# Patient Record
Sex: MALE | Race: WHITE | NOT HISPANIC OR LATINO | URBAN - METROPOLITAN AREA
[De-identification: names, ages, dates, MRNs, and addresses within clinical notes are randomized per-mention and may not be internally consistent; named-entity substitution may affect disease eponyms.]

---

## 2017-03-31 PROBLEM — D49.2 NEOPLASM OF UNSPECIFIED BEHAVIOR OF BONE, SOFT TISSUE, AND SKIN: Status: RESOLVED | Noted: 2017-03-17 | Resolved: 2017-03-31

## 2017-09-27 ENCOUNTER — APPOINTMENT (RX ONLY)
Dept: URBAN - METROPOLITAN AREA CLINIC 36 | Facility: CLINIC | Age: 82
Setting detail: DERMATOLOGY
End: 2017-09-27

## 2017-09-27 DIAGNOSIS — Z48.817 ENCOUNTER FOR SURGICAL AFTERCARE FOLLOWING SURGERY ON THE SKIN AND SUBCUTANEOUS TISSUE: ICD-10-CM

## 2017-09-27 PROBLEM — E78.5 HYPERLIPIDEMIA, UNSPECIFIED: Status: ACTIVE | Noted: 2017-09-27

## 2017-09-27 PROBLEM — Z85.828 PERSONAL HISTORY OF OTHER MALIGNANT NEOPLASM OF SKIN: Status: ACTIVE | Noted: 2017-09-27

## 2017-09-27 PROCEDURE — ? POST-OP WOUND CHECK

## 2017-09-27 PROCEDURE — 99024 POSTOP FOLLOW-UP VISIT: CPT

## 2017-09-27 ASSESSMENT — LOCATION DETAILED DESCRIPTION DERM: LOCATION DETAILED: RIGHT MEDIAL CANTHUS

## 2017-09-27 ASSESSMENT — LOCATION SIMPLE DESCRIPTION DERM: LOCATION SIMPLE: RIGHT EYELID

## 2017-09-27 ASSESSMENT — LOCATION ZONE DERM: LOCATION ZONE: EYELID

## 2017-09-27 NOTE — PROCEDURE: POST-OP WOUND CHECK
Add 79784 Cpt? (Important Note: In 2017 The Use Of 19762 Is Being Tracked By Cms To Determine Future Global Period Reimbursement For Global Periods): yes
Detail Level: Detailed
Body Location Override (Optional - Billing Will Still Be Based On Selected Body Map Location If Applicable): right medial canthus

## 2018-01-25 ENCOUNTER — APPOINTMENT (RX ONLY)
Dept: URBAN - METROPOLITAN AREA CLINIC 4 | Facility: CLINIC | Age: 83
Setting detail: DERMATOLOGY
End: 2018-01-25

## 2018-01-25 DIAGNOSIS — L57.0 ACTINIC KERATOSIS: ICD-10-CM

## 2018-01-25 DIAGNOSIS — L81.4 OTHER MELANIN HYPERPIGMENTATION: ICD-10-CM

## 2018-01-25 DIAGNOSIS — D18.0 HEMANGIOMA: ICD-10-CM

## 2018-01-25 DIAGNOSIS — L82.1 OTHER SEBORRHEIC KERATOSIS: ICD-10-CM

## 2018-01-25 DIAGNOSIS — D22 MELANOCYTIC NEVI: ICD-10-CM

## 2018-01-25 PROBLEM — D18.01 HEMANGIOMA OF SKIN AND SUBCUTANEOUS TISSUE: Status: ACTIVE | Noted: 2018-01-25

## 2018-01-25 PROBLEM — D22.5 MELANOCYTIC NEVI OF TRUNK: Status: ACTIVE | Noted: 2018-01-25

## 2018-01-25 PROBLEM — D22.61 MELANOCYTIC NEVI OF RIGHT UPPER LIMB, INCLUDING SHOULDER: Status: ACTIVE | Noted: 2018-01-25

## 2018-01-25 PROBLEM — D22.62 MELANOCYTIC NEVI OF LEFT UPPER LIMB, INCLUDING SHOULDER: Status: ACTIVE | Noted: 2018-01-25

## 2018-01-25 PROCEDURE — 17004 DESTROY PREMAL LESIONS 15/>: CPT

## 2018-01-25 PROCEDURE — 99213 OFFICE O/P EST LOW 20 MIN: CPT | Mod: 25

## 2018-01-25 PROCEDURE — ? LIQUID NITROGEN

## 2018-01-25 PROCEDURE — ? COUNSELING

## 2018-01-25 ASSESSMENT — LOCATION ZONE DERM
LOCATION ZONE: TRUNK
LOCATION ZONE: HAND
LOCATION ZONE: NECK
LOCATION ZONE: EAR
LOCATION ZONE: ARM
LOCATION ZONE: SCALP
LOCATION ZONE: FACE

## 2018-01-25 ASSESSMENT — LOCATION SIMPLE DESCRIPTION DERM
LOCATION SIMPLE: NECK
LOCATION SIMPLE: RIGHT HAND
LOCATION SIMPLE: LEFT UPPER BACK
LOCATION SIMPLE: SCALP
LOCATION SIMPLE: RIGHT WRIST
LOCATION SIMPLE: LEFT TEMPLE
LOCATION SIMPLE: ABDOMEN
LOCATION SIMPLE: CHEST
LOCATION SIMPLE: LEFT CHEEK
LOCATION SIMPLE: LEFT WRIST
LOCATION SIMPLE: RIGHT FOREARM
LOCATION SIMPLE: RIGHT CHEEK
LOCATION SIMPLE: LEFT UPPER ARM
LOCATION SIMPLE: RIGHT FOREHEAD
LOCATION SIMPLE: RIGHT UPPER ARM
LOCATION SIMPLE: RIGHT SCALP
LOCATION SIMPLE: LEFT FOREARM
LOCATION SIMPLE: RIGHT LOWER BACK
LOCATION SIMPLE: LEFT SCALP
LOCATION SIMPLE: RIGHT EAR
LOCATION SIMPLE: LOWER BACK
LOCATION SIMPLE: LEFT HAND
LOCATION SIMPLE: LEFT ELBOW
LOCATION SIMPLE: LEFT OCCIPITAL SCALP
LOCATION SIMPLE: GLABELLA

## 2018-01-25 ASSESSMENT — LOCATION DETAILED DESCRIPTION DERM
LOCATION DETAILED: LEFT MEDIAL TEMPLE
LOCATION DETAILED: GLABELLA
LOCATION DETAILED: LEFT RADIAL DORSAL HAND
LOCATION DETAILED: LEFT DISTAL DORSAL FOREARM
LOCATION DETAILED: LEFT VENTRAL DISTAL FOREARM
LOCATION DETAILED: LEFT ANTECUBITAL SKIN
LOCATION DETAILED: RIGHT ANTERIOR DISTAL UPPER ARM
LOCATION DETAILED: RIGHT INFERIOR FRONTAL SCALP
LOCATION DETAILED: LEFT ANTERIOR DISTAL UPPER ARM
LOCATION DETAILED: LEFT VENTRAL PROXIMAL FOREARM
LOCATION DETAILED: LEFT CENTRAL FRONTAL SCALP
LOCATION DETAILED: RIGHT INFERIOR CENTRAL MALAR CHEEK
LOCATION DETAILED: LEFT SUPERIOR LATERAL NECK
LOCATION DETAILED: LEFT MEDIAL INFERIOR CHEST
LOCATION DETAILED: RIGHT DORSAL WRIST
LOCATION DETAILED: RIGHT LATERAL FOREHEAD
LOCATION DETAILED: RIGHT SUPERIOR MEDIAL MIDBACK
LOCATION DETAILED: EPIGASTRIC SKIN
LOCATION DETAILED: RIGHT CENTRAL LATERAL NECK
LOCATION DETAILED: RIGHT FOREHEAD
LOCATION DETAILED: LEFT SUPERIOR PARIETAL SCALP
LOCATION DETAILED: SUPERIOR LUMBAR SPINE
LOCATION DETAILED: LEFT INFERIOR UPPER BACK
LOCATION DETAILED: RIGHT SUPERIOR HELIX
LOCATION DETAILED: RIGHT CENTRAL MALAR CHEEK
LOCATION DETAILED: RIGHT SUPERIOR PARIETAL SCALP
LOCATION DETAILED: LEFT DORSAL WRIST
LOCATION DETAILED: RIGHT SUPERIOR LATERAL NECK
LOCATION DETAILED: LEFT SUPERIOR LATERAL MALAR CHEEK
LOCATION DETAILED: LEFT SUPERIOR OCCIPITAL SCALP
LOCATION DETAILED: RIGHT VENTRAL PROXIMAL FOREARM
LOCATION DETAILED: RIGHT DISTAL DORSAL FOREARM
LOCATION DETAILED: LEFT PROXIMAL DORSAL FOREARM
LOCATION DETAILED: RIGHT PROXIMAL DORSAL FOREARM
LOCATION DETAILED: RIGHT MEDIAL FRONTAL SCALP
LOCATION DETAILED: RIGHT RADIAL DORSAL HAND

## 2018-07-26 ENCOUNTER — APPOINTMENT (RX ONLY)
Dept: URBAN - METROPOLITAN AREA CLINIC 4 | Facility: CLINIC | Age: 83
Setting detail: DERMATOLOGY
End: 2018-07-26

## 2018-07-26 DIAGNOSIS — L57.0 ACTINIC KERATOSIS: ICD-10-CM

## 2018-07-26 DIAGNOSIS — D22 MELANOCYTIC NEVI: ICD-10-CM

## 2018-07-26 DIAGNOSIS — D18.0 HEMANGIOMA: ICD-10-CM

## 2018-07-26 DIAGNOSIS — L81.4 OTHER MELANIN HYPERPIGMENTATION: ICD-10-CM

## 2018-07-26 DIAGNOSIS — L82.1 OTHER SEBORRHEIC KERATOSIS: ICD-10-CM

## 2018-07-26 PROBLEM — D22.62 MELANOCYTIC NEVI OF LEFT UPPER LIMB, INCLUDING SHOULDER: Status: ACTIVE | Noted: 2018-07-26

## 2018-07-26 PROBLEM — D22.61 MELANOCYTIC NEVI OF RIGHT UPPER LIMB, INCLUDING SHOULDER: Status: ACTIVE | Noted: 2018-07-26

## 2018-07-26 PROBLEM — D22.5 MELANOCYTIC NEVI OF TRUNK: Status: ACTIVE | Noted: 2018-07-26

## 2018-07-26 PROBLEM — D48.5 NEOPLASM OF UNCERTAIN BEHAVIOR OF SKIN: Status: ACTIVE | Noted: 2018-07-26

## 2018-07-26 PROBLEM — D18.01 HEMANGIOMA OF SKIN AND SUBCUTANEOUS TISSUE: Status: ACTIVE | Noted: 2018-07-26

## 2018-07-26 PROCEDURE — 11100: CPT | Mod: 59

## 2018-07-26 PROCEDURE — ? COUNSELING

## 2018-07-26 PROCEDURE — 17004 DESTROY PREMAL LESIONS 15/>: CPT

## 2018-07-26 PROCEDURE — 99213 OFFICE O/P EST LOW 20 MIN: CPT | Mod: 25

## 2018-07-26 PROCEDURE — ? BIOPSY BY SHAVE METHOD

## 2018-07-26 PROCEDURE — ? LIQUID NITROGEN

## 2018-07-26 PROCEDURE — 11101: CPT

## 2018-07-26 ASSESSMENT — LOCATION ZONE DERM
LOCATION ZONE: FACE
LOCATION ZONE: EAR
LOCATION ZONE: ARM
LOCATION ZONE: FINGER
LOCATION ZONE: TRUNK
LOCATION ZONE: SCALP
LOCATION ZONE: HAND
LOCATION ZONE: NECK

## 2018-07-26 ASSESSMENT — LOCATION DETAILED DESCRIPTION DERM
LOCATION DETAILED: RIGHT FOREHEAD
LOCATION DETAILED: LEFT CENTRAL FRONTAL SCALP
LOCATION DETAILED: LEFT SUPERIOR OCCIPITAL SCALP
LOCATION DETAILED: RIGHT SUPERIOR PARIETAL SCALP
LOCATION DETAILED: RIGHT ANTERIOR DISTAL UPPER ARM
LOCATION DETAILED: RIGHT SUPERIOR LATERAL NECK
LOCATION DETAILED: RIGHT PROXIMAL DORSAL SMALL FINGER
LOCATION DETAILED: SUPERIOR LUMBAR SPINE
LOCATION DETAILED: LEFT PROXIMAL DORSAL FOREARM
LOCATION DETAILED: LEFT POSTERIOR NECK
LOCATION DETAILED: LEFT INFERIOR UPPER BACK
LOCATION DETAILED: LEFT VENTRAL DISTAL FOREARM
LOCATION DETAILED: LEFT DISTAL POSTERIOR UPPER ARM
LOCATION DETAILED: RIGHT VENTRAL PROXIMAL FOREARM
LOCATION DETAILED: LEFT VENTRAL PROXIMAL FOREARM
LOCATION DETAILED: RIGHT CENTRAL MALAR CHEEK
LOCATION DETAILED: GLABELLA
LOCATION DETAILED: LEFT ULNAR DORSAL HAND
LOCATION DETAILED: EPIGASTRIC SKIN
LOCATION DETAILED: MID-OCCIPITAL SCALP
LOCATION DETAILED: LEFT PROXIMAL RADIAL DORSAL FOREARM
LOCATION DETAILED: LEFT SUPERIOR HELIX
LOCATION DETAILED: LEFT CENTRAL LATERAL NECK
LOCATION DETAILED: RIGHT LATERAL FOREHEAD
LOCATION DETAILED: LEFT ANTERIOR DISTAL UPPER ARM
LOCATION DETAILED: RIGHT SUPERIOR MEDIAL MIDBACK
LOCATION DETAILED: LEFT SUPERIOR FOREHEAD
LOCATION DETAILED: RIGHT PROXIMAL DORSAL FOREARM
LOCATION DETAILED: LEFT RADIAL DORSAL HAND
LOCATION DETAILED: RIGHT CENTRAL FRONTAL SCALP
LOCATION DETAILED: MID POSTERIOR NECK
LOCATION DETAILED: LEFT LATERAL FOREHEAD
LOCATION DETAILED: LEFT ANTECUBITAL SKIN
LOCATION DETAILED: LEFT LATERAL FRONTAL SCALP
LOCATION DETAILED: LEFT CENTRAL PARIETAL SCALP
LOCATION DETAILED: RIGHT RADIAL DORSAL HAND
LOCATION DETAILED: LEFT DISTAL RADIAL DORSAL FOREARM
LOCATION DETAILED: RIGHT SUPERIOR LATERAL FOREHEAD
LOCATION DETAILED: RIGHT DISTAL DORSAL FOREARM
LOCATION DETAILED: RIGHT SUPERIOR HELIX
LOCATION DETAILED: LEFT MEDIAL INFERIOR CHEST
LOCATION DETAILED: LEFT DISTAL DORSAL FOREARM
LOCATION DETAILED: RIGHT INFERIOR POSTAURICULAR SKIN
LOCATION DETAILED: LEFT OCCIPITAL SCALP
LOCATION DETAILED: LEFT PROXIMAL POSTERIOR UPPER ARM
LOCATION DETAILED: RIGHT INFERIOR LATERAL MALAR CHEEK
LOCATION DETAILED: LEFT MEDIAL FRONTAL SCALP
LOCATION DETAILED: RIGHT SUPERIOR OCCIPITAL SCALP

## 2018-07-26 ASSESSMENT — LOCATION SIMPLE DESCRIPTION DERM
LOCATION SIMPLE: LEFT ELBOW
LOCATION SIMPLE: RIGHT SMALL FINGER
LOCATION SIMPLE: ABDOMEN
LOCATION SIMPLE: POSTERIOR NECK
LOCATION SIMPLE: CHEST
LOCATION SIMPLE: LEFT FOREHEAD
LOCATION SIMPLE: LEFT UPPER BACK
LOCATION SIMPLE: RIGHT FOREHEAD
LOCATION SIMPLE: SCALP
LOCATION SIMPLE: LEFT UPPER ARM
LOCATION SIMPLE: RIGHT UPPER ARM
LOCATION SIMPLE: RIGHT CHEEK
LOCATION SIMPLE: RIGHT LOWER BACK
LOCATION SIMPLE: POSTERIOR SCALP
LOCATION SIMPLE: GLABELLA
LOCATION SIMPLE: LEFT SCALP
LOCATION SIMPLE: NECK
LOCATION SIMPLE: RIGHT SCALP
LOCATION SIMPLE: RIGHT OCCIPITAL SCALP
LOCATION SIMPLE: LOWER BACK
LOCATION SIMPLE: RIGHT EAR
LOCATION SIMPLE: RIGHT HAND
LOCATION SIMPLE: LEFT OCCIPITAL SCALP
LOCATION SIMPLE: LEFT HAND
LOCATION SIMPLE: RIGHT FOREARM
LOCATION SIMPLE: LEFT FOREARM
LOCATION SIMPLE: LEFT EAR

## 2018-07-26 NOTE — PROCEDURE: BIOPSY BY SHAVE METHOD
Anesthesia Type: 1% lidocaine with epinephrine
Lab: [id number]
Detail Level: Detailed
Dressing: bandage
Electrodesiccation Text: The wound bed was treated with electrodesiccation after the biopsy was performed.
Silver Nitrate Text: The wound bed was treated with silver nitrate after the biopsy was performed.
Post-Care Instructions: I reviewed with the patient in detail post-care instructions. Patient is to keep the biopsy site dry overnight, and then apply bacitracin twice daily until healed. Patient may apply hydrogen peroxide soaks to remove any crusting.
Destruction After The Procedure: No
Was A Bandage Applied: Yes
Curettage Text: The wound bed was treated with curettage after the biopsy was performed.
Lab Facility: 
Type Of Destruction Used: Electrodesiccation
X Size Of Lesion In Cm: 0
Notification Instructions: Patient will be notified of biopsy results. However, patient instructed to call the office if not contacted within 2 weeks.
Billing Type: Third-Party Bill
Anesthesia Volume In Cc: 0.5
Wound Care: Vaseline
Size Of Lesion In Cm: 0.8
Biopsy Type: H and E
Depth Of Biopsy: dermis
Consent: Written consent was obtained and risks were reviewed including but not limited to scarring, infection, bleeding, scabbing, incomplete removal, nerve damage and allergy to anesthesia.
Hemostasis: Electrocautery
Biopsy Method: Personna blade
Cryotherapy Text: The wound bed was treated with cryotherapy after the biopsy was performed.
Electrodesiccation And Curettage Text: The wound bed was treated with electrodesiccation and curettage after the biopsy was performed.
Size Of Lesion In Cm: 0.6

## 2018-08-20 ENCOUNTER — APPOINTMENT (RX ONLY)
Dept: URBAN - METROPOLITAN AREA CLINIC 4 | Facility: CLINIC | Age: 83
Setting detail: DERMATOLOGY
End: 2018-08-20

## 2018-08-20 PROBLEM — C44.319 BASAL CELL CARCINOMA OF SKIN OF OTHER PARTS OF FACE: Status: ACTIVE | Noted: 2018-08-20

## 2018-08-20 PROCEDURE — 13132 CMPLX RPR F/C/C/M/N/AX/G/H/F: CPT

## 2018-08-20 PROCEDURE — ? EXCISION

## 2018-08-20 PROCEDURE — 11642 EXC F/E/E/N/L MAL+MRG 1.1-2: CPT

## 2018-08-20 NOTE — PROCEDURE: EXCISION
Hatchet Flap Text: The defect edges were debeveled with a #15 scalpel blade.  Given the location of the defect, shape of the defect and the proximity to free margins a hatchet flap was deemed most appropriate.  Using a sterile surgical marker, an appropriate hatchet flap was drawn incorporating the defect and placing the expected incisions within the relaxed skin tension lines where possible.    The area thus outlined was incised deep to adipose tissue with a #15 scalpel blade.  The skin margins were undermined to an appropriate distance in all directions utilizing iris scissors.
Cartilage Graft Text: The defect edges were debeveled with a #15 scalpel blade.  Given the location of the defect, shape of the defect, the fact the defect involved a full thickness cartilage defect a cartilage graft was deemed most appropriate.  An appropriate donor site was identified, cleansed, and anesthetized. The cartilage graft was then harvested and transferred to the recipient site, oriented appropriately and then sutured into place.  The secondary defect was then repaired using a primary closure.
No Repair - Repaired With Adjacent Surgical Defect Text (Leave Blank If You Do Not Want): After the excision the defect was repaired concurrently with another surgical defect which was in close approximation.
Composite Graft Text: The defect edges were debeveled with a #15 scalpel blade.  Given the location of the defect, shape of the defect, the proximity to free margins and the fact the defect was full thickness a composite graft was deemed most appropriate.  The defect was outline and then transferred to the donor site.  A full thickness graft was then excised from the donor site. The graft was then placed in the primary defect, oriented appropriately and then sutured into place.  The secondary defect was then repaired using a primary closure.
Repair Anesthesia Method: local infiltration
Island Pedicle Flap With Canthal Suspension Text: The defect edges were debeveled with a #15 scalpel blade.  Given the location of the defect, shape of the defect and the proximity to free margins an island pedicle advancement flap was deemed most appropriate.  Using a sterile surgical marker, an appropriate advancement flap was drawn incorporating the defect, outlining the appropriate donor tissue and placing the expected incisions within the relaxed skin tension lines where possible. The area thus outlined was incised deep to adipose tissue with a #15 scalpel blade.  The skin margins were undermined to an appropriate distance in all directions around the primary defect and laterally outward around the island pedicle utilizing iris scissors.  There was minimal undermining beneath the pedicle flap. A suspension suture was placed in the canthal tendon to prevent tension and prevent ectropion.
H Plasty Text: Given the location of the defect, shape of the defect and the proximity to free margins a H-plasty was deemed most appropriate for repair.  Using a sterile surgical marker, the appropriate advancement arms of the H-plasty were drawn incorporating the defect and placing the expected incisions within the relaxed skin tension lines where possible. The area thus outlined was incised deep to adipose tissue with a #15 scalpel blade. The skin margins were undermined to an appropriate distance in all directions utilizing iris scissors.  The opposing advancement arms were then advanced into place in opposite direction and anchored with interrupted buried subcutaneous sutures.
Bill [id number] For Specimen Handling/Conveyance To Laboratory?: no
Bilobed Transposition Flap Text: The defect edges were debeveled with a #15 scalpel blade.  Given the location of the defect and the proximity to free margins a bilobed transposition flap was deemed most appropriate.  Using a sterile surgical marker, an appropriate bilobe flap drawn around the defect.    The area thus outlined was incised deep to adipose tissue with a #15 scalpel blade.  The skin margins were undermined to an appropriate distance in all directions utilizing iris scissors.
Complex Repair And Rotation Flap Text: The defect edges were debeveled with a #15 scalpel blade.  The primary defect was closed partially with a complex linear closure.  Given the location of the remaining defect, shape of the defect and the proximity to free margins a rotation flap was deemed most appropriate for complete closure of the defect.  Using a sterile surgical marker, an appropriate advancement flap was drawn incorporating the defect and placing the expected incisions within the relaxed skin tension lines where possible.    The area thus outlined was incised deep to adipose tissue with a #15 scalpel blade.  The skin margins were undermined to an appropriate distance in all directions utilizing iris scissors.
Show Repair Surgeon Variable: Yes
Z Plasty Text: The lesion was extirpated to the level of the fat with a #15 scalpel blade.  Given the location of the defect, shape of the defect and the proximity to free margins a Z-plasty was deemed most appropriate for repair.  Using a sterile surgical marker, the appropriate transposition arms of the Z-plasty were drawn incorporating the defect and placing the expected incisions within the relaxed skin tension lines where possible.    The area thus outlined was incised deep to adipose tissue with a #15 scalpel blade.  The skin margins were undermined to an appropriate distance in all directions utilizing iris scissors.  The opposing transposition arms were then transposed into place in opposite direction and anchored with interrupted buried subcutaneous sutures.
Detail Level: Detailed
Repair Type: Complex
Post-Care Instructions: I reviewed with the patient in detail post-care instructions:\\n1. Apply bacitracin over the steri-strips.  \\n2. Cut non-stick pad (Telfa) to cover the steri-strips\\n3. Apply tape (hypafix) over the non-stick pad\\n4. Change once per day for 5 days\\n5. Shower with bandage on, change bandage after shower\\n\\nPatient is not to engage in any heavy lifting, exercise, hot tub, or swimming for the next 14 days. Should the patient develop any fevers, chills, bleeding, severe pain patient will contact the office immediately.
Scalpel Size: 15 blade
Curvilinear Excision Additional Text (Leave Blank If You Do Not Want): The margin was drawn around the clinically apparent lesion.  A curvilinear shape was then drawn on the skin incorporating the lesion and margins.  Incisions were then made along these lines to the appropriate tissue plane and the lesion was extirpated.
Advancement-Rotation Flap Text: The defect edges were debeveled with a #15 scalpel blade.  Given the location of the defect, shape of the defect and the proximity to free margins an advancement-rotation flap was deemed most appropriate.  Using a sterile surgical marker, an appropriate flap was drawn incorporating the defect and placing the expected incisions within the relaxed skin tension lines where possible. The area thus outlined was incised deep to adipose tissue with a #15 scalpel blade.  The skin margins were undermined to an appropriate distance in all directions utilizing iris scissors.
Fusiform Excision Additional Text (Leave Blank If You Do Not Want): The margin was drawn around the clinically apparent lesion.  A fusiform shape was then drawn on the skin incorporating the lesion and margins.  Incisions were then made along these lines to the appropriate tissue plane and the lesion was extirpated.
Consent was obtained from the patient. The risks and benefits to therapy were discussed in detail. Specifically, the risks of infection, scarring, bleeding, prolonged wound healing, incomplete removal, allergy to anesthesia, nerve injury and recurrence were addressed. Prior to the procedure, the treatment site was clearly identified and confirmed by the patient. All components of Universal Protocol/PAUSE Rule completed.
Surgeon (Optional): Cullen
Positioning (Leave Blank If You Do Not Want): The patient was placed in a comfortable position exposing the surgical site.
O-L Flap Text: The defect edges were debeveled with a #15 scalpel blade.  Given the location of the defect, shape of the defect and the proximity to free margins an O-L flap was deemed most appropriate.  Using a sterile surgical marker, an appropriate advancement flap was drawn incorporating the defect and placing the expected incisions within the relaxed skin tension lines where possible.    The area thus outlined was incised deep to adipose tissue with a #15 scalpel blade.  The skin margins were undermined to an appropriate distance in all directions utilizing iris scissors.
Bilateral Helical Rim Advancement Flap Text: The defect edges were debeveled with a #15 blade scalpel.  Given the location of the defect and the proximity to free margins (helical rim) a bilateral helical rim advancement flap was deemed most appropriate.  Using a sterile surgical marker, the appropriate advancement flaps were drawn incorporating the defect and placing the expected incisions between the helical rim and antihelix where possible.  The area thus outlined was incised through and through with a #15 scalpel blade.  With a skin hook and iris scissors, the flaps were gently and sharply undermined and freed up.
Paramedian Forehead Flap Text: A decision was made to reconstruct the defect utilizing an interpolation axial flap and a staged reconstruction.  A telfa template was made of the defect.  This telfa template was then used to outline the paramedian forehead pedicle flap.  The donor area for the pedicle flap was then injected with anesthesia.  The flap was excised through the skin and subcutaneous tissue down to the layer of the underlying musculature.  The pedicle flap was carefully excised within this deep plane to maintain its blood supply.  The edges of the donor site were undermined.   The donor site was closed in a primary fashion.  The pedicle was then rotated into position and sutured.  Once the tube was sutured into place, adequate blood supply was confirmed with blanching and refill.  The pedicle was then wrapped with xeroform gauze and dressed appropriately with a telfa and gauze bandage to ensure continued blood supply and protect the attached pedicle.
Size Of Lesion In Cm: 1.3
Skin Substitute Text: The defect edges were debeveled with a #15 scalpel blade.  Given the location of the defect, shape of the defect and the proximity to free margins a skin substitute graft was deemed most appropriate.  The graft material was trimmed to fit the size of the defect. The graft was then placed in the primary defect and oriented appropriately.
Slit Excision Additional Text (Leave Blank If You Do Not Want): A linear line was drawn on the skin overlying the lesion. An incision was made slowly until the lesion was visualized.  Once visualized, the lesion was removed with blunt dissection.
Complex Repair And Modified Advancement Flap Text: The defect edges were debeveled with a #15 scalpel blade.  The primary defect was closed partially with a complex linear closure.  Given the location of the remaining defect, shape of the defect and the proximity to free margins a modified advancement flap was deemed most appropriate for complete closure of the defect.  Using a sterile surgical marker, an appropriate advancement flap was drawn incorporating the defect and placing the expected incisions within the relaxed skin tension lines where possible.    The area thus outlined was incised deep to adipose tissue with a #15 scalpel blade.  The skin margins were undermined to an appropriate distance in all directions utilizing iris scissors.
Tissue Cultured Epidermal Autograft Text: The defect edges were debeveled with a #15 scalpel blade.  Given the location of the defect, shape of the defect and the proximity to free margins a tissue cultured epidermal autograft was deemed most appropriate.  The graft was then trimmed to fit the size of the defect.  The graft was then placed in the primary defect and oriented appropriately.
Lazy S Intermediate Repair Preamble Text (Leave Blank If You Do Not Want): Undermining was performed with blunt dissection.
Xenograft Text: The defect edges were debeveled with a #15 scalpel blade.  Given the location of the defect, shape of the defect and the proximity to free margins a xenograft was deemed most appropriate.  The graft was then trimmed to fit the size of the defect.  The graft was then placed in the primary defect and oriented appropriately.
Secondary Defect Width (In Cm): 0
Anesthesia Volume In Cc: 12
Complex Repair And Tissue Cultured Epidermal Autograft Text: The defect edges were debeveled with a #15 scalpel blade.  The primary defect was closed partially with a complex linear closure.  Given the location of the defect, shape of the defect and the proximity to free margins an tissue cultured epidermal autograft was deemed most appropriate to repair the remaining defect.  The graft was trimmed to fit the size of the remaining defect.  The graft was then placed in the primary defect, oriented appropriately, and sutured into place.
W Plasty Text: The lesion was extirpated to the level of the fat with a #15 scalpel blade.  Given the location of the defect, shape of the defect and the proximity to free margins a W-plasty was deemed most appropriate for repair.  Using a sterile surgical marker, the appropriate transposition arms of the W-plasty were drawn incorporating the defect and placing the expected incisions within the relaxed skin tension lines where possible.    The area thus outlined was incised deep to adipose tissue with a #15 scalpel blade.  The skin margins were undermined to an appropriate distance in all directions utilizing iris scissors.  The opposing transposition arms were then transposed into place in opposite direction and anchored with interrupted buried subcutaneous sutures.
Complex Repair And Dermal Autograft Text: The defect edges were debeveled with a #15 scalpel blade.  The primary defect was closed partially with a complex linear closure.  Given the location of the defect, shape of the defect and the proximity to free margins an dermal autograft was deemed most appropriate to repair the remaining defect.  The graft was trimmed to fit the size of the remaining defect.  The graft was then placed in the primary defect, oriented appropriately, and sutured into place.
Lip Wedge Excision Repair Text: Given the location of the defect and the proximity to free margins a full thickness wedge repair was deemed most appropriate.  Using a sterile surgical marker, the appropriate repair was drawn incorporating the defect and placing the expected incisions perpendicular to the vermilion border.  The vermilion border was also meticulously outlined to ensure appropriate reapproximation during the repair.  The area thus outlined was incised through and through with a #15 scalpel blade.  The muscularis and dermis were reaproximated with deep sutures following hemostasis. Care was taken to realign the vermilion border before proceeding with the superficial closure.  Once the vermilion was realigned the superfical and mucosal closure was finished.
Keystone Flap Text: The defect edges were debeveled with a #15 scalpel blade.  Given the location of the defect, shape of the defect a keystone flap was deemed most appropriate.  Using a sterile surgical marker, an appropriate keystone flap was drawn incorporating the defect, outlining the appropriate donor tissue and placing the expected incisions within the relaxed skin tension lines where possible. The area thus outlined was incised deep to adipose tissue with a #15 scalpel blade.  The skin margins were undermined to an appropriate distance in all directions around the primary defect and laterally outward around the flap utilizing iris scissors.
Helical Rim Advancement Flap Text: The defect edges were debeveled with a #15 blade scalpel.  Given the location of the defect and the proximity to free margins (helical rim) a double helical rim advancement flap was deemed most appropriate.  Using a sterile surgical marker, the appropriate advancement flaps were drawn incorporating the defect and placing the expected incisions between the helical rim and antihelix where possible.  The area thus outlined was incised through and through with a #15 scalpel blade.  With a skin hook and iris scissors, the flaps were gently and sharply undermined and freed up.
Dermal Closure: buried vertical mattress
Complex Repair And M Plasty Text: The defect edges were debeveled with a #15 scalpel blade.  The primary defect was closed partially with a complex linear closure.  Given the location of the remaining defect, shape of the defect and the proximity to free margins an M plasty was deemed most appropriate for complete closure of the defect.  Using a sterile surgical marker, an appropriate advancement flap was drawn incorporating the defect and placing the expected incisions within the relaxed skin tension lines where possible.    The area thus outlined was incised deep to adipose tissue with a #15 scalpel blade.  The skin margins were undermined to an appropriate distance in all directions utilizing iris scissors.
Excisional Biopsy Additional Text (Leave Blank If You Do Not Want): The margin was drawn around the clinically apparent lesion. An elliptical shape was then drawn on the skin incorporating the lesion and margins.  Incisions were then made along these lines to the appropriate tissue plane and the lesion was extirpated.
Complex Repair And A-T Advancement Flap Text: The defect edges were debeveled with a #15 scalpel blade.  The primary defect was closed partially with a complex linear closure.  Given the location of the remaining defect, shape of the defect and the proximity to free margins an A-T advancement flap was deemed most appropriate for complete closure of the defect.  Using a sterile surgical marker, an appropriate advancement flap was drawn incorporating the defect and placing the expected incisions within the relaxed skin tension lines where possible.    The area thus outlined was incised deep to adipose tissue with a #15 scalpel blade.  The skin margins were undermined to an appropriate distance in all directions utilizing iris scissors.
Complex Repair And Double Advancement Flap Text: The defect edges were debeveled with a #15 scalpel blade.  The primary defect was closed partially with a complex linear closure.  Given the location of the remaining defect, shape of the defect and the proximity to free margins a double advancement flap was deemed most appropriate for complete closure of the defect.  Using a sterile surgical marker, an appropriate advancement flap was drawn incorporating the defect and placing the expected incisions within the relaxed skin tension lines where possible.    The area thus outlined was incised deep to adipose tissue with a #15 scalpel blade.  The skin margins were undermined to an appropriate distance in all directions utilizing iris scissors.
Ear Star Wedge Flap Text: The defect edges were debeveled with a #15 blade scalpel.  Given the location of the defect and the proximity to free margins (helical rim) an ear star wedge flap was deemed most appropriate.  Using a sterile surgical marker, the appropriate flap was drawn incorporating the defect and placing the expected incisions between the helical rim and antihelix where possible.  The area thus outlined was incised through and through with a #15 scalpel blade.
Complex Repair And Xenograft Text: The defect edges were debeveled with a #15 scalpel blade.  The primary defect was closed partially with a complex linear closure.  Given the location of the defect, shape of the defect and the proximity to free margins a xenograft was deemed most appropriate to repair the remaining defect.  The graft was trimmed to fit the size of the remaining defect.  The graft was then placed in the primary defect, oriented appropriately, and sutured into place.
Elliptical Excision Additional Text (Leave Blank If You Do Not Want): The margin was drawn around the clinically apparent lesion.  An elliptical shape was then drawn on the skin incorporating the lesion and margins.  Incisions were then made along these lines to the appropriate tissue plane and the lesion was extirpated.
Repair Performed By Another Provider Text (Leave Blank If You Do Not Want): After the tissue was excised the defect was repaired by another provider.
Complex Repair And Rhombic Flap Text: The defect edges were debeveled with a #15 scalpel blade.  The primary defect was closed partially with a complex linear closure.  Given the location of the remaining defect, shape of the defect and the proximity to free margins a rhombic flap was deemed most appropriate for complete closure of the defect.  Using a sterile surgical marker, an appropriate advancement flap was drawn incorporating the defect and placing the expected incisions within the relaxed skin tension lines where possible.    The area thus outlined was incised deep to adipose tissue with a #15 scalpel blade.  The skin margins were undermined to an appropriate distance in all directions utilizing iris scissors.
Wound Care: Bacitracin
Body Location Override (Optional - Billing Will Still Be Based On Selected Body Map Location If Applicable): left central temple
Deep Sutures: 4-0 Vicryl
Epidermal Autograft Text: The defect edges were debeveled with a #15 scalpel blade.  Given the location of the defect, shape of the defect and the proximity to free margins an epidermal autograft was deemed most appropriate.  Using a sterile surgical marker, the primary defect shape was transferred to the donor site. The epidermal graft was then harvested.  The skin graft was then placed in the primary defect and oriented appropriately.
Purse String (Intermediate) Text: Given the location of the defect and the characteristics of the surrounding skin a purse string intermediate closure was deemed most appropriate.  Undermining was performed circumfirentially around the surgical defect.  A purse string suture was then placed and tightened.
Advancement Flap (Single) Text: The defect edges were debeveled with a #15 scalpel blade.  Given the location of the defect and the proximity to free margins a single advancement flap was deemed most appropriate.  Using a sterile surgical marker, an appropriate advancement flap was drawn incorporating the defect and placing the expected incisions within the relaxed skin tension lines where possible.    The area thus outlined was incised deep to adipose tissue with a #15 scalpel blade.  The skin margins were undermined to an appropriate distance in all directions utilizing iris scissors.
V-Y Flap Text: The defect edges were debeveled with a #15 scalpel blade.  Given the location of the defect, shape of the defect and the proximity to free margins a V-Y flap was deemed most appropriate.  Using a sterile surgical marker, an appropriate advancement flap was drawn incorporating the defect and placing the expected incisions within the relaxed skin tension lines where possible.    The area thus outlined was incised deep to adipose tissue with a #15 scalpel blade.  The skin margins were undermined to an appropriate distance in all directions utilizing iris scissors.
Complex Repair And O-T Advancement Flap Text: The defect edges were debeveled with a #15 scalpel blade.  The primary defect was closed partially with a complex linear closure.  Given the location of the remaining defect, shape of the defect and the proximity to free margins an O-T advancement flap was deemed most appropriate for complete closure of the defect.  Using a sterile surgical marker, an appropriate advancement flap was drawn incorporating the defect and placing the expected incisions within the relaxed skin tension lines where possible.    The area thus outlined was incised deep to adipose tissue with a #15 scalpel blade.  The skin margins were undermined to an appropriate distance in all directions utilizing iris scissors.
Dermal Autograft Text: The defect edges were debeveled with a #15 scalpel blade.  Given the location of the defect, shape of the defect and the proximity to free margins a dermal autograft was deemed most appropriate.  Using a sterile surgical marker, the primary defect shape was transferred to the donor site. The area thus outlined was incised deep to adipose tissue with a #15 scalpel blade.  The harvested graft was then trimmed of adipose and epidermal tissue until only dermis was left.  The skin graft was then placed in the primary defect and oriented appropriately.
Complex Repair And Split-Thickness Skin Graft Text: The defect edges were debeveled with a #15 scalpel blade.  The primary defect was closed partially with a complex linear closure.  Given the location of the defect, shape of the defect and the proximity to free margins a split thickness skin graft was deemed most appropriate to repair the remaining defect.  The graft was trimmed to fit the size of the remaining defect.  The graft was then placed in the primary defect, oriented appropriately, and sutured into place.
Melolabial Interpolation Flap Text: A decision was made to reconstruct the defect utilizing an interpolation axial flap and a staged reconstruction.  A telfa template was made of the defect.  This telfa template was then used to outline the melolabial interpolation flap.  The donor area for the pedicle flap was then injected with anesthesia.  The flap was excised through the skin and subcutaneous tissue down to the layer of the underlying musculature.  The pedicle flap was carefully excised within this deep plane to maintain its blood supply.  The edges of the donor site were undermined.   The donor site was closed in a primary fashion.  The pedicle was then rotated into position and sutured.  Once the tube was sutured into place, adequate blood supply was confirmed with blanching and refill.  The pedicle was then wrapped with xeroform gauze and dressed appropriately with a telfa and gauze bandage to ensure continued blood supply and protect the attached pedicle.
Hemostasis: Electrocautery
Undermining Location (Optional): in the superficial subcutaneous fat
Muscle Hinge Flap Text: The defect edges were debeveled with a #15 scalpel blade.  Given the size, depth and location of the defect and the proximity to free margins a muscle hinge flap was deemed most appropriate.  Using a sterile surgical marker, an appropriate hinge flap was drawn incorporating the defect. The area thus outlined was incised with a #15 scalpel blade.  The skin margins were undermined to an appropriate distance in all directions utilizing iris scissors.
Island Pedicle Flap Text: The defect edges were debeveled with a #15 scalpel blade.  Given the location of the defect, shape of the defect and the proximity to free margins an island pedicle advancement flap was deemed most appropriate.  Using a sterile surgical marker, an appropriate advancement flap was drawn incorporating the defect, outlining the appropriate donor tissue and placing the expected incisions within the relaxed skin tension lines where possible.    The area thus outlined was incised deep to adipose tissue with a #15 scalpel blade.  The skin margins were undermined to an appropriate distance in all directions around the primary defect and laterally outward around the island pedicle utilizing iris scissors.  There was minimal undermining beneath the pedicle flap.
Size Of Margin In Cm: 0.2
Lab: [id number]
Posterior Auricular Interpolation Flap Text: A decision was made to reconstruct the defect utilizing an interpolation axial flap and a staged reconstruction.  A telfa template was made of the defect.  This telfa template was then used to outline the posterior auricular interpolation flap.  The donor area for the pedicle flap was then injected with anesthesia.  The flap was excised through the skin and subcutaneous tissue down to the layer of the underlying musculature.  The pedicle flap was carefully excised within this deep plane to maintain its blood supply.  The edges of the donor site were undermined.   The donor site was closed in a primary fashion.  The pedicle was then rotated into position and sutured.  Once the tube was sutured into place, adequate blood supply was confirmed with blanching and refill.  The pedicle was then wrapped with xeroform gauze and dressed appropriately with a telfa and gauze bandage to ensure continued blood supply and protect the attached pedicle.
O-T Plasty Text: The defect edges were debeveled with a #15 scalpel blade.  Given the location of the defect, shape of the defect and the proximity to free margins an O-T plasty was deemed most appropriate.  Using a sterile surgical marker, an appropriate O-T plasty was drawn incorporating the defect and placing the expected incisions within the relaxed skin tension lines where possible.    The area thus outlined was incised deep to adipose tissue with a #15 scalpel blade.  The skin margins were undermined to an appropriate distance in all directions utilizing iris scissors.
Epidermal Sutures: 5-0 Vicryl Rapide
Complex Repair And Single Advancement Flap Text: The defect edges were debeveled with a #15 scalpel blade.  The primary defect was closed partially with a complex linear closure.  Given the location of the remaining defect, shape of the defect and the proximity to free margins a single advancement flap was deemed most appropriate for complete closure of the defect.  Using a sterile surgical marker, an appropriate advancement flap was drawn incorporating the defect and placing the expected incisions within the relaxed skin tension lines where possible.    The area thus outlined was incised deep to adipose tissue with a #15 scalpel blade.  The skin margins were undermined to an appropriate distance in all directions utilizing iris scissors.
Complex Repair Preamble Text (Leave Blank If You Do Not Want): Extensive wide undermining was performed.
Date Of Previous Biopsy (Optional): 7/26/18
Complex Repair And Epidermal Autograft Text: The defect edges were debeveled with a #15 scalpel blade.  The primary defect was closed partially with a complex linear closure.  Given the location of the defect, shape of the defect and the proximity to free margins an epidermal autograft was deemed most appropriate to repair the remaining defect.  The graft was trimmed to fit the size of the remaining defect.  The graft was then placed in the primary defect, oriented appropriately, and sutured into place.
Complex Repair And V-Y Plasty Text: The defect edges were debeveled with a #15 scalpel blade.  The primary defect was closed partially with a complex linear closure.  Given the location of the remaining defect, shape of the defect and the proximity to free margins a V-Y plasty was deemed most appropriate for complete closure of the defect.  Using a sterile surgical marker, an appropriate advancement flap was drawn incorporating the defect and placing the expected incisions within the relaxed skin tension lines where possible.    The area thus outlined was incised deep to adipose tissue with a #15 scalpel blade.  The skin margins were undermined to an appropriate distance in all directions utilizing iris scissors.
Additional Anesthesia Volume In Cc: 6
S Plasty Text: Given the location and shape of the defect, and the orientation of relaxed skin tension lines, an S-plasty was deemed most appropriate for repair.  Using a sterile surgical marker, the appropriate outline of the S-plasty was drawn, incorporating the defect and placing the expected incisions within the relaxed skin tension lines where possible.  The area thus outlined was incised deep to adipose tissue with a #15 scalpel blade.  The skin margins were undermined to an appropriate distance in all directions utilizing iris scissors. The skin flaps were advanced over the defect.  The opposing margins were then approximated with interrupted buried subcutaneous sutures.
Interpolation Flap Text: A decision was made to reconstruct the defect utilizing an interpolation axial flap and a staged reconstruction.  A telfa template was made of the defect.  This telfa template was then used to outline the interpolation flap.  The donor area for the pedicle flap was then injected with anesthesia.  The flap was excised through the skin and subcutaneous tissue down to the layer of the underlying musculature.  The interpolation flap was carefully excised within this deep plane to maintain its blood supply.  The edges of the donor site were undermined.   The donor site was closed in a primary fashion.  The pedicle was then rotated into position and sutured.  Once the tube was sutured into place, adequate blood supply was confirmed with blanching and refill.  The pedicle was then wrapped with xeroform gauze and dressed appropriately with a telfa and gauze bandage to ensure continued blood supply and protect the attached pedicle.
Ftsg Text: The defect edges were debeveled with a #15 scalpel blade.  Given the location of the defect, shape of the defect and the proximity to free margins a full thickness skin graft was deemed most appropriate.  Using a sterile surgical marker, the primary defect shape was transferred to the donor site. The area thus outlined was incised deep to adipose tissue with a #15 scalpel blade.  The harvested graft was then trimmed of adipose tissue until only dermis and epidermis was left.  The skin margins of the secondary defect were undermined to an appropriate distance in all directions utilizing iris scissors.  The secondary defect was closed with interrupted buried subcutaneous sutures.  The skin edges were then re-apposed with running  sutures.  The skin graft was then placed in the primary defect and oriented appropriately.
Rhombic Flap Text: The defect edges were debeveled with a #15 scalpel blade.  Given the location of the defect and the proximity to free margins a rhombic flap was deemed most appropriate.  Using a sterile surgical marker, an appropriate rhombic flap was drawn incorporating the defect.    The area thus outlined was incised deep to adipose tissue with a #15 scalpel blade.  The skin margins were undermined to an appropriate distance in all directions utilizing iris scissors.
Anesthesia Type: 1% lidocaine with epinephrine
Modified Advancement Flap Text: The defect edges were debeveled with a #15 scalpel blade.  Given the location of the defect, shape of the defect and the proximity to free margins a modified advancement flap was deemed most appropriate.  Using a sterile surgical marker, an appropriate advancement flap was drawn incorporating the defect and placing the expected incisions within the relaxed skin tension lines where possible.    The area thus outlined was incised deep to adipose tissue with a #15 scalpel blade.  The skin margins were undermined to an appropriate distance in all directions utilizing iris scissors.
Excision Depth: adipose tissue
Crescentic Advancement Flap Text: The defect edges were debeveled with a #15 scalpel blade.  Given the location of the defect and the proximity to free margins a crescentic advancement flap was deemed most appropriate.  Using a sterile surgical marker, the appropriate advancement flap was drawn incorporating the defect and placing the expected incisions within the relaxed skin tension lines where possible.    The area thus outlined was incised deep to adipose tissue with a #15 scalpel blade.  The skin margins were undermined to an appropriate distance in all directions utilizing iris scissors.
Estimated Blood Loss (Cc): minimal
Alar Island Pedicle Flap Text: The defect edges were debeveled with a #15 scalpel blade.  Given the location of the defect, shape of the defect and the proximity to the alar rim an island pedicle advancement flap was deemed most appropriate.  Using a sterile surgical marker, an appropriate advancement flap was drawn incorporating the defect, outlining the appropriate donor tissue and placing the expected incisions within the nasal ala running parallel to the alar rim. The area thus outlined was incised with a #15 scalpel blade.  The skin margins were undermined minimally to an appropriate distance in all directions around the primary defect and laterally outward around the island pedicle utilizing iris scissors.  There was minimal undermining beneath the pedicle flap.
Dorsal Nasal Flap Text: The defect edges were debeveled with a #15 scalpel blade.  Given the location of the defect and the proximity to free margins a dorsal nasal flap was deemed most appropriate.  Using a sterile surgical marker, an appropriate dorsal nasal flap was drawn around the defect.    The area thus outlined was incised deep to adipose tissue with a #15 scalpel blade.  The skin margins were undermined to an appropriate distance in all directions utilizing iris scissors.
Rotation Flap Text: The defect edges were debeveled with a #15 scalpel blade.  Given the location of the defect, shape of the defect and the proximity to free margins a rotation flap was deemed most appropriate.  Using a sterile surgical marker, an appropriate rotation flap was drawn incorporating the defect and placing the expected incisions within the relaxed skin tension lines where possible.    The area thus outlined was incised deep to adipose tissue with a #15 scalpel blade.  The skin margins were undermined to an appropriate distance in all directions utilizing iris scissors.
Complex Repair And Z Plasty Text: The defect edges were debeveled with a #15 scalpel blade.  The primary defect was closed partially with a complex linear closure.  Given the location of the remaining defect, shape of the defect and the proximity to free margins a Z plasty was deemed most appropriate for complete closure of the defect.  Using a sterile surgical marker, an appropriate advancement flap was drawn incorporating the defect and placing the expected incisions within the relaxed skin tension lines where possible.    The area thus outlined was incised deep to adipose tissue with a #15 scalpel blade.  The skin margins were undermined to an appropriate distance in all directions utilizing iris scissors.
Double Island Pedicle Flap Text: The defect edges were debeveled with a #15 scalpel blade.  Given the location of the defect, shape of the defect and the proximity to free margins a double island pedicle advancement flap was deemed most appropriate.  Using a sterile surgical marker, an appropriate advancement flap was drawn incorporating the defect, outlining the appropriate donor tissue and placing the expected incisions within the relaxed skin tension lines where possible.    The area thus outlined was incised deep to adipose tissue with a #15 scalpel blade.  The skin margins were undermined to an appropriate distance in all directions around the primary defect and laterally outward around the island pedicle utilizing iris scissors.  There was minimal undermining beneath the pedicle flap.
Complex Repair And Ftsg Text: The defect edges were debeveled with a #15 scalpel blade.  The primary defect was closed partially with a complex linear closure.  Given the location of the defect, shape of the defect and the proximity to free margins a full thickness skin graft was deemed most appropriate to repair the remaining defect.  The graft was trimmed to fit the size of the remaining defect.  The graft was then placed in the primary defect, oriented appropriately, and sutured into place.
Intermediate / Complex Repair - Final Wound Length In Cm: 4.5
Melolabial Transposition Flap Text: The defect edges were debeveled with a #15 scalpel blade.  Given the location of the defect and the proximity to free margins a melolabial flap was deemed most appropriate.  Using a sterile surgical marker, an appropriate melolabial transposition flap was drawn incorporating the defect.    The area thus outlined was incised deep to adipose tissue with a #15 scalpel blade.  The skin margins were undermined to an appropriate distance in all directions utilizing iris scissors.
Bilobed Flap Text: The defect edges were debeveled with a #15 scalpel blade.  Given the location of the defect and the proximity to free margins a bilobe flap was deemed most appropriate.  Using a sterile surgical marker, an appropriate bilobe flap drawn around the defect.    The area thus outlined was incised deep to adipose tissue with a #15 scalpel blade.  The skin margins were undermined to an appropriate distance in all directions utilizing iris scissors.
Epidermal Closure Graft Donor Site (Optional): simple interrupted
A-T Advancement Flap Text: The defect edges were debeveled with a #15 scalpel blade.  Given the location of the defect, shape of the defect and the proximity to free margins an A-T advancement flap was deemed most appropriate.  Using a sterile surgical marker, an appropriate advancement flap was drawn incorporating the defect and placing the expected incisions within the relaxed skin tension lines where possible.    The area thus outlined was incised deep to adipose tissue with a #15 scalpel blade.  The skin margins were undermined to an appropriate distance in all directions utilizing iris scissors.
Complex Repair And Skin Substitute Graft Text: The defect edges were debeveled with a #15 scalpel blade.  The primary defect was closed partially with a complex linear closure.  Given the location of the remaining defect, shape of the defect and the proximity to free margins a skin substitute graft was deemed most appropriate to repair the remaining defect.  The graft was trimmed to fit the size of the remaining defect.  The graft was then placed in the primary defect, oriented appropriately, and sutured into place.
Partial Purse String (Simple) Text: Given the location of the defect and the characteristics of the surrounding skin a simple purse string closure was deemed most appropriate.  Undermining was performed circumferentially around the surgical defect.  A purse string suture was then placed and tightened. Wound tension of the circular defect prevented complete closure of the wound.
Trilobed Flap Text: The defect edges were debeveled with a #15 scalpel blade.  Given the location of the defect and the proximity to free margins a trilobed flap was deemed most appropriate.  Using a sterile surgical marker, an appropriate trilobed flap drawn around the defect.    The area thus outlined was incised deep to adipose tissue with a #15 scalpel blade.  The skin margins were undermined to an appropriate distance in all directions utilizing iris scissors.
Complex Repair And W Plasty Text: The defect edges were debeveled with a #15 scalpel blade.  The primary defect was closed partially with a complex linear closure.  Given the location of the remaining defect, shape of the defect and the proximity to free margins a W plasty was deemed most appropriate for complete closure of the defect.  Using a sterile surgical marker, an appropriate advancement flap was drawn incorporating the defect and placing the expected incisions within the relaxed skin tension lines where possible.    The area thus outlined was incised deep to adipose tissue with a #15 scalpel blade.  The skin margins were undermined to an appropriate distance in all directions utilizing iris scissors.
O-Z Plasty Text: The defect edges were debeveled with a #15 scalpel blade.  Given the location of the defect, shape of the defect and the proximity to free margins an O-Z plasty (double transposition flap) was deemed most appropriate.  Using a sterile surgical marker, the appropriate transposition flaps were drawn incorporating the defect and placing the expected incisions within the relaxed skin tension lines where possible.    The area thus outlined was incised deep to adipose tissue with a #15 scalpel blade.  The skin margins were undermined to an appropriate distance in all directions utilizing iris scissors.  Hemostasis was achieved with electrocautery.  The flaps were then transposed into place, one clockwise and the other counterclockwise, and anchored with interrupted buried subcutaneous sutures.
Complex Repair And Transposition Flap Text: The defect edges were debeveled with a #15 scalpel blade.  The primary defect was closed partially with a complex linear closure.  Given the location of the remaining defect, shape of the defect and the proximity to free margins a transposition flap was deemed most appropriate for complete closure of the defect.  Using a sterile surgical marker, an appropriate advancement flap was drawn incorporating the defect and placing the expected incisions within the relaxed skin tension lines where possible.    The area thus outlined was incised deep to adipose tissue with a #15 scalpel blade.  The skin margins were undermined to an appropriate distance in all directions utilizing iris scissors.
Graft Donor Site Bandage (Optional-Leave Blank If You Don't Want In Note): Steri-strips and a pressure bandage were applied to the donor site.
Burow's Advancement Flap Text: The defect edges were debeveled with a #15 scalpel blade.  Given the location of the defect and the proximity to free margins a Burow's advancement flap was deemed most appropriate.  Using a sterile surgical marker, the appropriate advancement flap was drawn incorporating the defect and placing the expected incisions within the relaxed skin tension lines where possible.    The area thus outlined was incised deep to adipose tissue with a #15 scalpel blade.  The skin margins were undermined to an appropriate distance in all directions utilizing iris scissors.
Referring Physician (Optional): Jayne CORONEL
Cheek-To-Nose Interpolation Flap Text: A decision was made to reconstruct the defect utilizing an interpolation axial flap and a staged reconstruction.  A telfa template was made of the defect.  This telfa template was then used to outline the Cheek-To-Nose Interpolation flap.  The donor area for the pedicle flap was then injected with anesthesia.  The flap was excised through the skin and subcutaneous tissue down to the layer of the underlying musculature.  The interpolation flap was carefully excised within this deep plane to maintain its blood supply.  The edges of the donor site were undermined.   The donor site was closed in a primary fashion.  The pedicle was then rotated into position and sutured.  Once the tube was sutured into place, adequate blood supply was confirmed with blanching and refill.  The pedicle was then wrapped with xeroform gauze and dressed appropriately with a telfa and gauze bandage to ensure continued blood supply and protect the attached pedicle.
Saucerization Excision Additional Text (Leave Blank If You Do Not Want): The margin was drawn around the clinically apparent lesion.  Incisions were then made along these lines, in a tangential fashion, to the appropriate tissue plane and the lesion was extirpated.
Mucosal Advancement Flap Text: Given the location of the defect, shape of the defect and the proximity to free margins a mucosal advancement flap was deemed most appropriate. Incisions were made with a 15 blade scalpel in the appropriate fashion along the cutaneous vermilion border and the mucosal lip. The remaining actinically damaged mucosal tissue was excised.  The mucosal advancement flap was then elevated to the gingival sulcus with care taken to preserve the neurovascular structures and advanced into the primary defect. Care was taken to ensure that precise realignment of the vermilion border was achieved.
Mercedes Flap Text: The defect edges were debeveled with a #15 scalpel blade.  Given the location of the defect, shape of the defect and the proximity to free margins a Mercedes flap was deemed most appropriate.  Using a sterile surgical marker, an appropriate advancement flap was drawn incorporating the defect and placing the expected incisions within the relaxed skin tension lines where possible. The area thus outlined was incised deep to adipose tissue with a #15 scalpel blade.  The skin margins were undermined to an appropriate distance in all directions utilizing iris scissors.
Pre-Excision Curettage Text (Leave Blank If You Do Not Want): Prior to drawing the surgical margin the visible lesion was removed with electrodesiccation and curettage to clearly define the lesion size.
Complex Repair And Dorsal Nasal Flap Text: The defect edges were debeveled with a #15 scalpel blade.  The primary defect was closed partially with a complex linear closure.  Given the location of the remaining defect, shape of the defect and the proximity to free margins a dorsal nasal flap was deemed most appropriate for complete closure of the defect.  Using a sterile surgical marker, an appropriate flap was drawn incorporating the defect and placing the expected incisions within the relaxed skin tension lines where possible.    The area thus outlined was incised deep to adipose tissue with a #15 scalpel blade.  The skin margins were undermined to an appropriate distance in all directions utilizing iris scissors.
Complex Repair And O-L Flap Text: The defect edges were debeveled with a #15 scalpel blade.  The primary defect was closed partially with a complex linear closure.  Given the location of the remaining defect, shape of the defect and the proximity to free margins an O-L flap was deemed most appropriate for complete closure of the defect.  Using a sterile surgical marker, an appropriate flap was drawn incorporating the defect and placing the expected incisions within the relaxed skin tension lines where possible.    The area thus outlined was incised deep to adipose tissue with a #15 scalpel blade.  The skin margins were undermined to an appropriate distance in all directions utilizing iris scissors.
Home Suture Removal Text: Patient was provided a home suture removal kit and will remove their sutures at home.  If they have any questions or difficulties they will call the office.
Previous Accession (Optional): [id number]
Complex Repair And Melolabial Flap Text: The defect edges were debeveled with a #15 scalpel blade.  The primary defect was closed partially with a complex linear closure.  Given the location of the remaining defect, shape of the defect and the proximity to free margins a melolabial flap was deemed most appropriate for complete closure of the defect.  Using a sterile surgical marker, an appropriate advancement flap was drawn incorporating the defect and placing the expected incisions within the relaxed skin tension lines where possible.    The area thus outlined was incised deep to adipose tissue with a #15 scalpel blade.  The skin margins were undermined to an appropriate distance in all directions utilizing iris scissors.
Excision Method: Elliptical
Dressing: dry sterile dressing
Spiral Flap Text: The defect edges were debeveled with a #15 scalpel blade.  Given the location of the defect, shape of the defect and the proximity to free margins a spiral flap was deemed most appropriate.  Using a sterile surgical marker, an appropriate rotation flap was drawn incorporating the defect and placing the expected incisions within the relaxed skin tension lines where possible. The area thus outlined was incised deep to adipose tissue with a #15 scalpel blade.  The skin margins were undermined to an appropriate distance in all directions utilizing iris scissors.
Cheek Interpolation Flap Text: A decision was made to reconstruct the defect utilizing an interpolation axial flap and a staged reconstruction.  A telfa template was made of the defect.  This telfa template was then used to outline the Cheek Interpolation flap.  The donor area for the pedicle flap was then injected with anesthesia.  The flap was excised through the skin and subcutaneous tissue down to the layer of the underlying musculature.  The interpolation flap was carefully excised within this deep plane to maintain its blood supply.  The edges of the donor site were undermined.   The donor site was closed in a primary fashion.  The pedicle was then rotated into position and sutured.  Once the tube was sutured into place, adequate blood supply was confirmed with blanching and refill.  The pedicle was then wrapped with xeroform gauze and dressed appropriately with a telfa and gauze bandage to ensure continued blood supply and protect the attached pedicle.
Partial Purse String (Intermediate) Text: Given the location of the defect and the characteristics of the surrounding skin an intermediate purse string closure was deemed most appropriate.  Undermining was performed circumferentially around the surgical defect.  A purse string suture was then placed and tightened. Wound tension of the circular defect prevented complete closure of the wound.
O-T Advancement Flap Text: The defect edges were debeveled with a #15 scalpel blade.  Given the location of the defect, shape of the defect and the proximity to free margins an O-T advancement flap was deemed most appropriate.  Using a sterile surgical marker, an appropriate advancement flap was drawn incorporating the defect and placing the expected incisions within the relaxed skin tension lines where possible.    The area thus outlined was incised deep to adipose tissue with a #15 scalpel blade.  The skin margins were undermined to an appropriate distance in all directions utilizing iris scissors.
Billing Type: Third-Party Bill
Star Wedge Flap Text: The defect edges were debeveled with a #15 scalpel blade.  Given the location of the defect, shape of the defect and the proximity to free margins a star wedge flap was deemed most appropriate.  Using a sterile surgical marker, an appropriate rotation flap was drawn incorporating the defect and placing the expected incisions within the relaxed skin tension lines where possible. The area thus outlined was incised deep to adipose tissue with a #15 scalpel blade.  The skin margins were undermined to an appropriate distance in all directions utilizing iris scissors.
Bi-Rhombic Flap Text: The defect edges were debeveled with a #15 scalpel blade.  Given the location of the defect and the proximity to free margins a bi-rhombic flap was deemed most appropriate.  Using a sterile surgical marker, an appropriate rhombic flap was drawn incorporating the defect. The area thus outlined was incised deep to adipose tissue with a #15 scalpel blade.  The skin margins were undermined to an appropriate distance in all directions utilizing iris scissors.
Path Notes (To The Dermatopathologist): Please check margins.
Split-Thickness Skin Graft Text: The defect edges were debeveled with a #15 scalpel blade.  Given the location of the defect, shape of the defect and the proximity to free margins a split thickness skin graft was deemed most appropriate.  Using a sterile surgical marker, the primary defect shape was transferred to the donor site. The split thickness graft was then harvested.  The skin graft was then placed in the primary defect and oriented appropriately.
Mastoid Interpolation Flap Text: A decision was made to reconstruct the defect utilizing an interpolation axial flap and a staged reconstruction.  A telfa template was made of the defect.  This telfa template was then used to outline the mastoid interpolation flap.  The donor area for the pedicle flap was then injected with anesthesia.  The flap was excised through the skin and subcutaneous tissue down to the layer of the underlying musculature.  The pedicle flap was carefully excised within this deep plane to maintain its blood supply.  The edges of the donor site were undermined.   The donor site was closed in a primary fashion.  The pedicle was then rotated into position and sutured.  Once the tube was sutured into place, adequate blood supply was confirmed with blanching and refill.  The pedicle was then wrapped with xeroform gauze and dressed appropriately with a telfa and gauze bandage to ensure continued blood supply and protect the attached pedicle.
Transposition Flap Text: The defect edges were debeveled with a #15 scalpel blade.  Given the location of the defect and the proximity to free margins a transposition flap was deemed most appropriate.  Using a sterile surgical marker, an appropriate transposition flap was drawn incorporating the defect.    The area thus outlined was incised deep to adipose tissue with a #15 scalpel blade.  The skin margins were undermined to an appropriate distance in all directions utilizing iris scissors.
Complex Repair And Bilobe Flap Text: The defect edges were debeveled with a #15 scalpel blade.  The primary defect was closed partially with a complex linear closure.  Given the location of the remaining defect, shape of the defect and the proximity to free margins a bilobe flap was deemed most appropriate for complete closure of the defect.  Using a sterile surgical marker, an appropriate advancement flap was drawn incorporating the defect and placing the expected incisions within the relaxed skin tension lines where possible.    The area thus outlined was incised deep to adipose tissue with a #15 scalpel blade.  The skin margins were undermined to an appropriate distance in all directions utilizing iris scissors.
Complex Repair And Double M Plasty Text: The defect edges were debeveled with a #15 scalpel blade.  The primary defect was closed partially with a complex linear closure.  Given the location of the remaining defect, shape of the defect and the proximity to free margins a double M plasty was deemed most appropriate for complete closure of the defect.  Using a sterile surgical marker, an appropriate advancement flap was drawn incorporating the defect and placing the expected incisions within the relaxed skin tension lines where possible.    The area thus outlined was incised deep to adipose tissue with a #15 scalpel blade.  The skin margins were undermined to an appropriate distance in all directions utilizing iris scissors.
Perilesional Excision Additional Text (Leave Blank If You Do Not Want): The margin was drawn around the clinically apparent lesion. Incisions were then made along these lines to the appropriate tissue plane and the lesion was extirpated.
Island Pedicle Flap-Requiring Vessel Identification Text: The defect edges were debeveled with a #15 scalpel blade.  Given the location of the defect, shape of the defect and the proximity to free margins an island pedicle advancement flap was deemed most appropriate.  Using a sterile surgical marker, an appropriate advancement flap was drawn, based on the axial vessel mentioned above, incorporating the defect, outlining the appropriate donor tissue and placing the expected incisions within the relaxed skin tension lines where possible.    The area thus outlined was incised deep to adipose tissue with a #15 scalpel blade.  The skin margins were undermined to an appropriate distance in all directions around the primary defect and laterally outward around the island pedicle utilizing iris scissors.  There was minimal undermining beneath the pedicle flap.
Lab Facility: 
V-Y Plasty Text: The defect edges were debeveled with a #15 scalpel blade.  Given the location of the defect, shape of the defect and the proximity to free margins an V-Y advancement flap was deemed most appropriate.  Using a sterile surgical marker, an appropriate advancement flap was drawn incorporating the defect and placing the expected incisions within the relaxed skin tension lines where possible.    The area thus outlined was incised deep to adipose tissue with a #15 scalpel blade.  The skin margins were undermined to an appropriate distance in all directions utilizing iris scissors.
Banner Transposition Flap Text: The defect edges were debeveled with a #15 scalpel blade.  Given the location of the defect and the proximity to free margins a Banner transposition flap was deemed most appropriate.  Using a sterile surgical marker, an appropriate flap drawn around the defect. The area thus outlined was incised deep to adipose tissue with a #15 scalpel blade.  The skin margins were undermined to an appropriate distance in all directions utilizing iris scissors.
Epidermal Closure: running subcuticular
Purse String (Simple) Text: Given the location of the defect and the characteristics of the surrounding skin a purse string simple closure was deemed most appropriate.  Undermining was performed circumferentially around the surgical defect.  A purse string suture was then placed and tightened.
Advancement Flap (Double) Text: The defect edges were debeveled with a #15 scalpel blade.  Given the location of the defect and the proximity to free margins a double advancement flap was deemed most appropriate.  Using a sterile surgical marker, the appropriate advancement flaps were drawn incorporating the defect and placing the expected incisions within the relaxed skin tension lines where possible.    The area thus outlined was incised deep to adipose tissue with a #15 scalpel blade.  The skin margins were undermined to an appropriate distance in all directions utilizing iris scissors.

## 2018-09-20 ENCOUNTER — APPOINTMENT (RX ONLY)
Dept: URBAN - METROPOLITAN AREA CLINIC 4 | Facility: CLINIC | Age: 83
Setting detail: DERMATOLOGY
End: 2018-09-20

## 2018-09-20 DIAGNOSIS — Z86.007 PERSONAL HISTORY OF IN-SITU NEOPLASM OF SKIN: ICD-10-CM

## 2018-09-20 DIAGNOSIS — L81.4 OTHER MELANIN HYPERPIGMENTATION: ICD-10-CM

## 2018-09-20 DIAGNOSIS — L57.0 ACTINIC KERATOSIS: ICD-10-CM

## 2018-09-20 PROBLEM — Z85.828 PERSONAL HISTORY OF OTHER MALIGNANT NEOPLASM OF SKIN: Status: ACTIVE | Noted: 2018-09-20

## 2018-09-20 PROCEDURE — 99213 OFFICE O/P EST LOW 20 MIN: CPT | Mod: 25

## 2018-09-20 PROCEDURE — 17000 DESTRUCT PREMALG LESION: CPT

## 2018-09-20 PROCEDURE — ? COUNSELING

## 2018-09-20 PROCEDURE — ? OBSERVATION

## 2018-09-20 PROCEDURE — ? LIQUID NITROGEN

## 2018-09-20 PROCEDURE — 17003 DESTRUCT PREMALG LES 2-14: CPT

## 2018-09-20 ASSESSMENT — LOCATION DETAILED DESCRIPTION DERM
LOCATION DETAILED: INFERIOR MID FOREHEAD
LOCATION DETAILED: RIGHT INFERIOR LATERAL FOREHEAD
LOCATION DETAILED: RIGHT CENTRAL ZYGOMA
LOCATION DETAILED: LEFT SUPERIOR MEDIAL MALAR CHEEK
LOCATION DETAILED: RIGHT CENTRAL MALAR CHEEK
LOCATION DETAILED: LEFT INFERIOR LATERAL MALAR CHEEK
LOCATION DETAILED: LEFT MEDIAL FRONTAL SCALP
LOCATION DETAILED: LEFT MEDIAL FOREHEAD
LOCATION DETAILED: RIGHT INFERIOR CENTRAL MALAR CHEEK
LOCATION DETAILED: LEFT CENTRAL MALAR CHEEK

## 2018-09-20 ASSESSMENT — LOCATION SIMPLE DESCRIPTION DERM
LOCATION SIMPLE: RIGHT ZYGOMA
LOCATION SIMPLE: INFERIOR FOREHEAD
LOCATION SIMPLE: LEFT SCALP
LOCATION SIMPLE: LEFT CHEEK
LOCATION SIMPLE: RIGHT CHEEK
LOCATION SIMPLE: RIGHT FOREHEAD
LOCATION SIMPLE: LEFT FOREHEAD

## 2018-09-20 ASSESSMENT — LOCATION ZONE DERM
LOCATION ZONE: SCALP
LOCATION ZONE: FACE

## 2019-01-17 ENCOUNTER — APPOINTMENT (RX ONLY)
Dept: URBAN - METROPOLITAN AREA CLINIC 4 | Facility: CLINIC | Age: 84
Setting detail: DERMATOLOGY
End: 2019-01-17

## 2019-01-17 DIAGNOSIS — L82.1 OTHER SEBORRHEIC KERATOSIS: ICD-10-CM

## 2019-01-17 DIAGNOSIS — L57.0 ACTINIC KERATOSIS: ICD-10-CM

## 2019-01-17 DIAGNOSIS — L81.4 OTHER MELANIN HYPERPIGMENTATION: ICD-10-CM

## 2019-01-17 DIAGNOSIS — D18.0 HEMANGIOMA: ICD-10-CM

## 2019-01-17 DIAGNOSIS — D22 MELANOCYTIC NEVI: ICD-10-CM

## 2019-01-17 PROBLEM — D18.01 HEMANGIOMA OF SKIN AND SUBCUTANEOUS TISSUE: Status: ACTIVE | Noted: 2019-01-17

## 2019-01-17 PROBLEM — D22.62 MELANOCYTIC NEVI OF LEFT UPPER LIMB, INCLUDING SHOULDER: Status: ACTIVE | Noted: 2019-01-17

## 2019-01-17 PROBLEM — D22.61 MELANOCYTIC NEVI OF RIGHT UPPER LIMB, INCLUDING SHOULDER: Status: ACTIVE | Noted: 2019-01-17

## 2019-01-17 PROBLEM — D22.5 MELANOCYTIC NEVI OF TRUNK: Status: ACTIVE | Noted: 2019-01-17

## 2019-01-17 PROCEDURE — 17003 DESTRUCT PREMALG LES 2-14: CPT

## 2019-01-17 PROCEDURE — 17000 DESTRUCT PREMALG LESION: CPT

## 2019-01-17 PROCEDURE — ? LIQUID NITROGEN

## 2019-01-17 PROCEDURE — 99213 OFFICE O/P EST LOW 20 MIN: CPT | Mod: 25

## 2019-01-17 PROCEDURE — ? COUNSELING

## 2019-01-17 ASSESSMENT — LOCATION SIMPLE DESCRIPTION DERM
LOCATION SIMPLE: RIGHT HAND
LOCATION SIMPLE: LEFT OCCIPITAL SCALP
LOCATION SIMPLE: RIGHT FOREARM
LOCATION SIMPLE: GLABELLA
LOCATION SIMPLE: LEFT UPPER BACK
LOCATION SIMPLE: LEFT HAND
LOCATION SIMPLE: LEFT FOREARM
LOCATION SIMPLE: LEFT ELBOW
LOCATION SIMPLE: RIGHT CHEEK
LOCATION SIMPLE: CHEST
LOCATION SIMPLE: LOWER BACK
LOCATION SIMPLE: RIGHT SCALP
LOCATION SIMPLE: ABDOMEN
LOCATION SIMPLE: RIGHT UPPER ARM
LOCATION SIMPLE: RIGHT LOWER BACK
LOCATION SIMPLE: LEFT UPPER ARM
LOCATION SIMPLE: SCALP

## 2019-01-17 ASSESSMENT — LOCATION DETAILED DESCRIPTION DERM
LOCATION DETAILED: RIGHT SUPERIOR PARIETAL SCALP
LOCATION DETAILED: RIGHT ANTERIOR DISTAL UPPER ARM
LOCATION DETAILED: EPIGASTRIC SKIN
LOCATION DETAILED: RIGHT VENTRAL PROXIMAL FOREARM
LOCATION DETAILED: LEFT INFERIOR UPPER BACK
LOCATION DETAILED: LEFT MEDIAL INFERIOR CHEST
LOCATION DETAILED: LEFT RADIAL DORSAL HAND
LOCATION DETAILED: LEFT ANTECUBITAL SKIN
LOCATION DETAILED: LEFT VENTRAL PROXIMAL FOREARM
LOCATION DETAILED: LEFT ANTERIOR DISTAL UPPER ARM
LOCATION DETAILED: RIGHT CENTRAL MALAR CHEEK
LOCATION DETAILED: LEFT VENTRAL DISTAL FOREARM
LOCATION DETAILED: RIGHT SUPERIOR MEDIAL MIDBACK
LOCATION DETAILED: SUPERIOR LUMBAR SPINE
LOCATION DETAILED: LEFT SUPERIOR OCCIPITAL SCALP
LOCATION DETAILED: RIGHT RADIAL DORSAL HAND
LOCATION DETAILED: GLABELLA
LOCATION DETAILED: RIGHT CENTRAL FRONTAL SCALP

## 2019-01-17 ASSESSMENT — LOCATION ZONE DERM
LOCATION ZONE: SCALP
LOCATION ZONE: HAND
LOCATION ZONE: FACE
LOCATION ZONE: ARM
LOCATION ZONE: TRUNK

## 2019-01-17 NOTE — PROCEDURE: LIQUID NITROGEN
Post-Care Instructions: I reviewed with the patient in detail post-care instructions. Patient is to wear sunprotection, and avoid picking at any of the treated lesions. Pt may apply Vaseline to crusted or scabbing areas.
Duration Of Freeze Thaw-Cycle (Seconds): 0
Detail Level: Detailed
Render Post-Care Instructions In Note?: no
Consent: The patient's consent was obtained including but not limited to risks of crusting, scabbing, blistering, scarring, darker or lighter pigmentary change, recurrence, incomplete removal and infection.
Medical Necessity Information: It is in your best interest to select a reason for this procedure from the list below. All of these items fulfill various CMS LCD requirements except the new and changing color options.
Medical Necessity Clause: This procedure was medically necessary because the lesions that were treated were:  If lesion does not resolve, bx is needed.

## 2019-07-18 ENCOUNTER — APPOINTMENT (RX ONLY)
Dept: URBAN - METROPOLITAN AREA CLINIC 4 | Facility: CLINIC | Age: 84
Setting detail: DERMATOLOGY
End: 2019-07-18

## 2019-07-18 DIAGNOSIS — D22 MELANOCYTIC NEVI: ICD-10-CM

## 2019-07-18 DIAGNOSIS — D18.0 HEMANGIOMA: ICD-10-CM

## 2019-07-18 DIAGNOSIS — L82.1 OTHER SEBORRHEIC KERATOSIS: ICD-10-CM

## 2019-07-18 DIAGNOSIS — L57.0 ACTINIC KERATOSIS: ICD-10-CM

## 2019-07-18 DIAGNOSIS — L81.4 OTHER MELANIN HYPERPIGMENTATION: ICD-10-CM

## 2019-07-18 PROBLEM — D18.01 HEMANGIOMA OF SKIN AND SUBCUTANEOUS TISSUE: Status: ACTIVE | Noted: 2019-07-18

## 2019-07-18 PROBLEM — D48.5 NEOPLASM OF UNCERTAIN BEHAVIOR OF SKIN: Status: ACTIVE | Noted: 2019-07-18

## 2019-07-18 PROBLEM — D22.62 MELANOCYTIC NEVI OF LEFT UPPER LIMB, INCLUDING SHOULDER: Status: ACTIVE | Noted: 2019-07-18

## 2019-07-18 PROBLEM — D22.5 MELANOCYTIC NEVI OF TRUNK: Status: ACTIVE | Noted: 2019-07-18

## 2019-07-18 PROBLEM — D22.61 MELANOCYTIC NEVI OF RIGHT UPPER LIMB, INCLUDING SHOULDER: Status: ACTIVE | Noted: 2019-07-18

## 2019-07-18 PROCEDURE — 11102 TANGNTL BX SKIN SINGLE LES: CPT | Mod: 59

## 2019-07-18 PROCEDURE — 99213 OFFICE O/P EST LOW 20 MIN: CPT | Mod: 25

## 2019-07-18 PROCEDURE — 17004 DESTROY PREMAL LESIONS 15/>: CPT

## 2019-07-18 PROCEDURE — ? BIOPSY BY SHAVE METHOD

## 2019-07-18 PROCEDURE — ? COUNSELING

## 2019-07-18 PROCEDURE — ? LIQUID NITROGEN

## 2019-07-18 ASSESSMENT — LOCATION DETAILED DESCRIPTION DERM
LOCATION DETAILED: RIGHT FOREHEAD
LOCATION DETAILED: RIGHT DORSAL WRIST
LOCATION DETAILED: LEFT PROXIMAL RADIAL DORSAL FOREARM
LOCATION DETAILED: RIGHT SUPERIOR CENTRAL BUCCAL CHEEK
LOCATION DETAILED: LEFT DISTAL LATERAL POSTERIOR UPPER ARM
LOCATION DETAILED: LEFT LATERAL ELBOW
LOCATION DETAILED: LEFT DISTAL RADIAL DORSAL FOREARM
LOCATION DETAILED: RIGHT PROXIMAL RADIAL DORSAL FOREARM
LOCATION DETAILED: RIGHT ANTERIOR DISTAL UPPER ARM
LOCATION DETAILED: GLABELLA
LOCATION DETAILED: LEFT MEDIAL FOREHEAD
LOCATION DETAILED: RIGHT SUPERIOR HELIX
LOCATION DETAILED: LEFT SUPERIOR FRONTAL SCALP
LOCATION DETAILED: LEFT VENTRAL PROXIMAL FOREARM
LOCATION DETAILED: LEFT DISTAL DORSAL FOREARM
LOCATION DETAILED: LEFT MEDIAL INFERIOR CHEST
LOCATION DETAILED: LEFT INFERIOR LATERAL FOREHEAD
LOCATION DETAILED: LEFT POSTERIOR EAR
LOCATION DETAILED: LEFT SUPERIOR HELIX
LOCATION DETAILED: RIGHT PROXIMAL DORSAL FOREARM
LOCATION DETAILED: RIGHT SUPERIOR FOREHEAD
LOCATION DETAILED: RIGHT CENTRAL FRONTAL SCALP
LOCATION DETAILED: LEFT ANTERIOR DISTAL UPPER ARM
LOCATION DETAILED: LEFT SUPERIOR MEDIAL MALAR CHEEK
LOCATION DETAILED: LEFT VENTRAL DISTAL FOREARM
LOCATION DETAILED: EPIGASTRIC SKIN
LOCATION DETAILED: LEFT SUPERIOR MEDIAL FOREHEAD
LOCATION DETAILED: RIGHT CENTRAL MALAR CHEEK
LOCATION DETAILED: LEFT LATERAL TEMPLE
LOCATION DETAILED: RIGHT SUPERIOR MEDIAL MIDBACK
LOCATION DETAILED: LEFT SUPERIOR CENTRAL MALAR CHEEK
LOCATION DETAILED: SUPERIOR LUMBAR SPINE
LOCATION DETAILED: LEFT RADIAL DORSAL HAND
LOCATION DETAILED: NASAL DORSUM
LOCATION DETAILED: RIGHT RADIAL DORSAL HAND
LOCATION DETAILED: RIGHT VENTRAL PROXIMAL FOREARM
LOCATION DETAILED: LEFT INFERIOR UPPER BACK
LOCATION DETAILED: RIGHT SUPERIOR POSTERIOR PARIETAL SCALP
LOCATION DETAILED: LEFT ANTIHELIX
LOCATION DETAILED: LEFT FOREHEAD
LOCATION DETAILED: RIGHT INFERIOR MEDIAL MALAR CHEEK
LOCATION DETAILED: LEFT LATERAL ZYGOMA
LOCATION DETAILED: LEFT DISTAL POSTERIOR UPPER ARM
LOCATION DETAILED: LEFT ANTECUBITAL SKIN

## 2019-07-18 ASSESSMENT — LOCATION SIMPLE DESCRIPTION DERM
LOCATION SIMPLE: LEFT FOREHEAD
LOCATION SIMPLE: CHEST
LOCATION SIMPLE: RIGHT HAND
LOCATION SIMPLE: RIGHT EAR
LOCATION SIMPLE: RIGHT WRIST
LOCATION SIMPLE: ABDOMEN
LOCATION SIMPLE: RIGHT UPPER ARM
LOCATION SIMPLE: RIGHT CHEEK
LOCATION SIMPLE: SCALP
LOCATION SIMPLE: LEFT HAND
LOCATION SIMPLE: LEFT UPPER ARM
LOCATION SIMPLE: NOSE
LOCATION SIMPLE: LEFT FOREARM
LOCATION SIMPLE: RIGHT FOREARM
LOCATION SIMPLE: LEFT EAR
LOCATION SIMPLE: LEFT ZYGOMA
LOCATION SIMPLE: LOWER BACK
LOCATION SIMPLE: POSTERIOR SCALP
LOCATION SIMPLE: LEFT TEMPLE
LOCATION SIMPLE: RIGHT FOREHEAD
LOCATION SIMPLE: LEFT CHEEK
LOCATION SIMPLE: LEFT UPPER BACK
LOCATION SIMPLE: GLABELLA
LOCATION SIMPLE: RIGHT SCALP
LOCATION SIMPLE: RIGHT LOWER BACK
LOCATION SIMPLE: LEFT ELBOW

## 2019-07-18 ASSESSMENT — LOCATION ZONE DERM
LOCATION ZONE: SCALP
LOCATION ZONE: ARM
LOCATION ZONE: TRUNK
LOCATION ZONE: FACE
LOCATION ZONE: EAR
LOCATION ZONE: NOSE
LOCATION ZONE: HAND

## 2019-07-18 NOTE — PROCEDURE: BIOPSY BY SHAVE METHOD
Electrodesiccation Text: The wound bed was treated with electrodesiccation after the biopsy was performed.
Notification Instructions: Patient will be notified of biopsy results. However, patient instructed to call the office if not contacted within 2 weeks.
Biopsy Type: H and E
Lab Facility: 
Wound Care: Vaseline
X Size Of Lesion In Cm: 0
Post-Care Instructions: I reviewed with the patient in detail post-care instructions. Patient is to keep the biopsy site dry overnight, and then apply bacitracin twice daily until healed. Patient may apply hydrogen peroxide soaks to remove any crusting.
Electrodesiccation And Curettage Text: The wound bed was treated with electrodesiccation and curettage after the biopsy was performed.
Dressing: bandage
Anesthesia Volume In Cc: 0.5
Type Of Destruction Used: Electrodesiccation
Consent: Written consent was obtained and risks were reviewed including but not limited to scarring, infection, bleeding, scabbing, incomplete removal, nerve damage and allergy to anesthesia.
Bill For Surgical Tray: no
Curettage Text: The wound bed was treated with curettage after the biopsy was performed.
Hemostasis: Electrocautery
Silver Nitrate Text: The wound bed was treated with silver nitrate after the biopsy was performed.
Biopsy Method: Personna blade
Detail Level: Detailed
Was A Bandage Applied: Yes
Cryotherapy Text: The wound bed was treated with cryotherapy after the biopsy was performed.
Depth Of Biopsy: dermis
Size Of Lesion In Cm: 0.3
Anesthesia Type: 1% lidocaine with epinephrine
Lab: [id number]
Billing Type: Third-Party Bill

## 2019-08-15 ENCOUNTER — APPOINTMENT (RX ONLY)
Dept: URBAN - METROPOLITAN AREA CLINIC 4 | Facility: CLINIC | Age: 84
Setting detail: DERMATOLOGY
End: 2019-08-15

## 2019-08-15 DIAGNOSIS — L57.0 ACTINIC KERATOSIS: ICD-10-CM

## 2019-08-15 DIAGNOSIS — L82.1 OTHER SEBORRHEIC KERATOSIS: ICD-10-CM

## 2019-08-15 DIAGNOSIS — D18.0 HEMANGIOMA: ICD-10-CM

## 2019-08-15 DIAGNOSIS — L81.4 OTHER MELANIN HYPERPIGMENTATION: ICD-10-CM

## 2019-08-15 DIAGNOSIS — D22 MELANOCYTIC NEVI: ICD-10-CM

## 2019-08-15 DIAGNOSIS — Z87.2 PERSONAL HISTORY OF DISEASES OF THE SKIN AND SUBCUTANEOUS TISSUE: ICD-10-CM

## 2019-08-15 PROBLEM — D22.62 MELANOCYTIC NEVI OF LEFT UPPER LIMB, INCLUDING SHOULDER: Status: ACTIVE | Noted: 2019-08-15

## 2019-08-15 PROBLEM — D18.01 HEMANGIOMA OF SKIN AND SUBCUTANEOUS TISSUE: Status: ACTIVE | Noted: 2019-08-15

## 2019-08-15 PROBLEM — D22.61 MELANOCYTIC NEVI OF RIGHT UPPER LIMB, INCLUDING SHOULDER: Status: ACTIVE | Noted: 2019-08-15

## 2019-08-15 PROBLEM — D22.5 MELANOCYTIC NEVI OF TRUNK: Status: ACTIVE | Noted: 2019-08-15

## 2019-08-15 PROCEDURE — ? COUNSELING

## 2019-08-15 PROCEDURE — ? OBSERVATION

## 2019-08-15 PROCEDURE — 99213 OFFICE O/P EST LOW 20 MIN: CPT | Mod: 25

## 2019-08-15 PROCEDURE — 17004 DESTROY PREMAL LESIONS 15/>: CPT

## 2019-08-15 PROCEDURE — ? LIQUID NITROGEN

## 2019-08-15 ASSESSMENT — LOCATION ZONE DERM
LOCATION ZONE: ARM
LOCATION ZONE: FACE
LOCATION ZONE: HAND
LOCATION ZONE: EAR
LOCATION ZONE: SCALP
LOCATION ZONE: TRUNK
LOCATION ZONE: NOSE

## 2019-08-15 ASSESSMENT — LOCATION SIMPLE DESCRIPTION DERM
LOCATION SIMPLE: RIGHT LOWER BACK
LOCATION SIMPLE: LEFT FOREARM
LOCATION SIMPLE: NOSE
LOCATION SIMPLE: ABDOMEN
LOCATION SIMPLE: RIGHT EAR
LOCATION SIMPLE: LEFT WRIST
LOCATION SIMPLE: LEFT HAND
LOCATION SIMPLE: CHEST
LOCATION SIMPLE: LEFT SCALP
LOCATION SIMPLE: POSTERIOR SCALP
LOCATION SIMPLE: GLABELLA
LOCATION SIMPLE: LOWER BACK
LOCATION SIMPLE: LEFT UPPER BACK
LOCATION SIMPLE: RIGHT FOREARM
LOCATION SIMPLE: LEFT EAR
LOCATION SIMPLE: RIGHT ELBOW
LOCATION SIMPLE: LEFT UPPER ARM
LOCATION SIMPLE: LEFT ELBOW
LOCATION SIMPLE: RIGHT HAND
LOCATION SIMPLE: RIGHT UPPER ARM
LOCATION SIMPLE: LEFT OCCIPITAL SCALP
LOCATION SIMPLE: RIGHT TEMPLE
LOCATION SIMPLE: LEFT FOREHEAD
LOCATION SIMPLE: RIGHT FOREHEAD
LOCATION SIMPLE: RIGHT CHEEK
LOCATION SIMPLE: RIGHT OCCIPITAL SCALP
LOCATION SIMPLE: LEFT CHEEK

## 2019-08-15 ASSESSMENT — LOCATION DETAILED DESCRIPTION DERM
LOCATION DETAILED: RIGHT PROXIMAL RADIAL DORSAL FOREARM
LOCATION DETAILED: RIGHT PROXIMAL DORSAL FOREARM
LOCATION DETAILED: LEFT INFERIOR UPPER BACK
LOCATION DETAILED: LEFT SUPERIOR CENTRAL MALAR CHEEK
LOCATION DETAILED: LEFT ANTERIOR DISTAL UPPER ARM
LOCATION DETAILED: NASAL DORSUM
LOCATION DETAILED: RIGHT ELBOW
LOCATION DETAILED: RIGHT INFERIOR CENTRAL MALAR CHEEK
LOCATION DETAILED: RIGHT CENTRAL MALAR CHEEK
LOCATION DETAILED: GLABELLA
LOCATION DETAILED: RIGHT SUPERIOR OCCIPITAL SCALP
LOCATION DETAILED: RIGHT SUPERIOR HELIX
LOCATION DETAILED: LEFT PROXIMAL DORSAL FOREARM
LOCATION DETAILED: RIGHT INFERIOR FOREHEAD
LOCATION DETAILED: MID-OCCIPITAL SCALP
LOCATION DETAILED: LEFT VENTRAL PROXIMAL FOREARM
LOCATION DETAILED: LEFT DORSAL WRIST
LOCATION DETAILED: RIGHT DISTAL RADIAL DORSAL FOREARM
LOCATION DETAILED: RIGHT ANTERIOR DISTAL UPPER ARM
LOCATION DETAILED: LEFT RADIAL DORSAL HAND
LOCATION DETAILED: RIGHT SUPERIOR MEDIAL MIDBACK
LOCATION DETAILED: LEFT SUPERIOR MEDIAL FOREHEAD
LOCATION DETAILED: LEFT SUPERIOR OCCIPITAL SCALP
LOCATION DETAILED: LEFT ANTECUBITAL SKIN
LOCATION DETAILED: EPIGASTRIC SKIN
LOCATION DETAILED: LEFT DISTAL DORSAL FOREARM
LOCATION DETAILED: LEFT MEDIAL INFERIOR CHEST
LOCATION DETAILED: LEFT ELBOW
LOCATION DETAILED: RIGHT DISTAL DORSAL FOREARM
LOCATION DETAILED: SUPERIOR LUMBAR SPINE
LOCATION DETAILED: LEFT SUPERIOR HELIX
LOCATION DETAILED: RIGHT INFERIOR MEDIAL FOREHEAD
LOCATION DETAILED: RIGHT CENTRAL BUCCAL CHEEK
LOCATION DETAILED: RIGHT CENTRAL TEMPLE
LOCATION DETAILED: LEFT VENTRAL DISTAL FOREARM
LOCATION DETAILED: RIGHT VENTRAL PROXIMAL FOREARM
LOCATION DETAILED: RIGHT RADIAL DORSAL HAND
LOCATION DETAILED: LEFT MEDIAL FRONTAL SCALP

## 2021-03-30 ENCOUNTER — HOSPITAL ENCOUNTER (OUTPATIENT)
Dept: RADIOLOGY | Facility: MEDICAL CENTER | Age: 86
End: 2021-03-30
Payer: MEDICARE

## 2021-03-30 ENCOUNTER — HOSPITAL ENCOUNTER (INPATIENT)
Facility: MEDICAL CENTER | Age: 86
LOS: 6 days | DRG: 038 | End: 2021-04-06
Attending: HOSPITALIST | Admitting: HOSPITALIST
Payer: MEDICARE

## 2021-03-30 DIAGNOSIS — I63.9 ACUTE CVA (CEREBROVASCULAR ACCIDENT) (HCC): ICD-10-CM

## 2021-03-30 LAB
BASOPHILS # BLD AUTO: 0.7 % (ref 0–1.8)
BASOPHILS # BLD: 0.04 K/UL (ref 0–0.12)
EOSINOPHIL # BLD AUTO: 0.24 K/UL (ref 0–0.51)
EOSINOPHIL NFR BLD: 4.2 % (ref 0–6.9)
ERYTHROCYTE [DISTWIDTH] IN BLOOD BY AUTOMATED COUNT: 44.5 FL (ref 35.9–50)
HCT VFR BLD AUTO: 47.4 % (ref 42–52)
HGB BLD-MCNC: 15.8 G/DL (ref 14–18)
IMM GRANULOCYTES # BLD AUTO: 0.03 K/UL (ref 0–0.11)
IMM GRANULOCYTES NFR BLD AUTO: 0.5 % (ref 0–0.9)
LYMPHOCYTES # BLD AUTO: 0.66 K/UL (ref 1–4.8)
LYMPHOCYTES NFR BLD: 11.4 % (ref 22–41)
MCH RBC QN AUTO: 31.2 PG (ref 27–33)
MCHC RBC AUTO-ENTMCNC: 33.3 G/DL (ref 33.7–35.3)
MCV RBC AUTO: 93.5 FL (ref 81.4–97.8)
MONOCYTES # BLD AUTO: 0.51 K/UL (ref 0–0.85)
MONOCYTES NFR BLD AUTO: 8.8 % (ref 0–13.4)
NEUTROPHILS # BLD AUTO: 4.3 K/UL (ref 1.82–7.42)
NEUTROPHILS NFR BLD: 74.4 % (ref 44–72)
NRBC # BLD AUTO: 0 K/UL
NRBC BLD-RTO: 0 /100 WBC
PLATELET # BLD AUTO: 193 K/UL (ref 164–446)
PMV BLD AUTO: 10.1 FL (ref 9–12.9)
RBC # BLD AUTO: 5.07 M/UL (ref 4.7–6.1)
WBC # BLD AUTO: 5.8 K/UL (ref 4.8–10.8)

## 2021-03-30 PROCEDURE — G0378 HOSPITAL OBSERVATION PER HR: HCPCS

## 2021-03-30 PROCEDURE — 36415 COLL VENOUS BLD VENIPUNCTURE: CPT

## 2021-03-30 PROCEDURE — 80053 COMPREHEN METABOLIC PANEL: CPT

## 2021-03-30 PROCEDURE — 83036 HEMOGLOBIN GLYCOSYLATED A1C: CPT

## 2021-03-30 PROCEDURE — 80061 LIPID PANEL: CPT

## 2021-03-30 PROCEDURE — 86780 TREPONEMA PALLIDUM: CPT

## 2021-03-30 PROCEDURE — 85025 COMPLETE CBC W/AUTO DIFF WBC: CPT

## 2021-03-30 PROCEDURE — 99220 PR INITIAL OBSERVATION CARE,LEVL III: CPT | Performed by: STUDENT IN AN ORGANIZED HEALTH CARE EDUCATION/TRAINING PROGRAM

## 2021-03-30 PROCEDURE — A9270 NON-COVERED ITEM OR SERVICE: HCPCS | Performed by: STUDENT IN AN ORGANIZED HEALTH CARE EDUCATION/TRAINING PROGRAM

## 2021-03-30 PROCEDURE — 82607 VITAMIN B-12: CPT

## 2021-03-30 PROCEDURE — 83735 ASSAY OF MAGNESIUM: CPT

## 2021-03-30 PROCEDURE — 700102 HCHG RX REV CODE 250 W/ 637 OVERRIDE(OP): Performed by: STUDENT IN AN ORGANIZED HEALTH CARE EDUCATION/TRAINING PROGRAM

## 2021-03-30 PROCEDURE — 82746 ASSAY OF FOLIC ACID SERUM: CPT

## 2021-03-30 RX ORDER — ASPIRIN 300 MG/1
300 SUPPOSITORY RECTAL DAILY
Status: DISCONTINUED | OUTPATIENT
Start: 2021-03-30 | End: 2021-03-31

## 2021-03-30 RX ORDER — ASPIRIN 325 MG
325 TABLET ORAL DAILY
Status: DISCONTINUED | OUTPATIENT
Start: 2021-03-30 | End: 2021-03-31

## 2021-03-30 RX ORDER — ACETAMINOPHEN 325 MG/1
650 TABLET ORAL EVERY 6 HOURS PRN
Status: DISCONTINUED | OUTPATIENT
Start: 2021-03-30 | End: 2021-04-06 | Stop reason: HOSPADM

## 2021-03-30 RX ORDER — BISACODYL 10 MG
10 SUPPOSITORY, RECTAL RECTAL
Status: DISCONTINUED | OUTPATIENT
Start: 2021-03-30 | End: 2021-04-06 | Stop reason: HOSPADM

## 2021-03-30 RX ORDER — ASPIRIN 81 MG/1
324 TABLET, CHEWABLE ORAL DAILY
Status: DISCONTINUED | OUTPATIENT
Start: 2021-03-31 | End: 2021-03-30

## 2021-03-30 RX ORDER — AMOXICILLIN 250 MG
2 CAPSULE ORAL 2 TIMES DAILY
Status: DISCONTINUED | OUTPATIENT
Start: 2021-03-30 | End: 2021-04-06 | Stop reason: HOSPADM

## 2021-03-30 RX ORDER — ASPIRIN 325 MG
325 TABLET ORAL DAILY
Status: DISCONTINUED | OUTPATIENT
Start: 2021-03-31 | End: 2021-03-30

## 2021-03-30 RX ORDER — ASPIRIN 81 MG/1
324 TABLET, CHEWABLE ORAL DAILY
Status: DISCONTINUED | OUTPATIENT
Start: 2021-03-30 | End: 2021-03-31

## 2021-03-30 RX ORDER — ASPIRIN 300 MG/1
300 SUPPOSITORY RECTAL DAILY
Status: DISCONTINUED | OUTPATIENT
Start: 2021-03-31 | End: 2021-03-30

## 2021-03-30 RX ORDER — POLYETHYLENE GLYCOL 3350 17 G/17G
1 POWDER, FOR SOLUTION ORAL
Status: DISCONTINUED | OUTPATIENT
Start: 2021-03-30 | End: 2021-04-06 | Stop reason: HOSPADM

## 2021-03-30 RX ADMIN — ASPIRIN 324 MG: 81 TABLET, CHEWABLE ORAL at 23:38

## 2021-03-30 ASSESSMENT — LIFESTYLE VARIABLES
EVER HAD A DRINK FIRST THING IN THE MORNING TO STEADY YOUR NERVES TO GET RID OF A HANGOVER: NO
CONSUMPTION TOTAL: NEGATIVE
AVERAGE NUMBER OF DAYS PER WEEK YOU HAVE A DRINK CONTAINING ALCOHOL: 0
TOTAL SCORE: 0
ON A TYPICAL DAY WHEN YOU DRINK ALCOHOL HOW MANY DRINKS DO YOU HAVE: 0
TOTAL SCORE: 0
EVER FELT BAD OR GUILTY ABOUT YOUR DRINKING: NO
DOES PATIENT WANT TO STOP DRINKING: NO
HAVE PEOPLE ANNOYED YOU BY CRITICIZING YOUR DRINKING: NO
HOW MANY TIMES IN THE PAST YEAR HAVE YOU HAD 5 OR MORE DRINKS IN A DAY: 0
ALCOHOL_USE: NO
HAVE YOU EVER FELT YOU SHOULD CUT DOWN ON YOUR DRINKING: NO
TOTAL SCORE: 0

## 2021-03-30 ASSESSMENT — PATIENT HEALTH QUESTIONNAIRE - PHQ9
SUM OF ALL RESPONSES TO PHQ9 QUESTIONS 1 AND 2: 0
1. LITTLE INTEREST OR PLEASURE IN DOING THINGS: NOT AT ALL
2. FEELING DOWN, DEPRESSED, IRRITABLE, OR HOPELESS: NOT AT ALL

## 2021-03-30 ASSESSMENT — COGNITIVE AND FUNCTIONAL STATUS - GENERAL
DAILY ACTIVITIY SCORE: 21
TOILETING: A LITTLE
MOBILITY SCORE: 18
CLIMB 3 TO 5 STEPS WITH RAILING: A LITTLE
DRESSING REGULAR LOWER BODY CLOTHING: A LITTLE
MOVING FROM LYING ON BACK TO SITTING ON SIDE OF FLAT BED: A LITTLE
WALKING IN HOSPITAL ROOM: A LITTLE
STANDING UP FROM CHAIR USING ARMS: A LITTLE
MOVING TO AND FROM BED TO CHAIR: A LITTLE
TURNING FROM BACK TO SIDE WHILE IN FLAT BAD: A LITTLE
HELP NEEDED FOR BATHING: A LITTLE
SUGGESTED CMS G CODE MODIFIER DAILY ACTIVITY: CJ
SUGGESTED CMS G CODE MODIFIER MOBILITY: CK

## 2021-03-30 ASSESSMENT — PAIN DESCRIPTION - PAIN TYPE: TYPE: ACUTE PAIN

## 2021-03-30 NOTE — PROGRESS NOTES
[facility]I[employer] OFFICER ADMISSION ACCEPTANCE NOTE:     - I spoke and discussed the case with the ER physician, Dr. Stout at [facility]  - This is a 86-year-old male who presents to the emergency room for transient bilateral blindness, generalized weakness, difficulty word finding.  His symptoms have now resolved.  This his second episodes of this.  Once CTA found bilateral 60% ICA stenosis.  He had a optometrist evaluation 2 months prior but not ophthalmology.  Outlying facility does not have MRI.  Patient will need a vascular surgery evaluation and MRI upon arrival.  - Please call admitting physician for full admission orders, and cross-coverage issues on patient's arrival to the unit.

## 2021-03-31 ENCOUNTER — APPOINTMENT (OUTPATIENT)
Dept: RADIOLOGY | Facility: MEDICAL CENTER | Age: 86
DRG: 038 | End: 2021-03-31
Attending: STUDENT IN AN ORGANIZED HEALTH CARE EDUCATION/TRAINING PROGRAM
Payer: MEDICARE

## 2021-03-31 ENCOUNTER — APPOINTMENT (OUTPATIENT)
Dept: RADIOLOGY | Facility: MEDICAL CENTER | Age: 86
DRG: 038 | End: 2021-03-31
Attending: NURSE PRACTITIONER
Payer: MEDICARE

## 2021-03-31 PROBLEM — E87.0 HYPERNATREMIA: Status: ACTIVE | Noted: 2021-03-31

## 2021-03-31 PROBLEM — H53.9 CHANGES IN VISION: Status: ACTIVE | Noted: 2021-03-31

## 2021-03-31 PROBLEM — I65.23 CAROTID STENOSIS, BILATERAL: Status: ACTIVE | Noted: 2021-03-31

## 2021-03-31 PROBLEM — R55 SYNCOPE: Status: ACTIVE | Noted: 2021-03-31

## 2021-03-31 PROBLEM — E87.0 HYPERNATREMIA: Status: RESOLVED | Noted: 2021-03-31 | Resolved: 2021-03-31

## 2021-03-31 PROBLEM — I63.9 ACUTE CVA (CEREBROVASCULAR ACCIDENT) (HCC): Status: ACTIVE | Noted: 2021-03-31

## 2021-03-31 PROBLEM — R55 SYNCOPE: Status: RESOLVED | Noted: 2021-03-31 | Resolved: 2021-03-31

## 2021-03-31 PROBLEM — R40.4 ALTERED LEVEL OF CONSCIOUSNESS: Status: ACTIVE | Noted: 2021-03-31

## 2021-03-31 PROBLEM — Z78.9 DO NOT INTUBATE, CARDIOPULMONARY RESUSCITATION (CPR)-ONLY CODE STATUS: Status: ACTIVE | Noted: 2021-03-31

## 2021-03-31 LAB
ALBUMIN SERPL BCP-MCNC: 4.1 G/DL (ref 3.2–4.9)
ALBUMIN/GLOB SERPL: 1.6 G/DL
ALP SERPL-CCNC: 89 U/L (ref 30–99)
ALT SERPL-CCNC: 12 U/L (ref 2–50)
ANION GAP SERPL CALC-SCNC: 12 MMOL/L (ref 7–16)
AST SERPL-CCNC: 11 U/L (ref 12–45)
BILIRUB SERPL-MCNC: 0.7 MG/DL (ref 0.1–1.5)
BUN SERPL-MCNC: 17 MG/DL (ref 8–22)
CALCIUM SERPL-MCNC: 8.9 MG/DL (ref 8.5–10.5)
CHLORIDE SERPL-SCNC: 106 MMOL/L (ref 96–112)
CHOLEST SERPL-MCNC: 202 MG/DL (ref 100–199)
CO2 SERPL-SCNC: 24 MMOL/L (ref 20–33)
CREAT SERPL-MCNC: 1.05 MG/DL (ref 0.5–1.4)
EKG IMPRESSION: NORMAL
EST. AVERAGE GLUCOSE BLD GHB EST-MCNC: 103 MG/DL
FLUAV RNA SPEC QL NAA+PROBE: NEGATIVE
FLUBV RNA SPEC QL NAA+PROBE: NEGATIVE
FOLATE SERPL-MCNC: 12.3 NG/ML
GLOBULIN SER CALC-MCNC: 2.6 G/DL (ref 1.9–3.5)
GLUCOSE SERPL-MCNC: 93 MG/DL (ref 65–99)
HBA1C MFR BLD: 5.2 % (ref 4–5.6)
HDLC SERPL-MCNC: 41 MG/DL
LDLC SERPL CALC-MCNC: 139 MG/DL
MAGNESIUM SERPL-MCNC: 2.5 MG/DL (ref 1.5–2.5)
POTASSIUM SERPL-SCNC: 3.7 MMOL/L (ref 3.6–5.5)
PROT SERPL-MCNC: 6.7 G/DL (ref 6–8.2)
SARS-COV-2 RNA RESP QL NAA+PROBE: NOTDETECTED
SODIUM SERPL-SCNC: 142 MMOL/L (ref 135–145)
SPECIMEN SOURCE: NORMAL
TREPONEMA PALLIDUM IGG+IGM AB [PRESENCE] IN SERUM OR PLASMA BY IMMUNOASSAY: NORMAL
TRIGL SERPL-MCNC: 111 MG/DL (ref 0–149)
VIT B12 SERPL-MCNC: 581 PG/ML (ref 211–911)

## 2021-03-31 PROCEDURE — A9270 NON-COVERED ITEM OR SERVICE: HCPCS | Performed by: STUDENT IN AN ORGANIZED HEALTH CARE EDUCATION/TRAINING PROGRAM

## 2021-03-31 PROCEDURE — 770020 HCHG ROOM/CARE - TELE (206)

## 2021-03-31 PROCEDURE — 4A00X4Z MEASUREMENT OF CENTRAL NERVOUS ELECTRICAL ACTIVITY, EXTERNAL APPROACH: ICD-10-PCS | Performed by: STUDENT IN AN ORGANIZED HEALTH CARE EDUCATION/TRAINING PROGRAM

## 2021-03-31 PROCEDURE — 36415 COLL VENOUS BLD VENIPUNCTURE: CPT

## 2021-03-31 PROCEDURE — 70551 MRI BRAIN STEM W/O DYE: CPT

## 2021-03-31 PROCEDURE — 95819 EEG AWAKE AND ASLEEP: CPT | Performed by: STUDENT IN AN ORGANIZED HEALTH CARE EDUCATION/TRAINING PROGRAM

## 2021-03-31 PROCEDURE — 99222 1ST HOSP IP/OBS MODERATE 55: CPT | Mod: 25 | Performed by: PSYCHIATRY & NEUROLOGY

## 2021-03-31 PROCEDURE — 97161 PT EVAL LOW COMPLEX 20 MIN: CPT

## 2021-03-31 PROCEDURE — 93010 ELECTROCARDIOGRAM REPORT: CPT | Performed by: INTERNAL MEDICINE

## 2021-03-31 PROCEDURE — 97165 OT EVAL LOW COMPLEX 30 MIN: CPT

## 2021-03-31 PROCEDURE — 96372 THER/PROPH/DIAG INJ SC/IM: CPT

## 2021-03-31 PROCEDURE — A9270 NON-COVERED ITEM OR SERVICE: HCPCS | Performed by: HOSPITALIST

## 2021-03-31 PROCEDURE — 93005 ELECTROCARDIOGRAM TRACING: CPT | Performed by: STUDENT IN AN ORGANIZED HEALTH CARE EDUCATION/TRAINING PROGRAM

## 2021-03-31 PROCEDURE — 83519 RIA NONANTIBODY: CPT

## 2021-03-31 PROCEDURE — 700102 HCHG RX REV CODE 250 W/ 637 OVERRIDE(OP): Performed by: STUDENT IN AN ORGANIZED HEALTH CARE EDUCATION/TRAINING PROGRAM

## 2021-03-31 PROCEDURE — 700111 HCHG RX REV CODE 636 W/ 250 OVERRIDE (IP): Performed by: STUDENT IN AN ORGANIZED HEALTH CARE EDUCATION/TRAINING PROGRAM

## 2021-03-31 PROCEDURE — 99233 SBSQ HOSP IP/OBS HIGH 50: CPT | Mod: GC | Performed by: HOSPITALIST

## 2021-03-31 PROCEDURE — 96374 THER/PROPH/DIAG INJ IV PUSH: CPT

## 2021-03-31 PROCEDURE — 0240U HCHG SARS-COV-2 COVID-19 NFCT DS RESP RNA 3 TRGT MIC: CPT

## 2021-03-31 PROCEDURE — C9803 HOPD COVID-19 SPEC COLLECT: HCPCS | Performed by: STUDENT IN AN ORGANIZED HEALTH CARE EDUCATION/TRAINING PROGRAM

## 2021-03-31 PROCEDURE — 95819 EEG AWAKE AND ASLEEP: CPT | Mod: 26 | Performed by: STUDENT IN AN ORGANIZED HEALTH CARE EDUCATION/TRAINING PROGRAM

## 2021-03-31 PROCEDURE — 83516 IMMUNOASSAY NONANTIBODY: CPT

## 2021-03-31 PROCEDURE — 700102 HCHG RX REV CODE 250 W/ 637 OVERRIDE(OP): Performed by: HOSPITALIST

## 2021-03-31 PROCEDURE — 700105 HCHG RX REV CODE 258: Performed by: STUDENT IN AN ORGANIZED HEALTH CARE EDUCATION/TRAINING PROGRAM

## 2021-03-31 RX ORDER — ASPIRIN 81 MG/1
81 TABLET, CHEWABLE ORAL DAILY
Status: DISCONTINUED | OUTPATIENT
Start: 2021-03-31 | End: 2021-04-06 | Stop reason: HOSPADM

## 2021-03-31 RX ORDER — SODIUM CHLORIDE, SODIUM LACTATE, POTASSIUM CHLORIDE, CALCIUM CHLORIDE 600; 310; 30; 20 MG/100ML; MG/100ML; MG/100ML; MG/100ML
INJECTION, SOLUTION INTRAVENOUS CONTINUOUS
Status: DISCONTINUED | OUTPATIENT
Start: 2021-03-31 | End: 2021-04-01

## 2021-03-31 RX ORDER — POTASSIUM CHLORIDE 20 MEQ/1
40 TABLET, EXTENDED RELEASE ORAL ONCE
Status: COMPLETED | OUTPATIENT
Start: 2021-03-31 | End: 2021-03-31

## 2021-03-31 RX ORDER — ATORVASTATIN CALCIUM 40 MG/1
40 TABLET, FILM COATED ORAL EVERY EVENING
Status: DISCONTINUED | OUTPATIENT
Start: 2021-03-31 | End: 2021-03-31

## 2021-03-31 RX ORDER — CLOPIDOGREL BISULFATE 75 MG/1
75 TABLET ORAL DAILY
Status: DISCONTINUED | OUTPATIENT
Start: 2021-03-31 | End: 2021-04-04

## 2021-03-31 RX ORDER — ATORVASTATIN CALCIUM 80 MG/1
80 TABLET, FILM COATED ORAL EVERY EVENING
Status: DISCONTINUED | OUTPATIENT
Start: 2021-03-31 | End: 2021-04-06 | Stop reason: HOSPADM

## 2021-03-31 RX ORDER — LORAZEPAM 2 MG/ML
1 INJECTION INTRAMUSCULAR ONCE
Status: COMPLETED | OUTPATIENT
Start: 2021-03-31 | End: 2021-03-31

## 2021-03-31 RX ORDER — CLOPIDOGREL BISULFATE 75 MG/1
75 TABLET ORAL DAILY
Status: DISCONTINUED | OUTPATIENT
Start: 2021-03-31 | End: 2021-03-31

## 2021-03-31 RX ORDER — LORAZEPAM 1 MG/1
1 TABLET ORAL
Status: DISCONTINUED | OUTPATIENT
Start: 2021-03-31 | End: 2021-03-31

## 2021-03-31 RX ADMIN — ATORVASTATIN CALCIUM 40 MG: 40 TABLET, FILM COATED ORAL at 01:31

## 2021-03-31 RX ADMIN — POTASSIUM CHLORIDE 40 MEQ: 1500 TABLET, EXTENDED RELEASE ORAL at 01:31

## 2021-03-31 RX ADMIN — ENOXAPARIN SODIUM 40 MG: 40 INJECTION SUBCUTANEOUS at 17:52

## 2021-03-31 RX ADMIN — CLOPIDOGREL BISULFATE 75 MG: 75 TABLET ORAL at 20:55

## 2021-03-31 RX ADMIN — ASPIRIN 81 MG: 81 TABLET, CHEWABLE ORAL at 09:08

## 2021-03-31 RX ADMIN — SODIUM CHLORIDE, POTASSIUM CHLORIDE, SODIUM LACTATE AND CALCIUM CHLORIDE: 600; 310; 30; 20 INJECTION, SOLUTION INTRAVENOUS at 11:08

## 2021-03-31 RX ADMIN — LORAZEPAM 1 MG: 2 INJECTION INTRAMUSCULAR; INTRAVENOUS at 16:15

## 2021-03-31 RX ADMIN — SODIUM CHLORIDE, POTASSIUM CHLORIDE, SODIUM LACTATE AND CALCIUM CHLORIDE: 600; 310; 30; 20 INJECTION, SOLUTION INTRAVENOUS at 21:48

## 2021-03-31 RX ADMIN — DOCUSATE SODIUM 50 MG AND SENNOSIDES 8.6 MG 2 TABLET: 8.6; 5 TABLET, FILM COATED ORAL at 17:53

## 2021-03-31 RX ADMIN — ATORVASTATIN CALCIUM 80 MG: 80 TABLET, FILM COATED ORAL at 17:53

## 2021-03-31 ASSESSMENT — ENCOUNTER SYMPTOMS
DIARRHEA: 0
FOCAL WEAKNESS: 0
DOUBLE VISION: 0
HEADACHES: 0
SORE THROAT: 0
FEVER: 0
MYALGIAS: 0
NAUSEA: 0
NERVOUS/ANXIOUS: 0
CHILLS: 0
SHORTNESS OF BREATH: 0
PALPITATIONS: 0
BLURRED VISION: 1
FALLS: 0
CONSTIPATION: 0
VOMITING: 0
ABDOMINAL PAIN: 0
COUGH: 0
DIZZINESS: 1
MEMORY LOSS: 1

## 2021-03-31 ASSESSMENT — COGNITIVE AND FUNCTIONAL STATUS - GENERAL
DAILY ACTIVITIY SCORE: 21
TOILETING: A LITTLE
DRESSING REGULAR LOWER BODY CLOTHING: A LITTLE
MOBILITY SCORE: 24
HELP NEEDED FOR BATHING: A LITTLE
SUGGESTED CMS G CODE MODIFIER MOBILITY: CH
SUGGESTED CMS G CODE MODIFIER DAILY ACTIVITY: CJ

## 2021-03-31 ASSESSMENT — GAIT ASSESSMENTS
DISTANCE (FEET): 200
GAIT LEVEL OF ASSIST: SUPERVISED

## 2021-03-31 ASSESSMENT — PAIN DESCRIPTION - PAIN TYPE: TYPE: ACUTE PAIN

## 2021-03-31 ASSESSMENT — FIBROSIS 4 INDEX: FIB4 SCORE: 1.41

## 2021-03-31 ASSESSMENT — ACTIVITIES OF DAILY LIVING (ADL): TOILETING: INDEPENDENT

## 2021-03-31 NOTE — CARE PLAN
"  Problem: Safety  Goal: Will remain free from injury  3/30/2021 2319 by Oscar Renee R.N.  Outcome: PROGRESSING AS EXPECTED  3/30/2021 2318 by Oscar Renee R.N.  Outcome: PROGRESSING AS EXPECTED   Pt safety precautions in place; bed in lowest lock position, 2 side rails up, non slip socks on, call light within reach- educated on when and how to use call light. Encouraged patient to call for toileting needs. Patient is agreeable, \"I had a fall last week in the shower.\" Bed alarm on.     Problem: Communication  Goal: The ability to communicate needs accurately and effectively will improve  Outcome: PROGRESSING AS EXPECTED   Encouraged patient to use hearing aids and call staff if vision issues reoccur. Oriented patient to call light.   "

## 2021-03-31 NOTE — ASSESSMENT & PLAN NOTE
Preceded by dizziness.  Etiology unclear.  Possible dehydration.    Check orthostatic blood pressures.  Echocardiogram.

## 2021-03-31 NOTE — ASSESSMENT & PLAN NOTE
As per outside hospital 146 sodium level.  Appears to be improved on repeat lab draw here.    Continue to monitor.

## 2021-03-31 NOTE — THERAPY
Physical Therapy   Initial Evaluation     Patient Name: Cooper Harvey  Age:  86 y.o., Sex:  male  Medical Record #: [id number]  Today's Date: 3/31/2021          Assessment  Patient is 86 y.o. male admitted w/ c/o transient vision changes.  Hx of BPH.  Per chart review, he woke up on the floor, not certain as to what happened.  He lives w/ his wife in a single story house.  He reports that he and his wife take walks almost every day.  Today, he is rec'd alert, in bed, no c/o visual changes or issues.  He is very willing to work w/ PT.  H is able to move to/from the eob w/o assist.  He is able to stand w/o assist.  He is able to ambulate in the hallway w/o loss of balance and w/o need of physical/mehcanical assist.  He is able to perform stairs w/ spv.  No acute PT needs.  Appears to be at his PLOF.  Plan    Recommend Physical Therapy for Evaluation only   DC Equipment Recommendations: None  Discharge Recommendations: Anticipate that the patient will have no further physical therapy needs after discharge from the hospital         Objective       03/31/21 0811   Prior Living Situation   Housing / Facility 1 Story House   Steps Into Home 5   Steps In Home 0   Rail Right Rail (Steps into Home)   Equipment Owned None   Lives with - Patient's Self Care Capacity Spouse   Prior Level of Functional Mobility   Bed Mobility Independent   Transfer Status Independent   Ambulation Independent   Assistive Devices Used None   Stairs Independent   Cognition    Level of Consciousness Alert   Balance Assessment   Sitting Balance (Static) Fair +   Sitting Balance (Dynamic) Fair +   Standing Balance (Static) Fair   Standing Balance (Dynamic) Fair   Weight Shift Sitting Good   Weight Shift Standing Good   Gait Analysis   Gait Level Of Assist Supervised   Assistive Device None   Distance (Feet) 200   # of Stairs Climbed 5   Level of Assist with Stairs Supervised   Bed Mobility    Supine to Sit Supervised   Sit to Supine Supervised    Scooting Supervised   Functional Mobility   Sit to Stand Supervised   Bed, Chair, Wheelchair Transfer Supervised   Anticipated Discharge Equipment and Recommendations   DC Equipment Recommendations None   Discharge Recommendations Anticipate that the patient will have no further physical therapy needs after discharge from the hospital

## 2021-03-31 NOTE — CONSULTS
Neurology Initial Consult H&P  Neurohospitalist Service, [facility] Neurosciences    Referring Physician: GREGORIO Rich M.D.    Reason for Referral: Transient vision changes and syncope    HPI: Cooper Harvey is a 86 y.o. male with past history of benign prostatic hypertrophy and hyperlipidemia who presented to [facility] 3/30/21 [facility] a [facility] with complaint of transient bilateral vision loss over the past several weeks. He reports that several times while he has been watching television in the evening, he feels his left eye wandering medially and experiences double vision. He reports these symptoms are improved in the mornings. He also reports a syncopal episode 2 days ago in which he awoke on the floor. He did not seek medical attention after this event, and felt well upon waking the next morning. He is symptom free at this time, denies headache, vision loss, speech changes, focal weakness. He is hard of hearing with bilateral hearing aids in place. CTA head/neck from [facility] showed 60% stenosis of the bilateral carotid arteries, thus request was made for transfer to [facility] for further work-up with MRI of the brain as well as vascular surgery consultation.    Review of systems: In addition to what is detailed in the HPI above, all other systems reviewed and are negative.    Past Medical History:    has a past medical history of Hydrocele, unspecified, Hypertrophy of prostate without urinary obstruction and other lower urinary tract symptoms (LUTS), and Mixed hyperlipidemia.    FHx:  family history includes Cancer in his mother.    SHx:   reports that he quit smoking about 31 years ago. He has a 20.00 pack-year smoking history. He has never used smokeless tobacco. He reports current alcohol use. He reports that he does not use drugs.    Allergies:  Allergies   Allergen Reactions   • Vicodin [Hydrocodone-Acetaminophen] Rash     rash       Medications:    Current  Facility-Administered Medications:   •  aspirin (ASA) chewable tab 81 mg, 81 mg, Oral, DAILY, Ann Marie Blanton M.D., 81 mg at 03/31/21 0908  •  LORazepam (ATIVAN) injection 1 mg, 1 mg, Intravenous, Once, Ann Marie Blanton M.D.  •  lactated ringers infusion, , Intravenous, Continuous, Ann Marie Blanton M.D., Last Rate: 125 mL/hr at 03/31/21 1108, New Bag at 03/31/21 1108  •  atorvastatin (LIPITOR) tablet 80 mg, 80 mg, Oral, Q EVENING, Ann Marie Blanton M.D.  •  Pharmacy consult request - Allow for permissive hypertension: SBP up to 220 mmHg/DBP up to 120 mmHg x 48 hours, , Other, PHARMACY TO DOSE, Ghulam Bryan M.D.  •  acetaminophen (Tylenol) tablet 650 mg, 650 mg, Oral, Q6HRS PRN, Ghulam Bryan M.D.  •  senna-docusate (PERICOLACE or SENOKOT S) 8.6-50 MG per tablet 2 tablet, 2 tablet, Oral, BID **AND** polyethylene glycol/lytes (MIRALAX) PACKET 1 Packet, 1 Packet, Oral, QDAY PRN **AND** magnesium hydroxide (MILK OF MAGNESIA) suspension 30 mL, 30 mL, Oral, QDAY PRN **AND** bisacodyl (DULCOLAX) suppository 10 mg, 10 mg, Rectal, QDAY PRN, Ghulam Bryan M.D.  •  enoxaparin (LOVENOX) inj 40 mg, 40 mg, Subcutaneous, DAILY AT 1800, Ghulam Bryan M.D.    Physical Examination:     Vitals:    03/31/21 0814 03/31/21 0815 03/31/21 0816 03/31/21 1207   BP: 159/79 153/81 118/63 156/90   Pulse: 60   64   Resp: (!) 26   (!) 28   Temp: 36.6 °C (97.9 °F)   36.9 °C (98.4 °F)   TempSrc: Temporal   Temporal   SpO2: 96%   95%   Weight:       Height:           General: Patient is awake and in no acute distress. Hard of hearing with hearing aides in place bilaterally.  Eyes: examination of optic disks not indicated at this time given acuity of consult  CV: SR 60's    NEUROLOGICAL EXAM:     Mental status: Awake, alert and fully oriented, follows commands  Speech and language: speech is not dysarthric. The patient is able to name and repeat.  Cranial nerve exam: Pupils are equal, round and reactive to light bilaterally. Visual fields  are full. Extraocular muscles are intact. Sensation in the face is intact to light touch. Face is symmetric. Hearing to finger rub equal. Palate elevates symmetrically. Shoulder shrug is full. Tongue is midline.  Motor exam: Strength is 4+/5 in all extremities both distally and proximally. Tone is normal. No abnormal movements were seen on exam.  Sensory exam: No sensory deficits identified   Deep tendon reflexes:  2+ and symmetric. Toes down-going bilaterally.  Coordination: no ataxia with finger to nose.  Gait: deferred given patient preference    Objective Data:    Labs:  No results found for: PROTHROMBTM, INR   Lab Results   Component Value Date/Time    WBC 5.8 03/30/2021 10:26 PM    RBC 5.07 03/30/2021 10:26 PM    HEMOGLOBIN 15.8 03/30/2021 10:26 PM    HEMATOCRIT 47.4 03/30/2021 10:26 PM    MCV 93.5 03/30/2021 10:26 PM    MCH 31.2 03/30/2021 10:26 PM    MCHC 33.3 (L) 03/30/2021 10:26 PM    MPV 10.1 03/30/2021 10:26 PM    NEUTSPOLYS 74.40 (H) 03/30/2021 10:26 PM    LYMPHOCYTES 11.40 (L) 03/30/2021 10:26 PM    MONOCYTES 8.80 03/30/2021 10:26 PM    EOSINOPHILS 4.20 03/30/2021 10:26 PM    BASOPHILS 0.70 03/30/2021 10:26 PM      Lab Results   Component Value Date/Time    SODIUM 142 03/30/2021 10:26 PM    POTASSIUM 3.7 03/30/2021 10:26 PM    CHLORIDE 106 03/30/2021 10:26 PM    CO2 24 03/30/2021 10:26 PM    GLUCOSE 93 03/30/2021 10:26 PM    BUN 17 03/30/2021 10:26 PM    CREATININE 1.05 03/30/2021 10:26 PM    CREATININE 1.2 10/03/2008 11:30 AM      Lab Results   Component Value Date/Time    CHOLSTRLTOT 202 (H) 03/30/2021 10:26 PM     (H) 03/30/2021 10:26 PM    HDL 41 03/30/2021 10:26 PM    TRIGLYCERIDE 111 03/30/2021 10:26 PM       Lab Results   Component Value Date/Time    ALKPHOSPHAT 89 03/30/2021 10:26 PM    ASTSGOT 11 (L) 03/30/2021 10:26 PM    ALTSGPT 12 03/30/2021 10:26 PM    TBILIRUBIN 0.7 03/30/2021 10:26 PM        Imaging/Testing:    I interpreted and/or reviewed the patient's  neuroimaging    MR-BRAIN-W/O    (Results Pending)   EC-ECHOCARDIOGRAM COMPLETE W/O CONT    (Results Pending)   MR-BRAIN-W/O    (Results Pending)       Assessment and Plan:    Cooper Harvey is a 86 y.o. male with past history of benign prostatic hypertrophy and hyperlipidemia who presented to [facility]30/[facility] with complaint of transient bilateral vision loss over the past several weeks. CTA head/neck from [facility] showed 60% stenosis of the bilateral carotid arteries, thus request was made for transfer to [facility] for further work-up with MRI of the brain as well as vascular surgery consultation. MRI brain w/o CST is pending. At this time he has no focal neurological deficits. With consideration of his report of recurrent diplopia in the evenings, AChR and MuSK antiboy titers have been ordered to rule out myasthenia gravis. He remains admitted to the floor in stable condition.    Plan:  -AChR and MuSK antibody titers  -MRI brain without      The evaluation of the patient, and recommended management, was discussed with attending neurologist, Dr. Benedict Bailon.    Corinne Canavero, APRN  Acute Care Neurohospitalist Service

## 2021-03-31 NOTE — PROGRESS NOTES
Daily Progress Note:     Date of Service: 3/31/2021  Primary Team: [facility] Blue Team   Attending: GREGORIO Rich M.D.   Senior Resident: Dr. Miller  Intern: Dr. Blanton  Contact:  [phone]    Patient ID:  86 YOM with PMHx of BPH, HLD who presented as a transfer from outside hospital for BL transient vision loss, weakness, concerning for TIA vs stroke. However, pt has had similar episodes over past year (~12) with associated stiffness, unresponsiveness, and occasional drooling, concerning for seizures. Vascular, Neuro consulted.     Chief Complaint:  BL transient vision loss    Interval Update:   NAEON. Clarified on episode with patient - has had ~12 episodes over the past year/year and a half. Per family members, no post-ictal confusion, can involve shaking and stiffness, lack of responsiveness, and occasional drooling. Per wife, he had his most recent episode almost 2 weeks ago while driving. Neurology consulted and EEG ordered for possible absence seizures. Vascular surgery consulted for BL carotid artery stenosis (60%) seen on CTA from outside hospital. Orthostatics were also positive for patient, started on IVF.     Consultants/Specialty:  Neuro, Vascular Surgery    Review of Systems:    Review of Systems   Constitutional: Negative for chills and fever.   HENT: Positive for ear pain. Negative for ear discharge and sore throat.    Eyes: Positive for blurred vision. Negative for double vision.   Respiratory: Negative for cough and shortness of breath.    Cardiovascular: Negative for chest pain and palpitations.   Gastrointestinal: Negative for abdominal pain, constipation, diarrhea, nausea and vomiting.   Genitourinary: Negative for dysuria, frequency and urgency.   Musculoskeletal: Negative for falls, joint pain and myalgias.   Skin: Negative for itching and rash.   Neurological: Positive for dizziness. Negative for focal weakness and headaches.   Psychiatric/Behavioral: Positive for memory loss. The patient  is not nervous/anxious.        Objective Data:   Physical Exam:   Vitals:   Temp:  [36.4 °C (97.6 °F)-36.9 °C (98.4 °F)] 36.9 °C (98.4 °F)  Pulse:  [54-91] 64  Resp:  [17-28] 28  BP: (116-168)/(63-90) 156/90  SpO2:  [95 %-97 %] 95 %    Physical Exam  Vitals and nursing note reviewed.   Constitutional:       General: He is not in acute distress.     Appearance: Normal appearance. He is well-developed.   HENT:      Head: Normocephalic and atraumatic.      Right Ear: External ear normal.      Left Ear: External ear normal.      Nose: Nose normal.      Mouth/Throat:      Mouth: Mucous membranes are dry.      Pharynx: Oropharynx is clear. No oropharyngeal exudate or posterior oropharyngeal erythema.   Eyes:      General: No scleral icterus.     Extraocular Movements: Extraocular movements intact.      Conjunctiva/sclera: Conjunctivae normal.   Neck:      Thyroid: No thyromegaly.      Vascular: No JVD.      Trachea: No tracheal deviation.   Cardiovascular:      Rate and Rhythm: Normal rate and regular rhythm.      Heart sounds: Normal heart sounds. No murmur. No friction rub. No gallop.    Pulmonary:      Effort: Pulmonary effort is normal. No respiratory distress.      Breath sounds: Normal breath sounds. No stridor. No wheezing or rales.   Abdominal:      General: Bowel sounds are normal. There is no distension.      Palpations: Abdomen is soft. There is no mass.      Tenderness: There is no abdominal tenderness. There is no guarding or rebound.   Musculoskeletal:      Cervical back: Neck supple.      Right lower leg: No edema.      Left lower leg: No edema.   Skin:     General: Skin is warm and dry.      Coloration: Skin is not pale.      Findings: No erythema or rash.   Neurological:      Mental Status: He is alert and oriented to person, place, and time.      Sensory: No sensory deficit.      Motor: No weakness or abnormal muscle tone.      Comments: Speech is slightly broken.  Decreasing hearing BL (chronic)    Psychiatric:         Behavior: Behavior normal.         Thought Content: Thought content normal.         Judgment: Judgment normal.         Labs:   Recent Results (from the past 24 hour(s))   CBC with Differential    Collection Time: 03/30/21 10:26 PM   Result Value Ref Range    WBC 5.8 4.8 - 10.8 K/uL    RBC 5.07 4.70 - 6.10 M/uL    Hemoglobin 15.8 14.0 - 18.0 g/dL    Hematocrit 47.4 42.0 - 52.0 %    MCV 93.5 81.4 - 97.8 fL    MCH 31.2 27.0 - 33.0 pg    MCHC 33.3 (L) 33.7 - 35.3 g/dL    RDW 44.5 35.9 - 50.0 fL    Platelet Count 193 164 - 446 K/uL    MPV 10.1 9.0 - 12.9 fL    Neutrophils-Polys 74.40 (H) 44.00 - 72.00 %    Lymphocytes 11.40 (L) 22.00 - 41.00 %    Monocytes 8.80 0.00 - 13.40 %    Eosinophils 4.20 0.00 - 6.90 %    Basophils 0.70 0.00 - 1.80 %    Immature Granulocytes 0.50 0.00 - 0.90 %    Nucleated RBC 0.00 /100 WBC    Neutrophils (Absolute) 4.30 1.82 - 7.42 K/uL    Lymphs (Absolute) 0.66 (L) 1.00 - 4.80 K/uL    Monos (Absolute) 0.51 0.00 - 0.85 K/uL    Eos (Absolute) 0.24 0.00 - 0.51 K/uL    Baso (Absolute) 0.04 0.00 - 0.12 K/uL    Immature Granulocytes (abs) 0.03 0.00 - 0.11 K/uL    NRBC (Absolute) 0.00 K/uL   Comp Metabolic Panel (CMP)    Collection Time: 03/30/21 10:26 PM   Result Value Ref Range    Sodium 142 135 - 145 mmol/L    Potassium 3.7 3.6 - 5.5 mmol/L    Chloride 106 96 - 112 mmol/L    Co2 24 20 - 33 mmol/L    Anion Gap 12.0 7.0 - 16.0    Glucose 93 65 - 99 mg/dL    Bun 17 8 - 22 mg/dL    Creatinine 1.05 0.50 - 1.40 mg/dL    Calcium 8.9 8.5 - 10.5 mg/dL    AST(SGOT) 11 (L) 12 - 45 U/L    ALT(SGPT) 12 2 - 50 U/L    Alkaline Phosphatase 89 30 - 99 U/L    Total Bilirubin 0.7 0.1 - 1.5 mg/dL    Albumin 4.1 3.2 - 4.9 g/dL    Total Protein 6.7 6.0 - 8.2 g/dL    Globulin 2.6 1.9 - 3.5 g/dL    A-G Ratio 1.6 g/dL   Magnesium    Collection Time: 03/30/21 10:26 PM   Result Value Ref Range    Magnesium 2.5 1.5 - 2.5 mg/dL   Lipid Profile    Collection Time: 03/30/21 10:26 PM   Result Value Ref Range     Cholesterol,Tot 202 (H) 100 - 199 mg/dL    Triglycerides 111 0 - 149 mg/dL    HDL 41 >=40 mg/dL     (H) <100 mg/dL   HEMOGLOBIN A1C    Collection Time: 21 10:26 PM   Result Value Ref Range    Glycohemoglobin 5.2 4.0 - 5.6 %    Est Avg Glucose 103 mg/dL   ESTIMATED GFR    Collection Time: 21 10:26 PM   Result Value Ref Range    GFR If African American >60 >60 mL/min/1.73 m 2    GFR If Non African American >60 >60 mL/min/1.73 m 2   CoV-2 and Flu A/B by PCR (24 hour In-House): Collect NP swab in VTM    Collection Time: 21  1:40 AM    Specimen: Nasopharyngeal; Respirate   Result Value Ref Range    SARS-CoV-2 Source NP Swab    EKG    Collection Time: 21  7:34 AM   Result Value Ref Range    Report       [facility]    Test Date:  2021  Pt Name:    MAURO REYES       Department: 171  MRN:        [id number]                      [facility]  Gender:     Male                         Technician: MANJINDER  :        1934                   Requested By:RONNIE KAPLAN  Order #:    [id number]                    Reading MD: Yue Thorpe MD    Measurements  Intervals                                Axis  Rate:       55                           P:          76  IN:         164                          QRS:        12  QRSD:       76                           T:          29  QT:         416  QTc:        398    Interpretive Statements  SINUS BRADYCARDIA  ARTIFACT IN LEAD(S) II,III,aVR,aVL,aVF,V1,V2,V3,V4,V5,V6  No previous ECG available for comparison  Electronically Signed On 3- 7:40:42 PDT by Yue Thorpe MD         Imaging:   Independant Imaging Review: Completed  MR-BRAIN-W/O    (Results Pending)   EC-ECHOCARDIOGRAM COMPLETE W/O CONT    (Results Pending)   MR-BRAIN-W/O    (Results Pending)       Problem Representation:   * Altered level of consciousness- (present on admission)  Assessment & Plan  - Presenting with BL transient vision loss with weakness; however, has  had multiple episodes over past year with shaking, unresponsiveness, stiffness, and drooling on last episode, concerning for TIA/amaurosis fugax vs stroke vs seizure  - CT head showed diffuse atrophy, EKG and CXR normal  - MRI, EEG, Echo pending  - Neuro and Vascular consulted  - Monitor on Tele  - Increased Atorvastatin to 80 mg, continue ASA - will hold off on further anticoagulation until discussed with Vascular  - Daughter would like to be updated (Angela, [phone])    Carotid stenosis, bilateral- (present on admission)  Assessment & Plan  - As per CTA at outside hospital 60% bilaterally - cnclear at this time whether this is related to his vision changes.  - Vascular surgery consulted - hold off on further anticoagulation, continue ASA and Atorvastatin    Mixed hyperlipidemia- (present on admission)  Assessment & Plan  - Increased Atorvastatin to 80 mg    Do not intubate, cardiopulmonary resuscitation (CPR)-only code status- (present on admission)  Assessment & Plan  Discussed CODE STATUS with patient.  He is okay with CPR.  He wishes not to be intubated.    BPH (benign prostatic hyperplasia)- (present on admission)  Assessment & Plan  - Not on any meds, denying any current symptoms

## 2021-03-31 NOTE — PROGRESS NOTES
Paged admitting upon patient arrival, consent to treat signed with patient. Paged admitting hospitalist for orders and patient arrival.

## 2021-03-31 NOTE — THERAPY
"Occupational Therapy   Initial Evaluation     Patient Name: Cooper Harvey  Age:  86 y.o., Sex:  male  Medical Record #: [id number]  Today's Date: 3/31/2021          Assessment  Patient is 86 y.o. male admitted for transient vision changes, possible TIA/CVA CTH and CTA (-), MRI still pending. Pt lives in a H with spouse, normally independent with all ADLs and per patient has been ambulating everyday with spouse prior to admission, pt describes vision changes as left eye drifting towards right intermittently but unable to reproduce during exam with visual tracking, pt supervision for all mobility and ADLs, no functional needs identified, will complete order at this time. Patient will not be actively followed for occupational therapy services at this time, however may be seen if requested by physician for 1 more visit within 30 days to address any discharge or equipment needs.     Plan    Recommend Occupational Therapy for Evaluation only.    DC Equipment Recommendations: (P) None  Discharge Recommendations: (P) Anticipate that the patient will have no further occupational therapy needs after discharge from the hospital     Subjective    \"Everything seems fine now\"     Objective       03/31/21 0820   Prior Living Situation   Prior Services Home-Independent   Housing / Facility [address]   Steps Into Home 5   Steps In Home 0   Rail Right Rail  (Steps in Home)   Bathroom Set up Walk In Shower   Equipment Owned None   Lives with - Patient's Self Care Capacity Spouse   Prior Level of ADL Function   Self Feeding Independent   Grooming / Hygiene Independent   Bathing Independent   Dressing Independent   Toileting Independent   Prior Level of IADL Function   Medication Management Independent   Laundry Independent   Kitchen Mobility Independent   Finances Independent   Home Management Independent   Shopping Independent   Prior Level Of Mobility Independent Without Device in Community   Driving / Transportation " Driving Independent   Occupation (Pre-Hospital Vocational) Retired Due To Age   History of Falls   History of Falls No   Pain 0 - 10 Group   Therapist Pain Assessment Post Activity Pain Same as Prior to Activity;Nurse Notified;0   Cognition    Cognition / Consciousness WDL   Level of Consciousness Alert   Active ROM Upper Body   Active ROM Upper Body  WDL   Dominant Hand Right   Strength Upper Body   Upper Body Strength  WDL   Sensation Upper Body   Upper Extremity Sensation  WDL   Upper Body Muscle Tone   Upper Body Muscle Tone  WDL   Neurological Concerns   Neurological Concerns No   Coordination Upper Body   Coordination WDL   Balance Assessment   Sitting Balance (Static) Fair +   Sitting Balance (Dynamic) Fair +   Standing Balance (Static) Fair   Standing Balance (Dynamic) Fair   Weight Shift Sitting Good   Weight Shift Standing Good   Comments no AD   Bed Mobility    Supine to Sit Supervised   Sit to Supine Supervised   Scooting Supervised   ADL Assessment   Eating Independent   Upper Body Dressing Supervision   Lower Body Dressing Supervision   Toileting   (NT-refused need)   How much help from another person does the patient currently need...   Putting on and taking off regular lower body clothing? 3   Bathing (including washing, rinsing, and drying)? 3   Toileting, which includes using a toilet, bedpan, or urinal? 3   Putting on and taking off regular upper body clothing? 4   Taking care of personal grooming such as brushing teeth? 4   Eating meals? 4   6 Clicks Daily Activity Score 21   Functional Mobility   Sit to Stand Supervised   Bed, Chair, Wheelchair Transfer Supervised   Toilet Transfers Refused   Transfer Method Stand Step   Mobility bed mobility, hallway mobility, up to chair   Comments no AD   Visual Perception   Visual Perception  WDL   Comments unable to reproduce symptoms which were reason for admission   Activity Tolerance   Sitting in Chair left seated in chair   Sitting Edge of Bed 10    Standing 5   Education Group   Education Provided Role of Occupational Therapist   Role of Occupational Therapist Patient Response Patient;Acceptance;Explanation   Problem List   Problem List None   Interdisciplinary Plan of Care Collaboration   IDT Collaboration with  Nursing   Patient Position at End of Therapy Seated;Chair Alarm On;Call Light within Reach;Tray Table within Reach;Phone within Reach   Collaboration Comments RN updated

## 2021-03-31 NOTE — H&P
"Hospital Medicine History & Physical Note    Date of Service  3/30/2021    Primary Care Physician  Wali Clay M.D.    Consultants      Code Status  Full Code    Chief Complaint  Transient vision changes and syncope    History of Presenting Illness  86 y.o. male who presented 3/30/2021 with transient vision changes.  This is a pleasant gentleman with a history of BPH with LUTS and hyperlipidemia not on any medications, who presents with complaints of vision changes as well as syncope.  Initially presented to [facility] in [place], [place] with complaints of transient bilateral blindness.  He is a poor historian and cannot recall the details with the vision changes.  He denies any diplopia.  He is also hard of hearing even with his hearing aids in place.  He reports his \"left eye crossing over to the right.\"  Per outside medical records, he had bilateral vision changes while driving 12 days ago, which improved.  He also had complaints of his head feeling like \"a baseball\" and feeling \"foggy.\"  Patient reports that he has been having vision changes that come and go but is not able to explain the details of the nature of change.  He reports that he passed out on Sunday was feeling dizzy especially getting up.  He reports waking up on the floor of the kitchen on Sunday morning.  Outside medical records indicate that he was also experiencing generalized weakness as well as having some word finding difficulties that were new.  He is currently living with his wife, who was concerned about the mental status changes.    CTA was performed at outside hospital, which showed 60% stenosis of the bilateral carotid arteries.  Request was made for transfer to [facility] for further work-up with MRI of the brain as well as vascular surgery consultation.  NIH stroke score was 2 with loss points for orientation.    Outside medical records were reviewed.  Laboratory studies were significant " with hyponatremia sodium of 146, creatinine elevated at 1.43, GFR of 50.  Total cholesterol 214, triglycerides 92, , HDL 46.  EKG showed sinus bradycardia.    Patient denies any history of stroke.  Distant history of alcohol and smoking.  Father  in a car accident while he was an infant.  Mother  of alcohol and smoking per patient.      Review of Systems  Review of Systems   Constitutional: Negative for chills and fever.   HENT: Positive for ear pain. Negative for ear discharge and sore throat.    Eyes: Positive for blurred vision. Negative for double vision.   Respiratory: Negative for cough and shortness of breath.    Cardiovascular: Negative for chest pain and palpitations.   Gastrointestinal: Negative for abdominal pain, constipation, diarrhea, nausea and vomiting.   Genitourinary: Negative for dysuria, frequency and urgency.   Musculoskeletal: Negative for falls, joint pain and myalgias.   Skin: Negative for itching and rash.   Neurological: Positive for dizziness. Negative for focal weakness and headaches.   Psychiatric/Behavioral: Positive for memory loss. The patient is not nervous/anxious.        Past Medical History   has a past medical history of Hydrocele, unspecified, Hypertrophy of prostate without urinary obstruction and other lower urinary tract symptoms (LUTS), and Mixed hyperlipidemia.    Surgical History   has a past surgical history that includes hemorrhoidectomy (); plastic surgery (2009); and eye surgery ( ; ).     Family History  family history includes Cancer in his mother.     Social History   reports that he quit smoking about 31 years ago. He has a 20.00 pack-year smoking history. He has never used smokeless tobacco. He reports current alcohol use. He reports that he does not use drugs.    Allergies  Allergies   Allergen Reactions   • Vicodin [Hydrocodone-Acetaminophen] Rash       Medications  None       Physical Exam  Temp:  [36.4 °C (97.6 °F)] 36.4 °C  (97.6 °F)  Pulse:  [54] 54  Resp:  [17] 17  BP: (168)/(83) 168/83  SpO2:  [97 %] 97 %    Physical Exam  Vitals and nursing note reviewed.   Constitutional:       General: He is not in acute distress.     Appearance: Normal appearance. He is well-developed. He is not ill-appearing or diaphoretic.   HENT:      Head: Normocephalic and atraumatic.      Right Ear: External ear normal.      Left Ear: External ear normal.      Nose: Nose normal.      Mouth/Throat:      Pharynx: Oropharynx is clear. No oropharyngeal exudate or posterior oropharyngeal erythema.   Eyes:      General: No scleral icterus.        Right eye: No discharge.         Left eye: No discharge.      Conjunctiva/sclera: Conjunctivae normal.      Pupils: Pupils are equal, round, and reactive to light.   Neck:      Thyroid: No thyromegaly.      Vascular: No carotid bruit or JVD.      Trachea: No tracheal deviation.   Cardiovascular:      Rate and Rhythm: Regular rhythm. Bradycardia present.      Heart sounds: Normal heart sounds. No murmur. No friction rub. No gallop.    Pulmonary:      Effort: Pulmonary effort is normal. No respiratory distress.      Breath sounds: Normal breath sounds. No stridor. No wheezing or rales.   Abdominal:      General: Bowel sounds are normal. There is no distension.      Palpations: Abdomen is soft. There is no mass.      Tenderness: There is no abdominal tenderness. There is no guarding or rebound.   Musculoskeletal:         General: No tenderness or deformity.      Cervical back: Neck supple. No tenderness.      Right lower leg: No edema.      Left lower leg: No edema.   Lymphadenopathy:      Cervical: No cervical adenopathy.   Skin:     General: Skin is warm and dry.      Coloration: Skin is not pale.      Findings: No erythema or rash.   Neurological:      Mental Status: He is alert and oriented to person, place, and time.      Sensory: No sensory deficit.      Motor: No weakness or abnormal muscle tone.      Comments:  Cranial nerves II through XII are intact.  There is no drift.  Speech is slightly broken.  Loss of hearing bilaterally.  No dysmetria.  Motor strength is full in both the upper and lower extremities.  Sensation is intact to light touch in both the upper lower extremities.  No carotid bruits on auscultation.   Psychiatric:         Behavior: Behavior normal.         Thought Content: Thought content normal.         Judgment: Judgment normal.         Laboratory:          No results for input(s): ALTSGPT, ASTSGOT, ALKPHOSPHAT, TBILIRUBIN, DBILIRUBIN, GAMMAGT, AMYLASE, LIPASE, ALB, PREALBUMIN, GLUCOSE in the last 72 hours.      No results for input(s): NTPROBNP in the last 72 hours.      No results for input(s): TROPONINT in the last 72 hours.    Imaging:  No orders to display       Head CT per my review shows diffuse global atrophy with no hypodensities, hemorrhages, or masses.  Hydrocephalus ex vacuo present secondary to global atrophy prominent in frontal lobes.      Chest x-ray performed at the outside hospital per my review shows hazy nonspecific interstitial infiltrates in the left lower lobe.  No focal consolidation.  Sharp costophrenic angles bilaterally.    EKG from outside hospital dated March 30, 2021 at 10:47 AM was personally reviewed.  The EKG showed sinus bradycardia with heart rate of 57, QTc of 400, no significant ST elevation or depression.    Assessment/Plan:  I anticipate this patient is appropriate for observation status at this time.    * Changes in vision- (present on admission)  Assessment & Plan  Specifics of vision changes or loss unclear.  Patient reports possibly being evaluated by an optometrist with normal exam.  Concern for TIA/amaurosis fugax versus stroke.    Admit to telemetry.  MRI of the brain without contrast.  Echocardiogram.  Consider neurology consultation.  Start aspirin and high intensity statin.    Carotid stenosis, bilateral- (present on admission)  Assessment & Plan  As per CTA  at outside hospital 60% bilaterally.  Unclear at this time whether this is related to his vision changes.    Consider neurology consultation.  Continue aspirin 325 mg daily and high intensity statin.  Consider neurology and vascular surgery consultations.    Syncope- (present on admission)  Assessment & Plan  Preceded by dizziness.  Etiology unclear.  Possible dehydration.    Check orthostatic blood pressures.  Echocardiogram.    Hypernatremia- (present on admission)  Assessment & Plan  As per outside hospital 146 sodium level.  Appears to be improved on repeat lab draw here.    Continue to monitor.    Mixed hyperlipidemia- (present on admission)  Assessment & Plan  As per lab studies.    Start high intensity statin in setting of possible TIA.    Do not intubate, cardiopulmonary resuscitation (CPR)-only code status- (present on admission)  Assessment & Plan  Discussed CODE STATUS with patient.  He is okay with CPR.  He wishes not to be intubated.    BPH (benign prostatic hyperplasia)- (present on admission)  Assessment & Plan  As per history currently not complaining of symptoms.      Continue to monitor.

## 2021-03-31 NOTE — PROGRESS NOTES
Received bedside report from transport, pt care assumed, VSS, pt assessment complete. Pt AAOx4, c/o 0/10 pain at this time. No signs of acute distress noted at this time. POC discussed with pt and verbalizes no questions. Pt denies any additional needs at this time. Bed in lowest position, bed alarm on, pt educated on fall risk and verbalized understanding, call light within reach, hourly rounding initiated.

## 2021-03-31 NOTE — ASSESSMENT & PLAN NOTE
- Presenting with BL transient vision loss with weakness; however, has had multiple episodes over past year with shaking, unresponsiveness, stiffness, and drooling on last episode, concerning for TIA/amaurosis fugax vs stroke vs seizure  - CT head showed diffuse atrophy, EKG and CXR normal  - MRI showed punctate acute infarcts in R cerebellum, R occipital, and R frontal cortex - Plavix added last night  - EEG was normal, Echo with bubble study normal (EF = 70%)  - Neuro considering myasthenia gravis - testing for MuSK Ab and AChR Ab  - Recommended Zio patch on d/c  - Vascular surgery performed CEA on 4/2 - removed hemorrhagic ulcerated plaue  - Continue ASA and Atorvastatin (discontinued Plavix, per Vascular)  - PT, OT, SLP recommended post-acute placement, PM&R consult pending.  - Daughter would like to be updated (Angela, [phone])

## 2021-03-31 NOTE — ED NOTES
Med Rec completed per patient at bedside  Allergies reviewed  No ORAL antibiotics in last 14 days

## 2021-03-31 NOTE — PROGRESS NOTES
· 2 RN skin check complete.   · Elbows are dry, but skin exam is otherwise unremarkable. Skin is clean, dry, and intact elsewhere.

## 2021-03-31 NOTE — ASSESSMENT & PLAN NOTE
- As per CTA at outside hospital 60% bilaterally - ucnlear at this time whether this is related to his vision changes  - CEA performed on R-side on 4/2 - removed hemorrhagic ulcerated plaque

## 2021-03-31 NOTE — PROCEDURES
ROUTINE VIDEO ELECTROENCEPHALOGRAM REPORT      Referring provider:   Dr. Blanton    DOS: 03/31/21 (0 hours and 24 minutes of total recording time).     INDICATION:  Cooper Harvey 86 y.o. male presenting with altered mental status    CURRENT ANTIEPILEPTIC AND/OR SEDATING REGIMEN: No AEDs    TECHNIQUE: Routine VEEG was set up by a Neurodiagnostic technologist who performed education to the patient and staff. A minimum of 23 electrodes and 23 channel recording was setup and performed by Neurodiagnostic technologist, in accordance with the international 10-20 system. The study was reviewed in bipolar and referential montages. The recording examined the patient in the  awake, drowsy, and sleep state(s).     DESCRIPTION OF THE RECORD:  During wakefulness, the background was moderate to low amplitude, continuous and showed a 9-10 Hz posterior dominant rhythm.  There was reactivity to eye closure/opening.  An anterior-posterior gradient was noted with faster beta frequencies seen anteriorly.  During drowsiness, theta/delta frequencies were seen.    Sleep was captured and was characterized by diffuse background delta/theta activity with a loss of myogenic artifact.  N2 sleep transients in the form of sleep spindles and vertex waves were seen in the leads over the central regions.     ACTIVATION PROCEDURES:   Intermittent Photic stimulation was performed in a stepwise fashion from 1 to 30 Hz, and did not elicit any abnormal responses.      ICTAL AND INTERICTAL FINDINGS:   No focal or generalized epileptiform activity noted.     No regional slowing was seen during this routine study.      No clinical events or seizures were reported or recorded during the study.     EKG: sampling of the EKG recording showed occasional PACs    EVENTS:  None    INTERPRETATION:   Normal video EEG recording in the awake, drowsy, and sleep state(s):  - No persistent focal asymmetries seen.  - No epileptiform discharges seen   - No seizures.  Clinical correlation is recommended.      Note: A normal EEG does not rule out epilepsy.  If the clinical suspicion remains high for seizures, a prolonged recording to capture clinical or subclinical events may be helpful.        Garrett Reinoso MD  Epilepsy and General Neurology  Department of Neurology  Instructor of Clinical Neurology [facility].   Office: [phone]  Fax: [phone]

## 2021-04-01 ENCOUNTER — APPOINTMENT (OUTPATIENT)
Dept: CARDIOLOGY | Facility: MEDICAL CENTER | Age: 86
DRG: 038 | End: 2021-04-01
Attending: STUDENT IN AN ORGANIZED HEALTH CARE EDUCATION/TRAINING PROGRAM
Payer: MEDICARE

## 2021-04-01 ENCOUNTER — APPOINTMENT (OUTPATIENT)
Dept: RADIOLOGY | Facility: MEDICAL CENTER | Age: 86
DRG: 038 | End: 2021-04-01
Attending: PSYCHIATRY & NEUROLOGY
Payer: MEDICARE

## 2021-04-01 PROBLEM — R41.82 ALTERED MENTAL STATUS, UNSPECIFIED: Status: ACTIVE | Noted: 2021-04-01

## 2021-04-01 LAB
ALBUMIN SERPL BCP-MCNC: 4.2 G/DL (ref 3.2–4.9)
ALBUMIN/GLOB SERPL: 1.5 G/DL
ALP SERPL-CCNC: 92 U/L (ref 30–99)
ALT SERPL-CCNC: 9 U/L (ref 2–50)
ANION GAP SERPL CALC-SCNC: 13 MMOL/L (ref 7–16)
AST SERPL-CCNC: 16 U/L (ref 12–45)
BASOPHILS # BLD AUTO: 0.4 % (ref 0–1.8)
BASOPHILS # BLD: 0.03 K/UL (ref 0–0.12)
BILIRUB SERPL-MCNC: 0.6 MG/DL (ref 0.1–1.5)
BUN SERPL-MCNC: 19 MG/DL (ref 8–22)
CALCIUM SERPL-MCNC: 8.9 MG/DL (ref 8.5–10.5)
CHLORIDE SERPL-SCNC: 102 MMOL/L (ref 96–112)
CO2 SERPL-SCNC: 21 MMOL/L (ref 20–33)
CREAT SERPL-MCNC: 1.03 MG/DL (ref 0.5–1.4)
EOSINOPHIL # BLD AUTO: 0.09 K/UL (ref 0–0.51)
EOSINOPHIL NFR BLD: 1.2 % (ref 0–6.9)
ERYTHROCYTE [DISTWIDTH] IN BLOOD BY AUTOMATED COUNT: 42.4 FL (ref 35.9–50)
GLOBULIN SER CALC-MCNC: 2.8 G/DL (ref 1.9–3.5)
GLUCOSE SERPL-MCNC: 128 MG/DL (ref 65–99)
HCT VFR BLD AUTO: 47.3 % (ref 42–52)
HGB BLD-MCNC: 16.1 G/DL (ref 14–18)
IMM GRANULOCYTES # BLD AUTO: 0.04 K/UL (ref 0–0.11)
IMM GRANULOCYTES NFR BLD AUTO: 0.5 % (ref 0–0.9)
LV EJECT FRACT  99904: 70
LV EJECT FRACT MOD 2C 99903: 74.08
LV EJECT FRACT MOD 4C 99902: 68.66
LV EJECT FRACT MOD BP 99901: 71.81
LYMPHOCYTES # BLD AUTO: 0.81 K/UL (ref 1–4.8)
LYMPHOCYTES NFR BLD: 10.5 % (ref 22–41)
MCH RBC QN AUTO: 31.1 PG (ref 27–33)
MCHC RBC AUTO-ENTMCNC: 34 G/DL (ref 33.7–35.3)
MCV RBC AUTO: 91.3 FL (ref 81.4–97.8)
MONOCYTES # BLD AUTO: 0.56 K/UL (ref 0–0.85)
MONOCYTES NFR BLD AUTO: 7.3 % (ref 0–13.4)
NEUTROPHILS # BLD AUTO: 6.16 K/UL (ref 1.82–7.42)
NEUTROPHILS NFR BLD: 80.1 % (ref 44–72)
NRBC # BLD AUTO: 0 K/UL
NRBC BLD-RTO: 0 /100 WBC
PLATELET # BLD AUTO: 204 K/UL (ref 164–446)
PMV BLD AUTO: 10.4 FL (ref 9–12.9)
POTASSIUM SERPL-SCNC: 3.8 MMOL/L (ref 3.6–5.5)
PROT SERPL-MCNC: 7 G/DL (ref 6–8.2)
RBC # BLD AUTO: 5.18 M/UL (ref 4.7–6.1)
SODIUM SERPL-SCNC: 136 MMOL/L (ref 135–145)
TSH SERPL DL<=0.005 MIU/L-ACNC: 2.01 UIU/ML (ref 0.38–5.33)
WBC # BLD AUTO: 7.7 K/UL (ref 4.8–10.8)

## 2021-04-01 PROCEDURE — A9270 NON-COVERED ITEM OR SERVICE: HCPCS | Performed by: STUDENT IN AN ORGANIZED HEALTH CARE EDUCATION/TRAINING PROGRAM

## 2021-04-01 PROCEDURE — 99233 SBSQ HOSP IP/OBS HIGH 50: CPT | Mod: GC | Performed by: HOSPITALIST

## 2021-04-01 PROCEDURE — 36415 COLL VENOUS BLD VENIPUNCTURE: CPT

## 2021-04-01 PROCEDURE — 700102 HCHG RX REV CODE 250 W/ 637 OVERRIDE(OP): Performed by: STUDENT IN AN ORGANIZED HEALTH CARE EDUCATION/TRAINING PROGRAM

## 2021-04-01 PROCEDURE — 770020 HCHG ROOM/CARE - TELE (206)

## 2021-04-01 PROCEDURE — A9270 NON-COVERED ITEM OR SERVICE: HCPCS | Performed by: HOSPITALIST

## 2021-04-01 PROCEDURE — 93306 TTE W/DOPPLER COMPLETE: CPT | Mod: 26 | Performed by: INTERNAL MEDICINE

## 2021-04-01 PROCEDURE — 70496 CT ANGIOGRAPHY HEAD: CPT | Mod: MG

## 2021-04-01 PROCEDURE — 84443 ASSAY THYROID STIM HORMONE: CPT

## 2021-04-01 PROCEDURE — 700117 HCHG RX CONTRAST REV CODE 255: Performed by: PSYCHIATRY & NEUROLOGY

## 2021-04-01 PROCEDURE — 80053 COMPREHEN METABOLIC PANEL: CPT

## 2021-04-01 PROCEDURE — 92610 EVALUATE SWALLOWING FUNCTION: CPT

## 2021-04-01 PROCEDURE — 700111 HCHG RX REV CODE 636 W/ 250 OVERRIDE (IP): Performed by: STUDENT IN AN ORGANIZED HEALTH CARE EDUCATION/TRAINING PROGRAM

## 2021-04-01 PROCEDURE — 700102 HCHG RX REV CODE 250 W/ 637 OVERRIDE(OP): Performed by: HOSPITALIST

## 2021-04-01 PROCEDURE — 83519 RIA NONANTIBODY: CPT

## 2021-04-01 PROCEDURE — 700105 HCHG RX REV CODE 258: Performed by: STUDENT IN AN ORGANIZED HEALTH CARE EDUCATION/TRAINING PROGRAM

## 2021-04-01 PROCEDURE — 93306 TTE W/DOPPLER COMPLETE: CPT

## 2021-04-01 PROCEDURE — 85025 COMPLETE CBC W/AUTO DIFF WBC: CPT

## 2021-04-01 PROCEDURE — 99233 SBSQ HOSP IP/OBS HIGH 50: CPT | Performed by: NURSE PRACTITIONER

## 2021-04-01 PROCEDURE — 70498 CT ANGIOGRAPHY NECK: CPT | Mod: MG

## 2021-04-01 RX ORDER — LABETALOL HYDROCHLORIDE 5 MG/ML
10 INJECTION, SOLUTION INTRAVENOUS EVERY 4 HOURS PRN
Status: DISCONTINUED | OUTPATIENT
Start: 2021-04-01 | End: 2021-04-06 | Stop reason: HOSPADM

## 2021-04-01 RX ORDER — HALOPERIDOL 5 MG/ML
2 INJECTION INTRAMUSCULAR EVERY 6 HOURS PRN
Status: DISCONTINUED | OUTPATIENT
Start: 2021-04-01 | End: 2021-04-06 | Stop reason: HOSPADM

## 2021-04-01 RX ORDER — DIVALPROEX SODIUM 250 MG/1
250 TABLET, DELAYED RELEASE ORAL EVERY 8 HOURS
Status: DISCONTINUED | OUTPATIENT
Start: 2021-04-01 | End: 2021-04-06 | Stop reason: HOSPADM

## 2021-04-01 RX ORDER — QUETIAPINE FUMARATE 25 MG/1
25 TABLET, FILM COATED ORAL
Status: COMPLETED | OUTPATIENT
Start: 2021-04-01 | End: 2021-04-01

## 2021-04-01 RX ORDER — SODIUM CHLORIDE, SODIUM LACTATE, POTASSIUM CHLORIDE, CALCIUM CHLORIDE 600; 310; 30; 20 MG/100ML; MG/100ML; MG/100ML; MG/100ML
INJECTION, SOLUTION INTRAVENOUS CONTINUOUS
Status: DISCONTINUED | OUTPATIENT
Start: 2021-04-01 | End: 2021-04-02

## 2021-04-01 RX ADMIN — DOCUSATE SODIUM 50 MG AND SENNOSIDES 8.6 MG 2 TABLET: 8.6; 5 TABLET, FILM COATED ORAL at 18:10

## 2021-04-01 RX ADMIN — QUETIAPINE FUMARATE 25 MG: 25 TABLET ORAL at 03:06

## 2021-04-01 RX ADMIN — SODIUM CHLORIDE, POTASSIUM CHLORIDE, SODIUM LACTATE AND CALCIUM CHLORIDE: 600; 310; 30; 20 INJECTION, SOLUTION INTRAVENOUS at 05:48

## 2021-04-01 RX ADMIN — ASPIRIN 81 MG: 81 TABLET, CHEWABLE ORAL at 05:47

## 2021-04-01 RX ADMIN — IOHEXOL 80 ML: 350 INJECTION, SOLUTION INTRAVENOUS at 17:07

## 2021-04-01 RX ADMIN — SODIUM CHLORIDE, POTASSIUM CHLORIDE, SODIUM LACTATE AND CALCIUM CHLORIDE: 600; 310; 30; 20 INJECTION, SOLUTION INTRAVENOUS at 15:15

## 2021-04-01 RX ADMIN — DIVALPROEX SODIUM 250 MG: 250 TABLET, DELAYED RELEASE ORAL at 13:48

## 2021-04-01 RX ADMIN — CLOPIDOGREL BISULFATE 75 MG: 75 TABLET ORAL at 18:10

## 2021-04-01 RX ADMIN — ATORVASTATIN CALCIUM 80 MG: 80 TABLET, FILM COATED ORAL at 18:10

## 2021-04-01 RX ADMIN — ENOXAPARIN SODIUM 40 MG: 40 INJECTION SUBCUTANEOUS at 18:10

## 2021-04-01 RX ADMIN — DIVALPROEX SODIUM 250 MG: 250 TABLET, DELAYED RELEASE ORAL at 21:23

## 2021-04-01 ASSESSMENT — ENCOUNTER SYMPTOMS
CONSTIPATION: 0
BLURRED VISION: 1
FOCAL WEAKNESS: 0
MEMORY LOSS: 1
FALLS: 0
NAUSEA: 0
COUGH: 0
MYALGIAS: 0
CHILLS: 0
DIARRHEA: 0
NERVOUS/ANXIOUS: 0
DIZZINESS: 1
SHORTNESS OF BREATH: 0
PALPITATIONS: 0
HEADACHES: 0
FEVER: 0
DOUBLE VISION: 0
ABDOMINAL PAIN: 0
SORE THROAT: 0
VOMITING: 0

## 2021-04-01 ASSESSMENT — PAIN DESCRIPTION - PAIN TYPE
TYPE: ACUTE PAIN
TYPE: ACUTE PAIN

## 2021-04-01 NOTE — PROGRESS NOTES
Daily Progress Note:     Date of Service: 4/1/2021  Primary Team: [employer] IM Blue Team   Attending: GREGORIO Rich M.D.   Senior Resident: Dr. Miller  Intern: Dr. Blanton  Contact:  [phone]    Patient ID:  [id number] YOM with PMHx of BPH, HLD who presented as a transfer from outside hospital for BL transient vision loss, weakness, concerning for TIA vs stroke. However, pt has had similar episodes over past year (~12) with associated stiffness, unresponsiveness, and occasional drooling, concerning for seizures. Vascular, Neuro consulted.      Chief Complaint:  BL transient vision loss     Interval Update:   NAEON. Pt was agitated and combative with staff last night - Seroquel was given, which did not seem to work. He was thus placed on soft restraints. This morning, he appears disoriented and confused to why he is on restraints but remains argumentative.     MRI of brain w/o contrast showed acute punctate infarcts in R cerebellum, R frontal cortex, and R occipital cortex. Plavix was added and Vascular Surgery notified about results. Neuro saw patient yesterday, concerning for possible myasthenia gravis (diplopia, weakness.) Echo pending.       Consultants/Specialty:  Neuro, Vascular Surgery    Review of Systems:    Review of Systems   Constitutional: Negative for chills and fever.   HENT: Positive for ear pain. Negative for ear discharge and sore throat.    Eyes: Positive for blurred vision. Negative for double vision.   Respiratory: Negative for cough and shortness of breath.    Cardiovascular: Negative for chest pain and palpitations.   Gastrointestinal: Negative for abdominal pain, constipation, diarrhea, nausea and vomiting.   Genitourinary: Negative for dysuria, frequency and urgency.   Musculoskeletal: Negative for falls, joint pain and myalgias.   Skin: Negative for itching and rash.   Neurological: Positive for dizziness. Negative for focal weakness and headaches.   Psychiatric/Behavioral: Positive for memory  loss. The patient is not nervous/anxious.        Objective Data:   Physical Exam:   Vitals:   Temp:  [36.3 °C (97.4 °F)-37.3 °C (99.2 °F)] 37.2 °C (98.9 °F)  Pulse:  [63-94] 88  Resp:  [17-20] 17  BP: (154-179)/() 170/96  SpO2:  [93 %-94 %] 94 %    Physical Exam  Vitals and nursing note reviewed.   Constitutional:       General: He is not in acute distress.     Appearance: Normal appearance. He is well-developed.   HENT:      Head: Normocephalic and atraumatic.      Right Ear: External ear normal.      Left Ear: External ear normal.      Nose: Nose normal.      Mouth/Throat:      Mouth: Mucous membranes are dry.      Pharynx: Oropharynx is clear. No oropharyngeal exudate or posterior oropharyngeal erythema.   Eyes:      General: No scleral icterus.     Extraocular Movements: Extraocular movements intact.      Conjunctiva/sclera: Conjunctivae normal.   Neck:      Thyroid: No thyromegaly.      Vascular: No JVD.      Trachea: No tracheal deviation.   Cardiovascular:      Rate and Rhythm: Normal rate and regular rhythm.      Heart sounds: Normal heart sounds. No murmur. No friction rub. No gallop.    Pulmonary:      Effort: Pulmonary effort is normal. No respiratory distress.      Breath sounds: Normal breath sounds. No stridor. No wheezing or rales.   Abdominal:      General: Bowel sounds are normal. There is no distension.      Palpations: Abdomen is soft.      Tenderness: There is no abdominal tenderness.   Musculoskeletal:      Cervical back: Neck supple.      Right lower leg: No edema.      Left lower leg: No edema.   Skin:     General: Skin is warm and dry.      Coloration: Skin is not pale.      Findings: No erythema or rash.   Neurological:      Mental Status: He is alert. He is disoriented.      Sensory: No sensory deficit.      Motor: No weakness or abnormal muscle tone.      Comments: Speech is slightly broken.  Decreasing hearing BL (chronic)   Psychiatric:      Comments: Agitated, combative last night          Labs:   Recent Results (from the past 24 hour(s))   TSH WITH REFLEX TO FT4    Collection Time: 04/01/21  5:21 AM   Result Value Ref Range    TSH 2.010 0.380 - 5.330 uIU/mL   CBC WITH DIFFERENTIAL    Collection Time: 04/01/21  5:21 AM   Result Value Ref Range    WBC 7.7 4.8 - 10.8 K/uL    RBC 5.18 4.70 - 6.10 M/uL    Hemoglobin 16.1 14.0 - 18.0 g/dL    Hematocrit 47.3 42.0 - 52.0 %    MCV 91.3 81.4 - 97.8 fL    MCH 31.1 27.0 - 33.0 pg    MCHC 34.0 33.7 - 35.3 g/dL    RDW 42.4 35.9 - 50.0 fL    Platelet Count 204 164 - 446 K/uL    MPV 10.4 9.0 - 12.9 fL    Neutrophils-Polys 80.10 (H) 44.00 - 72.00 %    Lymphocytes 10.50 (L) 22.00 - 41.00 %    Monocytes 7.30 0.00 - 13.40 %    Eosinophils 1.20 0.00 - 6.90 %    Basophils 0.40 0.00 - 1.80 %    Immature Granulocytes 0.50 0.00 - 0.90 %    Nucleated RBC 0.00 /100 WBC    Neutrophils (Absolute) 6.16 1.82 - 7.42 K/uL    Lymphs (Absolute) 0.81 (L) 1.00 - 4.80 K/uL    Monos (Absolute) 0.56 0.00 - 0.85 K/uL    Eos (Absolute) 0.09 0.00 - 0.51 K/uL    Baso (Absolute) 0.03 0.00 - 0.12 K/uL    Immature Granulocytes (abs) 0.04 0.00 - 0.11 K/uL    NRBC (Absolute) 0.00 K/uL   Comp Metabolic Panel    Collection Time: 04/01/21  5:21 AM   Result Value Ref Range    Sodium 136 135 - 145 mmol/L    Potassium 3.8 3.6 - 5.5 mmol/L    Chloride 102 96 - 112 mmol/L    Co2 21 20 - 33 mmol/L    Anion Gap 13.0 7.0 - 16.0    Glucose 128 (H) 65 - 99 mg/dL    Bun 19 8 - 22 mg/dL    Creatinine 1.03 0.50 - 1.40 mg/dL    Calcium 8.9 8.5 - 10.5 mg/dL    AST(SGOT) 16 12 - 45 U/L    ALT(SGPT) 9 2 - 50 U/L    Alkaline Phosphatase 92 30 - 99 U/L    Total Bilirubin 0.6 0.1 - 1.5 mg/dL    Albumin 4.2 3.2 - 4.9 g/dL    Total Protein 7.0 6.0 - 8.2 g/dL    Globulin 2.8 1.9 - 3.5 g/dL    A-G Ratio 1.5 g/dL   ESTIMATED GFR    Collection Time: 04/01/21  5:21 AM   Result Value Ref Range    GFR If African American >60 >60 mL/min/1.73 m 2    GFR If Non African American >60 >60 mL/min/1.73 m 2   EC-ECHOCARDIOGRAM  COMPLETE W/O CONT    Collection Time: 04/01/21 12:59 PM   Result Value Ref Range    Eject.Frac. MOD BP 71.81     Eject.Frac. MOD 4C 68.66     Eject.Frac. MOD 2C 74.08     Left Ventrical Ejection Fraction 70        Imaging:   Independant Imaging Review: Completed  EC-ECHOCARDIOGRAM COMPLETE W/O CONT   Final Result      MR-BRAIN-W/O   Final Result      1.  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   2.  Linear focus of gradient echo signal hypointensity in the left occipital lobe consistent with chronic hypertensive microhemorrhage, remote trauma or infection.   3.  Moderate diffuse cerebral substance loss.   4.  Mild microangiopathic ischemic change versus demyelination or gliosis.      CT-CTA HEAD WITH & W/O-POST PROCESS    (Results Pending)   CT-CTA NECK WITH & W/O-POST PROCESSING    (Results Pending)       Problem Representation:   * Acute CVA (cerebrovascular accident) (HCC)- (present on admission)  Assessment & Plan  - Presenting with BL transient vision loss with weakness; however, has had multiple episodes over past year with shaking, unresponsiveness, stiffness, and drooling on last episode, concerning for TIA/amaurosis fugax vs stroke vs seizure  - CT head showed diffuse atrophy, EKG and CXR normal  - MRI showed punctate acute infarcts in R cerebellum, R occipital, and R frontal cortex - Plavix added last night    - EEG was normal, Echo with bubble study normal (EF = 70%)  - Neuro considering myasthenia gravis - testing for MuSK Ab and AChR Ab, also ordered CTA head and neck  - Vascular surgery think patient is candidate for carotid clean-out or endarectomy -  - Monitor on Tele  - Continue ASA, Plavix, and Atorvastatin (discussed with Vascular, continue DAPT)  - Daughter would like to be updated (Angela, [phone])    Carotid stenosis, bilateral- (present on admission)  Assessment & Plan  - As per CTA at outside hospital 60% bilaterally -  joselear at this time whether this is related to his vision changes.  - Vascular surgery consulted - see above plan    Altered mental status, unspecified  Assessment & Plan  - Pt became extremely disoriented and combative with staff on 3/31 evening/night    - Was given Seroquel which did not seem to change behavior    - Then placed on soft restraints  - Remains fairly disoriented but soft restraints removed on 4/1 around 3 PM - started on Depakote TID  - Tele sitter ordered to monitor patient, has Haldol PRN onboard    Mixed hyperlipidemia- (present on admission)  Assessment & Plan  - Continue Atorvastatin at higher dose    Do not intubate, cardiopulmonary resuscitation (CPR)-only code status- (present on admission)  Assessment & Plan  Discussed CODE STATUS with patient.  He is okay with CPR.  He wishes not to be intubated.    BPH (benign prostatic hyperplasia)- (present on admission)  Assessment & Plan  - Not on any meds, denying any current symptoms

## 2021-04-01 NOTE — DISCHARGE PLANNING
"LSW met w/ pt at bedside for assessment.  Pt was a little confused and kept asking \"what happened?\".    LSW called and left voicemail msg for pt's spouse.  "

## 2021-04-01 NOTE — PROGRESS NOTES
Vascular    Seen and examined  Patient does meet indications for right carotid endarterectomy (60% stenosis and Right-brain infarcts on MRI)    However, he appears to be a relatively poor candidate for endarterectomy or stenting, he is somewhat confused and I'm not sure what his baseline function is at home.      I put a call out to the wife to discuss the situation with her and left a voicemail and I am awaiting a call back.      Willie Beavers MD  [facility] (General and Vascular Surgery)  Cell: [phone] (text/call)  Office: [phone]  __________________________________________________________________  Patient:Cooper Harvey   MRN:[id number]   CSN:[id number]    4/1/2021    11:06 AM
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
## 2021-04-01 NOTE — PROGRESS NOTES
Neurology Progress Note  Neurohospitalist Service, [facility]iences    Referring Physician: GREGORIO Rich M.D.    Reason for Referral: Transient vision changes and syncope    HPI: Cooper Harvey is a 86 y.o. male with past history of benign prostatic hypertrophy and hyperlipidemia who presented to [facility] 3/30/21 as a transfer from OSH with complaint of transient bilateral vision loss over the past several weeks. He reports that several times while he has been watching television in the evening, he feels his left eye wandering medially and experiences double vision. He reports these symptoms are improved in the mornings. He also reports a syncopal episode 2 days ago in which he awoke on the floor. He did not seek medical attention after this event, and felt well upon waking the next morning. He is symptom free at this time, denies headache, vision loss, speech changes, focal weakness. He is hard of hearing with bilateral hearing aids in place. OSH reported 60% stenosis of the bilateral carotid arteries, thus request was made for transfer to [facility] for further work-up, vascular surgery consultation, and to obtain MRI Brain for vision changes.    Interval Note 4/1/21: Patient became agitated and confused overnight, requiring restraints for safety. He is oriented to self only at this time. He is denying headache, visual change, focal motor weakness, and is not having difficulty speaking.     Review of systems: In addition to what is detailed in the HPI above, all other systems reviewed and are negative.    Past Medical History:    has a past medical history of Hydrocele, unspecified, Hypertrophy of prostate without urinary obstruction and other lower urinary tract symptoms (LUTS), and Mixed hyperlipidemia.    FHx:  family history includes Cancer in his mother.    SHx:   reports that he quit smoking about 31 years ago. He has a 20.00 pack-year smoking history. He has  never used smokeless tobacco. He reports current alcohol use. He reports that he does not use drugs.    Allergies:  Allergies   Allergen Reactions   • Vicodin [Hydrocodone-Acetaminophen] Rash     rash       Medications:    Current Facility-Administered Medications:   •  haloperidol lactate (HALDOL) injection 2 mg, 2 mg, Intravenous, Q6HRS PRN, Coretta Basurto M.D.  •  aspirin (ASA) chewable tab 81 mg, 81 mg, Oral, DAILY, Ann Marie Blanton M.D., 81 mg at 04/01/21 0547  •  lactated ringers infusion, , Intravenous, Continuous, Ann Marie Blanton M.D., Last Rate: 125 mL/hr at 04/01/21 0548, New Bag at 04/01/21 0548  •  atorvastatin (LIPITOR) tablet 80 mg, 80 mg, Oral, Q EVENING, Ann Marie Blanton M.D., 80 mg at 03/31/21 1753  •  clopidogrel (PLAVIX) tablet 75 mg, 75 mg, Oral, DAILY, GREGORIO Rich M.D., 75 mg at 03/31/21 2055  •  Pharmacy consult request - Allow for permissive hypertension: SBP up to 220 mmHg/DBP up to 120 mmHg x 48 hours, , Other, PHARMACY TO DOSE, Ghulam Bryan M.D.  •  acetaminophen (Tylenol) tablet 650 mg, 650 mg, Oral, Q6HRS PRN, Ghulam Bryan M.D.  •  senna-docusate (PERICOLACE or SENOKOT S) 8.6-50 MG per tablet 2 tablet, 2 tablet, Oral, BID, 2 tablet at 03/31/21 1753 **AND** polyethylene glycol/lytes (MIRALAX) PACKET 1 Packet, 1 Packet, Oral, QDAY PRN **AND** magnesium hydroxide (MILK OF MAGNESIA) suspension 30 mL, 30 mL, Oral, QDAY PRN **AND** bisacodyl (DULCOLAX) suppository 10 mg, 10 mg, Rectal, QDAY PRN, Ghulam Bryan M.D.  •  enoxaparin (LOVENOX) inj 40 mg, 40 mg, Subcutaneous, DAILY AT 1800, Ghulam Bryan M.D., 40 mg at 03/31/21 1752    Physical Examination:     Vitals:    03/31/21 2046 03/31/21 2331 04/01/21 0421 04/01/21 0817   BP:  (!) 161/97 (!) 168/117 (!) 179/97   Pulse:  81  94   Resp:  18 18 17   Temp:  36.4 °C (97.6 °F) 37.3 °C (99.1 °F) 37.3 °C (99.2 °F)   TempSrc:  Temporal Temporal Temporal   SpO2:  93% 94% 93%   Weight: 75.1 kg (165 lb 9.1 oz)      Height:            General: Patient is awake and in no acute distress. He is in soft restraints x 3 due to agitation, aggression towards staff, and attempts to get out of bed. He is hard of hearing with hearing aides in place bilaterally.  Eyes: examination of optic disks not indicated at this time given acuity of consult  CV: SR 80's    NEUROLOGICAL EXAM:     Mental status: Awake, alert, oriented to self only, attends, follows commands  Speech and language: speech is not dysarthric. The patient is able to name and repeat.  Cranial nerve exam: Pupils are equal, round and reactive to light bilaterally. Blinks to threat bilaterally. Tracks provider in room. Sensation in the face is intact to light touch. Face is symmetric. Hearing to finger rub equal. Palate elevates symmetrically. Shoulder shrug is full. Tongue is midline.  Motor exam: Strength is 4+/5 in all extremities both distally and proximally. Tone is normal. No abnormal movements were seen on exam.  Sensory exam: No sensory deficits identified   Deep tendon reflexes:  2+ and symmetric. Toes down-going bilaterally.  Coordination: no ataxia with finger to nose.  Gait: deferred given patient preference    NIH Stroke Scale    1a. Level of Consciousness (Alert, drowsy, etc): 0= Alert    1b. LOC Questions (Month, age): 2= Incorrect    1c. LOC Commands (Open/close eyes make fist/let go): 0= Obeys both correctly    2.   Best Gaze (Eyes open - patient follows examiner's finger on face): 0= Normal    3.   Visual Fields (introduce visual stimulus/threat to patient's field quadrants): 0= No visual loss    4.   Facial Paresis (Show teeth, raise eyebrows and squeeze eyes shut): 0= Normal     5a. Motor Arm - Left (Elevate arm to 90 degrees if patient is sitting, 45 degrees if  supine): 0= No drift    5b. Motor Arm - Right (Elevate arm to 90 degrees if patient is sitting, 45 degrees if supine): 0= No drift    6a. Motor Leg - Left (Elevate leg 30 degrees with patient supine): 0= No  drift    6b. Motor Leg - Right  (Elevate leg 30 degrees with patient supine): 0= No drift    7.   Limb Ataxia (Finger-nose, heel down shin): 0= No ataxia    8.   Sensory (Pin prick to face, arm, trunk and leg - compare side to side): 0= Normal    9.  Best Language (Name item, describe a picture and read sentences): 0= No aphasia    10. Dysarthria (Evaluate speech clarity by patient repeating listed words): 0= Normal articulation    11. Extinction and Inattention (Use information from prior testing to identify neglect or  double simultaneous stimuli testing): 0= No neglect    Total NIH Score: 2    Modified Marcos Scale (MRS): 0 = No symptoms        Objective Data:    Labs:  No results found for: PROTHROMBTM, INR   Lab Results   Component Value Date/Time    WBC 7.7 04/01/2021 05:21 AM    RBC 5.18 04/01/2021 05:21 AM    HEMOGLOBIN 16.1 04/01/2021 05:21 AM    HEMATOCRIT 47.3 04/01/2021 05:21 AM    MCV 91.3 04/01/2021 05:21 AM    MCH 31.1 04/01/2021 05:21 AM    MCHC 34.0 04/01/2021 05:21 AM    MPV 10.4 04/01/2021 05:21 AM    NEUTSPOLYS 80.10 (H) 04/01/2021 05:21 AM    LYMPHOCYTES 10.50 (L) 04/01/2021 05:21 AM    MONOCYTES 7.30 04/01/2021 05:21 AM    EOSINOPHILS 1.20 04/01/2021 05:21 AM    BASOPHILS 0.40 04/01/2021 05:21 AM      Lab Results   Component Value Date/Time    SODIUM 136 04/01/2021 05:21 AM    POTASSIUM 3.8 04/01/2021 05:21 AM    CHLORIDE 102 04/01/2021 05:21 AM    CO2 21 04/01/2021 05:21 AM    GLUCOSE 128 (H) 04/01/2021 05:21 AM    BUN 19 04/01/2021 05:21 AM    CREATININE 1.03 04/01/2021 05:21 AM    CREATININE 1.2 10/03/2008 11:30 AM      Lab Results   Component Value Date/Time    CHOLSTRLTOT 202 (H) 03/30/2021 10:26 PM     (H) 03/30/2021 10:26 PM    HDL 41 03/30/2021 10:26 PM    TRIGLYCERIDE 111 03/30/2021 10:26 PM       Lab Results   Component Value Date/Time    ALKPHOSPHAT 92 04/01/2021 05:21 AM    ASTSGOT 16 04/01/2021 05:21 AM    ALTSGPT 9 04/01/2021 05:21 AM    TBILIRUBIN 0.6 04/01/2021 05:21 AM         Imaging/Testing:    I interpreted and/or reviewed the patient's neuroimaging    MR-BRAIN-W/O   Final Result      1.  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   2.  Linear focus of gradient echo signal hypointensity in the left occipital lobe consistent with chronic hypertensive microhemorrhage, remote trauma or infection.   3.  Moderate diffuse cerebral substance loss.   4.  Mild microangiopathic ischemic change versus demyelination or gliosis.      EC-ECHOCARDIOGRAM COMPLETE W/O CONT    (Results Pending)       Assessment and Plan:    Cooper Harvey is a 86 y.o. male with past history of benign prostatic hypertrophy and hyperlipidemia who presented to [facility] 3/30/21 as a transfer from OS with complaint of transient bilateral vision loss over the past several weeks. OSH reported 60% stenosis of the bilateral carotid arteries, thus request was made for transfer to [facility] for further work-up with MRI of the brain as well as vascular surgery consultation. The patient is without neurological deficits. He has no complaints of visual changes since arrival. MRI brain 3/31 revealed acute punctate infarcts in RIGHT cerebellum, RIGHT occipital cortex and RIGHT frontal cortex. He was not considered a candidate for tPA due to unknown timing of infarcts and lack of symptoms. LVO is unlikely considering he is asymptomatic. CTA head and neck has been ordered to further evaluate stenosis and to assess for other vascular abnormality. Infarcts in multiple vascular territories cannot be explained by a single vessel. The appearance is suspicious for cardioembolic etiology, however atherosclerotic/atheroembolic sources have not been ruled out. Echocardiogram is pending. He is currently on DAPT and high intensity statin. Home meds are unverified and family has been difficult to reach per nursing staff. Hemoglobin A1C is 5.2, at goal.  LDL is elevated at 139, goal is <100. At this time he has no visual deficits, but wiith consideration of his report of recurrent diplopia in the evenings, AChR and MuSK antiboy titers have been ordered to rule out myasthenia gravis. He remains admitted to the floor. Unfortunately he has developed alteration in mental status with confusion and aggression requiring the use of restraints.    Plan:    1. STROKE  - Neurology checks and vital signs per protocol  - CTA head and neck - pending  - Telemetry  - Echo with bubble study  - Outpatient Zio patch  - BP goal is normotension  - High intensity statin  - Continue DAPT for now, further evaluation of vessels to determine ongoing need for DAPT vs monotherapy with aspirin.   - obtain normoglycemia and avoid hypo- or hyper -natremia; aim for normothermia  - evaluate and treat with PT/OT/ST    2. Visual complaints  -AChR and MuSK antibody titers    3. AMS   - attempt to minimize risk of delirium such as avoiding day time napping and promote night time sleep, monitor for constipation, remove lines/tubing that is not needed, avoid early lab draws and vital checks, limit polypharmacy as able, and keep close to the window.   -Recommend sitter vs soft restraints if possible.  - obtain normoglycemia and avoid hypo- or hyper -natremia; aim for normothermia    The evaluation of the patient, and recommended management, was discussed with attending neurologist, Dr. Benedict Bailon.    Corinne Canavero, APRN  Acute Care Neurohospitalist Service

## 2021-04-01 NOTE — CARE PLAN
Problem: Safety  Goal: Will remain free from injury  Outcome: PROGRESSING AS EXPECTED  Note: Pt mobility assessed at beginning of shift. Pt is x2 assist. Fall precautions in place. Non-slip socks on. Bed in lowest locked position. Bed alarm on. Call light within reach. Pt educated to call for assistance and verbalizes understanding.       Problem: Safety - Medical Restraint  Goal: Remains free of injury from restraints (Restraint for Interference with Medical Device)  Description: INTERVENTIONS:  1. Determine that other, less restrictive measures have been tried or would not be effective before applying the restraint  2. Evaluate the patient's condition at the time of restraint application  3. Inform patient/family regarding the reason for restraint  4. Q2H: Monitor safety, psychosocial status, comfort, nutrition and hydration  Outcome: PROGRESSING AS EXPECTED  Flowsheets (Taken 4/1/2021 3690)  Addressed this shift: Remains free of injury from restraints (restraint for interference with medical device):   Determine that other, less restrictive measures have been tried or would not be effective before applying the restraint   Evaluate the patient's condition at the time of restraint application   Inform patient/family regarding the reason for restraint   Every 2 hours: Monitor safety, psychosocial status, comfort, nutrition and hydration

## 2021-04-01 NOTE — PROGRESS NOTES
Assumed care of pt. Pt resting in bed with no signs of labored breathing. On RA. Tele monitor in place, cardiac rhythm being monitored. Call light within reach, bed in lowest position, upper bed rails up. Pt was updated on plan of care for the day. Will continue to monitor.

## 2021-04-01 NOTE — ASSESSMENT & PLAN NOTE
- Was disoriented on 3/31, required soft restraints but removed on 4/1  - Currently on Depakote TID  - 4/4: Had another episode on 4/3 night of agitation and confusion, may be related to sundowning  - Has Haldol PRN + Tele sitter

## 2021-04-01 NOTE — CARE PLAN
Problem: Communication  Goal: The ability to communicate needs accurately and effectively will improve  Outcome: PROGRESSING AS EXPECTED  Note: Encourage pt. to voice concerns or questions in regards to his plan of care.      Problem: Safety  Goal: Will remain free from injury  Outcome: PROGRESSING AS EXPECTED  Note: Patient's room close to nursing station, bed in the lowest position, call light within reach, hourly rounding on the pt.

## 2021-04-01 NOTE — PROGRESS NOTES
Patient got very aggressive with the CNA, trying to get out of bed, multiple staff members had to come and assist patient to stay in bed, patient continued to be aggressive towards the staff, bilateral soft wrist restraints and left ankle restraint was initiated. Dr. Coretta Basurto was notified and got restraint orders in place. Patient's wife was called but no answer, left voicemail to call back to be updated on the status of her . Will continue to monitor the patient.

## 2021-04-02 ENCOUNTER — ANESTHESIA EVENT (OUTPATIENT)
Dept: SURGERY | Facility: MEDICAL CENTER | Age: 86
DRG: 038 | End: 2021-04-02
Payer: MEDICARE

## 2021-04-02 ENCOUNTER — ANESTHESIA (OUTPATIENT)
Dept: SURGERY | Facility: MEDICAL CENTER | Age: 86
DRG: 038 | End: 2021-04-02
Payer: MEDICARE

## 2021-04-02 LAB
ABO + RH BLD: NORMAL
ABO GROUP BLD: NORMAL
BLD GP AB SCN SERPL QL: NORMAL
RH BLD: NORMAL

## 2021-04-02 PROCEDURE — 500368 HCHG DRAIN, 7MM FLAT-FLUTED: Performed by: SURGERY

## 2021-04-02 PROCEDURE — 700111 HCHG RX REV CODE 636 W/ 250 OVERRIDE (IP): Performed by: ANESTHESIOLOGY

## 2021-04-02 PROCEDURE — 03CK0ZZ EXTIRPATION OF MATTER FROM RIGHT INTERNAL CAROTID ARTERY, OPEN APPROACH: ICD-10-PCS | Performed by: SURGERY

## 2021-04-02 PROCEDURE — 99233 SBSQ HOSP IP/OBS HIGH 50: CPT | Mod: GC | Performed by: HOSPITALIST

## 2021-04-02 PROCEDURE — 95955 EEG DURING SURGERY: CPT | Performed by: SURGERY

## 2021-04-02 PROCEDURE — 160002 HCHG RECOVERY MINUTES (STAT): Performed by: SURGERY

## 2021-04-02 PROCEDURE — 770020 HCHG ROOM/CARE - TELE (206)

## 2021-04-02 PROCEDURE — A9270 NON-COVERED ITEM OR SERVICE: HCPCS | Performed by: HOSPITALIST

## 2021-04-02 PROCEDURE — 700102 HCHG RX REV CODE 250 W/ 637 OVERRIDE(OP): Performed by: STUDENT IN AN ORGANIZED HEALTH CARE EDUCATION/TRAINING PROGRAM

## 2021-04-02 PROCEDURE — 700101 HCHG RX REV CODE 250: Performed by: SURGERY

## 2021-04-02 PROCEDURE — 86900 BLOOD TYPING SEROLOGIC ABO: CPT

## 2021-04-02 PROCEDURE — 03HY32Z INSERTION OF MONITORING DEVICE INTO UPPER ARTERY, PERCUTANEOUS APPROACH: ICD-10-PCS | Performed by: ANESTHESIOLOGY

## 2021-04-02 PROCEDURE — 110454 HCHG SHELL REV 250: Performed by: SURGERY

## 2021-04-02 PROCEDURE — A9270 NON-COVERED ITEM OR SERVICE: HCPCS | Performed by: STUDENT IN AN ORGANIZED HEALTH CARE EDUCATION/TRAINING PROGRAM

## 2021-04-02 PROCEDURE — 700105 HCHG RX REV CODE 258: Performed by: ANESTHESIOLOGY

## 2021-04-02 PROCEDURE — 700111 HCHG RX REV CODE 636 W/ 250 OVERRIDE (IP): Performed by: SURGERY

## 2021-04-02 PROCEDURE — 86850 RBC ANTIBODY SCREEN: CPT

## 2021-04-02 PROCEDURE — 160028 HCHG SURGERY MINUTES - 1ST 30 MINS LEVEL 3: Performed by: SURGERY

## 2021-04-02 PROCEDURE — 700105 HCHG RX REV CODE 258: Performed by: STUDENT IN AN ORGANIZED HEALTH CARE EDUCATION/TRAINING PROGRAM

## 2021-04-02 PROCEDURE — 36415 COLL VENOUS BLD VENIPUNCTURE: CPT

## 2021-04-02 PROCEDURE — 500257: Performed by: SURGERY

## 2021-04-02 PROCEDURE — 700102 HCHG RX REV CODE 250 W/ 637 OVERRIDE(OP): Performed by: HOSPITALIST

## 2021-04-02 PROCEDURE — 160048 HCHG OR STATISTICAL LEVEL 1-5: Performed by: SURGERY

## 2021-04-02 PROCEDURE — 160035 HCHG PACU - 1ST 60 MINS PHASE I: Performed by: SURGERY

## 2021-04-02 PROCEDURE — C1781 MESH (IMPLANTABLE): HCPCS | Performed by: SURGERY

## 2021-04-02 PROCEDURE — 86901 BLOOD TYPING SEROLOGIC RH(D): CPT

## 2021-04-02 PROCEDURE — 160009 HCHG ANES TIME/MIN: Performed by: SURGERY

## 2021-04-02 PROCEDURE — 35301 RECHANNELING OF ARTERY: CPT | Mod: RT | Performed by: SURGERY

## 2021-04-02 PROCEDURE — 501837 HCHG SUTURE CV: Performed by: SURGERY

## 2021-04-02 PROCEDURE — 95955 EEG DURING SURGERY: CPT | Mod: 26,59 | Performed by: SURGERY

## 2021-04-02 PROCEDURE — 160039 HCHG SURGERY MINUTES - EA ADDL 1 MIN LEVEL 3: Performed by: SURGERY

## 2021-04-02 PROCEDURE — 160036 HCHG PACU - EA ADDL 30 MINS PHASE I: Performed by: SURGERY

## 2021-04-02 PROCEDURE — 700101 HCHG RX REV CODE 250: Performed by: ANESTHESIOLOGY

## 2021-04-02 PROCEDURE — 95938 SOMATOSENSORY TESTING: CPT | Performed by: SURGERY

## 2021-04-02 PROCEDURE — 03UK0JZ SUPPLEMENT RIGHT INTERNAL CAROTID ARTERY WITH SYNTHETIC SUBSTITUTE, OPEN APPROACH: ICD-10-PCS | Performed by: SURGERY

## 2021-04-02 PROCEDURE — 95940 IONM IN OPERATNG ROOM 15 MIN: CPT | Performed by: SURGERY

## 2021-04-02 PROCEDURE — 99232 SBSQ HOSP IP/OBS MODERATE 35: CPT | Performed by: NURSE PRACTITIONER

## 2021-04-02 PROCEDURE — 501838 HCHG SUTURE GENERAL: Performed by: SURGERY

## 2021-04-02 DEVICE — PATCH .8X8CM XENOSURE BIOLOGIC VASCULAR---ORDER IN MULTIPLES OF 5---: Type: IMPLANTABLE DEVICE | Status: FUNCTIONAL

## 2021-04-02 RX ORDER — SODIUM CHLORIDE, SODIUM LACTATE, POTASSIUM CHLORIDE, CALCIUM CHLORIDE 600; 310; 30; 20 MG/100ML; MG/100ML; MG/100ML; MG/100ML
INJECTION, SOLUTION INTRAVENOUS CONTINUOUS
Status: DISCONTINUED | OUTPATIENT
Start: 2021-04-02 | End: 2021-04-02 | Stop reason: HOSPADM

## 2021-04-02 RX ORDER — HEPARIN SODIUM,PORCINE 1000/ML
VIAL (ML) INJECTION
Status: DISCONTINUED | OUTPATIENT
Start: 2021-04-02 | End: 2021-04-02 | Stop reason: HOSPADM

## 2021-04-02 RX ORDER — OXYCODONE HYDROCHLORIDE AND ACETAMINOPHEN 5; 325 MG/1; MG/1
1 TABLET ORAL
Status: DISCONTINUED | OUTPATIENT
Start: 2021-04-02 | End: 2021-04-02 | Stop reason: HOSPADM

## 2021-04-02 RX ORDER — SODIUM CHLORIDE, SODIUM LACTATE, POTASSIUM CHLORIDE, CALCIUM CHLORIDE 600; 310; 30; 20 MG/100ML; MG/100ML; MG/100ML; MG/100ML
INJECTION, SOLUTION INTRAVENOUS
Status: DISCONTINUED | OUTPATIENT
Start: 2021-04-02 | End: 2021-04-02 | Stop reason: SURG

## 2021-04-02 RX ORDER — BUPIVACAINE HYDROCHLORIDE AND EPINEPHRINE 5; 5 MG/ML; UG/ML
INJECTION, SOLUTION EPIDURAL; INTRACAUDAL; PERINEURAL
Status: DISCONTINUED | OUTPATIENT
Start: 2021-04-02 | End: 2021-04-02 | Stop reason: HOSPADM

## 2021-04-02 RX ORDER — CEFAZOLIN SODIUM 1 G/3ML
INJECTION, POWDER, FOR SOLUTION INTRAMUSCULAR; INTRAVENOUS PRN
Status: DISCONTINUED | OUTPATIENT
Start: 2021-04-02 | End: 2021-04-02 | Stop reason: SURG

## 2021-04-02 RX ORDER — LISINOPRIL 10 MG/1
10 TABLET ORAL
Status: DISCONTINUED | OUTPATIENT
Start: 2021-04-02 | End: 2021-04-02

## 2021-04-02 RX ORDER — OXYCODONE HYDROCHLORIDE AND ACETAMINOPHEN 5; 325 MG/1; MG/1
2 TABLET ORAL
Status: DISCONTINUED | OUTPATIENT
Start: 2021-04-02 | End: 2021-04-02 | Stop reason: HOSPADM

## 2021-04-02 RX ORDER — LIDOCAINE HYDROCHLORIDE 20 MG/ML
INJECTION, SOLUTION EPIDURAL; INFILTRATION; INTRACAUDAL; PERINEURAL PRN
Status: DISCONTINUED | OUTPATIENT
Start: 2021-04-02 | End: 2021-04-02 | Stop reason: SURG

## 2021-04-02 RX ORDER — DIPHENHYDRAMINE HYDROCHLORIDE 50 MG/ML
12.5 INJECTION INTRAMUSCULAR; INTRAVENOUS
Status: DISCONTINUED | OUTPATIENT
Start: 2021-04-02 | End: 2021-04-02 | Stop reason: HOSPADM

## 2021-04-02 RX ORDER — LISINOPRIL 10 MG/1
10 TABLET ORAL
Status: DISCONTINUED | OUTPATIENT
Start: 2021-04-02 | End: 2021-04-05

## 2021-04-02 RX ORDER — PROTAMINE SULFATE 10 MG/ML
INJECTION, SOLUTION INTRAVENOUS PRN
Status: DISCONTINUED | OUTPATIENT
Start: 2021-04-02 | End: 2021-04-02 | Stop reason: SURG

## 2021-04-02 RX ORDER — PHENYLEPHRINE HCL IN 0.9% NACL 0.5 MG/5ML
SYRINGE (ML) INTRAVENOUS PRN
Status: DISCONTINUED | OUTPATIENT
Start: 2021-04-02 | End: 2021-04-02 | Stop reason: SURG

## 2021-04-02 RX ORDER — ONDANSETRON 2 MG/ML
INJECTION INTRAMUSCULAR; INTRAVENOUS PRN
Status: DISCONTINUED | OUTPATIENT
Start: 2021-04-02 | End: 2021-04-02 | Stop reason: SURG

## 2021-04-02 RX ORDER — HEPARIN SODIUM 1000 [USP'U]/ML
INJECTION, SOLUTION INTRAVENOUS; SUBCUTANEOUS PRN
Status: DISCONTINUED | OUTPATIENT
Start: 2021-04-02 | End: 2021-04-02 | Stop reason: SURG

## 2021-04-02 RX ORDER — LABETALOL HYDROCHLORIDE 5 MG/ML
INJECTION, SOLUTION INTRAVENOUS PRN
Status: DISCONTINUED | OUTPATIENT
Start: 2021-04-02 | End: 2021-04-02 | Stop reason: SURG

## 2021-04-02 RX ORDER — ROCURONIUM BROMIDE 10 MG/ML
INJECTION, SOLUTION INTRAVENOUS PRN
Status: DISCONTINUED | OUTPATIENT
Start: 2021-04-02 | End: 2021-04-02 | Stop reason: SURG

## 2021-04-02 RX ADMIN — LISINOPRIL 10 MG: 10 TABLET ORAL at 17:26

## 2021-04-02 RX ADMIN — PROPOFOL 20 MG: 10 INJECTION, EMULSION INTRAVENOUS at 12:41

## 2021-04-02 RX ADMIN — DIVALPROEX SODIUM 250 MG: 250 TABLET, DELAYED RELEASE ORAL at 21:00

## 2021-04-02 RX ADMIN — Medication 100 MCG: at 12:09

## 2021-04-02 RX ADMIN — EPHEDRINE SULFATE 10 MG: 50 INJECTION, SOLUTION INTRAVENOUS at 11:51

## 2021-04-02 RX ADMIN — LABETALOL HYDROCHLORIDE 5 MG: 5 INJECTION, SOLUTION INTRAVENOUS at 12:28

## 2021-04-02 RX ADMIN — PROPOFOL 100 MG: 10 INJECTION, EMULSION INTRAVENOUS at 11:11

## 2021-04-02 RX ADMIN — ATORVASTATIN CALCIUM 80 MG: 80 TABLET, FILM COATED ORAL at 17:26

## 2021-04-02 RX ADMIN — ROCURONIUM BROMIDE 10 MG: 10 INJECTION, SOLUTION INTRAVENOUS at 11:42

## 2021-04-02 RX ADMIN — ROCURONIUM BROMIDE 50 MG: 10 INJECTION, SOLUTION INTRAVENOUS at 11:11

## 2021-04-02 RX ADMIN — ONDANSETRON 4 MG: 2 INJECTION INTRAMUSCULAR; INTRAVENOUS at 11:11

## 2021-04-02 RX ADMIN — DOCUSATE SODIUM 50 MG AND SENNOSIDES 8.6 MG 2 TABLET: 8.6; 5 TABLET, FILM COATED ORAL at 05:50

## 2021-04-02 RX ADMIN — EPHEDRINE SULFATE 10 MG: 50 INJECTION, SOLUTION INTRAVENOUS at 12:08

## 2021-04-02 RX ADMIN — FENTANYL CITRATE 50 MCG: 50 INJECTION, SOLUTION INTRAMUSCULAR; INTRAVENOUS at 11:34

## 2021-04-02 RX ADMIN — ROCURONIUM BROMIDE 10 MG: 10 INJECTION, SOLUTION INTRAVENOUS at 12:07

## 2021-04-02 RX ADMIN — PROTAMINE SULFATE 50 MG: 10 INJECTION, SOLUTION INTRAVENOUS at 12:30

## 2021-04-02 RX ADMIN — FENTANYL CITRATE 50 MCG: 50 INJECTION, SOLUTION INTRAMUSCULAR; INTRAVENOUS at 11:53

## 2021-04-02 RX ADMIN — SODIUM CHLORIDE, POTASSIUM CHLORIDE, SODIUM LACTATE AND CALCIUM CHLORIDE: 600; 310; 30; 20 INJECTION, SOLUTION INTRAVENOUS at 00:35

## 2021-04-02 RX ADMIN — DIVALPROEX SODIUM 250 MG: 250 TABLET, DELAYED RELEASE ORAL at 05:50

## 2021-04-02 RX ADMIN — Medication 100 MCG: at 11:26

## 2021-04-02 RX ADMIN — LABETALOL HYDROCHLORIDE 5 MG: 5 INJECTION, SOLUTION INTRAVENOUS at 12:33

## 2021-04-02 RX ADMIN — LABETALOL HYDROCHLORIDE 10 MG: 5 INJECTION, SOLUTION INTRAVENOUS at 12:07

## 2021-04-02 RX ADMIN — LIDOCAINE HYDROCHLORIDE 100 MG: 20 INJECTION, SOLUTION EPIDURAL; INFILTRATION; INTRACAUDAL at 11:11

## 2021-04-02 RX ADMIN — HEPARIN SODIUM 7000 UNITS: 1000 INJECTION, SOLUTION INTRAVENOUS; SUBCUTANEOUS at 11:51

## 2021-04-02 RX ADMIN — Medication 100 MCG: at 11:22

## 2021-04-02 RX ADMIN — Medication 100 MCG: at 11:44

## 2021-04-02 RX ADMIN — CEFAZOLIN 2 G: 330 INJECTION, POWDER, FOR SOLUTION INTRAMUSCULAR; INTRAVENOUS at 11:11

## 2021-04-02 RX ADMIN — SUGAMMADEX 200 MG: 100 INJECTION, SOLUTION INTRAVENOUS at 12:41

## 2021-04-02 RX ADMIN — SODIUM CHLORIDE, POTASSIUM CHLORIDE, SODIUM LACTATE AND CALCIUM CHLORIDE: 600; 310; 30; 20 INJECTION, SOLUTION INTRAVENOUS at 11:04

## 2021-04-02 ASSESSMENT — PAIN DESCRIPTION - PAIN TYPE
TYPE: ACUTE PAIN;SURGICAL PAIN

## 2021-04-02 ASSESSMENT — ENCOUNTER SYMPTOMS
VOMITING: 0
BLURRED VISION: 1
MEMORY LOSS: 1
NERVOUS/ANXIOUS: 0
MYALGIAS: 0
COUGH: 0
DOUBLE VISION: 0
FOCAL WEAKNESS: 0
ABDOMINAL PAIN: 0
HEADACHES: 0
CONSTIPATION: 0
FALLS: 0
PALPITATIONS: 0
SHORTNESS OF BREATH: 0
DIARRHEA: 0
SORE THROAT: 0
DIZZINESS: 1
NAUSEA: 0
CHILLS: 0
FEVER: 0

## 2021-04-02 ASSESSMENT — COGNITIVE AND FUNCTIONAL STATUS - GENERAL
MOVING TO AND FROM BED TO CHAIR: A LITTLE
WALKING IN HOSPITAL ROOM: A LITTLE
DRESSING REGULAR LOWER BODY CLOTHING: A LITTLE
SUGGESTED CMS G CODE MODIFIER DAILY ACTIVITY: CK
HELP NEEDED FOR BATHING: A LITTLE
STANDING UP FROM CHAIR USING ARMS: A LITTLE
TOILETING: A LITTLE
EATING MEALS: A LITTLE
SUGGESTED CMS G CODE MODIFIER MOBILITY: CK
TURNING FROM BACK TO SIDE WHILE IN FLAT BAD: A LITTLE
DRESSING REGULAR UPPER BODY CLOTHING: A LITTLE
MOVING FROM LYING ON BACK TO SITTING ON SIDE OF FLAT BED: A LITTLE
MOBILITY SCORE: 18
CLIMB 3 TO 5 STEPS WITH RAILING: A LITTLE
PERSONAL GROOMING: A LITTLE
DAILY ACTIVITIY SCORE: 18

## 2021-04-02 ASSESSMENT — PAIN SCALES - GENERAL: PAIN_LEVEL: 0

## 2021-04-02 NOTE — THERAPY
Missed Therapy     Patient Name: Cooper Harvey  Age:  86 y.o., Sex:  male  Medical Record #: [id number]  Today's Date: 4/2/2021 04/02/21 1137   Interdisciplinary Plan of Care Collaboration   IDT Collaboration with  Nursing   Collaboration Comments Attempted to see patient for OT follow up, Patient currently off the floor for a right carotid endarterectomy. Will follow up 4/3 as appropriate.

## 2021-04-02 NOTE — OP REPORT
Vascular Surgery Operative Note  --------------------------------------------    Date of Service: 4/2/2021    Patient Name:  Cooper Harvey    Patient MRN:  [id number]    --------------------------------------------------------------------------------------------------    Preoperative Diagnosis:  1) Symptomatic right carotid stenosis    Postoperative Diagnosis:  1) Symptomatic right carotid stenosis    Procedure:  1) right carotid endarterectomy with neuromonitoring    -------------------------------------------------------------------------------------------------    Surgeon:   Willie Beavers MD    Assistant:   Angelo GRADY    Anesthesia:   GETA    EBL:    35cc    Blood Products:  none    Heparin:   Systemically heparinized, 7000 units    Specimen:   None sent    Complications:  None     Disposition:   PACU in stable condition     Findings:   Hemorrhagic ulcerated plaque    Justification for use of Surgical First Assist:  During this operation my assistant participated with patient preparation for surgery, incision, surgical exposure including retraction, dissection, and ligation to isolate the target structures and preserve nearby structures, and closure of the field of dissection.  The presence of the expert assist increased both the efficiency and safety of the operation, decreasing anesthesia time and decreasing the risk of intraoperative surgical complications.    -----------------------------------------------------------------------------------------------------    History:  Cooper Harvey is a 86 y.o. male with moderate right carotid stenosis and evidence of embolic strokes on MRI.  I had a very detailed, thorough discussion with the patient and his family regarding the severity of his carotid disease and that he is at high risk of stroke.  I explained the primary purpose of this operation is to prevent stroke from the plaque in the carotid artery.  I explained the details of the operation  including neuro monitoring and possible use of a shunt.  I discussed the alternatives of optimal medical management or stenting, although carotid endarterectomy is preferable in the setting of such severe plaque and in someone who is acceptable risk for surgery.  I discussed the potential risks, including but not limited to bleeding, infection, injury to vessels or nerves, and risks of anesthesia. I explained the risk that the operation itself can cause a stroke, although the risk of stroke from the operation is less than the risk of stroke if the plaque is not treated, and thus carotid endarterectomy is recommended.  All of their questions were answered. Patient understands and agrees to proceed.    Procedure Summary:  Following informed consent, patient was brought to the OR where general anesthesia was administered. Patient was positioned, neuromointoring was put in place and the right neck was prepped and draped in the usual sterile fashion.  Timeout was called to identify the correct patient, team and equipment.  Everyone was in agreement.  Procedure began with injection of local anesthesia along the right SCM and then a longitudinal incision was made and carried down through each layer using cautery to assure hemostasis.  The common facial vein was identified and ligated.  The common, external and internal carotid were identified and dissected circumferentially proximally and distally and encircled with loops.    Patient was systemically heparinized and then the artery was clamped, with the ICA being applied first.  The carotid was opened with a longitudinal arteriotomy and a hemorrhagic ulcerated plaque was seen.  There were no changes on EEG monitoring. Endarterectomy of the plaque was performed and the distal ICA endpoint was tacked with prolene sutures.  Once complete, a bovine pericardial patch was sutured with a running 6-0 prolene suture.  The artery was flushed and copiously irrigated, the suture line  was completed.  The clamps were removed, with the ICA being removed last, and flow was restored.  There was a continuous flow signal in the ICA at this point.  The heparin was reversed with protamine. Topical hemostatic was applied.  A 7 flat MARJ drain was placed and secured with a nylon suture.       At this point we had good hemostasis.  The platysma was reapproximated with interrupted vicryl sutures.  The skin was reapproximated with a running subcuticular suture.  A sterile dressing was placed. Patient was extubated and returned to PACU in stable condition.  All counts were correct at the conclusion of the case.         Willie Beavers MD  General and Vascular Surgery  [facility]  Cell: [phone]

## 2021-04-02 NOTE — CARE PLAN
Problem: Communication  Goal: The ability to communicate needs accurately and effectively will improve  Outcome: PROGRESSING AS EXPECTED   Patient educated to utilize call light. Frequent reorientation required due to confusion. Hourly rounding in place.    Problem: Safety  Goal: Will remain free from injury  Outcome: PROGRESSING AS EXPECTED   Patient's risk for injury and falls assessed. Appropriate safety precautions in place. Patient educated to utilize call light for needs. Patient verbalizes understanding. Telesitter at bedside for safety.

## 2021-04-02 NOTE — CONSULTS
Vascular Surgery Consult Note  -------------------------------------------------------------------------------------------------  Date: 4/1/2021    Consulting Physician: Willie Beavers M.D. [facility]    Referring Provider:   GREGORIO Rich MD  -------------------------------------------------------------------------------------------------    Reason for consultation:  Symptomatic right carotid stenosis    HPI:  This is a 86 y.o. male who is presenting with vision disturbance and MRI showed evidence of embolic strokes in the right hemisphere.  Subsequent work-up showed a moderate right carotid stenosis which was concerning for the source of the strokes.  When I came to evaluate the patient he was alert but somewhat disoriented.  He has no focal neurologic deficits at this time.    Past Medical History:   Diagnosis Date   • Hydrocele, unspecified    • Hypertrophy of prostate without urinary obstruction and other lower urinary tract symptoms (LUTS)    • Mixed hyperlipidemia        Past Surgical History:   Procedure Laterality Date   • PLASTIC SURGERY  February 2009    large skin cancer removed from the top of head with skin graft--donor site left anterior thigh   • HEMORRHOIDECTOMY  2004   • EYE SURGERY  6/05 ; 7/05    Bilateral Cataracts       Current Facility-Administered Medications   Medication Dose Route Frequency Provider Last Rate Last Admin   • haloperidol lactate (HALDOL) injection 2 mg  2 mg Intravenous Q6HRS PRN Coretta Basurto M.D.       • divalproex (DEPAKOTE) delayed-release tablet 250 mg  250 mg Oral Q8HRS Ann Marie Blanton M.D.   250 mg at 04/02/21 0550   • labetalol (NORMODYNE/TRANDATE) injection 10 mg  10 mg Intravenous Q4HRS PRN Coretta Basurto M.D.       • aspirin (ASA) chewable tab 81 mg  81 mg Oral DAILY Ann Marie Blanton M.D.   Stopped at 04/02/21 0600   • atorvastatin (LIPITOR) tablet 80 mg  80 mg Oral Q EVENING Ann Marie Blanton M.D.   80 mg at 04/01/21 1810   •  clopidogrel (PLAVIX) tablet 75 mg  75 mg Oral DAILY GREGORIO Rich M.D.   75 mg at 21   • acetaminophen (Tylenol) tablet 650 mg  650 mg Oral Q6HRS PRN Ghulam Bryan M.D.       • senna-docusate (PERICOLACE or SENOKOT S) 8.6-50 MG per tablet 2 tablet  2 tablet Oral BID Ghulam Bryan M.D.   2 tablet at 21 0550    And   • polyethylene glycol/lytes (MIRALAX) PACKET 1 Packet  1 Packet Oral QDAY PRN Ghulam Bryan M.D.        And   • magnesium hydroxide (MILK OF MAGNESIA) suspension 30 mL  30 mL Oral QDAY PRN Ghulam Bryan M.D.        And   • bisacodyl (DULCOLAX) suppository 10 mg  10 mg Rectal QDAY PRN Ghulam Bryan M.D.       • enoxaparin (LOVENOX) inj 40 mg  40 mg Subcutaneous DAILY AT 1800 Ghulam Bryan M.D.   40 mg at 21       Social History     Socioeconomic History   • Marital status:      Spouse name: Not on file   • Number of children: Not on file   • Years of education: Not on file   • Highest education level: Not on file   Occupational History   • Not on file   Tobacco Use   • Smoking status: Former Smoker     Packs/day: 1.00     Years: 20.00     Pack years: 20.00     Quit date: 1990     Years since quittin.2   • Smokeless tobacco: Never Used   • Tobacco comment: 25 to 30 years a go   Substance and Sexual Activity   • Alcohol use: Yes     Comment: slim and none   • Drug use: No   • Sexual activity: Yes     Partners: Female     Birth control/protection: Post-Menopausal   Other Topics Concern   • Not on file   Social History Narrative   • Not on file     Social Determinants of Health     Financial Resource Strain:    • Difficulty of Paying Living Expenses:    Food Insecurity:    • Worried About Running Out of Food in the Last Year:    • Ran Out of Food in the Last Year:    Transportation Needs:    • Lack of Transportation (Medical):    • Lack of Transportation (Non-Medical):    Physical Activity:    • Days of Exercise per Week:    • Minutes of Exercise per Session:   "  Stress:    • Feeling of Stress :    Social Connections:    • Frequency of Communication with Friends and Family:    • Frequency of Social Gatherings with Friends and Family:    • Attends Christian Services:    • Active Member of Clubs or Organizations:    • Attends Club or Organization Meetings:    • Marital Status:    Intimate Partner Violence:    • Fear of Current or Ex-Partner:    • Emotionally Abused:    • Physically Abused:    • Sexually Abused:        Family History   Problem Relation Age of Onset   • Cancer Mother        Allergies:  Vicodin [hydrocodone-acetaminophen]    Review of Systems:  Noncontributory except as per HPI    Physical Exam:  /83   Pulse 62   Temp 36.5 °C (97.7 °F) (Temporal)   Resp 16   Ht 1.753 m (5' 9\")   Wt 75.1 kg (165 lb 9.1 oz)   SpO2 95%     Constitutional: Alert, no acute distress  HEENT:  Normocephalic and atraumatic, EOMI  Neck:   Supple, no JVD,   Cardiovascular: Regular rate and rhythm,   Pulmonary:  Good air entry bilaterally,    Abdominal:  Soft, non-tender, non-distended     Aortic impulse not widened  Musculoskeletal: No edema, no tenderness  Neurological:  CN II-XII grossly intact, no focal deficits  Skin:   Skin is warm and dry. No rash noted.      Labs:  Recent Labs     03/30/21 2226 04/01/21  0521   WBC 5.8 7.7   RBC 5.07 5.18   HEMOGLOBIN 15.8 16.1   HEMATOCRIT 47.4 47.3   MCV 93.5 91.3   MCH 31.2 31.1   MCHC 33.3* 34.0   RDW 44.5 42.4   PLATELETCT 193 204   MPV 10.1 10.4     Recent Labs     03/30/21 2226 04/01/21  0521   SODIUM 142 136   POTASSIUM 3.7 3.8   CHLORIDE 106 102   CO2 24 21   GLUCOSE 93 128*   BUN 17 19   CREATININE 1.05 1.03   CALCIUM 8.9 8.9         Recent Labs     03/30/21 2226 04/01/21  0521   ASTSGOT 11* 16   ALTSGPT 12 9   TBILIRUBIN 0.7 0.6   ALKPHOSPHAT 89 92   GLOBULIN 2.6 2.8       Radiology:  MRI shows embolic strokes in the right hemisphere    CTA shows moderate bilateral carotid stenosis    Assessment/Plan:  -Symptomatic " moderate right carotid stenosis  -Embolic strokes to the right hemisphere  -Hyperlipidemia    Patient has embolic strokes in the right hemisphere and a moderate ipsilateral carotid stenosis.  The carotid stenosis is concerning for the source of the strokes and carotid intervention is recommended.  I had a detailed discussion with the patient and his family regarding the options for intervention which include either endarterectomy or stenting.  Patient appears to be a suitable candidate for carotid endarterectomy.    I had a very detailed, thorough discussion with the patient and his family regarding the severity of his carotid disease and that he is at high risk of additional strokes.  I explained the primary purpose of this operation is to prevent stroke from the plaque in the carotid artery.  I explained the details of the operation including neuro monitoring and possible use of a shunt.  I discussed the alternatives of optimal medical management or stenting, although carotid endarterectomy is preferable in the setting of such severe plaque and in someone who is acceptable risk for surgery.  I discussed the potential risks, including but not limited to bleeding, infection, injury to vessels or nerves, and risks of anesthesia. I explained the risk that the operation itself can cause a stroke, although the risk of stroke from the operation is less than the risk of stroke if the plaque is not treated, and thus carotid endarterectomy is recommended.  All of their questions were answered. They understand and agree to proceed.        Willie Beavers MD  [facility] (General and Vascular Surgery)  Cell: [phone] (text or call is fine, if you don't reach me please try my office)  Office: [phone]    ___________________________________________________________________  Patient:Cooper Harvey   MRN:[id number]   CSN:[id number]      Addendum 6/8/2021 8:30 PM  Referring provider for this consultation  was T. Lukas Beavers MD

## 2021-04-02 NOTE — ANESTHESIA PREPROCEDURE EVALUATION
Relevant Problems   NEURO   (+) Acute CVA (cerebrovascular accident) (HCC)      CARDIAC   (+) Carotid stenosis, bilateral      Other   (+) Arthritis       Physical Exam    Airway   Mallampati: II  TM distance: >3 FB  Neck ROM: full       Cardiovascular - normal exam  Rhythm: regular  Rate: normal  (-) murmur     Dental - normal exam           Pulmonary - normal exam  Breath sounds clear to auscultation     Abdominal    Neurological - normal exam                 Anesthesia Plan    ASA 4   ASA physical status 4 criteria: CVA or TIA - recent (< 3 months)    Plan - general       Airway plan will be ETT          Induction: intravenous      Pertinent diagnostic labs and testing reviewed    Informed Consent:    Anesthetic plan and risks discussed with patient.

## 2021-04-02 NOTE — ANESTHESIA PROCEDURE NOTES
Arterial Line  Performed by: Marcus Borrero M.D.  Authorized by: Marcus Borrero M.D.     Localization: surface landmarks    Patient Location:  OR  Indication: continuous blood pressure monitoring        Catheter Size:  20 G  Seldinger Technique?: Yes    Laterality:  Right  Site:  Radial artery  Line Secured:  Antimicrobial disc, tape and transparent dressing  Events: patient tolerated procedure well with no complications

## 2021-04-02 NOTE — PROGRESS NOTES
Daily Progress Note:     Date of Service: 4/2/2021  Primary Team: [facility] Blue Team   Attending: GREGORIO Rich M.D.   Senior Resident: Dr. Miller  Intern: Dr. Blanton  Contact:  [phone]    Patient ID:  86 YOM with PMHx of BPH, HLD who presented as a transfer from outside hospital for BL transient vision loss, weakness, concerning for TIA vs stroke. However, pt has had similar episodes over past year (~12) with associated stiffness, unresponsiveness, and occasional drooling, concerning for seizures. Vascular, Neuro consulted.      Chief Complaint:  BL transient vision loss     Interval Update:   NAEON.  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  CEA today with vascular surgery.  Patient tolerated procedure well.    Consultants/Specialty:  Neuro, Vascular Surgery    Review of Systems:    Review of Systems   Constitutional: Negative for chills and fever.   HENT: Positive for ear pain. Negative for ear discharge and sore throat.    Eyes: Positive for blurred vision. Negative for double vision.   Respiratory: Negative for cough and shortness of breath.    Cardiovascular: Negative for chest pain and palpitations.   Gastrointestinal: Negative for abdominal pain, constipation, diarrhea, nausea and vomiting.   Genitourinary: Negative for dysuria, frequency and urgency.   Musculoskeletal: Negative for falls, joint pain and myalgias.   Skin: Negative for itching and rash.   Neurological: Positive for dizziness. Negative for focal weakness and headaches.   Psychiatric/Behavioral: Positive for memory loss. The patient is not nervous/anxious.      Objective Data:   Physical Exam:   Vitals:   Temp:  [36.3 °C (97.3 °F)-37.7 °C (99.8 °F)] 36.3 °C (97.3 °F)  Pulse:  [50-78] 61  Resp:  [14-20] 16  BP: (114-169)/(43-98) 126/49  SpO2:  [94 %-100 %] 97 %    Physical Exam  Vitals and nursing note reviewed.    Constitutional:       General: He is not in acute distress.     Appearance: Normal appearance. He is well-developed.   HENT:      Head: Normocephalic and atraumatic.      Right Ear: External ear normal.      Left Ear: External ear normal.      Nose: Nose normal.      Mouth/Throat:      Mouth: Mucous membranes are dry.      Pharynx: Oropharynx is clear. No oropharyngeal exudate or posterior oropharyngeal erythema.   Eyes:      General: No scleral icterus.     Extraocular Movements: Extraocular movements intact.      Conjunctiva/sclera: Conjunctivae normal.   Neck:      Thyroid: No thyromegaly.      Vascular: No JVD.      Trachea: No tracheal deviation.   Cardiovascular:      Rate and Rhythm: Normal rate and regular rhythm.      Heart sounds: Normal heart sounds. No murmur. No friction rub. No gallop.    Pulmonary:      Effort: Pulmonary effort is normal. No respiratory distress.      Breath sounds: Normal breath sounds. No stridor. No wheezing or rales.   Abdominal:      General: Bowel sounds are normal. There is no distension.      Palpations: Abdomen is soft.      Tenderness: There is no abdominal tenderness.   Musculoskeletal:      Cervical back: Neck supple.      Right lower leg: No edema.      Left lower leg: No edema.   Skin:     General: Skin is warm and dry.      Coloration: Skin is not pale.      Findings: No erythema or rash.   Neurological:      Mental Status: He is alert. He is disoriented.      Sensory: No sensory deficit.      Motor: No weakness or abnormal muscle tone.      Comments: Speech is slightly broken.  Decreasing hearing BL (chronic)       Labs:   Recent Results (from the past 24 hour(s))   COD - Adult (Type and Screen)    Collection Time: 04/02/21 10:17 AM   Result Value Ref Range    ABO Grouping Only O     Rh Grouping Only POS     Antibody Screen-Cod NEG      Imaging:   Independant Imaging Review: Completed  CT-CTA HEAD WITH & W/O-POST PROCESS   Final Result      1.  No large vessel  occlusion is identified.      2.  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      3.  Atherosclerotic disease. Resulting stenosis is greater than 50% in the small distal right vertebral artery.      4.  No acute intracranial findings.      CT-CTA NECK WITH & W/O-POST PROCESSING   Final Result      1.  Atherosclerotic disease. Resulting stenosis in the carotid arteries is less than 50%.      2.  Resulting stenosis in the small right vertebral artery is greater than 50% distally. Stenosis in the proximal right subclavian artery is consistent with approximately 50% stenosis      EC-ECHOCARDIOGRAM COMPLETE W/O CONT   Final Result      MR-BRAIN-W/O   Final Result      1.  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   2.  Linear focus of gradient echo signal hypointensity in the left occipital lobe consistent with chronic hypertensive microhemorrhage, remote trauma or infection.   3.  Moderate diffuse cerebral substance loss.   4.  Mild microangiopathic ischemic change versus demyelination or gliosis.        Problem Representation:   * Acute CVA (cerebrovascular accident) (HCC)- (present on admission)  Assessment & Plan  - Presenting with BL transient vision loss with weakness; however, has had multiple episodes over past year with shaking, unresponsiveness, stiffness, and drooling on last episode, concerning for TIA/amaurosis fugax vs stroke vs seizure  - CT head showed diffuse atrophy, EKG and CXR normal  - MRI showed punctate acute infarcts in R cerebellum, R occipital, and R frontal cortex - Plavix added last night    - EEG was normal, Echo with bubble study normal (EF = 70%)  - Neuro considering myasthenia gravis - testing for MuSK Ab and AChR Ab, also ordered CTA head and neck  - Vascular surgery think patient is candidate for carotid clean-out or endarectomy -  - Monitor on Tele  - Continue ASA, Plavix, and Atorvastatin  (discussed with Vascular, continue DAPT)  - Daughter would like to be updated (Angela, [phone])    Carotid stenosis, bilateral- (present on admission)  Assessment & Plan  - As per CTA at outside hospital 60% bilaterally - cnclear at this time whether this is related to his vision changes.  - Vascular surgery consulted - see above plan    Altered mental status, unspecified  Assessment & Plan  - Pt became extremely disoriented and combative with staff on 3/31 evening/night    - Was given Seroquel which did not seem to change behavior    - Then placed on soft restraints  - Remains fairly disoriented but soft restraints removed on 4/1 around 3 PM - started on Depakote TID  - Tele sitter ordered to monitor patient, has Haldol PRN onboard    Mixed hyperlipidemia- (present on admission)  Assessment & Plan  - Continue Atorvastatin at higher dose    Do not intubate, cardiopulmonary resuscitation (CPR)-only code status- (present on admission)  Assessment & Plan  Discussed CODE STATUS with patient.  He is okay with CPR.  He wishes not to be intubated.    BPH (benign prostatic hyperplasia)- (present on admission)  Assessment & Plan  - Not on any meds, denying any current symptoms

## 2021-04-02 NOTE — ANESTHESIA TIME REPORT
Anesthesia Start and Stop Event Times     Date Time Event    4/2/2021 1052 Ready for Procedure     1104 Anesthesia Start     1257 Anesthesia Stop        Responsible Staff  04/02/21    Name Role Begin End    Marcus Borrero M.D. Anesth 1104 1257        Preop Diagnosis (Free Text):  Pre-op Diagnosis     ICA stenosis        Preop Diagnosis (Codes):    Post op Diagnosis  Carotid stenosis      Premium Reason  Non-Premium    Comments:

## 2021-04-02 NOTE — ANESTHESIA PROCEDURE NOTES
Airway    Date/Time: 4/2/2021 11:13 AM  Performed by: Marcus Borrero M.D.  Authorized by: Marcus Borrero M.D.     Location:  OR  Urgency:  Elective  Indications for Airway Management:  Anesthesia      Spontaneous Ventilation: absent    Sedation Level:  Deep  Preoxygenated: Yes    Patient Position:  Sniffing  Mask Difficulty Assessment:  0 - not attempted  Final Airway Type:  Endotracheal airway  Final Endotracheal Airway:  ETT  Cuffed: Yes    Technique Used for Successful ETT Placement:  Direct laryngoscopy    Insertion Site:  Oral  Blade Type:  Paula  Laryngoscope Blade/Videolaryngoscope Blade Size:  4  ETT Size (mm):  8.0  Measured from:  Gums  Placement Verified by: auscultation and capnometry    Cormack-Lehane Classification:  Grade I - full view of glottis  Number of Attempts at Approach:  1

## 2021-04-02 NOTE — OR NURSING
Three hours post op complete at 1600 in TPACU. Pt on 2 L NC.  No c/o nausea, tolerating PO fluids.  No difficulty swallowing, passed bedside swallow evaluation.  Right side of neck surgical site CDI.  MARJ compressed, patent and draining scant dark red fluid.   VSS, afebrile, PEGUERO, A/O x4.    Hearing aide found in pt bed, now on pt chart.   Upper and lower dentures in place.   Oxygen tank 75% full.  Transferred with surgical mask in place.   Pt transported on oxygen and monitor by this RN.     Bedside handoff to Brian PATE.

## 2021-04-02 NOTE — PROGRESS NOTES
Received bedside report from RN, pt care assumed, VSS, pt assessment complete. Pt AAOx1, no c/o pain at this time. No signs of acute distress noted at this time. Pt denies any additional needs at this time. Bed in lowest position, bed alarm on, telesitter at bedside, call light within reach, hourly rounding initiated.

## 2021-04-02 NOTE — ANESTHESIA POSTPROCEDURE EVALUATION
Patient: Cooper Harvey    Procedure Summary     Date: 04/02/21 Room / Location: [place] [address] / SURGERY [facility]    Anesthesia Start: 1104 Anesthesia Stop: 1257    Procedure: ENDARTERECTOMY, CAROTID (Right Neck) Diagnosis: (ICA stenosis)    Surgeons: Willie Beavers M.D. Responsible Provider: Marcus Borrero M.D.    Anesthesia Type: general ASA Status: 4          Final Anesthesia Type: general  Last vitals  BP   Blood Pressure : 143/58, Arterial BP: (!) 127/37    Temp   36.3 °C (97.3 °F)    Pulse   (!) 51   Resp   15    SpO2   99 %      Anesthesia Post Evaluation    Patient location during evaluation: PACU  Patient participation: complete - patient participated  Level of consciousness: awake and alert  Pain score: 0    Airway patency: patent  Anesthetic complications: no  Cardiovascular status: hemodynamically stable  Respiratory status: acceptable  Hydration status: euvolemic    PONV: none          No complications documented.     Nurse Pain Score: 0 (NPRS)

## 2021-04-02 NOTE — THERAPY
Missed Therapy     Patient Name: Cooper Harvey  Age:  86 y.o., Sex:  male  Medical Record #: [id number]  Today's Date: 4/2/2021 04/02/21 1131   Treatment Variance   Reason For Missed Therapy Medical - Patient  in Procedure   Interdisciplinary Plan of Care Collaboration   Collaboration Comments Attempted to see the patient for a cognitive evaluation.  Patient currently off the floor for a right carotid endarterectomy.  SLP will reattempt as the patient is appropriate.

## 2021-04-02 NOTE — PROGRESS NOTES
OR today for right carotid endarterectomy    Willie Beavers MD  [facility] (General and Vascular Surgery)  Cell: [phone] (text/call)  Office: [phone]  __________________________________________________________________  Patient:Cooper Harvey   MRN:[id number]   CSN:[id number]    4/2/2021    10:29 AM

## 2021-04-02 NOTE — THERAPY
Missed Therapy     Patient Name: Cooper Harvey  Age:  86 y.o., Sex:  male  Medical Record #: [id number]  Today's Date: 4/2/2021    Discussed missed therapy with RN    Pt having endarterectomy today, will attempt again on 4/3

## 2021-04-02 NOTE — PROGRESS NOTES
Neurology Progress Note  Neurohospitalist Service, [facility] Neurosciences    Referring Physician: GREGORIO Rich M.D.    Reason for Referral: Transient vision changes and syncope    HPI: Cooper Harvey is a 86 y.o. male with past history of benign prostatic hypertrophy and hyperlipidemia who presented to [facility] 3/30/21 as a transfer from OSH with complaint of transient bilateral vision loss over the past several weeks. He reports that several times while he has been watching television in the evening, he feels his left eye wandering medially and experiences double vision. He reports these symptoms are improved in the mornings. He also reports a syncopal episode 2 days ago in which he awoke on the floor. He did not seek medical attention after this event, and felt well upon waking the next morning. He is symptom free at this time, denies headache, vision loss, speech changes, focal weakness. He is hard of hearing with bilateral hearing aids in place. OSH reported 60% stenosis of the bilateral carotid arteries, thus request was made for transfer to [facility] for further work-up, vascular surgery consultation, and to obtain MRI Brain for vision changes.    Interval Note 4/1/21: Patient became agitated and confused overnight, requiring restraints for safety. He is oriented to self only at this time. He is denying headache, visual change, focal motor weakness, and is not having difficulty speaking.     Interval Note 4/2/21: Today the patient's mental status has returned to baseline. He has no new complaints. He has remained in SR 65-76. Family at bedside updated on results and plan of care. He is scheduled for CEA of Right ICA with Dr. Beavers today.    Review of systems: In addition to what is detailed in the HPI above, all other systems reviewed and are negative.    Past Medical History:    has a past medical history of Hydrocele, unspecified, Hypertrophy of prostate without  urinary obstruction and other lower urinary tract symptoms (LUTS), and Mixed hyperlipidemia.    FHx:  family history includes Cancer in his mother.    SHx:   reports that he quit smoking about 31 years ago. He has a 20.00 pack-year smoking history. He has never used smokeless tobacco. He reports current alcohol use. He reports that he does not use drugs.    Allergies:  Allergies   Allergen Reactions   • Vicodin [Hydrocodone-Acetaminophen] Rash     rash       Medications:    Current Facility-Administered Medications:   •  haloperidol lactate (HALDOL) injection 2 mg, 2 mg, Intravenous, Q6HRS PRN, Coretta Basurto M.D.  •  divalproex (DEPAKOTE) delayed-release tablet 250 mg, 250 mg, Oral, Q8HRS, Ann Marie Blanton M.D., 250 mg at 04/02/21 0550  •  labetalol (NORMODYNE/TRANDATE) injection 10 mg, 10 mg, Intravenous, Q4HRS PRN, Coretta Basurto M.D.  •  aspirin (ASA) chewable tab 81 mg, 81 mg, Oral, DAILY, Ann Marie Blanton M.D., Stopped at 04/02/21 0600  •  atorvastatin (LIPITOR) tablet 80 mg, 80 mg, Oral, Q EVENING, Ann Marie Blanton M.D., 80 mg at 04/01/21 1810  •  clopidogrel (PLAVIX) tablet 75 mg, 75 mg, Oral, DAILY, GREGORIO Rich M.D., 75 mg at 04/01/21 1810  •  acetaminophen (Tylenol) tablet 650 mg, 650 mg, Oral, Q6HRS PRN, Ghulam Bryan M.D.  •  senna-docusate (PERICOLACE or SENOKOT S) 8.6-50 MG per tablet 2 tablet, 2 tablet, Oral, BID, 2 tablet at 04/02/21 0550 **AND** polyethylene glycol/lytes (MIRALAX) PACKET 1 Packet, 1 Packet, Oral, QDAY PRN **AND** magnesium hydroxide (MILK OF MAGNESIA) suspension 30 mL, 30 mL, Oral, QDAY PRN **AND** bisacodyl (DULCOLAX) suppository 10 mg, 10 mg, Rectal, QDAY PRN, Ghulam Bryan M.D.  •  enoxaparin (LOVENOX) inj 40 mg, 40 mg, Subcutaneous, DAILY AT 1800, Ghulam Bryan M.D., 40 mg at 04/01/21 1810    Physical Examination:     Vitals:    04/01/21 1506 04/01/21 2000 04/02/21 0000 04/02/21 0400   BP: (!) 170/96 (!) 169/86 156/98 143/80   Pulse: 88 76 78 75   Resp: 17 18 18 16    Temp: 37.2 °C (98.9 °F) 37.7 °C (99.8 °F) 36.4 °C (97.6 °F) 36.5 °C (97.7 °F)   TempSrc: Temporal Temporal Temporal Temporal   SpO2: 94% 94% 96% 95%   Weight:       Height:           General: Patient is awake and in no acute distress. AxOx4. Calm and cooperative. He is hard of hearing with hearing aides in place bilaterally.  Eyes: examination of optic disks not indicated at this time given acuity of consult  CV: SR 80's    NEUROLOGICAL EXAM:     Mental status: Awake, alert, oriented x4, follows commands  Speech and language: speech is not dysarthric. The patient is able to name and repeat.  Cranial nerve exam: Pupils are equal, round and reactive to light bilaterally. Visual fields are full. EOMi. Sensation in the face is intact to light touch. Face is symmetric. Hearing to finger rub equal. Palate elevates symmetrically. Shoulder shrug is full. Tongue is midline.  Motor exam: Strength is 4+/5 in all extremities both distally and proximally. Tone is normal. No abnormal movements were seen on exam.  Sensory exam: No sensory deficits identified   Deep tendon reflexes:  2+ and symmetric. Toes down-going bilaterally.  Coordination: no ataxia with finger to nose.  Gait: deferred given patient preference    Objective Data:    Labs:  No results found for: PROTHROMBTM, INR   Lab Results   Component Value Date/Time    WBC 7.7 04/01/2021 05:21 AM    RBC 5.18 04/01/2021 05:21 AM    HEMOGLOBIN 16.1 04/01/2021 05:21 AM    HEMATOCRIT 47.3 04/01/2021 05:21 AM    MCV 91.3 04/01/2021 05:21 AM    MCH 31.1 04/01/2021 05:21 AM    MCHC 34.0 04/01/2021 05:21 AM    MPV 10.4 04/01/2021 05:21 AM    NEUTSPOLYS 80.10 (H) 04/01/2021 05:21 AM    LYMPHOCYTES 10.50 (L) 04/01/2021 05:21 AM    MONOCYTES 7.30 04/01/2021 05:21 AM    EOSINOPHILS 1.20 04/01/2021 05:21 AM    BASOPHILS 0.40 04/01/2021 05:21 AM      Lab Results   Component Value Date/Time    SODIUM 136 04/01/2021 05:21 AM    POTASSIUM 3.8 04/01/2021 05:21 AM    CHLORIDE 102 04/01/2021  05:21 AM    CO2 21 04/01/2021 05:21 AM    GLUCOSE 128 (H) 04/01/2021 05:21 AM    BUN 19 04/01/2021 05:21 AM    CREATININE 1.03 04/01/2021 05:21 AM    CREATININE 1.2 10/03/2008 11:30 AM      Lab Results   Component Value Date/Time    CHOLSTRLTOT 202 (H) 03/30/2021 10:26 PM     (H) 03/30/2021 10:26 PM    HDL 41 03/30/2021 10:26 PM    TRIGLYCERIDE 111 03/30/2021 10:26 PM       Lab Results   Component Value Date/Time    ALKPHOSPHAT 92 04/01/2021 05:21 AM    ASTSGOT 16 04/01/2021 05:21 AM    ALTSGPT 9 04/01/2021 05:21 AM    TBILIRUBIN 0.6 04/01/2021 05:21 AM        Imaging/Testing:    I interpreted and/or reviewed the patient's neuroimaging    CT-CTA HEAD WITH & W/O-POST PROCESS   Final Result      1.  No large vessel occlusion is identified.      2.  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      3.  Atherosclerotic disease. Resulting stenosis is greater than 50% in the small distal right vertebral artery.      4.  No acute intracranial findings.      CT-CTA NECK WITH & W/O-POST PROCESSING   Final Result      1.  Atherosclerotic disease. Resulting stenosis in the carotid arteries is less than 50%.      2.  Resulting stenosis in the small right vertebral artery is greater than 50% distally. Stenosis in the proximal right subclavian artery is consistent with approximately 50% stenosis      EC-ECHOCARDIOGRAM COMPLETE W/O CONT   Final Result      MR-BRAIN-W/O   Final Result      1.  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   2.  Linear focus of gradient echo signal hypointensity in the left occipital lobe consistent with chronic hypertensive microhemorrhage, remote trauma or infection.   3.  Moderate diffuse cerebral substance loss.   4.  Mild microangiopathic ischemic change versus demyelination or gliosis.          Assessment and Plan:    Cooper Harvey is a 86 y.o. male with past history of benign prostatic  hypertrophy and hyperlipidemia who presented to [facility] 3/30/21 as a transfer from OS with complaint of transient bilateral vision loss over the past several weeks. OSH reported 60% stenosis of the bilateral carotid arteries, thus request was made for transfer to [facility] for further work-up with MRI of the brain as well as vascular surgery consultation. The patient is without neurological deficits. He has no complaints of visual changes since arrival. MRI brain 3/31 revealed acute punctate infarcts in RIGHT cerebellum, RIGHT occipital cortex and RIGHT frontal cortex. He was not considered a candidate for tPA due to unknown timing of infarcts and lack of symptoms. CTA head and neck reveals: Atherosclerotic disease, resulting stenosis in the carotid arteries is less than 50%. Infarcts in multiple vascular territories cannot be explained by a single vessel. The appearance is suspicious for cardioembolic etiology, however diffuse atherosclerosis is also present. Echocardiogram shows EF 70%, no PFO, no wall motion abnormality, normal left atrial size, left atrial volume index is 20. He has remained in a sinus rhythm throughout his admission. We recommend ongoing cardiac monitoring for paroxysmal a fib despite reassuring findings due to cerebral infarcts occurring in multiple territories. He is currently on DAPT and high intensity statin. Hemoglobin A1C is 5.2, at goal. LDL is elevated at 139, goal is <100. At this time he has no visual deficits, but wiith consideration of his report of recurrent diplopia in the evenings, AChR and MuSK antiboy titers have been ordered to rule out underlying myasthenia gravis. He is planned to undergo CEA today.    Plan:    1. STROKE  - Neurology checks and vital signs per protocol  - Telemetry  - Outpatient Zio patch  - BP goal is normotension  - High intensity statin  - Continue DAPT  - obtain normoglycemia and avoid hypo- or hyper -natremia; aim for normothermia  -  evaluate and treat with PT/OT/ST    2. Visual complaints  -AChR and MuSK antibody titers    3. AMS - improved today   - attempt to minimize risk of delirium such as avoiding day time napping and promote night time sleep, monitor for constipation, remove lines/tubing that is not needed, avoid early lab draws and vital checks, limit polypharmacy as able, and keep close to the window.   -Recommend sitter vs soft restraints if possible.  - obtain normoglycemia and avoid hypo- or hyper -natremia; aim for normothermia    The evaluation of the patient, and recommended management, was discussed with attending neurologist, Dr. Benedict Bailon.    Corinne Canavero, APRN  Acute Care Neurohospitalist Service

## 2021-04-03 PROBLEM — N17.9 AKI (ACUTE KIDNEY INJURY) (HCC): Status: ACTIVE | Noted: 2021-04-03

## 2021-04-03 LAB
ALBUMIN SERPL BCP-MCNC: 3.5 G/DL (ref 3.2–4.9)
ALBUMIN/GLOB SERPL: 1.4 G/DL
ALP SERPL-CCNC: 76 U/L (ref 30–99)
ALT SERPL-CCNC: 9 U/L (ref 2–50)
ANION GAP SERPL CALC-SCNC: 6 MMOL/L (ref 7–16)
AST SERPL-CCNC: 22 U/L (ref 12–45)
BASOPHILS # BLD AUTO: 0.3 % (ref 0–1.8)
BASOPHILS # BLD: 0.03 K/UL (ref 0–0.12)
BILIRUB SERPL-MCNC: 0.5 MG/DL (ref 0.1–1.5)
BUN SERPL-MCNC: 28 MG/DL (ref 8–22)
CALCIUM SERPL-MCNC: 8.4 MG/DL (ref 8.5–10.5)
CHLORIDE SERPL-SCNC: 103 MMOL/L (ref 96–112)
CO2 SERPL-SCNC: 24 MMOL/L (ref 20–33)
CREAT SERPL-MCNC: 1.38 MG/DL (ref 0.5–1.4)
EOSINOPHIL # BLD AUTO: 0.13 K/UL (ref 0–0.51)
EOSINOPHIL NFR BLD: 1.3 % (ref 0–6.9)
ERYTHROCYTE [DISTWIDTH] IN BLOOD BY AUTOMATED COUNT: 46.2 FL (ref 35.9–50)
GLOBULIN SER CALC-MCNC: 2.5 G/DL (ref 1.9–3.5)
GLUCOSE SERPL-MCNC: 118 MG/DL (ref 65–99)
HCT VFR BLD AUTO: 43.6 % (ref 42–52)
HGB BLD-MCNC: 14.2 G/DL (ref 14–18)
IMM GRANULOCYTES # BLD AUTO: 0.04 K/UL (ref 0–0.11)
IMM GRANULOCYTES NFR BLD AUTO: 0.4 % (ref 0–0.9)
LYMPHOCYTES # BLD AUTO: 0.72 K/UL (ref 1–4.8)
LYMPHOCYTES NFR BLD: 7.4 % (ref 22–41)
MCH RBC QN AUTO: 31.1 PG (ref 27–33)
MCHC RBC AUTO-ENTMCNC: 32.6 G/DL (ref 33.7–35.3)
MCV RBC AUTO: 95.6 FL (ref 81.4–97.8)
MONOCYTES # BLD AUTO: 1.1 K/UL (ref 0–0.85)
MONOCYTES NFR BLD AUTO: 11.3 % (ref 0–13.4)
NEUTROPHILS # BLD AUTO: 7.74 K/UL (ref 1.82–7.42)
NEUTROPHILS NFR BLD: 79.3 % (ref 44–72)
NRBC # BLD AUTO: 0 K/UL
NRBC BLD-RTO: 0 /100 WBC
PLATELET # BLD AUTO: 204 K/UL (ref 164–446)
PMV BLD AUTO: 10.8 FL (ref 9–12.9)
POTASSIUM SERPL-SCNC: 4.2 MMOL/L (ref 3.6–5.5)
PROT SERPL-MCNC: 6 G/DL (ref 6–8.2)
RBC # BLD AUTO: 4.56 M/UL (ref 4.7–6.1)
SODIUM SERPL-SCNC: 133 MMOL/L (ref 135–145)
WBC # BLD AUTO: 9.8 K/UL (ref 4.8–10.8)

## 2021-04-03 PROCEDURE — 80053 COMPREHEN METABOLIC PANEL: CPT

## 2021-04-03 PROCEDURE — 770020 HCHG ROOM/CARE - TELE (206)

## 2021-04-03 PROCEDURE — 700102 HCHG RX REV CODE 250 W/ 637 OVERRIDE(OP): Performed by: STUDENT IN AN ORGANIZED HEALTH CARE EDUCATION/TRAINING PROGRAM

## 2021-04-03 PROCEDURE — 85025 COMPLETE CBC W/AUTO DIFF WBC: CPT

## 2021-04-03 PROCEDURE — 700105 HCHG RX REV CODE 258: Performed by: STUDENT IN AN ORGANIZED HEALTH CARE EDUCATION/TRAINING PROGRAM

## 2021-04-03 PROCEDURE — 92523 SPEECH SOUND LANG COMPREHEN: CPT

## 2021-04-03 PROCEDURE — 99233 SBSQ HOSP IP/OBS HIGH 50: CPT | Mod: GC | Performed by: HOSPITALIST

## 2021-04-03 PROCEDURE — A9270 NON-COVERED ITEM OR SERVICE: HCPCS | Performed by: STUDENT IN AN ORGANIZED HEALTH CARE EDUCATION/TRAINING PROGRAM

## 2021-04-03 PROCEDURE — 97161 PT EVAL LOW COMPLEX 20 MIN: CPT

## 2021-04-03 PROCEDURE — 99231 SBSQ HOSP IP/OBS SF/LOW 25: CPT | Performed by: NURSE PRACTITIONER

## 2021-04-03 PROCEDURE — 36415 COLL VENOUS BLD VENIPUNCTURE: CPT

## 2021-04-03 PROCEDURE — A9270 NON-COVERED ITEM OR SERVICE: HCPCS | Performed by: HOSPITALIST

## 2021-04-03 PROCEDURE — 97165 OT EVAL LOW COMPLEX 30 MIN: CPT

## 2021-04-03 PROCEDURE — 700102 HCHG RX REV CODE 250 W/ 637 OVERRIDE(OP): Performed by: HOSPITALIST

## 2021-04-03 RX ORDER — SODIUM CHLORIDE 9 MG/ML
INJECTION, SOLUTION INTRAVENOUS CONTINUOUS
Status: DISCONTINUED | OUTPATIENT
Start: 2021-04-03 | End: 2021-04-04

## 2021-04-03 RX ADMIN — DOCUSATE SODIUM 50 MG AND SENNOSIDES 8.6 MG 2 TABLET: 8.6; 5 TABLET, FILM COATED ORAL at 05:41

## 2021-04-03 RX ADMIN — DIVALPROEX SODIUM 250 MG: 250 TABLET, DELAYED RELEASE ORAL at 14:41

## 2021-04-03 RX ADMIN — LISINOPRIL 10 MG: 10 TABLET ORAL at 05:41

## 2021-04-03 RX ADMIN — ASPIRIN 81 MG: 81 TABLET, CHEWABLE ORAL at 05:42

## 2021-04-03 RX ADMIN — ATORVASTATIN CALCIUM 80 MG: 80 TABLET, FILM COATED ORAL at 17:25

## 2021-04-03 RX ADMIN — DIVALPROEX SODIUM 250 MG: 250 TABLET, DELAYED RELEASE ORAL at 07:55

## 2021-04-03 RX ADMIN — SODIUM CHLORIDE: 9 INJECTION, SOLUTION INTRAVENOUS at 07:55

## 2021-04-03 RX ADMIN — DIVALPROEX SODIUM 250 MG: 250 TABLET, DELAYED RELEASE ORAL at 23:20

## 2021-04-03 RX ADMIN — CLOPIDOGREL BISULFATE 75 MG: 75 TABLET ORAL at 17:25

## 2021-04-03 ASSESSMENT — ENCOUNTER SYMPTOMS
SHORTNESS OF BREATH: 0
COUGH: 0
CHILLS: 0
DIZZINESS: 1
VOMITING: 0
NAUSEA: 0
CONSTIPATION: 0
FOCAL WEAKNESS: 0
NERVOUS/ANXIOUS: 0
MYALGIAS: 0
SORE THROAT: 0
BLURRED VISION: 1
DOUBLE VISION: 0
FEVER: 0
HEADACHES: 0
FALLS: 0
ABDOMINAL PAIN: 0
DIARRHEA: 0
PALPITATIONS: 0
MEMORY LOSS: 1

## 2021-04-03 ASSESSMENT — COGNITIVE AND FUNCTIONAL STATUS - GENERAL
TURNING FROM BACK TO SIDE WHILE IN FLAT BAD: A LITTLE
CLIMB 3 TO 5 STEPS WITH RAILING: A LOT
SUGGESTED CMS G CODE MODIFIER MOBILITY: CK
DRESSING REGULAR UPPER BODY CLOTHING: A LITTLE
WALKING IN HOSPITAL ROOM: A LOT
MOVING TO AND FROM BED TO CHAIR: A LITTLE
MOVING FROM LYING ON BACK TO SITTING ON SIDE OF FLAT BED: A LITTLE
SUGGESTED CMS G CODE MODIFIER DAILY ACTIVITY: CK
TOILETING: A LOT
HELP NEEDED FOR BATHING: A LOT
STANDING UP FROM CHAIR USING ARMS: A LITTLE
PERSONAL GROOMING: A LITTLE
MOBILITY SCORE: 16
EATING MEALS: A LITTLE
DRESSING REGULAR LOWER BODY CLOTHING: A LOT
DAILY ACTIVITIY SCORE: 15

## 2021-04-03 ASSESSMENT — GAIT ASSESSMENTS
DISTANCE (FEET): 10
GAIT LEVEL OF ASSIST: MINIMAL ASSIST
ASSISTIVE DEVICE: FRONT WHEEL WALKER
DEVIATION: SHUFFLED GAIT;DECREASED HEEL STRIKE;DECREASED TOE OFF;BRADYKINETIC

## 2021-04-03 ASSESSMENT — ACTIVITIES OF DAILY LIVING (ADL): TOILETING: INDEPENDENT

## 2021-04-03 NOTE — THERAPY
"Speech Language Pathology   Cognitive Evaluation      Patient Name: Cooper Harvey  AGE:  86 y.o., SEX:  male  Medical Record #: [id number]  Today's Date: 4/3/2021     Precautions  Precautions: Fall Risk  Comments: karen MCMAHAN,    Assessment  The patient is an 85 y/o male who was admitted with bilateral transient vision loss and weakness.  PMHx: BPH, HLD.  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 Linear focus of gradient echo signal hypointensity in the left occipital lobe consistent with chronic hypertensive microhemorrhage, remote trauma or infection.  Moderate diffuse cerebral substance loss.\" Patient seen by SLP for a clinical swallow evaluation on 4/1/21 with recommendations including soft and bite size textures with thin liquids and direct supervision with PO intake.     Patient seen this date for a cognitive evaluation. Family present at the beginning of the session, but stepped out for the evaluation. Patient's wife stated the patient is very hard of hearing and his son assisted the patient in putting in hearing aids for the evaluation. Patient alert and oriented x3 (not oriented to the year). SLP administered portions of the Cognistat and informal medication management/clock drawing tasks. Patient demonstrated functional cognitive ability in attention, immediate memory/repetition, object naming, reasoning, judgment and copying written language. He demonstrated mild difficulty in orientation, verbal repetition of sentence-length information, delayed memory, calculations, medication management and clock drawing. Of note, patient demonstrates significant hearing loss with/without his hearing aids. Frequent repetition, clarification or written cues required for patient comprehension of instruction or cues. As a result, some errors in repetition or memory may be related to this. With slow speech, loud volume, short " phrases or written cues, his accuracy in task completion related to memory, mathematics and repetition significantly increases. He also showed slight difficulty in divided attention, demonstrating confusion about the topic of a task. This may also be related to hearing loss, as he showed improvement after significant clarification between tasks.   Notable findings include mild difficulty following written directions related to medication management. Recommend SLP initiate skilled intervention targeting executive functioning skills related to home safety with an emphasis on medication management.       Plan  1) Continue current diet of regular textures/thin liquids with direct supervision  2) SLP to initiate skilled cognitive intervention targeting executive functioning skills and medication management for home environment. Further cognitive goals to be added as appropriate.     Recommend Speech Therapy 3 times per week until therapy goals are met for the following treatments:  Cognitive-Linguistic Training and Patient / Family / Caregiver Education.    Discharge Recommendations: Recommend post-acute placement for additional speech therapy services prior to discharge home    Subjective  Patient seen this date for cognitive evaluation. Family present for the start of the session, but left for the evaluation.     Objective     04/03/21 1018   Charge Group   SLP Speech Language Evaluation Speech Sound Language Comprehension   Verbal Expression   Verbal Expression / Aphasia Eval (WDL) X   Vocal Quality Clear   Verbal Output Automatic Within Functional Limits (6-7)   Verbal Output: Phrases Within Functional Limits (6-7)   Verbal Output Conversation Within Functional Limits (6-7)   Verbal Output Functional Within Functional Limits (6-7)   Repetition: Single Words Within Functional Limits (6-7)   Repetition: Phrases Within Functional Limits (6-7)   Repetition: Sentences Minimal (4)  (Repetition required d/t hearing loss)    Naming Within Functional Limits (6-7)   Dysarthria Within Functional Limits (6-7)   Word Finding Deficits Within Functional Limits (6-7)   Auditory Comprehension   Auditory Comprehension (WDL) X   Yes / No Questions: Personal Information Within Functional Limits (6-7)   Yes / No Questions: General Information Within Functional Limits (6-7)   Yes / No Questions: Abstract Within Functional Limits (6-7)   Identifies Objects Within Functional Limits (6-7)   Follows One Unit Commands Within Functional Limits (6-7)   Follows Two Unit Commands Minimal (4)  (difficulty d/t hearing loss)   Follows Three Unit Commands Not Tested   Understands Paragraph   (Not tested d/t severe hearing loss)   Understands Simple, Structured Conversation  Within Functional Limits (6-7)   Understands Complex Conversation Minimal (4)  (Repetition/clarification required d/t hearing los)   Reading Comprehension   Reading Comprehension (WDL) X   Reading Words Within Functional Limits (6-7)   Reading Phrases Within Functional Limits (6-7)   Reading Sentences Within Functional Limits (6-7)   Following Written Direction Minimal (4)   Functional Reading Materials Within Functional Limits (6-7)   Barriers to Reading Vision / Visual Processing   Written Expression   Written Expression (WDL) X   Functional Writing to Dictation: Sentence Moderate (3)  (step-by-step intervention d/t hearing loss)   Overall Legibility Minimal (4)   Dominant Hand Right   Cognitive-Linguistic   Cognitive-Linguistic (WDL) X   Level of Consciousness Alert   Orientation Level Not Oriented to Time   Sustained Attention Within Functional Limits (6-7)   Alternating Attention Within Functional Limits (6-7)   Divided Attention Minimal (4)  (Increased confusion d/t hearing loss)   Short Term Memory Minimal (4)   Immediate Memory Within Functional Limits (6-7)   Long Term Memory / Reminiscing Within Functional Limits (6-7)   Simple Reasoning / Problem Solving Within Functional Limits  (6-7)   Complex Reasoning  / Problem Solving Minimal (4)   [place] Reasoning Within Functional Limits (6-7)   Abstract Reasoning Within Functional Limits (6-7)   Safety Awareness Supervision (5)   Insight into Deficits Supervision (5)   Executive Functioning / Organization Minimal (4)   Written Sequencing Supervision (5)   Written Arithmetic Minimal (4)   Auditory Math Minimal (4)   Medication Management  Minimal (4)   Clock Drawing Poor Planning;Disorganization;Numeric Errors;Impaired Hand Placement   Short Term Goals   Short Term Goal # 1 The patient will consume SB6/TN0 diet with no overt s/sx of aspiration, given min cues to swallowing strategies.    Goal Outcome # 1 Progressing as expected   Short Term Goal # 2 Pt will complete executive functioning tasks related to medication management and home safety with 90% accuracy and min cues   Education Group   Education Provided Traumatic Brain Injury / Cognitive-Linguistic;Role of Speech Therapy   TBI / Cog-Ling Patient Response Patient;Family;Acceptance;Explanation;Verbal Demonstration   Role of SLP Patient Response Patient;Family;Acceptance;Explanation;Verbal Demonstration   Problem List   Problem List Dysarthia;Cognitive-Linguistic Deficits;Reading Comprehension Deficit;Hearing Deficit   Anticipated Discharge Needs   Discharge Recommendations Recommend post-acute placement for additional speech therapy services prior to discharge home   Therapy Recommendations Upon DC Dysphagia Training;Comprehension Training;Cognitive-Linguistic Training;Community Re-Integration;Patient / Family / Caregiver Education   Interdisciplinary Plan of Care Collaboration   IDT Collaboration with  Nursing   Patient Position at End of Therapy In Bed;Call Light within Reach;Tray Table within Reach;Phone within Reach   Collaboration Comments RN updated with results and recs

## 2021-04-03 NOTE — PROGRESS NOTES
"Vascular    Events  4/3/2021: ANDREA, pain controlled, neuro stable    Vitals  /62   Pulse 67   Temp 36.6 °C (97.9 °F) (Temporal)   Resp 17   Ht 1.753 m (5' 9\")   Wt 75.1 kg (165 lb 9.1 oz)   SpO2 94%   BMI 24.45 kg/m²     Exam  Neuro exam stable  Minimal swelling  Drain removed    Labs  unremarkable    A/P)  POD 1 from carotid endarterectomy  Doing well  Up as tolerated  Home anytime from surgical standpoint  FU 2 weeks in clinic for progress check    Antithrombotic regimen: ASA 81mg daily indefinitely, no Plavix    Willie Beavers MD  [facility] (General and Vascular Surgery)  Cell: [phone] (text or call is fine, if you don't reach me please try my office)  Office: [phone]  __________________________________________________________________  Patient:Cooper Harvey   MRN:[id number]   CSN:[id number]    "

## 2021-04-03 NOTE — PROGRESS NOTES
Neurology Progress Note  Neurohospitalist Service, [facility]    Referring Physician: GREGORIO Rich M.D.    Reason for Referral: Transient vision changes and syncope    HPI: Cooper Harvey is a 86 y.o. male with past history of benign prostatic hypertrophy and hyperlipidemia who presented to [facility] 3/30/21 as a transfer from OSH with complaint of transient bilateral vision loss over the past several weeks. He reports that several times while he has been watching television in the evening, he feels his left eye wandering medially and experiences double vision. He reports these symptoms are improved in the mornings. He also reports a syncopal episode 2 days ago in which he awoke on the floor. He did not seek medical attention after this event, and felt well upon waking the next morning. He is symptom free at this time, denies headache, vision loss, speech changes, focal weakness. He is hard of hearing with bilateral hearing aids in place. OSH reported 60% stenosis of the bilateral carotid arteries, thus request was made for transfer to [facility] for further work-up, vascular surgery consultation, and to obtain MRI Brain for vision changes.    Interval Note 4/1/21: Patient became agitated and confused overnight, requiring restraints for safety. He is oriented to self only at this time. He is denying headache, visual change, focal motor weakness, and is not having difficulty speaking.     Interval Note 4/2/21: Today the patient's mental status has returned to baseline. He has no new complaints. He has remained in SR 65-76. Family at bedside updated on results and plan of care. He is scheduled for CEA of Right ICA with Dr. Beavers today.    Interval Note 4/3/21: Patient is alert and oriented, calm and cooperative, without complaint. He is 1d s/p CEA of the R Common Carotid. He is denying headache, visual change, focal motor weakness, and is not having difficulty  speaking.     Review of systems: In addition to what is detailed in the HPI above, all other systems reviewed and are negative.    Past Medical History:    has a past medical history of "[employer] (hard of hearing) (04/02/2021), Hydrocele, unspecified, Hypertrophy of prostate without urinary obstruction and other lower urinary tract symptoms (LUTS), and Mixed hyperlipidemia.    FHx:  family history includes Cancer in his mother.    SHx:   reports that he quit smoking about 31 years ago. He has a 20.00 pack-year smoking history. He has never used smokeless tobacco. He reports current alcohol use. He reports that he does not use drugs.    Allergies:  Allergies   Allergen Reactions   • Vicodin [Hydrocodone-Acetaminophen] Rash     rash       Medications:    Current Facility-Administered Medications:   •  NS infusion, , Intravenous, Continuous, Ann Marie Blanton M.D., Last Rate: 100 mL/hr at 04/03/21 0755, New Bag at 04/03/21 0755  •  lisinopril (PRINIVIL) tablet 10 mg, 10 mg, Oral, Q DAY, Preethi Miller M.D., 10 mg at 04/03/21 0541  •  haloperidol lactate (HALDOL) injection 2 mg, 2 mg, Intravenous, Q6HRS PRN, Coretta Basurto M.D.  •  divalproex (DEPAKOTE) delayed-release tablet 250 mg, 250 mg, Oral, Q8HRS, Ann Marie Blanton M.D., 250 mg at 04/03/21 0755  •  labetalol (NORMODYNE/TRANDATE) injection 10 mg, 10 mg, Intravenous, Q4HRS PRN, Coretta Basurto M.D.  •  aspirin (ASA) chewable tab 81 mg, 81 mg, Oral, DAILY, Ann Marie Blanton M.D., 81 mg at 04/03/21 0542  •  atorvastatin (LIPITOR) tablet 80 mg, 80 mg, Oral, Q EVENING, Ann Marie Blanton M.D., 80 mg at 04/02/21 1726  •  clopidogrel (PLAVIX) tablet 75 mg, 75 mg, Oral, DAILY, GREGORIO Rich M.D., 75 mg at 04/01/21 1810  •  acetaminophen (Tylenol) tablet 650 mg, 650 mg, Oral, Q6HRS PRN, Ghulam Bryan M.D.  •  senna-docusate (PERICOLACE or SENOKOT S) 8.6-50 MG per tablet 2 tablet, 2 tablet, Oral, BID, 2 tablet at 04/03/21 0541 **AND** polyethylene glycol/lytes  (MIRALAX) PACKET 1 Packet, 1 Packet, Oral, QDAY PRN **AND** magnesium hydroxide (MILK OF MAGNESIA) suspension 30 mL, 30 mL, Oral, QDAY PRN **AND** bisacodyl (DULCOLAX) suppository 10 mg, 10 mg, Rectal, QDAY PRN, Ghulam Bryan M.D.  •  enoxaparin (LOVENOX) inj 40 mg, 40 mg, Subcutaneous, DAILY AT 1800, Ghulam Bryan M.D., Stopped at 04/02/21 1800    Physical Examination:     Vitals:    04/03/21 0341 04/03/21 0715 04/03/21 0800 04/03/21 1130   BP: 114/53 130/65  125/60   Pulse: (!) 58 68  65   Resp: 16 17  17   Temp: 37.1 °C (98.8 °F) 36.8 °C (98.2 °F)  36.9 °C (98.5 °F)   TempSrc: Temporal Temporal  Temporal   SpO2: 93% 91% 93% 93%   Weight:       Height:           General: Awake, alert and in no acute distress. Calm and cooperative. He is hard of hearing with hearing aides in place bilaterally.  Eyes: examination of optic disks not indicated at this time given acuity of consult  CV: SR 60's    NEUROLOGICAL EXAM:     Mental status: Awake, alert, oriented x4, follows commands  Speech and language: speech is not dysarthric. The patient is able to name and repeat.  Cranial nerve exam: Pupils are equal, round and reactive to light bilaterally. Visual fields are full. EOMi. Sensation in the face is intact to light touch. Face is symmetric. Hearing to finger rub equal. Palate elevates symmetrically. Shoulder shrug is full. Tongue is midline.  Motor exam: Strength is 4+/5 in all extremities both distally and proximally. Tone is normal. No abnormal movements were seen on exam.  Sensory exam: No sensory deficits identified   Deep tendon reflexes:  2+ and symmetric. Toes down-going bilaterally.  Coordination: no ataxia  Gait: deferred given patient preference    Objective Data:    Labs:  No results found for: PROTHROMBTM, INR   Lab Results   Component Value Date/Time    WBC 9.8 04/03/2021 04:08 AM    RBC 4.56 (L) 04/03/2021 04:08 AM    HEMOGLOBIN 14.2 04/03/2021 04:08 AM    HEMATOCRIT 43.6 04/03/2021 04:08 AM    MCV 95.6  04/03/2021 04:08 AM    MCH 31.1 04/03/2021 04:08 AM    MCHC 32.6 (L) 04/03/2021 04:08 AM    MPV 10.8 04/03/2021 04:08 AM    NEUTSPOLYS 79.30 (H) 04/03/2021 04:08 AM    LYMPHOCYTES 7.40 (L) 04/03/2021 04:08 AM    MONOCYTES 11.30 04/03/2021 04:08 AM    EOSINOPHILS 1.30 04/03/2021 04:08 AM    BASOPHILS 0.30 04/03/2021 04:08 AM      Lab Results   Component Value Date/Time    SODIUM 133 (L) 04/03/2021 04:08 AM    POTASSIUM 4.2 04/03/2021 04:08 AM    CHLORIDE 103 04/03/2021 04:08 AM    CO2 24 04/03/2021 04:08 AM    GLUCOSE 118 (H) 04/03/2021 04:08 AM    BUN 28 (H) 04/03/2021 04:08 AM    CREATININE 1.38 04/03/2021 04:08 AM    CREATININE 1.2 10/03/2008 11:30 AM      Lab Results   Component Value Date/Time    CHOLSTRLTOT 202 (H) 03/30/2021 10:26 PM     (H) 03/30/2021 10:26 PM    HDL 41 03/30/2021 10:26 PM    TRIGLYCERIDE 111 03/30/2021 10:26 PM       Lab Results   Component Value Date/Time    ALKPHOSPHAT 76 04/03/2021 04:08 AM    ASTSGOT 22 04/03/2021 04:08 AM    ALTSGPT 9 04/03/2021 04:08 AM    TBILIRUBIN 0.5 04/03/2021 04:08 AM        Imaging/Testing:    I interpreted and/or reviewed the patient's neuroimaging    CT-CTA HEAD WITH & W/O-POST PROCESS   Final Result      1.  No large vessel occlusion is identified.      2.  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      3.  Atherosclerotic disease. Resulting stenosis is greater than 50% in the small distal right vertebral artery.      4.  No acute intracranial findings.      CT-CTA NECK WITH & W/O-POST PROCESSING   Final Result      1.  Atherosclerotic disease. Resulting stenosis in the carotid arteries is less than 50%.      2.  Resulting stenosis in the small right vertebral artery is greater than 50% distally. Stenosis in the proximal right subclavian artery is consistent with approximately 50% stenosis      EC-ECHOCARDIOGRAM COMPLETE W/O CONT   Final Result      MR-BRAIN-W/O   Final Result      1.  Punctate acute infarcts in the right  cerebellar hemisphere, right occipital cortex and right frontal cortex.   2.  Linear focus of gradient echo signal hypointensity in the left occipital lobe consistent with chronic hypertensive microhemorrhage, remote trauma or infection.   3.  Moderate diffuse cerebral substance loss.   4.  Mild microangiopathic ischemic change versus demyelination or gliosis.          Assessment and Plan:    Cooper Harvey is a 86 y.o. male with past history of benign prostatic hypertrophy and hyperlipidemia who presented to [facility] 3/30/21 as a transfer from OSH with complaint of transient bilateral vision loss over the past several weeks. OSH reported 60% stenosis of the bilateral carotid arteries, thus request was made for transfer to [facility] for further work-up with MRI of the brain as well as vascular surgery consultation. The patient is without neurological deficits. He has no complaints of visual changes since arrival. MRI brain 3/31 revealed acute punctate infarcts in RIGHT cerebellum, RIGHT occipital cortex and RIGHT frontal cortex. He was not considered a candidate for tPA due to unknown timing of infarcts and lack of symptoms. CTA head and neck reveals: Atherosclerotic disease, resulting stenosis in the carotid arteries is less than 50%. Infarcts in multiple vascular territories cannot be explained by a single vessel. The appearance is suspicious for cardioembolic etiology, however diffuse atherosclerosis is also present. Echocardiogram shows EF 70%, no PFO, no wall motion abnormality, normal left atrial size, left atrial volume index is 20. He has remained in a sinus rhythm throughout his admission. We recommend ongoing cardiac monitoring for paroxysmal a fib despite reassuring findings due to cerebral infarcts occurring in multiple territories. He is currently on DAPT and high intensity statin. Hemoglobin A1C is 5.2, at goal. LDL is elevated at 139, goal is <100. At this time he has no  visual deficits, but wiith consideration of his report of recurrent diplopia in the evenings, AChR and MuSK antiboy titers have been ordered to rule out underlying myasthenia gravis. He underwent successful CEA of R common carotid with Dr. Beavers yesterday. Plan to have left CEA in the near future. No further recommendations or further studies from an acute neurological standpoint at this time. Please re-consult if you have further questions or there is a change in status.    Plan:    1. STROKE  - Neurology checks and vital signs per protocol  - Telemetry  - Outpatient Zio patch  - BP goal is normotension  - High intensity statin  - Continue DAPT  - obtain normoglycemia and avoid hypo- or hyper -natremia; aim for normothermia  - evaluate and treat with PT/OT/ST    2. Visual complaints  -AChR and MuSK antibody titers - pending, may take several more days.      The evaluation of the patient, and recommended management, was discussed with attending neurologist, Dr. Benedict Bailon.    Corinne Canavero, APRN  Acute Care Neurohospitalist Service

## 2021-04-03 NOTE — PROGRESS NOTES
Daily Progress Note:     Date of Service: 4/3/2021  Primary Team: [facility] IM Blue Team   Attending: GREGORIO Rich M.D.   Senior Resident: Dr. Miller  Intern: Dr. Blanton  Contact:  [phone]    Patient ID:  [id number] YOM with PMHx of BPH, HLD who presented as a transfer from outside hospital for BL transient vision loss, weakness, concerning for TIA vs stroke. However, pt has had similar episodes over past year (~12) with associated stiffness, unresponsiveness, and occasional drooling, concerning for seizures. Vascular, Neuro consulted.      Chief Complaint:  BL transient vision loss     Interval Update:   NAEON.  POD#1 after CEA yesterday - showed hemorrhagic ulcerated plaque (likely source of acute CVAs). He tolerated procedure well and appears much more oriented this morning. Has 50 mL of blood from J-tube. Evaluated by SLP and PT this morning (OT pending) - both recommended post-acute placement. PM&R consulted - will see on Monday.      Consultants/Specialty:  Neuro, Vascular Surgery    Review of Systems:    Review of Systems   Constitutional: Negative for chills and fever.   HENT: Positive for ear pain. Negative for ear discharge and sore throat.    Eyes: Positive for blurred vision. Negative for double vision.   Respiratory: Negative for cough and shortness of breath.    Cardiovascular: Negative for chest pain and palpitations.   Gastrointestinal: Negative for abdominal pain, constipation, diarrhea, nausea and vomiting.   Genitourinary: Negative for dysuria, frequency and urgency.   Musculoskeletal: Negative for falls, joint pain and myalgias.   Skin: Negative for itching and rash.   Neurological: Positive for dizziness. Negative for focal weakness and headaches.   Psychiatric/Behavioral: Positive for memory loss. The patient is not nervous/anxious.        Objective Data:   Physical Exam:   Vitals:   Temp:  [36.3 °C (97.3 °F)-37.7 °C (99.8 °F)] 36.9 °C (98.5 °F)  Pulse:  [50-68] 65  Resp:  [14-20] 17  BP:  (100-157)/(43-78) 125/60  SpO2:  [91 %-100 %] 93 %    Physical Exam  Vitals and nursing note reviewed.   Constitutional:       General: He is not in acute distress.     Appearance: Normal appearance. He is well-developed.   HENT:      Head: Normocephalic and atraumatic.      Right Ear: External ear normal.      Left Ear: External ear normal.      Nose: Nose normal.      Mouth/Throat:      Mouth: Mucous membranes are dry.      Pharynx: Oropharynx is clear. No oropharyngeal exudate or posterior oropharyngeal erythema.   Eyes:      General: No scleral icterus.     Extraocular Movements: Extraocular movements intact.      Conjunctiva/sclera: Conjunctivae normal.   Neck:      Thyroid: No thyromegaly.      Vascular: No JVD.      Trachea: No tracheal deviation.   Cardiovascular:      Rate and Rhythm: Normal rate and regular rhythm.      Heart sounds: Normal heart sounds. No murmur. No friction rub. No gallop.    Pulmonary:      Effort: Pulmonary effort is normal. No respiratory distress.      Breath sounds: Normal breath sounds. No stridor. No wheezing or rales.   Abdominal:      General: Bowel sounds are normal. There is no distension.      Palpations: Abdomen is soft.      Tenderness: There is no abdominal tenderness.   Musculoskeletal:      Cervical back: Neck supple.      Right lower leg: No edema.      Left lower leg: No edema.   Skin:     General: Skin is warm and dry.      Coloration: Skin is not pale.      Findings: No erythema or rash.   Neurological:      General: No focal deficit present.      Mental Status: He is alert and oriented to person, place, and time.      Sensory: No sensory deficit.      Motor: Weakness (3/5 strength BLE) present. No abnormal muscle tone.      Comments: Speech is slightly broken.  Decreasing hearing BL (chronic)   Psychiatric:         Mood and Affect: Mood normal.         Behavior: Behavior normal.         Labs:   Recent Results (from the past 24 hour(s))   CBC WITH DIFFERENTIAL     Collection Time: 04/03/21  4:08 AM   Result Value Ref Range    WBC 9.8 4.8 - 10.8 K/uL    RBC 4.56 (L) 4.70 - 6.10 M/uL    Hemoglobin 14.2 14.0 - 18.0 g/dL    Hematocrit 43.6 42.0 - 52.0 %    MCV 95.6 81.4 - 97.8 fL    MCH 31.1 27.0 - 33.0 pg    MCHC 32.6 (L) 33.7 - 35.3 g/dL    RDW 46.2 35.9 - 50.0 fL    Platelet Count 204 164 - 446 K/uL    MPV 10.8 9.0 - 12.9 fL    Neutrophils-Polys 79.30 (H) 44.00 - 72.00 %    Lymphocytes 7.40 (L) 22.00 - 41.00 %    Monocytes 11.30 0.00 - 13.40 %    Eosinophils 1.30 0.00 - 6.90 %    Basophils 0.30 0.00 - 1.80 %    Immature Granulocytes 0.40 0.00 - 0.90 %    Nucleated RBC 0.00 /100 WBC    Neutrophils (Absolute) 7.74 (H) 1.82 - 7.42 K/uL    Lymphs (Absolute) 0.72 (L) 1.00 - 4.80 K/uL    Monos (Absolute) 1.10 (H) 0.00 - 0.85 K/uL    Eos (Absolute) 0.13 0.00 - 0.51 K/uL    Baso (Absolute) 0.03 0.00 - 0.12 K/uL    Immature Granulocytes (abs) 0.04 0.00 - 0.11 K/uL    NRBC (Absolute) 0.00 K/uL   Comp Metabolic Panel    Collection Time: 04/03/21  4:08 AM   Result Value Ref Range    Sodium 133 (L) 135 - 145 mmol/L    Potassium 4.2 3.6 - 5.5 mmol/L    Chloride 103 96 - 112 mmol/L    Co2 24 20 - 33 mmol/L    Anion Gap 6.0 (L) 7.0 - 16.0    Glucose 118 (H) 65 - 99 mg/dL    Bun 28 (H) 8 - 22 mg/dL    Creatinine 1.38 0.50 - 1.40 mg/dL    Calcium 8.4 (L) 8.5 - 10.5 mg/dL    AST(SGOT) 22 12 - 45 U/L    ALT(SGPT) 9 2 - 50 U/L    Alkaline Phosphatase 76 30 - 99 U/L    Total Bilirubin 0.5 0.1 - 1.5 mg/dL    Albumin 3.5 3.2 - 4.9 g/dL    Total Protein 6.0 6.0 - 8.2 g/dL    Globulin 2.5 1.9 - 3.5 g/dL    A-G Ratio 1.4 g/dL   ESTIMATED GFR    Collection Time: 04/03/21  4:08 AM   Result Value Ref Range    GFR If  59 (A) >60 mL/min/1.73 m 2    GFR If Non African American 49 (A) >60 mL/min/1.73 m 2       Imaging:   Independant Imaging Review: Completed  CT-CTA HEAD WITH & W/O-POST PROCESS   Final Result      1.  No large vessel occlusion is identified.      2.  3 mm aneurysm at the  bifurcation of the right middle cerebral artery.      3.  Atherosclerotic disease. Resulting stenosis is greater than 50% in the small distal right vertebral artery.      4.  No acute intracranial findings.      CT-CTA NECK WITH & W/O-POST PROCESSING   Final Result      1.  Atherosclerotic disease. Resulting stenosis in the carotid arteries is less than 50%.      2.  Resulting stenosis in the small right vertebral artery is greater than 50% distally. Stenosis in the proximal right subclavian artery is consistent with approximately 50% stenosis      EC-ECHOCARDIOGRAM COMPLETE W/O CONT   Final Result      MR-BRAIN-W/O   Final Result      1.  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   2.  Linear focus of gradient echo signal hypointensity in the left occipital lobe consistent with chronic hypertensive microhemorrhage, remote trauma or infection.   3.  Moderate diffuse cerebral substance loss.   4.  Mild microangiopathic ischemic change versus demyelination or gliosis.          Problem Representation:   * Acute CVA (cerebrovascular accident) (HCC)- (present on admission)  Assessment & Plan  - Presenting with BL transient vision loss with weakness; however, has had multiple episodes over past year with shaking, unresponsiveness, stiffness, and drooling on last episode, concerning for TIA/amaurosis fugax vs stroke vs seizure  - CT head showed diffuse atrophy, EKG and CXR normal  - MRI showed punctate acute infarcts in R cerebellum, R occipital, and R frontal cortex - Plavix added last night    - EEG was normal, Echo with bubble study normal (EF = 70%)  - Neuro considering myasthenia gravis - testing for MuSK Ab and AChR Ab    - Recommended Zio patch on d/c  - Vascular surgery performed CEA on 4/2 - removed hemorrhagic ulcerated plaue  - Continue ASA, Plavix, and Atorvastatin (discussed with Vascular, continue DAPT)  - PT and SLP recommended post-acute  placement, OT and PM&R consult pending  - Daughter would like to be updated (Angela, [phone])    Carotid stenosis, bilateral- (present on admission)  Assessment & Plan  - As per CTA at outside hospital 60% bilaterally - ucnlear at this time whether this is related to his vision changes  - CEA performed on R-side on 4/2 - removed hemorrhagic ulcerated plaque    Altered mental status, unspecified  Assessment & Plan  - Pt became extremely disoriented and combative with staff on 3/31 evening/night    - Was given Seroquel which did not seem to change behavior    - Then placed on soft restraints  - Remains fairly disoriented but soft restraints removed on 4/1 around 3 PM - started on Depakote TID  - Tele sitter + Haldol PRN onboard    Mixed hyperlipidemia- (present on admission)  Assessment & Plan  - Continue Atorvastatin    ANA LAURA (acute kidney injury) (HCC)  Assessment & Plan  - Cr 1.38 on 4/3 from 1.03  - BUN/Cr > 20 - started on IVF    Do not intubate, cardiopulmonary resuscitation (CPR)-only code status- (present on admission)  Assessment & Plan  Discussed CODE STATUS with patient.  He is okay with CPR.  He wishes not to be intubated.    BPH (benign prostatic hyperplasia)- (present on admission)  Assessment & Plan  - Not on any meds, denying any current symptoms

## 2021-04-03 NOTE — THERAPY
Physical Therapy   Initial Evaluation     Patient Name: Cooper Harvey  Age:  86 y.o., Sex:  male  Medical Record #: [id number]  Today's Date: 4/3/2021     Precautions: Fall Risk    Assessment  Patient is 86 y.o. male who was admitted on 3/30/21 due to vision changes. Found to have moderated right carotid stenosis and  CTA showed a 3 mm aneurysm at the 5 bifurcation of the right middle cerebral artery with stenosis and greater than 50% small distal right vertebral artery. Pt underwent right carotid endarterectomy on 4/2/21.  Pt presenting with a decline in mobility from pre-op PT assessment (see below for status and goals).   Recommend continued inpatient PT services while in house. Pt may require post acute PT services depending on progress.    Plan    Recommend Physical Therapy 4 times per week until therapy goals are met for the following treatments:  Bed Mobility, Equipment, Gait Training, Neuro Re-Education / Balance, Self Care/Home Evaluation, Stair Training, Therapeutic Activities and Therapeutic Exercises    DC Equipment Recommendations: Unable to determine at this time  Discharge Recommendations: Recommend post-acute placement for additional physical therapy services prior to discharge home          Objective       04/03/21 0945   Prior Living Situation   Prior Services None   Housing / Facility [address] [facility]   Steps Into Home 5   Steps In Home 0   Rail Right Rail  (Steps in Home)   Equipment Owned None   Lives with - Patient's Self Care Capacity Spouse   Comments resides with spouse in [facility]   Prior Level of Functional Mobility   Bed Mobility Independent   Transfer Status Independent   Ambulation Independent   Stairs Independent   Cognition    Speech/ Communication Delayed Responses, [place]   Level of Consciousness Alert,    Active ROM Lower Body    Active ROM Lower Body  WDL   Strength Lower Body   Lower Body Strength  WDL   Comments no isolated weakness,   Sensation Lower Body   Lower Extremity  Sensation   WDL   Balance Assessment   Sitting Balance (Static) Fair   Sitting Balance (Dynamic) Fair -   Standing Balance (Static) Fair -   Standing Balance (Dynamic) Poor +   Weight Shift Sitting Fair   Weight Shift Standing Fair   Comments heavy reliance on FWW for balance in staning   Gait Analysis   Gait Level Of Assist Minimal Assist   Assistive Device Front Wheel Walker   Distance (Feet) 10   # of Times Distance was Traveled 1   Deviation Shuffled Gait;Decreased Heel Strike;Decreased Toe Off;Bradykinetic  (heavy reliance on FWW for balance. )   # of Stairs Climbed 0   Weight Bearing Status no restrictions.    Bed Mobility    Supine to Sit Minimal Assist   Sit to Supine Minimal Assist   Scooting Minimal Assist   Rolling Minimum Assist to Lt.   Comments cues for sequencing    Functional Mobility   Sit to Stand Minimal Assist   Transfer Method Stand Step   Mobility walk in room then BTB   Comments need cues for walker guidance, cues for posture and balance    Patient / Family Goals    Patient / Family Goal #1 return home    Short Term Goals    Short Term Goal # 1 Pt will perform bed mobility with HOB flat at SPV level by the 6th tx   Short Term Goal # 2 Pt will transfer with LRAD from bed to chair with SPV by the 6th tx   Short Term Goal # 3 Pt will ambulate with LRAD for 150 ft at SPV level by the 6th tx    Short Term Goal # 4 Pt will acsend and descend steps x 5 to enter/exit his home with SPV by the 6th tx.   Education Group   Role of Physical Therapist Patient Response Patient;Acceptance;Explanation;Verbal Demonstration   Use of Assistive Device Patient Response Patient;Acceptance;Explanation;Reinforcement Needed;Demonstration   Problem List    Problems Impaired Bed Mobility;Impaired Transfers;Impaired Ambulation;Functional Strength Deficit;Impaired Balance;Impaired Coordination;Impaired Vision;Decreased Activity Tolerance;Motor Planning / Sequencing

## 2021-04-03 NOTE — THERAPY
Occupational Therapy   Initial Evaluation     Patient Name: Cooper Harvey  Age:  86 y.o., Sex:  male  Medical Record #: [id number]  Today's Date: 4/3/2021     Precautions  Precautions: Fall Risk  Comments: karen MCMAHAN,    Assessment  Patient is 86 y.o. male with a diagnosis of acute CVA now s/p CEA of the R common carotid. Pt demonstrating decreased cognition, balance, generalized strength and endurance post CEA. Pt required cues for sequencing, max a for LB ADLs and min a for UB ADL, increased c/o fatigue post light ADL and eob mobility. Pt will benefit from acute skilled OT services and post acute placement recommended at this time.     Plan    Recommend Occupational Therapy 3 times per week until therapy goals are met for the following treatments:  Adaptive Equipment, Cognitive Skill Development, Manual Therapy Techniques, Neuro Re-Education / Balance, Self Care/Activities of Daily Living, Therapeutic Activities and Therapeutic Exercises.    DC Equipment Recommendations: Unable to determine at this time  Discharge Recommendations: Recommend post-acute placement for additional occupational therapy services prior to discharge home        Objective       04/03/21 0908   Prior Living Situation   Prior Services Home-Independent   Housing / Facility 1 Story House   Steps Into Home 5   Steps In Home 0   Rail Right Rail (Steps into Home)   Bathroom Set up Walk In Shower   Equipment Owned None   Lives with - Patient's Self Care Capacity Spouse   Comments Reports living w/ Spouse in [place], [place].    Prior Level of ADL Function   Self Feeding Independent   Grooming / Hygiene Independent   Bathing Independent   Dressing Independent   Toileting Independent   Prior Level of IADL Function   Medication Management Independent   Laundry Independent   Kitchen Mobility Independent   Finances Independent   Home Management Requires Assist   Shopping Independent   Prior Level Of Mobility Independent Without Device in Community    Driving / Transportation Driving Independent   Occupation (Pre-Hospital Vocational) Retired Due To Age   Cognition    Cognition / Consciousness X   Speech/ Communication Delayed Responses   Orientation Level Not Oriented to Time   Level of Consciousness Alert   Safety Awareness Impaired   Sequencing Impaired   Comments Following 1-step directions, delayed responses and slightly delayed sequencing and safety awareness noted   Coordination Upper Body   Coordination X   Comments slightly delayed d/t delayed responses   Balance Assessment   Sitting Balance (Static) Fair   Sitting Balance (Dynamic) Fair -   Standing Balance (Static) Fair -   Standing Balance (Dynamic) Poor +   Weight Shift Sitting Fair   Weight Shift Standing Fair   Comments w/FWW   Bed Mobility    Supine to Sit Minimal Assist   Sit to Supine Minimal Assist   Scooting Minimal Assist   Rolling Minimum Assist to Lt.   Comments cues for sequencing and increased time   ADL Assessment   Eating Supervision   Grooming Minimal Assist;Seated   Bathing   (discussed home s/u, AE )   Upper Body Dressing Minimal Assist   Lower Body Dressing Maximal Assist   Toileting Maximal Assist  (jones in place)   Functional Mobility   Sit to Stand Minimal Assist   Toilet Transfers Unable to Participate   Mobility bed mobility, short walk near bed with FWW   Comments w/FWW   Short Term Goals   Short Term Goal # 1 Pt will perform LB dressing with min a   Short Term Goal # 2 Pt will perform toileting task w/ min a   Short Term Goal # 3 Pt will perform functional t/f's with min a   Anticipated Discharge Equipment and Recommendations   DC Equipment Recommendations Unable to determine at this time

## 2021-04-04 LAB
ACHR BIND AB SER-SCNC: 0 NMOL/L (ref 0–0.4)
ACHR BLOCK AB/ACHR TOTAL SFR SER: 9 % (ref 0–26)
ALBUMIN SERPL BCP-MCNC: 3.6 G/DL (ref 3.2–4.9)
ALBUMIN/GLOB SERPL: 1.3 G/DL
ALP SERPL-CCNC: 73 U/L (ref 30–99)
ALT SERPL-CCNC: 9 U/L (ref 2–50)
ANION GAP SERPL CALC-SCNC: 9 MMOL/L (ref 7–16)
AST SERPL-CCNC: 20 U/L (ref 12–45)
BASOPHILS # BLD AUTO: 0.3 % (ref 0–1.8)
BASOPHILS # BLD: 0.03 K/UL (ref 0–0.12)
BILIRUB SERPL-MCNC: 0.7 MG/DL (ref 0.1–1.5)
BUN SERPL-MCNC: 21 MG/DL (ref 8–22)
CALCIUM SERPL-MCNC: 8.2 MG/DL (ref 8.5–10.5)
CHLORIDE SERPL-SCNC: 109 MMOL/L (ref 96–112)
CO2 SERPL-SCNC: 23 MMOL/L (ref 20–33)
CREAT SERPL-MCNC: 1.11 MG/DL (ref 0.5–1.4)
EOSINOPHIL # BLD AUTO: 0.12 K/UL (ref 0–0.51)
EOSINOPHIL NFR BLD: 1.2 % (ref 0–6.9)
ERYTHROCYTE [DISTWIDTH] IN BLOOD BY AUTOMATED COUNT: 44.6 FL (ref 35.9–50)
GLOBULIN SER CALC-MCNC: 2.7 G/DL (ref 1.9–3.5)
GLUCOSE SERPL-MCNC: 107 MG/DL (ref 65–99)
HCT VFR BLD AUTO: 41.5 % (ref 42–52)
HGB BLD-MCNC: 13.7 G/DL (ref 14–18)
IMM GRANULOCYTES # BLD AUTO: 0.07 K/UL (ref 0–0.11)
IMM GRANULOCYTES NFR BLD AUTO: 0.7 % (ref 0–0.9)
LYMPHOCYTES # BLD AUTO: 0.55 K/UL (ref 1–4.8)
LYMPHOCYTES NFR BLD: 5.6 % (ref 22–41)
MCH RBC QN AUTO: 30.9 PG (ref 27–33)
MCHC RBC AUTO-ENTMCNC: 33 G/DL (ref 33.7–35.3)
MCV RBC AUTO: 93.7 FL (ref 81.4–97.8)
MONOCYTES # BLD AUTO: 0.86 K/UL (ref 0–0.85)
MONOCYTES NFR BLD AUTO: 8.8 % (ref 0–13.4)
NEUTROPHILS # BLD AUTO: 8.19 K/UL (ref 1.82–7.42)
NEUTROPHILS NFR BLD: 83.4 % (ref 44–72)
NRBC # BLD AUTO: 0 K/UL
NRBC BLD-RTO: 0 /100 WBC
PLATELET # BLD AUTO: 189 K/UL (ref 164–446)
PMV BLD AUTO: 10.6 FL (ref 9–12.9)
POTASSIUM SERPL-SCNC: 3.9 MMOL/L (ref 3.6–5.5)
PROT SERPL-MCNC: 6.3 G/DL (ref 6–8.2)
RBC # BLD AUTO: 4.43 M/UL (ref 4.7–6.1)
SODIUM SERPL-SCNC: 141 MMOL/L (ref 135–145)
WBC # BLD AUTO: 9.8 K/UL (ref 4.8–10.8)

## 2021-04-04 PROCEDURE — 80053 COMPREHEN METABOLIC PANEL: CPT

## 2021-04-04 PROCEDURE — 700102 HCHG RX REV CODE 250 W/ 637 OVERRIDE(OP): Performed by: STUDENT IN AN ORGANIZED HEALTH CARE EDUCATION/TRAINING PROGRAM

## 2021-04-04 PROCEDURE — 85025 COMPLETE CBC W/AUTO DIFF WBC: CPT

## 2021-04-04 PROCEDURE — 36415 COLL VENOUS BLD VENIPUNCTURE: CPT

## 2021-04-04 PROCEDURE — A9270 NON-COVERED ITEM OR SERVICE: HCPCS | Performed by: STUDENT IN AN ORGANIZED HEALTH CARE EDUCATION/TRAINING PROGRAM

## 2021-04-04 PROCEDURE — 700111 HCHG RX REV CODE 636 W/ 250 OVERRIDE (IP): Performed by: STUDENT IN AN ORGANIZED HEALTH CARE EDUCATION/TRAINING PROGRAM

## 2021-04-04 PROCEDURE — 700105 HCHG RX REV CODE 258: Performed by: STUDENT IN AN ORGANIZED HEALTH CARE EDUCATION/TRAINING PROGRAM

## 2021-04-04 PROCEDURE — 99232 SBSQ HOSP IP/OBS MODERATE 35: CPT | Mod: GC | Performed by: HOSPITALIST

## 2021-04-04 PROCEDURE — 770020 HCHG ROOM/CARE - TELE (206)

## 2021-04-04 RX ORDER — AMLODIPINE BESYLATE 5 MG/1
5 TABLET ORAL
Status: DISCONTINUED | OUTPATIENT
Start: 2021-04-04 | End: 2021-04-06

## 2021-04-04 RX ADMIN — DIVALPROEX SODIUM 250 MG: 250 TABLET, DELAYED RELEASE ORAL at 05:19

## 2021-04-04 RX ADMIN — DIVALPROEX SODIUM 250 MG: 250 TABLET, DELAYED RELEASE ORAL at 22:50

## 2021-04-04 RX ADMIN — SODIUM CHLORIDE: 9 INJECTION, SOLUTION INTRAVENOUS at 03:42

## 2021-04-04 RX ADMIN — DIVALPROEX SODIUM 250 MG: 250 TABLET, DELAYED RELEASE ORAL at 13:03

## 2021-04-04 RX ADMIN — LISINOPRIL 10 MG: 10 TABLET ORAL at 05:19

## 2021-04-04 RX ADMIN — ENOXAPARIN SODIUM 40 MG: 40 INJECTION SUBCUTANEOUS at 18:17

## 2021-04-04 RX ADMIN — AMLODIPINE BESYLATE 5 MG: 5 TABLET ORAL at 08:15

## 2021-04-04 RX ADMIN — ATORVASTATIN CALCIUM 80 MG: 80 TABLET, FILM COATED ORAL at 18:17

## 2021-04-04 RX ADMIN — ASPIRIN 81 MG: 81 TABLET, CHEWABLE ORAL at 05:19

## 2021-04-04 ASSESSMENT — ENCOUNTER SYMPTOMS
BLURRED VISION: 1
DIZZINESS: 1
DIARRHEA: 0
SHORTNESS OF BREATH: 0
PALPITATIONS: 0
CHILLS: 0
DOUBLE VISION: 0
COUGH: 0
NAUSEA: 0
ABDOMINAL PAIN: 0
SORE THROAT: 0
VOMITING: 0
NERVOUS/ANXIOUS: 0
CONSTIPATION: 0
HEADACHES: 0
MYALGIAS: 0
FALLS: 0
FEVER: 0
MEMORY LOSS: 1
FOCAL WEAKNESS: 0

## 2021-04-04 NOTE — PROGRESS NOTES
Handoff report received at 1915 from day shift RN. Assumed pt care. Pt not in distress. Pt AOx 4, on RA. Tele box on, rhythm verified. Safety precautions in place. Call light and personal belongings within reach. Educated to call for assistance if needed.

## 2021-04-04 NOTE — CARE PLAN
Problem: Safety  Goal: Will remain free from injury  Outcome: PROGRESSING AS EXPECTED  Fall precautions in place. Educated to use call light for assistance.      Problem: Knowledge Deficit  Goal: Knowledge of disease process/condition, treatment plan, diagnostic tests, and medications will improve  Outcome: PROGRESSING AS EXPECTED  Updated on POC, educated on all medications received

## 2021-04-04 NOTE — PROGRESS NOTES
Right carotid endarterectomy site noted to have swelling 1+. Charge RN notified and in to reassess site together. Pt denies pain. AOx2-3, otherwise q 4 neuro unremarkable.    Rapid RN consulted. Dressing reinforced

## 2021-04-04 NOTE — PROGRESS NOTES
"Daily Progress Note:     Date of Service: 4/4/2021  Primary Team: [facility] Blue Team   Attending: GREGORIO Rich M.D.   Senior Resident: Dr. Miller  Intern: Dr. Blanton  Contact:  [phone]    Patient ID:  86 YOM with PMHx of BPH, HLD who presented as a transfer from outside hospital for BL transient vision loss, weakness, concerning for TIA vs stroke. However, pt has had similar episodes over past year (~12) with associated stiffness, unresponsiveness, and occasional drooling, concerning for seizures. Vascular, Neuro consulted.      Chief Complaint:  BL transient vision loss     Interval Update:   NAEON.  POD#2  after CEA on 4/2 - showed hemorrhagic ulcerated plaque (likely source of acute CVAs). Slight swelling around neck yesterday but no pain associated with it. Continues to be A&O x 2-3 (appears baseline.) However, pt reported to family he had another episode where he \"blanked out.\" Re-assessed around 12 PM, pt appeared around baseline - will defer CT head w/o contrast for now, unless pt's mental status begins to decline. Placement pending.      Consultants/Specialty:  Neuro, Vascular Surgery    Review of Systems:    Review of Systems   Constitutional: Negative for chills and fever.   HENT: Positive for ear pain. Negative for ear discharge and sore throat.    Eyes: Positive for blurred vision. Negative for double vision.   Respiratory: Negative for cough and shortness of breath.    Cardiovascular: Negative for chest pain and palpitations.   Gastrointestinal: Negative for abdominal pain, constipation, diarrhea, nausea and vomiting.   Genitourinary: Negative for dysuria, frequency and urgency.   Musculoskeletal: Negative for falls, joint pain and myalgias.   Skin: Negative for itching and rash.   Neurological: Positive for dizziness. Negative for focal weakness and headaches.   Psychiatric/Behavioral: Positive for memory loss. The patient is not nervous/anxious.        Objective Data:   Physical Exam: "   Vitals:   Temp:  [36.6 °C (97.9 °F)-37.6 °C (99.6 °F)] 37.6 °C (99.6 °F)  Pulse:  [65-82] 82  Resp:  [17-20] 18  BP: (125-178)/(60-84) 156/84  SpO2:  [93 %-95 %] 93 %    Physical Exam  Vitals and nursing note reviewed.   Constitutional:       General: He is not in acute distress.     Appearance: Normal appearance. He is well-developed.   HENT:      Head: Normocephalic and atraumatic.      Right Ear: External ear normal.      Left Ear: External ear normal.      Nose: Nose normal.      Mouth/Throat:      Mouth: Mucous membranes are dry.      Pharynx: Oropharynx is clear. No oropharyngeal exudate or posterior oropharyngeal erythema.   Eyes:      General: No scleral icterus.     Extraocular Movements: Extraocular movements intact.      Conjunctiva/sclera: Conjunctivae normal.   Neck:      Thyroid: No thyromegaly.      Vascular: No JVD.      Trachea: No tracheal deviation.   Cardiovascular:      Rate and Rhythm: Normal rate and regular rhythm.      Heart sounds: Normal heart sounds. No murmur. No friction rub. No gallop.    Pulmonary:      Effort: Pulmonary effort is normal. No respiratory distress.      Breath sounds: Normal breath sounds. No stridor. No wheezing or rales.   Abdominal:      General: Bowel sounds are normal. There is no distension.      Palpations: Abdomen is soft.      Tenderness: There is no abdominal tenderness.   Musculoskeletal:      Cervical back: Neck supple.      Right lower leg: No edema.      Left lower leg: No edema.   Skin:     General: Skin is warm and dry.      Coloration: Skin is not pale.      Findings: No erythema or rash.   Neurological:      General: No focal deficit present.      Mental Status: He is alert. Mental status is at baseline.      Sensory: No sensory deficit.      Motor: Weakness (3/5 strength BLE) present. No abnormal muscle tone.      Comments: A&O x2-3. Speech is slightly broken.  Decreasing hearing BL (chronic)   Psychiatric:         Mood and Affect: Mood normal.          Behavior: Behavior normal.         Labs:   Recent Results (from the past 24 hour(s))   CBC WITH DIFFERENTIAL    Collection Time: 04/04/21  4:06 AM   Result Value Ref Range    WBC 9.8 4.8 - 10.8 K/uL    RBC 4.43 (L) 4.70 - 6.10 M/uL    Hemoglobin 13.7 (L) 14.0 - 18.0 g/dL    Hematocrit 41.5 (L) 42.0 - 52.0 %    MCV 93.7 81.4 - 97.8 fL    MCH 30.9 27.0 - 33.0 pg    MCHC 33.0 (L) 33.7 - 35.3 g/dL    RDW 44.6 35.9 - 50.0 fL    Platelet Count 189 164 - 446 K/uL    MPV 10.6 9.0 - 12.9 fL    Neutrophils-Polys 83.40 (H) 44.00 - 72.00 %    Lymphocytes 5.60 (L) 22.00 - 41.00 %    Monocytes 8.80 0.00 - 13.40 %    Eosinophils 1.20 0.00 - 6.90 %    Basophils 0.30 0.00 - 1.80 %    Immature Granulocytes 0.70 0.00 - 0.90 %    Nucleated RBC 0.00 /100 WBC    Neutrophils (Absolute) 8.19 (H) 1.82 - 7.42 K/uL    Lymphs (Absolute) 0.55 (L) 1.00 - 4.80 K/uL    Monos (Absolute) 0.86 (H) 0.00 - 0.85 K/uL    Eos (Absolute) 0.12 0.00 - 0.51 K/uL    Baso (Absolute) 0.03 0.00 - 0.12 K/uL    Immature Granulocytes (abs) 0.07 0.00 - 0.11 K/uL    NRBC (Absolute) 0.00 K/uL   Comp Metabolic Panel    Collection Time: 04/04/21  4:06 AM   Result Value Ref Range    Sodium 141 135 - 145 mmol/L    Potassium 3.9 3.6 - 5.5 mmol/L    Chloride 109 96 - 112 mmol/L    Co2 23 20 - 33 mmol/L    Anion Gap 9.0 7.0 - 16.0    Glucose 107 (H) 65 - 99 mg/dL    Bun 21 8 - 22 mg/dL    Creatinine 1.11 0.50 - 1.40 mg/dL    Calcium 8.2 (L) 8.5 - 10.5 mg/dL    AST(SGOT) 20 12 - 45 U/L    ALT(SGPT) 9 2 - 50 U/L    Alkaline Phosphatase 73 30 - 99 U/L    Total Bilirubin 0.7 0.1 - 1.5 mg/dL    Albumin 3.6 3.2 - 4.9 g/dL    Total Protein 6.3 6.0 - 8.2 g/dL    Globulin 2.7 1.9 - 3.5 g/dL    A-G Ratio 1.3 g/dL   ESTIMATED GFR    Collection Time: 04/04/21  4:06 AM   Result Value Ref Range    GFR If African American >60 >60 mL/min/1.73 m 2    GFR If Non African American >60 >60 mL/min/1.73 m 2       Imaging:   Independant Imaging Review: Completed  CT-CTA HEAD WITH & W/O-POST  PROCESS   Final Result      1.  No large vessel occlusion is identified.      2.  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      3.  Atherosclerotic disease. Resulting stenosis is greater than 50% in the small distal right vertebral artery.      4.  No acute intracranial findings.      CT-CTA NECK WITH & W/O-POST PROCESSING   Final Result      1.  Atherosclerotic disease. Resulting stenosis in the carotid arteries is less than 50%.      2.  Resulting stenosis in the small right vertebral artery is greater than 50% distally. Stenosis in the proximal right subclavian artery is consistent with approximately 50% stenosis      EC-ECHOCARDIOGRAM COMPLETE W/O CONT   Final Result      MR-BRAIN-W/O   Final Result      1.  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   2.  Linear focus of gradient echo signal hypointensity in the left occipital lobe consistent with chronic hypertensive microhemorrhage, remote trauma or infection.   3.  Moderate diffuse cerebral substance loss.   4.  Mild microangiopathic ischemic change versus demyelination or gliosis.          Problem Representation:   * Acute CVA (cerebrovascular accident) (HCC)- (present on admission)  Assessment & Plan  - Presenting with BL transient vision loss with weakness; however, has had multiple episodes over past year with shaking, unresponsiveness, stiffness, and drooling on last episode, concerning for TIA/amaurosis fugax vs stroke vs seizure  - CT head showed diffuse atrophy, EKG and CXR normal  - MRI showed punctate acute infarcts in R cerebellum, R occipital, and R frontal cortex - Plavix added last night    - EEG was normal, Echo with bubble study normal (EF = 70%)  - Neuro considering myasthenia gravis - testing for MuSK Ab and AChR Ab    - Recommended Zio patch on d/c  - Vascular surgery performed CEA on 4/2 - removed hemorrhagic ulcerated plaue  - Continue ASA and  Atorvastatin (discontinued Plavix, per Vascular)  - PT, OT, SLP recommended post-acute placement, PM&R consult pending Monday  - Daughter would like to be updated (Angela, [phone])    Carotid stenosis, bilateral- (present on admission)  Assessment & Plan  - As per CTA at outside hospital 60% bilaterally - ucnlear at this time whether this is related to his vision changes  - CEA performed on R-side on 4/2 - removed hemorrhagic ulcerated plaque    Altered mental status, unspecified  Assessment & Plan  - Was disoriented on 3/31, required soft restraints but removed on 4/1  - Currently on Depakote TID  - 4/4: Had another episode on 4/3 night of agitation and confusion, may be related to sundowning  - Has Haldol PRN + Tele sitter    Mixed hyperlipidemia- (present on admission)  Assessment & Plan  - Continue Atorvastatin    ANA LAURA (acute kidney injury) (HCC)  Assessment & Plan  - Cr 1.38 on 4/3 from 1.03  - Cr 1.11 on 4/4  - ANA LAURA resolved, IVF d/c    Do not intubate, cardiopulmonary resuscitation (CPR)-only code status- (present on admission)  Assessment & Plan  Discussed CODE STATUS with patient.  He is okay with CPR.  He wishes not to be intubated.    BPH (benign prostatic hyperplasia)- (present on admission)  Assessment & Plan  - Not on any meds, denying any current symptoms

## 2021-04-05 LAB
ALBUMIN SERPL BCP-MCNC: 3.6 G/DL (ref 3.2–4.9)
ALBUMIN/GLOB SERPL: 1.3 G/DL
ALP SERPL-CCNC: 71 U/L (ref 30–99)
ALT SERPL-CCNC: 15 U/L (ref 2–50)
ANION GAP SERPL CALC-SCNC: 10 MMOL/L (ref 7–16)
AST SERPL-CCNC: 31 U/L (ref 12–45)
BASOPHILS # BLD AUTO: 0.3 % (ref 0–1.8)
BASOPHILS # BLD: 0.03 K/UL (ref 0–0.12)
BILIRUB SERPL-MCNC: 0.5 MG/DL (ref 0.1–1.5)
BUN SERPL-MCNC: 21 MG/DL (ref 8–22)
CALCIUM SERPL-MCNC: 8.2 MG/DL (ref 8.5–10.5)
CHLORIDE SERPL-SCNC: 106 MMOL/L (ref 96–112)
CO2 SERPL-SCNC: 22 MMOL/L (ref 20–33)
CREAT SERPL-MCNC: 0.78 MG/DL (ref 0.5–1.4)
EOSINOPHIL # BLD AUTO: 0.24 K/UL (ref 0–0.51)
EOSINOPHIL NFR BLD: 2.6 % (ref 0–6.9)
ERYTHROCYTE [DISTWIDTH] IN BLOOD BY AUTOMATED COUNT: 43.8 FL (ref 35.9–50)
GLOBULIN SER CALC-MCNC: 2.7 G/DL (ref 1.9–3.5)
GLUCOSE SERPL-MCNC: 91 MG/DL (ref 65–99)
HCT VFR BLD AUTO: 40.5 % (ref 42–52)
HGB BLD-MCNC: 13.6 G/DL (ref 14–18)
IMM GRANULOCYTES # BLD AUTO: 0.06 K/UL (ref 0–0.11)
IMM GRANULOCYTES NFR BLD AUTO: 0.6 % (ref 0–0.9)
LYMPHOCYTES # BLD AUTO: 0.52 K/UL (ref 1–4.8)
LYMPHOCYTES NFR BLD: 5.6 % (ref 22–41)
MAGNESIUM SERPL-MCNC: 2.1 MG/DL (ref 1.5–2.5)
MCH RBC QN AUTO: 31.2 PG (ref 27–33)
MCHC RBC AUTO-ENTMCNC: 33.6 G/DL (ref 33.7–35.3)
MCV RBC AUTO: 92.9 FL (ref 81.4–97.8)
MONOCYTES # BLD AUTO: 0.87 K/UL (ref 0–0.85)
MONOCYTES NFR BLD AUTO: 9.3 % (ref 0–13.4)
NEUTROPHILS # BLD AUTO: 7.62 K/UL (ref 1.82–7.42)
NEUTROPHILS NFR BLD: 81.6 % (ref 44–72)
NRBC # BLD AUTO: 0 K/UL
NRBC BLD-RTO: 0 /100 WBC
PLATELET # BLD AUTO: 189 K/UL (ref 164–446)
PMV BLD AUTO: 10.3 FL (ref 9–12.9)
POTASSIUM SERPL-SCNC: 3.7 MMOL/L (ref 3.6–5.5)
PROT SERPL-MCNC: 6.3 G/DL (ref 6–8.2)
RBC # BLD AUTO: 4.36 M/UL (ref 4.7–6.1)
SODIUM SERPL-SCNC: 138 MMOL/L (ref 135–145)
WBC # BLD AUTO: 9.3 K/UL (ref 4.8–10.8)

## 2021-04-05 PROCEDURE — 700102 HCHG RX REV CODE 250 W/ 637 OVERRIDE(OP): Performed by: STUDENT IN AN ORGANIZED HEALTH CARE EDUCATION/TRAINING PROGRAM

## 2021-04-05 PROCEDURE — 99232 SBSQ HOSP IP/OBS MODERATE 35: CPT | Mod: GC | Performed by: HOSPITALIST

## 2021-04-05 PROCEDURE — A9270 NON-COVERED ITEM OR SERVICE: HCPCS | Performed by: STUDENT IN AN ORGANIZED HEALTH CARE EDUCATION/TRAINING PROGRAM

## 2021-04-05 PROCEDURE — 700111 HCHG RX REV CODE 636 W/ 250 OVERRIDE (IP): Performed by: STUDENT IN AN ORGANIZED HEALTH CARE EDUCATION/TRAINING PROGRAM

## 2021-04-05 PROCEDURE — 99222 1ST HOSP IP/OBS MODERATE 55: CPT | Performed by: PHYSICAL MEDICINE & REHABILITATION

## 2021-04-05 PROCEDURE — 83735 ASSAY OF MAGNESIUM: CPT

## 2021-04-05 PROCEDURE — 36415 COLL VENOUS BLD VENIPUNCTURE: CPT

## 2021-04-05 PROCEDURE — 85025 COMPLETE CBC W/AUTO DIFF WBC: CPT

## 2021-04-05 PROCEDURE — 770020 HCHG ROOM/CARE - TELE (206)

## 2021-04-05 PROCEDURE — 80053 COMPREHEN METABOLIC PANEL: CPT

## 2021-04-05 PROCEDURE — 51798 US URINE CAPACITY MEASURE: CPT

## 2021-04-05 RX ORDER — LISINOPRIL 20 MG/1
20 TABLET ORAL
Status: DISCONTINUED | OUTPATIENT
Start: 2021-04-06 | End: 2021-04-06 | Stop reason: HOSPADM

## 2021-04-05 RX ORDER — LISINOPRIL 10 MG/1
10 TABLET ORAL ONCE
Status: COMPLETED | OUTPATIENT
Start: 2021-04-05 | End: 2021-04-05

## 2021-04-05 RX ORDER — POTASSIUM CHLORIDE 20 MEQ/1
20 TABLET, EXTENDED RELEASE ORAL ONCE
Status: COMPLETED | OUTPATIENT
Start: 2021-04-05 | End: 2021-04-05

## 2021-04-05 RX ADMIN — ATORVASTATIN CALCIUM 80 MG: 80 TABLET, FILM COATED ORAL at 16:52

## 2021-04-05 RX ADMIN — DIVALPROEX SODIUM 250 MG: 250 TABLET, DELAYED RELEASE ORAL at 22:00

## 2021-04-05 RX ADMIN — POTASSIUM CHLORIDE 20 MEQ: 1500 TABLET, EXTENDED RELEASE ORAL at 08:32

## 2021-04-05 RX ADMIN — AMLODIPINE BESYLATE 5 MG: 5 TABLET ORAL at 05:29

## 2021-04-05 RX ADMIN — ENOXAPARIN SODIUM 40 MG: 40 INJECTION SUBCUTANEOUS at 16:52

## 2021-04-05 RX ADMIN — DIVALPROEX SODIUM 250 MG: 250 TABLET, DELAYED RELEASE ORAL at 14:57

## 2021-04-05 RX ADMIN — ASPIRIN 81 MG: 81 TABLET, CHEWABLE ORAL at 05:29

## 2021-04-05 RX ADMIN — LISINOPRIL 10 MG: 10 TABLET ORAL at 05:29

## 2021-04-05 RX ADMIN — LISINOPRIL 10 MG: 10 TABLET ORAL at 08:32

## 2021-04-05 RX ADMIN — DIVALPROEX SODIUM 250 MG: 250 TABLET, DELAYED RELEASE ORAL at 05:30

## 2021-04-05 ASSESSMENT — ENCOUNTER SYMPTOMS
DIZZINESS: 1
MYALGIAS: 0
MEMORY LOSS: 1
CONSTIPATION: 0
SHORTNESS OF BREATH: 0
CHILLS: 0
FALLS: 0
BLURRED VISION: 1
ABDOMINAL PAIN: 0
PALPITATIONS: 0
DIARRHEA: 0
VOMITING: 0
DOUBLE VISION: 0
NAUSEA: 0
NERVOUS/ANXIOUS: 0
COUGH: 0
FOCAL WEAKNESS: 0
SORE THROAT: 0
FEVER: 0
HEADACHES: 0

## 2021-04-05 NOTE — PROGRESS NOTES
Daily Progress Note:     Date of Service: 4/5/2021  Primary Team: [facility] Blue Team   Attending: GREGORIO Rich M.D.   Senior Resident: Dr. Miller  Intern: Dr. Reddy  Contact:  [phone]       Chief Complaint:  BL transient vision loss     Interval Update:   She denies any acute complaints overnight.  Denies any chest pain, palpitation, nausea, vomiting, diarrhea, or constipation at this time.  Patient says he is able to eat his diet without any difficulty. No other complaints at this time.    Consultants/Specialty:  Neuro, Vascular Surgery    Review of Systems:    Review of Systems   Constitutional: Negative for chills and fever.   HENT: Positive for ear pain. Negative for ear discharge and sore throat.    Eyes: Positive for blurred vision. Negative for double vision.   Respiratory: Negative for cough and shortness of breath.    Cardiovascular: Negative for chest pain and palpitations.   Gastrointestinal: Negative for abdominal pain, constipation, diarrhea, nausea and vomiting.   Genitourinary: Negative for dysuria, frequency and urgency.   Musculoskeletal: Negative for falls, joint pain and myalgias.   Skin: Negative for itching and rash.   Neurological: Positive for dizziness. Negative for focal weakness and headaches.   Psychiatric/Behavioral: Positive for memory loss. The patient is not nervous/anxious.        Objective Data:   Physical Exam:   Vitals:   Temp:  [36.6 °C (97.9 °F)-36.9 °C (98.5 °F)] 36.9 °C (98.5 °F)  Pulse:  [70-87] 70  Resp:  [18-20] 18  BP: (114-168)/(60-85) 158/70  SpO2:  [93 %-96 %] 96 %    Physical Exam  Vitals and nursing note reviewed.   Constitutional:       General: He is not in acute distress.     Appearance: Normal appearance. He is well-developed.   HENT:      Head: Normocephalic and atraumatic.      Right Ear: External ear normal.      Left Ear: External ear normal.      Nose: Nose normal.      Mouth/Throat:      Mouth: Mucous membranes are moist.      Pharynx: Oropharynx is  clear. No oropharyngeal exudate or posterior oropharyngeal erythema.   Eyes:      General: No scleral icterus.     Extraocular Movements: Extraocular movements intact.      Conjunctiva/sclera: Conjunctivae normal.   Neck:      Thyroid: No thyromegaly.      Vascular: No JVD.      Trachea: No tracheal deviation.   Cardiovascular:      Rate and Rhythm: Normal rate and regular rhythm.      Heart sounds: Normal heart sounds. No murmur. No friction rub. No gallop.    Pulmonary:      Effort: Pulmonary effort is normal. No respiratory distress.      Breath sounds: Normal breath sounds. No stridor. No wheezing or rales.   Abdominal:      General: Bowel sounds are normal. There is no distension.      Palpations: Abdomen is soft.      Tenderness: There is no abdominal tenderness.   Musculoskeletal:      Right lower leg: No edema.      Left lower leg: No edema.   Skin:     General: Skin is warm and dry.      Coloration: Skin is not pale.      Findings: No erythema or rash.   Neurological:      General: No focal deficit present.      Mental Status: He is alert. Mental status is at baseline.      Sensory: No sensory deficit.      Motor: Weakness (3/5 strength BLE) present. No abnormal muscle tone.      Comments: A&O x2-3. Speech is slightly broken.  Decreasing hearing BL (chronic)   Psychiatric:         Mood and Affect: Mood normal.         Behavior: Behavior normal.         Labs:   Recent Results (from the past 24 hour(s))   CBC WITH DIFFERENTIAL    Collection Time: 04/05/21  3:12 AM   Result Value Ref Range    WBC 9.3 4.8 - 10.8 K/uL    RBC 4.36 (L) 4.70 - 6.10 M/uL    Hemoglobin 13.6 (L) 14.0 - 18.0 g/dL    Hematocrit 40.5 (L) 42.0 - 52.0 %    MCV 92.9 81.4 - 97.8 fL    MCH 31.2 27.0 - 33.0 pg    MCHC 33.6 (L) 33.7 - 35.3 g/dL    RDW 43.8 35.9 - 50.0 fL    Platelet Count 189 164 - 446 K/uL    MPV 10.3 9.0 - 12.9 fL    Neutrophils-Polys 81.60 (H) 44.00 - 72.00 %    Lymphocytes 5.60 (L) 22.00 - 41.00 %    Monocytes 9.30 0.00 -  13.40 %    Eosinophils 2.60 0.00 - 6.90 %    Basophils 0.30 0.00 - 1.80 %    Immature Granulocytes 0.60 0.00 - 0.90 %    Nucleated RBC 0.00 /100 WBC    Neutrophils (Absolute) 7.62 (H) 1.82 - 7.42 K/uL    Lymphs (Absolute) 0.52 (L) 1.00 - 4.80 K/uL    Monos (Absolute) 0.87 (H) 0.00 - 0.85 K/uL    Eos (Absolute) 0.24 0.00 - 0.51 K/uL    Baso (Absolute) 0.03 0.00 - 0.12 K/uL    Immature Granulocytes (abs) 0.06 0.00 - 0.11 K/uL    NRBC (Absolute) 0.00 K/uL   Comp Metabolic Panel    Collection Time: 04/05/21  3:12 AM   Result Value Ref Range    Sodium 138 135 - 145 mmol/L    Potassium 3.7 3.6 - 5.5 mmol/L    Chloride 106 96 - 112 mmol/L    Co2 22 20 - 33 mmol/L    Anion Gap 10.0 7.0 - 16.0    Glucose 91 65 - 99 mg/dL    Bun 21 8 - 22 mg/dL    Creatinine 0.78 0.50 - 1.40 mg/dL    Calcium 8.2 (L) 8.5 - 10.5 mg/dL    AST(SGOT) 31 12 - 45 U/L    ALT(SGPT) 15 2 - 50 U/L    Alkaline Phosphatase 71 30 - 99 U/L    Total Bilirubin 0.5 0.1 - 1.5 mg/dL    Albumin 3.6 3.2 - 4.9 g/dL    Total Protein 6.3 6.0 - 8.2 g/dL    Globulin 2.7 1.9 - 3.5 g/dL    A-G Ratio 1.3 g/dL   ESTIMATED GFR    Collection Time: 04/05/21  3:12 AM   Result Value Ref Range    GFR If African American >60 >60 mL/min/1.73 m 2    GFR If Non African American >60 >60 mL/min/1.73 m 2   MAGNESIUM    Collection Time: 04/05/21  3:12 AM   Result Value Ref Range    Magnesium 2.1 1.5 - 2.5 mg/dL       Imaging:   Independant Imaging Review: Completed  CT-CTA HEAD WITH & W/O-POST PROCESS   Final Result      1.  No large vessel occlusion is identified.      2.  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      3.  Atherosclerotic disease. Resulting stenosis is greater than 50% in the small distal right vertebral artery.      4.  No acute intracranial findings.      CT-CTA NECK WITH & W/O-POST PROCESSING   Final Result      1.  Atherosclerotic disease. Resulting stenosis in the carotid arteries is less than 50%.      2.  Resulting stenosis in the  small right vertebral artery is greater than 50% distally. Stenosis in the proximal right subclavian artery is consistent with approximately 50% stenosis      EC-ECHOCARDIOGRAM COMPLETE W/O CONT   Final Result      MR-BRAIN-W/O   Final Result      1.  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   2.  Linear focus of gradient echo signal hypointensity in the left occipital lobe consistent with chronic hypertensive microhemorrhage, remote trauma or infection.   3.  Moderate diffuse cerebral substance loss.   4.  Mild microangiopathic ischemic change versus demyelination or gliosis.          Problem Representation:   * Acute CVA (cerebrovascular accident) (HCC)- (present on admission)  Assessment & Plan  - Presenting with BL transient vision loss with weakness; however, has had multiple episodes over past year with shaking, unresponsiveness, stiffness, and drooling on last episode, concerning for TIA/amaurosis fugax vs stroke vs seizure  - CT head showed diffuse atrophy, EKG and CXR normal  - MRI showed punctate acute infarcts in R cerebellum, R occipital, and R frontal cortex - Plavix added last night  - EEG was normal, Echo with bubble study normal (EF = 70%)  - Neuro considering myasthenia gravis - testing for MuSK Ab and AChR Ab  - Recommended Zio patch on d/c  - Vascular surgery performed CEA on 4/2 - removed hemorrhagic ulcerated plaue  - Continue ASA and Atorvastatin (discontinued Plavix, per Vascular)  - PT, OT, SLP recommended post-acute placement, PM&R consult pending.  - Daughter would like to be updated (Angela, [phone])    Carotid stenosis, bilateral- (present on admission)  Assessment & Plan  - As per CTA at outside hospital 60% bilaterally - ucnlear at this time whether this is related to his vision changes  - CEA performed on R-side on 4/2 - removed hemorrhagic ulcerated plaque    Altered mental status, unspecified  Assessment &  Plan  - Was disoriented on 3/31, required soft restraints but removed on 4/1  - Currently on Depakote TID  - 4/4: Had another episode on 4/3 night of agitation and confusion, may be related to sundowning  - Has Haldol PRN + Tele sitter    Mixed hyperlipidemia- (present on admission)  Assessment & Plan  - Continue Atorvastatin    ANA LAURA (acute kidney injury) (HCC)  Assessment & Plan  - Cr 1.38 on 4/3 from 1.03  - Cr 1.11 on 4/4  - ANA LAURA resolved, IVF d/c    Do not intubate, cardiopulmonary resuscitation (CPR)-only code status- (present on admission)  Assessment & Plan  Discussed CODE STATUS with patient.  He is okay with CPR.  He wishes not to be intubated.    BPH (benign prostatic hyperplasia)- (present on admission)  Assessment & Plan  - Not on any meds, denying any current symptoms

## 2021-04-05 NOTE — CARE PLAN
Problem: Safety  Goal: Will remain free from falls  Outcome: PROGRESSING AS EXPECTED  Bed alarm on. Telesitter in place. Bed locked and in lowest position. Treaded socks on. Educated to call for assistance, call light within reach     Problem: Skin Integrity  Goal: Risk for impaired skin integrity will decrease  Outcome: PROGRESSING AS EXPECTED  Waffle placed. Pillows for repositioning

## 2021-04-05 NOTE — CONSULTS
Physical Medicine and Rehabilitation Consultation         Initial Consult      Initial Consultation Date: 4/5/2021  Consulting provider: Dr. Blanton  Reason for consultation: assess for acute inpatient rehab appropriateness  LOS: 5 Day(s)      Chief complaint: stroke      HPI:   The patient is a 86 y.o. male with a past medical history of BPH, HL;  who presented on 3/30/2021  9:43 PM as transfer from [facility] with bilateral vision loss and syncopal episode 2 days prior to admission.  CTA head/neck at outside facility showed 60% stenosis of bilateral carotid arteries, transferred to [facility] for additional work-up and vascular surgery consultation.  Did have an episode of agitation on 4/1, but returned to baseline by 4/2.  S/p right CEA with Dr. Beavers on 4/2.     The patient currently reports that he knew that he had a stroke, but difficult to communicate due to the hard of hearing or due to cognitive impairment.  He states that his wife was here 5 minutes ago but just left.  Denies any focal weakness.  Does have intermittent right hand/forearm numbness and swelling.  No significant pain.  No fever, chills, nausea, vomiting.      ROS:  Pertinent positives are listed in HPI, all other systems reviewed and are negative        Social Hx:  Pre-morbidly, this patient lived in:   1 story home  With 5 steps to enter  Lives with spouse      Current level of function:   SLP 4/3: Mild cognitive deficits  PT 4/3: Min assist, 10 feet; min assist bed mobility and transfers  OT 4/3: Min assist bed mobility; max assist ADLs, min assist transfers        PMH:  Past Medical History:   Diagnosis Date   • [place] (hard of hearing) 04/02/2021   • Hydrocele, unspecified    • Hypertrophy of prostate without urinary obstruction and other lower urinary tract symptoms (LUTS)    • Mixed hyperlipidemia          PSH:  Past Surgical History:   Procedure Laterality Date   • PB THROMBOENDARTECTMY NECK,NECK INCIS Right 4/2/2021    Procedure:  "ENDARTERECTOMY, CAROTID;  Surgeon: Willie Beavers M.D.;  Location: SURGERY [facility];  Service: Vascular   • PLASTIC SURGERY  February 2009    large skin cancer removed from the top of head with skin graft--donor site left anterior thigh   • HEMORRHOIDECTOMY  2004   • EYE SURGERY  6/05 ; 7/05    Bilateral Cataracts         FHX: Reviewed.  Family History   Problem Relation Age of Onset   • Cancer Mother                  Medications:  Current Facility-Administered Medications   Medication Dose   • [START ON 4/6/2021] lisinopril (PRINIVIL) tablet 20 mg  20 mg   • amLODIPine (NORVASC) tablet 5 mg  5 mg   • haloperidol lactate (HALDOL) injection 2 mg  2 mg   • divalproex (DEPAKOTE) delayed-release tablet 250 mg  250 mg   • labetalol (NORMODYNE/TRANDATE) injection 10 mg  10 mg   • aspirin (ASA) chewable tab 81 mg  81 mg   • atorvastatin (LIPITOR) tablet 80 mg  80 mg   • acetaminophen (Tylenol) tablet 650 mg  650 mg   • senna-docusate (PERICOLACE or SENOKOT S) 8.6-50 MG per tablet 2 tablet  2 tablet    And   • polyethylene glycol/lytes (MIRALAX) PACKET 1 Packet  1 Packet    And   • magnesium hydroxide (MILK OF MAGNESIA) suspension 30 mL  30 mL    And   • bisacodyl (DULCOLAX) suppository 10 mg  10 mg   • enoxaparin (LOVENOX) inj 40 mg  40 mg       Allergies:  Allergies   Allergen Reactions   • Vicodin [Hydrocodone-Acetaminophen] Rash     rash         Vitals: /70   Pulse 70   Temp 36.9 °C (98.5 °F) (Temporal)   Resp 18   Ht 1.753 m (5' 9\")   Wt 75.1 kg (165 lb 9.1 oz)   SpO2 96%         Physical Exam:  Gen: very pleasant  Head: no visible head lesions or abrasion   Eyes/ Nose/ Mouth: moist mucous membranes  Cardio: Well perfused extremities  Pulm: on room air, with normal respiratory effort  Abd: Soft NTND  Skin: warm/dry skin  Ext: No atrophy of muscles  Psych: Difficulty answering questions due to severe hearing impairment; blunted affect    Neuro:   -Speech: fluent, no aphasia or " dysarthria    Motor:  -4/5 with all 4 limbs    Sensory:   -Numbness to right hand/forearm             Labs: Reviewed and significant for 4/5, Hgb 13.6, Na 138, K3.7, Cr 0.78  Recent Labs     04/03/21 0408 04/04/21 0406 04/05/21  0312   RBC 4.56* 4.43* 4.36*   HEMOGLOBIN 14.2 13.7* 13.6*   HEMATOCRIT 43.6 41.5* 40.5*   PLATELETCT 204 189 189     Recent Labs     04/03/21 0408 04/04/21 0406 04/05/21  0312   SODIUM 133* 141 138   POTASSIUM 4.2 3.9 3.7   CHLORIDE 103 109 106   CO2 24 23 22   GLUCOSE 118* 107* 91   BUN 28* 21 21   CREATININE 1.38 1.11 0.78   CALCIUM 8.4* 8.2* 8.2*     Recent Results (from the past 24 hour(s))   CBC WITH DIFFERENTIAL    Collection Time: 04/05/21  3:12 AM   Result Value Ref Range    WBC 9.3 4.8 - 10.8 K/uL    RBC 4.36 (L) 4.70 - 6.10 M/uL    Hemoglobin 13.6 (L) 14.0 - 18.0 g/dL    Hematocrit 40.5 (L) 42.0 - 52.0 %    MCV 92.9 81.4 - 97.8 fL    MCH 31.2 27.0 - 33.0 pg    MCHC 33.6 (L) 33.7 - 35.3 g/dL    RDW 43.8 35.9 - 50.0 fL    Platelet Count 189 164 - 446 K/uL    MPV 10.3 9.0 - 12.9 fL    Neutrophils-Polys 81.60 (H) 44.00 - 72.00 %    Lymphocytes 5.60 (L) 22.00 - 41.00 %    Monocytes 9.30 0.00 - 13.40 %    Eosinophils 2.60 0.00 - 6.90 %    Basophils 0.30 0.00 - 1.80 %    Immature Granulocytes 0.60 0.00 - 0.90 %    Nucleated RBC 0.00 /100 WBC    Neutrophils (Absolute) 7.62 (H) 1.82 - 7.42 K/uL    Lymphs (Absolute) 0.52 (L) 1.00 - 4.80 K/uL    Monos (Absolute) 0.87 (H) 0.00 - 0.85 K/uL    Eos (Absolute) 0.24 0.00 - 0.51 K/uL    Baso (Absolute) 0.03 0.00 - 0.12 K/uL    Immature Granulocytes (abs) 0.06 0.00 - 0.11 K/uL    NRBC (Absolute) 0.00 K/uL   Comp Metabolic Panel    Collection Time: 04/05/21  3:12 AM   Result Value Ref Range    Sodium 138 135 - 145 mmol/L    Potassium 3.7 3.6 - 5.5 mmol/L    Chloride 106 96 - 112 mmol/L    Co2 22 20 - 33 mmol/L    Anion Gap 10.0 7.0 - 16.0    Glucose 91 65 - 99 mg/dL    Bun 21 8 - 22 mg/dL    Creatinine 0.78 0.50 - 1.40 mg/dL    Calcium 8.2 (L)  8.5 - 10.5 mg/dL    AST(SGOT) 31 12 - 45 U/L    ALT(SGPT) 15 2 - 50 U/L    Alkaline Phosphatase 71 30 - 99 U/L    Total Bilirubin 0.5 0.1 - 1.5 mg/dL    Albumin 3.6 3.2 - 4.9 g/dL    Total Protein 6.3 6.0 - 8.2 g/dL    Globulin 2.7 1.9 - 3.5 g/dL    A-G Ratio 1.3 g/dL   ESTIMATED GFR    Collection Time: 04/05/21  3:12 AM   Result Value Ref Range    GFR If African American >60 >60 mL/min/1.73 m 2    GFR If Non African American >60 >60 mL/min/1.73 m 2   MAGNESIUM    Collection Time: 04/05/21  3:12 AM   Result Value Ref Range    Magnesium 2.1 1.5 - 2.5 mg/dL           Imaging:  MR-BRAIN-W/O  Result Date: 3/31/2021  3/31/2021 4:21 PM HISTORY/REASON FOR EXAM:  Altered mental status. Bilateral vision loss. TECHNIQUE/EXAM DESCRIPTION AND NUMBER OF VIEWS:  MRI of the brain without contrast. The study was performed on a [employer] Signa 1.5 Lotus MRI scanner. Spoiled-GRASS sagittal, thin-section T2 fast spin-echo axial, T1 coronal, and FLAIR coronal images were obtained of the whole brain. FINDINGS:  The calvariae are normal. There are no extra-axial fluid collections. There is a pattern of moderate cerebral atrophy manifest as prominence of sulcal markings over the convexities and vertex along with moderate ventriculomegaly. There are several punctate foci of diffusion restriction noted in the right cerebellar hemisphere, right occipital cortex and right frontal cortex consistent with acute infarcts. There is a linear focus of gradient echo signal hypointensity in the left occipital lobe consistent with chronic hemosiderin deposition which may related to prior hypertensive microhemorrhage, trauma or infection. There is a pattern of mild supratentorial white matter disease with scattered foci of bright T2 and FLAIR signal in the subcortical and deep white matter of both hemispheres consistent with small vessel ischemic change versus demyelination or gliosis. There is no mass effect or midline shift. There are no hemorrhagic  lesions. The brainstem and posterior fossa structures are unremarkable. Vascular flow voids in the carotid and vertebrobasilar arteries, [place], and dural venous sinuses are intact. The patient is status post cataract repair. The visualized paranasal sinuses and mastoid air cells appear clear.     1.  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 2.  Linear focus of gradient echo signal hypointensity in the left occipital lobe consistent with chronic hypertensive microhemorrhage, remote trauma or infection. 3.  Moderate diffuse cerebral substance loss. 4.  Mild microangiopathic ischemic change versus demyelination or gliosis.              ASSESSMENT:  Patient is a 86 y.o. male admitted with right CVA causing vision impairments, not a candidate for TPA due to timing.  MRI brain 3/31 showed acute punctate infarcts in right cerebellum, right occipital cortex, and right frontal cortex.  CTA head/neck showed no significant stenosis.  Concerning for cardioembolic etiology given multiple location.  Echo EF 70%, no PFO.  Right common carotid CEA on 4/2 with Dr. Beavers, plan for left CEA in the future.     # Rehabilitation: Impaired ADLs and mobility  -Vitals: hypertension, RA  -Insurance: Medicare/AARP  -Discharge support: Spouse  -Rehab Impairment Code: 0001.4 - Stroke: No Paresis  -Reason for admission: Acute CVA (cerebrovascular accident) (HCC)  -When medically cleared and discharge support verified, recommend inpatient rehab      #Neuro: right CVA causing vision impairments, not a candidate for TPA.  MRI brain 3/31 showed acute punctate infarcts in right cerebellum, right occipital cortex, and right frontal cortex.  CTA head/neck showed no significant stenosis.  Concerning for cardioembolic etiology given multiple location.  Echo EF 70%, no PFO.  Right common carotid CEA on 4/2 with Dr. Beavers  -DAPT   -plan for left CEA in the future.    -Outpatient Zio patch  -AChR and MuSK antibody titers from 3/31 were negative  -Depakote 250 mg every 8 hours for temporary agitation, can likely be weaned    #CV: HL, HTN  -amlodipine, ASA, atorvastatin, lisinopril    #Renal: ANA LAURA, improved    #Bladder: BPH   -Voiding    #Bowel: 3x on 4/4    #DVT PPX:  -Lovenox        Discussed with pt, summarized hospitalization and care, options for next step of care  Labs reviewed as above.  Discussed with rehab team about recommendations       Thank you for allowing us to participate in the care of this patient.             Royce Rowland MD  Physical Medicine and Rehabilitation   4/5/2021

## 2021-04-05 NOTE — CARE PLAN
Problem: Safety  Goal: Will remain free from injury  Outcome: PROGRESSING AS EXPECTED  Goal: Will remain free from falls  Outcome: PROGRESSING AS EXPECTED     Problem: Infection  Goal: Will remain free from infection  Outcome: PROGRESSING AS EXPECTED     Problem: Venous Thromboembolism (VTW)/Deep Vein Thrombosis (DVT) Prevention:  Goal: Patient will participate in Venous Thrombosis (VTE)/Deep Vein Thrombosis (DVT)Prevention Measures  Outcome: PROGRESSING AS EXPECTED     Problem: Mobility  Goal: Risk for activity intolerance will decrease  Outcome: PROGRESSING AS EXPECTED

## 2021-04-05 NOTE — DISCHARGE PLANNING
[facility] Transitional Care Coordination    Referral from:  Dr. Blanton  Insurance Provider on Facesheet: Medicare/AARP  Potential Rehab Diagnosis:  Stroke    Chart review indicates patient has on going medical management and therapy needs to possibly meet inpatient rehab facility criteria with the goal of returning to community.    D/C support: Spouse     Physiatry to consult.  MRI brain - Punctate acute infarcts in the right cerebellar hemisphere, right occipital cortex and right frontal cortex.  POD #3   R carotid endarterectomy.    Last Covid test:  3/31/0221 COVID negative     Thank you for the referral.

## 2021-04-05 NOTE — PROGRESS NOTES
Handoff report received from day shift RN. Assumed pt care. Pt not in distress. Pt AOx 2-3, on RA. Tele box on, rhythm verified. Safety precautions in place. Call light and personal belongings within reach. Educated to call for assistance if needed.

## 2021-04-05 NOTE — PROGRESS NOTES
Received bedside report from RN, pt care assumed, VSS, pt assessment complete. Pt AAOx3, disoriented to time, with no c/o of pain at this time. No signs of acute distress noted at this time. Plan of care discussed with pt and verbalizes no questions. Pt denies any additional needs at this time. Bed locked/in lowest position, bed alarm on, pt educated on fall risk and verbalized understanding, call light within reach, hourly rounding initiated.

## 2021-04-06 ENCOUNTER — HOSPITAL ENCOUNTER (INPATIENT)
Facility: REHABILITATION | Age: 86
LOS: 18 days | DRG: 057 | End: 2021-04-24
Attending: PHYSICAL MEDICINE & REHABILITATION | Admitting: PHYSICAL MEDICINE & REHABILITATION
Payer: MEDICARE

## 2021-04-06 VITALS
BODY MASS INDEX: 24.52 KG/M2 | HEART RATE: 77 BPM | WEIGHT: 165.57 LBS | DIASTOLIC BLOOD PRESSURE: 77 MMHG | OXYGEN SATURATION: 95 % | SYSTOLIC BLOOD PRESSURE: 163 MMHG | TEMPERATURE: 99.5 F | RESPIRATION RATE: 18 BRPM | HEIGHT: 69 IN

## 2021-04-06 PROBLEM — D64.9 ANEMIA: Status: ACTIVE | Noted: 2021-04-06

## 2021-04-06 PROBLEM — H91.90 HARD OF HEARING: Status: ACTIVE | Noted: 2021-04-06

## 2021-04-06 PROBLEM — E87.6 HYPOKALEMIA: Status: ACTIVE | Noted: 2021-04-06

## 2021-04-06 PROBLEM — Z98.890 S/P CAROTID ENDARTERECTOMY: Status: ACTIVE | Noted: 2021-04-06

## 2021-04-06 PROBLEM — R45.1 AGITATION: Status: ACTIVE | Noted: 2021-04-06

## 2021-04-06 PROBLEM — R00.1 SINUS BRADYCARDIA: Status: ACTIVE | Noted: 2021-04-06

## 2021-04-06 LAB
ALBUMIN SERPL BCP-MCNC: 3.6 G/DL (ref 3.2–4.9)
ALBUMIN/GLOB SERPL: 1.2 G/DL
ALP SERPL-CCNC: 76 U/L (ref 30–99)
ALT SERPL-CCNC: 17 U/L (ref 2–50)
ANION GAP SERPL CALC-SCNC: 7 MMOL/L (ref 7–16)
AST SERPL-CCNC: 23 U/L (ref 12–45)
BASOPHILS # BLD AUTO: 0.6 % (ref 0–1.8)
BASOPHILS # BLD: 0.04 K/UL (ref 0–0.12)
BILIRUB SERPL-MCNC: 0.5 MG/DL (ref 0.1–1.5)
BUN SERPL-MCNC: 21 MG/DL (ref 8–22)
CALCIUM SERPL-MCNC: 8.4 MG/DL (ref 8.5–10.5)
CHLORIDE SERPL-SCNC: 105 MMOL/L (ref 96–112)
CO2 SERPL-SCNC: 27 MMOL/L (ref 20–33)
CREAT SERPL-MCNC: 0.88 MG/DL (ref 0.5–1.4)
EOSINOPHIL # BLD AUTO: 0.24 K/UL (ref 0–0.51)
EOSINOPHIL NFR BLD: 3.4 % (ref 0–6.9)
ERYTHROCYTE [DISTWIDTH] IN BLOOD BY AUTOMATED COUNT: 43.3 FL (ref 35.9–50)
GLOBULIN SER CALC-MCNC: 2.9 G/DL (ref 1.9–3.5)
GLUCOSE SERPL-MCNC: 98 MG/DL (ref 65–99)
HCT VFR BLD AUTO: 41.4 % (ref 42–52)
HGB BLD-MCNC: 13.9 G/DL (ref 14–18)
IMM GRANULOCYTES # BLD AUTO: 0.04 K/UL (ref 0–0.11)
IMM GRANULOCYTES NFR BLD AUTO: 0.6 % (ref 0–0.9)
LYMPHOCYTES # BLD AUTO: 0.57 K/UL (ref 1–4.8)
LYMPHOCYTES NFR BLD: 8 % (ref 22–41)
MCH RBC QN AUTO: 31.2 PG (ref 27–33)
MCHC RBC AUTO-ENTMCNC: 33.6 G/DL (ref 33.7–35.3)
MCV RBC AUTO: 92.8 FL (ref 81.4–97.8)
MONOCYTES # BLD AUTO: 0.72 K/UL (ref 0–0.85)
MONOCYTES NFR BLD AUTO: 10.1 % (ref 0–13.4)
MUSK AB SER-SCNC: 0 NMOL/L (ref 0–0.03)
MUSK AB SER-SCNC: 0 NMOL/L (ref 0–0.03)
NEUTROPHILS # BLD AUTO: 5.52 K/UL (ref 1.82–7.42)
NEUTROPHILS NFR BLD: 77.3 % (ref 44–72)
NRBC # BLD AUTO: 0 K/UL
NRBC BLD-RTO: 0 /100 WBC
PLATELET # BLD AUTO: 206 K/UL (ref 164–446)
PMV BLD AUTO: 10.6 FL (ref 9–12.9)
POTASSIUM SERPL-SCNC: 3.4 MMOL/L (ref 3.6–5.5)
PROT SERPL-MCNC: 6.5 G/DL (ref 6–8.2)
RBC # BLD AUTO: 4.46 M/UL (ref 4.7–6.1)
SODIUM SERPL-SCNC: 139 MMOL/L (ref 135–145)
WBC # BLD AUTO: 7.1 K/UL (ref 4.8–10.8)

## 2021-04-06 PROCEDURE — A9270 NON-COVERED ITEM OR SERVICE: HCPCS | Performed by: STUDENT IN AN ORGANIZED HEALTH CARE EDUCATION/TRAINING PROGRAM

## 2021-04-06 PROCEDURE — 770010 HCHG ROOM/CARE - REHAB SEMI PRIVAT*

## 2021-04-06 PROCEDURE — 99239 HOSP IP/OBS DSCHRG MGMT >30: CPT | Mod: GC | Performed by: HOSPITALIST

## 2021-04-06 PROCEDURE — 99223 1ST HOSP IP/OBS HIGH 75: CPT | Mod: AI | Performed by: PHYSICAL MEDICINE & REHABILITATION

## 2021-04-06 PROCEDURE — 700111 HCHG RX REV CODE 636 W/ 250 OVERRIDE (IP): Performed by: PHYSICAL MEDICINE & REHABILITATION

## 2021-04-06 PROCEDURE — 700102 HCHG RX REV CODE 250 W/ 637 OVERRIDE(OP): Performed by: PHYSICAL MEDICINE & REHABILITATION

## 2021-04-06 PROCEDURE — 80053 COMPREHEN METABOLIC PANEL: CPT

## 2021-04-06 PROCEDURE — 36415 COLL VENOUS BLD VENIPUNCTURE: CPT

## 2021-04-06 PROCEDURE — U0003 INFECTIOUS AGENT DETECTION BY NUCLEIC ACID (DNA OR RNA); SEVERE ACUTE RESPIRATORY SYNDROME CORONAVIRUS 2 (SARS-COV-2) (CORONAVIRUS DISEASE [COVID-19]), AMPLIFIED PROBE TECHNIQUE, MAKING USE OF HIGH THROUGHPUT TECHNOLOGIES AS DESCRIBED BY CMS-2020-01-R: HCPCS

## 2021-04-06 PROCEDURE — 85025 COMPLETE CBC W/AUTO DIFF WBC: CPT

## 2021-04-06 PROCEDURE — U0005 INFEC AGEN DETEC AMPLI PROBE: HCPCS

## 2021-04-06 PROCEDURE — 94760 N-INVAS EAR/PLS OXIMETRY 1: CPT

## 2021-04-06 PROCEDURE — 700102 HCHG RX REV CODE 250 W/ 637 OVERRIDE(OP): Performed by: STUDENT IN AN ORGANIZED HEALTH CARE EDUCATION/TRAINING PROGRAM

## 2021-04-06 PROCEDURE — A9270 NON-COVERED ITEM OR SERVICE: HCPCS | Performed by: PHYSICAL MEDICINE & REHABILITATION

## 2021-04-06 RX ORDER — ATORVASTATIN CALCIUM 80 MG/1
80 TABLET, FILM COATED ORAL EVERY EVENING
Qty: 30 TABLET | Status: ON HOLD
Start: 2021-04-06 | End: 2021-04-23

## 2021-04-06 RX ORDER — QUETIAPINE FUMARATE 25 MG/1
25 TABLET, FILM COATED ORAL 3 TIMES DAILY PRN
Status: DISCONTINUED | OUTPATIENT
Start: 2021-04-06 | End: 2021-04-20

## 2021-04-06 RX ORDER — ENEMA 19; 7 G/133ML; G/133ML
1 ENEMA RECTAL
Status: DISCONTINUED | OUTPATIENT
Start: 2021-04-06 | End: 2021-04-24 | Stop reason: HOSPADM

## 2021-04-06 RX ORDER — LISINOPRIL 20 MG/1
20 TABLET ORAL DAILY
Qty: 30 TABLET | Status: ON HOLD
Start: 2021-04-07 | End: 2021-04-15 | Stop reason: SDUPTHER

## 2021-04-06 RX ORDER — BISACODYL 10 MG
10 SUPPOSITORY, RECTAL RECTAL
Status: DISCONTINUED | OUTPATIENT
Start: 2021-04-06 | End: 2021-04-16

## 2021-04-06 RX ORDER — ONDANSETRON 2 MG/ML
4 INJECTION INTRAMUSCULAR; INTRAVENOUS 4 TIMES DAILY PRN
Status: DISCONTINUED | OUTPATIENT
Start: 2021-04-06 | End: 2021-04-24 | Stop reason: HOSPADM

## 2021-04-06 RX ORDER — MIDAZOLAM HYDROCHLORIDE 5 MG/ML
5 INJECTION INTRAMUSCULAR; INTRAVENOUS PRN
Status: DISCONTINUED | OUTPATIENT
Start: 2021-04-06 | End: 2021-04-24 | Stop reason: HOSPADM

## 2021-04-06 RX ORDER — HALOPERIDOL 5 MG/ML
2 INJECTION INTRAMUSCULAR EVERY 6 HOURS PRN
Refills: 0 | Status: ON HOLD
Start: 2021-04-06 | End: 2021-04-15

## 2021-04-06 RX ORDER — POLYETHYLENE GLYCOL 3350 17 G/17G
1 POWDER, FOR SOLUTION ORAL
Status: CANCELLED | OUTPATIENT
Start: 2021-04-06

## 2021-04-06 RX ORDER — ATORVASTATIN CALCIUM 40 MG/1
80 TABLET, FILM COATED ORAL EVERY EVENING
Status: DISCONTINUED | OUTPATIENT
Start: 2021-04-06 | End: 2021-04-15

## 2021-04-06 RX ORDER — ASPIRIN 81 MG/1
81 TABLET, CHEWABLE ORAL DAILY
Status: DISCONTINUED | OUTPATIENT
Start: 2021-04-07 | End: 2021-04-24 | Stop reason: HOSPADM

## 2021-04-06 RX ORDER — AMOXICILLIN 250 MG
2 CAPSULE ORAL 2 TIMES DAILY
Status: DISCONTINUED | OUTPATIENT
Start: 2021-04-06 | End: 2021-04-16

## 2021-04-06 RX ORDER — ATORVASTATIN CALCIUM 80 MG/1
80 TABLET, FILM COATED ORAL EVERY EVENING
Status: CANCELLED | OUTPATIENT
Start: 2021-04-06

## 2021-04-06 RX ORDER — AMLODIPINE BESYLATE 10 MG/1
10 TABLET ORAL
Status: CANCELLED | OUTPATIENT
Start: 2021-04-07

## 2021-04-06 RX ORDER — DIVALPROEX SODIUM 250 MG/1
250 TABLET, DELAYED RELEASE ORAL EVERY 8 HOURS
Status: DISCONTINUED | OUTPATIENT
Start: 2021-04-06 | End: 2021-04-08

## 2021-04-06 RX ORDER — AMLODIPINE BESYLATE 10 MG/1
10 TABLET ORAL DAILY
Qty: 30 TABLET | Status: ON HOLD
Start: 2021-04-07 | End: 2021-04-15

## 2021-04-06 RX ORDER — LABETALOL HYDROCHLORIDE 5 MG/ML
10 INJECTION, SOLUTION INTRAVENOUS EVERY 4 HOURS PRN
Refills: 0 | Status: ON HOLD
Start: 2021-04-06 | End: 2021-04-15

## 2021-04-06 RX ORDER — BISACODYL 10 MG
10 SUPPOSITORY, RECTAL RECTAL
Refills: 0 | Status: ON HOLD
Start: 2021-04-06 | End: 2021-04-23

## 2021-04-06 RX ORDER — ECHINACEA PURPUREA EXTRACT 125 MG
2 TABLET ORAL PRN
Status: DISCONTINUED | OUTPATIENT
Start: 2021-04-06 | End: 2021-04-24 | Stop reason: HOSPADM

## 2021-04-06 RX ORDER — LISINOPRIL 20 MG/1
20 TABLET ORAL
Status: DISCONTINUED | OUTPATIENT
Start: 2021-04-07 | End: 2021-04-07

## 2021-04-06 RX ORDER — POLYETHYLENE GLYCOL 3350 17 G/17G
1 POWDER, FOR SOLUTION ORAL
Status: DISCONTINUED | OUTPATIENT
Start: 2021-04-06 | End: 2021-04-16

## 2021-04-06 RX ORDER — ALUMINA, MAGNESIA, AND SIMETHICONE 2400; 2400; 240 MG/30ML; MG/30ML; MG/30ML
20 SUSPENSION ORAL
Status: DISCONTINUED | OUTPATIENT
Start: 2021-04-06 | End: 2021-04-24 | Stop reason: HOSPADM

## 2021-04-06 RX ORDER — AMLODIPINE BESYLATE 5 MG/1
10 TABLET ORAL
Status: DISCONTINUED | OUTPATIENT
Start: 2021-04-07 | End: 2021-04-15

## 2021-04-06 RX ORDER — DIVALPROEX SODIUM 250 MG/1
250 TABLET, DELAYED RELEASE ORAL EVERY 8 HOURS
Qty: 90 TABLET | Status: ON HOLD
Start: 2021-04-06 | End: 2021-04-15

## 2021-04-06 RX ORDER — BISACODYL 10 MG
10 SUPPOSITORY, RECTAL RECTAL
Status: CANCELLED | OUTPATIENT
Start: 2021-04-06

## 2021-04-06 RX ORDER — DIVALPROEX SODIUM 250 MG/1
250 TABLET, DELAYED RELEASE ORAL EVERY 8 HOURS
Status: CANCELLED | OUTPATIENT
Start: 2021-04-06

## 2021-04-06 RX ORDER — ASPIRIN 81 MG/1
81 TABLET, CHEWABLE ORAL DAILY
Status: CANCELLED | OUTPATIENT
Start: 2021-04-07

## 2021-04-06 RX ORDER — ONDANSETRON 4 MG/1
4 TABLET, ORALLY DISINTEGRATING ORAL 4 TIMES DAILY PRN
Status: DISCONTINUED | OUTPATIENT
Start: 2021-04-06 | End: 2021-04-24 | Stop reason: HOSPADM

## 2021-04-06 RX ORDER — TRAZODONE HYDROCHLORIDE 50 MG/1
50 TABLET ORAL
Status: DISCONTINUED | OUTPATIENT
Start: 2021-04-06 | End: 2021-04-24 | Stop reason: HOSPADM

## 2021-04-06 RX ORDER — LACTULOSE 20 G/30ML
30 SOLUTION ORAL
Status: DISCONTINUED | OUTPATIENT
Start: 2021-04-06 | End: 2021-04-24 | Stop reason: HOSPADM

## 2021-04-06 RX ORDER — AMOXICILLIN 250 MG
2 CAPSULE ORAL 2 TIMES DAILY
Status: CANCELLED | OUTPATIENT
Start: 2021-04-06

## 2021-04-06 RX ORDER — ACETAMINOPHEN 325 MG/1
650 TABLET ORAL EVERY 4 HOURS PRN
Status: DISCONTINUED | OUTPATIENT
Start: 2021-04-06 | End: 2021-04-24 | Stop reason: HOSPADM

## 2021-04-06 RX ORDER — POLYVINYL ALCOHOL 14 MG/ML
1 SOLUTION/ DROPS OPHTHALMIC PRN
Status: DISCONTINUED | OUTPATIENT
Start: 2021-04-06 | End: 2021-04-24 | Stop reason: HOSPADM

## 2021-04-06 RX ORDER — LISINOPRIL 20 MG/1
20 TABLET ORAL
Status: CANCELLED | OUTPATIENT
Start: 2021-04-07

## 2021-04-06 RX ORDER — LANOLIN ALCOHOL/MO/W.PET/CERES
3 CREAM (GRAM) TOPICAL NIGHTLY PRN
Status: DISCONTINUED | OUTPATIENT
Start: 2021-04-06 | End: 2021-04-07

## 2021-04-06 RX ORDER — AMLODIPINE BESYLATE 10 MG/1
10 TABLET ORAL
Status: DISCONTINUED | OUTPATIENT
Start: 2021-04-07 | End: 2021-04-06 | Stop reason: HOSPADM

## 2021-04-06 RX ORDER — AMOXICILLIN 250 MG
2 CAPSULE ORAL 2 TIMES DAILY
Qty: 30 TABLET | Refills: 0 | Status: ON HOLD
Start: 2021-04-06 | End: 2021-04-23

## 2021-04-06 RX ORDER — ASPIRIN 81 MG/1
81 TABLET, CHEWABLE ORAL DAILY
Qty: 100 TABLET | Status: ON HOLD
Start: 2021-04-07 | End: 2021-04-23 | Stop reason: SDUPTHER

## 2021-04-06 RX ORDER — ACETAMINOPHEN 325 MG/1
650 TABLET ORAL EVERY 6 HOURS PRN
Qty: 30 TABLET | Refills: 0 | Status: ON HOLD
Start: 2021-04-06 | End: 2021-04-23

## 2021-04-06 RX ORDER — HYDRALAZINE HYDROCHLORIDE 10 MG/1
10 TABLET, FILM COATED ORAL EVERY 8 HOURS PRN
Status: DISCONTINUED | OUTPATIENT
Start: 2021-04-06 | End: 2021-04-24 | Stop reason: HOSPADM

## 2021-04-06 RX ORDER — HYDROXYZINE HYDROCHLORIDE 25 MG/1
50 TABLET, FILM COATED ORAL EVERY 6 HOURS PRN
Status: DISCONTINUED | OUTPATIENT
Start: 2021-04-06 | End: 2021-04-24 | Stop reason: HOSPADM

## 2021-04-06 RX ORDER — POLYETHYLENE GLYCOL 3350 17 G/17G
17 POWDER, FOR SOLUTION ORAL
Refills: 3 | Status: ON HOLD
Start: 2021-04-06 | End: 2021-04-23

## 2021-04-06 RX ADMIN — DIVALPROEX SODIUM 250 MG: 250 TABLET, DELAYED RELEASE ORAL at 21:45

## 2021-04-06 RX ADMIN — AMLODIPINE BESYLATE 5 MG: 5 TABLET ORAL at 05:22

## 2021-04-06 RX ADMIN — LISINOPRIL 20 MG: 20 TABLET ORAL at 05:22

## 2021-04-06 RX ADMIN — DIVALPROEX SODIUM 250 MG: 250 TABLET, DELAYED RELEASE ORAL at 05:23

## 2021-04-06 RX ADMIN — ASPIRIN 81 MG: 81 TABLET, CHEWABLE ORAL at 05:22

## 2021-04-06 RX ADMIN — SENNOSIDES AND DOCUSATE SODIUM 2 TABLET: 8.6; 5 TABLET ORAL at 21:45

## 2021-04-06 RX ADMIN — ENOXAPARIN SODIUM 40 MG: 40 INJECTION SUBCUTANEOUS at 17:54

## 2021-04-06 RX ADMIN — MELATONIN TAB 3 MG 3 MG: 3 TAB at 21:45

## 2021-04-06 RX ADMIN — DOCUSATE SODIUM 50 MG AND SENNOSIDES 8.6 MG 2 TABLET: 8.6; 5 TABLET, FILM COATED ORAL at 05:22

## 2021-04-06 RX ADMIN — ATORVASTATIN CALCIUM 80 MG: 40 TABLET, FILM COATED ORAL at 21:45

## 2021-04-06 ASSESSMENT — LIFESTYLE VARIABLES
ON A TYPICAL DAY WHEN YOU DRINK ALCOHOL HOW MANY DRINKS DO YOU HAVE: 0
EVER FELT BAD OR GUILTY ABOUT YOUR DRINKING: NO
ALCOHOL_USE: NO
TOTAL SCORE: 0
HAVE PEOPLE ANNOYED YOU BY CRITICIZING YOUR DRINKING: NO
TOTAL SCORE: 0
HOW MANY TIMES IN THE PAST YEAR HAVE YOU HAD 5 OR MORE DRINKS IN A DAY: 0
EVER HAD A DRINK FIRST THING IN THE MORNING TO STEADY YOUR NERVES TO GET RID OF A HANGOVER: NO
TOTAL SCORE: 0
EVER_SMOKED: YES
AVERAGE NUMBER OF DAYS PER WEEK YOU HAVE A DRINK CONTAINING ALCOHOL: 0
CONSUMPTION TOTAL: NEGATIVE
HAVE YOU EVER FELT YOU SHOULD CUT DOWN ON YOUR DRINKING: NO

## 2021-04-06 ASSESSMENT — PAIN DESCRIPTION - PAIN TYPE
TYPE: ACUTE PAIN
TYPE: ACUTE PAIN

## 2021-04-06 ASSESSMENT — FIBROSIS 4 INDEX: FIB4 SCORE: 2.33

## 2021-04-06 ASSESSMENT — PATIENT HEALTH QUESTIONNAIRE - PHQ9
SUM OF ALL RESPONSES TO PHQ9 QUESTIONS 1 AND 2: 0
1. LITTLE INTEREST OR PLEASURE IN DOING THINGS: NOT AT ALL

## 2021-04-06 NOTE — DISCHARGE PLANNING
[facility] Transitional Care Coordination    Dr. Sugey Barkley will accept Ion to inpatient rehab.  Transport scheduled 3:30-4p today via [facility].  Nursing to call report to [phone].  Voalte update to SAMANTHA Bowens.  Telephone call to son Ion Barrios with update on pending transfer.  Provided [facility] nurses station contact information.      [facility] will follow to assist as needed with transition to [facility].

## 2021-04-06 NOTE — PREADMISSION SCREENING NOTE
Pre-Admission Screening Form    Patient Information:   Name: Cooper Harvey     MRN: [id number]       : 1934      Age: 86 y.o.   Gender: male      Race: White [7]       Marital Status:  [2]  Family Contact: Cynthia Harvey  DaughterConstantin        Relationship: Spouse [17]  Daughter [2]  Home Phone: [phone]             Cell Phone:   [phone]  Advanced Directives: None  Code Status:  PARTIAL  Current Attending Provider: GREGORIO Rich M.D.  Referring Physician: Dr. Blanton     Physiatrist Consult: Dr. Royce Rowland       Referral Date: 2021  Primary Payor Source:  MEDICARE  Secondary Payor Source:  [facility]    Medical Information:   Date of Admission to Acute Care Setting:3/30/2021  Room Number: T728/01  Rehabilitation Diagnosis: 0001.4 - Stroke: No Paresis  Immunization History   Administered Date(s) Administered   • INFLUENZA TIV (IM) 2012   • Influenza Vaccine Quad Inj (Pf) 2015   • Pneumococcal polysaccharide vaccine (PPSV-23) 2012     Allergies   Allergen Reactions   • Vicodin [Hydrocodone-Acetaminophen] Rash     rash     Past Medical History:   Diagnosis Date   • [place] (hard of hearing) 2021   • Hydrocele, unspecified    • Hypertrophy of prostate without urinary obstruction and other lower urinary tract symptoms (LUTS)    • Mixed hyperlipidemia      Past Surgical History:   Procedure Laterality Date   • PB THROMBOENDARTECTMY NECK,NECK INCIS Right 2021    Procedure: ENDARTERECTOMY, CAROTID;  Surgeon: Willie Beavers M.D.;  Location: SURGERY [facility];  Service: Vascular   • PLASTIC SURGERY  2009    large skin cancer removed from the top of head with skin graft--donor site left anterior thigh   • HEMORRHOIDECTOMY     • EYE SURGERY   ;     Bilateral Cataracts       History Leading to Admission, Conditions that Caused the Need for Rehab (CMS):        Ghulam Bryan M.D.   Physician   [facility] Medicine   H&P       Addendum   Date  "of Service:  3/30/2021 11:53 PM               [facility] Medicine History & Physical Note     Date of Service  3/30/2021     Primary Care Physician  Wali Clay M.D.     Consultants        Code Status  Full Code     Chief Complaint  Transient vision changes and syncope     History of Presenting Illness  86 y.o. male who presented 3/30/2021 with transient vision changes.  This is a pleasant gentleman with a history of BPH with LUTS and hyperlipidemia not on any medications, who presents with complaints of vision changes as well as syncope.  Initially presented to [facility] in [place], [place] with complaints of transient bilateral blindness.  He is a poor historian and cannot recall the details with the vision changes.  He denies any diplopia.  He is also hard of hearing even with his hearing aids in place.  He reports his \"left eye crossing over to the right.\"  Per outside medical records, he had bilateral vision changes while driving 12 days ago, which improved.  He also had complaints of his head feeling like \"a baseball\" and feeling \"foggy.\"  Patient reports that he has been having vision changes that come and go but is not able to explain the details of the nature of change.  He reports that he passed out on Sunday was feeling dizzy especially getting up.  He reports waking up on the floor of the kitchen on Sunday morning.  Outside medical records indicate that he was also experiencing generalized weakness as well as having some word finding difficulties that were new.  He is currently living with his wife, who was concerned about the mental status changes.     CTA was performed at outside hospital, which showed 60% stenosis of the bilateral carotid arteries.  Request was made for transfer to [facility] for further work-up with MRI of the brain as well as vascular surgery consultation.  NIH stroke score was 2 with loss points for " orientation.     Outside medical records were reviewed.  Laboratory studies were significant with hyponatremia sodium of 146, creatinine elevated at 1.43, GFR of 50.  Total cholesterol 214, triglycerides 92, , HDL 46.  EKG showed sinus bradycardia.     Patient denies any history of stroke.  Distant history of alcohol and smoking.  Father  in a car accident while he was an infant.  Mother  of alcohol and smoking per patient.        Review of Systems  Review of Systems   Constitutional: Negative for chills and fever.   HENT: Positive for ear pain. Negative for ear discharge and sore throat.    Eyes: Positive for blurred vision. Negative for double vision.   Respiratory: Negative for cough and shortness of breath.    Cardiovascular: Negative for chest pain and palpitations.   Gastrointestinal: Negative for abdominal pain, constipation, diarrhea, nausea and vomiting.   Genitourinary: Negative for dysuria, frequency and urgency.   Musculoskeletal: Negative for falls, joint pain and myalgias.   Skin: Negative for itching and rash.   Neurological: Positive for dizziness. Negative for focal weakness and headaches.   Psychiatric/Behavioral: Positive for memory loss. The patient is not nervous/anxious.          Past Medical History   has a past medical history of Hydrocele, unspecified, Hypertrophy of prostate without urinary obstruction and other lower urinary tract symptoms (LUTS), and Mixed hyperlipidemia.     Surgical History   has a past surgical history that includes hemorrhoidectomy (); plastic surgery (2009); and eye surgery ( ; ).      Family History  family history includes Cancer in his mother.      Social History   reports that he quit smoking about 31 years ago. He has a 20.00 pack-year smoking history. He has never used smokeless tobacco. He reports current alcohol use. He reports that he does not use drugs.     Allergies       Allergies   Allergen Reactions   • Vicodin  [Hydrocodone-Acetaminophen] Rash                     Assessment/Plan:  I anticipate this patient is appropriate for observation status at this time.     * Changes in vision- (present on admission)  Assessment & Plan  Specifics of vision changes or loss unclear.  Patient reports possibly being evaluated by an optometrist with normal exam.  Concern for TIA/amaurosis fugax versus stroke.     Admit to telemetry.  MRI of the brain without contrast.  Echocardiogram.  Consider neurology consultation.  Start aspirin and high intensity statin.     Carotid stenosis, bilateral- (present on admission)  Assessment & Plan  As per CTA at outside hospital 60% bilaterally.  Unclear at this time whether this is related to his vision changes.     Consider neurology consultation.  Continue aspirin 325 mg daily and high intensity statin.  Consider neurology and vascular surgery consultations.     Syncope- (present on admission)  Assessment & Plan  Preceded by dizziness.  Etiology unclear.  Possible dehydration.     Check orthostatic blood pressures.  Echocardiogram.     Hypernatremia- (present on admission)  Assessment & Plan  As per outside hospital 146 sodium level.  Appears to be improved on repeat lab draw here.     Continue to monitor.     Mixed hyperlipidemia- (present on admission)  Assessment & Plan  As per lab studies.     Start high intensity statin in setting of possible TIA.     Do not intubate, cardiopulmonary resuscitation (CPR)-only code status- (present on admission)  Assessment & Plan  Discussed CODE STATUS with patient.  He is okay with CPR.  He wishes not to be intubated.     BPH (benign prostatic hyperplasia)- (present on admission)  Assessment & Plan  As per history currently not complaining of symptoms.       Continue to monitor.                 Corinne E Canavero, A.P.R.N.   Nurse Practitioner   Neurology   Consults       Attested   Date of Service:  3/31/2021 12:15 PM               Attestation signed by Benedict  JIMMIE Bailon at 3/31/2021 2:45 PM   The patient was seen and examined. I agree with plan above; see any corrections below:     I saw and evaluated the patient and discussed the management with the primary medical team, nursing, and resident staff. I reviewed Ngoc Hicks note and agree with their findings and plan as documented in the note except as documented below. The chart was reviewed and summarized.  As listed in reviewed note any/all available labs, imaging, vitals were reviewed and neuroimaging visualized. Available nursing, consultant, and resident notes were reviewed.     86-year-old male transferred from outside facility.  CT head and neck shows bilateral carotid orifice carotid disease of 60%.  He is a very poor historian.  He does have a hearing issues which could be a problem with the different histories.  The story he told us: he has been having vision problems the past several weeks now.  Seems to be worse at night.  Especially after watching TV for a while.  He does says he had double vision at times.  Given this type of story sounds more like a myasthenia gravis picture.  However I do agree getting the MRI brain to make sure nothing acute.  We will send off the anticholinergic antibodies and MuSK antibodies.     Benedict Bailon MD  Board Certified Neurology-ABPN  t) [phone]        Referring Provider-  GREGORIO Rich M.D.         [facility]      Neurology Initial Consult H&P  Neurohospitalist Service, [facility]     Referring Physician: GREGORIO Rich M.D.     Reason for Referral: Transient vision changes and syncope     HPI: Cooper Harvey is a 86 y.o. male with past history of benign prostatic hypertrophy and hyperlipidemia who presented to [facility] 3/30/21 as a transfer from [facility] with complaint of transient bilateral vision loss over the past several weeks. He reports that several times while he has been watching television in the  evening, he feels his left eye wandering medially and experiences double vision. He reports these symptoms are improved in the mornings. He also reports a syncopal episode 2 days ago in which he awoke on the floor. He did not seek medical attention after this event, and felt well upon waking the next morning. He is symptom free at this time, denies headache, vision loss, speech changes, focal weakness. He is hard of hearing with bilateral hearing aids in place. CTA head/neck from [facility] showed 60% stenosis of the bilateral carotid arteries, thus request was made for transfer to [facility] for further work-up with MRI of the brain as well as vascular surgery consultation.     Review of systems: In addition to what is detailed in the HPI above, all other systems reviewed and are negative.     Past Medical History:    has a past medical history of Hydrocele, unspecified, Hypertrophy of prostate without urinary obstruction and other lower urinary tract symptoms (LUTS), and Mixed hyperlipidemia.     FHx:  family history includes Cancer in his mother.     SHx:   reports that he quit smoking about 31 years ago. He has a 20.00 pack-year smoking history. He has never used smokeless tobacco. He reports current alcohol use. He reports that he does not use drugs.         Assessment and Plan:     Cooper Harvey is a 86 y.o. male with past history of benign prostatic hypertrophy and hyperlipidemia who presented to [facility] 3/30/21 as a transfer from [facility] with complaint of transient bilateral vision loss over the past several weeks. CTA head/neck from [facility] showed 60% stenosis of the bilateral carotid arteries, thus request was made for transfer to [facility] for further work-up with MRI of the brain as well as vascular surgery consultation. MRI brain w/o CST is pending. At this time he has no focal neurological deficits. With consideration of his report of recurrent diplopia in the evenings,  AChR and MuSK antiboy titers have been ordered to rule out myasthenia gravis. He remains admitted to the floor in stable condition.     Plan:  -AChR and MuSK antibody titers  -MRI brain without        The evaluation of the patient, and recommended management, was discussed with attending neurologist, Dr. Benedict Bailon.     Corinne Canavero, APRN Kevin R Chysna, M.D.   Physician   Neurology   Procedures       Signed   Date of Service:  3/31/2021  2:21 PM            Procedure Orders                  ROUTINE VIDEO ELECTROENCEPHALOGRAM REPORT        Referring provider:   Dr. Blanton     DOS: 03/31/21 (0 hours and 24 minutes of total recording time).      INDICATION:  Cooper Harvey 86 y.o. male presenting with altered mental status     CURRENT ANTIEPILEPTIC AND/OR SEDATING REGIMEN: No AEDs     TECHNIQUE: Routine VEEG was set up by a Neurodiagnostic technologist who performed education to the patient and staff. A minimum of 23 electrodes and 23 channel recording was setup and performed by Neurodiagnostic technologist, in accordance with the international 10-20 system. The study was reviewed in bipolar and referential montages. The recording examined the patient in the  awake, drowsy, and sleep state(s).      DESCRIPTION OF THE RECORD:  During wakefulness, the background was moderate to low amplitude, continuous and showed a 9-10 Hz posterior dominant rhythm.  There was reactivity to eye closure/opening.  An anterior-posterior gradient was noted with faster beta frequencies seen anteriorly.  During drowsiness, theta/delta frequencies were seen.     Sleep was captured and was characterized by diffuse background delta/theta activity with a loss of myogenic artifact.  N2 sleep transients in the form of sleep spindles and vertex waves were seen in the leads over the central regions.      ACTIVATION PROCEDURES:   Intermittent Photic stimulation was performed in a stepwise fashion from 1 to 30 Hz, and did not  elicit any abnormal responses.        ICTAL AND INTERICTAL FINDINGS:   No focal or generalized epileptiform activity noted.      No regional slowing was seen during this routine study.       No clinical events or seizures were reported or recorded during the study.      EKG: sampling of the EKG recording showed occasional PACs     EVENTS:  None     INTERPRETATION:   Normal video EEG recording in the awake, drowsy, and sleep state(s):  - No persistent focal asymmetries seen.  - No epileptiform discharges seen   - No seizures. Clinical correlation is recommended.        Note: A normal EEG does not rule out epilepsy.  If the clinical suspicion remains high for seizures, a prolonged recording to capture clinical or subclinical events may be helpful.           MD Willie King M.D.   Physician   Surgery Vascular   OP Report       Signed   Date of Service:  4/2/2021  1:51 PM                    Vascular Surgery Operative Note  --------------------------------------------     Date of Service:          4/2/2021     Patient Name:             Cooper Harvey     Patient MRN:               [id number]     --------------------------------------------------------------------------------------------------     Preoperative Diagnosis:  1) Symptomatic right carotid stenosis     Postoperative Diagnosis:  1) Symptomatic right carotid stenosis     Procedure:  1) right carotid endarterectomy with neuromonitoring     -------------------------------------------------------------------------------------------------     Surgeon:                                 Willie Beavers MD     Assistant:                                Angelo GRADY     Anesthesia:                             GETA     EBL:                                        35cc     Blood Products:                      none     Heparin:                                  Systemically heparinized, 7000 units     Specimen:                                None sent     Complications:                        None      Disposition:                             PACU in stable condition      Findings:                                 Hemorrhagic ulcerated plaque     Justification for use of Surgical First Assist:  During this operation my assistant participated with patient preparation for surgery, incision, surgical exposure including retraction, dissection, and ligation to isolate the target structures and preserve nearby structures, and closure of the field of dissection.  The presence of the expert assist increased both the efficiency and safety of the operation, decreasing anesthesia time and decreasing the risk of intraoperative surgical complications.     -----------------------------------------------------------------------------------------------------     History:  Cooper Harvey is a 86 y.o. male with moderate right carotid stenosis and evidence of embolic strokes on MRI.  I had a very detailed, thorough discussion with the patient and his family regarding the severity of his carotid disease and that he is at high risk of stroke.  I explained the primary purpose of this operation is to prevent stroke from the plaque in the carotid artery.  I explained the details of the operation including neuro monitoring and possible use of a shunt.  I discussed the alternatives of optimal medical management or stenting, although carotid endarterectomy is preferable in the setting of such severe plaque and in someone who is acceptable risk for surgery.  I discussed the potential risks, including but not limited to bleeding, infection, injury to vessels or nerves, and risks of anesthesia. I explained the risk that the operation itself can cause a stroke, although the risk of stroke from the operation is less than the risk of stroke if the plaque is not treated, and thus carotid endarterectomy is recommended.  All of their questions were answered. Patient understands and  agrees to proceed.     Procedure Summary:  Following informed consent, patient was brought to the OR where general anesthesia was administered. Patient was positioned, neuromointoring was put in place and the right neck was prepped and draped in the usual sterile fashion.  Timeout was called to identify the correct patient, team and equipment.  Everyone was in agreement.  Procedure began with injection of local anesthesia along the right SCM and then a longitudinal incision was made and carried down through each layer using cautery to assure hemostasis.  The common facial vein was identified and ligated.  The common, external and internal carotid were identified and dissected circumferentially proximally and distally and encircled with loops.    Patient was systemically heparinized and then the artery was clamped, with the ICA being applied first.  The carotid was opened with a longitudinal arteriotomy and a hemorrhagic ulcerated plaque was seen.  There were no changes on EEG monitoring. Endarterectomy of the plaque was performed and the distal ICA endpoint was tacked with prolene sutures.  Once complete, a bovine pericardial patch was sutured with a running 6-0 prolene suture.  The artery was flushed and copiously irrigated, the suture line was completed.  The clamps were removed, with the ICA being removed last, and flow was restored.  There was a continuous flow signal in the ICA at this point.  The heparin was reversed with protamine. Topical hemostatic was applied.  A 7 flat MARJ drain was placed and secured with a nylon suture.        At this point we had good hemostasis.  The platysma was reapproximated with interrupted vicryl sutures.  The skin was reapproximated with a running subcuticular suture.  A sterile dressing was placed. Patient was extubated and returned to PACU in stable condition.  All counts were correct at the conclusion of the case.           Willie Rowland M.D.    Physician   Physical Medicine & Rehab   Consults       Signed   Date of Service:  4/5/2021  4:41 PM            Consult Orders                                                                         Physical Medicine and Rehabilitation Consultation                                                                                      Initial Consult        Initial Consultation Date: 4/5/2021  Consulting provider: Dr. Blanton  Reason for consultation: assess for acute inpatient rehab appropriateness  LOS: 5 Day(s)        Chief complaint: stroke        HPI:   The patient is a 86 y.o. male with a past medical history of BPH, HL;  who presented on 3/30/2021  9:43 PM as transfer from [facility] with bilateral vision loss and syncopal episode 2 days prior to admission.  CTA head/neck at outside facility showed 60% stenosis of bilateral carotid arteries, transferred to [facility] for additional work-up and vascular surgery consultation.  Did have an episode of agitation on 4/1, but returned to baseline by 4/2.  S/p right CEA with Dr. Beavers on 4/2.     The patient currently reports that he knew that he had a stroke, but difficult to communicate due to the hard of hearing or due to cognitive impairment.  He states that his wife was here 5 minutes ago but just left.  Denies any focal weakness.  Does have intermittent right hand/forearm numbness and swelling.  No significant pain.  No fever, chills, nausea, vomiting.        ROS:  Pertinent positives are listed in HPI, all other systems reviewed and are negative           Social Hx:  Pre-morbidly, this patient lived in:   1 story home  With 5 steps to enter  Lives with spouse        Current level of function:   SLP 4/3: Mild cognitive deficits  PT 4/3: Min assist, 10 feet; min assist bed mobility and transfers  OT 4/3: Min assist bed mobility; max assist ADLs, min assist transfers               ASSESSMENT:  Patient is a 86 y.o. male admitted with right CVA causing vision  impairments, not a candidate for TPA due to timing.  MRI brain 3/31 showed acute punctate infarcts in right cerebellum, right occipital cortex, and right frontal cortex.  CTA head/neck showed no significant stenosis.  Concerning for cardioembolic etiology given multiple location.  Echo EF 70%, no PFO.  Right common carotid CEA on 4/2 with Dr. Beavers, plan for left CEA in the future.      # Rehabilitation: Impaired ADLs and mobility  -Vitals: hypertension, RA  -Insurance: Medicare/AARP  -Discharge support: Spouse  -Rehab Impairment Code: 0001.4 - Stroke: No Paresis  -Reason for admission: Acute CVA (cerebrovascular accident) (HCC)  -When medically cleared and discharge support verified, recommend inpatient rehab        #Neuro: right CVA causing vision impairments, not a candidate for TPA.  MRI brain 3/31 showed acute punctate infarcts in right cerebellum, right occipital cortex, and right frontal cortex.  CTA head/neck showed no significant stenosis.  Concerning for cardioembolic etiology given multiple location.  Echo EF 70%, no PFO.  Right common carotid CEA on 4/2 with Dr. Beavers  -DAPT   -plan for left CEA in the future.   -Outpatient Zio patch  -AChR and MuSK antibody titers from 3/31 were negative  -Depakote 250 mg every 8 hours for temporary agitation, can likely be weaned     #CV: HL, HTN  -amlodipine, ASA, atorvastatin, lisinopril     #Renal: ANA LAURA, improved     #Bladder: BPH   -Voiding     #Bowel: 3x on 4/4     #DVT PPX:  -Lovenox           Discussed with pt, summarized hospitalization and care, options for next step of care  Labs reviewed as above.  Discussed with rehab team about recommendations         Thank you for allowing us to participate in the care of this patient.                  Royce Rowland MD    Co-morbidities: See above  Potential Risk - Complications: Cognitive Impairment, Deep Vein Thrombosis, Dysphagia, Incontinence, Malnutrition, Pain, Paralysis, Perceptual Impairment, Pneumonia,  "Pressure Ulcer, Seizures, Urinary Tract Infection and Infection  Level of Risk: High    Ongoing Medical Management Needed (Medical/Nursing Needs):   Patient Active Problem List    Diagnosis Date Noted   • Acute CVA (cerebrovascular accident) ([facility]) 03/31/2021   • Carotid stenosis, bilateral 03/31/2021   • Altered mental status, unspecified 04/01/2021   • Mixed hyperlipidemia 09/04/2009   • ANA LAURA (acute kidney injury) ([facility]) 04/03/2021   • Do not intubate, cardiopulmonary resuscitation (CPR)-only code status 03/31/2021   • BPH (benign prostatic hyperplasia) 06/29/2010   • Rectus diastasis 08/04/2011   • Hydrocele, left 08/04/2011   • Arthritis 06/29/2010   • Skin cancer 09/04/2009   • HYDROCELE 09/04/2009     Devorah Lund RTatyanaN.   Registered Nurse      Progress Notes       Signed   Date of Service:  4/6/2021  7:56 AM                    Received bedside report from RN, pt care assumed, VSS, pt assessment complete. Pt AAOx3 disoriented to year with no complaint of pain at this time. No signs of acute distress noted at this time. Plan of care discussed with pt and verbalizes no questions. Pt denies any additional needs at this time. Bed locked/in lowest position, bed alarm on, pt educated on fall risk and verbalized understanding, call light within reach, hourly rounding initiated.         Current Vital Signs:   Temperature: 37.5 °C (99.5 °F) Pulse: 77 Respiration: 18 Blood Pressure : (!) 163/77  Weight: 75.1 kg (165 lb 9.1 oz) Height: 175.3 cm (5' 9\")  Pulse Oximetry: 95 % O2 (LPM): 0      Completed Laboratory Reports:  Recent Labs     04/04/21  0406 04/05/21  0312 04/06/21  0600   WBC 9.8 9.3 7.1   HEMOGLOBIN 13.7* 13.6* 13.9*   HEMATOCRIT 41.5* 40.5* 41.4*   PLATELETCT 189 189 206   SODIUM 141 138 139   POTASSIUM 3.9 3.7 3.4*   BUN 21 21 21   CREATININE 1.11 0.78 0.88   ALBUMIN 3.6 3.6 3.6   GLUCOSE 107* 91 98     Additional Labs: Not Applicable    Prior Living Situation:   Housing / Facility: 1 Story House  Steps " Into Home: 5  Steps In Home: 0  Lives with - Patient's Self Care Capacity: Spouse  Equipment Owned: None    Prior Level of Function / Living Situation:   Physical Therapy: Prior Services: None  Housing / Facility: [address] [facility]  Steps Into Home: 5  Steps In Home: 0  Rail: Right Rail  (Steps in Home)  Bathroom Set up: Walk In Shower  Equipment Owned: None  Lives with - Patient's Self Care Capacity: Spouse  Bed Mobility: Independent  Transfer Status: Independent  Ambulation: Independent  Assistive Devices Used: None  Stairs: Independent  Current Level of Function:   Gait Level Of Assist: Minimal Assist  Assistive Device: Front Wheel Walker  Distance (Feet): 10  Deviation: Shuffled Gait, Decreased Heel Strike, Decreased Toe Off, Bradykinetic(heavy reliance on FWW for balance. )  # of Stairs Climbed: 0  Level of Assist with Stairs: Supervised  Weight Bearing Status: no restrictions.   Supine to Sit: Minimal Assist  Sit to Supine: Minimal Assist  Scooting: Minimal Assist  Rolling: Minimum Assist to Lt.  Comments: cues for sequencing   Sit to Stand: Minimal Assist  Bed, Chair, Wheelchair Transfer: Unable to Participate  Toilet Transfers: Unable to Participate  Transfer Method: Stand Step  Sitting in Chair: NT  Sitting Edge of Bed: 8 min   Standing: 3 min   Occupational Therapy:   Self Feeding: Independent  Grooming / Hygiene: Independent  Bathing: Independent  Dressing: Independent  Toileting: Independent  Medication Management: Independent  Laundry: Independent  Kitchen Mobility: Independent  Finances: Independent  Home Management: Requires Assist  Shopping: Independent  Prior Level Of Mobility: Independent Without Device in Community  Driving / Transportation: Driving Independent  Prior Services: None  Housing / Facility: [address] [facility]  Occupation (Pre-Hospital Vocational): Retired Due To Age  Current Level of Function:   Eating: Supervision  Bathing: (discussed home s/u, AE )  Upper Body Dressing: Minimal Assist  Lower  Body Dressing: Maximal Assist  Toileting: Maximal Assist(jones in place)  Speech Language Pathology:   Problem List: Dysarthia, Cognitive-Linguistic Deficits, Reading Comprehension Deficit, Hearing Deficit  Diet / Liquid Recommendation: Soft & Bite-Sized (6) - (Dysphagia III), Thin (0)  Rehabilitation Prognosis/Potential: Good  Estimated Length of Stay: 14 days    Nursing:   Orientation : Disoriented to Time  Jones in Place    Scope/Intensity of Services Recommended:  Physical Therapy: 1 hr / day  5 days / week. Therapeutic Interventions Required: Maximize Endurance, Mobility, Strength and Safety  Occupational Therapy: 1 hr / day 5 days / week. Therapeutic Interventions Required: Maximize Self Care, ADLs, IADLs and Energy Conservation  Speech & Language Pathology: 1 hr / day 5 days / week. Therapeutic Interventions Required: Maximize Cognition, Swallowing and Safety  Rehabilitation Nursin/7. Therapeutic Interventions Required: Monitor Pain, Skin, Vital Signs, Intake and Output, Labs, Safety, Aspiration Risk, Family Training and R Carotid Surgical Incision Care; DVT Prophylaxis; Bowel & blader regimen; Infection Control; ADlL's.  Rehabilitation Physician: 3 - 5 days / week. Therapeutic Interventions Required: Medical Management  Respiratory Care: Consult. Therapeutic Interventions Required: Pulmonary Toileting, Aspiration Risk and Respiratory care per protocol  Dietician: Consult. Therapeutic Interventions Required: Nutritional evaluation with recommendations to promote optimal heatlh/healing.     He requires 24-hour rehabilitation nursing to manage bowel and bladder function, skin care, surgical incision, nutrition and fluid intake, pulmonary hygiene, pain control, safety, medication management and patient/family goals. In addition, rehabilitation nursing will reiterate and reinforce therapy skills and equipment use, including ADLs, as well as provide education to the patient and family. Cooper Harvey  is willing to participate in and is able to tolerate the proposed plan of care.    Rehabilitation Goals and Plan (Expected frequency & duration of treatment in the IRF):   Return to the Community, Modified Independent Level of Care and Outpatient Support  Anticipated Date of Rehabilitation Admission: 04/06/2021  Patient/Family oriented IRF level of care/facility/plan: Yes  Patient/Family willing to participate in IRF care/facility/plan: Yes  Patient able to tolerate IRF level of care proposed: Yes  Patient has potential to benefit IRF level of care proposed: Yes  Comments: Not Applicable    Special Needs or Precautions - Medical Necessity:  Safety Concerns/Precautions:  Fall Risk / High Risk for Falls, Balance, Cognition, Hard of Hearing, Visually Impaired and Bed / Chair Alarm  Complex Wound Care: R Neck Surgical Incision Care  Pain Management     Diet:   DIET ORDERS (From admission to next 24h)     Start     Ordered    04/02/21 1355  Diet Order Diet: Level 6 - Soft and Bite Sized; Liquid level: Level 0 - Thin; Tray Modifications (optional): SLP - 1:1 Supervision by Nursing, SLP - Deliver to Nursing Station  ALL MEALS     Question Answer Comment   Diet: Level 6 - Soft and Bite Sized    Liquid level Level 0 - Thin    Tray Modifications (optional) SLP - 1:1 Supervision by Nursing    Tray Modifications (optional) SLP - Deliver to Nursing Station        04/02/21 1354                Anticipated Discharge Destination / Patient/Family Goal:  Destination: Home with Assistance Support System: Spouse and Family   Anticipated home health services: OT, PT, SLP, Nursing and Aide  Previously used HH service/ provider: Not Applicable  Anticipated DME Needs: To be determined  Outpatient Services: To be determined  Alternative resources to address additional identified needs:   Outpatient follow up Vascular Surgery - appointment to be arranged  Outpatient follow up Physiatry Clinic - appointment to be arranged  Pre-Screen  Completed: 4/6/2021 12:29 PM Jeaneth Krause R.N.

## 2021-04-06 NOTE — DISCHARGE PLANNING
[facility] Transitional Care Coordination    Follow up with client/spouse Cynthia at bedside to discuss IRF referral.  Explained specifics of inpatient rehab, answered questions/concerns. Pt agreeable to rehab admission.  Spouse then requested TCC speak with family members in waiting room.      Son Ion Barrios, daughters Angela and Constantin present.  Reviewed specifics of inpatient rehab level care, answered questions.  All in agreement with rehab admission.  Family again raised question of additional visitor allowed to assist Cynthia who they report is also hard of hearing.  Advised only one visitor for duration of stay after confirmation of negative COVID status upon arrival [facility].  Family voiced understanding.  They inquired about more direct channel for Physician updates, as they feel Cynthia may not clearly comprehend treatment team recommendations.   Reassured them CM team [facility] is good resource for updates on patient progress, plan of care and should be able to facilitate Physician updates to family representative.  We reviewed insurance coverage - Medicare/AARP - insurance coverage to include no anticipated out-of-pocket cost for IRF.  Provided TCC contact information for any additional rehab concerns.

## 2021-04-06 NOTE — H&P
"REHABILITATION HISTORY AND PHYSICAL/POST ADMISSION EVALUATION    4/6/2021  4:45 PM  Cooper Harvey  RH29/02  Admission: 4/6/2021  [facility] Code/Reason for admission: 0001.4 - Stroke: No Paresis   Etiologic diagnosis/problem: Acute CVA (cerebrovascular accident) (HCC)  Chief Complaint: double vision    HPI:  The patient is a 86 y.o. male with a past medical history of BPH, hearing loss, hyperlipidemia; now admitted for acute inpatient rehabilitation with severe functional debility after an acute stroke.      On admission the patient and medical record report, he was transferred from [facility] with complaints of weeks of vision changes and a possible syncopal event. Negative Head CT. CTA with bilateral carotid stenosis, for which he was then transferred here to [facility]. MRI with punctate infarctions in the right cerebellum, right occipital and right frontal lobes. NIHSS 2. Patient is now s/p right carotid endarterectomy with Dr. Beavers on 4/2. HgbA1c 5.2, , ECHO EF 70 %. EKG with sinus bradycardia. He had an EEG that was negative for seizures. He was started on Depakote for agitation.     Patient current reports double vision. He is very hard of hearing, has aides, and can only hear if you yell right next to his ear. He denies any focal weakness. He is ambidextrous. 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 I remarked that he must be very strong, and he replied \"No just stupid,\" with a laugh. He reports one of his daughters is the person to talk to if there is any medical issues. Had discussion with him regarding his code status, which was CPR OK, but intubation not OK over at acute. Explained cannot do the first without the second and he reports he just doesn't want to be on a breathing machine for a long time or to be a burden to his children. Nursing report from acute indicates he's had some bladder incontinence.     Patient was " evaluated by Rehab Medicine physician and Physical Therapy, Occupational Therapy and Speech Therapy and determined to be appropriate for acute inpatient rehab and was transferred to [facility] on No admission date for patient encounter..    With this acute therapeutic intervention, this patient hopes to improve his functional status, and return to independent living with the supportive care of spouse.    REVIEW OF SYSTEMS:     A complete review of systems was performed and was negative in detail with the exception of items mentioned elsewhere in this document.    PMH:  Past Medical History:   Diagnosis Date   • [place] (hard of hearing) 04/02/2021   • Hydrocele, unspecified    • Hypertrophy of prostate without urinary obstruction and other lower urinary tract symptoms (LUTS)    • Mixed hyperlipidemia        PSH:  Past Surgical History:   Procedure Laterality Date   • PB THROMBOENDARTECTMY NECK,NECK INCIS Right 4/2/2021    Procedure: ENDARTERECTOMY, CAROTID;  Surgeon: Willie Beavers M.D.;  Location: SURGERY [facility];  Service: Vascular   • PLASTIC SURGERY  February 2009    large skin cancer removed from the top of head with skin graft--donor site left anterior thigh   • HEMORRHOIDECTOMY  2004   • EYE SURGERY  6/05 ; 7/05    Bilateral Cataracts       Family History   Problem Relation Age of Onset   • Cancer Mother         MEDICATIONS:  Current Facility-Administered Medications   Medication Dose   • hydrOXYzine HCl (ATARAX) tablet 50 mg  50 mg   • melatonin tablet 3 mg  3 mg   • Respiratory Therapy Consult     • Pharmacy Consult Request ...Pain Management Review 1 Each  1 Each   • hydrALAZINE (APRESOLINE) tablet 10 mg  10 mg   • acetaminophen (Tylenol) tablet 650 mg  650 mg   • lactulose 20 GM/30ML solution 30 mL  30 mL   • docusate sodium (ENEMEEZ) enema 283 mg  283 mg   • fleet enema 133 mL  1 Each   • artificial tears ophthalmic solution 1 Drop  1 Drop   • benzocaine-menthol (CEPACOL) lozenge  1 Lozenge  1 Lozenge   • mag hydrox-al hydrox-simeth (MAALOX PLUS ES or MYLANTA DS) suspension 20 mL  20 mL   • ondansetron (ZOFRAN ODT) dispertab 4 mg  4 mg    Or   • ondansetron (ZOFRAN) syringe/vial injection 4 mg  4 mg   • traZODone (DESYREL) tablet 50 mg  50 mg   • sodium chloride (OCEAN) 0.65 % nasal spray 2 Spray  2 Spray   • midazolam (VERSED) 5 mg/mL (1 mL vial)  5 mg   • senna-docusate (PERICOLACE or SENOKOT S) 8.6-50 MG per tablet 2 tablet  2 tablet    And   • polyethylene glycol/lytes (MIRALAX) PACKET 1 Packet  1 Packet    And   • magnesium hydroxide (MILK OF MAGNESIA) suspension 30 mL  30 mL    And   • bisacodyl (DULCOLAX) suppository 10 mg  10 mg   • enoxaparin (LOVENOX) inj 40 mg  40 mg   • [START ON 4/7/2021] amLODIPine (NORVASC) tablet 10 mg  10 mg   • [START ON 4/7/2021] aspirin (ASA) chewable tab 81 mg  81 mg   • atorvastatin (LIPITOR) tablet 80 mg  80 mg   • divalproex (DEPAKOTE) delayed-release tablet 250 mg  250 mg   • [START ON 4/7/2021] lisinopril (PRINIVIL) tablet 20 mg  20 mg       ALLERGIES:  Vicodin [hydrocodone-acetaminophen]    PSYCHOSOCIAL HISTORY:  Pre-mobidly, the patient lived in a single level home with 5 steps to enter, in [place], [place] with spouse. He has been  to his wife for over 50 years. They have 5 children. He's had multiple jobs, in [place] and in [facility]. Wife apparently is also hard of hearing.     LEVEL OF FUNCTION PRIOR TO DISABILTY:  Independent    LEVEL OF FUNCTION PRIOR TO ADMISSION to [facility]:  PT:  Gait Level Of Assist: Minimal Assist  Assistive Device: Front Wheel Walker  Distance (Feet): 10  Deviation: Shuffled Gait, Decreased Heel Strike, Decreased Toe Off, Bradykinetic(heavy reliance on FWW for balance. )  # of Stairs Climbed: 0  Level of Assist with Stairs: Supervised  Weight Bearing Status: no restrictions.   Supine to Sit: Minimal Assist  Sit to Supine: Minimal Assist  Scooting: Minimal Assist  Rolling: Minimum Assist to  "Lt.  Comments: cues for sequencing   Sit to Stand: Minimal Assist  Bed, Chair, Wheelchair Transfer: Unable to Participate  Toilet Transfers: Unable to Participate  Transfer Method: Stand Step  Sitting in Chair: NT  Sitting Edge of Bed: 8 min   Standing: 3 min     OT:  Eating: Supervision  Bathing: (discussed home s/u, AE )  Upper Body Dressing: Minimal Assist  Lower Body Dressing: Maximal Assist  Toileting: Maximal Assist(jones in place)    SLP:  Problem List: Dysarthia, Cognitive-Linguistic Deficits, Reading Comprehension Deficit, Hearing Deficit  Diet / Liquid Recommendation: Soft & Bite-Sized (6) - (Dysphagia III), Thin (0)    CURRENT LEVEL OF FUNCTION:   Same as level of function prior to admission to [facility]    PHYSICAL EXAM:     VITAL SIGNS:   height is 1.727 m (5' 8\") and weight is 76 kg (167 lb 8.8 oz). His temporal temperature is 37.1 °C (98.8 °F). His blood pressure is 143/83 and his pulse is 76. His respiration is 18 and oxygen saturation is 96%.     GENERAL: No apparent distress  HEENT: Normocephalic/atraumatic, EOMI and PERRL, horizontal nystagmus to the right  CARDIAC: Regular rate and rhythm, normal S1, S2, no murmurs, no peripheral edema   LUNGS: Clear to auscultation, normal respiratory effort, on room air   ABDOMINAL: bowel sounds present, soft, nontender and nondistended    EXTREMITIES: no spasticity or no edema, thin skin with ecchymosis in bilateral forearms  MSK: No joint swelling    NEURO:    Mental status: alert  Speech: fluent, no aphasia or dysarthria    CRANIAL NERVES:  2,3: visual acuity grossly intact, PERRL  3,4,6: EOMI bilaterally, right horizontal nystagmus, + diplopia  5: intact in all branches  7: no facial asymmetry  8: very hard of hearing, with aides in place  9,10: symmetric palate elevation  11: SCM/Trapezius strength 5/5 bilaterally  12: tongue protrudes midline    Motor:  Shoulder flexors:  Right -  5/5, Left -  5/5  Elbow flexors:  Right -  5/5, Left - "  5/5  Elbow extensors:  Right -  5/5, Left -  5/5  Symmetrical   Hip flexors:  Right -  5/5, Left -  5/5  Knee ext:  Right -  5/5, Left -  5/5  Dorsiflexors:  Right -  5/5, Left -  5/5  EHL:  Right -  5/5, Left -  5/5  Plantar flexors:  Right -  5/5, Left -  5/5       RADIOLOGY:              Results for orders placed during the hospital encounter of 03/30/21   MR-BRAIN-W/O    Impression 1.  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  2.  Linear focus of gradient echo signal hypointensity in the left occipital lobe consistent with chronic hypertensive microhemorrhage, remote trauma or infection.  3.  Moderate diffuse cerebral substance loss.  4.  Mild microangiopathic ischemic change versus demyelination or gliosis.                                                                                               Results for orders placed during the hospital encounter of 03/30/21   CT-CTA NECK WITH & W/O-POST PROCESSING    Impression 1.  Atherosclerotic disease. Resulting stenosis in the carotid arteries is less than 50%.    2.  Resulting stenosis in the small right vertebral artery is greater than 50% distally. Stenosis in the proximal right subclavian artery is consistent with approximately 50% stenosis                                                                    LABS:  Recent Labs     04/04/21  0406 04/05/21  0312 04/06/21  0600   SODIUM 141 138 139   POTASSIUM 3.9 3.7 3.4*   CHLORIDE 109 106 105   CO2 23 22 27   GLUCOSE 107* 91 98   BUN 21 21 21   CREATININE 1.11 0.78 0.88   CALCIUM 8.2* 8.2* 8.4*     Recent Labs     04/04/21  0406 04/05/21  0312 04/06/21  0600   WBC 9.8 9.3 7.1   RBC 4.43* 4.36* 4.46*   HEMOGLOBIN 13.7* 13.6* 13.9*   HEMATOCRIT 41.5* 40.5* 41.4*   MCV 93.7 92.9 92.8   MCH 30.9 31.2 31.2   MCHC 33.0* 33.6* 33.6*   RDW 44.6 43.8 43.3   PLATELETCT 189 189 206   MPV 10.6 10.3 10.6         PRIMARY REHAB DIAGNOSIS:    This patient  is a 86 y.o. male admitted for acute inpatient rehabilitation with Acute CVA (cerebrovascular accident) (HCC).    IMPAIRMENTS:   Cognitive  ADLs/IADLs  Mobility    SECONDARY DIAGNOSIS/MEDICAL CO-MORBIDITIES AFFECTING FUNCTION:    Agitation  Hypertension  Carotid stenosis  S/p right carotid endarterectomy  Anemia  Hyperlipidemia  Hypokalemia  BPH  Sinus bradycardia    RELEVANT CHANGES SINCE PREADMISSION EVALUATION:    Status unchanged    The patient's rehabilitation potential is Excellent  The patient's medical prognosis is excellent    PLAN:   Discussion and Recommendations, discussed with the patient and/or family:   1. The patient requires an acute inpatient rehabilitation program with a coordinated program of care at an intensity and frequency not available at a lower level of care. This recommendation is substantiated by the patient's medical physicians who recommend that the patient's intervention and assessment of medical issues needs to be done at an acute level of care for patient's safety and maximum outcome.     2. A coordinated program of care will be supplied by an interdisciplinary team of physical therapy, occupational therapy, rehab physician, rehab nursing, and, if needed, speech therapy and rehab psychology. Rehab team presents a patient-specific rehabilitation and education program concentrating on prevention of future problems related to accessibility, mobility, skin, bowel, bladder, sexuality, and psychosocial and medical/surgical problems.     3. Need for Rehabilitation Physician: The rehab physician will be evaluating the patient on a multi-weekly basis to help coordinate the program of care. The rehab physician communicates between medical physicians, therapists, and nurses to maximize the patient's potential outcome. Specific areas in which the rehab physician will be providing daily assessment include the following:   A. Assessing the patient's heart rate and blood pressure response (vitals  monitoring) to activity and making adjustments in medications or conservative measures as needed.   B. The rehab physician will be assessing the frequency at which the program can be increased to allow the patient to reach optimal functional outcome.   C. The rehab physician will also provide assessments in daily skin care, especially in light of patient's impairments in mobility.   D. The rehab physician will provide special expertise in understanding how to work with functional impairment and recommend appropriate interventions, compensatory techniques, and education that will facilitate the patient's outcome.     4. Rehab R.N.   The rehab RN will be working with patient to carry over in room mobility and activities of daily living when the patient is not in 3 hours of skilled therapy. Rehab nursing will be working in conjunction with rehab physician to address all the medical issues above and continue to assess laboratory work and discuss abnormalities with the treating physicians, assess vitals, and response to activity, and discuss and report abnormalities with the rehab physician. Rehab RN will also continue daily skin care, supervise bladder/bowel program, instruct in medication administration, and ensure patient safety.     5. Therapies to treat at intensity and frequency of (may change after completion of evaluation by all therapeutic disciplines):       PT:  Physical therapy to address mobility, transfer, gait training and evaluation for adaptive equipment needs 1hour/day at least 5 days/week for the duration of the ELOS (see below)       OT:  Occupational therapy to address ADLs, self-care, home management training, functional mobility/transfers and assistive device evaluation, and community re-integration 1hour/day at least 5 days/week for the duration of the ELOS (see below).        ST/Dysphagia:  Speech therapy to address speech, language, and cognitive deficits as well as swallowing difficulties with  retraining/dysphagia management and community re-integration with comprehension, expression, cognitive training 1hour/day at least 5 days/week for the duration of the ELOS (see below).     6. Medical management / Rehabilitation Issues/Adverse Potential affecting function as part of rehabilitation plan.    Agitation  Depakote, titrate prior to discharge  PRN seroquel    Hypertension  Amlodipine  Lisinopril  PRN hydralazine  May need to consult hospitalist    Carotid stenosis  S/p right carotid endarterectomy  Statin  Aspirin  Outpatient follow up with Dr. Beavers    Anemia  Check am labs    Hyperlipidemia  Statin    Hypokalemia  Check am labs    BPH  With incontinence  Check PVRs    Sinus bradycardia  Monitor    I performed a complete drug regimen review and did not identify any potential clinically significant medication issues.    The patient's CODE STATUS was confirmed as FULL CODE on admission, with the patient and/or family at bedside.    REHABILITATION ISSUES/ADVERSE POTENTIAL:  1.  CVA (Cerebrovascular Accident): Continue aspirin for secondary prophylaxis as well as lipid and blood pressure management. Patient demonstrates functional deficits in strength, balance, coordination, and ADL's. Patient is admitted to [facility] for comprehensive rehabilitation therapy as described below.   Rehabilitation nursing monitors bowel and bladder control, educates on medication administration, co-morbidities and monitors patient safety.    2.  DVT prophylaxis:  Patient is on Lovenox for anticoagulation upon transfer. Encourage OOB. Monitor daily for signs and symptoms of DVT including but not limited to swelling and pain to prevent the development of DVT that may interfere with therapies.    3.  Pain: No issues with pain currently / Controlled with as needed oral analgesics.    4.  Nutrition/Dysphagia: Dietician monitors nutrient intake, recommend supplements prn and provide nutrition education to  pt/family to promote optimal nutrition for wound healing/recovery.     5.  Bladder/bowel:  Start bowel and bladder program, to prevent constipation, urinary retention (which may lead to UTI), and urinary incontinence (which will impact upon pt's functional independence).   - TV Q3h while awake with post void bladder scans, I&O cath for PVRs >400  - up to commode after meal     6.  Skin/dermal ulcer prophylaxis: Monitor for new skin conditions with q.2 h. turns as required to prevent the development of skin breakdown.     7.  Cognition/Behavior:  Psychologist Dr. Mcfadden provides adjustment counseling as needed to illness and psychosocial barriers that may be potential barriers to rehabilitation.     8. Respiratory therapy: RT performs O2 management prn, breathing retraining, pulmonary hygiene and bronchospasm management prn to optimize participation in therapies.    Pt was seen today for 71 min, and entire time spent in face-to-face contact was >50% in counseling and coordination of care as detailed in A/P above.        GOALS/EXPECTED LEVEL OF FUNCTION BASED ON CURRENT MEDICAL AND FUNCTIONAL STATUS (may change based on patient's medical status and rate of impairment recovery):  Transfers:   Supervision  Mobility/Gait:   Supervision  ADL's:   Supervision  Cognition:  Least Verbal cues    DISPOSITION: Discharge to pre-morbid independent living setting with the supportive care of patient's spouse.      ELOS: 10-14 days    Sugey Barkley M.D.  Physical Medicine and Rehabilitation

## 2021-04-06 NOTE — DISCHARGE SUMMARY
Discharge Summary    Date of Admission: 3/30/2021  Date of Discharge: 4/6/2021  Discharging Attending: GREGORIO Rich M.D.   Discharging Senior Resident: Dr. Miller  Discharging Intern: Dr. Reddy    CHIEF COMPLAINT ON ADMISSION  Transient vision changes    Reason for Admission  Mental status change    Admission Date  3/30/2021    CODE STATUS  Partial Code    HPI & HOSPITAL COURSE  This is a 86 y.o. male who presented on 3/30/2021 with transient vision changes.  He has a previous history of BPH with LUTS, hyperlipidemia and untreated hypertension.  He presented from [facility] in [place], [place]. CTA was performed at outside hospital, which showed 60% stenosis of the bilateral carotid arteries.  Request was made for transfer to [facility] for further work-up with MRI of the brain as well as vascular surgery consultation.  NIH stroke score was 2 with loss points for orientation.     Outside medical records were reviewed.  Laboratory studies were significant with hyponatremia sodium of 146, creatinine elevated at 1.43, GFR of 50.  Total cholesterol 214, triglycerides 92, , HDL 46.  EKG showed sinus bradycardia.  He was started on aspirin and statin and admitted to telemetry for further work-up and observation.     Given his initial presentation, lack of focal neurologic deficits and unclear history EEG was done which was negative.  MRI of the brain was done which did show multiple punctate acute infarcts in the right cerebellar hemisphere, right occipital cortex and right frontal cortex, an area of chronic hypertensive microhemorrhages, moderate diffuse cerebral submucosal loss and mild microangiopathic ischemic changes.  Echo with bubble was negative.  Given his MRI findings and the carotid stenosis seen on CTA, vascular surgery was consulted and after discussion with both the patient and his family it was decided to pursue a right carotid endarterectomy on 4/2/2021.   Patient tolerated the procedure well.  Neurology was also consulted who recommended secondary prevention with blood pressure control, statin and antiplatelet therapy.  He worked with physical therapy occupational therapy and speech therapy and remained stable for the duration of his hospital stay. Physiatry saw the patient and recommended inpatient rehab and patient was discharged to rehab.     Therefore, he is discharged in good and stable condition to an inpatient rehabilitation hospital.    The patient met 2-midnight criteria for an inpatient stay at the time of discharge.    PHYSICAL EXAM ON DISCHARGE  Temp:  [36.7 °C (98 °F)-37.5 °C (99.5 °F)] 37.5 °C (99.5 °F)  Pulse:  [70-94] 77  Resp:  [15-18] 18  BP: (150-180)/(68-77) 163/77  SpO2:  [95 %-98 %] 95 %    Vitals and nursing note reviewed.   Constitutional:       General: He is not in acute distress.     Appearance: Normal appearance. He is well-developed.   HENT:      Head: Normocephalic and atraumatic.      Right Ear: External ear normal.      Left Ear: External ear normal.      Nose: Nose normal.      Mouth/Throat:      Mouth: Mucous membranes are moist.      Pharynx: Oropharynx is clear. No oropharyngeal exudate or posterior oropharyngeal erythema.   Eyes:      General: No scleral icterus.     Extraocular Movements: Extraocular movements intact.      Conjunctiva/sclera: Conjunctivae normal.   Neck:      Thyroid: No thyromegaly.      Vascular: No JVD.      Trachea: No tracheal deviation.   Cardiovascular:      Rate and Rhythm: Normal rate and regular rhythm.      Heart sounds: Normal heart sounds. No murmur. No friction rub. No gallop.    Pulmonary:      Effort: Pulmonary effort is normal. No respiratory distress.      Breath sounds: Normal breath sounds. No stridor. No wheezing or rales.   Abdominal:      General: Bowel sounds are normal. There is no distension.      Palpations: Abdomen is soft.      Tenderness: There is no abdominal tenderness.    Musculoskeletal:      Right lower leg: No edema.      Left lower leg: No edema.   Skin:     General: Skin is warm and dry.      Coloration: Skin is not pale.      Findings: No erythema or rash.   Neurological:      General: No focal deficit present.      Mental Status: He is alert. Mental status is at baseline.      Sensory: No sensory deficit.      Motor: Weakness (3/5 strength BLE) present. No abnormal muscle tone.      Comments: A&O x2-3. Speech is slightly broken.  Decreasing hearing BL (chronic)   Psychiatric:         Mood and Affect: Mood normal.         Behavior: Behavior normal.     Discharge Date  4/6/2021, to rehab facility    FOLLOW UP ITEMS POST DISCHARGE  -Follow-up with stroke clinic  -May need left CEA, follow-up with vascular clinic    DISCHARGE DIAGNOSES  Principal Problem:    Acute CVA (cerebrovascular accident) ([facility]) POA: Yes  Active Problems:    Carotid stenosis, bilateral POA: Yes    Mixed hyperlipidemia POA: Yes    Altered mental status, unspecified POA: Unknown    BPH (benign prostatic hyperplasia) POA: Yes    Do not intubate, cardiopulmonary resuscitation (CPR)-only code status POA: Yes    ANA LAURA (acute kidney injury) ([facility]) POA: Unknown  Resolved Problems:    Syncope POA: Yes    Hypernatremia POA: Yes    FOLLOW UP  No follow-up provider specified.    MEDICATIONS ON DISCHARGE     Medication List      START taking these medications      Instructions   acetaminophen 325 MG Tabs  Commonly known as: Tylenol   Take 2 Tablets by mouth every 6 hours as needed (Mild Pain; (Pain scale 1-3); Temp greater than 100.5 F).  Dose: 650 mg     amLODIPine 10 MG Tabs  Start taking on: April 7, 2021  Commonly known as: NORVASC   Take 1 tablet by mouth every day.  Dose: 10 mg     aspirin 81 MG Chew chewable tablet  Start taking on: April 7, 2021  Commonly known as: ASA   Chew 1 tablet every day.  Dose: 81 mg     atorvastatin 80 MG tablet  Commonly known as: LIPITOR   Take 1 tablet by mouth every evening.  Dose: 80  mg     bisacodyl 10 MG Supp  Commonly known as: DULCOLAX   Insert 1 Suppository into the rectum 1 time a day as needed (if magnesium hydroxide ineffective after 24 hours).  Dose: 10 mg     divalproex 250 MG Tbec  Commonly known as: DEPAKOTE   Take 1 tablet by mouth every 8 hours.  Dose: 250 mg     enoxaparin 40 MG/0.4ML Soln inj  Commonly known as: LOVENOX   Inject 40 mg under the skin every day at 6 PM.  Dose: 40 mg     haloperidol lactate 5 MG/ML Soln  Commonly known as: HALDOL   Infuse 0.4 mL into a venous catheter every 6 hours as needed.  Dose: 2 mg     labetalol 5 MG/ML Soln  Commonly known as: NORMODYNE/TRANDATE   Infuse 2 mL into a venous catheter every four hours as needed (see admin instructions for parameters).  Dose: 10 mg     lisinopril 20 MG Tabs  Start taking on: April 7, 2021  Commonly known as: PRINIVIL   Take 1 tablet by mouth every day.  Dose: 20 mg     magnesium hydroxide 400 MG/5ML Susp  Commonly known as: MILK OF MAGNESIA   Take 30 mL by mouth 1 time a day as needed (if polyethylene glycol ineffective after 24 hours).  Dose: 30 mL     polyethylene glycol/lytes 17 g Pack  Commonly known as: MIRALAX   Take 1 Packet by mouth 1 time a day as needed (if sennosides and docusate ineffective after 24 hours).  Dose: 17 g     senna-docusate 8.6-50 MG Tabs  Commonly known as: PERICOLACE or SENOKOT S   Take 2 Tablets by mouth 2 times a day.  Dose: 2 tablet          Allergies  Allergies   Allergen Reactions   • Vicodin [Hydrocodone-Acetaminophen] Rash     rash     DIET  Orders Placed This Encounter   Procedures   • Diet Order Diet: Level 6 - Soft and Bite Sized; Liquid level: Level 0 - Thin; Tray Modifications (optional): SLP - 1:1 Supervision by Nursing, SLP - Deliver to Nursing Station     Standing Status:   Standing     Number of Occurrences:   1     Order Specific Question:   Diet:     Answer:   Level 6 - Soft and Bite Sized [23]     Order Specific Question:   Liquid level     Answer:   Level 0 - Thin      Order Specific Question:   Tray Modifications (optional)     Answer:   SLP - 1:1 Supervision by Nursing     Order Specific Question:   Tray Modifications (optional)     Answer:   SLP - Deliver to Nursing Station     ACTIVITY  As tolerated and directed by rehab.  Weight bearing as tolerated    CONSULTATIONS  Dr. Bailon with Neurology Service consulted.  Treatment options were discussed and plan of care agreed upon.   Dr. Beavers with vascular surgery consulted.  Treatment options were discussed and plan of care agreed upon.  Dr. Rowland with physical medicine and rehab consulted.  Treatment options were discussed and plan of care agreed upon.    PROCEDURES  3/31/2021: Routine video electroencephalogram  4/2/2021: Right carotid endarterectomy with neuro monitoring    Time spent on discharge: 120 minutes

## 2021-04-06 NOTE — DISCHARGE PLANNING
Anticipated Discharge Disposition: [facility] via [facility] at 3:30 pm today.    Action: Spoke with patient's spouse at the bedside about [facility]. She verbalized understanding and agreement and signed [employer] transfer form.    Barriers to Discharge: None    Plan: Will continue to follow for any discharge needs/barriers

## 2021-04-06 NOTE — PROGRESS NOTES
Received bedside report from RN, pt care assumed, VSS, pt assessment complete. Pt AAOx3 disoriented to year with no complaint of pain at this time. No signs of acute distress noted at this time. Plan of care discussed with pt and verbalizes no questions. Pt denies any additional needs at this time. Bed locked/in lowest position, bed alarm on, pt educated on fall risk and verbalized understanding, call light within reach, hourly rounding initiated.

## 2021-04-06 NOTE — DISCHARGE INSTRUCTIONS
Discharge Instructions    Discharged to other by medical transportation with escort. Discharged via wheelchair, hospital escort: Yes.  Special equipment needed: Not Applicable    Be sure to schedule a follow-up appointment with your primary care doctor or any specialists as instructed.     Discharge Plan:   Influenza Vaccine Indication: Not indicated: Previously immunized this influenza season and > 8 years of age    I understand that a diet low in cholesterol, fat, and sodium is recommended for good health. Unless I have been given specific instructions below for another diet, I accept this instruction as my diet prescription.   Other diet: soft/bite sized, thin liquids    Special Instructions: None    · Is patient discharged on Warfarin / Coumadin?   No     Depression / Suicide Risk    As you are discharged from this [facility] facility, it is important to learn how to keep safe from harming yourself.    Recognize the warning signs:  · Abrupt changes in personality, positive or negative- including increase in energy   · Giving away possessions  · Change in eating patterns- significant weight changes-  positive or negative  · Change in sleeping patterns- unable to sleep or sleeping all the time   · Unwillingness or inability to communicate  · Depression  · Unusual sadness, discouragement and loneliness  · Talk of wanting to die  · Neglect of personal appearance   · Rebelliousness- reckless behavior  · Withdrawal from people/activities they love  · Confusion- inability to concentrate     If you or a loved one observes any of these behaviors or has concerns about self-harm, here's what you can do:  · Talk about it- your feelings and reasons for harming yourself  · Remove any means that you might use to hurt yourself (examples: pills, rope, extension cords, firearm)  · Get professional help from the community (Mental Health, Substance Abuse, psychological counseling)  · Do not be alone:Call your Safe Contact-  someone whom you trust who will be there for you.  · Call your local CRISIS HOTLINE 156-2695 or 474-843-7102  · Call your local Children's Mobile Crisis Response Team [place] (212) 102-1976 or www.Fastacash  · Call the toll free National Suicide Prevention Hotlines   · National Suicide Prevention Lifeline 750-947-LYHS (1796)  · National Hope Line Network 800-SUICIDE (469-3072)    Stroke/CVA/TIA/Hemorrhagic Ischemia Discharge Instructions  You have had a stroke. Your risk factors have been identified as follows:  Carotid Stenosis   It is important that you reduce your risk factors to avoid another stroke in the future. Here are some general guidelines to follow:  · Eat healthy - avoid food high in fat.  · Get regular exercise.  · Maintain a healthy weight.  · Avoid smoking.  · Avoid alcohol and illegal drug use.  · Take your medications as directed.  For more information regarding risk factors, refer to pages 17-19 in your Stroke Patient Education Guide. Stroke Education Guide was given to patient.    Warning signs of a stroke include (which can also be found on page 3 of your Stroke Patient Education Guide):  · Sudden numbness of weakness of the face, arm or leg (especially on one side of the body).  · Sudden confusion, trouble speaking or understanding.  · Sudden trouble seeing in one or both eyes.  · Sudden trouble walking, dizziness, loss of balance or coordination.  · Sudden severe headache with no known cause.  It is very important to get treatment quickly when a stroke occurs. If you experience any of the above warning signs, call 911 immediately.     Some patients who have had a stroke will be going home on a blood thinner medication called Warfarin (Coumadin).  This medication requires very close monitoring and follow up.  This follow up can be provided by either your Primary Care Physician or by [facility]'s Outpatient Anticoagulation Service.  The Outpatient Anticoagulation Service is located at  the [facility] at [facility] ([address]).  If you do not know when your follow up appointment is scheduled, call [phone] to verify your appointment time.      · Is patient discharged on Warfarin / Coumadin?   No     Depression / Suicide Risk    As you are discharged from this [facility], it is important to learn how to keep safe from harming yourself.    Recognize the warning signs:  · Abrupt changes in personality, positive or negative- including increase in energy   · Giving away possessions  · Change in eating patterns- significant weight changes-  positive or negative  · Change in sleeping patterns- unable to sleep or sleeping all the time   · Unwillingness or inability to communicate  · Depression  · Unusual sadness, discouragement and loneliness  · Talk of wanting to die  · Neglect of personal appearance   · Rebelliousness- reckless behavior  · Withdrawal from people/activities they love  · Confusion- inability to concentrate     If you or a loved one observes any of these behaviors or has concerns about self-harm, here's what you can do:  · Talk about it- your feelings and reasons for harming yourself  · Remove any means that you might use to hurt yourself (examples: pills, rope, extension cords, firearm)  · Get professional help from the community (Mental Health, Substance Abuse, psychological counseling)  · Do not be alone:Call your Safe Contact- someone whom you trust who will be there for you.  · Call your local CRISIS HOTLINE 721-5406 or 519-436-8732  · Call your local Children's Mobile Crisis Response Team [place] (913) 940-8349 or www.[employer] Mobile  · Call the toll free National Suicide Prevention Hotlines   · National Suicide Prevention Lifeline 792-210-QGLY (8987)  · National Hope Line Network 800-SUICIDE (757-2597)          Ischemic Stroke    An ischemic stroke is the sudden death of brain tissue. Blood carries oxygen  to all areas of the body. This type of stroke happens when your blood does not flow to your brain like normal. Your brain cannot get the oxygen it needs. This is an emergency. It must be treated right away.  Symptoms of a stroke usually happen all of a sudden. You may notice them when you wake up. They can include:  · Weakness or loss of feeling in your face, arm, or leg. This often happens on one side of the body.  · Trouble walking.  · Trouble moving your arms or legs.  · Loss of balance or coordination.  · Feeling confused.  · Trouble talking or understanding what people are saying.  · Slurred speech.  · Trouble seeing.  · Seeing two of one object (double vision).  · Feeling dizzy.  · Feeling sick to your stomach (nauseous) and throwing up (vomiting).  · A very bad headache for no reason.  Get help as soon as any of these problems start. This is important. Some treatments work better if they are given right away. These include:  · Aspirin.  · Medicines to control blood pressure.  · A shot (injection) of medicine to break up the blood clot.  · Treatments given in the blood vessel (artery) to take out the clot or break it up.  Other treatments may include:  · Oxygen.  · Fluids given through an IV tube.  · Medicines to thin out your blood.  · Procedures to help your blood flow better.  What increases the risk?  Certain things may make you more likely to have a stroke. Some of these are things that you can change, such as:  · Being very overweight (obesity).  · Smoking.  · Taking birth control pills.  · Not being active.  · Drinking too much alcohol.  · Using drugs.  Other risk factors include:  · High blood pressure.  · High cholesterol.  · Diabetes.  · Heart disease.  · Being , , , or .  · Being over age 60.  · Family history of stroke.  · Having had blood clots, stroke, or warning stroke (transient ischemic attack, TIA) in the past.  · Sickle cell  disease.  · Being a woman with a history of high blood pressure in pregnancy (preeclampsia).  · Migraine headache.  · Sleep apnea.  · Having an irregular heartbeat (atrial fibrillation).  · Long-term (chronic) diseases that cause soreness and swelling (inflammation).  · Disorders that affect how your blood clots.  Follow these instructions at home:  Medicines  · Take over-the-counter and prescription medicines only as told by your doctor.  · If you were told to take aspirin or another medicine to thin your blood, take it exactly as told by your doctor.  ? Taking too much of the medicine can cause bleeding.  ? If you do not take enough, it may not work as well.  · Know the side effects of your medicines. If you are taking a blood thinner, make sure you:  ? Hold pressure over any cuts for longer than usual.  ? Tell your dentist and other doctors that you take this medicine.  ? Avoid activities that may cause damage or injury to your body.  Eating and drinking  · Follow instructions from your doctor about what you cannot eat or drink.  · Eat healthy foods.  · If you have trouble with swallowing, do these things to avoid choking:  ? Take small bites when eating.  ? Eat foods that are soft or pureed.  Safety  · Follow instructions from your health care team about physical activity.  · Use a walker or cane as told by your doctor.  · Keep your home safe so you do not fall. This may include:  ? Having experts look at your home to make sure it is safe.  ? Putting grab bars in the bedroom and bathroom.  ? Using raised toilets.  ? Putting a seat in the shower.  General instructions  · Do not use any tobacco products.  ? Examples of these are cigarettes, chewing tobacco, and e-cigarettes.  ? If you need help quitting, ask your doctor.  · Limit how much alcohol you drink. This means no more than 1 drink a day for nonpregnant women and 2 drinks a day for men. One drink equals 12 oz of beer, 5 oz of wine, or 1½ oz of hard  "liquor.  · If you need help to stop using drugs or alcohol, ask your doctor to refer you to a program or specialist.  · Stay active. Exercise as told by your doctor.  · Keep all follow-up visits as told by your doctor. This is important.  Get help right away if:    · You have any signs of a stroke. \"BE FAST\" is an easy way to remember the main warning signs:  ? B - Balance. Signs are dizziness, sudden trouble walking, or loss of balance.  ? E - Eyes. Signs are trouble seeing or a change in how you see.  ? F - Face. Signs are sudden weakness or loss of feeling of the face, or the face or eyelid drooping on one side.  ? A - Arms. Signs are weakness or loss of feeling in an arm. This happens suddenly and usually on one side of the body.  ? S - Speech. Signs are sudden trouble speaking, slurred speech, or trouble understanding what people say.  ? T - Time. Time to call emergency services. Write down what time symptoms started.  · You have other signs of a stroke, such as:  ? A sudden, very bad headache with no known cause.  ? Feeling sick to your stomach (nausea).  ? Throwing up (vomiting).  ? Jerky movements you cannot control (seizure).  These symptoms may be an emergency. Do not wait to see if the symptoms will go away. Get medical help right away. Call your local emergency services (911 in the [place] Do not drive yourself to the hospital.  Summary  · An ischemic stroke is the sudden death of brain tissue.  · Symptoms of a stroke usually happen all of a sudden. You may notice them when you wake up.  · Get help if you have any warning signs of a stroke. This is important. Some treatments work better if they are given right away.  This information is not intended to replace advice given to you by your health care provider. Make sure you discuss any questions you have with your health care provider.  Document Released: 12/06/2012 Document Revised: 05/29/2019 Document Reviewed: 03/15/2017  Elsevier Patient Education © " 2020 Elsevier Inc[employer]        Stroke Prevention  Some medical conditions and behaviors are associated with a higher chance of having a stroke. You can help prevent a stroke by making nutrition, lifestyle, and other changes, including managing any medical conditions you may have.  What nutrition changes can be made?    · Eat healthy foods. You can do this by:  ? Choosing foods high in fiber, such as fresh fruits and vegetables and whole grains.  ? Eating at least 5 or more servings of fruits and vegetables a day. Try to fill half of your plate at each meal with fruits and vegetables.  ? Choosing lean protein foods, such as lean cuts of meat, poultry without skin, fish, tofu, beans, and nuts.  ? Eating low-fat dairy products.  ? Avoiding foods that are high in salt (sodium). This can help lower blood pressure.  ? Avoiding foods that have saturated fat, trans fat, and cholesterol. This can help prevent high cholesterol.  ? Avoiding processed and premade foods.  · Follow your health care provider's specific guidelines for losing weight, controlling high blood pressure (hypertension), lowering high cholesterol, and managing diabetes. These may include:  ? Reducing your daily calorie intake.  ? Limiting your daily sodium intake to 1,500 milligrams (mg).  ? Using only healthy fats for cooking, such as olive oil, canola oil, or sunflower oil.  ? Counting your daily carbohydrate intake.  What lifestyle changes can be made?  · Maintain a healthy weight. Talk to your health care provider about your ideal weight.  · Get at least 30 minutes of moderate physical activity at least 5 days a week. Moderate activity includes brisk walking, biking, and swimming.  · Do not use any products that contain nicotine or tobacco, such as cigarettes and e-cigarettes. If you need help quitting, ask your health care provider. It may also be helpful to avoid exposure to secondhand smoke.  · Limit alcohol intake to no more than 1 drink a day for  nonpregnant women and 2 drinks a day for men. One drink equals 12 oz of beer, 5 oz of wine, or 1½ oz of hard liquor.  · Stop any illegal drug use.  · Avoid taking birth control pills. Talk to your health care provider about the risks of taking birth control pills if:  ? You are over 35 years old.  ? You smoke.  ? You get migraines.  ? You have ever had a blood clot.  What other changes can be made?  · Manage your cholesterol levels.  ? Eating a healthy diet is important for preventing high cholesterol. If cholesterol cannot be managed through diet alone, you may also need to take medicines.  ? Take any prescribed medicines to control your cholesterol as told by your health care provider.  · Manage your diabetes.  ? Eating a healthy diet and exercising regularly are important parts of managing your blood sugar. If your blood sugar cannot be managed through diet and exercise, you may need to take medicines.  ? Take any prescribed medicines to control your diabetes as told by your health care provider.  · Control your hypertension.  ? To reduce your risk of stroke, try to keep your blood pressure below 130/80.  ? Eating a healthy diet and exercising regularly are an important part of controlling your blood pressure. If your blood pressure cannot be managed through diet and exercise, you may need to take medicines.  ? Take any prescribed medicines to control hypertension as told by your health care provider.  ? Ask your health care provider if you should monitor your blood pressure at home.  ? Have your blood pressure checked every year, even if your blood pressure is normal. Blood pressure increases with age and some medical conditions.  · Get evaluated for sleep disorders (sleep apnea). Talk to your health care provider about getting a sleep evaluation if you snore a lot or have excessive sleepiness.  · Take over-the-counter and prescription medicines only as told by your health care provider. Aspirin or blood  thinners (antiplatelets or anticoagulants) may be recommended to reduce your risk of forming blood clots that can lead to stroke.  · Make sure that any other medical conditions you have, such as atrial fibrillation or atherosclerosis, are managed.  What are the warning signs of a stroke?  The warning signs of a stroke can be easily remembered as BEFAST.  · B is for balance. Signs include:  ? Dizziness.  ? Loss of balance or coordination.  ? Sudden trouble walking.  · E is for eyes. Signs include:  ? A sudden change in vision.  ? Trouble seeing.  · F is for face. Signs include:  ? Sudden weakness or numbness of the face.  ? The face or eyelid drooping to one side.  · A is for arms. Signs include:  ? Sudden weakness or numbness of the arm, usually on one side of the body.  · S is for speech. Signs include:  ? Trouble speaking (aphasia).  ? Trouble understanding.  · T is for time.  ? These symptoms may represent a serious problem that is an emergency. Do not wait to see if the symptoms will go away. Get medical help right away. Call your local emergency services (911 in the [place] Do not drive yourself to the hospital.  · Other signs of stroke may include:  ? A sudden, severe headache with no known cause.  ? Nausea or vomiting.  ? Seizure.  Where to find more information  For more information, visit:  · American Stroke Association: www.strokeassociation.org  · National Stroke Association: www.stroke.org  Summary  · You can prevent a stroke by eating healthy, exercising, not smoking, limiting alcohol intake, and managing any medical conditions you may have.  · Do not use any products that contain nicotine or tobacco, such as cigarettes and e-cigarettes. If you need help quitting, ask your health care provider. It may also be helpful to avoid exposure to secondhand smoke.  · Remember BEFAST for warning signs of stroke. Get help right away if you or a loved one has any of these signs.  This information is not intended to  replace advice given to you by your health care provider. Make sure you discuss any questions you have with your health care provider.  Document Released: 01/25/2006 Document Revised: 11/30/2018 Document Reviewed: 01/23/2018  Elsevier Patient Education © 2020 [employer]          Carotid Stenosis and Carotid Endarterectomy  The carotid arteries are the large arteries in the neck that supply blood to the brain. Carotid stenosis is the narrowing of the carotid arteries. This narrowing can put you at risk for a stroke because it decreases blood flow to the brain (ischemic stroke or transient ischemic attack [TIA]). Carotid stenosis is also called carotid artery disease.  CAUSES   Carotid stenosis is usually caused by a buildup of plaque in the arteries. Plaque is also called atherosclerosis. Plaque can build up in any of your arteries. This commonly occurs as we grow older.  SYMPTOMS   Symptoms of decreased blood to your brain include:   · Feeling faint.  · Numbness or weakness in the arms or legs.  · Difficulty with movements.  · Difficulty speaking.  · Vision problems.  If the arteries are left untreated, you are at risk of having a stroke or TIA.  TREATMENT   Nonsurgical Treatment  If appropriate, your caregiver will suggest medical options other than surgery. These include taking aspirin or other medicines that thin your blood (anticoagulants). Your caregiver can help you decide if these are acceptable treatment methods for you.  Surgical Treatment: Carotid Endarterectomy  A carotid endarterectomy is a surgical procedure to clear blockages in the carotid artery.  RISKS AND COMPLICATIONS  · Bleeding.  · Infection.  · Stroke or TIA.  BEFORE THE PROCEDURE   · Do not eat or drink for as long as directed by your caregiver before the procedure.  · Your caregiver will advise you if there are any medicines that need to be withheld before the surgery, and for how long.  PROCEDURE   You will be given a drug to make you  sleep (general anesthesia). After you receive the anesthetic, the surgeon will make a small cut (incision) in your neck to expose the artery. An incision is made in the artery and the plaque is removed. The artery is then repaired and the incision is closed in your neck with stitches (sutures).  AFTER THE PROCEDURE   You will stay in the hospital right after surgery. Recovery time varies, depending on your age, condition, general health, and other factors. You are usually able to return to a normal lifestyle within a few weeks.  HOME CARE INSTRUCTIONS   · It is normal to be sore for a couple weeks after surgery. See your caregiver if this seems to be getting worse rather than better.  · Take showers, not baths, for a few days after surgery or until instructed otherwise by your caregiver. Do not take a bath or swim until directed by your surgeon.  · Only take over-the-counter or prescription medicines for pain, discomfort, or fever as directed by your caregiver.  · An anticoagulant may be prescribed after surgery. This medicine should be taken exactly as directed.  · Change bandages (dressings) as directed by your caregiver.  · Resume your normal activities as directed by your caregiver.  · Avoid lifting until you are instructed otherwise.  · Make an appointment to see your caregiver for suture or staple removal when instructed.  · Stop smoking if you smoke. This is a grave risk factor.  · Stop taking the pill (oral contraceptives) unless your caregiver recommends otherwise.  · Maintain good blood pressure control.  · Exercise regularly or as instructed.  · Lower blood lipids (cholesterol and triglycerides).  · Eat a heart-healthy diet. Manage heart problems if they are contributing to your risk.  SEEK MEDICAL CARE IF:   · There is increased bleeding from the wound.  · You notice redness, swelling, or increasing pain in the wound.  · You notice swelling in your neck or have difficulty breathing or talking.  · You  notice a bad smell or pus coming from the wound or dressing.  · You have an oral temperature above 102° F (38.9° C).  SEEK IMMEDIATE MEDICAL CARE IF:   · Your initial symptoms are getting worse instead of better.  · You develop any abnormal bruising or bleeding.  · You develop a rash.  · You have difficulty breathing.  · You develop any reaction or side effects to medicine given.  · You develop chest pain, shortness of breath, or pain or swelling in your legs.  · You have a return of symptoms or problems that caused you to have this surgery.  · You develop a temporary loss of vision.  · You develop temporary numbness on one side.  · You develop a temporary inability to speak (aphasia).        Atorvastatin tablets  What is this medicine?  ATORVASTATIN (a TORE va sta tin) is known as a HMG-CoA reductase inhibitor or 'statin'. It lowers the level of cholesterol and triglycerides in the blood. This drug may also reduce the risk of heart attack, stroke, or other health problems in patients with risk factors for heart disease. Diet and lifestyle changes are often used with this drug.  This medicine may be used for other purposes; ask your health care provider or pharmacist if you have questions.  COMMON BRAND NAME(S): Lipitor  What should I tell my health care provider before I take this medicine?  They need to know if you have any of these conditions:  · diabetes  · if you often drink alcohol  · history of stroke  · kidney disease  · liver disease  · muscle aches or weakness  · thyroid disease  · an unusual or allergic reaction to atorvastatin, other medicines, foods, dyes, or preservatives  · pregnant or trying to get pregnant  · breast-feeding  How should I use this medicine?  Take this medicine by mouth with a glass of water. Follow the directions on the prescription label. You can take it with or without food. If it upsets your stomach, take it with food. Do not take with grapefruit juice. Take your medicine at  regular intervals. Do not take it more often than directed. Do not stop taking except on your doctor's advice.  Talk to your pediatrician regarding the use of this medicine in children. While this drug may be prescribed for children as young as 10 for selected conditions, precautions do apply.  Overdosage: If you think you have taken too much of this medicine contact a poison control center or emergency room at once.  NOTE: This medicine is only for you. Do not share this medicine with others.  What if I miss a dose?  If you miss a dose, take it as soon as you can. If your next dose is to be taken in less than 12 hours, then do not take the missed dose. Take the next dose at your regular time. Do not take double or extra doses.  What may interact with this medicine?  Do not take this medicine with any of the following medications:  · dasabuvir; ombitasvir; paritaprevir; ritonavir  · ombitasvir; paritaprevir; ritonavir  · posaconazole  · red yeast rice  This medicine may also interact with the following medications:  · alcohol  · birth control pills  · certain antibiotics like erythromycin and clarithromycin  · certain antivirals for HIV or hepatitis  · certain medicines for cholesterol like fenofibrate, gemfibrozil, and niacin  · certain medicines for fungal infections like ketoconazole and itraconazole  · colchicine  · cyclosporine  · digoxin  · grapefruit juice  · rifampin  This list may not describe all possible interactions. Give your health care provider a list of all the medicines, herbs, non-prescription drugs, or dietary supplements you use. Also tell them if you smoke, drink alcohol, or use illegal drugs. Some items may interact with your medicine.  What should I watch for while using this medicine?  Visit your doctor or health care professional for regular check-ups. You may need regular tests to make sure your liver is working properly.  Your health care professional may tell you to stop taking this  medicine if you develop muscle problems. If your muscle problems do not go away after stopping this medicine, contact your health care professional.  Do not become pregnant while taking this medicine. Women should inform their health care professional if they wish to become pregnant or think they might be pregnant. There is a potential for serious side effects to an unborn child. Talk to your health care professional or pharmacist for more information. Do not breast-feed an infant while taking this medicine.  This medicine may increase blood sugar. Ask your healthcare provider if changes in diet or medicines are needed if you have diabetes.  If you are going to need surgery or other procedure, tell your doctor that you are using this medicine.  This drug is only part of a total heart-health program. Your doctor or a dietician can suggest a low-cholesterol and low-fat diet to help. Avoid alcohol and smoking, and keep a proper exercise schedule.  This medicine may cause a decrease in Co-Enzyme Q-10. You should make sure that you get enough Co-Enzyme Q-10 while you are taking this medicine. Discuss the foods you eat and the vitamins you take with your health care professional.  What side effects may I notice from receiving this medicine?  Side effects that you should report to your doctor or health care professional as soon as possible:  · allergic reactions like skin rash, itching or hives, swelling of the face, lips, or tongue  · fever  · joint pain  · loss of memory  · redness, blistering, peeling or loosening of the skin, including inside the mouth  · signs and symptoms of high blood sugar such as being more thirsty or hungry or having to urinate more than normal. You may also feel very tired or have blurry vision.  · signs and symptoms of liver injury like dark yellow or brown urine; general ill feeling or flu-like symptoms; light-belly pain; unusually weak or tired; yellowing of the eyes or skin  · signs and  symptoms of muscle injury like dark urine; trouble passing urine or change in the amount of urine; unusually weak or tired; muscle pain or side or back pain  Side effects that usually do not require medical attention (report to your doctor or health care professional if they continue or are bothersome):  · diarrhea  · nausea  · stomach pain  · trouble sleeping  · upset stomach  This list may not describe all possible side effects. Call your doctor for medical advice about side effects. You may report side effects to FDA at 9-197-YDZ-5507.  Where should I keep my medicine?  Keep out of the reach of children.  Store between 20 and 25 degrees C (68 and 77 degrees F). Throw away any unused medicine after the expiration date.  NOTE: This sheet is a summary. It may not cover all possible information. If you have questions about this medicine, talk to your doctor, pharmacist, or health care provider.  © 2020 Elsevier/Gold Standard (2019-10-09 11:36:16)    · You develop temporary areas of weakness.  · You have problems or concerns that have not been answered.  MAKE SURE YOU:   · Understand these instructions.  · Will watch your condition.  · Will get help right away if you are not doing well or get worse.  Document Released: 12/15/2001 Document Revised: 03/11/2013 Document Reviewed: 06/25/2010  ExitCare® Patient Information ©2014 [employer].          Aspirin and Your Heart    Aspirin is a medicine that prevents the cells in the blood that are used for clotting, called platelets, from sticking together. Aspirin can be used to help reduce the risk of blood clots, heart attacks, and other heart-related problems.  Can I take aspirin?  Your health care provider will help you determine whether it is safe and beneficial for you to take aspirin daily. Taking aspirin daily may be helpful if you:  · Have had a heart attack or chest pain.  · Are at risk for a heart attack.  · Have undergone open-heart surgery, such as coronary  artery bypass surgery (CABG).  · Have had coronary angioplasty or a stent.  · Have had certain types of stroke or transient ischemic attack (TIA).  · Have peripheral artery disease (PAD).  · Have chronic heart rhythm problems such as atrial fibrillation and cannot take an anticoagulant.  · Have valve disease or have had surgery on a valve.  What are the risks?  Daily use of aspirin can cause side effects. Some of these include:  · Bleeding. Bleeding problems can be minor or serious. An example of a minor problem is a cut that does not stop bleeding. An example of a more serious problem is stomach bleeding or, rarely, bleeding into the brain. Your risk of bleeding is increased if you are also taking non-steroidal anti-inflammatory drugs (NSAIDs).  · Increased bruising.  · Upset stomach.  · An allergic reaction. People who have nasal polyps have an increased risk of developing an aspirin allergy.  General guidelines  · Take aspirin only as told by your health care provider. Make sure that you understand how much you should take and what form you should take. The two forms of aspirin are:  ? Non-enteric-coated.This type of aspirin does not have a coating and is absorbed quickly. This type of aspirin also comes in a chewable form.  ? Enteric-coated. This type of aspirin has a coating that releases the medicine very slowly. Enteric-coated aspirin might cause less stomach upset than non-enteric-coated aspirin. This type of aspirin should not be chewed or crushed.  · Limit alcohol intake to no more than 1 drink a day for nonpregnant women and 2 drinks a day for men. Drinking alcohol increases your risk of bleeding. One drink equals 12 oz of beer, 5 oz of wine, or 1½ oz of hard liquor.  Contact a health care provider if you:  · Have unusual bleeding or bruising.  · Have stomach pain or nausea.  · Have ringing in your ears.  · Have an allergic reaction that causes:  ? Hives.  ? Itchy skin.  ? Swelling of the lips, tongue, or  face.  Get help right away if you:  · Notice that your bowel movements are bloody, dark red, or black in color.  · Vomit or cough up blood.  · Have blood in your urine.  · Cough, have noisy breathing (wheeze), or feel short of breath.  · Have chest pain, especially if the pain spreads to the arms, back, neck, or jaw.  · Have a severe headache, or a headache with confusion, or dizziness.  These symptoms may represent a serious problem that is an emergency. Do not wait to see if the symptoms will go away. Get medical help right away. Call your local emergency services (911 in the [place] Do not drive yourself to the hospital.  Summary  · Aspirin can be used to help reduce the risk of blood clots, heart attacks, and other heart-related problems.  · Daily use of aspirin can increase your risk of side effects. Your health care provider will help you determine whether it is safe and beneficial for you to take aspirin daily.  · Take aspirin only as told by your health care provider. Make sure that you understand how much you can take and what form you can take.  This information is not intended to replace advice given to you by your health care provider. Make sure you discuss any questions you have with your health care provider.  Document Released: 11/30/2009 Document Revised: 10/18/2018 Document Reviewed: 10/18/2018  Elsevier Patient Education © 2020 Elsevier Inc.

## 2021-04-06 NOTE — PROGRESS NOTES
Discharge instructions given to patient and family at bedside, verbalizes understanding. Individualized instruction and stroke discharge information provided on stroke education, and worsening of symptoms needing follow-up care. Telemetry monitor removed, per [facility] RN okay to keep IV in. All belongings accounted for, all questions answered at this time. Patient discharged to [facility] with escort. Copy of escort's badge copied and placed in chart.

## 2021-04-07 PROBLEM — F05 SUNDOWNING: Status: ACTIVE | Noted: 2021-04-07

## 2021-04-07 LAB
ALBUMIN SERPL BCP-MCNC: 3.5 G/DL (ref 3.2–4.9)
ALBUMIN/GLOB SERPL: 1.2 G/DL
ALP SERPL-CCNC: 77 U/L (ref 30–99)
ALT SERPL-CCNC: 17 U/L (ref 2–50)
ANION GAP SERPL CALC-SCNC: 6 MMOL/L (ref 7–16)
AST SERPL-CCNC: 20 U/L (ref 12–45)
BASOPHILS # BLD AUTO: 0.6 % (ref 0–1.8)
BASOPHILS # BLD: 0.04 K/UL (ref 0–0.12)
BILIRUB SERPL-MCNC: 0.5 MG/DL (ref 0.1–1.5)
BUN SERPL-MCNC: 26 MG/DL (ref 8–22)
CALCIUM SERPL-MCNC: 8.7 MG/DL (ref 8.5–10.5)
CHLORIDE SERPL-SCNC: 102 MMOL/L (ref 96–112)
CO2 SERPL-SCNC: 27 MMOL/L (ref 20–33)
CREAT SERPL-MCNC: 0.97 MG/DL (ref 0.5–1.4)
EOSINOPHIL # BLD AUTO: 0.23 K/UL (ref 0–0.51)
EOSINOPHIL NFR BLD: 3.4 % (ref 0–6.9)
ERYTHROCYTE [DISTWIDTH] IN BLOOD BY AUTOMATED COUNT: 43.8 FL (ref 35.9–50)
GLOBULIN SER CALC-MCNC: 2.9 G/DL (ref 1.9–3.5)
GLUCOSE SERPL-MCNC: 88 MG/DL (ref 65–99)
HCT VFR BLD AUTO: 42 % (ref 42–52)
HGB BLD-MCNC: 14 G/DL (ref 14–18)
IMM GRANULOCYTES # BLD AUTO: 0.04 K/UL (ref 0–0.11)
IMM GRANULOCYTES NFR BLD AUTO: 0.6 % (ref 0–0.9)
LYMPHOCYTES # BLD AUTO: 0.72 K/UL (ref 1–4.8)
LYMPHOCYTES NFR BLD: 10.7 % (ref 22–41)
MAGNESIUM SERPL-MCNC: 2.1 MG/DL (ref 1.5–2.5)
MCH RBC QN AUTO: 31.1 PG (ref 27–33)
MCHC RBC AUTO-ENTMCNC: 33.3 G/DL (ref 33.7–35.3)
MCV RBC AUTO: 93.3 FL (ref 81.4–97.8)
MONOCYTES # BLD AUTO: 0.79 K/UL (ref 0–0.85)
MONOCYTES NFR BLD AUTO: 11.7 % (ref 0–13.4)
NEUTROPHILS # BLD AUTO: 4.92 K/UL (ref 1.82–7.42)
NEUTROPHILS NFR BLD: 73 % (ref 44–72)
NRBC # BLD AUTO: 0 K/UL
NRBC BLD-RTO: 0 /100 WBC
PLATELET # BLD AUTO: 209 K/UL (ref 164–446)
PMV BLD AUTO: 10.3 FL (ref 9–12.9)
POTASSIUM SERPL-SCNC: 3.2 MMOL/L (ref 3.6–5.5)
PROT SERPL-MCNC: 6.4 G/DL (ref 6–8.2)
RBC # BLD AUTO: 4.5 M/UL (ref 4.7–6.1)
SARS-COV-2 RNA RESP QL NAA+PROBE: NOTDETECTED
SODIUM SERPL-SCNC: 135 MMOL/L (ref 135–145)
SPECIMEN SOURCE: NORMAL
WBC # BLD AUTO: 6.7 K/UL (ref 4.8–10.8)

## 2021-04-07 PROCEDURE — 82306 VITAMIN D 25 HYDROXY: CPT

## 2021-04-07 PROCEDURE — 700111 HCHG RX REV CODE 636 W/ 250 OVERRIDE (IP): Performed by: PHYSICAL MEDICINE & REHABILITATION

## 2021-04-07 PROCEDURE — A9270 NON-COVERED ITEM OR SERVICE: HCPCS | Performed by: PHYSICAL MEDICINE & REHABILITATION

## 2021-04-07 PROCEDURE — 92523 SPEECH SOUND LANG COMPREHEN: CPT

## 2021-04-07 PROCEDURE — 97162 PT EVAL MOD COMPLEX 30 MIN: CPT

## 2021-04-07 PROCEDURE — 99233 SBSQ HOSP IP/OBS HIGH 50: CPT | Performed by: PHYSICAL MEDICINE & REHABILITATION

## 2021-04-07 PROCEDURE — 97167 OT EVAL HIGH COMPLEX 60 MIN: CPT

## 2021-04-07 PROCEDURE — 85025 COMPLETE CBC W/AUTO DIFF WBC: CPT

## 2021-04-07 PROCEDURE — 80053 COMPREHEN METABOLIC PANEL: CPT

## 2021-04-07 PROCEDURE — 36415 COLL VENOUS BLD VENIPUNCTURE: CPT

## 2021-04-07 PROCEDURE — 97535 SELF CARE MNGMENT TRAINING: CPT

## 2021-04-07 PROCEDURE — 770010 HCHG ROOM/CARE - REHAB SEMI PRIVAT*

## 2021-04-07 PROCEDURE — 700102 HCHG RX REV CODE 250 W/ 637 OVERRIDE(OP): Performed by: PHYSICAL MEDICINE & REHABILITATION

## 2021-04-07 PROCEDURE — 83735 ASSAY OF MAGNESIUM: CPT

## 2021-04-07 PROCEDURE — 97530 THERAPEUTIC ACTIVITIES: CPT

## 2021-04-07 RX ORDER — QUETIAPINE FUMARATE 25 MG/1
25 TABLET, FILM COATED ORAL NIGHTLY
Status: DISCONTINUED | OUTPATIENT
Start: 2021-04-07 | End: 2021-04-20

## 2021-04-07 RX ORDER — LANOLIN ALCOHOL/MO/W.PET/CERES
3 CREAM (GRAM) TOPICAL
Status: DISCONTINUED | OUTPATIENT
Start: 2021-04-07 | End: 2021-04-24 | Stop reason: HOSPADM

## 2021-04-07 RX ORDER — LISINOPRIL 20 MG/1
30 TABLET ORAL
Status: DISCONTINUED | OUTPATIENT
Start: 2021-04-08 | End: 2021-04-15

## 2021-04-07 RX ORDER — ZIPRASIDONE MESYLATE 20 MG/ML
20 INJECTION, POWDER, LYOPHILIZED, FOR SOLUTION INTRAMUSCULAR
Status: DISCONTINUED | OUTPATIENT
Start: 2021-04-07 | End: 2021-04-20

## 2021-04-07 RX ORDER — POTASSIUM CHLORIDE 20 MEQ/1
40 TABLET, EXTENDED RELEASE ORAL ONCE
Status: COMPLETED | OUTPATIENT
Start: 2021-04-07 | End: 2021-04-07

## 2021-04-07 RX ADMIN — SENNOSIDES AND DOCUSATE SODIUM 2 TABLET: 8.6; 5 TABLET ORAL at 09:57

## 2021-04-07 RX ADMIN — ASPIRIN 81 MG CHEWABLE TABLET 81 MG: 81 TABLET CHEWABLE at 09:56

## 2021-04-07 RX ADMIN — DIVALPROEX SODIUM 250 MG: 250 TABLET, DELAYED RELEASE ORAL at 21:33

## 2021-04-07 RX ADMIN — QUETIAPINE FUMARATE 25 MG: 25 TABLET ORAL at 21:33

## 2021-04-07 RX ADMIN — ENOXAPARIN SODIUM 40 MG: 40 INJECTION SUBCUTANEOUS at 17:37

## 2021-04-07 RX ADMIN — MELATONIN TAB 3 MG 3 MG: 3 TAB at 21:33

## 2021-04-07 RX ADMIN — ATORVASTATIN CALCIUM 80 MG: 40 TABLET, FILM COATED ORAL at 21:33

## 2021-04-07 RX ADMIN — SENNOSIDES AND DOCUSATE SODIUM 2 TABLET: 8.6; 5 TABLET ORAL at 21:32

## 2021-04-07 RX ADMIN — DIVALPROEX SODIUM 250 MG: 250 TABLET, DELAYED RELEASE ORAL at 05:58

## 2021-04-07 RX ADMIN — LISINOPRIL 20 MG: 20 TABLET ORAL at 05:59

## 2021-04-07 RX ADMIN — AMLODIPINE BESYLATE 10 MG: 5 TABLET ORAL at 05:58

## 2021-04-07 RX ADMIN — DIVALPROEX SODIUM 250 MG: 250 TABLET, DELAYED RELEASE ORAL at 15:08

## 2021-04-07 RX ADMIN — POTASSIUM CHLORIDE 40 MEQ: 1500 TABLET, EXTENDED RELEASE ORAL at 10:01

## 2021-04-07 ASSESSMENT — BRIEF INTERVIEW FOR MENTAL STATUS (BIMS)
WHAT MONTH IS IT: ACCURATE WITHIN 5 DAYS
WHAT MONTH IS IT: MISSED BY 6 DAYS TO 1 MONTH
ASKED TO RECALL SOCK: NO, COULD NOT RECALL
ASKED TO RECALL BLUE: NO, COULD NOT RECALL
BIMS SUMMARY SCORE: 7
ASKED TO RECALL BED: NO, COULD NOT RECALL
WHAT YEAR IS IT: MISSED BY 1 YEAR
ASKED TO RECALL BED: NO, COULD NOT RECALL
WHAT DAY OF THE WEEK IS IT: INCORRECT
INITIAL REPETITION OF BED BLUE SOCK - FIRST ATTEMPT: 3
BIMS SUMMARY SCORE: 9
ASKED TO RECALL SOCK: NO, COULD NOT RECALL
INITIAL REPETITION OF BED BLUE SOCK - FIRST ATTEMPT: 3
WHAT DAY OF THE WEEK IS IT: INCORRECT
WHAT YEAR IS IT: CORRECT
ASKED TO RECALL BLUE: YES, NO CUE REQUIRED

## 2021-04-07 ASSESSMENT — GAIT ASSESSMENTS
DISTANCE (FEET): 25
ASSISTIVE DEVICE: HAND HELD ASSIST
GAIT LEVEL OF ASSIST: TOTAL ASSIST X 2
DEVIATION: ATAXIC;BRADYKINETIC;DECREASED HEEL STRIKE;DECREASED TOE OFF

## 2021-04-07 ASSESSMENT — ACTIVITIES OF DAILY LIVING (ADL)
TOILETING_LEVEL_OF_ASSIST_DESCRIPTION: ASSIST TO PULL PANTS UP;ASSIST FOR STANDING BALANCE;GRAB BAR;INCREASED TIME;INITIAL PREPARATION FOR TASK;SET-UP OF EQUIPMENT;SUPERVISION FOR SAFETY;VERBAL CUEING
TOILET_TRANSFER_DESCRIPTION: GRAB BAR;INCREASED TIME;INITIAL PREPARATION FOR TASK;SET-UP OF EQUIPMENT;SUPERVISION FOR SAFETY;VERBAL CUEING
BED_CHAIR_WHEELCHAIR_TRANSFER_DESCRIPTION: INCREASED TIME;SET-UP OF EQUIPMENT;VERBAL CUEING;SUPERVISION FOR SAFETY
TUB_SHOWER_TRANSFER_DESCRIPTION: GRAB BAR;SHOWER BENCH;INCREASED TIME;INITIAL PREPARATION FOR TASK;SET-UP OF EQUIPMENT;SUPERVISION FOR SAFETY;VERBAL CUEING
TOILETING: INDEPENDENT

## 2021-04-07 NOTE — FLOWSHEET NOTE
04/06/21 1720   Events/Summary/Plan   Events/Summary/Plan RT assessment   Vital Signs   Pulse 76   Respiration 18   Pulse Oximetry 96 %   $ Pulse Oximetry (Spot Check) Yes   Respiratory Assessment   Level of Consciousness Alert   Chest Exam   Work Of Breathing / Effort Within Normal Limits   Breath Sounds   RLL Breath Sounds Diminished   LLL Breath Sounds Diminished   Oxygen   O2 Delivery Device None - Room Air   Smoking History   Have you ever smoked Yes   Have you smoked in the last 12 months No   Confirm Quit Date 04/06/71

## 2021-04-07 NOTE — CARE PLAN
Problem: Safety  Goal: Will remain free from injury  Outcome: PROGRESSING AS EXPECTED  Note: Call light within reach, patient encouraged to use for assistance for any needs and for assistance for safe transferring.    Patient has wander guard on.   Goal: Will remain free from falls  Outcome: PROGRESSING AS EXPECTED

## 2021-04-07 NOTE — CARE PLAN
Problem: Communication  Goal: The ability to communicate needs accurately and effectively will improve  Note: Pt is very difficult of hearing even with hearing aids on.      Problem: Safety  Goal: Will remain free from injury  Note: Pt was found walking in the hallway unsupervised a little after being admitted thinking he was in a bowling alley, pt was escorted back to room, reoriented to new environment, educated of safety precautions and fall prevention measures, bed alarms are on, call light is within easy reach, pt was advised not to transfer without assistance and to use call light when assistance is needed. Night shift RN made aware of pt's impulsivity and wandering episode.

## 2021-04-07 NOTE — FLOWSHEET NOTE
04/06/21 1719   Patient History   Pulmonary Diagnosis none that pt knows of   Procedures Relevant to Respiratory Status none   Home O2 No   Nocturnal CPAP No   Home Treatments/Frequency No

## 2021-04-07 NOTE — THERAPY
Physical Therapy   Initial Evaluation     Patient Name: Cooper Harvey  Age:  86 y.o., Sex:  male  Medical Record #: [id number]  Today's Date: 4/7/2021     Subjective    Patient is seated in w/c upon arrival. Patient states he is frustrated with his physical ability.     Objective       04/07/21 1031   Cosignature   Documentation Review Approved with modifications made by preceptor in flowsheet   Prior Living Situation   Prior Services Home-Independent   Housing / Facility 1 Story House   Steps Into Home 5   Steps In Home 0   Rail Unable To Determine At This Time  (Unclear answer from patient)   Equipment Owned Front-Wheel Walker  (Unclear 4WW vs FWW; old from when spouse broke her leg)   Lives with - Patient's Self Care Capacity Spouse   Comments Need to verify home-set up with spouse; pt provides inconsistent responses   Prior Level of Functional Mobility   Bed Mobility Independent   Transfer Status Independent   Ambulation Independent   Distance Ambulation (Feet) 150  (Unclear )   Assistive Devices Used None   Stairs Independent   Prior Functioning: Everyday Activities   Self Care Independent   Indoor Mobility (Ambulation) Independent   Stairs Independent   Functional Cognition Independent   Prior Device Use None of the given options   Pain 0 - 10 Group   Location Head   Therapist Pain Assessment Prior to Activity   Cognition    Speech/ Communication Hard of Hearing   Orientation Level Not Oriented to Year;Not Oriented to Day;Not Oriented to Reason   Level of Consciousness Confused   Ability To Follow Commands 1 Step   Safety Awareness Impulsive   Attention Impaired  (Frequent cuing to stay on task)   Passive ROM Lower Body   Passive ROM Lower Body WDL   Active ROM Lower Body    Active ROM Lower Body  WDL   Strength Lower Body   Lower Body Strength  WDL   Rt Hip Flexion Strength 5 (N)   Rt Knee Flexion Strength 5 (N)   Rt Ankle Dorsiflexion Strength 5 (N)   Lt Hip Flexion Strength 5 (N)   Lt Knee Extension  Strength 5 (N)   Lt Ankle Dorsiflexion Strength 5 (N)   Sensation Lower Body   Lower Extremity Sensation   WDL   Rt Lower Extremity Light Touch   (Intact)   Rt Lower Extremity Proprioception   (Intact)   Lt Lower Extremity Light Touch   (Intact)   Lt Lower Extremity Proprioception   (Intact)   Paresthesia   (Denies )   Balance Assessment   Sitting Balance (Static) Fair   Standing Balance (Static) Fair -   Standing Balance (Dynamic) Poor +   Bed Mobility    Supine to Sit Stand by Assist  (VC for hand placement)   Sit to Supine Stand by Assist   Sit to Stand Contact Guard Assist   Scooting Stand by Assist  (VC )   Rolling Supervised   Comments HOB elevated, use of bed rail   Coordination Lower Body    Apraxia Right Lower Extremity Present   Apraxia Left Lower Extremity Present   Heel To Shin Right Impaired   Heel To Shin Left Impaired   Roll Left and Right   Assistance Needed Supervision;Verbal cues   Physical Assistance Level No physical assistance   CARE Score 4   Roll Left and Right Discharge Goal   Discharge Goal 6   Sit to Lying   Assistance Needed Supervision;Verbal cues   Physical Assistance Level No physical assistance   CARE Score 4   Sit to Lying Discharge Goal   Discharge Goal 6   Lying to Sitting on Side of Bed   Assistance Needed Supervision;Verbal cues;Adaptive equipment   Physical Assistance Level No physical assistance   CARE Score 4   Lying to Sitting on Side of Bed Discharge Goal   Discharge Goal 6   Sit to Stand   Assistance Needed Supervision;Verbal cues   Physical Assistance Level No physical assistance   CARE Score 4   Sit to Stand Discharge Goal   Discharge Goal 6   Chair/Bed-to-Chair Transfer   Assistance Needed Supervision;Adaptive equipment;Verbal cues   Physical Assistance Level No physical assistance   CARE Score 4   Chair/Bed-to-Chair Transfer Discharge Goal   Discharge Goal 6   Car Transfer   Reason if not Attempted Environmental limitations  (Isolation precaution )   CARE Score 10   Car  Transfer Discharge Goal   Discharge Goal 4   Walk 10 Feet   Assistance Needed Physical assistance;Verbal cues   Physical Assistance Level Total assistance   CARE Score 1   Walk 10 Feet Discharge Goal   Discharge Goal 4   Walk 50 Feet with Two Turns   Reason if not Attempted Safety concerns   CARE Score 88   Walk 50 Feet with Two Turns Discharge Goal   Discharge Goal 4   Walk 150 Feet   Reason if not Attempted Safety concerns   CARE Score 88   Walk 150 Feet Discharge Goal   Discharge Goal 4   Walking 10 Feet on Uneven Surfaces   Reason if not Attempted Safety concerns   CARE Score 88   Walking 10 Feet on Uneven Surfaces Discharge Goal   Discharge Goal 4   1 Step (Curb)   Reason if not Attempted Safety concerns   CARE Score 88   1 Step (Curb) Discharge Goal   Discharge Goal 4   4 Steps   Reason if not Attempted Safety concerns   CARE Score 88   4 Steps Discharge Goal   Discharge Goal 4   12 Steps   Reason if not Attempted Safety concerns   CARE Score 88   12 Steps Discharge Goal   Discharge Goal 9   Picking Up Object   Reason if not Attempted Safety concerns   CARE Score 88   Picking Up Object Discharge Goal   Discharge Goal 4   Wheel 50 Feet with Two Turns   Assistance Needed Supervision;Adaptive equipment;Verbal cues   Physical Assistance Level No physical assistance   CARE Score 4   Type of Wheelchair/Scooter Manual   Wheel 50 Feet with Two Turns Discharge Goal   Discharge Goal 4   Wheel 150 Feet   Reason if not Attempted Safety concerns   CARE Score 88   Type of Wheelchair/Scooter Manual   Wheel 150 Feet Discharge Goal   Discharge Goal 4   Gait Functional Level of Assist    Gait Level Of Assist Total Assist X 2  (Nicholas with 2nd person for w/c follow)   Assistive Device Hand Held Assist   Distance (Feet) 25   # of Times Distance was Traveled 1   Deviation Ataxic;Bradykinetic;Decreased Heel Strike;Decreased Toe Off   Wheelchair Functional Level of Assist   Wheelchair Assist Supervised   Distance Wheelchair (Feet or  Distance) 50   Wheelchair Description Adaptive equipment;Supervision for safety;Verbal cueing   Stairs Functional Level of Assist   Level of Assist with Stairs Unable to Participate   Transfer Functional Level of Assist   Bed, Chair, Wheelchair Transfer Contact Guard Assist   Bed Chair Wheelchair Transfer Description Increased time;Set-up of equipment;Verbal cueing;Supervision for safety  (SPT from w/c <> bed)   Problem List    Problems Impaired Ambulation;Impaired Balance;Impaired Coordination;Safety Awareness Deficits / Cognition;Impaired Bed Mobility;Impaired Transfers;Impaired Vision;Decreased Activity Tolerance   Precautions   Precautions Fall Risk   Comments Impulsivity, Diploplia, [place], Wander-Guard   Current Discharge Plan   Current Discharge Plan Return to Prior Living Situation   Interdisciplinary Plan of Care Collaboration   IDT Collaboration with  Nursing   Patient Position at End of Therapy Seated;Chair Alarm On;Self Releasing Lap Belt Applied;Call Light within Reach;Tray Table within Reach   Collaboration Comments CLOF   Benefit   Therapy Benefit Patient Would Benefit from Inpatient Rehabilitation Physical Therapy to Maximize Functional [place] with ADLs, IADLs and Mobility.   Strengths & Barriers   Strengths Adequate strength;Independent prior level of function;Pleasant and cooperative;Willingly participates in therapeutic activities   Barriers Confused;Decreased endurance;Difficulty following instructions;Hearing impairment;Home accessibility;Impaired balance;Impaired carryover of learning;Impaired functional cognition;Impaired insight/denial of deficits;Impulsive;Limited mobility;Impaired activity tolerance   PT Total Time Spent   PT Individual Total Time Spent (Mins) 60   PT Charge Group   PT Therapeutic Activities 1   PT Evaluation PT Evaluation Mod     Pt demonstrated intermittent impulsivity during session.  Completed collaboration with therapy tech to place alarming seat belt later in the  afternoon.      Assessment  Patient is 86 y.o. male with a diagnosis of acute acute CVA (cerebrovascular accident). MRI with punctate infarctions in the right cerebellum, right occipital and right frontal lobes.Patient is now s/p right carotid endarterectomy. Additional factors influencing patient status / progress (ie: cognitive factors, co-morbidities, social support, etc): confusion, difficulty following instructions, [place], impulsive and independent prior level of function.      Plan  Recommend Physical Therapy  minutes per day 5-7 days per week for 10-14 days for the following treatments:  PT Group Therapy, PT Gait Training, PT Therapeutic Exercises, PT Neuro Re-Ed/Balance, PT Aquatic Therapy, PT Therapeutic Activity and PT Evaluation.    Passport items to be completed:  Get in/out of bed safely, in/out of a vehicle, safely use mobility device, walk or wheel around home/community, navigate up and down stairs, show how to get up/down from the ground, ensure home is accessible, demonstrate HEP, complete caregiver training    Goals:  Long term and short term goals have been discussed with patient and they are in agreement.    Physical Therapy Problems     Problem: Mobility     Dates: Start: 04/07/21       Goal: STG-Within one week, patient will propel wheelchair community     Dates: Start: 04/07/21       Description: 1) Individualized goal:  W/C 150ft with supervision   2) Interventions:  PT Gait Training, PT Therapeutic Exercises, PT Neuro Re-Ed/Balance, PT Aquatic Therapy, PT Therapeutic Activity, and PT Evaluation            Goal: STG-Within one week, patient will ambulate household distance     Dates: Start: 04/07/21       Description: 1) Individualized goal: AMB 30 ft with FWW and CGA  2) Interventions:  PT Gait Training, PT Therapeutic Exercises, PT Neuro Re-Ed/Balance, PT Aquatic Therapy, PT Therapeutic Activity, and PT Evaluation                  Problem: Mobility Transfers     Dates: Start: 04/07/21        Goal: STG-Within one week, patient will transfer bed to chair     Dates: Start: 04/07/21       Description: 1) Individualized goal:  SPT with FWW and SBA  2) Interventions: T Gait Training, PT Therapeutic Exercises, PT Neuro Re-Ed/Balance, PT Aquatic Therapy, PT Therapeutic Activity, and PT Evaluation                  Problem: PT-Long Term Goals     Dates: Start: 04/07/21       Goal: LTG-By discharge, patient will ambulate     Dates: Start: 04/07/21       Description: 1) Individualized goal: AMB 150ft with FWW and supervision  2) Interventions:  PT Gait Training, PT Therapeutic Exercises, PT Neuro Re-Ed/Balance, PT Aquatic Therapy, PT Therapeutic Activity, and PT Evaluation            Goal: LTG-By discharge, patient will transfer one surface to another     Dates: Start: 04/07/21       Description: 1) Individualized goal: SPT with FWW and supervision  2) Interventions:  PT Gait Training, PT Therapeutic Exercises, PT Neuro Re-Ed/Balance, PT Aquatic Therapy, PT Therapeutic Activity, and PT Evaluation            Goal: LTG-By discharge, patient will ambulate up/down 4-6 stairs     Dates: Start: 04/07/21       Description: 1) Individualized goal:  Negotiate 5 steps with 2 handrails and CGA  2) Interventions:  PT Gait Training, PT Therapeutic Exercises, PT Neuro Re-Ed/Balance, PT Aquatic Therapy, PT Therapeutic Activity, and PT Evaluation            Goal: LTG-By discharge, patient will transfer in/out of a car     Dates: Start: 04/07/21       Description: 1) Individualized goal: Transfer with FWW and SBA  2) Interventions:  PT Gait Training, PT Therapeutic Exercises, PT Neuro Re-Ed/Balance, PT Aquatic Therapy, PT Therapeutic Activity, and PT Evaluation

## 2021-04-07 NOTE — PROGRESS NOTES
Patient is disoriented to time and place. Patient left room twice to go down barth and went into other patient rooms. Patient stated that he was going bowling the first time and then to go see the cars outside the second time.     Patient becomes frustrated when asked to go back to his room after reorienting.     This writer spoke with Dr. Barkley. Patient will have wander guard put on and [place] bed if needed.

## 2021-04-07 NOTE — PROGRESS NOTES
"Rehab Progress Note     Date of Service: 4/7/2021  Chief Complaint: follow up stroke    Interval Events (Subjective)    Patient seen and examined in this room today. He was disoriented last night, thought he was in a bowling alley, went into other patient's rooms, and wanted to go outside to see the cars. A wander guard was put on patient.    Patient remembers this episode and reports to me he doesn't know why he was confused. Advised him it's from being in the hospital, and from his small strokes. He participated in therapy today.    Easier to communicate with patient with the dry erase board. He denies any pain. He has no complaints. He states everyone is very nice here.     ROS: No changes to bowel, bladder, pain, mood, or sleep.       Objective:  VITAL SIGNS: /88   Pulse 91   Temp 36.6 °C (97.8 °F) (Tympanic)   Resp 18   Ht 1.727 m (5' 8\") Comment: Simultaneous filing. User may not have seen previous data.  Wt 76 kg (167 lb 8.8 oz) Comment: Simultaneous filing. User may not have seen previous data.  SpO2 96%   BMI 25.48 kg/m²   Gen: alert, no apparent distress  Neuro: notable for non-focal, very hard of hearing    Recent Results (from the past 72 hour(s))   CBC WITH DIFFERENTIAL    Collection Time: 04/05/21  3:12 AM   Result Value Ref Range    WBC 9.3 4.8 - 10.8 K/uL    RBC 4.36 (L) 4.70 - 6.10 M/uL    Hemoglobin 13.6 (L) 14.0 - 18.0 g/dL    Hematocrit 40.5 (L) 42.0 - 52.0 %    MCV 92.9 81.4 - 97.8 fL    MCH 31.2 27.0 - 33.0 pg    MCHC 33.6 (L) 33.7 - 35.3 g/dL    RDW 43.8 35.9 - 50.0 fL    Platelet Count 189 164 - 446 K/uL    MPV 10.3 9.0 - 12.9 fL    Neutrophils-Polys 81.60 (H) 44.00 - 72.00 %    Lymphocytes 5.60 (L) 22.00 - 41.00 %    Monocytes 9.30 0.00 - 13.40 %    Eosinophils 2.60 0.00 - 6.90 %    Basophils 0.30 0.00 - 1.80 %    Immature Granulocytes 0.60 0.00 - 0.90 %    Nucleated RBC 0.00 /100 WBC    Neutrophils (Absolute) 7.62 (H) 1.82 - 7.42 K/uL    Lymphs (Absolute) 0.52 (L) 1.00 - 4.80 " K/uL    Monos (Absolute) 0.87 (H) 0.00 - 0.85 K/uL    Eos (Absolute) 0.24 0.00 - 0.51 K/uL    Baso (Absolute) 0.03 0.00 - 0.12 K/uL    Immature Granulocytes (abs) 0.06 0.00 - 0.11 K/uL    NRBC (Absolute) 0.00 K/uL   Comp Metabolic Panel    Collection Time: 04/05/21  3:12 AM   Result Value Ref Range    Sodium 138 135 - 145 mmol/L    Potassium 3.7 3.6 - 5.5 mmol/L    Chloride 106 96 - 112 mmol/L    Co2 22 20 - 33 mmol/L    Anion Gap 10.0 7.0 - 16.0    Glucose 91 65 - 99 mg/dL    Bun 21 8 - 22 mg/dL    Creatinine 0.78 0.50 - 1.40 mg/dL    Calcium 8.2 (L) 8.5 - 10.5 mg/dL    AST(SGOT) 31 12 - 45 U/L    ALT(SGPT) 15 2 - 50 U/L    Alkaline Phosphatase 71 30 - 99 U/L    Total Bilirubin 0.5 0.1 - 1.5 mg/dL    Albumin 3.6 3.2 - 4.9 g/dL    Total Protein 6.3 6.0 - 8.2 g/dL    Globulin 2.7 1.9 - 3.5 g/dL    A-G Ratio 1.3 g/dL   ESTIMATED GFR    Collection Time: 04/05/21  3:12 AM   Result Value Ref Range    GFR If African American >60 >60 mL/min/1.73 m 2    GFR If Non African American >60 >60 mL/min/1.73 m 2   MAGNESIUM    Collection Time: 04/05/21  3:12 AM   Result Value Ref Range    Magnesium 2.1 1.5 - 2.5 mg/dL   CBC WITH DIFFERENTIAL    Collection Time: 04/06/21  6:00 AM   Result Value Ref Range    WBC 7.1 4.8 - 10.8 K/uL    RBC 4.46 (L) 4.70 - 6.10 M/uL    Hemoglobin 13.9 (L) 14.0 - 18.0 g/dL    Hematocrit 41.4 (L) 42.0 - 52.0 %    MCV 92.8 81.4 - 97.8 fL    MCH 31.2 27.0 - 33.0 pg    MCHC 33.6 (L) 33.7 - 35.3 g/dL    RDW 43.3 35.9 - 50.0 fL    Platelet Count 206 164 - 446 K/uL    MPV 10.6 9.0 - 12.9 fL    Neutrophils-Polys 77.30 (H) 44.00 - 72.00 %    Lymphocytes 8.00 (L) 22.00 - 41.00 %    Monocytes 10.10 0.00 - 13.40 %    Eosinophils 3.40 0.00 - 6.90 %    Basophils 0.60 0.00 - 1.80 %    Immature Granulocytes 0.60 0.00 - 0.90 %    Nucleated RBC 0.00 /100 WBC    Neutrophils (Absolute) 5.52 1.82 - 7.42 K/uL    Lymphs (Absolute) 0.57 (L) 1.00 - 4.80 K/uL    Monos (Absolute) 0.72 0.00 - 0.85 K/uL    Eos (Absolute) 0.24 0.00  - 0.51 K/uL    Baso (Absolute) 0.04 0.00 - 0.12 K/uL    Immature Granulocytes (abs) 0.04 0.00 - 0.11 K/uL    NRBC (Absolute) 0.00 K/uL   Comp Metabolic Panel    Collection Time: 04/06/21  6:00 AM   Result Value Ref Range    Sodium 139 135 - 145 mmol/L    Potassium 3.4 (L) 3.6 - 5.5 mmol/L    Chloride 105 96 - 112 mmol/L    Co2 27 20 - 33 mmol/L    Anion Gap 7.0 7.0 - 16.0    Glucose 98 65 - 99 mg/dL    Bun 21 8 - 22 mg/dL    Creatinine 0.88 0.50 - 1.40 mg/dL    Calcium 8.4 (L) 8.5 - 10.5 mg/dL    AST(SGOT) 23 12 - 45 U/L    ALT(SGPT) 17 2 - 50 U/L    Alkaline Phosphatase 76 30 - 99 U/L    Total Bilirubin 0.5 0.1 - 1.5 mg/dL    Albumin 3.6 3.2 - 4.9 g/dL    Total Protein 6.5 6.0 - 8.2 g/dL    Globulin 2.9 1.9 - 3.5 g/dL    A-G Ratio 1.2 g/dL   ESTIMATED GFR    Collection Time: 04/06/21  6:00 AM   Result Value Ref Range    GFR If African American >60 >60 mL/min/1.73 m 2    GFR If Non African American >60 >60 mL/min/1.73 m 2   SARS-CoV-2, PCR (In-House)    Collection Time: 04/06/21  5:00 PM   Result Value Ref Range    SARS-CoV-2 Source NP Swab     SARS-CoV-2 by PCR NotDetected    CBC with Differential    Collection Time: 04/07/21  6:19 AM   Result Value Ref Range    WBC 6.7 4.8 - 10.8 K/uL    RBC 4.50 (L) 4.70 - 6.10 M/uL    Hemoglobin 14.0 14.0 - 18.0 g/dL    Hematocrit 42.0 42.0 - 52.0 %    MCV 93.3 81.4 - 97.8 fL    MCH 31.1 27.0 - 33.0 pg    MCHC 33.3 (L) 33.7 - 35.3 g/dL    RDW 43.8 35.9 - 50.0 fL    Platelet Count 209 164 - 446 K/uL    MPV 10.3 9.0 - 12.9 fL    Neutrophils-Polys 73.00 (H) 44.00 - 72.00 %    Lymphocytes 10.70 (L) 22.00 - 41.00 %    Monocytes 11.70 0.00 - 13.40 %    Eosinophils 3.40 0.00 - 6.90 %    Basophils 0.60 0.00 - 1.80 %    Immature Granulocytes 0.60 0.00 - 0.90 %    Nucleated RBC 0.00 /100 WBC    Neutrophils (Absolute) 4.92 1.82 - 7.42 K/uL    Lymphs (Absolute) 0.72 (L) 1.00 - 4.80 K/uL    Monos (Absolute) 0.79 0.00 - 0.85 K/uL    Eos (Absolute) 0.23 0.00 - 0.51 K/uL    Baso (Absolute) 0.04  0.00 - 0.12 K/uL    Immature Granulocytes (abs) 0.04 0.00 - 0.11 K/uL    NRBC (Absolute) 0.00 K/uL   Comp Metabolic Panel (CMP)    Collection Time: 04/07/21  6:19 AM   Result Value Ref Range    Sodium 135 135 - 145 mmol/L    Potassium 3.2 (L) 3.6 - 5.5 mmol/L    Chloride 102 96 - 112 mmol/L    Co2 27 20 - 33 mmol/L    Anion Gap 6.0 (L) 7.0 - 16.0    Glucose 88 65 - 99 mg/dL    Bun 26 (H) 8 - 22 mg/dL    Creatinine 0.97 0.50 - 1.40 mg/dL    Calcium 8.7 8.5 - 10.5 mg/dL    AST(SGOT) 20 12 - 45 U/L    ALT(SGPT) 17 2 - 50 U/L    Alkaline Phosphatase 77 30 - 99 U/L    Total Bilirubin 0.5 0.1 - 1.5 mg/dL    Albumin 3.5 3.2 - 4.9 g/dL    Total Protein 6.4 6.0 - 8.2 g/dL    Globulin 2.9 1.9 - 3.5 g/dL    A-G Ratio 1.2 g/dL   Magnesium    Collection Time: 04/07/21  6:19 AM   Result Value Ref Range    Magnesium 2.1 1.5 - 2.5 mg/dL   ESTIMATED GFR    Collection Time: 04/07/21  6:19 AM   Result Value Ref Range    GFR If African American >60 >60 mL/min/1.73 m 2    GFR If Non African American >60 >60 mL/min/1.73 m 2       Current Facility-Administered Medications   Medication Frequency   • QUEtiapine (Seroquel) tablet 25 mg Nightly   • hydrOXYzine HCl (ATARAX) tablet 50 mg Q6HRS PRN   • melatonin tablet 3 mg HS PRN   • Respiratory Therapy Consult Continuous RT   • Pharmacy Consult Request ...Pain Management Review 1 Each PHARMACY TO DOSE   • hydrALAZINE (APRESOLINE) tablet 10 mg Q8HRS PRN   • acetaminophen (Tylenol) tablet 650 mg Q4HRS PRN   • lactulose 20 GM/30ML solution 30 mL QDAY PRN   • docusate sodium (ENEMEEZ) enema 283 mg QDAY PRN   • fleet enema 133 mL QDAY PRN   • artificial tears ophthalmic solution 1 Drop PRN   • benzocaine-menthol (CEPACOL) lozenge 1 Lozenge Q2HRS PRN   • mag hydrox-al hydrox-simeth (MAALOX PLUS ES or MYLANTA DS) suspension 20 mL Q2HRS PRN   • ondansetron (ZOFRAN ODT) dispertab 4 mg 4X/DAY PRN    Or   • ondansetron (ZOFRAN) syringe/vial injection 4 mg 4X/DAY PRN   • traZODone (DESYREL) tablet 50 mg  QHS PRN   • sodium chloride (OCEAN) 0.65 % nasal spray 2 Spray PRN   • midazolam (VERSED) 5 mg/mL (1 mL vial) PRN   • senna-docusate (PERICOLACE or SENOKOT S) 8.6-50 MG per tablet 2 tablet BID    And   • polyethylene glycol/lytes (MIRALAX) PACKET 1 Packet QDAY PRN    And   • magnesium hydroxide (MILK OF MAGNESIA) suspension 30 mL QDAY PRN    And   • bisacodyl (DULCOLAX) suppository 10 mg QDAY PRN   • enoxaparin (LOVENOX) inj 40 mg DAILY AT 1800   • amLODIPine (NORVASC) tablet 10 mg Q DAY   • aspirin (ASA) chewable tab 81 mg DAILY   • atorvastatin (LIPITOR) tablet 80 mg Q EVENING   • divalproex (DEPAKOTE) delayed-release tablet 250 mg Q8HRS   • lisinopril (PRINIVIL) tablet 20 mg Q DAY   • QUEtiapine (Seroquel) tablet 25 mg TID PRN       Orders Placed This Encounter   Procedures   • Diet Order Diet: Level 6 - Soft and Bite Sized; Liquid level: Level 0 - Thin; Tray Modifications (optional): SLP - 1:1 Supervision by Nursing, SLP - Deliver to Nursing Station     Standing Status:   Standing     Number of Occurrences:   1     Order Specific Question:   Diet:     Answer:   Level 6 - Soft and Bite Sized [23]     Order Specific Question:   Liquid level     Answer:   Level 0 - Thin     Order Specific Question:   Tray Modifications (optional)     Answer:   SLP - 1:1 Supervision by Nursing     Order Specific Question:   Tray Modifications (optional)     Answer:   SLP - Deliver to Nursing Station       Assessment:  Active Hospital Problems    Diagnosis    • *Acute CVA (cerebrovascular accident) (HCC)    • Carotid stenosis, bilateral    • Altered mental status, unspecified    • Mixed hyperlipidemia    • BPH (benign prostatic hyperplasia)    • Sundowning    • Hard of hearing    • Sinus bradycardia    • Hypokalemia    • Anemia    • S/P carotid endarterectomy    • Agitation      This patient is a 86 y.o. male admitted for acute inpatient rehabilitation with Acute CVA (cerebrovascular accident) (HCC).    Medical Decision Making and  Plan:    Small punctate infarctions  Right frontal lobe, right cerebellum, right occipital lobe  Non-focal  Visual complaints  Cognitive impairment (suspect some is baseline)  Continue full rehab program  PT/OT/SLP, 1 hr each discipline, 5 days per week    Aspirin   Statin    Outpatient follow up with stroke bridge clinic, Dr Sales    Right carotid stenosis  S/p CEA 4/2  Statin  Aspirin  Outpatient follow up with Dr. Nimesh Kuhn  Sundowning  Depakote started at acute, titrate prior to discharge  Scheduled Seroquel at night  Scheduled melatonin at night  PRN seroquel  Qtc OK     Hypertension  Amlodipine  Lisinopril, increase dose  PRN hydralazine     Anemia, resolved     Hyperlipidemia  Statin     Hypokalemia  Supplement     Sinus bradycardia  Monitor    Bowel program  Continue bowel medications  Scheduled Sennakot  PRN Miralax, MOM, bisacodyl suppository  Last BM 4/6    Bladder program  BPH  With incontinence  Check PVRs - 106, 86  Bladder scan for no voids  ICP for over 400 cc  Scheduled toileting    DVT prophylaxis  Lovenox    Total time:  35 minutes.  I spent greater than 50% of the time for patient care, counseling, and coordination on this date, including patient face-to face time, unit/floor time with review of records/pertinent lab data and studies, as well as discussing diagnostic evaluation/work up, planned therapeutic interventions, and future disposition of care, as per the interval events/subjective and the assessment and plan as noted above.      Sugey Barkley M.D.   Physical Medicine and Rehabilitation

## 2021-04-07 NOTE — PROGRESS NOTES
Patient admitted to facility at 1645 via w/c; accompanied by hospital transport.  Patient assisted to room and positioned in bed for comfort and safety; call light within reach.  Patient assisted with stowing belongings and oriented to room and facility.  Admission assessment performed and documented in computer.  Admission paperwork completed; signed copies placed in chart.  Will continue to monitor.    2 RN skin check done with admitting RN and RN Caitlin. Face photo and skin photos documented in media. Appropriate LDAs opened. Pt with Varghese score of 18, RN wound protocol not ordered at this time. Pt has an incision to R side of neck, clean and dry, open to air.

## 2021-04-07 NOTE — THERAPY
"Speech Language Pathology   Initial Assessment     Patient Name: Cooper Harvey  AGE:  86 y.o., SEX:  male  Medical Record #: [id number]  Today's Date: 4/7/2021     Subjective    Per H&P: \"The patient is a 86 y.o. male with a past medical history of BPH, hearing loss, hyperlipidemia; now admitted for acute inpatient rehabilitation with severe functional debility after an acute stroke.       On admission the patient and medical record report, he was transferred from [facility] with complaints of weeks of vision changes and a possible syncopal event. Negative Head CT. CTA with bilateral carotid stenosis, for which he was then transferred here to [facility]. MRI with punctate infarctions in the right cerebellum, right occipital and right frontal lobes. NIHSS 2. Patient is now s/p right carotid endarterectomy with Dr. Beavers on 4/2. HgbA1c 5.2, , ECHO EF 70 %. EKG with sinus bradycardia. He had an EEG that was negative for seizures. He was started on Depakote for agitation.      Patient current reports double vision. He is very hard of hearing, has aides, and can only hear if you yell right next to his ear. He denies any focal weakness. He is ambidextrous. He reports some numbness and tingling in his right hand ever since he played rope tug of war a month ago. I remarked that he must be very strong, and he replied \"No just stupid,\" with a laugh. He reports one of his daughters is the person to talk to if there is any medical issues. Had discussion with him regarding his code status, which was CPR OK, but intubation not OK over at acute. Explained cannot do the first without the second and he reports he just doesn't want to be on a breathing machine for a long time or to be a burden to his children. Nursing report from acute indicates he's had some bladder incontinence.\"        Objective       04/07/21 0831   Precautions   Precautions Fall Risk   Prior Living Situation   Prior Services None " "  Housing / Facility 1 Story House   Lives with - Patient's Self Care Capacity Spouse   Prior Level Of Function   Communication Within Functional Limits   Swallow Within Functional Limits   Dentition Intact   Dentures None   Hearing Impaired Both Ears   Hearing Aid Right;Left   Vision Within Functional Limits for Evaluation   Patient's Primary Language English   Occupation (Pre-Hospital Vocational) Retired Due To Age   Receptive Language / Auditory Comprehension   Receptive Language / Auditory Comprehension X   Understands Simple, Structured Conversation  Minimal (4)   Expressive Language   Expressive Language (WDL) WDL   Reading Comprehension    Reading Comprehension (WDL) WDL   Written Language Expression   Dominant Hand Ambidextrous   Cognition   Cognitive-Linguistic (WDL) X   Attention to Task Within Functional Limits (6-7)   Orientation  Supervision (5)   Functional Memory Activities Moderate (3)   ABS (Agitated Behavior Scale)   Agitated Behavior Scale Performed No   Cognitive Pattern Assessment   Cognitive Pattern Assessment Used BIMS   Brief Interview for Mental Status (BIMS)   Repetition of Three Words (First Attempt) 3   Temporal Orientation: Year Missed by more than 5 years   Temporal Orientation: Month Accurate within 5 days   Temporal Orientation: Day Incorrect   Recall: \"Sock\" No, could not recall   Recall: \"Blue\" Yes, no cue required   Recall: \"Bed\" No, could not recall   BIMS Summary Score 7   Social / Pragmatic Communication   Social / Pragmatic Communication WDL   Tracheostomy   Tracheostomy No   Functional Level of Assist   Comprehension Moderate Assist   Comprehension Description Hearing aids/amplifiers   Expression Supervision   Expression Description Verbal cueing   Social Interaction Independent   Problem Solving Moderate Assist   Problem Solving Description Bed/chair alarm;Increased time;Therapy schedule;Verbal cueing   Memory Moderate Assist   Memory Description Bed/chair alarm;Increased " time;Seat belt;Therapy schedule;Verbal cueing   Outcome Measures   Outcome Measures Utilized SCCAN   SCCAN (Scales of Cognitive and Communicative Ability for Neurorehabilitation)   Oral Expression - Raw Score 14   Oral Expression - Scale Performance Score 74   Orientation - Raw Score 11   Orientation - Scale Performance Score 92   Speech Comprehension - Raw Score 10   Speech Comprehension - Scale Performance Score 77   Problem List   Problem List Cognitive-Linguistic Deficits;Hearing Deficit;Memory Deficit   Current Discharge Plan   Current Discharge Plan Return to Prior Living Situation   Benefit   Therapy Benefit Patient would benefit from Inpatient Rehab Speech-Language Pathology to address above identified deficits.   Interdisciplinary Plan of Care Collaboration   Patient Position at End of Therapy Seated;Chair Alarm On;Self Releasing Lap Belt Applied;Call Light within Reach;Tray Table within Reach;Phone within Reach   Strengths & Barriers   Strengths Alert and oriented;Pleasant and cooperative;Willingly participates in therapeutic activities   Barriers Hearing impairment;Impaired insight/denial of deficits;Impaired functional cognition;Impaired carryover of learning   SLP Total Time Spent   SLP Individual Total Time Spent (Mins) 60   Evaluation Charges   Charges Yes   SLP Speech Language Evaluation Speech Sound Language Comprehension       Assessment    Patient is 86 y.o. male with a diagnosis of severe functional debility after an acute CVA.  Additional factors influencing patient status/progress (ie: cognitive factors, co-morbidities, social support, etc): hearing impairment, impaired functional cognition, impaired insight to deficits     Cog-Ling Eval: Discussed pt's PLOF. Pt required the use of application via iPad (speech to large text application) for comprehension of questions, conversation, and directions for the duration of this evaluation due to being severely [place] even with his hearing aids in. Pt  "reported he lives at home with his 81 year old wife. Pt reported his wife was assisting with medications (giving him his pills at the prescribed times), managing finances, and driving. Pt reported he is ambidextrous, however, he prefers to write with is right hand. Pt has a significant hearing impairment bilaterally and reports he does not like his hearing aids and they need to be cleaned. Pt reporting during this evaluation \"I can understand people when they talk slow\"; however required written information for accurate comprehension.  Pt demonstrated difficulty recalling the reason he is in the hospital (unsure how many strokes he has had and when). Pt reported double vision in his left and right eye, causing him to stop what he is doing (watching TV) until they readjust, he does not wear glasses. SCCAN assessment initiated, however, not completed due to time constraints and time dedicated to dictate and for pt to comprehend written instructions via iPad. Pt completed 3/8 subtests. Pt demonstrated difficulty with delayed and immediate recall and speech comprehension tasks. Full scores will be available upon completion of SCCAN.     Plan  Recommend Speech Therapy 30-60 minutes per day 5-7 days per week for 2 weeks for the following treatments:  SLP Speech Language Treatment, SLP Self Care / ADL Training , SLP Cognitive Skill Development and SLP Group Treatment.    Passport items to be completed:  Express basic needs, understand food/liquid recommendations, consistently follow swallow precautions, manage finances, manage medications, arrive to therapy appointments on time, complete daily memory log entries, solve problems related to safety situations, review education related to hospitalization, complete caregiver training     Goals:  Long term and short term goals have been discussed with patient and they are in agreement.    Speech Therapy Problems       Problem: Problem Solving STGs       Dates: Start: 04/07/21    "      Goal: STG-Within one week, patient will       Dates: Start: 04/07/21       Description: 1) Individualized goal:  complete SCCAN assessment with goals to be added as appropriate.  2) Interventions:  SLP Speech Language Treatment and SLP Cognitive Skill Development                          Problem: Speech/Swallowing LTGs       Dates: Start: 04/07/21         Goal: LTG-By discharge, patient will solve basic problems       Dates: Start: 04/07/21       Description: 1) Individualized goal:  given spv for a safe d/c home  2) Interventions:  SLP Speech Language Treatment, SLP Self Care / ADL Training , SLP Cognitive Skill Development, and SLP Group Treatment

## 2021-04-07 NOTE — DISCHARGE PLANNING
"CASE MANAGEMENT INITIAL ASSESSMENT    Admit Date:  4/6/2021     I spoke with patients wife Cynthia to discuss role of case management / discharge planning / team conference.     Patient is a  86 y.o. male transferred from [facility] S/P Stroke.    PLOF: Home and independent with spouse.    PCP: [facility]: per wife, \"just sees whoever is available\".      ADM MD: Sugey Barkley    Diagnosis: Ischemic stroke ([facility]) [I63.9]    Co-morbidities:   Patient Active Problem List    Diagnosis Date Noted   • Acute CVA (cerebrovascular accident) ([facility]) 03/31/2021   • Carotid stenosis, bilateral 03/31/2021   • Altered mental status, unspecified 04/01/2021   • Mixed hyperlipidemia 09/04/2009   • ANA LAURA (acute kidney injury) ([facility]) 04/03/2021   • Do not intubate, cardiopulmonary resuscitation (CPR)-only code status 03/31/2021   • BPH (benign prostatic hyperplasia) 06/29/2010   • Hard of hearing 04/06/2021   • Sinus bradycardia 04/06/2021   • Hypokalemia 04/06/2021   • Anemia 04/06/2021   • S/P carotid endarterectomy 04/06/2021   • Agitation 04/06/2021   • Rectus diastasis 08/04/2011   • Hydrocele, left 08/04/2011   • Arthritis 06/29/2010   • Skin cancer 09/04/2009   • HYDROCELE 09/04/2009     Prior Living Situation:  Housing / Facility: 1 Story House  Lives with - Patient's Self Care Capacity: Spouse    Prior Level of Function:  Medication Management: Independent  Finances: Independent  Home Management: Independent  Shopping: Independent  Prior Level Of Mobility: Independent Without Device in Community, Independent Without Device in Home  Driving / Transportation: Driving Independent    Support Systems:  Primary : Cynthia Harvey-wife  Other support systems: Daughter- Constantin: [phone]  Advance Directives: No  Power of  (Name & Phone): Wife    Previous Services Utilized:   Equipment Owned: None  Prior Services: None, Home-Independent    Other Information:  Occupation (Pre-Hospital Vocational): Retired " Due To Age     Primary Payor Source: Medicare A, Medicare B  Secondary Payor Source: Supplemental Insurance(AARP)  Primary Care Practitioner : Pt. goes to [facility] and sees whoever is avail.    Other MDs: Dr. Beavers    Additional Case Management Questions:  Have you ever received case management services for yourself or a family member? No    Do you feel you have and an understanding of what services  provide? Yes    Do you have any additional questions regarding case management? No      CASE MANAGEMENT PLAN OF CARE   Individualized Goals:   Patient / Family Goal:  Per wife:   1. Have home health at DC   2. Have family training so I can be more confident when  comes home   3. Gain strength    Barriers:   1. Functional limitation    Plan:  1. Continue to follow patient through hospitalization and provide discharge planning in collaboration with patient, family, physicians and ancillary services.     2. Utilize community resources to ensure a safe discharge.

## 2021-04-07 NOTE — THERAPY
"Occupational Therapy   Initial Evaluation     Patient Name: Cooper Harvey  Age:  86 y.o., Sex:  male  Medical Record #: [id number]  Today's Date: 4/7/2021     Subjective    \"Good morning\"  Pt awake, supine in bed, agreeable to OT eval     Objective       04/07/21 0701   Prior Living Situation   Prior Services None   Housing / Facility 1 Story House   Steps Into Home 5   Elevator No   Lives with - Patient's Self Care Capacity Spouse   Comments Pt resides with wife in [place], [place].  PLOF Indep   Prior Level of ADL Function   Self Feeding Independent   Grooming / Hygiene Independent   Bathing Independent   Dressing Independent   Toileting Independent   Prior Level of IADL Function   Medication Management Independent   Laundry Independent   Kitchen Mobility Independent   Finances Independent   Home Management Independent   Shopping Independent   Prior Level Of Mobility Independent Without Device in Community;Independent Without Device in Home   Driving / Transportation Driving Independent   Occupation (Pre-Hospital Vocational) Retired Due To Age   Prior Functioning: Everyday Activities   Self Care Independent   Indoor Mobility (Ambulation) Independent   Stairs Independent   Functional Cognition Independent   Vitals   O2 Delivery Device None - Room Air   Pain   Intervention Declines   Pain 0 - 10 Group   Comfort Goal 0   Cognition    Level of Consciousness Alert   ABS (Agitated Behavior Scale)   Agitated Behavior Scale Performed No   Cognitive Pattern Assessment   Cognitive Pattern Assessment Used BIMS   Brief Interview for Mental Status (BIMS)   Repetition of Three Words (First Attempt) 3   Temporal Orientation: Year Correct   Temporal Orientation: Month Missed by 6 days to 1 month   Temporal Orientation: Day Incorrect   Recall: \"Sock\" No, could not recall   Recall: \"Blue\" No, could not recall   Recall: \"Bed\" No, could not recall   BIMS Summary Score 7   Vision Screen   Vision Not tested   Passive ROM Upper Body "   Passive ROM Upper Body WDL   Active ROM Upper Body   Active ROM Upper Body  WDL   Dominant Hand Ambidextrous   Strength Upper Body   Upper Body Strength  X   Comments Generalized weakness bilaterlly   Upper Body Muscle Tone   Upper Body Muscle Tone  WDL   Balance Assessment   Sitting Balance (Static) Fair   Sitting Balance (Dynamic) Fair -   Standing Balance (Static) Fair -   Standing Balance (Dynamic) Poor +   Bed Mobility    Supine to Sit Minimal Assist   Sit to Stand Minimal Assist   Eating   Assistance Needed Independent   Physical Assistance Level No physical assistance   CARE Score 6   Eating Discharge Goal   Discharge Goal 6   Oral Hygiene   Assistance Needed Supervision;Set-up / clean-up   Physical Assistance Level No physical assistance   CARE Score 4   Oral Hygiene Discharge Goal   Discharge Goal 6   Shower/Bathe Self   Assistance Needed Physical assistance;Verbal cues;Supervision;Set-up / clean-up;Adaptive equipment   Physical Assistance Level 26%-50%   CARE Score 3   Shower/Bathe Self Discharge Goal   Discharge Goal 6   Upper Body Dressing   Assistance Needed Set-up / clean-up;Supervision   Physical Assistance Level No physical assistance   CARE Score 4   Upper Body Dressing Discharge Goal   Discharge Goal 6   Lower Body Dressing   Assistance Needed Physical assistance;Verbal cues;Supervision;Set-up / clean-up   Physical Assistance Level 76% or more   CARE Score 2   Lower Body Dressing Discharge Goal   Discharge Goal 6   Putting On/Taking Off Footwear   Assistance Needed Physical assistance;Verbal cues;Supervision;Set-up / clean-up   Physical Assistance Level 76% or more   CARE Score 2   Putting On/Taking Off Footwear Discharge Goal   Discharge Goal 6   Toileting Hygiene   Assistance Needed Physical assistance;Verbal cues;Supervision;Set-up / clean-up;Adaptive equipment   Physical Assistance Level 26%-50%   CARE Score 3   Toileting Hygiene Discharge Goal   Discharge Goal 6   Toilet Transfer    Assistance Needed Set-up / clean-up;Supervision;Verbal cues;Physical assistance   Physical Assistance Level 25% or less   CARE Score 3   Toilet Transfer Discharge Goal   Discharge Goal 6   Hearing, Speech, and Vision   Expression of Ideas and Wants Some difficulty   Understanding Verbal and Non-Verbal Content Usually understands   Functional Level of Assist   Eating Independent   Grooming Supervision;Seated   Grooming Description Increased time;Initial preparation for task;Seated in wheelchair at sink;Set-up of equipment;Supervision for safety;Verbal cueing   Bathing Moderate Assist   Bathing Description Grab bar;Hand held shower;Tub bench;Assit wtih lower extremities;Assit with perineal;Increased time;Initial preparation for task;Set-up of equipment;Supervision for safety;Verbal cueing   Upper Body Dressing Supervision   Upper Body Dressing Description Increased time;Set-up of equipment;Initial preparation for task;Supervision for safety;Verbal cueing   Lower Body Dressing Maximal Assist   Lower Body Dressing Description Grab bar;Assist with closures;Increased time;Initial preparation for task;Set-up of equipment;Supervision for safety;Verbal cueing   Toileting Moderate Assist   Toileting Description Assist to pull pants up;Assist for standing balance;Grab bar;Increased time;Initial preparation for task;Set-up of equipment;Supervision for safety;Verbal cueing   Toilet Transfers Minimal Assist   Toilet Transfer Description Grab bar;Increased time;Initial preparation for task;Set-up of equipment;Supervision for safety;Verbal cueing   Tub / Shower Transfers Minimal Assist   Tub Shower Transfer Description Grab bar;Shower bench;Increased time;Initial preparation for task;Set-up of equipment;Supervision for safety;Verbal cueing   Problem List   Problem List Decreased Active Daily Living Skills;Decreased Homemaking Skills;Decreased Upper Extremity Strength Right;Decreased Upper Extremity Strength Left;Decreased  Functional Mobility;Safety Awareness Deficits / Cognition;Impaired Vision;Impaired Postural Control / Balance;Other (Comments)  ([place], impulsivity, Diploplia)   Precautions   Precautions Fall Risk;Other (See Comments)   Comments Impulsivity, Diploplia, [place], Wander-Guard   Current Discharge Plan   Current Discharge Plan Return to Prior Living Situation   Benefit    Therapy Benefit Patient Would Benefit from Inpatient Rehab Occupational Therapy to Maximize Telephone with ADLs, IADLs and Functional Mobility.   Interdisciplinary Plan of Care Collaboration   IDT Collaboration with  Nursing   Patient Position at End of Therapy Seated;Chair Alarm On;Self Releasing Lap Belt Applied;Call Light within Reach;Tray Table within Reach;Phone within Reach   Collaboration Comments CLOF   Strengths & Barriers   Strengths Independent prior level of function;Motivated for self care and independence;Pleasant and cooperative;Willingly participates in therapeutic activities   Barriers Bladder incontinence;Decreased endurance;Generalized weakness;Hearing impairment;Impaired activity tolerance;Impaired balance;Impulsive;Confused;Limited mobility;Visual impairment   OT Total Time Spent   OT Individual Total Time Spent (Mins) 60   OT Charge Group   OT Self Care / ADL 1   OT Evaluation OT Evaluation High       Assessment  This patient is a 86 y.o. male admitted for acute inpatient rehabilitation with Acute CVA.  Additional factors influencing patient status / progress (ie: cognitive factors, co-morbidities, social support, etc): Agitation, Hypertension, Carotid Stenosis, S/P Right Carotid Endarteretomy, Anemia, Hyperlipidemia, Hypokalemia, BPH, Sinus Bradycardia.  At time of OT eval patient presented below baseline for ADL's, functional mobility and transfers.  Barriers include impulsivity, impaired safety/environmental awareness, confusion, generalized weakness, impaired endurance and balance..      Plan  Recommend Occupational Therapy  30-60 minutes per day 5-7 days per week for 2 weeks for the following treatments:  OT Self Care/ADL, OT Cognitive Skill Dev, OT Neuro Re-Ed/Balance, OT Therapeutic Activity, OT Evaluation and OT Therapeutic Exercise.    Passport items to be completed:  Passport items to be completed:  Perform bathroom transfers, complete dressing, complete feeding, get ready for the day, prepare a simple meal, participate in household tasks, adapt home for safety needs, demonstrate home exercise program, complete caregiver training     Goals:  Long term and short term goals have been discussed with patient and they are in agreement.    Occupational Therapy Goals     Problem: Dressing     Dates: Start: 04/07/21       Goal: STG-Within one week, patient will dress LB     Dates: Start: 04/07/21       Description: 1) Individualized Goal:  Min assist to don/doff Lower body Clothing via AE/DME PRN  2) Interventions:  OT Self Care/ADL, OT Cognitive Skill Dev, OT Neuro Re-Ed/Balance, OT Therapeutic Activity, OT Evaluation, and OT Therapeutic Exercise                  Problem: Functional Transfers     Dates: Start: 04/07/21       Goal: STG-Within one week, patient will transfer to toilet     Dates: Start: 04/07/21       Description: 1) Individualized Goal:  Sup/SBA for commode transfer via DME PRN  2) Interventions:  OT Self Care/ADL, OT Cognitive Skill Dev, OT Neuro Re-Ed/Balance, OT Therapeutic Activity, OT Evaluation, and OT Therapeutic Exercise                    Problem: OT Long Term Goals     Dates: Start: 04/07/21       Goal: LTG-By discharge, patient will complete basic self care tasks     Dates: Start: 04/07/21       Description: 1) Individualized Goal:  Mod I for BADL's via AE/DME PRN  2) Interventions:  OT Self Care/ADL, OT Cognitive Skill Dev, OT Neuro Re-Ed/Balance, OT Therapeutic Activity, OT Evaluation, and OT Therapeutic Exercise              Goal: LTG-By discharge, patient will perform bathroom transfers     Dates: Start:  04/07/21       Description: 1) Individualized Goal: Mod I for bathroom transfers via DME PRN  2) Interventions:  OT Self Care/ADL, OT Cognitive Skill Dev, OT Neuro Re-Ed/Balance, OT Therapeutic Activity, OT Evaluation, and OT Therapeutic Exercise                    Problem: Toileting     Dates: Start: 04/07/21       Goal: STG-Within one week, patient will complete toileting tasks     Dates: Start: 04/07/21       Description: 1) Individualized Goal:  Min assist for BM via DME PRN  2) Interventions:  OT Self Care/ADL, OT Cognitive Skill Dev, OT Neuro Re-Ed/Balance, OT Therapeutic Activity, OT Evaluation, and OT Therapeutic Exercise

## 2021-04-08 ENCOUNTER — NON-PROVIDER VISIT (OUTPATIENT)
Dept: CARDIOLOGY | Facility: MEDICAL CENTER | Age: 86
End: 2021-04-08
Payer: MEDICARE

## 2021-04-08 DIAGNOSIS — R00.2 PALPITATIONS: ICD-10-CM

## 2021-04-08 DIAGNOSIS — I47.10 SVT (SUPRAVENTRICULAR TACHYCARDIA) (HCC): ICD-10-CM

## 2021-04-08 LAB — 25(OH)D3 SERPL-MCNC: 34 NG/ML (ref 30–80)

## 2021-04-08 PROCEDURE — 97112 NEUROMUSCULAR REEDUCATION: CPT

## 2021-04-08 PROCEDURE — 700102 HCHG RX REV CODE 250 W/ 637 OVERRIDE(OP): Performed by: PHYSICAL MEDICINE & REHABILITATION

## 2021-04-08 PROCEDURE — 97129 THER IVNTJ 1ST 15 MIN: CPT

## 2021-04-08 PROCEDURE — 97116 GAIT TRAINING THERAPY: CPT

## 2021-04-08 PROCEDURE — 97130 THER IVNTJ EA ADDL 15 MIN: CPT

## 2021-04-08 PROCEDURE — 99232 SBSQ HOSP IP/OBS MODERATE 35: CPT | Performed by: PHYSICAL MEDICINE & REHABILITATION

## 2021-04-08 PROCEDURE — 97110 THERAPEUTIC EXERCISES: CPT

## 2021-04-08 PROCEDURE — 700111 HCHG RX REV CODE 636 W/ 250 OVERRIDE (IP): Performed by: PHYSICAL MEDICINE & REHABILITATION

## 2021-04-08 PROCEDURE — 97530 THERAPEUTIC ACTIVITIES: CPT

## 2021-04-08 PROCEDURE — 770010 HCHG ROOM/CARE - REHAB SEMI PRIVAT*

## 2021-04-08 PROCEDURE — A9270 NON-COVERED ITEM OR SERVICE: HCPCS | Performed by: PHYSICAL MEDICINE & REHABILITATION

## 2021-04-08 PROCEDURE — 97535 SELF CARE MNGMENT TRAINING: CPT

## 2021-04-08 RX ORDER — DIVALPROEX SODIUM 250 MG/1
250 TABLET, DELAYED RELEASE ORAL EVERY 12 HOURS
Status: DISCONTINUED | OUTPATIENT
Start: 2021-04-08 | End: 2021-04-09

## 2021-04-08 RX ADMIN — LISINOPRIL 30 MG: 20 TABLET ORAL at 05:58

## 2021-04-08 RX ADMIN — QUETIAPINE FUMARATE 25 MG: 25 TABLET ORAL at 20:41

## 2021-04-08 RX ADMIN — ATORVASTATIN CALCIUM 80 MG: 40 TABLET, FILM COATED ORAL at 20:41

## 2021-04-08 RX ADMIN — ASPIRIN 81 MG CHEWABLE TABLET 81 MG: 81 TABLET CHEWABLE at 08:54

## 2021-04-08 RX ADMIN — AMLODIPINE BESYLATE 10 MG: 5 TABLET ORAL at 05:59

## 2021-04-08 RX ADMIN — MELATONIN TAB 3 MG 3 MG: 3 TAB at 20:41

## 2021-04-08 RX ADMIN — ENOXAPARIN SODIUM 40 MG: 40 INJECTION SUBCUTANEOUS at 17:49

## 2021-04-08 RX ADMIN — SENNOSIDES AND DOCUSATE SODIUM 2 TABLET: 8.6; 5 TABLET ORAL at 08:54

## 2021-04-08 RX ADMIN — DIVALPROEX SODIUM 250 MG: 250 TABLET, DELAYED RELEASE ORAL at 20:41

## 2021-04-08 RX ADMIN — DIVALPROEX SODIUM 250 MG: 250 TABLET, DELAYED RELEASE ORAL at 05:58

## 2021-04-08 RX ADMIN — SENNOSIDES AND DOCUSATE SODIUM 2 TABLET: 8.6; 5 TABLET ORAL at 20:40

## 2021-04-08 ASSESSMENT — ENCOUNTER SYMPTOMS
FOCAL WEAKNESS: 0
DIZZINESS: 1
MEMORY LOSS: 1
DOUBLE VISION: 1
BLURRED VISION: 1

## 2021-04-08 ASSESSMENT — GAIT ASSESSMENTS
DISTANCE (FEET): 60
GAIT LEVEL OF ASSIST: TOTAL ASSIST X 2
DEVIATION: ATAXIC;SHUFFLED GAIT;DECREASED HEEL STRIKE;DECREASED TOE OFF
ASSISTIVE DEVICE: FRONT WHEEL WALKER

## 2021-04-08 ASSESSMENT — ACTIVITIES OF DAILY LIVING (ADL)
BED_CHAIR_WHEELCHAIR_TRANSFER_DESCRIPTION: SET-UP OF EQUIPMENT;SUPERVISION FOR SAFETY;VERBAL CUEING
TOILETING_LEVEL_OF_ASSIST_DESCRIPTION: SET-UP OF EQUIPMENT;SUPERVISION FOR SAFETY;VERBAL CUEING

## 2021-04-08 NOTE — THERAPY
"Physical Therapy   Daily Treatment     Patient Name: Cooper Harvey  Age:  86 y.o., Sex:  male  Medical Record #: [id number]  Today's Date: 4/8/2021     Precautions  Precautions: (P) Fall Risk  Comments: (P) Impulsivity, Diploplia, [place], Wander-Guard    Subjective    Patient is seated in w/c upon arrival, agreeable to therapy. Spouse present for duration of session. Spouse confirms home set-up, 4 stairs to enter through garage with one rail (can use either right or left). Spouse states she has a 4WW from a previous fall he can use.      Objective       04/08/21 1031   Precautions   Precautions Fall Risk   Comments Impulsivity, Diploplia, [place], Wander-Guard   Vitals   Pulse 95   Patient BP Position Sitting   Blood Pressure  102/46   Pulse Oximetry 95 %   O2 Delivery Device None - Room Air   Cognition    Speech/ Communication Hard of Hearing   Level of Consciousness Alert   Ability To Follow Commands 1 Step  (Variable response to 2 step commands )   Gait Functional Level of Assist    Gait Level Of Assist Total Assist X 2  (CGA with 2nd person for w/c follow)   Assistive Device Front Wheel Walker   Distance (Feet) 60  (30 feet x2 with FWW; 10 feet x1 // bars)   # of Times Distance was Traveled 1   Deviation Ataxic;Shuffled Gait;Decreased Heel Strike;Decreased Toe Off   Sitting Lower Body Exercises   Ankle Pumps 1 set of 10;Bilateral   Hip Abduction 1 set of 10;Bilateral   Hip Adduction 1 set of 10;Bilateral   Long Arc Quad 1 set of 10;Bilateral  (ataxic)   Marching 1 set of 10   Hamstring Curl 1 set of 10;Bilateral  (ataxic)   Comments Seated exercise HEP handout provided with blue theraband    Bed Mobility    Sit to Stand Contact Guard Assist   Neuro-Muscular Treatments   Neuro-Muscular Treatments Anterior weight shift;Sequencing;Tactile Cuing;Verbal Cuing;Weight Shift Right;Weight Shift Left   Comments Toe taps on 4\" step in // bars x10 reps bilat with CGA; step-ups on 4\" box in // bars x5 reps bilat with CGA; " seated toe taps on cone x10 reps bilat with an emphasis on coordination; standing toe taps on cone in // bars with emphasis on coordination    Interdisciplinary Plan of Care Collaboration   IDT Collaboration with  Nursing;Certified Nursing Assistant;Physician   Patient Position at End of Therapy Seated;Chair Alarm On;Self Releasing Lap Belt Applied;Call Light within Reach;Tray Table within Reach;Family / Friend in Room   Collaboration Comments CLOF    Strengths & Barriers   Strengths Adequate strength;Independent prior level of function;Pleasant and cooperative;Willingly participates in therapeutic activities   Barriers Decreased endurance;Difficulty following instructions;Hearing impairment;Home accessibility;Impaired balance;Impaired carryover of learning;Impaired functional cognition;Impaired insight/denial of deficits;Impulsive;Limited mobility;Impaired activity tolerance   PT Total Time Spent   PT Individual Total Time Spent (Mins) 60   PT Charge Group   PT Gait Training 2   PT Therapeutic Exercise 1   PT Neuromuscular Re-Education / Balance 1     Patient encouraged to completed seated exercise program in room.  CLOF discussed with SLP.    Assessment    Patient appears less confused with increased attention today. Patient demonstrates an improvement in functional mobility. LE ataxia decreased with ambulation practice. Patient demonstrated good coordination with toe taps on a cone in seated and standing, however increased ataxia noted with LAQ and hamstring curls. Patient is limited by hearing impairment, requiring therapist to speak loudly closely ear. Use of a whiteboard would improve communication.    Strengths: (P) Adequate strength, Independent prior level of function, Pleasant and cooperative, Willingly participates in therapeutic activities  Barriers: (P) Decreased endurance, Difficulty following instructions, Hearing impairment, Home accessibility, Impaired balance, Impaired carryover of learning,  Impaired functional cognition, Impaired insight/denial of deficits, Impulsive, Limited mobility, Impaired activity tolerance    Plan    Gait with most appropriate assistive device (4WW vs FWW), step practice with one rail, coordination exercise, transfer training, balance assessment.    Passport items to be completed:  Get in/out of bed safely, in/out of a vehicle, safely use mobility device, walk or wheel around home/community, navigate up and down stairs, show how to get up/down from the ground, ensure home is accessible, demonstrate HEP, complete caregiver training    Physical Therapy Problems     Problem: Mobility     Dates: Start: 04/07/21       Goal: STG-Within one week, patient will propel wheelchair community     Dates: Start: 04/07/21       Description: 1) Individualized goal:  W/C 150ft with supervision   2) Interventions:  PT Gait Training, PT Therapeutic Exercises, PT Neuro Re-Ed/Balance, PT Aquatic Therapy, PT Therapeutic Activity, and PT Evaluation            Goal: STG-Within one week, patient will ambulate household distance     Dates: Start: 04/07/21       Description: 1) Individualized goal: AMB 30 ft with FWW and CGA  2) Interventions:  PT Gait Training, PT Therapeutic Exercises, PT Neuro Re-Ed/Balance, PT Aquatic Therapy, PT Therapeutic Activity, and PT Evaluation                  Problem: Mobility Transfers     Dates: Start: 04/07/21       Goal: STG-Within one week, patient will transfer bed to chair     Dates: Start: 04/07/21       Description: 1) Individualized goal:  SPT with FWW and SBA  2) Interventions: T Gait Training, PT Therapeutic Exercises, PT Neuro Re-Ed/Balance, PT Aquatic Therapy, PT Therapeutic Activity, and PT Evaluation                  Problem: PT-Long Term Goals     Dates: Start: 04/07/21       Goal: LTG-By discharge, patient will ambulate     Dates: Start: 04/07/21       Description: 1) Individualized goal: AMB 150ft with FWW and supervision  2) Interventions:  PT Gait  Training, PT Therapeutic Exercises, PT Neuro Re-Ed/Balance, PT Aquatic Therapy, PT Therapeutic Activity, and PT Evaluation            Goal: LTG-By discharge, patient will transfer one surface to another     Dates: Start: 04/07/21       Description: 1) Individualized goal: SPT with FWW and supervision  2) Interventions:  PT Gait Training, PT Therapeutic Exercises, PT Neuro Re-Ed/Balance, PT Aquatic Therapy, PT Therapeutic Activity, and PT Evaluation            Goal: LTG-By discharge, patient will ambulate up/down 4-6 stairs     Dates: Start: 04/07/21       Description: 1) Individualized goal:  Negotiate 5 steps with 2 handrails and CGA  2) Interventions:  PT Gait Training, PT Therapeutic Exercises, PT Neuro Re-Ed/Balance, PT Aquatic Therapy, PT Therapeutic Activity, and PT Evaluation            Goal: LTG-By discharge, patient will transfer in/out of a car     Dates: Start: 04/07/21       Description: 1) Individualized goal: Transfer with FWW and SBA  2) Interventions:  PT Gait Training, PT Therapeutic Exercises, PT Neuro Re-Ed/Balance, PT Aquatic Therapy, PT Therapeutic Activity, and PT Evaluation

## 2021-04-08 NOTE — THERAPY
Speech Language Pathology  Daily Treatment     Patient Name: Cooper Harvey  Age:  86 y.o., Sex:  male  Medical Record #: [id number]  Today's Date: 4/8/2021     Precautions  Precautions: Fall Risk  Comments: Impulsivity, Diploplia, [place], Wander-Guard    Subjective    Patient pleasant and cooperative.  Slow to process.     Objective       04/08/21 0931   Cognition   Attention to Task Within Functional Limits (6-7)   Simple Attention Minimal (4)   Moderate Attention Moderate (3)   Orientation  Supervision (5)   Prospective Memory Severe (2)   Functional Memory Activities Moderate (3)   Functional Problem Solving Moderate (3)   Written Sequencing Moderate (3)   Written Arithmetic Minimal (4)   Clock Drawing Poor Planning;Disorganization;Impaired Hand Placement   SCCAN (Scales of Cognitive and Communicative Ability for Neurorehabilitation)   Oral Expression - Raw Score 14   Oral Expression - Scale Performance Score 74   Orientation - Raw Score 11   Orientation - Scale Performance Score 92   Memory - Raw Score 8   Memory - Scale Performance Score 42   Speech Comprehension - Raw Score 10   Speech Comprehension - Scale Performance Score 77   Reading Comprehension - Raw Score 7   Reading Comprehension - Scale Performance Score 58   Writing - Raw Score 6   Writing - Scale Performance Score 86   Attention - Raw Score 7   Attention - Scale Performance Score 44   Problem Solving - Raw Score 16   Problem Solving - Scale Performance Score 70   SCCAN Total Raw Score 64   SCCAN Degree of Severity Moderate Impairment   SLP Total Time Spent   SLP Individual Total Time Spent (Mins) 60   Treatment Charges   SLP Cognitive Skill Development First 15 Minutes 1   SLP Cognitive Skill Development Additional 15 Minutes 3       Assessment    SCCAN completed Patient achieved a total raw score of 64 which is 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 Patient achieved the following percentage scores for subtests given:   Oral Expression 74, Orientation 92, Memory 42, Speech Comprehension 77, Reading Comprehension 58, Writing 86, Attention 44, Problem solving 70.  Patient required extra time and repetitions to process auditory and written information.     Strengths: Alert and oriented, Pleasant and cooperative, Willingly participates in therapeutic activities  Barriers: Hearing impairment, Impaired insight/denial of deficits, Impaired functional cognition, Impaired carryover of learning    Plan    Target written directives, attention, recall and safety planning and problem solving.    Passport items to be completed:  Express basic needs, understand food/liquid recommendations, consistently follow swallow precautions, manage finances, manage medications, arrive to therapy appointments on time, complete daily memory log entries, solve problems related to safety situations, review education related to hospitalization, complete caregiver training     Speech Therapy Problems     Problem: Memory STGs     Dates: Start: 04/08/21       Goal: STG-Within one week, patient will     Dates: Start: 04/08/21       Description: 1) Individualized goal:  recall daily events and safety sequencing with 70% acc with use of memory log and memory strategies.  2) Interventions:  SLP Self Care / ADL Training , SLP Cognitive Skill Development, and SLP Group Treatment                    Problem: Problem Solving STGs     Dates: Start: 04/07/21       Goal: STG-Within one week, patient will solve basic problems     Dates: Start: 04/08/21       Description: 1) Individualized goal:  related to safety and hospitalization with min A with 80% accuracy.   2) Interventions:  SLP Speech Language Treatment, SLP Self Care / ADL Training , SLP Cognitive Skill Development, and SLP Group Treatment              Goal: STG-Within one week, patient will     Dates: Start: 04/08/21       Description: 1) Individualized goal:  will perform selective attention tasks with 80% acc with  min cues to improve to 100%  2) Interventions:  SLP Self Care / ADL Training , SLP Cognitive Skill Development, and SLP Group Treatment                    Problem: Speech/Swallowing LTGs     Dates: Start: 04/07/21       Goal: LTG-By discharge, patient will solve basic problems     Dates: Start: 04/07/21       Description: 1) Individualized goal:  given spv for a safe d/c home  2) Interventions:  SLP Speech Language Treatment, SLP Self Care / ADL Training , SLP Cognitive Skill Development, and SLP Group Treatment

## 2021-04-08 NOTE — CARE PLAN
Problem: Safety  Goal: Will remain free from injury  Outcome: PROGRESSING AS EXPECTED  Note: Call light within reach, patient encouraged to use for assistance for any needs and for assistance for safe transferring.    Patient has wander guard on left wrist. Alarms on.   Goal: Will remain free from falls  Outcome: PROGRESSING AS EXPECTED

## 2021-04-08 NOTE — CARE PLAN
Problem: Problem Solving STGs  Goal: STG-Within one week, patient will  Description: 1) Individualized goal:  complete SCCAN assessment with goals to be added as appropriate.  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 See ST note for full details.

## 2021-04-08 NOTE — THERAPY
Occupational Therapy  Daily Treatment     Patient Name: Cooper Harvey  Age:  86 y.o., Sex:  male  Medical Record #: [id number]  Today's Date: 4/8/2021     Precautions  Precautions: (P) Fall Risk  Comments: (P) Impulsivity, Diploplia, [place], Wander-Guard         Subjective    Patient asleep in bed, but easily awoken.  Agreeable to work on shaving his facial hair.     Objective       04/08/21 1331   Precautions   Precautions Fall Risk   Comments Impulsivity, Diploplia, [place], Wander-Guard   Cognition    Speech/ Communication Hard of Hearing   Functional Level of Assist   Grooming Minimal Assist   Grooming Description Set-up of equipment;Seated in wheelchair at sink;Supervision for safety;Verbal cueing;Increased time  (min A to shave, cleaned dentures and wash/dry hands/face)   Toileting Stand by Assist   Toileting Description Set-up of equipment;Supervision for safety;Verbal cueing  (setup/sba to void in urinal standing at bedside)   Bed, Chair, Wheelchair Transfer Contact Guard Assist   Bed Chair Wheelchair Transfer Description Set-up of equipment;Supervision for safety;Verbal cueing   Bed Mobility    Supine to Sit Minimal Assist  (x 2 attempts)   Sit to Supine Stand by Assist  (x 2 attempts)   Scooting Minimal Assist   Interdisciplinary Plan of Care Collaboration   Patient Position at End of Therapy In Bed;Call Light within Reach;Tray Table within Reach;Phone within Reach   OT Total Time Spent   OT Individual Total Time Spent (Mins) 60   OT Charge Group   OT Self Care / ADL 3   OT Therapy Activity 1       Assessment    Patient required min A for thoroughness with shaving due to impaired coordination of R UE.  Required assistance with areas under the jaw line on both sides and the front of his neck.  Did well with cleaning his dentures. Extra time was required for shaving due to 1-2 weeks of growth.  Strengths: Independent prior level of function, Motivated for self care and independence, Pleasant and cooperative,  Willingly participates in therapeutic activities  Barriers: Bladder incontinence, Decreased endurance, Generalized weakness, Hearing impairment, Impaired activity tolerance, Impaired balance, Impulsive, Confused, Limited mobility, Visual impairment    Plan    ADLs, functional mobility, safety with transfers, strength/endurance/coordination      Occupational Therapy Goals     Problem: Dressing     Dates: Start: 04/07/21       Goal: STG-Within one week, patient will dress LB     Dates: Start: 04/07/21       Description: 1) Individualized Goal:  Min assist to don/doff Lower body Clothing via AE/DME PRN  2) Interventions:  OT Self Care/ADL, OT Cognitive Skill Dev, OT Neuro Re-Ed/Balance, OT Therapeutic Activity, OT Evaluation, and OT Therapeutic Exercise                  Problem: Functional Transfers     Dates: Start: 04/07/21       Goal: STG-Within one week, patient will transfer to toilet     Dates: Start: 04/07/21       Description: 1) Individualized Goal:  Sup/SBA for commode transfer via DME PRN  2) Interventions:  OT Self Care/ADL, OT Cognitive Skill Dev, OT Neuro Re-Ed/Balance, OT Therapeutic Activity, OT Evaluation, and OT Therapeutic Exercise                    Problem: OT Long Term Goals     Dates: Start: 04/07/21       Goal: LTG-By discharge, patient will complete basic self care tasks     Dates: Start: 04/07/21       Description: 1) Individualized Goal:  Mod I for BADL's via AE/DME PRN  2) Interventions:  OT Self Care/ADL, OT Cognitive Skill Dev, OT Neuro Re-Ed/Balance, OT Therapeutic Activity, OT Evaluation, and OT Therapeutic Exercise              Goal: LTG-By discharge, patient will perform bathroom transfers     Dates: Start: 04/07/21       Description: 1) Individualized Goal: Mod I for bathroom transfers via DME PRN  2) Interventions:  OT Self Care/ADL, OT Cognitive Skill Dev, OT Neuro Re-Ed/Balance, OT Therapeutic Activity, OT Evaluation, and OT Therapeutic Exercise                    Problem:  Toileting     Dates: Start: 04/07/21       Goal: STG-Within one week, patient will complete toileting tasks     Dates: Start: 04/07/21       Description: 1) Individualized Goal:  Min assist for BM via DME PRN  2) Interventions:  OT Self Care/ADL, OT Cognitive Skill Dev, OT Neuro Re-Ed/Balance, OT Therapeutic Activity, OT Evaluation, and OT Therapeutic Exercise

## 2021-04-08 NOTE — PROGRESS NOTES
"Rehab Progress Note     Date of Service: 4/8/2021  Chief Complaint: follow up stroke    Interval Events (Subjective)    Patient seen and examined today in the therapy gym.  He is walking with the physical therapist using a walker.  Patient had no agitation overnight.    Neurology will come by tomorrow for the patient to get a Zio patch cardiac monitor.    Review of Systems   HENT: Positive for hearing loss.    Eyes: Positive for blurred vision and double vision.   Neurological: Positive for dizziness. Negative for focal weakness.   Psychiatric/Behavioral: Positive for memory loss.            Objective:  VITAL SIGNS: /55   Pulse 93   Temp 36.6 °C (97.9 °F) (Tympanic)   Resp 18   Ht 1.727 m (5' 8\") Comment: Simultaneous filing. User may not have seen previous data.  Wt 76 kg (167 lb 8.8 oz) Comment: Simultaneous filing. User may not have seen previous data.  SpO2 96%   BMI 25.48 kg/m²   Physical Exam   Constitutional: He is well-developed, well-nourished, and in no distress.   Cardiovascular: Normal rate and regular rhythm.   Pulmonary/Chest: Effort normal and breath sounds normal.   Neurological: He is alert. He is disoriented. Gait abnormal.         Recent Results (from the past 72 hour(s))   CBC WITH DIFFERENTIAL    Collection Time: 04/06/21  6:00 AM   Result Value Ref Range    WBC 7.1 4.8 - 10.8 K/uL    RBC 4.46 (L) 4.70 - 6.10 M/uL    Hemoglobin 13.9 (L) 14.0 - 18.0 g/dL    Hematocrit 41.4 (L) 42.0 - 52.0 %    MCV 92.8 81.4 - 97.8 fL    MCH 31.2 27.0 - 33.0 pg    MCHC 33.6 (L) 33.7 - 35.3 g/dL    RDW 43.3 35.9 - 50.0 fL    Platelet Count 206 164 - 446 K/uL    MPV 10.6 9.0 - 12.9 fL    Neutrophils-Polys 77.30 (H) 44.00 - 72.00 %    Lymphocytes 8.00 (L) 22.00 - 41.00 %    Monocytes 10.10 0.00 - 13.40 %    Eosinophils 3.40 0.00 - 6.90 %    Basophils 0.60 0.00 - 1.80 %    Immature Granulocytes 0.60 0.00 - 0.90 %    Nucleated RBC 0.00 /100 WBC    Neutrophils (Absolute) 5.52 1.82 - 7.42 K/uL    Lymphs " (Absolute) 0.57 (L) 1.00 - 4.80 K/uL    Monos (Absolute) 0.72 0.00 - 0.85 K/uL    Eos (Absolute) 0.24 0.00 - 0.51 K/uL    Baso (Absolute) 0.04 0.00 - 0.12 K/uL    Immature Granulocytes (abs) 0.04 0.00 - 0.11 K/uL    NRBC (Absolute) 0.00 K/uL   Comp Metabolic Panel    Collection Time: 04/06/21  6:00 AM   Result Value Ref Range    Sodium 139 135 - 145 mmol/L    Potassium 3.4 (L) 3.6 - 5.5 mmol/L    Chloride 105 96 - 112 mmol/L    Co2 27 20 - 33 mmol/L    Anion Gap 7.0 7.0 - 16.0    Glucose 98 65 - 99 mg/dL    Bun 21 8 - 22 mg/dL    Creatinine 0.88 0.50 - 1.40 mg/dL    Calcium 8.4 (L) 8.5 - 10.5 mg/dL    AST(SGOT) 23 12 - 45 U/L    ALT(SGPT) 17 2 - 50 U/L    Alkaline Phosphatase 76 30 - 99 U/L    Total Bilirubin 0.5 0.1 - 1.5 mg/dL    Albumin 3.6 3.2 - 4.9 g/dL    Total Protein 6.5 6.0 - 8.2 g/dL    Globulin 2.9 1.9 - 3.5 g/dL    A-G Ratio 1.2 g/dL   ESTIMATED GFR    Collection Time: 04/06/21  6:00 AM   Result Value Ref Range    GFR If African American >60 >60 mL/min/1.73 m 2    GFR If Non African American >60 >60 mL/min/1.73 m 2   SARS-CoV-2, PCR (In-House)    Collection Time: 04/06/21  5:00 PM   Result Value Ref Range    SARS-CoV-2 Source NP Swab     SARS-CoV-2 by PCR NotDetected    CBC with Differential    Collection Time: 04/07/21  6:19 AM   Result Value Ref Range    WBC 6.7 4.8 - 10.8 K/uL    RBC 4.50 (L) 4.70 - 6.10 M/uL    Hemoglobin 14.0 14.0 - 18.0 g/dL    Hematocrit 42.0 42.0 - 52.0 %    MCV 93.3 81.4 - 97.8 fL    MCH 31.1 27.0 - 33.0 pg    MCHC 33.3 (L) 33.7 - 35.3 g/dL    RDW 43.8 35.9 - 50.0 fL    Platelet Count 209 164 - 446 K/uL    MPV 10.3 9.0 - 12.9 fL    Neutrophils-Polys 73.00 (H) 44.00 - 72.00 %    Lymphocytes 10.70 (L) 22.00 - 41.00 %    Monocytes 11.70 0.00 - 13.40 %    Eosinophils 3.40 0.00 - 6.90 %    Basophils 0.60 0.00 - 1.80 %    Immature Granulocytes 0.60 0.00 - 0.90 %    Nucleated RBC 0.00 /100 WBC    Neutrophils (Absolute) 4.92 1.82 - 7.42 K/uL    Lymphs (Absolute) 0.72 (L) 1.00 - 4.80  K/uL    Monos (Absolute) 0.79 0.00 - 0.85 K/uL    Eos (Absolute) 0.23 0.00 - 0.51 K/uL    Baso (Absolute) 0.04 0.00 - 0.12 K/uL    Immature Granulocytes (abs) 0.04 0.00 - 0.11 K/uL    NRBC (Absolute) 0.00 K/uL   Comp Metabolic Panel (CMP)    Collection Time: 04/07/21  6:19 AM   Result Value Ref Range    Sodium 135 135 - 145 mmol/L    Potassium 3.2 (L) 3.6 - 5.5 mmol/L    Chloride 102 96 - 112 mmol/L    Co2 27 20 - 33 mmol/L    Anion Gap 6.0 (L) 7.0 - 16.0    Glucose 88 65 - 99 mg/dL    Bun 26 (H) 8 - 22 mg/dL    Creatinine 0.97 0.50 - 1.40 mg/dL    Calcium 8.7 8.5 - 10.5 mg/dL    AST(SGOT) 20 12 - 45 U/L    ALT(SGPT) 17 2 - 50 U/L    Alkaline Phosphatase 77 30 - 99 U/L    Total Bilirubin 0.5 0.1 - 1.5 mg/dL    Albumin 3.5 3.2 - 4.9 g/dL    Total Protein 6.4 6.0 - 8.2 g/dL    Globulin 2.9 1.9 - 3.5 g/dL    A-G Ratio 1.2 g/dL   Magnesium    Collection Time: 04/07/21  6:19 AM   Result Value Ref Range    Magnesium 2.1 1.5 - 2.5 mg/dL   ESTIMATED GFR    Collection Time: 04/07/21  6:19 AM   Result Value Ref Range    GFR If African American >60 >60 mL/min/1.73 m 2    GFR If Non African American >60 >60 mL/min/1.73 m 2       Current Facility-Administered Medications   Medication Frequency   • divalproex (DEPAKOTE) delayed-release tablet 250 mg Q12HRS   • QUEtiapine (Seroquel) tablet 25 mg Nightly   • lisinopril (PRINIVIL) tablet 30 mg Q DAY   • melatonin tablet 3 mg QHS   • ziprasidone (Geodon) injection 20 mg Q2HRS PRN   • hydrOXYzine HCl (ATARAX) tablet 50 mg Q6HRS PRN   • Respiratory Therapy Consult Continuous RT   • Pharmacy Consult Request ...Pain Management Review 1 Each PHARMACY TO DOSE   • hydrALAZINE (APRESOLINE) tablet 10 mg Q8HRS PRN   • acetaminophen (Tylenol) tablet 650 mg Q4HRS PRN   • lactulose 20 GM/30ML solution 30 mL QDAY PRN   • docusate sodium (ENEMEEZ) enema 283 mg QDAY PRN   • fleet enema 133 mL QDAY PRN   • artificial tears ophthalmic solution 1 Drop PRN   • benzocaine-menthol (CEPACOL) lozenge 1  Lozenge Q2HRS PRN   • mag hydrox-al hydrox-simeth (MAALOX PLUS ES or MYLANTA DS) suspension 20 mL Q2HRS PRN   • ondansetron (ZOFRAN ODT) dispertab 4 mg 4X/DAY PRN    Or   • ondansetron (ZOFRAN) syringe/vial injection 4 mg 4X/DAY PRN   • traZODone (DESYREL) tablet 50 mg QHS PRN   • sodium chloride (OCEAN) 0.65 % nasal spray 2 Spray PRN   • midazolam (VERSED) 5 mg/mL (1 mL vial) PRN   • senna-docusate (PERICOLACE or SENOKOT S) 8.6-50 MG per tablet 2 tablet BID    And   • polyethylene glycol/lytes (MIRALAX) PACKET 1 Packet QDAY PRN    And   • magnesium hydroxide (MILK OF MAGNESIA) suspension 30 mL QDAY PRN    And   • bisacodyl (DULCOLAX) suppository 10 mg QDAY PRN   • enoxaparin (LOVENOX) inj 40 mg DAILY AT 1800   • amLODIPine (NORVASC) tablet 10 mg Q DAY   • aspirin (ASA) chewable tab 81 mg DAILY   • atorvastatin (LIPITOR) tablet 80 mg Q EVENING   • QUEtiapine (Seroquel) tablet 25 mg TID PRN       Orders Placed This Encounter   Procedures   • Diet Order Diet: Level 6 - Soft and Bite Sized; Liquid level: Level 0 - Thin; Tray Modifications (optional): SLP - 1:1 Supervision by Nursing, SLP - Deliver to Nursing Station     Standing Status:   Standing     Number of Occurrences:   1     Order Specific Question:   Diet:     Answer:   Level 6 - Soft and Bite Sized [23]     Order Specific Question:   Liquid level     Answer:   Level 0 - Thin     Order Specific Question:   Tray Modifications (optional)     Answer:   SLP - 1:1 Supervision by Nursing     Order Specific Question:   Tray Modifications (optional)     Answer:   SLP - Deliver to Nursing Station       Assessment:  Active Hospital Problems    Diagnosis    • *Acute CVA (cerebrovascular accident) (HCC)    • Carotid stenosis, bilateral    • Altered mental status, unspecified    • Mixed hyperlipidemia    • BPH (benign prostatic hyperplasia)    • Sundowning    • Hard of hearing    • Sinus bradycardia    • Hypokalemia    • Anemia    • S/P carotid endarterectomy    • Agitation       This patient is a 86 y.o. male admitted for acute inpatient rehabilitation with Acute CVA (cerebrovascular accident) (HCC).    Therapy notes reviewed.    Patient will have his first weekly conference on Monday to discuss a discharge date.    Medical Decision Making and Plan:    Small punctate infarctions  Right frontal lobe, right cerebellum, right occipital lobe  Non-focal  Visual complaints  Cognitive impairment (suspect some is baseline)  Continue full rehab program  PT/OT/SLP, 1 hr each discipline, 5 days per week    Aspirin   Statin    Outpatient follow up with stroke bridge clinic, Dr Henrry Pinto patch cardiac monitor to be placed tomorrow.    Right carotid stenosis  S/p CEA 4/2  Statin  Aspirin  Outpatient follow up with Dr. Beavers    Agitation, improved  Sundowning, improved  Depakote started at acute, start titration  Scheduled Seroquel at night  Scheduled melatonin at night  PRN seroquel, not requiring  Qtc OK     Hypertension, improved  Amlodipine  Lisinopril, increase dose  PRN hydralazine     Anemia, resolved     Hyperlipidemia  Statin     Hypokalemia  Supplement  Recheck in the morning     Sinus bradycardia, resolved    Bowel program  Continue bowel medications  Scheduled Sennakot  PRN Miralax, MOM, bisacodyl suppository  Last  4/7    Bladder program  BPH  With incontinence  Check PVRs - 106, 86, 20  Not retaining  Bladder scan for no voids  ICP for over 400 cc  Scheduled toileting    DVT prophylaxis  Lovenox    Total time:  28 minutes.  I spent greater than 50% of the time for patient care, counseling, and coordination on this date, including patient face-to face time, unit/floor time with review of records/pertinent lab data and studies, as well as discussing diagnostic evaluation/work up, planned therapeutic interventions, and future disposition of care, as per the interval events/subjective and the assessment and plan as noted above.    I have performed a physical exam, reviewed and  updated ROS, as well as the assessment and plan today 4/8/2021. In review of note from 4/7/2021 there are no new changes except as documented above.            Sugey Barkley M.D.   Physical Medicine and Rehabilitation

## 2021-04-08 NOTE — DISCHARGE PLANNING
CM met with patients two daughters in the lobby to explain CM and IDT.  Provided them with a business card.  CM will continue to monitor for DC needs.

## 2021-04-09 DIAGNOSIS — I65.23 CAROTID STENOSIS, BILATERAL: ICD-10-CM

## 2021-04-09 DIAGNOSIS — I63.9 ACUTE CVA (CEREBROVASCULAR ACCIDENT) (HCC): ICD-10-CM

## 2021-04-09 DIAGNOSIS — Z98.890 S/P CAROTID ENDARTERECTOMY: ICD-10-CM

## 2021-04-09 LAB
ANION GAP SERPL CALC-SCNC: 10 MMOL/L (ref 7–16)
BUN SERPL-MCNC: 32 MG/DL (ref 8–22)
CALCIUM SERPL-MCNC: 8.8 MG/DL (ref 8.5–10.5)
CHLORIDE SERPL-SCNC: 109 MMOL/L (ref 96–112)
CO2 SERPL-SCNC: 26 MMOL/L (ref 20–33)
CREAT SERPL-MCNC: 1.02 MG/DL (ref 0.5–1.4)
GLUCOSE SERPL-MCNC: 84 MG/DL (ref 65–99)
POTASSIUM SERPL-SCNC: 3.9 MMOL/L (ref 3.6–5.5)
SODIUM SERPL-SCNC: 145 MMOL/L (ref 135–145)

## 2021-04-09 PROCEDURE — A9270 NON-COVERED ITEM OR SERVICE: HCPCS

## 2021-04-09 PROCEDURE — 97110 THERAPEUTIC EXERCISES: CPT

## 2021-04-09 PROCEDURE — A9270 NON-COVERED ITEM OR SERVICE: HCPCS | Performed by: PHYSICAL MEDICINE & REHABILITATION

## 2021-04-09 PROCEDURE — 770010 HCHG ROOM/CARE - REHAB SEMI PRIVAT*

## 2021-04-09 PROCEDURE — 97130 THER IVNTJ EA ADDL 15 MIN: CPT | Performed by: SPEECH-LANGUAGE PATHOLOGIST

## 2021-04-09 PROCEDURE — 36415 COLL VENOUS BLD VENIPUNCTURE: CPT

## 2021-04-09 PROCEDURE — 99222 1ST HOSP IP/OBS MODERATE 55: CPT | Performed by: PSYCHIATRY & NEUROLOGY

## 2021-04-09 PROCEDURE — 700102 HCHG RX REV CODE 250 W/ 637 OVERRIDE(OP)

## 2021-04-09 PROCEDURE — 80048 BASIC METABOLIC PNL TOTAL CA: CPT

## 2021-04-09 PROCEDURE — 97112 NEUROMUSCULAR REEDUCATION: CPT

## 2021-04-09 PROCEDURE — 99232 SBSQ HOSP IP/OBS MODERATE 35: CPT | Performed by: PHYSICAL MEDICINE & REHABILITATION

## 2021-04-09 PROCEDURE — 97129 THER IVNTJ 1ST 15 MIN: CPT | Performed by: SPEECH-LANGUAGE PATHOLOGIST

## 2021-04-09 PROCEDURE — 700102 HCHG RX REV CODE 250 W/ 637 OVERRIDE(OP): Performed by: PHYSICAL MEDICINE & REHABILITATION

## 2021-04-09 PROCEDURE — 97535 SELF CARE MNGMENT TRAINING: CPT

## 2021-04-09 PROCEDURE — 700111 HCHG RX REV CODE 636 W/ 250 OVERRIDE (IP): Performed by: PHYSICAL MEDICINE & REHABILITATION

## 2021-04-09 RX ORDER — DIVALPROEX SODIUM 250 MG/1
250 TABLET, DELAYED RELEASE ORAL
Status: COMPLETED | OUTPATIENT
Start: 2021-04-09 | End: 2021-04-11

## 2021-04-09 RX ORDER — DIVALPROEX SODIUM 250 MG/1
TABLET, DELAYED RELEASE ORAL
Status: COMPLETED
Start: 2021-04-09 | End: 2021-04-09

## 2021-04-09 RX ADMIN — AMLODIPINE BESYLATE 10 MG: 5 TABLET ORAL at 05:47

## 2021-04-09 RX ADMIN — MELATONIN TAB 3 MG 3 MG: 3 TAB at 20:27

## 2021-04-09 RX ADMIN — SENNOSIDES AND DOCUSATE SODIUM 2 TABLET: 8.6; 5 TABLET ORAL at 09:20

## 2021-04-09 RX ADMIN — QUETIAPINE FUMARATE 25 MG: 25 TABLET ORAL at 20:27

## 2021-04-09 RX ADMIN — ASPIRIN 81 MG CHEWABLE TABLET 81 MG: 81 TABLET CHEWABLE at 09:20

## 2021-04-09 RX ADMIN — LISINOPRIL 30 MG: 20 TABLET ORAL at 05:46

## 2021-04-09 RX ADMIN — ENOXAPARIN SODIUM 40 MG: 40 INJECTION SUBCUTANEOUS at 17:37

## 2021-04-09 RX ADMIN — DIVALPROEX SODIUM 250 MG: 250 TABLET, DELAYED RELEASE ORAL at 09:20

## 2021-04-09 RX ADMIN — ATORVASTATIN CALCIUM 80 MG: 40 TABLET, FILM COATED ORAL at 20:27

## 2021-04-09 RX ADMIN — DIVALPROEX SODIUM 250 MG: 250 TABLET, DELAYED RELEASE ORAL at 20:26

## 2021-04-09 ASSESSMENT — BALANCE ASSESSMENTS
PICK UP OBJECT FROM THE FLOOR FROM A STANDING POSITION: 3
TRANSFERS: 4
STANDING TO SITTING: 4
TURN 360 DEGREES: 2
SITTING TO STANDING: 4
STANDING UNSUPPORTED WITH EYES CLOSED: 3
STANDING ON ONE LEG: 3
PLACE ALTERNATE FOOT ON STEP OR STOOL WHILE STANDING UNSUPPORTED: 0
SITTING UNSUPPORTED: 4
LONG VERSION TOTAL SCORE (MAX 56): 43
REACHING FORWARD WITH OUTSTRETCHED ARM WHILE STANDING: 2
STANDING UNSUPPORTED WITH FEET TOGETHER: 3
LONG VERSION TOTAL SCORE (MAX 56): 43
STANDING UNSUPPORTED ONE FOOT IN FRONT: 4
STANDING UNSUPPORTED: 3
LOOK OVER LEFT AND RIGHT SHOULDERS WHILE STANDING: 4

## 2021-04-09 ASSESSMENT — GAIT ASSESSMENTS
ASSISTIVE DEVICE: FRONT WHEEL WALKER
DISTANCE (FEET): 150
DEVIATION: ATAXIC;SHUFFLED GAIT;DECREASED HEEL STRIKE;DECREASED TOE OFF
GAIT LEVEL OF ASSIST: CONTACT GUARD ASSIST

## 2021-04-09 ASSESSMENT — ACTIVITIES OF DAILY LIVING (ADL): BED_CHAIR_WHEELCHAIR_TRANSFER_DESCRIPTION: ADAPTIVE EQUIPMENT;SET-UP OF EQUIPMENT;SUPERVISION FOR SAFETY

## 2021-04-09 NOTE — THERAPY
Speech Language Pathology  Daily Treatment     Patient Name: Cooper Harvey  Age:  86 y.o., Sex:  male  Medical Record #: [id number]  Today's Date: 4/9/2021     Precautions  Precautions: Fall Risk  Comments: Impulsivity, severe [place]    Subjective    Patient was in bed when SLP arrived but agreeable to join SLP in speech office for tx. He is very [place] and requires all verbal information also be presented in written form.      Objective       04/09/21 1001   Precautions   Precautions Fall Risk   Comments Impulsivity, severe [place]   Reading Comprehension    Reading Sentences Within Functional Limits (6-7)   Written Language Expression   Copying Within Functional Limits (6-7)   Legibility Within Functional Limits (6-7)   Cognition   Orientation  Supervision (5)   Functional Memory Activities Moderate (3)   Sleep/Wake Cycle   Sleep & Rest Awake;Out of bed   Interdisciplinary Plan of Care Collaboration   IDT Collaboration with  Therapy Tech;Certified Nursing Assistant   Patient Position at End of Therapy In Bed;Phone within Reach;Tray Table within Reach   Collaboration Comments re: wheelchair brakes not working   SLP Total Time Spent   SLP Individual Total Time Spent (Mins) 60   Treatment Charges   SLP Cognitive Skill Development First 15 Minutes 1   SLP Cognitive Skill Development Additional 15 Minutes 3       Assessment    SLP provided patient with memory book and orientation to staff page. SLP discussed the names/roles of each discipline. Patient copied the information into his memory book. SLP also provided verbal cues for patient to use the therapy schedule to determine the date and who he would be working with today. Patient was able to copy the times and names for scheduled therapy into his memory book with minimal verbal cues.     Strengths: Alert and oriented, Pleasant and cooperative, Willingly participates in therapeutic activities  Barriers: Hearing impairment, Impaired insight/denial of deficits, Impaired  functional cognition, Impaired carryover of learning    Plan    Target functional memory and safety    Passport items to be completed:  Express basic needs, understand food/liquid recommendations, consistently follow swallow precautions, manage finances, manage medications, arrive to therapy appointments on time, complete daily memory log entries, solve problems related to safety situations, review education related to hospitalization, complete caregiver training     Speech Therapy Problems     Problem: Memory STGs     Dates: Start: 04/08/21       Goal: STG-Within one week, patient will     Dates: Start: 04/08/21       Description: 1) Individualized goal:  recall daily events and safety sequencing with 70% acc with use of memory log and memory strategies.  2) Interventions:  SLP Self Care / ADL Training , SLP Cognitive Skill Development, and SLP Group Treatment                    Problem: Problem Solving STGs     Dates: Start: 04/07/21       Goal: STG-Within one week, patient will solve basic problems     Dates: Start: 04/08/21       Description: 1) Individualized goal:  related to safety and hospitalization with min A with 80% accuracy.   2) Interventions:  SLP Speech Language Treatment, SLP Self Care / ADL Training , SLP Cognitive Skill Development, and SLP Group Treatment              Goal: STG-Within one week, patient will     Dates: Start: 04/08/21       Description: 1) Individualized goal:  will perform selective attention tasks with 80% acc with min cues to improve to 100%  2) Interventions:  SLP Self Care / ADL Training , SLP Cognitive Skill Development, and SLP Group Treatment                    Problem: Speech/Swallowing LTGs     Dates: Start: 04/07/21       Goal: LTG-By discharge, patient will solve basic problems     Dates: Start: 04/07/21       Description: 1) Individualized goal:  given spv for a safe d/c home  2) Interventions:  SLP Speech Language Treatment, SLP Self Care / ADL Training , SLP  Cognitive Skill Development, and SLP Group Treatment

## 2021-04-09 NOTE — CONSULTS
"Neurology Initial Consult H&P  Neurohospitalist Service, [facility]    Referring Physician: Sugey Barkley M.D.    HPI: Cooper Harvey is a 86 y.o. man with a history of HLD and stroke admitted to the rehab hospital presenting for whom neurology has been consulted for extended cardiac event monitoring placement.  As documented by Dr. Sugey Barkley 4/6/21, \"admitted for acute inpatient rehabilitation with severe functional debility after an acute stroke... [s/p] transferred from [facility] with complaints of weeks of vision changes and a possible syncopal event... CTA with bilateral carotid stenosis, for which he was then transferred here to [facility]. MRI with punctate infarctions in the right cerebellum, right occipital and right frontal lobes. NIHSS 2. Patient is now s/p right carotid endarterectomy with Dr. Beavers on 4/2... EEG that was negative for seizures. He was started on Depakote for agitation. Patient current reports double vision. He is very hard of hearing, has aides, and can only hear if you yell right next to his ear. He denies any focal weakness. He is ambidextrous. He reports some numbness and tingling in his right hand ever since he played [employer] tug of war a month ago.\"  Seen at bedside 4/9/21 and patient denies headache.  Denies CP, SOB, lightheadedness, or anxiety.    Review of systems: In addition to what is detailed in the HPI above, all other systems reviewed and are negative.    Past Medical History:    has a past medical history of [place] (hard of hearing) (04/02/2021), Hydrocele, unspecified, Hypertrophy of prostate without urinary obstruction and other lower urinary tract symptoms (LUTS), and Mixed hyperlipidemia.    FHx:  family history includes Cancer in his mother.    SHx:   reports that he quit smoking about 31 years ago. He has a 20.00 pack-year smoking history. He has never used smokeless tobacco. He reports current alcohol use. He reports " that he does not use drugs.    Allergies:  Allergies   Allergen Reactions   • Vicodin [Hydrocodone-Acetaminophen] Rash     rash       Medications:    Current Facility-Administered Medications:   •  divalproex (DEPAKOTE) delayed-release tablet 250 mg, 250 mg, Oral, Q12HRS, Sugey Barkley M.D., 250 mg at 04/08/21 2041  •  QUEtiapine (Seroquel) tablet 25 mg, 25 mg, Oral, Nightly, Sugey Barkley M.D., 25 mg at 04/08/21 2041  •  lisinopril (PRINIVIL) tablet 30 mg, 30 mg, Oral, Q DAY, Sugey Barkley M.D., 30 mg at 04/09/21 0546  •  melatonin tablet 3 mg, 3 mg, Oral, QHS, Sugey Barkley M.D., 3 mg at 04/08/21 2041  •  ziprasidone (Geodon) injection 20 mg, 20 mg, Intramuscular, Q2HRS PRN, Sugey Barkley M.D.  •  hydrOXYzine HCl (ATARAX) tablet 50 mg, 50 mg, Oral, Q6HRS PRN, Sugey Barkley M.D.  •  Respiratory Therapy Consult, , Nebulization, Continuous RT, Sugey Barkley M.D.  •  Pharmacy Consult Request ...Pain Management Review 1 Each, 1 Each, Other, PHARMACY TO DOSE, Sugey Barkley M.D.  •  hydrALAZINE (APRESOLINE) tablet 10 mg, 10 mg, Oral, Q8HRS PRN, Sugey Barkley M.D.  •  acetaminophen (Tylenol) tablet 650 mg, 650 mg, Oral, Q4HRS PRN, Sugey Barkley M.D.  •  lactulose 20 GM/30ML solution 30 mL, 30 mL, Oral, QDAY PRN, Sugey Barkley M.D.  •  docusate sodium (ENEMEEZ) enema 283 mg, 283 mg, Rectal, QDAY PRN, Sugey Barkley M.D.  •  fleet enema 133 mL, 1 Each, Rectal, QDAY PRN, Sugey Barkley M.D.  •  artificial tears ophthalmic solution 1 Drop, 1 Drop, Both Eyes, PRN, Sugey Barkley M.D.  •  benzocaine-menthol (CEPACOL) lozenge 1 Lozenge, 1 Lozenge, Mouth/Throat, Q2HRS PRN, Sugey Barkley M.D.  •  mag hydrox-al hydrox-simeth (MAALOX PLUS ES or MYLANTA DS) suspension 20 mL, 20 mL, Oral, Q2HRS PRN, Sugey Barkley M.D.  •  ondansetron (ZOFRAN ODT) dispertab 4 mg, 4 mg, Oral, 4X/DAY PRN **OR** ondansetron (ZOFRAN) syringe/vial injection 4 mg, 4 mg,  Intramuscular, 4X/DAY PRN, Sugey Barkley M.D.  •  traZODone (DESYREL) tablet 50 mg, 50 mg, Oral, QHS PRN, Sugey Barkley M.D.  •  sodium chloride (OCEAN) 0.65 % nasal spray 2 Spray, 2 Spray, Nasal, PRN, Sugey Barkley M.D.  •  midazolam (VERSED) 5 mg/mL (1 mL vial), 5 mg, Nasal, PRN, Sugey Barkley M.D.  •  senna-docusate (PERICOLACE or SENOKOT S) 8.6-50 MG per tablet 2 tablet, 2 tablet, Oral, BID, 2 tablet at 04/08/21 2040 **AND** polyethylene glycol/lytes (MIRALAX) PACKET 1 Packet, 1 Packet, Oral, QDAY PRN **AND** magnesium hydroxide (MILK OF MAGNESIA) suspension 30 mL, 30 mL, Oral, QDAY PRN **AND** bisacodyl (DULCOLAX) suppository 10 mg, 10 mg, Rectal, QDAY PRN, Sugey Barkley M.D.  •  enoxaparin (LOVENOX) inj 40 mg, 40 mg, Subcutaneous, DAILY AT 1800, Sugey Barkley M.D., 40 mg at 04/08/21 1749  •  amLODIPine (NORVASC) tablet 10 mg, 10 mg, Oral, Q DAY, Sugey Barkley M.D., 10 mg at 04/09/21 0547  •  aspirin (ASA) chewable tab 81 mg, 81 mg, Oral, DAILY, Sugey Barkley M.D., 81 mg at 04/08/21 0854  •  atorvastatin (LIPITOR) tablet 80 mg, 80 mg, Oral, Q EVENING, Sugey Barkley M.D., 80 mg at 04/08/21 2041  •  QUEtiapine (Seroquel) tablet 25 mg, 25 mg, Oral, TID PRN, Sugey Barkley M.D.    Physical Examination:     Vitals:    04/08/21 1400 04/08/21 1900 04/09/21 0546 04/09/21 0700   BP: 114/55 106/62 143/69 131/60   Pulse: 93 84  76   Resp: 18 18  18   Temp: 36.6 °C (97.9 °F) 36.1 °C (97 °F)  36.3 °C (97.3 °F)   TempSrc: Tympanic Temporal  Oral   SpO2: 96% 95%  93%   Weight:       Height:           General: Patient is awake and in no acute distress  Eyes: examination of optic disks not indicated at this time given acuity of consult  CV: RRR    NEUROLOGICAL EXAM:     Mental status: Awake, alert and oriented to place and time, follows simple but not embedded commands, unable to spell CANDY backwards, + frontal lobe release signs: present grasp reflex, negative glabellar, snout,  and negative palmomental  Speech and language: speech is not dysarthric. The patient is able to name and repeat.  Cranial nerve exam: Pupils are equal, round and reactive to light bilaterally. Visual fields are full. Extraocular muscles are intact with impairment of upgaze. Sensation in the face is intact to light touch. Face is symmetric. Hearing to finger rub diminished b/l; severe presbyacusis. Palate elevates symmetrically. Shoulder shrug is full. Tongue is midline.  Motor exam: Strength is 5/5 in all extremities both distally and proximally. Tone is evident for gegenhalten. No abnormal movements were seen on exam.  Sensory exam: No sensory deficits identified   Deep tendon reflexes:  1+ and symmetric  Coordination: no ataxia   Gait: deferred given patient preference    Objective Data:    Labs:  No results found for: PROTHROMBTM, INR   Lab Results   Component Value Date/Time    WBC 6.7 04/07/2021 06:19 AM    RBC 4.50 (L) 04/07/2021 06:19 AM    HEMOGLOBIN 14.0 04/07/2021 06:19 AM    HEMATOCRIT 42.0 04/07/2021 06:19 AM    MCV 93.3 04/07/2021 06:19 AM    MCH 31.1 04/07/2021 06:19 AM    MCHC 33.3 (L) 04/07/2021 06:19 AM    MPV 10.3 04/07/2021 06:19 AM    NEUTSPOLYS 73.00 (H) 04/07/2021 06:19 AM    LYMPHOCYTES 10.70 (L) 04/07/2021 06:19 AM    MONOCYTES 11.70 04/07/2021 06:19 AM    EOSINOPHILS 3.40 04/07/2021 06:19 AM    BASOPHILS 0.60 04/07/2021 06:19 AM      Lab Results   Component Value Date/Time    SODIUM 135 04/07/2021 06:19 AM    POTASSIUM 3.2 (L) 04/07/2021 06:19 AM    CHLORIDE 102 04/07/2021 06:19 AM    CO2 27 04/07/2021 06:19 AM    GLUCOSE 88 04/07/2021 06:19 AM    BUN 26 (H) 04/07/2021 06:19 AM    CREATININE 0.97 04/07/2021 06:19 AM    CREATININE 1.2 10/03/2008 11:30 AM      Lab Results   Component Value Date/Time    CHOLSTRLTOT 202 (H) 03/30/2021 10:26 PM     (H) 03/30/2021 10:26 PM    HDL 41 03/30/2021 10:26 PM    TRIGLYCERIDE 111 03/30/2021 10:26 PM       Lab Results   Component Value Date/Time     ALKPHOSPHAT 77 04/07/2021 06:19 AM    ASTSGOT 20 04/07/2021 06:19 AM    ALTSGPT 17 04/07/2021 06:19 AM    TBILIRUBIN 0.5 04/07/2021 06:19 AM        Imaging/Testing:    I interpreted and/or reviewed the patient's neuroimaging    MRI brain 3/31/21:     1.  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  2.  Linear focus of gradient echo signal hypointensity in the left occipital lobe consistent with chronic hypertensive microhemorrhage, remote trauma or infection.  3.  Moderate diffuse cerebral substance loss.  4.  Mild microangiopathic ischemic change versus demyelination or gliosis.    Assessment and Plan:    Cooper Harvey is a 86 y.o. man with a history of HLD and stroke to both anterior and posterior circulation territories in the right hemisphere presenting for whom neurology has been consulted for placement of extended cardiac event monitor.  No evidence of fetal PCA on CTA head.  The differential diagnosis as it pertains to etiology includes large vessel disease ie. atherosclerotic disease and thromboembolism vs. cardioembolic phenomena.  Clinically stable and doing well with rehabilitation efforts.    Plan:    - provide secondary stroke prevention measures  - goal BP is normotension   - obtain normoglycemia and avoid hypo- or hyper -natremia; aim for normothermia  - secondary stroke prevention therapy with ASA and high intensity statin per SPARCL Trial  - follow up in neurology stroke bridge clinic; referral is in    On 4/9/21, I personally placed an outpatient extended cardiac event monitoring for afib (eg. Zio patch) at bedside.    The evaluation of the patient, and recommended management, was discussed with Dr. Barkley.    Mirza Estrada MD  Neurohospitalist, Acute Care Services   of Neurology

## 2021-04-09 NOTE — CARE PLAN
Problem: Safety  Goal: Will remain free from falls  Outcome: PROGRESSING AS EXPECTED  Note: Patient demonstrates good safety technique this shift.  Reminders to use the call light for transfer.  Able to verbalize needs.       Problem: Venous Thromboembolism (VTW)/Deep Vein Thrombosis (DVT) Prevention:  Goal: Patient will participate in Venous Thrombosis (VTE)/Deep Vein Thrombosis (DVT)Prevention Measures  Outcome: PROGRESSING AS EXPECTED  Note: Pt is up and walking, participates in therapies, and up to dinning room for all meals.      Problem: Pain Management  Goal: Pain level will decrease to patient's comfort goal  Outcome: PROGRESSING AS EXPECTED  Note: Pt able to participate in therapies and activities this shift. Patient able to verbalize pain level and verbalize an acceptable level of pain.

## 2021-04-09 NOTE — THERAPY
Physical Therapy   Daily Treatment     Patient Name: Cooper Harvey  Age:  86 y.o., Sex:  male  Medical Record #: [id number]  Today's Date: 4/9/2021     Precautions  Precautions: (P) Fall Risk  Comments: (P) Pt is currently wearing a Zio patch heart monitor (grey case needs to be within 6ft of pt at all times); Impulsive; [place]    Subjective    Patient laying in bed upon arrival, agreeable to therapy. Patient states he would like exercise more throughout the day.     Objective       04/09/21 1501   Precautions   Precautions Fall Risk   Comments Pt is currently wearing a Zio patch heart monitor (grey case needs to be within 6ft of pt at all times); Impulsive; [place]   Cognition    Speech/ Communication Hard of Hearing   Ability To Follow Commands 2 Step   Attention   (Intact)   Gait Functional Level of Assist    Gait Level Of Assist Contact Guard Assist   Assistive Device Front Wheel Walker   Distance (Feet) 150   # of Times Distance was Traveled 1   Deviation Ataxic;Shuffled Gait;Decreased Heel Strike;Decreased Toe Off   Transfer Functional Level of Assist   Bed, Chair, Wheelchair Transfer Contact Guard Assist  (SPT with no AD)   Bed Chair Wheelchair Transfer Description Adaptive equipment;Set-up of equipment;Supervision for safety   Bed Mobility    Supine to Sit Minimal Assist  (Verbal cuing for sequencing; HHA)   Sit to Supine Stand by Assist   Sit to Stand Contact Guard Assist   Neuro-Muscular Treatments   Neuro-Muscular Treatments Anterior weight shift;Postural Facilitation;Sequencing;Tactile Cuing;Verbal Cuing;Weight Shift Right;Weight Shift Left   Comments NBOS on Airex foam x30 seconds no UE support; toe taps on Airex foam x60 second no UE support; balloon volley with no UE support x60 seconds x2 sets    Greene Balance Scale   Sitting Unsupported (Score 0-4) 4   Change Of Positon: Sitting To Standing (Score 0-4) 4   Change Of Positon: Standing To Sitting (Score 0-4) 4   Transfers (Score 0-4) 4   Standing  Unsupported (Score 0-4) 3   Standing With Eyes Closed (Score 0-4) 3   Standing With Feet Together (Score 0-4) 3   Tandem Standing (Score 0-4) 4   Standing On One Leg (Score 0-4) 3   Turning Trunk (Feet Fixed) (Score 0-4) 4   Retrieving Objects From Floor (Score 0-4) 3   Turning 360 Degrees (Score 0-4) 2   Stool Stepping (Score 0-4) 0  (Need assistance to prevent LOB; toe caught on step)   Reaching Forward While Standing (Score 0-4) 2   Greene Balance Total Score (0-56) 43   Interdisciplinary Plan of Care Collaboration   IDT Collaboration with  Family / Caregiver   Patient Position at End of Therapy In Bed;Bed Alarm On;Call Light within Reach;Tray Table within Reach;Phone within Reach   Collaboration Comments Spouse present at beginning of session   Strengths & Barriers   Strengths Adequate strength;Independent prior level of function;Pleasant and cooperative;Willingly participates in therapeutic activities   Barriers Decreased endurance;Difficulty following instructions;Hearing impairment;Home accessibility;Impaired balance;Impaired carryover of learning;Impulsive;Limited mobility;Impaired activity tolerance   PT Total Time Spent   PT Individual Total Time Spent (Mins) 60   PT Charge Group   PT Neuromuscular Re-Education / Balance 4     Patient educated on Greene Balance assessment score and interpretation.    Assessment    Patient scored a 43/56 on the [place] Balance assessment, indicating an increase risk for a fall. Patient receptive to score interpretation and motivated to improve his balance. Patient would benefit from a FWW for ambulation at this time. Patient demonstrated increased attention and ability to follow multi-step commands. Patient continues to be limited by hard of hearing. Patient affect improved following balloon volley, suggesting he may benefit from recreational therapy.     Strengths: (P) Adequate strength, Independent prior level of function, Pleasant and cooperative, Willingly participates in  therapeutic activities  Barriers: (P) Decreased endurance, Difficulty following instructions, Hearing impairment, Home accessibility, Impaired balance, Impaired carryover of learning, Impulsive, Limited mobility, Impaired activity tolerance    Plan    Gait with most FWW, step practice with one rail, coordination exercise, transfer training, static and dynamic balance.      Passport items to be completed:  Get in/out of bed safely, in/out of a vehicle, safely use mobility device, walk or wheel around home/community, navigate up and down stairs, show how to get up/down from the ground, ensure home is accessible, demonstrate HEP, complete caregiver training    Physical Therapy Problems     Problem: Mobility     Dates: Start: 04/07/21       Goal: STG-Within one week, patient will propel wheelchair community     Dates: Start: 04/07/21       Description: 1) Individualized goal:  W/C 150ft with supervision   2) Interventions:  PT Gait Training, PT Therapeutic Exercises, PT Neuro Re-Ed/Balance, PT Aquatic Therapy, PT Therapeutic Activity, and PT Evaluation            Goal: STG-Within one week, patient will ambulate household distance     Dates: Start: 04/07/21       Description: 1) Individualized goal: AMB 30 ft with FWW and CGA  2) Interventions:  PT Gait Training, PT Therapeutic Exercises, PT Neuro Re-Ed/Balance, PT Aquatic Therapy, PT Therapeutic Activity, and PT Evaluation                  Problem: Mobility Transfers     Dates: Start: 04/07/21       Goal: STG-Within one week, patient will transfer bed to chair     Dates: Start: 04/07/21       Description: 1) Individualized goal:  SPT with FWW and SBA  2) Interventions: T Gait Training, PT Therapeutic Exercises, PT Neuro Re-Ed/Balance, PT Aquatic Therapy, PT Therapeutic Activity, and PT Evaluation                  Problem: PT-Long Term Goals     Dates: Start: 04/07/21       Goal: LTG-By discharge, patient will ambulate     Dates: Start: 04/07/21       Description: 1)  Individualized goal: AMB 150ft with FWW and supervision  2) Interventions:  PT Gait Training, PT Therapeutic Exercises, PT Neuro Re-Ed/Balance, PT Aquatic Therapy, PT Therapeutic Activity, and PT Evaluation            Goal: LTG-By discharge, patient will transfer one surface to another     Dates: Start: 04/07/21       Description: 1) Individualized goal: SPT with FWW and supervision  2) Interventions:  PT Gait Training, PT Therapeutic Exercises, PT Neuro Re-Ed/Balance, PT Aquatic Therapy, PT Therapeutic Activity, and PT Evaluation            Goal: LTG-By discharge, patient will ambulate up/down 4-6 stairs     Dates: Start: 04/07/21       Description: 1) Individualized goal:  Negotiate 5 steps with 2 handrails and CGA  2) Interventions:  PT Gait Training, PT Therapeutic Exercises, PT Neuro Re-Ed/Balance, PT Aquatic Therapy, PT Therapeutic Activity, and PT Evaluation            Goal: LTG-By discharge, patient will transfer in/out of a car     Dates: Start: 04/07/21       Description: 1) Individualized goal: Transfer with FWW and SBA  2) Interventions:  PT Gait Training, PT Therapeutic Exercises, PT Neuro Re-Ed/Balance, PT Aquatic Therapy, PT Therapeutic Activity, and PT Evaluation

## 2021-04-09 NOTE — THERAPY
Occupational Therapy  Daily Treatment     Patient Name: Cooper Harvey  Age:  86 y.o., Sex:  male  Medical Record #: [id number]  Today's Date: 4/9/2021     Precautions  Precautions: (P) Fall Risk  Comments: (P) Impulsivity, [place]         Subjective    Patient and spouse sitting in room together talking.  Patient was agreeable to OT.       Objective       04/09/21 1231   Precautions   Precautions Fall Risk   Comments Impulsivity, [place]   Pain 0 - 10 Group   Therapist Pain Assessment 0   Cognition    Speech/ Communication Hard of Hearing   Safety Awareness Impaired   Functional Level of Assist   Lower Body Dressing Contact Guard Assist   Lower Body Dressing Description Increased time;Set-up of equipment;Supervision for safety;Verbal cueing  (don/doff socks, don shoes w/ laces, buttoned/zipped shorts)   Sitting Upper Body Exercises   Sitting Upper Body Exercises Yes   Tricep Press 3 sets of 10;Bilateral;Weight (See Comments for lbs)  (30 lbs on rickshaw forward)   Sitting Lower Body Exercises   Sitting Lower Body Exercises Yes   Nustep Time (See Comments)  (nustep level 5 x 15 minutes w/ B UE/LE)   Interdisciplinary Plan of Care Collaboration   IDT Collaboration with  Family / Caregiver   Patient Position at End of Therapy Seated;Chair Alarm On;Self Releasing Lap Belt Applied;Call Light within Reach;Tray Table within Reach;Phone within Reach;Family / Friend in Room   Collaboration Comments spouse present during session   OT Total Time Spent   OT Individual Total Time Spent (Mins) 60   OT Charge Group   OT Self Care / ADL 2   OT Therapeutic Exercise  2     SPT w/c <> nustep with min A and no AD.    Assessment    Patient with good tolerance to using nustep. 30 lbs on rickshaw was appropriate for patient for first attempt as patient's body was shaky when attempting 40 lbs.    Strengths: Independent prior level of function, Motivated for self care and independence, Pleasant and cooperative, Willingly participates in  therapeutic activities  Barriers: Bladder incontinence, Decreased endurance, Generalized weakness, Hearing impairment, Impaired activity tolerance, Impaired balance, Impulsive, Confused, Limited mobility, Visual impairment    Plan    ADLs, functional mobility, safety with transfers, strength/endurance/coordination      Occupational Therapy Goals     Problem: Dressing     Dates: Start: 04/07/21       Goal: STG-Within one week, patient will dress LB     Dates: Start: 04/07/21       Description: 1) Individualized Goal:  Min assist to don/doff Lower body Clothing via AE/DME PRN  2) Interventions:  OT Self Care/ADL, OT Cognitive Skill Dev, OT Neuro Re-Ed/Balance, OT Therapeutic Activity, OT Evaluation, and OT Therapeutic Exercise                  Problem: Functional Transfers     Dates: Start: 04/07/21       Goal: STG-Within one week, patient will transfer to toilet     Dates: Start: 04/07/21       Description: 1) Individualized Goal:  Sup/SBA for commode transfer via DME PRN  2) Interventions:  OT Self Care/ADL, OT Cognitive Skill Dev, OT Neuro Re-Ed/Balance, OT Therapeutic Activity, OT Evaluation, and OT Therapeutic Exercise                    Problem: OT Long Term Goals     Dates: Start: 04/07/21       Goal: LTG-By discharge, patient will complete basic self care tasks     Dates: Start: 04/07/21       Description: 1) Individualized Goal:  Mod I for BADL's via AE/DME PRN  2) Interventions:  OT Self Care/ADL, OT Cognitive Skill Dev, OT Neuro Re-Ed/Balance, OT Therapeutic Activity, OT Evaluation, and OT Therapeutic Exercise              Goal: LTG-By discharge, patient will perform bathroom transfers     Dates: Start: 04/07/21       Description: 1) Individualized Goal: Mod I for bathroom transfers via DME PRN  2) Interventions:  OT Self Care/ADL, OT Cognitive Skill Dev, OT Neuro Re-Ed/Balance, OT Therapeutic Activity, OT Evaluation, and OT Therapeutic Exercise                    Problem: Toileting     Dates: Start:  04/07/21       Goal: STG-Within one week, patient will complete toileting tasks     Dates: Start: 04/07/21       Description: 1) Individualized Goal:  Min assist for BM via DME PRN  2) Interventions:  OT Self Care/ADL, OT Cognitive Skill Dev, OT Neuro Re-Ed/Balance, OT Therapeutic Activity, OT Evaluation, and OT Therapeutic Exercise

## 2021-04-09 NOTE — CARE PLAN
Problem: Communication  Goal: The ability to communicate needs accurately and effectively will improve  Intervention: [place] patient and significant other/support system to call light to alert staff of needs  Note: Educated Patient to uses call light appropriately this shift, and to wait for assistance when needed and to not attempt self transfer.  Able to verbalize needs.       Problem: Pain Management  Goal: Pain level will decrease to patient's comfort goal  Outcome: PROGRESSING AS EXPECTED  Note: Pt able to participate in therapies and activities this shift. Patient able to verbalize pain level and verbalize an acceptable level of pain.

## 2021-04-09 NOTE — PROGRESS NOTES
"Rehab Progress Note     Date of Service: 4/9/2021  Chief Complaint: follow up stroke    Interval Events (Subjective)    Patient seen and examined today in his room.  He reports feeling tired as he just finished his therapies.  His wife is present.  She is very hard of hearing as well as the patient.  Advised him both he will have a Ziehl patch today placed by neurology.  Patient reports that he slept well last night.  Per speech therapy is going to have a modified barium swallow study.  Patient denies any pain.  He has no new complaints.  Per discussion with nursing staff slept well last night without any agitation.    ROS: No changes to bowel, bladder, pain, mood, or sleep.              Objective:  VITAL SIGNS: /60   Pulse 91   Temp 36.5 °C (97.7 °F) (Tympanic)   Resp 18   Ht 1.727 m (5' 8\") Comment: Simultaneous filing. User may not have seen previous data.  Wt 76 kg (167 lb 8.8 oz) Comment: Simultaneous filing. User may not have seen previous data.  SpO2 94%   BMI 25.48 kg/m²   Gen: alert, no apparent distress  Neuro: notable for hard of hearing, short-term memory impairments    Recent Results (from the past 72 hour(s))   SARS-CoV-2, PCR (In-House)    Collection Time: 04/06/21  5:00 PM   Result Value Ref Range    SARS-CoV-2 Source NP Swab     SARS-CoV-2 by PCR NotDetected    CBC with Differential    Collection Time: 04/07/21  6:19 AM   Result Value Ref Range    WBC 6.7 4.8 - 10.8 K/uL    RBC 4.50 (L) 4.70 - 6.10 M/uL    Hemoglobin 14.0 14.0 - 18.0 g/dL    Hematocrit 42.0 42.0 - 52.0 %    MCV 93.3 81.4 - 97.8 fL    MCH 31.1 27.0 - 33.0 pg    MCHC 33.3 (L) 33.7 - 35.3 g/dL    RDW 43.8 35.9 - 50.0 fL    Platelet Count 209 164 - 446 K/uL    MPV 10.3 9.0 - 12.9 fL    Neutrophils-Polys 73.00 (H) 44.00 - 72.00 %    Lymphocytes 10.70 (L) 22.00 - 41.00 %    Monocytes 11.70 0.00 - 13.40 %    Eosinophils 3.40 0.00 - 6.90 %    Basophils 0.60 0.00 - 1.80 %    Immature Granulocytes 0.60 0.00 - 0.90 %    Nucleated " RBC 0.00 /100 WBC    Neutrophils (Absolute) 4.92 1.82 - 7.42 K/uL    Lymphs (Absolute) 0.72 (L) 1.00 - 4.80 K/uL    Monos (Absolute) 0.79 0.00 - 0.85 K/uL    Eos (Absolute) 0.23 0.00 - 0.51 K/uL    Baso (Absolute) 0.04 0.00 - 0.12 K/uL    Immature Granulocytes (abs) 0.04 0.00 - 0.11 K/uL    NRBC (Absolute) 0.00 K/uL   Comp Metabolic Panel (CMP)    Collection Time: 04/07/21  6:19 AM   Result Value Ref Range    Sodium 135 135 - 145 mmol/L    Potassium 3.2 (L) 3.6 - 5.5 mmol/L    Chloride 102 96 - 112 mmol/L    Co2 27 20 - 33 mmol/L    Anion Gap 6.0 (L) 7.0 - 16.0    Glucose 88 65 - 99 mg/dL    Bun 26 (H) 8 - 22 mg/dL    Creatinine 0.97 0.50 - 1.40 mg/dL    Calcium 8.7 8.5 - 10.5 mg/dL    AST(SGOT) 20 12 - 45 U/L    ALT(SGPT) 17 2 - 50 U/L    Alkaline Phosphatase 77 30 - 99 U/L    Total Bilirubin 0.5 0.1 - 1.5 mg/dL    Albumin 3.5 3.2 - 4.9 g/dL    Total Protein 6.4 6.0 - 8.2 g/dL    Globulin 2.9 1.9 - 3.5 g/dL    A-G Ratio 1.2 g/dL   Magnesium    Collection Time: 04/07/21  6:19 AM   Result Value Ref Range    Magnesium 2.1 1.5 - 2.5 mg/dL   Vitamin D, 25-hydroxy (blood)    Collection Time: 04/07/21  6:19 AM   Result Value Ref Range    25-Hydroxy   Vitamin D 25 34 30 - 80 ng/mL   ESTIMATED GFR    Collection Time: 04/07/21  6:19 AM   Result Value Ref Range    GFR If African American >60 >60 mL/min/1.73 m 2    GFR If Non African American >60 >60 mL/min/1.73 m 2   Basic Metabolic Panel    Collection Time: 04/09/21  7:55 AM   Result Value Ref Range    Sodium 145 135 - 145 mmol/L    Potassium 3.9 3.6 - 5.5 mmol/L    Chloride 109 96 - 112 mmol/L    Co2 26 20 - 33 mmol/L    Glucose 84 65 - 99 mg/dL    Bun 32 (H) 8 - 22 mg/dL    Creatinine 1.02 0.50 - 1.40 mg/dL    Calcium 8.8 8.5 - 10.5 mg/dL    Anion Gap 10.0 7.0 - 16.0   ESTIMATED GFR    Collection Time: 04/09/21  7:55 AM   Result Value Ref Range    GFR If African American >60 >60 mL/min/1.73 m 2    GFR If Non African American >60 >60 mL/min/1.73 m 2       Current  Facility-Administered Medications   Medication Frequency   • divalproex (DEPAKOTE) delayed-release tablet 250 mg Q12HRS   • QUEtiapine (Seroquel) tablet 25 mg Nightly   • lisinopril (PRINIVIL) tablet 30 mg Q DAY   • melatonin tablet 3 mg QHS   • ziprasidone (Geodon) injection 20 mg Q2HRS PRN   • hydrOXYzine HCl (ATARAX) tablet 50 mg Q6HRS PRN   • Respiratory Therapy Consult Continuous RT   • Pharmacy Consult Request ...Pain Management Review 1 Each PHARMACY TO DOSE   • hydrALAZINE (APRESOLINE) tablet 10 mg Q8HRS PRN   • acetaminophen (Tylenol) tablet 650 mg Q4HRS PRN   • lactulose 20 GM/30ML solution 30 mL QDAY PRN   • docusate sodium (ENEMEEZ) enema 283 mg QDAY PRN   • fleet enema 133 mL QDAY PRN   • artificial tears ophthalmic solution 1 Drop PRN   • benzocaine-menthol (CEPACOL) lozenge 1 Lozenge Q2HRS PRN   • mag hydrox-al hydrox-simeth (MAALOX PLUS ES or MYLANTA DS) suspension 20 mL Q2HRS PRN   • ondansetron (ZOFRAN ODT) dispertab 4 mg 4X/DAY PRN    Or   • ondansetron (ZOFRAN) syringe/vial injection 4 mg 4X/DAY PRN   • traZODone (DESYREL) tablet 50 mg QHS PRN   • sodium chloride (OCEAN) 0.65 % nasal spray 2 Spray PRN   • midazolam (VERSED) 5 mg/mL (1 mL vial) PRN   • senna-docusate (PERICOLACE or SENOKOT S) 8.6-50 MG per tablet 2 tablet BID    And   • polyethylene glycol/lytes (MIRALAX) PACKET 1 Packet QDAY PRN    And   • magnesium hydroxide (MILK OF MAGNESIA) suspension 30 mL QDAY PRN    And   • bisacodyl (DULCOLAX) suppository 10 mg QDAY PRN   • enoxaparin (LOVENOX) inj 40 mg DAILY AT 1800   • amLODIPine (NORVASC) tablet 10 mg Q DAY   • aspirin (ASA) chewable tab 81 mg DAILY   • atorvastatin (LIPITOR) tablet 80 mg Q EVENING   • QUEtiapine (Seroquel) tablet 25 mg TID PRN       Orders Placed This Encounter   Procedures   • Diet Order Diet: Level 6 - Soft and Bite Sized; Liquid level: Level 0 - Thin; Tray Modifications (optional): SLP - 1:1 Supervision by Nursing, SLP - Deliver to Nursing Station     Standing  Status:   Standing     Number of Occurrences:   1     Order Specific Question:   Diet:     Answer:   Level 6 - Soft and Bite Sized [23]     Order Specific Question:   Liquid level     Answer:   Level 0 - Thin     Order Specific Question:   Tray Modifications (optional)     Answer:   SLP - 1:1 Supervision by Nursing     Order Specific Question:   Tray Modifications (optional)     Answer:   SLP - Deliver to Nursing Station       Assessment:  Active Hospital Problems    Diagnosis    • *Acute CVA (cerebrovascular accident) (HCC)    • Carotid stenosis, bilateral    • Altered mental status, unspecified    • Mixed hyperlipidemia    • BPH (benign prostatic hyperplasia)    • Sundowning    • Hard of hearing    • Sinus bradycardia    • Hypokalemia    • Anemia    • S/P carotid endarterectomy    • Agitation      This patient is a 86 y.o. male admitted for acute inpatient rehabilitation with Acute CVA (cerebrovascular accident) ([facility]).    Patient will have his first weekly conference on Monday to discuss a discharge date.    Medical Decision Making and Plan:    Small punctate infarctions  Right frontal lobe, right cerebellum, right occipital lobe  Non-focal  Visual complaints  Cognitive impairment (suspect some is baseline)  Continue full rehab program  PT/OT/SLP, 1 hr each discipline, 5 days per week    Aspirin   Statin    Outpatient follow up with stroke bridge clinic, Dr Sales, referrals made    Zio patch cardiac monitor to be placed today by neurology    Right carotid stenosis  S/p CEA 4/2  Statin  Aspirin  Outpatient follow up with Dr. Beavers, referrals made    Agitation, resolved  Sundowning, improved  Depakote started at acute, continue titration  Scheduled Seroquel at night  Scheduled melatonin at night  PRN seroquel, not requiring  Qtc OK     Hypertension, improved  Amlodipine  Lisinopril, increase dose  PRN hydralazine     Anemia, resolved     Hyperlipidemia  Statin     Hypokalemia, resolved     Bowel program  Continue  bowel medications  Scheduled Sennakot  PRN Miralax, MOM, bisacodyl suppository  Last BM 4/8    Bladder program  BPH  With incontinence  Check PVRs - 106, 86, 20  Not retaining  Bladder scan for no voids  ICP for over 400 cc  Scheduled toileting    DVT prophylaxis  Lovenox    Total time:  26 minutes.  I spent greater than 50% of the time for patient care, counseling, and coordination on this date, including patient face-to face time, unit/floor time with review of records/pertinent lab data and studies, as well as discussing diagnostic evaluation/work up, planned therapeutic interventions, and future disposition of care, as per the interval events/subjective and the assessment and plan as noted above.    I have performed a physical exam, reviewed and updated ROS, as well as the assessment and plan today 4/9/2021. In review of note from 4/8/2021 there are no new changes except as documented above.                Sugey Barkley M.D.   Physical Medicine and Rehabilitation

## 2021-04-10 PROCEDURE — A9270 NON-COVERED ITEM OR SERVICE: HCPCS | Performed by: PHYSICAL MEDICINE & REHABILITATION

## 2021-04-10 PROCEDURE — 97110 THERAPEUTIC EXERCISES: CPT

## 2021-04-10 PROCEDURE — 700102 HCHG RX REV CODE 250 W/ 637 OVERRIDE(OP): Performed by: PHYSICAL MEDICINE & REHABILITATION

## 2021-04-10 PROCEDURE — 97530 THERAPEUTIC ACTIVITIES: CPT

## 2021-04-10 PROCEDURE — 97116 GAIT TRAINING THERAPY: CPT

## 2021-04-10 PROCEDURE — 92610 EVALUATE SWALLOWING FUNCTION: CPT

## 2021-04-10 PROCEDURE — 770010 HCHG ROOM/CARE - REHAB SEMI PRIVAT*

## 2021-04-10 PROCEDURE — 97112 NEUROMUSCULAR REEDUCATION: CPT

## 2021-04-10 PROCEDURE — 99231 SBSQ HOSP IP/OBS SF/LOW 25: CPT | Performed by: PHYSICAL MEDICINE & REHABILITATION

## 2021-04-10 PROCEDURE — 700111 HCHG RX REV CODE 636 W/ 250 OVERRIDE (IP): Performed by: PHYSICAL MEDICINE & REHABILITATION

## 2021-04-10 RX ADMIN — SENNOSIDES AND DOCUSATE SODIUM 2 TABLET: 8.6; 5 TABLET ORAL at 09:16

## 2021-04-10 RX ADMIN — SENNOSIDES AND DOCUSATE SODIUM 2 TABLET: 8.6; 5 TABLET ORAL at 20:35

## 2021-04-10 RX ADMIN — LISINOPRIL 30 MG: 20 TABLET ORAL at 04:53

## 2021-04-10 RX ADMIN — MELATONIN TAB 3 MG 3 MG: 3 TAB at 20:34

## 2021-04-10 RX ADMIN — ATORVASTATIN CALCIUM 80 MG: 40 TABLET, FILM COATED ORAL at 20:34

## 2021-04-10 RX ADMIN — DIVALPROEX SODIUM 250 MG: 250 TABLET, DELAYED RELEASE ORAL at 20:34

## 2021-04-10 RX ADMIN — ENOXAPARIN SODIUM 40 MG: 40 INJECTION SUBCUTANEOUS at 17:39

## 2021-04-10 RX ADMIN — ASPIRIN 81 MG CHEWABLE TABLET 81 MG: 81 TABLET CHEWABLE at 09:15

## 2021-04-10 RX ADMIN — AMLODIPINE BESYLATE 10 MG: 5 TABLET ORAL at 04:52

## 2021-04-10 RX ADMIN — QUETIAPINE FUMARATE 25 MG: 25 TABLET ORAL at 20:35

## 2021-04-10 ASSESSMENT — GAIT ASSESSMENTS
DEVIATION: SHUFFLED GAIT;DECREASED HEEL STRIKE;DECREASED TOE OFF
DISTANCE (FEET): 125
GAIT LEVEL OF ASSIST: CONTACT GUARD ASSIST
ASSISTIVE DEVICE: FRONT WHEEL WALKER

## 2021-04-10 ASSESSMENT — ACTIVITIES OF DAILY LIVING (ADL)
BED_CHAIR_WHEELCHAIR_TRANSFER_DESCRIPTION: INCREASED TIME;ADAPTIVE EQUIPMENT;SUPERVISION FOR SAFETY
TOILET_TRANSFER_DESCRIPTION: GRAB BAR;INCREASED TIME;INITIAL PREPARATION FOR TASK;SUPERVISION FOR SAFETY;VERBAL CUEING

## 2021-04-10 NOTE — THERAPY
"Occupational Therapy  Daily Treatment     Patient Name: Cooper Harvey  Age:  86 y.o., Sex:  male  Medical Record #: [id number]  Today's Date: 4/10/2021     Precautions  Precautions: (P) Fall Risk, Other (See Comments)  Comments: (P) Pt is currently wearing a Zio patch heart monitor (grey case needs to be within 6ft of pt at all times); Impulsive; [place], wander-guard         Subjective    \"I think I did better than yesterday\" - pt reported about standing in // bars     Objective       04/10/21 0931   Precautions   Precautions Fall Risk;Other (See Comments)   Comments Pt is currently wearing a Zio patch heart monitor (grey case needs to be within 6ft of pt at all times); Impulsive; [place], wander-guard   Sitting Upper Body Exercises   Tricep Press 3 sets of 10;Bilateral;Weight (See Comments for lbs)  (30#, rickshaw)   Sitting Lower Body Exercises   Nustep Resistance Level 3  (10 min, B UE/LE)   Balance   Comments Pt completed balance activity in // bars to address functional mobility and balance during ADLs. Pt participated in bean bag toss where he reached outside his NILO w/ BUE while the other UE held onto // bar for balance. Pt used BUE to throw bean bags. Pt ambulated with hands on // bars to retreieve bean bags from ~3ft off the ground. Pt also participated in balloon volley inside // bars. Pt used one hand on // bars for balance. Pt was able to reach outside of NILO with 0 LOB. Pt reports that he felt more steady that he did yeasterday and thinks his balance is improving. Pt required 4 breaks throughout balance exercises.    Interdisciplinary Plan of Care Collaboration   IDT Collaboration with  Nursing   Patient Position at End of Therapy In Bed;Bed Alarm On;Call Light within Reach;Tray Table within Reach;Phone within Reach   Collaboration Comments CLOF   OT Total Time Spent   OT Individual Total Time Spent (Mins) 60   OT Charge Group   OT Neuromuscular Re-education / Balance 3   OT Therapeutic Exercise  1   Pt " participated in balance activity in // bars. See flowsheet for details.     Assessment    Pt tolerated session well focused on balance, UE strength, and endurance. Pt reports his balance feels better than yesterday. Pt was motivated to do UE exercises and  // bars to address balance. Pt required cues to stay seated until therapist was ready for transfer. Pt required min-CGA for transfers and Sit<>stands during session.  Strengths: Independent prior level of function, Motivated for self care and independence, Pleasant and cooperative, Willingly participates in therapeutic activities  Barriers: Bladder incontinence, Decreased endurance, Generalized weakness, Hearing impairment, Impaired activity tolerance, Impaired balance, Impulsive, Confused, Limited mobility, Visual impairment    Plan    ADLs, functional mobility, safety with transfers, strength/endurance/coordination    Occupational Therapy Goals     Problem: Dressing     Dates: Start: 04/07/21       Goal: STG-Within one week, patient will dress LB     Dates: Start: 04/07/21       Description: 1) Individualized Goal:  Min assist to don/doff Lower body Clothing via AE/DME PRN  2) Interventions:  OT Self Care/ADL, OT Cognitive Skill Dev, OT Neuro Re-Ed/Balance, OT Therapeutic Activity, OT Evaluation, and OT Therapeutic Exercise                  Problem: Functional Transfers     Dates: Start: 04/07/21       Goal: STG-Within one week, patient will transfer to toilet     Dates: Start: 04/07/21       Description: 1) Individualized Goal:  Sup/SBA for commode transfer via DME PRN  2) Interventions:  OT Self Care/ADL, OT Cognitive Skill Dev, OT Neuro Re-Ed/Balance, OT Therapeutic Activity, OT Evaluation, and OT Therapeutic Exercise                    Problem: OT Long Term Goals     Dates: Start: 04/07/21       Goal: LTG-By discharge, patient will complete basic self care tasks     Dates: Start: 04/07/21       Description: 1) Individualized Goal:  Mod I for BADL's  via AE/DME PRN  2) Interventions:  OT Self Care/ADL, OT Cognitive Skill Dev, OT Neuro Re-Ed/Balance, OT Therapeutic Activity, OT Evaluation, and OT Therapeutic Exercise              Goal: LTG-By discharge, patient will perform bathroom transfers     Dates: Start: 04/07/21       Description: 1) Individualized Goal: Mod I for bathroom transfers via DME PRN  2) Interventions:  OT Self Care/ADL, OT Cognitive Skill Dev, OT Neuro Re-Ed/Balance, OT Therapeutic Activity, OT Evaluation, and OT Therapeutic Exercise                    Problem: Toileting     Dates: Start: 04/07/21       Goal: STG-Within one week, patient will complete toileting tasks     Dates: Start: 04/07/21       Description: 1) Individualized Goal:  Min assist for BM via DME PRN  2) Interventions:  OT Self Care/ADL, OT Cognitive Skill Dev, OT Neuro Re-Ed/Balance, OT Therapeutic Activity, OT Evaluation, and OT Therapeutic Exercise

## 2021-04-10 NOTE — THERAPY
Physical Therapy   Daily Treatment     Patient Name: Cooper Harvey  Age:  86 y.o., Sex:  male  Medical Record #: [id number]  Today's Date: 4/10/2021     Precautions  Precautions: (P) Fall Risk  Comments: (P) Pt is currently wearing a Zio patch heart monitor (grey case needs to be within 6ft of pt at all times); Impulsive; [place], wander guard    Subjective    Patient in bed asleep, easily awoken and agreeable to therapy.     Objective       04/10/21 1430   Precautions   Precautions Fall Risk   Comments Pt is currently wearing a Zio patch heart monitor (grey case needs to be within 6ft of pt at all times); Impulsive; [place], wander guard   Gait Functional Level of Assist    Gait Level Of Assist Contact Guard Assist   Assistive Device Front Wheel Walker   Distance (Feet) 125   # of Times Distance was Traveled 3   Deviation Shuffled Gait;Decreased Heel Strike;Decreased Toe Off   Transfer Functional Level of Assist   Bed, Chair, Wheelchair Transfer Contact Guard Assist   Bed Chair Wheelchair Transfer Description Increased time;Adaptive equipment;Supervision for safety  (stand step transfer with FWW vs. UE support)   Toilet Transfers Contact Guard Assist   Toilet Transfer Description Grab bar;Increased time;Initial preparation for task;Supervision for safety;Verbal cueing  (w/c approach, stand step transfer with GB)   Bed Mobility    Supine to Sit Minimal Assist   Sit to Supine Stand by Assist   Sit to Stand Contact Guard Assist  (to SBA)   Scooting Stand by Assist   Rolling Supervised   Interdisciplinary Plan of Care Collaboration   Patient Position at End of Therapy In Bed;Call Light within Reach;Tray Table within Reach;Phone within Reach   PT Total Time Spent   PT Individual Total Time Spent (Mins) 60   PT Charge Group   PT Gait Training 1   PT Neuromuscular Re-Education / Balance 2   PT Therapeutic Activities 1     Staggered stance sit<>stands from therapy mat with BUEs holding 3# dumbbell performing BUE overhead  press to encourage anterior weight shifting and reduce retropulsion, 2x10 each leg leading. SBA-CGA and no UE support.    Tall kneeling with no UE support and SBA-CGA tossing ball into air and catching 2x20.    Assessment    Patient tolerated session well, focus of session on interval gait training and balance activities. Patient with tendency for retropulsion during mobility with reduction in frequency after interventions. Able to assume quadruped/tall kneeling activities from standing position with demonstration and extra time.    Strengths: Adequate strength, Independent prior level of function, Pleasant and cooperative, Willingly participates in therapeutic activities  Barriers: Decreased endurance, Difficulty following instructions, Hearing impairment, Home accessibility, Impaired balance, Impaired carryover of learning, Impulsive, Limited mobility, Impaired activity tolerance    Plan    Gait with most FWW, step practice with one rail, coordination exercise, transfer training, static and dynamic balance.      Passport items to be completed:  Get in/out of bed safely, in/out of a vehicle, safely use mobility device, walk or wheel around home/community, navigate up and down stairs, show how to get up/down from the ground, ensure home is accessible, demonstrate HEP, complete caregiver training      Physical Therapy Problems     Problem: Mobility     Dates: Start: 04/07/21       Goal: STG-Within one week, patient will propel wheelchair community     Dates: Start: 04/07/21       Description: 1) Individualized goal:  W/C 150ft with supervision   2) Interventions:  PT Gait Training, PT Therapeutic Exercises, PT Neuro Re-Ed/Balance, PT Aquatic Therapy, PT Therapeutic Activity, and PT Evaluation            Goal: STG-Within one week, patient will ambulate household distance     Dates: Start: 04/07/21       Description: 1) Individualized goal: AMB 30 ft with FWW and CGA  2) Interventions:  PT Gait Training, PT Therapeutic  Exercises, PT Neuro Re-Ed/Balance, PT Aquatic Therapy, PT Therapeutic Activity, and PT Evaluation                  Problem: Mobility Transfers     Dates: Start: 04/07/21       Goal: STG-Within one week, patient will transfer bed to chair     Dates: Start: 04/07/21       Description: 1) Individualized goal:  SPT with FWW and SBA  2) Interventions: T Gait Training, PT Therapeutic Exercises, PT Neuro Re-Ed/Balance, PT Aquatic Therapy, PT Therapeutic Activity, and PT Evaluation                  Problem: PT-Long Term Goals     Dates: Start: 04/07/21       Goal: LTG-By discharge, patient will ambulate     Dates: Start: 04/07/21       Description: 1) Individualized goal: AMB 150ft with FWW and supervision  2) Interventions:  PT Gait Training, PT Therapeutic Exercises, PT Neuro Re-Ed/Balance, PT Aquatic Therapy, PT Therapeutic Activity, and PT Evaluation            Goal: LTG-By discharge, patient will transfer one surface to another     Dates: Start: 04/07/21       Description: 1) Individualized goal: SPT with FWW and supervision  2) Interventions:  PT Gait Training, PT Therapeutic Exercises, PT Neuro Re-Ed/Balance, PT Aquatic Therapy, PT Therapeutic Activity, and PT Evaluation            Goal: LTG-By discharge, patient will ambulate up/down 4-6 stairs     Dates: Start: 04/07/21       Description: 1) Individualized goal:  Negotiate 5 steps with 2 handrails and CGA  2) Interventions:  PT Gait Training, PT Therapeutic Exercises, PT Neuro Re-Ed/Balance, PT Aquatic Therapy, PT Therapeutic Activity, and PT Evaluation            Goal: LTG-By discharge, patient will transfer in/out of a car     Dates: Start: 04/07/21       Description: 1) Individualized goal: Transfer with FWW and SBA  2) Interventions:  PT Gait Training, PT Therapeutic Exercises, PT Neuro Re-Ed/Balance, PT Aquatic Therapy, PT Therapeutic Activity, and PT Evaluation                       No significant past surgical history

## 2021-04-10 NOTE — CARE PLAN
Problem: Communication  Goal: The ability to communicate needs accurately and effectively will improve  Outcome: PROGRESSING AS EXPECTED     Problem: Safety  Goal: Will remain free from injury  Outcome: PROGRESSING AS EXPECTED     Problem: Bowel/Gastric:  Goal: Normal bowel function is maintained or improved  Outcome: PROGRESSING AS EXPECTED  Intervention: Educate patient and significant other/support system about diet, fluid intake, medications and activity to promote bowel function  Note: Declined scheduled bowel meds at hs. BM noted earlier in day.

## 2021-04-10 NOTE — PROGRESS NOTES
Received patient during shift change, report rec'd from day shift RN. Resting in bed, VS stable on room air. Per report [employer] of [employer], contact guard assist for transfers. A&O x 2-3, [place], able to make needs known. Bed in low position, call light within reach.

## 2021-04-10 NOTE — THERAPY
"Speech Language Pathology   Initial Assessment     Patient Name: Cooper Harvey  AGE:  86 y.o., SEX:  male  Medical Record #: [id number]  Today's Date: 4/10/2021     Subjective  Per chart review:  \"The patient is a 86 y.o. male with a past medical history of BPH, hearing loss, hyperlipidemia; now admitted for acute inpatient rehabilitation with severe functional debility after an acute stroke.\"    Pt pleasant and cooperative during oral pharyngeal evaluation.      Objective    See flow sheet.   Assessment    Patient is 86 y.o. male with a diagnosis of acute CVA.  Additional factors influencing patient status/progress (ie: cognitive factors, co-morbidities, social support, etc): impaired functional cognition, [place], supportive family.       Oral mechanism evaluation revealed decreased ROM of tongue to R cheek, however, all other structures, ROM, and function WFL.   Pt tolerated 7- Regular Textures and 0-Thin liquids w/ no overt clinical s/sx of aspiration. Pt completed Sarita swallow protocol w/ no s/sx of aspiration. Pt benefited from occasional verbal cues to slow rate.   REC: Upgrade pt to 7- Regular Textures, continue 0-Thin liquids.    No dysphagia therapy warranted at this time.       Plan  Recommend Speech Therapy 30-60 minutes per day 5-7 days per week for 2 weeks for the following treatments:  SLP Self Care / ADL Training , SLP Cognitive Skill Development and SLP Group Treatment.    Passport items to be completed:  Express basic needs, understand food/liquid recommendations, consistently follow swallow precautions, manage finances, manage medications, arrive to therapy appointments on time, complete daily memory log entries, solve problems related to safety situations, review education related to hospitalization, complete caregiver training     Goals:  Long term and short term goals have been discussed with patient and spouse and they are in agreement.    Speech Therapy Problems     Problem: Memory STGs     " Dates: Start: 04/08/21       Goal: STG-Within one week, patient will     Dates: Start: 04/08/21       Description: 1) Individualized goal:  recall daily events and safety sequencing with 70% acc with use of memory log and memory strategies.  2) Interventions:  SLP Self Care / ADL Training , SLP Cognitive Skill Development, and SLP Group Treatment                    Problem: Problem Solving STGs     Dates: Start: 04/07/21       Goal: STG-Within one week, patient will solve basic problems     Dates: Start: 04/08/21       Description: 1) Individualized goal:  related to safety and hospitalization with min A with 80% accuracy.   2) Interventions:  SLP Speech Language Treatment, SLP Self Care / ADL Training , SLP Cognitive Skill Development, and SLP Group Treatment              Goal: STG-Within one week, patient will     Dates: Start: 04/08/21       Description: 1) Individualized goal:  will perform selective attention tasks with 80% acc with min cues to improve to 100%  2) Interventions:  SLP Self Care / ADL Training , SLP Cognitive Skill Development, and SLP Group Treatment                    Problem: Speech/Swallowing LTGs     Dates: Start: 04/07/21       Goal: LTG-By discharge, patient will solve basic problems     Dates: Start: 04/07/21       Description: 1) Individualized goal:  given spv for a safe d/c home  2) Interventions:  SLP Speech Language Treatment, SLP Self Care / ADL Training , SLP Cognitive Skill Development, and SLP Group Treatment

## 2021-04-10 NOTE — PROGRESS NOTES
"Rehab Progress Note     Date of Service: 4/10/2021  Chief Complaint: follow up stroke    Interval Events (Subjective)    Patient seen and examined today in his room.  His wife Cynthia is present.  He denies any pain.  He does report some fatigue however.  Discussed with the patient and wife that we will meet on Monday to pick an estimated discharge date.  Patient reports he is in no rush to get out of here as everybody is so nice.  Patient's cardiac monitor was placed by neurology yesterday.  All questions were answered.    ROS: No changes to bowel, bladder, pain, mood, or sleep.       Objective:  VITAL SIGNS: /62   Pulse 71   Temp 36.9 °C (98.4 °F) (Temporal)   Resp 16   Ht 1.727 m (5' 8\") Comment: Simultaneous filing. User may not have seen previous data.  Wt 76 kg (167 lb 8.8 oz) Comment: Simultaneous filing. User may not have seen previous data.  SpO2 94%   BMI 25.48 kg/m²   Gen: alert, no apparent distress  Neuro: notable for very hard of hearing, short-term memory impairments    Recent Results (from the past 72 hour(s))   Basic Metabolic Panel    Collection Time: 04/09/21  7:55 AM   Result Value Ref Range    Sodium 145 135 - 145 mmol/L    Potassium 3.9 3.6 - 5.5 mmol/L    Chloride 109 96 - 112 mmol/L    Co2 26 20 - 33 mmol/L    Glucose 84 65 - 99 mg/dL    Bun 32 (H) 8 - 22 mg/dL    Creatinine 1.02 0.50 - 1.40 mg/dL    Calcium 8.8 8.5 - 10.5 mg/dL    Anion Gap 10.0 7.0 - 16.0   ESTIMATED GFR    Collection Time: 04/09/21  7:55 AM   Result Value Ref Range    GFR If African American >60 >60 mL/min/1.73 m 2    GFR If Non African American >60 >60 mL/min/1.73 m 2       Current Facility-Administered Medications   Medication Frequency   • divalproex (DEPAKOTE) delayed-release tablet 250 mg QHS   • QUEtiapine (Seroquel) tablet 25 mg Nightly   • lisinopril (PRINIVIL) tablet 30 mg Q DAY   • melatonin tablet 3 mg QHS   • ziprasidone (Geodon) injection 20 mg Q2HRS PRN   • hydrOXYzine HCl (ATARAX) tablet 50 mg " Q6HRS PRN   • Respiratory Therapy Consult Continuous RT   • Pharmacy Consult Request ...Pain Management Review 1 Each PHARMACY TO DOSE   • hydrALAZINE (APRESOLINE) tablet 10 mg Q8HRS PRN   • acetaminophen (Tylenol) tablet 650 mg Q4HRS PRN   • lactulose 20 GM/30ML solution 30 mL QDAY PRN   • docusate sodium (ENEMEEZ) enema 283 mg QDAY PRN   • fleet enema 133 mL QDAY PRN   • artificial tears ophthalmic solution 1 Drop PRN   • benzocaine-menthol (CEPACOL) lozenge 1 Lozenge Q2HRS PRN   • mag hydrox-al hydrox-simeth (MAALOX PLUS ES or MYLANTA DS) suspension 20 mL Q2HRS PRN   • ondansetron (ZOFRAN ODT) dispertab 4 mg 4X/DAY PRN    Or   • ondansetron (ZOFRAN) syringe/vial injection 4 mg 4X/DAY PRN   • traZODone (DESYREL) tablet 50 mg QHS PRN   • sodium chloride (OCEAN) 0.65 % nasal spray 2 Spray PRN   • midazolam (VERSED) 5 mg/mL (1 mL vial) PRN   • senna-docusate (PERICOLACE or SENOKOT S) 8.6-50 MG per tablet 2 tablet BID    And   • polyethylene glycol/lytes (MIRALAX) PACKET 1 Packet QDAY PRN    And   • magnesium hydroxide (MILK OF MAGNESIA) suspension 30 mL QDAY PRN    And   • bisacodyl (DULCOLAX) suppository 10 mg QDAY PRN   • enoxaparin (LOVENOX) inj 40 mg DAILY AT 1800   • amLODIPine (NORVASC) tablet 10 mg Q DAY   • aspirin (ASA) chewable tab 81 mg DAILY   • atorvastatin (LIPITOR) tablet 80 mg Q EVENING   • QUEtiapine (Seroquel) tablet 25 mg TID PRN       Orders Placed This Encounter   Procedures   • Diet Order Diet: Level 7 - Easy to Chew; Liquid level: Level 0 - Thin     Standing Status:   Standing     Number of Occurrences:   1     Order Specific Question:   Diet:     Answer:   Level 7 - Easy to Chew [22]     Order Specific Question:   Liquid level     Answer:   Level 0 - Thin       Assessment:  Active Hospital Problems    Diagnosis    • *Acute CVA (cerebrovascular accident) (HCC)    • Carotid stenosis, bilateral    • Altered mental status, unspecified    • Mixed hyperlipidemia    • BPH (benign prostatic  hyperplasia)    • Sundowning    • Hard of hearing    • Sinus bradycardia    • Hypokalemia    • Anemia    • S/P carotid endarterectomy    • Agitation      This patient is a 86 y.o. male admitted for acute inpatient rehabilitation with Acute CVA (cerebrovascular accident) (HCC).    Patient will have his first weekly conference on Monday to discuss a discharge date.    Medical Decision Making and Plan:    Small punctate infarctions  Right frontal lobe, right cerebellum, right occipital lobe  Non-focal  Visual complaints  Cognitive impairment (suspect some is baseline)  Continue full rehab program  PT/OT/SLP, 1 hr each discipline, 5 days per week    Aspirin   Statin    Outpatient follow up with stroke bridge clinic, Dr Sales, referrals made    Zio patch cardiac monitor to be placed by neurology    Right carotid stenosis  S/p CEA 4/2  Statin  Aspirin  Outpatient follow up with Dr. Beavers, referrals made    Agitation, resolved  Sundowning, improved  Depakote started at acute, continue titration, stop date 4/11  Scheduled Seroquel at night  Scheduled melatonin at night  PRN seroquel, not requiring  Qtc OK     Hypertension, improved  Amlodipine  Lisinopril, increased dose  PRN hydralazine     Anemia, resolved     Hyperlipidemia  Statin     Hypokalemia, resolved     Bowel program  Continue bowel medications  Scheduled Sennakot  PRN Miralax, MOM, bisacodyl suppository  Last BM 4/9    Bladder program  BPH  With incontinence  Check PVRs - 106, 86, 20  Not retaining  Bladder scan for no voids  ICP for over 400 cc  Scheduled toileting    DVT prophylaxis  Lovenox    Total time:  16 minutes.  I spent greater than 50% of the time for patient care, counseling, and coordination on this date, including patient face-to face time, unit/floor time with review of records/pertinent lab data and studies, as well as discussing diagnostic evaluation/work up, planned therapeutic interventions, and future disposition of care, as per the  interval events/subjective and the assessment and plan as noted above.    I have performed a physical exam, reviewed and updated ROS, as well as the assessment and plan today 4/10/2021. In review of note from 4/9/2021 there are no new changes except as documented above.      Sugey Barkley M.D.   Physical Medicine and Rehabilitation

## 2021-04-11 PROCEDURE — 770010 HCHG ROOM/CARE - REHAB SEMI PRIVAT*

## 2021-04-11 PROCEDURE — 700102 HCHG RX REV CODE 250 W/ 637 OVERRIDE(OP): Performed by: PHYSICAL MEDICINE & REHABILITATION

## 2021-04-11 PROCEDURE — 700111 HCHG RX REV CODE 636 W/ 250 OVERRIDE (IP): Performed by: PHYSICAL MEDICINE & REHABILITATION

## 2021-04-11 PROCEDURE — A9270 NON-COVERED ITEM OR SERVICE: HCPCS | Performed by: PHYSICAL MEDICINE & REHABILITATION

## 2021-04-11 RX ADMIN — ASPIRIN 81 MG CHEWABLE TABLET 81 MG: 81 TABLET CHEWABLE at 08:31

## 2021-04-11 RX ADMIN — MELATONIN TAB 3 MG 3 MG: 3 TAB at 19:47

## 2021-04-11 RX ADMIN — QUETIAPINE FUMARATE 25 MG: 25 TABLET ORAL at 19:47

## 2021-04-11 RX ADMIN — ENOXAPARIN SODIUM 40 MG: 40 INJECTION SUBCUTANEOUS at 18:44

## 2021-04-11 RX ADMIN — SENNOSIDES AND DOCUSATE SODIUM 2 TABLET: 8.6; 5 TABLET ORAL at 19:47

## 2021-04-11 RX ADMIN — DIVALPROEX SODIUM 250 MG: 250 TABLET, DELAYED RELEASE ORAL at 19:47

## 2021-04-11 RX ADMIN — AMLODIPINE BESYLATE 10 MG: 5 TABLET ORAL at 05:03

## 2021-04-11 RX ADMIN — SENNOSIDES AND DOCUSATE SODIUM 2 TABLET: 8.6; 5 TABLET ORAL at 08:31

## 2021-04-11 RX ADMIN — LISINOPRIL 30 MG: 20 TABLET ORAL at 05:04

## 2021-04-11 RX ADMIN — ATORVASTATIN CALCIUM 80 MG: 40 TABLET, FILM COATED ORAL at 19:47

## 2021-04-11 ASSESSMENT — FIBROSIS 4 INDEX: FIB4 SCORE: 2

## 2021-04-11 NOTE — CARE PLAN
Problem: Communication  Goal: The ability to communicate needs accurately and effectively will improve  Outcome: PROGRESSING AS EXPECTED     Problem: Safety  Goal: Will remain free from injury  Outcome: PROGRESSING AS EXPECTED  Intervention: Educate patient and significant other/support system about adaptive mobility strategies and safe transfers  Note: Using call light for assist. Bed in low position, call light within reach, room near nurses station, bed alarm activated.     Problem: Bowel/Gastric:  Goal: Normal bowel function is maintained or improved  Outcome: PROGRESSING AS EXPECTED

## 2021-04-11 NOTE — CARE PLAN
Problem: Safety  Goal: Will remain free from injury  Note:   Educated Patient to uses call light appropriately this shift, and to wait for assistance when needed and to not attempt self transfer.  Able to verbalize needs.       Problem: Pain Management  Goal: Pain level will decrease to patient's comfort goal  Note: Pt. denies pain during this shift.

## 2021-04-11 NOTE — PROGRESS NOTES
Received patient during shift change, report rec'd from day shift RN. Resting in bed, VS stable on room air. Continent of B&B, contact guard assist for transfers. A&O x 2-3, able to make needs known. Bed in low position, call light within reach.

## 2021-04-11 NOTE — CARE PLAN
Problem: Safety  Goal: Will remain free from falls  Outcome: PROGRESSING AS EXPECTED  Note: Patient demonstrates good safety technique this shift.  Able to verbalize needs.

## 2021-04-12 PROCEDURE — 700111 HCHG RX REV CODE 636 W/ 250 OVERRIDE (IP): Performed by: PHYSICAL MEDICINE & REHABILITATION

## 2021-04-12 PROCEDURE — 97535 SELF CARE MNGMENT TRAINING: CPT

## 2021-04-12 PROCEDURE — 97530 THERAPEUTIC ACTIVITIES: CPT

## 2021-04-12 PROCEDURE — A9270 NON-COVERED ITEM OR SERVICE: HCPCS | Performed by: PHYSICAL MEDICINE & REHABILITATION

## 2021-04-12 PROCEDURE — 97130 THER IVNTJ EA ADDL 15 MIN: CPT

## 2021-04-12 PROCEDURE — 99233 SBSQ HOSP IP/OBS HIGH 50: CPT | Performed by: PHYSICAL MEDICINE & REHABILITATION

## 2021-04-12 PROCEDURE — 97129 THER IVNTJ 1ST 15 MIN: CPT

## 2021-04-12 PROCEDURE — 770010 HCHG ROOM/CARE - REHAB SEMI PRIVAT*

## 2021-04-12 PROCEDURE — 97116 GAIT TRAINING THERAPY: CPT

## 2021-04-12 PROCEDURE — 700102 HCHG RX REV CODE 250 W/ 637 OVERRIDE(OP): Performed by: PHYSICAL MEDICINE & REHABILITATION

## 2021-04-12 PROCEDURE — 97112 NEUROMUSCULAR REEDUCATION: CPT

## 2021-04-12 RX ADMIN — SENNOSIDES AND DOCUSATE SODIUM 2 TABLET: 8.6; 5 TABLET ORAL at 19:36

## 2021-04-12 RX ADMIN — ATORVASTATIN CALCIUM 80 MG: 40 TABLET, FILM COATED ORAL at 19:36

## 2021-04-12 RX ADMIN — ENOXAPARIN SODIUM 40 MG: 40 INJECTION SUBCUTANEOUS at 17:49

## 2021-04-12 RX ADMIN — SENNOSIDES AND DOCUSATE SODIUM 2 TABLET: 8.6; 5 TABLET ORAL at 08:09

## 2021-04-12 RX ADMIN — ASPIRIN 81 MG CHEWABLE TABLET 81 MG: 81 TABLET CHEWABLE at 08:09

## 2021-04-12 RX ADMIN — MELATONIN TAB 3 MG 3 MG: 3 TAB at 19:36

## 2021-04-12 RX ADMIN — LISINOPRIL 30 MG: 20 TABLET ORAL at 05:27

## 2021-04-12 RX ADMIN — AMLODIPINE BESYLATE 10 MG: 5 TABLET ORAL at 05:28

## 2021-04-12 RX ADMIN — QUETIAPINE FUMARATE 25 MG: 25 TABLET ORAL at 19:36

## 2021-04-12 ASSESSMENT — PATIENT HEALTH QUESTIONNAIRE - PHQ9
1. LITTLE INTEREST OR PLEASURE IN DOING THINGS: NOT AT ALL
SUM OF ALL RESPONSES TO PHQ9 QUESTIONS 1 AND 2: 0
2. FEELING DOWN, DEPRESSED, IRRITABLE, OR HOPELESS: NOT AT ALL

## 2021-04-12 ASSESSMENT — GAIT ASSESSMENTS
DISTANCE (FEET): 120
GAIT LEVEL OF ASSIST: CONTACT GUARD ASSIST
DEVIATION: SHUFFLED GAIT;DECREASED HEEL STRIKE;DECREASED TOE OFF;DECREASED BASE OF SUPPORT
ASSISTIVE DEVICE: FRONT WHEEL WALKER

## 2021-04-12 ASSESSMENT — ACTIVITIES OF DAILY LIVING (ADL)
TOILET_TRANSFER_DESCRIPTION: GRAB BAR;SET-UP OF EQUIPMENT;SUPERVISION FOR SAFETY;VERBAL CUEING
TOILETING_LEVEL_OF_ASSIST_DESCRIPTION: GRAB BAR;SET-UP OF EQUIPMENT;SUPERVISION FOR SAFETY
TUB_SHOWER_TRANSFER_DESCRIPTION: GRAB BAR;SHOWER BENCH;SET-UP OF EQUIPMENT;SUPERVISION FOR SAFETY;VERBAL CUEING
TOILET_TRANSFER_DESCRIPTION: GRAB BAR;SET-UP OF EQUIPMENT;SUPERVISION FOR SAFETY;VERBAL CUEING
BED_CHAIR_WHEELCHAIR_TRANSFER_DESCRIPTION: INCREASED TIME;ADAPTIVE EQUIPMENT;SET-UP OF EQUIPMENT;SUPERVISION FOR SAFETY;VERBAL CUEING
BED_CHAIR_WHEELCHAIR_TRANSFER_DESCRIPTION: SET-UP OF EQUIPMENT;SUPERVISION FOR SAFETY;VERBAL CUEING

## 2021-04-12 NOTE — PROGRESS NOTES
"Rehab Progress Note     Date of Service: 4/12/2021  Chief Complaint: follow up stroke    Interval Events (Subjective)    Patient seen and examined today in his room. His wife is present. He denies any pain. He has no new complaints.   Advised him we will have his first weekly conference today.     ROS: No changes to bowel, bladder, pain, mood, or sleep.         Objective:  VITAL SIGNS: /72   Pulse 65   Temp 36.3 °C (97.4 °F) (Oral)   Resp 18   Ht 1.727 m (5' 8\") Comment: Simultaneous filing. User may not have seen previous data.  Wt 76.1 kg (167 lb 11.2 oz)   SpO2 91%   BMI 25.50 kg/m²   Gen: alert, no apparent distress  Neuro: notable for hard of hearing, impaired balance    No results found for this or any previous visit (from the past 72 hour(s)).    Current Facility-Administered Medications   Medication Frequency   • QUEtiapine (Seroquel) tablet 25 mg Nightly   • lisinopril (PRINIVIL) tablet 30 mg Q DAY   • melatonin tablet 3 mg QHS   • ziprasidone (Geodon) injection 20 mg Q2HRS PRN   • hydrOXYzine HCl (ATARAX) tablet 50 mg Q6HRS PRN   • Respiratory Therapy Consult Continuous RT   • Pharmacy Consult Request ...Pain Management Review 1 Each PHARMACY TO DOSE   • hydrALAZINE (APRESOLINE) tablet 10 mg Q8HRS PRN   • acetaminophen (Tylenol) tablet 650 mg Q4HRS PRN   • lactulose 20 GM/30ML solution 30 mL QDAY PRN   • docusate sodium (ENEMEEZ) enema 283 mg QDAY PRN   • fleet enema 133 mL QDAY PRN   • artificial tears ophthalmic solution 1 Drop PRN   • benzocaine-menthol (CEPACOL) lozenge 1 Lozenge Q2HRS PRN   • mag hydrox-al hydrox-simeth (MAALOX PLUS ES or MYLANTA DS) suspension 20 mL Q2HRS PRN   • ondansetron (ZOFRAN ODT) dispertab 4 mg 4X/DAY PRN    Or   • ondansetron (ZOFRAN) syringe/vial injection 4 mg 4X/DAY PRN   • traZODone (DESYREL) tablet 50 mg QHS PRN   • sodium chloride (OCEAN) 0.65 % nasal spray 2 Spray PRN   • midazolam (VERSED) 5 mg/mL (1 mL vial) PRN   • senna-docusate (PERICOLACE or SENOKOT " S) 8.6-50 MG per tablet 2 tablet BID    And   • polyethylene glycol/lytes (MIRALAX) PACKET 1 Packet QDAY PRN    And   • magnesium hydroxide (MILK OF MAGNESIA) suspension 30 mL QDAY PRN    And   • bisacodyl (DULCOLAX) suppository 10 mg QDAY PRN   • enoxaparin (LOVENOX) inj 40 mg DAILY AT 1800   • amLODIPine (NORVASC) tablet 10 mg Q DAY   • aspirin (ASA) chewable tab 81 mg DAILY   • atorvastatin (LIPITOR) tablet 80 mg Q EVENING   • QUEtiapine (Seroquel) tablet 25 mg TID PRN       Orders Placed This Encounter   Procedures   • Diet Order Diet: Level 7 - Easy to Chew; Liquid level: Level 0 - Thin     Standing Status:   Standing     Number of Occurrences:   1     Order Specific Question:   Diet:     Answer:   Level 7 - Easy to Chew [22]     Order Specific Question:   Liquid level     Answer:   Level 0 - Thin       Assessment:  Active Hospital Problems    Diagnosis    • *Acute CVA (cerebrovascular accident) (HCC)    • Carotid stenosis, bilateral    • Altered mental status, unspecified    • Mixed hyperlipidemia    • BPH (benign prostatic hyperplasia)    • Sundowning    • Hard of hearing    • Sinus bradycardia    • Hypokalemia    • Anemia    • S/P carotid endarterectomy    • Agitation      This patient is a 86 y.o. male admitted for acute inpatient rehabilitation with Acute CVA (cerebrovascular accident) (HCC).    I led and attended the weekly conference today, and agree with the IDT conference documentation and plan of care as noted below.    Date of conference: 4/12/2021    Goals and barriers: See IDT note.    Biggest barriers: hard of hearing, impaired balance, impaired carry over of learning, stairs at home, confusion, decreased safety awareness, slow processing speed    CM/social support: wife supportive    Anticipated DC date: 4/21    Home health: PT/OT/SLP/RN    Equip: FWW    Follow up: PCP, Dr. Sales, stroke bridge clinic, cardiology (at 6 weeks)        Medical Decision Making and Plan:    Small punctate  infarctions  Right frontal lobe, right cerebellum, right occipital lobe  Non-focal  Visual complaints  Cognitive impairment (suspect some is baseline)  Continue full rehab program  PT/OT/SLP, 1 hr each discipline, 5 days per week    Aspirin   Statin    Outpatient follow up with stroke bridge clinic, Dr Sales, referrals made    Zio patch cardiac monitor placed by neurology 4/9, follow up with cardiology at 6 weeks    Right carotid stenosis  S/p CEA 4/2  Statin  Aspirin  Outpatient follow up with Dr. Beavers, referrals made    Agitation, resolved  Sundowning, improved  Depakote started at acute, continue titration, stop date 4/11  Scheduled Seroquel at night  Scheduled melatonin at night  PRN seroquel, not requiring  Qtc OK     Hypertension, improved  Amlodipine  Lisinopril, increased dose  PRN hydralazine     Anemia, resolved     Hyperlipidemia  Statin     Hypokalemia, resolved    Azotemia  Recheck in am  May need some IVF     Bowel program  Continue bowel medications  Scheduled Sennakot  PRN Miralax, MOM, bisacodyl suppository  Last BM 4/12    Bladder program  BPH  With incontinence  Check PVRs - 106, 86, 20  Not retaining  Bladder scan for no voids  ICP for over 400 cc  Scheduled toileting    DVT prophylaxis  Lovenox    Total time:  40 minutes.  I spent greater than 50% of the time for patient care, counseling, and coordination on this date, including patient face-to face time, unit/floor time with review of records/pertinent lab data and studies, as well as discussing diagnostic evaluation/work up, planned therapeutic interventions, and future disposition of care, as per the interval events/subjective and the assessment and plan as noted above.        Sugey Barkley M.D.   Physical Medicine and Rehabilitation

## 2021-04-12 NOTE — CARE PLAN
Problem: Mobility  Goal: STG-Within one week, patient will propel wheelchair community  Description: 1) Individualized goal:  W/C 150ft with supervision   2) Interventions:  PT Gait Training, PT Therapeutic Exercises, PT Neuro Re-Ed/Balance, PT Aquatic Therapy, PT Therapeutic Activity, and PT Evaluation    Outcome: MET  Goal: STG-Within one week, patient will ambulate household distance  Description: 1) Individualized goal: AMB 30 ft with FWW and CGA  2) Interventions:  PT Gait Training, PT Therapeutic Exercises, PT Neuro Re-Ed/Balance, PT Aquatic Therapy, PT Therapeutic Activity, and PT Evaluation    Outcome: MET     Problem: Mobility Transfers  Goal: STG-Within one week, patient will transfer bed to chair  Description: 1) Individualized goal:  SPT with FWW and SBA  2) Interventions: T Gait Training, PT Therapeutic Exercises, PT Neuro Re-Ed/Balance, PT Aquatic Therapy, PT Therapeutic Activity, and PT Evaluation    Outcome: MET

## 2021-04-12 NOTE — THERAPY
Physical Therapy   Daily Treatment     Patient Name: Cooper Harvey  Age:  86 y.o., Sex:  male  Medical Record #: [id number]  Today's Date: 4/12/2021     Precautions  Precautions: Fall Risk, Other (See Comments)  Comments: Pt is currently wearing a Zio patch heart monitor (grey case needs to be within 6ft of pt at all times); Impulsive; [place], wander guard    Subjective    Patient laying in bed upon arrival, agreeable to therapy. Patient reports he has not used the bathroom since being here.     Objective       04/12/21 0831   Cosignature   Documentation Review Approved with modifications made by preceptor in flowsheet   Precautions   Precautions Fall Risk;Other (See Comments)   Comments Pt is currently wearing a Zio patch heart monitor (grey case needs to be within 6ft of pt at all times); Impulsive; [place], wander guard   Cognition    Speech/ Communication Hard of Hearing   Gait Functional Level of Assist    Gait Level Of Assist Contact Guard Assist   Assistive Device Front Wheel Walker   Distance (Feet) 120  (120ft x1; 60ft x1)   # of Times Distance was Traveled 1   Deviation Shuffled Gait;Decreased Heel Strike;Decreased Toe Off;Decreased Base Of Support  (Decrease toe clearance on R with fatigue )   Wheelchair Functional Level of Assist   Wheelchair Assist Supervised   Distance Wheelchair (Feet or Distance) 150   Wheelchair Description Adaptive equipment;Supervision for safety   Stairs Functional Level of Assist   Level of Assist with Stairs Unable to Participate   Transfer Functional Level of Assist   Bed, Chair, Wheelchair Transfer Stand by Assist   Bed Chair Wheelchair Transfer Description Increased time;Adaptive equipment;Set-up of equipment;Supervision for safety;Verbal cueing  (SPT with FWW from w/c to bed)   Toilet Transfers Contact Guard Assist   Toilet Transfer Description Grab bar;Set-up of equipment;Supervision for safety;Verbal cueing  (VC to use hand rail and doff pants )   Supine Lower Body  "Exercise   Bridges 2 sets of 10   Standing Lower Body Exercises   Step Up Bilateral  (5x 4\" stair in // bars min A)   Bed Mobility    Supine to Sit Stand by Assist   Sit to Supine Stand by Assist   Sit to Stand Stand by Assist   Scooting Stand by Assist   Neuro-Muscular Treatments   Neuro-Muscular Treatments Anterior weight shift;Joint Approximation;Postural Facilitation;Sequencing;Tactile Cuing;Verbal Cuing;Weight Shift Right;Weight Shift Left   Comments Toe taps on 4\" box 10 reps bilat x1 set; toe taps on 6\" box 10 reps bilat x2 sets; VC for postural facilitation with gait; joint approximation with step ups prox to knee jt    Interdisciplinary Plan of Care Collaboration   IDT Collaboration with  Certified Nursing Assistant   Patient Position at End of Therapy Other (Comments)  (Hand-off to CNA as pt uses restroom post tx)   Strengths & Barriers   Strengths Adequate strength;Independent prior level of function;Pleasant and cooperative;Willingly participates in therapeutic activities   Barriers Decreased endurance;Difficulty following instructions;Hearing impairment;Home accessibility;Impaired balance;Impaired carryover of learning;Impulsive;Limited mobility;Impaired activity tolerance   PT Total Time Spent   PT Individual Total Time Spent (Mins) 60   PT Charge Group   PT Gait Training 1   PT Neuromuscular Re-Education / Balance 1   PT Therapeutic Activities 2       Assessment    Patient shows improvement in functional mobility, demonstrated by an increase in ambulation distance. Patient continues to be limited by activity tolerance, with an increase in R toe drag with fatigue. Poor carryover of sit to stand sequencing noted, requiring reinforcement. Step up to a 4\" step required Nicholas, suggesting the patient would not be able to negotiate stairs to enter his home at this time.    Strengths: Adequate strength, Independent prior level of function, Pleasant and cooperative, Willingly participates in therapeutic " "activities  Barriers: Decreased endurance, Difficulty following instructions, Hearing impairment, Home accessibility, Impaired balance, Impaired carryover of learning, Impulsive, Limited mobility, Impaired activity tolerance    Plan    STS sequencing, gait with FWW, step practice progressing to 6\" steps with one rail as appropriate, coordination exercise, transfer training, static and dynamic balance.      Passport items to be completed:  Get in/out of bed safely, in/out of a vehicle, safely use mobility device, walk or wheel around home/community, navigate up and down stairs, show how to get up/down from the ground, ensure home is accessible, demonstrate HEP, complete caregiver training          Physical Therapy Problems     Problem: Mobility     Dates: Start: 04/07/21       Goal: STG-Within one week, patient will propel wheelchair community     Dates: Start: 04/07/21       Description: 1) Individualized goal:  W/C 150ft with supervision   2) Interventions:  PT Gait Training, PT Therapeutic Exercises, PT Neuro Re-Ed/Balance, PT Aquatic Therapy, PT Therapeutic Activity, and PT Evaluation            Goal: STG-Within one week, patient will ambulate household distance     Dates: Start: 04/07/21       Description: 1) Individualized goal: AMB 30 ft with FWW and CGA  2) Interventions:  PT Gait Training, PT Therapeutic Exercises, PT Neuro Re-Ed/Balance, PT Aquatic Therapy, PT Therapeutic Activity, and PT Evaluation                  Problem: Mobility Transfers     Dates: Start: 04/07/21       Goal: STG-Within one week, patient will transfer bed to chair     Dates: Start: 04/07/21       Description: 1) Individualized goal:  SPT with FWW and SBA  2) Interventions: T Gait Training, PT Therapeutic Exercises, PT Neuro Re-Ed/Balance, PT Aquatic Therapy, PT Therapeutic Activity, and PT Evaluation                  Problem: PT-Long Term Goals     Dates: Start: 04/07/21       Goal: LTG-By discharge, patient will ambulate     Dates: " Start: 04/07/21       Description: 1) Individualized goal: AMB 150ft with FWW and supervision  2) Interventions:  PT Gait Training, PT Therapeutic Exercises, PT Neuro Re-Ed/Balance, PT Aquatic Therapy, PT Therapeutic Activity, and PT Evaluation            Goal: LTG-By discharge, patient will transfer one surface to another     Dates: Start: 04/07/21       Description: 1) Individualized goal: SPT with FWW and supervision  2) Interventions:  PT Gait Training, PT Therapeutic Exercises, PT Neuro Re-Ed/Balance, PT Aquatic Therapy, PT Therapeutic Activity, and PT Evaluation            Goal: LTG-By discharge, patient will ambulate up/down 4-6 stairs     Dates: Start: 04/07/21       Description: 1) Individualized goal:  Negotiate 5 steps with 2 handrails and CGA  2) Interventions:  PT Gait Training, PT Therapeutic Exercises, PT Neuro Re-Ed/Balance, PT Aquatic Therapy, PT Therapeutic Activity, and PT Evaluation            Goal: LTG-By discharge, patient will transfer in/out of a car     Dates: Start: 04/07/21       Description: 1) Individualized goal: Transfer with FWW and SBA  2) Interventions:  PT Gait Training, PT Therapeutic Exercises, PT Neuro Re-Ed/Balance, PT Aquatic Therapy, PT Therapeutic Activity, and PT Evaluation

## 2021-04-12 NOTE — THERAPY
Occupational Therapy  Daily Treatment     Patient Name: Cooper Harvey  Age:  86 y.o., Sex:  male  Medical Record #: [id number]  Today's Date: 4/12/2021     Precautions  Precautions: (P) Fall Risk, Other (See Comments)  Comments: (P) Pt is currently wearing a Zio patch heart monitor (grey case needs to be within 6ft of pt at all times); Impulsive; [place], wander guard         Subjective    Patient laying in bed asleep with his spouse in the bedside chair.  Easily awoken and agreeable to a shower and shave.     Objective       04/12/21 1231   Precautions   Precautions Fall Risk;Other (See Comments)   Comments Pt is currently wearing a Zio patch heart monitor (grey case needs to be within 6ft of pt at all times); Impulsive; [place], wander guard   Cognition    Speech/ Communication Hard of Hearing   Safety Awareness Impaired   Functional Level of Assist   Grooming Supervision;Seated   Bathing Supervision   Bathing Description Set-up of equipment;Supervision for safety;Verbal cueing;Set up for shower sleeve;Tub bench;Grab bar;Hand held shower  (setup/sba with cues for safety and problem solving)   Upper Body Dressing Supervision   Upper Body Dressing Description Set-up of equipment   Lower Body Dressing Stand by Assist   Lower Body Dressing Description Set-up of equipment;Supervision for safety;Verbal cueing  (setup/sba to don/doff socks, pants, underwear, doff shoes)   Toileting Stand by Assist   Toileting Description Grab bar;Set-up of equipment;Supervision for safety  (SBA to void while standing)   Bed, Chair, Wheelchair Transfer Minimal Assist   Bed Chair Wheelchair Transfer Description Set-up of equipment;Supervision for safety;Verbal cueing  (min A SPT bed <> w/c without AD)   Toilet Transfers Contact Guard Assist   Toilet Transfer Description Grab bar;Set-up of equipment;Supervision for safety;Verbal cueing   Tub / Shower Transfers Stand by Assist   Tub Shower Transfer Description Grab bar;Shower bench;Set-up of  equipment;Supervision for safety;Verbal cueing   Bed Mobility    Supine to Sit Stand by Assist   Sit to Supine Stand by Assist   Scooting Stand by Assist   Interdisciplinary Plan of Care Collaboration   IDT Collaboration with  Family / Caregiver   Patient Position at End of Therapy In Bed;Call Light within Reach;Tray Table within Reach;Phone within Reach;Family / Friend in Room;Bed Alarm On   Collaboration Comments spouse present   OT Total Time Spent   OT Individual Total Time Spent (Mins) 60   OT Charge Group   OT Self Care / ADL 4       Assessment    Patient completed adl routine with CGA/SBA with fair- safety awareness and impaired problem solving with use of shower head and temperature controls.  Strengths: Independent prior level of function, Motivated for self care and independence, Pleasant and cooperative, Willingly participates in therapeutic activities  Barriers: Bladder incontinence, Decreased endurance, Generalized weakness, Hearing impairment, Impaired activity tolerance, Impaired balance, Impulsive, Confused, Limited mobility, Visual impairment    Plan    ADLs, functional mobility, safety with transfers, strength/endurance/coordination      Occupational Therapy Goals     Problem: Dressing     Dates: Start: 04/12/21       Goal: STG-Within one week, patient will dress LB     Dates: Start: 04/12/21       Description: 1) Individualized Goal:  with supervision using AE/AD/techniques  2) Interventions:  OT Self Care/ADL, OT Cognitive Skill Dev, OT Neuro Re-Ed/Balance, OT Therapeutic Activity, OT Evaluation, and OT Therapeutic Exercise                  Problem: Functional Transfers     Dates: Start: 04/07/21       Goal: STG-Within one week, patient will transfer to toilet     Dates: Start: 04/07/21       Description: 1) Individualized Goal:  Sup/SBA for commode transfer via DME PRN  2) Interventions:  OT Self Care/ADL, OT Cognitive Skill Dev, OT Neuro Re-Ed/Balance, OT Therapeutic Activity, OT Evaluation,  and OT Therapeutic Exercise        Note:     Goal Note filed on 04/12/21 1123 by Chema Funk, OT/L    CGA                        Problem: OT Long Term Goals     Dates: Start: 04/07/21       Goal: LTG-By discharge, patient will complete basic self care tasks     Dates: Start: 04/07/21       Description: 1) Individualized Goal:  Mod I for BADL's via AE/DME PRN  2) Interventions:  OT Self Care/ADL, OT Cognitive Skill Dev, OT Neuro Re-Ed/Balance, OT Therapeutic Activity, OT Evaluation, and OT Therapeutic Exercise              Goal: LTG-By discharge, patient will perform bathroom transfers     Dates: Start: 04/07/21       Description: 1) Individualized Goal: Mod I for bathroom transfers via DME PRN  2) Interventions:  OT Self Care/ADL, OT Cognitive Skill Dev, OT Neuro Re-Ed/Balance, OT Therapeutic Activity, OT Evaluation, and OT Therapeutic Exercise                    Problem: Toileting     Dates: Start: 04/12/21       Goal: STG-Within one week, patient will complete toileting tasks     Dates: Start: 04/12/21       Description: 1) Individualized Goal:  Supervised with AE/AD/techniques  2) Interventions:  OT Self Care/ADL, OT Cognitive Skill Dev, OT Neuro Re-Ed/Balance, OT Therapeutic Activity, OT Evaluation, and OT Therapeutic Exercise

## 2021-04-12 NOTE — THERAPY
Speech Language Pathology  Daily Treatment     Patient Name: Cooper Harvey  Age:  86 y.o., Sex:  male  Medical Record #: [id number]  Today's Date: 4/12/2021     Precautions  Precautions: Fall Risk  Comments: Pt is currently wearing a Zio patch heart monitor (grey case needs to be within 6ft of pt at all times); Impulsive; [place], wander guard    Subjective    Patient pleasant and cooperative.  [place] even with hearing aids functioning.  Supplemented with written instruction to facilitate patient comprehension.      Objective       04/12/21 0931   Cognition   Moderate Attention Moderate (3)   Functional Memory Activities Moderate (3)   Functional Problem Solving Moderate (3)   Written Sequencing Moderate (3)   Functional Level of Assist   Comprehension Moderate Assist   Comprehension Description Hearing aids/amplifiers;Verbal cues;Other (comment)  (written supplements)   Expression Supervision   Expression Description Verbal cueing   Social Interaction Independent   Problem Solving Moderate Assist   Problem Solving Description Bed/chair alarm;Increased time;Seat belt;Supervision;Therapy schedule;Verbal cueing   Memory Moderate Assist   Memory Description Bed/chair alarm;Increased time;Seat belt;Supervision;Therapy schedule;Verbal cueing   SLP Total Time Spent   SLP Individual Total Time Spent (Mins) 60   Treatment Charges   SLP Cognitive Skill Development First 15 Minutes 1   SLP Cognitive Skill Development Additional 15 Minutes 3       Assessment    Patient followed one step written directives with min to mod cues needed to attention to action to take on target with 80% acc.   Patient ranked 4 step written sequencing with 50% acc with mod cues needed to improve.  Continued education on how to use memory log to record skills that he needs to practice to master. Patient struggled to understand what he was being instructed to do with hearing loss a barrier.  Wrote out suggestion of what patient could record and he  copied. However, he was not able to read his own witting.  He will need total assist with memory log.    Strengths: Able to follow instructions, Alert and oriented, Pleasant and cooperative, Supportive family  Barriers: Impaired functional cognition    Plan    Target recall of safety sequencing, attention, safety planning and problem solving.    Passport items to be completed:  Express basic needs, understand food/liquid recommendations, consistently follow swallow precautions, manage finances, manage medications, arrive to therapy appointments on time, complete daily memory log entries, solve problems related to safety situations, review education related to hospitalization, complete caregiver training     Speech Therapy Problems     Problem: Memory STGs     Dates: Start: 04/08/21       Goal: STG-Within one week, patient will     Dates: Start: 04/08/21       Description: 1) Individualized goal:  recall daily events and safety sequencing with 70% acc with use of memory log and memory strategies.  2) Interventions:  SLP Self Care / ADL Training , SLP Cognitive Skill Development, and SLP Group Treatment                    Problem: Problem Solving STGs     Dates: Start: 04/07/21       Goal: STG-Within one week, patient will solve basic problems     Dates: Start: 04/08/21       Description: 1) Individualized goal:  related to safety and hospitalization with min A with 80% accuracy.   2) Interventions:  SLP Speech Language Treatment, SLP Self Care / ADL Training , SLP Cognitive Skill Development, and SLP Group Treatment              Goal: STG-Within one week, patient will     Dates: Start: 04/08/21       Description: 1) Individualized goal:  will perform selective attention tasks with 80% acc with min cues to improve to 100%  2) Interventions:  SLP Self Care / ADL Training , SLP Cognitive Skill Development, and SLP Group Treatment                    Problem: Speech/Swallowing LTGs     Dates: Start: 04/07/21       Goal:  LTG-By discharge, patient will solve basic problems     Dates: Start: 04/07/21       Description: 1) Individualized goal:  given spv for a safe d/c home  2) Interventions:  SLP Speech Language Treatment, SLP Self Care / ADL Training , SLP Cognitive Skill Development, and SLP Group Treatment

## 2021-04-12 NOTE — CARE PLAN
Problem: Problem Solving STGs  Goal: STG-Within one week, patient will solve basic problems  Description: 1) Individualized goal:  related to safety and hospitalization with min A with 80% accuracy.   2) Interventions:  SLP Speech Language Treatment, SLP Self Care / ADL Training , SLP Cognitive Skill Development, and SLP Group Treatment      Outcome: NOT MET  Note: Mod verbal cues and prompting needed.  Goal: STG-Within one week, patient will  Description: 1) Individualized goal:  will perform selective attention tasks with 80% acc with min cues to improve to 100%  2) Interventions:  SLP Self Care / ADL Training , SLP Cognitive Skill Development, and SLP Group Treatment      Outcome: NOT MET  Note: Min to mod cues needed for 80% acc.     Problem: Memory STGs  Goal: STG-Within one week, patient will  Description: 1) Individualized goal:  recall daily events and safety sequencing with 70% acc with use of memory log and memory strategies.  2) Interventions:  SLP Self Care / ADL Training , SLP Cognitive Skill Development, and SLP Group Treatment      Outcome: NOT MET  Note: Mod A for 70% acc.  Difficulty reading own writing impacts ability to independently record on his memory log.

## 2021-04-12 NOTE — CARE PLAN
Problem: Functional Transfers  Goal: STG-Within one week, patient will transfer to toilet  Description: 1) Individualized Goal:  Sup/SBA for commode transfer via DME PRN  2) Interventions:  OT Self Care/ADL, OT Cognitive Skill Dev, OT Neuro Re-Ed/Balance, OT Therapeutic Activity, OT Evaluation, and OT Therapeutic Exercise      Outcome: NOT MET  Note: CGA     Problem: Dressing  Goal: STG-Within one week, patient will dress LB  Description: 1) Individualized Goal:  Min assist to don/doff Lower body Clothing via AE/DME PRN  2) Interventions:  OT Self Care/ADL, OT Cognitive Skill Dev, OT Neuro Re-Ed/Balance, OT Therapeutic Activity, OT Evaluation, and OT Therapeutic Exercise    Outcome: MET  Note: CGA     Problem: Toileting  Goal: STG-Within one week, patient will complete toileting tasks  Description: 1) Individualized Goal:  Min assist for BM via DME PRN  2) Interventions:  OT Self Care/ADL, OT Cognitive Skill Dev, OT Neuro Re-Ed/Balance, OT Therapeutic Activity, OT Evaluation, and OT Therapeutic Exercise      Outcome: MET  Note: SBA

## 2021-04-13 ENCOUNTER — APPOINTMENT (OUTPATIENT)
Dept: RADIOLOGY | Facility: MEDICAL CENTER | Age: 86
End: 2021-04-13
Attending: EMERGENCY MEDICINE
Payer: MEDICARE

## 2021-04-13 ENCOUNTER — HOSPITAL ENCOUNTER (OUTPATIENT)
Facility: MEDICAL CENTER | Age: 86
End: 2021-04-15
Attending: EMERGENCY MEDICINE | Admitting: STUDENT IN AN ORGANIZED HEALTH CARE EDUCATION/TRAINING PROGRAM
Payer: MEDICARE

## 2021-04-13 DIAGNOSIS — Z86.73 HISTORY OF STROKE: ICD-10-CM

## 2021-04-13 DIAGNOSIS — H53.2 DIPLOPIA: ICD-10-CM

## 2021-04-13 PROBLEM — I67.1 ANEURYSM OF MIDDLE CEREBRAL ARTERY: Status: ACTIVE | Noted: 2021-04-13

## 2021-04-13 LAB
ABO GROUP BLD: NORMAL
ALBUMIN SERPL BCP-MCNC: 3.4 G/DL (ref 3.2–4.9)
ALBUMIN/GLOB SERPL: 1.1 G/DL
ALP SERPL-CCNC: 86 U/L (ref 30–99)
ALT SERPL-CCNC: 25 U/L (ref 2–50)
ANION GAP SERPL CALC-SCNC: 8 MMOL/L (ref 7–16)
ANION GAP SERPL CALC-SCNC: 8 MMOL/L (ref 7–16)
APTT PPP: 29.9 SEC (ref 24.7–36)
AST SERPL-CCNC: 19 U/L (ref 12–45)
BASOPHILS # BLD AUTO: 0.8 % (ref 0–1.8)
BASOPHILS # BLD: 0.05 K/UL (ref 0–0.12)
BILIRUB SERPL-MCNC: 0.2 MG/DL (ref 0.1–1.5)
BLD GP AB SCN SERPL QL: NORMAL
BUN SERPL-MCNC: 24 MG/DL (ref 8–22)
BUN SERPL-MCNC: 25 MG/DL (ref 8–22)
CALCIUM SERPL-MCNC: 8.5 MG/DL (ref 8.5–10.5)
CALCIUM SERPL-MCNC: 8.7 MG/DL (ref 8.5–10.5)
CHLORIDE SERPL-SCNC: 103 MMOL/L (ref 96–112)
CHLORIDE SERPL-SCNC: 104 MMOL/L (ref 96–112)
CO2 SERPL-SCNC: 25 MMOL/L (ref 20–33)
CO2 SERPL-SCNC: 27 MMOL/L (ref 20–33)
CREAT SERPL-MCNC: 0.97 MG/DL (ref 0.5–1.4)
CREAT SERPL-MCNC: 1.16 MG/DL (ref 0.5–1.4)
EKG IMPRESSION: NORMAL
EOSINOPHIL # BLD AUTO: 0.23 K/UL (ref 0–0.51)
EOSINOPHIL NFR BLD: 3.9 % (ref 0–6.9)
ERYTHROCYTE [DISTWIDTH] IN BLOOD BY AUTOMATED COUNT: 44.6 FL (ref 35.9–50)
GLOBULIN SER CALC-MCNC: 3 G/DL (ref 1.9–3.5)
GLUCOSE SERPL-MCNC: 103 MG/DL (ref 65–99)
GLUCOSE SERPL-MCNC: 87 MG/DL (ref 65–99)
HCT VFR BLD AUTO: 42.1 % (ref 42–52)
HGB BLD-MCNC: 13.6 G/DL (ref 14–18)
IMM GRANULOCYTES # BLD AUTO: 0.04 K/UL (ref 0–0.11)
IMM GRANULOCYTES NFR BLD AUTO: 0.7 % (ref 0–0.9)
INR PPP: 1.09 (ref 0.87–1.13)
LYMPHOCYTES # BLD AUTO: 0.51 K/UL (ref 1–4.8)
LYMPHOCYTES NFR BLD: 8.6 % (ref 22–41)
MCH RBC QN AUTO: 31.1 PG (ref 27–33)
MCHC RBC AUTO-ENTMCNC: 32.3 G/DL (ref 33.7–35.3)
MCV RBC AUTO: 96.3 FL (ref 81.4–97.8)
MONOCYTES # BLD AUTO: 0.7 K/UL (ref 0–0.85)
MONOCYTES NFR BLD AUTO: 11.8 % (ref 0–13.4)
NEUTROPHILS # BLD AUTO: 4.42 K/UL (ref 1.82–7.42)
NEUTROPHILS NFR BLD: 74.2 % (ref 44–72)
NRBC # BLD AUTO: 0 K/UL
NRBC BLD-RTO: 0 /100 WBC
PLATELET # BLD AUTO: 282 K/UL (ref 164–446)
PMV BLD AUTO: 11.2 FL (ref 9–12.9)
POTASSIUM SERPL-SCNC: 4.6 MMOL/L (ref 3.6–5.5)
POTASSIUM SERPL-SCNC: 4.7 MMOL/L (ref 3.6–5.5)
PROT SERPL-MCNC: 6.4 G/DL (ref 6–8.2)
PROTHROMBIN TIME: 14.4 SEC (ref 12–14.6)
RBC # BLD AUTO: 4.37 M/UL (ref 4.7–6.1)
RH BLD: NORMAL
SODIUM SERPL-SCNC: 136 MMOL/L (ref 135–145)
SODIUM SERPL-SCNC: 139 MMOL/L (ref 135–145)
TROPONIN T SERPL-MCNC: 21 NG/L (ref 6–19)
WBC # BLD AUTO: 6 K/UL (ref 4.8–10.8)

## 2021-04-13 PROCEDURE — 770024 HCHG ROOM/CARE - REHAB LOA (180)

## 2021-04-13 PROCEDURE — 93005 ELECTROCARDIOGRAM TRACING: CPT | Performed by: EMERGENCY MEDICINE

## 2021-04-13 PROCEDURE — 0042T CT-CEREBRAL PERFUSION ANALYSIS: CPT

## 2021-04-13 PROCEDURE — U0005 INFEC AGEN DETEC AMPLI PROBE: HCPCS

## 2021-04-13 PROCEDURE — 86900 BLOOD TYPING SEROLOGIC ABO: CPT

## 2021-04-13 PROCEDURE — 99285 EMERGENCY DEPT VISIT HI MDM: CPT

## 2021-04-13 PROCEDURE — 99239 HOSP IP/OBS DSCHRG MGMT >30: CPT | Performed by: PHYSICAL MEDICINE & REHABILITATION

## 2021-04-13 PROCEDURE — 85025 COMPLETE CBC W/AUTO DIFF WBC: CPT

## 2021-04-13 PROCEDURE — 99220 PR INITIAL OBSERVATION CARE,LEVL III: CPT | Performed by: STUDENT IN AN ORGANIZED HEALTH CARE EDUCATION/TRAINING PROGRAM

## 2021-04-13 PROCEDURE — 70498 CT ANGIOGRAPHY NECK: CPT | Mod: MG

## 2021-04-13 PROCEDURE — G0378 HOSPITAL OBSERVATION PER HR: HCPCS

## 2021-04-13 PROCEDURE — 86850 RBC ANTIBODY SCREEN: CPT

## 2021-04-13 PROCEDURE — 70450 CT HEAD/BRAIN W/O DYE: CPT | Mod: MG

## 2021-04-13 PROCEDURE — 36415 COLL VENOUS BLD VENIPUNCTURE: CPT

## 2021-04-13 PROCEDURE — A9270 NON-COVERED ITEM OR SERVICE: HCPCS | Performed by: PSYCHIATRY & NEUROLOGY

## 2021-04-13 PROCEDURE — 80048 BASIC METABOLIC PNL TOTAL CA: CPT

## 2021-04-13 PROCEDURE — 700102 HCHG RX REV CODE 250 W/ 637 OVERRIDE(OP): Performed by: PSYCHIATRY & NEUROLOGY

## 2021-04-13 PROCEDURE — U0003 INFECTIOUS AGENT DETECTION BY NUCLEIC ACID (DNA OR RNA); SEVERE ACUTE RESPIRATORY SYNDROME CORONAVIRUS 2 (SARS-COV-2) (CORONAVIRUS DISEASE [COVID-19]), AMPLIFIED PROBE TECHNIQUE, MAKING USE OF HIGH THROUGHPUT TECHNOLOGIES AS DESCRIBED BY CMS-2020-01-R: HCPCS

## 2021-04-13 PROCEDURE — 85610 PROTHROMBIN TIME: CPT

## 2021-04-13 PROCEDURE — 97110 THERAPEUTIC EXERCISES: CPT

## 2021-04-13 PROCEDURE — 70496 CT ANGIOGRAPHY HEAD: CPT | Mod: MG

## 2021-04-13 PROCEDURE — 86901 BLOOD TYPING SEROLOGIC RH(D): CPT

## 2021-04-13 PROCEDURE — 97112 NEUROMUSCULAR REEDUCATION: CPT

## 2021-04-13 PROCEDURE — A9270 NON-COVERED ITEM OR SERVICE: HCPCS | Performed by: PHYSICAL MEDICINE & REHABILITATION

## 2021-04-13 PROCEDURE — 71045 X-RAY EXAM CHEST 1 VIEW: CPT

## 2021-04-13 PROCEDURE — 97530 THERAPEUTIC ACTIVITIES: CPT

## 2021-04-13 PROCEDURE — 84484 ASSAY OF TROPONIN QUANT: CPT

## 2021-04-13 PROCEDURE — 97535 SELF CARE MNGMENT TRAINING: CPT

## 2021-04-13 PROCEDURE — 80053 COMPREHEN METABOLIC PANEL: CPT

## 2021-04-13 PROCEDURE — 94760 N-INVAS EAR/PLS OXIMETRY 1: CPT

## 2021-04-13 PROCEDURE — 700117 HCHG RX CONTRAST REV CODE 255: Performed by: EMERGENCY MEDICINE

## 2021-04-13 PROCEDURE — 85730 THROMBOPLASTIN TIME PARTIAL: CPT

## 2021-04-13 PROCEDURE — 97116 GAIT TRAINING THERAPY: CPT

## 2021-04-13 PROCEDURE — 700102 HCHG RX REV CODE 250 W/ 637 OVERRIDE(OP): Performed by: PHYSICAL MEDICINE & REHABILITATION

## 2021-04-13 PROCEDURE — 99215 OFFICE O/P EST HI 40 MIN: CPT | Performed by: PSYCHIATRY & NEUROLOGY

## 2021-04-13 RX ORDER — POLYETHYLENE GLYCOL 3350 17 G/17G
1 POWDER, FOR SOLUTION ORAL
Status: DISCONTINUED | OUTPATIENT
Start: 2021-04-13 | End: 2021-04-15 | Stop reason: HOSPADM

## 2021-04-13 RX ORDER — ACETAMINOPHEN 325 MG/1
650 TABLET ORAL EVERY 6 HOURS PRN
Status: DISCONTINUED | OUTPATIENT
Start: 2021-04-13 | End: 2021-04-15 | Stop reason: HOSPADM

## 2021-04-13 RX ORDER — AMOXICILLIN 250 MG
2 CAPSULE ORAL 2 TIMES DAILY
Status: DISCONTINUED | OUTPATIENT
Start: 2021-04-13 | End: 2021-04-15 | Stop reason: HOSPADM

## 2021-04-13 RX ORDER — LISINOPRIL 30 MG/1
30 TABLET ORAL DAILY
Status: ON HOLD | COMMUNITY
End: 2021-04-15

## 2021-04-13 RX ORDER — ATORVASTATIN CALCIUM 40 MG/1
40 TABLET, FILM COATED ORAL EVERY EVENING
Status: DISCONTINUED | OUTPATIENT
Start: 2021-04-13 | End: 2021-04-15 | Stop reason: HOSPADM

## 2021-04-13 RX ORDER — ASPIRIN 81 MG/1
81 TABLET, CHEWABLE ORAL DAILY
Status: DISCONTINUED | OUTPATIENT
Start: 2021-04-13 | End: 2021-04-15 | Stop reason: HOSPADM

## 2021-04-13 RX ORDER — QUETIAPINE FUMARATE 25 MG/1
25 TABLET, FILM COATED ORAL
Status: DISCONTINUED | OUTPATIENT
Start: 2021-04-13 | End: 2021-04-15 | Stop reason: HOSPADM

## 2021-04-13 RX ORDER — LANOLIN ALCOHOL/MO/W.PET/CERES
3 CREAM (GRAM) TOPICAL
Status: ON HOLD | COMMUNITY
End: 2021-04-23 | Stop reason: SDUPTHER

## 2021-04-13 RX ORDER — QUETIAPINE FUMARATE 25 MG/1
25 TABLET, FILM COATED ORAL
Status: ON HOLD | COMMUNITY
End: 2021-04-23

## 2021-04-13 RX ORDER — BISACODYL 10 MG
10 SUPPOSITORY, RECTAL RECTAL
Status: DISCONTINUED | OUTPATIENT
Start: 2021-04-13 | End: 2021-04-15 | Stop reason: HOSPADM

## 2021-04-13 RX ADMIN — ASPIRIN 81 MG: 81 TABLET, CHEWABLE ORAL at 16:45

## 2021-04-13 RX ADMIN — LISINOPRIL 30 MG: 20 TABLET ORAL at 05:11

## 2021-04-13 RX ADMIN — IOHEXOL 100 ML: 350 INJECTION, SOLUTION INTRAVENOUS at 14:45

## 2021-04-13 RX ADMIN — ASPIRIN 81 MG CHEWABLE TABLET 81 MG: 81 TABLET CHEWABLE at 08:18

## 2021-04-13 RX ADMIN — IOHEXOL 40 ML: 350 INJECTION, SOLUTION INTRAVENOUS at 14:45

## 2021-04-13 RX ADMIN — AMLODIPINE BESYLATE 10 MG: 5 TABLET ORAL at 05:11

## 2021-04-13 RX ADMIN — SENNOSIDES AND DOCUSATE SODIUM 2 TABLET: 8.6; 5 TABLET ORAL at 08:18

## 2021-04-13 ASSESSMENT — LIFESTYLE VARIABLES
TOTAL SCORE: 0
HAVE PEOPLE ANNOYED YOU BY CRITICIZING YOUR DRINKING: NO
ON A TYPICAL DAY WHEN YOU DRINK ALCOHOL HOW MANY DRINKS DO YOU HAVE: 0
AVERAGE NUMBER OF DAYS PER WEEK YOU HAVE A DRINK CONTAINING ALCOHOL: 0
EVER FELT BAD OR GUILTY ABOUT YOUR DRINKING: NO
HOW MANY TIMES IN THE PAST YEAR HAVE YOU HAD 5 OR MORE DRINKS IN A DAY: 0
CONSUMPTION TOTAL: NEGATIVE
TOTAL SCORE: 0
HAVE YOU EVER FELT YOU SHOULD CUT DOWN ON YOUR DRINKING: NO
DO YOU DRINK ALCOHOL: NO
TOTAL SCORE: 0
DOES PATIENT WANT TO STOP DRINKING: NO
ALCOHOL_USE: NO
EVER HAD A DRINK FIRST THING IN THE MORNING TO STEADY YOUR NERVES TO GET RID OF A HANGOVER: NO

## 2021-04-13 ASSESSMENT — PATIENT HEALTH QUESTIONNAIRE - PHQ9
SUM OF ALL RESPONSES TO PHQ9 QUESTIONS 1 AND 2: 0
1. LITTLE INTEREST OR PLEASURE IN DOING THINGS: NOT AT ALL
SUM OF ALL RESPONSES TO PHQ9 QUESTIONS 1 AND 2: 0
1. LITTLE INTEREST OR PLEASURE IN DOING THINGS: NOT AT ALL
2. FEELING DOWN, DEPRESSED, IRRITABLE, OR HOPELESS: NOT AT ALL

## 2021-04-13 ASSESSMENT — GAIT ASSESSMENTS
ASSISTIVE DEVICE: FRONT WHEEL WALKER
DISTANCE (FEET): 125
GAIT LEVEL OF ASSIST: CONTACT GUARD ASSIST
DEVIATION: SHUFFLED GAIT;DECREASED HEEL STRIKE;DECREASED TOE OFF;BRADYKINETIC

## 2021-04-13 ASSESSMENT — ACTIVITIES OF DAILY LIVING (ADL)
TOILET_TRANSFER_DESCRIPTION: GRAB BAR;SET-UP OF EQUIPMENT;SUPERVISION FOR SAFETY;VERBAL CUEING
BED_CHAIR_WHEELCHAIR_TRANSFER_DESCRIPTION: INCREASED TIME;SET-UP OF EQUIPMENT;SUPERVISION FOR SAFETY;VERBAL CUEING
TOILETING_LEVEL_OF_ASSIST_DESCRIPTION: SET-UP OF EQUIPMENT;SUPERVISION FOR SAFETY;VERBAL CUEING;GRAB BAR

## 2021-04-13 ASSESSMENT — FIBROSIS 4 INDEX: FIB4 SCORE: 1.16

## 2021-04-13 ASSESSMENT — ENCOUNTER SYMPTOMS
HEADACHES: 1
DOUBLE VISION: 1

## 2021-04-13 NOTE — ED PROVIDER NOTES
ED Provider Note    CHIEF COMPLAINT  Stroke protocol    [facility]  Cooper Harvey is a 86 y.o. male who presents for evaluation of possible stroke.  The patient was recently admitted after being diagnosed with an occipital stroke.  During his hospitalization the patient did undergo a carotid endarterectomy.  The patient is currently on aspirin but no other anticoagulants.  Patient is currently at the rehab hospital.  I was contacted by the rehabilitation physician who indicates that around 1 PM today patient complained of some double vision.  They were concerned about the possibility of stroke and was transferred here for evaluation.  The patient was seen emergently upon arrival by the stroke team.  The patient denies: Headache, cardiorespiratory symptoms, gastrointestinal symptoms, unilateral motor weakness or paresthesias.  No other complaints.    REVIEW OF SYSTEMS  See HPI for further details.  The patient does have a Zio patch on evaluating for potential atrial fibrillation.  All other systems negative.    PAST MEDICAL HISTORY  Past Medical History:   Diagnosis Date   • [place] (hard of hearing) 04/02/2021   • Hydrocele, unspecified    • Hypertrophy of prostate without urinary obstruction and other lower urinary tract symptoms (LUTS)    • Mixed hyperlipidemia        FAMILY HISTORY  Family History   Problem Relation Age of Onset   • Cancer Mother        SOCIAL HISTORY  Resides locally;    SURGICAL HISTORY  Past Surgical History:   Procedure Laterality Date   • PB THROMBOENDARTECTMY NECK,NECK INCIS Right 4/2/2021    Procedure: ENDARTERECTOMY, CAROTID;  Surgeon: Willie Beavers M.D.;  Location: SURGERY [facility];  Service: Vascular   • PLASTIC SURGERY  February 2009    large skin cancer removed from the top of head with skin graft--donor site left anterior thigh   • HEMORRHOIDECTOMY  2004   • EYE SURGERY  6/05 ; 7/05    Bilateral Cataracts       CURRENT MEDICATIONS  See nurses notes    ALLERGIES  Allergies    Allergen Reactions   • Vicodin [Hydrocodone-Acetaminophen] Rash     rash       PHYSICAL EXAM  VITAL SIGNS: see nurses notes   Constitutional: 86-year-old male, hard of hearing, but oriented x2  HENT: ,Atraumatic, Bilateral external ears normal, tympanic membranes clear, Oropharynx moist, No oral exudates, Nose normal.   Eyes: PERRL, EOMI, Conjunctiva normal, No discharge.   Neck: Normal range of motion, No tenderness, Supple, No stridor.   Lymphatic: No lymphadenopathy noted.   Cardiovascular: Normal heart rate, Normal rhythm, No murmurs, No rubs, No gallops.   Thorax & Lungs: Normal Equal breath sounds, No respiratory distress, No wheezing, no stridor, no rales. No chest tenderness.   Abdomen: Soft, nontender, nondistended, no organomegaly, positive bowel sounds normal in quality. No guarding or rebound.  Skin: Mild decreased skin turgor, pink, warm, dry. No rashes, petechiae, purpura. Normal capillary refill.   Back: No tenderness, No CVA tenderness.   Extremities: Intact distal pulses, No edema, No tenderness, No cyanosis, No clubbing. Vascular: Pulses are 2+, symmetric in the upper and lower extremities.  Musculoskeletal: Diffuse arthritic changes. No tenderness to palpation or major deformities noted.   Neurologic: Awake, oriented x 2, Normal motor function, Normal sensory function, No gross focal deficits noted; mild diplopia on examination    EKG  I have interpreted: Rate 70, rhythm sinus, axis normal, ME QRS QT intervals normal, no acute STEMI, twelve-lead EKG, no acute change compared to previous tracing;    RADIOLOGY/PROCEDURES  DX-CHEST-PORTABLE (1 VIEW)   Final Result      No acute cardiac or pulmonary abnormalities are identified.      CT-CTA NECK WITH & W/O-POST PROCESSING   Final Result      Interval right sided endarterectomy without evidence of occlusion or stenosis.      Calcified and noncalcified plaque in the left carotid bulb and proximal left internal carotid artery with less than 50%  stenosis.      CT-CEREBRAL PERFUSION ANALYSIS   Final Result      1.  Cerebral blood flow less than 30% likely representing completed infarct = 0 mL.      2.  T Max more than 6 seconds likely representing combination of completed infarct and ischemia = 0 mL.      3.  Mismatched volume likely representing ischemic brain/penumbra = None      4.  Please note that the cerebral perfusion was performed on the limited brain tissue around the basal ganglia region. Infarct/ischemia outside the CT perfusion sections can be missed in this study.      CT-CTA HEAD WITH & W/O-POST PROCESS   Final Result      No evidence of large vessel occlusion.      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      CT-HEAD W/O   Final Result      No acute intracranial abnormality      MR-BRAIN-W/O    (Results Pending)         COURSE & MEDICAL DECISION MAKING  Pertinent Labs & Imaging studies reviewed. (See chart for details)  1.  Monitor  2.  Saline lock    Laboratory studies: CBC shows a white count of 6.0, 74% neutrophils, 8% lymphocytes, 11% monocytes, hemoglobin 13.6, crit 42.1; CMP shows a glucose of 103, BUN 25, otherwise within normal; troponin 21; coags within normal;    Discussion/consultation: At this time, the patient presents for evaluation of possible stroke.  The patient was seen emergently as a stroke protocol by neurology.  The patient does not meet criteria for intervention or thrombolytic therapy.  The patient will be admitted to the hospitalist for monitoring.  The patient remained stable while in the emerge department.  I spent 30 minutes by time attendance evaluating patient, reassessing the patient, reviewing laboratory and imaging studies, speaking with consultants.    FINAL IMPRESSION  1. Diplopia    2. History of stroke    3.      Critical care time, 30 minutes       PLAN  1.  The patient will be admitted for further monitoring, treatment, and care.    Electronically signed  by: Guy G Gansert, M.D., 4/13/2021 2:09 PM

## 2021-04-13 NOTE — THERAPY
Occupational Therapy  Daily Treatment     Patient Name: Cooper Harvey  Age:  86 y.o., Sex:  male  Medical Record #: [id number]  Today's Date: 4/13/2021     Precautions  Precautions: (P) Fall Risk, Other (See Comments)  Comments: (P) Pt is currently wearing a Zio patch heart monitor (grey case needs to be within 6ft of pt at all times); Impulsive; [place], wander guard         Subjective    Patient complaining of a visual change while being pushed in w/c down the hallway.  He kept his eyes closed and stated it felt like one of his eyes was moving medially towards the other eye.     Objective       04/13/21 1231   Precautions   Precautions Fall Risk;Other (See Comments)   Comments Pt is currently wearing a Zio patch heart monitor (grey case needs to be within 6ft of pt at all times); Impulsive; [place], wander guard   Functional Level of Assist   Grooming Supervision   Grooming Description Supervision for safety;Seated in wheelchair at sink  (to wash hands)   Lower Body Dressing Stand by Assist   Lower Body Dressing Description Set-up of equipment;Supervision for safety  (setup/sba to don shoes and tie laces seated EOB)   Toileting Stand by Assist   Toileting Description Set-up of equipment;Supervision for safety;Verbal cueing;Grab bar   Toilet Transfers Stand by Assist   Toilet Transfer Description Grab bar;Set-up of equipment;Supervision for safety;Verbal cueing   Sitting Lower Body Exercises   Nustep Time (See Comments)  (nustep level 6 x 9 minutes w/ B UE/LE)   Bed Mobility    Supine to Sit Stand by Assist   Sit to Supine Stand by Assist   Scooting Stand by Assist   Therapy Missed   Missed Therapy (Minutes) 15   Reason For Missed Therapy Medical - Patient on Hold from Therapy   OT Total Time Spent   OT Individual Total Time Spent (Mins) 45   OT Charge Group   OT Self Care / ADL 1   OT Therapy Activity 1   OT Therapeutic Exercise  1       Assessment    Patient was tolerating session well, participating and laughing  throughout most of session.  Then put his hands up to his face, covered his eyes and stated it felt like one of his eyes was moving medially toward the other.  Patient had difficulty explaining verbally what he was experiencing.  Nurse, charge nurse and Dr. Barkley informed and assessed patient.  Strengths: Independent prior level of function, Motivated for self care and independence, Pleasant and cooperative, Willingly participates in therapeutic activities  Barriers: Bladder incontinence, Decreased endurance, Generalized weakness, Hearing impairment, Impaired activity tolerance, Impaired balance, Impulsive, Confused, Limited mobility, Visual impairment    Plan    ADLs, functional mobility, safety with transfers, strength/endurance/coordination    Occupational Therapy Goals     Problem: Dressing     Dates: Start: 04/12/21       Goal: STG-Within one week, patient will dress LB     Dates: Start: 04/12/21       Description: 1) Individualized Goal:  with supervision using AE/AD/techniques  2) Interventions:  OT Self Care/ADL, OT Cognitive Skill Dev, OT Neuro Re-Ed/Balance, OT Therapeutic Activity, OT Evaluation, and OT Therapeutic Exercise                  Problem: Functional Transfers     Dates: Start: 04/07/21       Goal: STG-Within one week, patient will transfer to toilet     Dates: Start: 04/07/21       Description: 1) Individualized Goal:  Sup/SBA for commode transfer via DME PRN  2) Interventions:  OT Self Care/ADL, OT Cognitive Skill Dev, OT Neuro Re-Ed/Balance, OT Therapeutic Activity, OT Evaluation, and OT Therapeutic Exercise        Note:     Goal Note filed on 04/12/21 1123 by Chema Fukn, OT/L    CGA                        Problem: OT Long Term Goals     Dates: Start: 04/07/21       Goal: LTG-By discharge, patient will complete basic self care tasks     Dates: Start: 04/07/21       Description: 1) Individualized Goal:  Mod I for BADL's via AE/DME PRN  2) Interventions:  OT Self Care/ADL, OT Cognitive  Skill Dev, OT Neuro Re-Ed/Balance, OT Therapeutic Activity, OT Evaluation, and OT Therapeutic Exercise              Goal: LTG-By discharge, patient will perform bathroom transfers     Dates: Start: 04/07/21       Description: 1) Individualized Goal: Mod I for bathroom transfers via DME PRN  2) Interventions:  OT Self Care/ADL, OT Cognitive Skill Dev, OT Neuro Re-Ed/Balance, OT Therapeutic Activity, OT Evaluation, and OT Therapeutic Exercise                    Problem: Toileting     Dates: Start: 04/12/21       Goal: STG-Within one week, patient will complete toileting tasks     Dates: Start: 04/12/21       Description: 1) Individualized Goal:  Supervised with AE/AD/techniques  2) Interventions:  OT Self Care/ADL, OT Cognitive Skill Dev, OT Neuro Re-Ed/Balance, OT Therapeutic Activity, OT Evaluation, and OT Therapeutic Exercise

## 2021-04-13 NOTE — CARE PLAN
Problem: Safety  Goal: Will remain free from falls  Intervention: Implement fall precautions  Note: Use of call light reinforced to make needs known.Fall precautions and frequent rounding in place for safety.Call light within reach.Will continue to monitor and assess needs and safety.     Problem: Bowel/Gastric:  Goal: Normal bowel function is maintained or improved  Intervention: Educate patient and significant other/support system about signs and symptoms of constipation and interventions to implement  Note: Pt is continent of bowel per report.Scheduled senna given.LBM 4/12.Will continue to monitor.

## 2021-04-13 NOTE — CONSULTS
"Neurology Initial Consult H&P  Neurohospitalist Service, [facility]    Referring Physician: Dr. Guy Gansert    HPI: Cooper Harvey is a 86 y.o. man presenting for whom neurology has been consulted for visual changes.  He was sent over from the [facility] 4/13/21 when at 1300 his wife witnessed him hold his hands over his eyes as he complained that he was \"seeing double.\"  She adds that this is similar to prior episodes preceding confirmed strokes.  I personally saw him at the [facility] last week to place a Zio patch.  He has a history of HLD and stroke in Feb-March 2021 initially transferred at that time from [facility] with complaints of weeks of vision changes and a possible syncopal event... CTA with bilateral carotid stenosis, for which he was then transferred here to [facility] (prior admission). MRI at that time showed punctate infarctions in the right cerebellum, right occipital and right frontal lobes. Patient is s/p right carotid endarterectomy with Dr. Beavers on 4/2... EEG that was negative for seizures. He was started on Depakote for agitation. Patient has been complainig of intermittent double vision for days to weeks. He is very hard of hearing, has aides, and can only hear if you yell right next to his ear. Denies headache, no changes in strength, sensation, or speech.  Denies vertigo.    Review of systems: In addition to what is detailed in the HPI above, all other systems reviewed and are negative.    Past Medical History:    has a past medical history of [place] (hard of hearing) (04/02/2021), Hydrocele, unspecified, Hypertrophy of prostate without urinary obstruction and other lower urinary tract symptoms (LUTS), and Mixed hyperlipidemia.    FHx:  family history includes Cancer in his mother.    SHx:   reports that he quit smoking about 31 years ago. He has a 20.00 pack-year smoking history. He has never used smokeless tobacco. He reports current " alcohol use. He reports that he does not use drugs.    Allergies:  Allergies   Allergen Reactions   • Vicodin [Hydrocodone-Acetaminophen] Rash     rash       Medications:  No current facility-administered medications for this encounter.    Current Outpatient Medications:   •  acetaminophen (TYLENOL) 325 MG Tab, Take 2 Tablets by mouth every 6 hours as needed (Mild Pain; (Pain scale 1-3); Temp greater than 100.5 F)., Disp: 30 tablet, Rfl: 0  •  amLODIPine (NORVASC) 10 MG Tab, Take 1 tablet by mouth every day., Disp: 30 tablet, Rfl:   •  aspirin (ASA) 81 MG Chew Tab chewable tablet, Chew 1 tablet every day., Disp: 100 tablet, Rfl:   •  atorvastatin (LIPITOR) 80 MG tablet, Take 1 tablet by mouth every evening., Disp: 30 tablet, Rfl:   •  divalproex (DEPAKOTE) 250 MG Tablet Delayed Response, Take 1 tablet by mouth every 8 hours., Disp: 90 tablet, Rfl:   •  enoxaparin (LOVENOX) 40 MG/0.4ML Solution inj, Inject 40 mg under the skin every day at 6 PM., Disp:  , Rfl:   •  haloperidol lactate (HALDOL) 5 MG/ML Solution, Infuse 0.4 mL into a venous catheter every 6 hours as needed., Disp:  , Rfl: 0  •  labetalol (NORMODYNE/TRANDATE) 5 MG/ML Solution, Infuse 2 mL into a venous catheter every four hours as needed (see admin instructions for parameters)., Disp:  , Rfl: 0  •  lisinopril (PRINIVIL) 20 MG Tab, Take 1 tablet by mouth every day., Disp: 30 tablet, Rfl:   •  senna-docusate (PERICOLACE OR SENOKOT S) 8.6-50 MG Tab, Take 2 Tablets by mouth 2 times a day., Disp: 30 tablet, Rfl: 0  •  polyethylene glycol/lytes (MIRALAX) 17 g Pack, Take 1 Packet by mouth 1 time a day as needed (if sennosides and docusate ineffective after 24 hours)., Disp: , Rfl: 3  •  magnesium hydroxide (MILK OF MAGNESIA) 400 MG/5ML Suspension, Take 30 mL by mouth 1 time a day as needed (if polyethylene glycol ineffective after 24 hours)., Disp:  , Rfl:   •  bisacodyl (DULCOLAX) 10 MG Suppos, Insert 1 Suppository into the rectum 1 time a day as needed (if  magnesium hydroxide ineffective after 24 hours)., Disp:  , Rfl: 0    Facility-Administered Medications Ordered in Other Encounters:   •  [MAR Hold - Suspended Admission] QUEtiapine (Seroquel) tablet 25 mg, 25 mg, Oral, Nightly, Sugey Barkley M.D., 25 mg at 04/12/21 1936  •  [MAR Hold - Suspended Admission] lisinopril (PRINIVIL) tablet 30 mg, 30 mg, Oral, Q DAY, Suegy Barkley M.D., 30 mg at 04/13/21 0511  •  [MAR Hold - Suspended Admission] melatonin tablet 3 mg, 3 mg, Oral, QHS, Sugey Barkley M.D., 3 mg at 04/12/21 1936  •  [MAR Hold - Suspended Admission] ziprasidone (Geodon) injection 20 mg, 20 mg, Intramuscular, Q2HRS PRN, Sugey Barkley M.D.  •  [MAR Hold - Suspended Admission] hydrOXYzine HCl (ATARAX) tablet 50 mg, 50 mg, Oral, Q6HRS PRN, Sugey Barkley M.D.  •  [MAR Hold - Suspended Admission] Respiratory Therapy Consult, , Nebulization, Continuous RT, Sugey Barkley M.D.  •  [MAR Hold - Suspended Admission] Pharmacy Consult Request ...Pain Management Review 1 Each, 1 Each, Other, PHARMACY TO DOSE, Sugey Barkley M.D.  •  [MAR Hold - Suspended Admission] hydrALAZINE (APRESOLINE) tablet 10 mg, 10 mg, Oral, Q8HRS PRN, Sugey Barkley M.D.  •  [MAR Hold - Suspended Admission] acetaminophen (Tylenol) tablet 650 mg, 650 mg, Oral, Q4HRS PRN, Sugey Barkley M.D.  •  [MAR Hold - Suspended Admission] lactulose 20 GM/30ML solution 30 mL, 30 mL, Oral, QDAY PRN, Sugey Barkley M.D.  •  [MAR Hold - Suspended Admission] docusate sodium (ENEMEEZ) enema 283 mg, 283 mg, Rectal, QDAY PRN, Sugey Barkley M.D.  •  [MAR Hold - Suspended Admission] fleet enema 133 mL, 1 Each, Rectal, QDAY PRN, Sugey Barkley M.D.  •  [MAR Hold - Suspended Admission] artificial tears ophthalmic solution 1 Drop, 1 Drop, Both Eyes, PRN, Sugey Barkley M.D.  •  [MAR Hold - Suspended Admission] benzocaine-menthol (CEPACOL) lozenge 1 Lozenge, 1 Lozenge, Mouth/Throat, Q2HRS PRN, Sugey Barkley,  M.D.  •  [MAR Hold - Suspended Admission] mag hydrox-al hydrox-simeth (MAALOX PLUS ES or MYLANTA DS) suspension 20 mL, 20 mL, Oral, Q2HRS PRN, Sugey Barkley M.D.  •  [MAR Hold - Suspended Admission] ondansetron (ZOFRAN ODT) dispertab 4 mg, 4 mg, Oral, 4X/DAY PRN **OR** [MAR Hold - Suspended Admission] ondansetron (ZOFRAN) syringe/vial injection 4 mg, 4 mg, Intramuscular, 4X/DAY PRN, Sugey Barkley M.D.  •  [MAR Hold - Suspended Admission] traZODone (DESYREL) tablet 50 mg, 50 mg, Oral, QHS PRN, Sugey Barkley M.D.  •  [MAR Hold - Suspended Admission] sodium chloride (OCEAN) 0.65 % nasal spray 2 Spray, 2 Spray, Nasal, PRN, Sugey Barkley M.D.  •  [MAR Hold - Suspended Admission] midazolam (VERSED) 5 mg/mL (1 mL vial), 5 mg, Nasal, PRN, Sugey Barkley M.D.  •  [MAR Hold - Suspended Admission] senna-docusate (PERICOLACE or SENOKOT S) 8.6-50 MG per tablet 2 tablet, 2 tablet, Oral, BID, 2 tablet at 04/13/21 0818 **AND** [MAR Hold - Suspended Admission] polyethylene glycol/lytes (MIRALAX) PACKET 1 Packet, 1 Packet, Oral, QDAY PRN **AND** [MAR Hold - Suspended Admission] magnesium hydroxide (MILK OF MAGNESIA) suspension 30 mL, 30 mL, Oral, QDAY PRN **AND** [MAR Hold - Suspended Admission] bisacodyl (DULCOLAX) suppository 10 mg, 10 mg, Rectal, QDAY PRN, Sugey Barkley M.D.  •  [MAR Hold - Suspended Admission] enoxaparin (LOVENOX) inj 40 mg, 40 mg, Subcutaneous, DAILY AT 1800, Sugey Barkley M.D., 40 mg at 04/12/21 1749  •  [MAR Hold - Suspended Admission] amLODIPine (NORVASC) tablet 10 mg, 10 mg, Oral, Q DAY, Sugey Barkley M.D., 10 mg at 04/13/21 0511  •  [MAR Hold - Suspended Admission] aspirin (ASA) chewable tab 81 mg, 81 mg, Oral, DAILY, Sugey Barkley M.D., 81 mg at 04/13/21 0818  •  [MAR Hold - Suspended Admission] atorvastatin (LIPITOR) tablet 80 mg, 80 mg, Oral, Q EVENING, Sugey Barkley M.D., 80 mg at 04/12/21 1936  •  [MAR Hold - Suspended Admission] QUEtiapine (Seroquel)  tablet 25 mg, 25 mg, Oral, TID PRN, Sugey Barkley M.D.    Physical Examination:     There were no vitals filed for this visit.    General: Patient is awake and in no acute distress  Eyes: examination of optic disks not indicated at this time given acuity of consult  CV: RRR    NEUROLOGICAL EXAM:     Mental status: Awake, alert and oriented, follows simple commands  Speech and language: speech is mildly dysarthric. The patient is able to name and repeat.  Cranial nerve exam: Pupils are equal, round and reactive to light bilaterally. Visual fields are full on blink to threat with diminished peripheral fields. Extraocular muscles are intact without deviation or palsy. Sensation in the face is intact to light touch. Face is symmetric. Hearing to finger rub equal. Palate elevates symmetrically. Shoulder shrug is full. Tongue is midline.  Motor exam: Strength is 5/5 in all extremities both distally and proximally. No pronator drift or orbiting.  Mild paratonia. No abnormal movements were seen on exam.  Sensory exam: No sensory deficits identified  Deep tendon reflexes:  1+ and symmetric  Coordination: mildly ataxic on the right  Gait: deferred given patient actively being transported to CT    Objective Data:    Labs:  No results found for: PROTHROMBTM, INR   Lab Results   Component Value Date/Time    WBC 6.7 04/07/2021 06:19 AM    RBC 4.50 (L) 04/07/2021 06:19 AM    HEMOGLOBIN 14.0 04/07/2021 06:19 AM    HEMATOCRIT 42.0 04/07/2021 06:19 AM    MCV 93.3 04/07/2021 06:19 AM    MCH 31.1 04/07/2021 06:19 AM    MCHC 33.3 (L) 04/07/2021 06:19 AM    MPV 10.3 04/07/2021 06:19 AM    NEUTSPOLYS 73.00 (H) 04/07/2021 06:19 AM    LYMPHOCYTES 10.70 (L) 04/07/2021 06:19 AM    MONOCYTES 11.70 04/07/2021 06:19 AM    EOSINOPHILS 3.40 04/07/2021 06:19 AM    BASOPHILS 0.60 04/07/2021 06:19 AM      Lab Results   Component Value Date/Time    SODIUM 139 04/13/2021 06:09 AM    POTASSIUM 4.6 04/13/2021 06:09 AM    CHLORIDE 104 04/13/2021  "06:09 AM    CO2 27 04/13/2021 06:09 AM    GLUCOSE 87 04/13/2021 06:09 AM    BUN 24 (H) 04/13/2021 06:09 AM    CREATININE 0.97 04/13/2021 06:09 AM    CREATININE 1.2 10/03/2008 11:30 AM      Lab Results   Component Value Date/Time    CHOLSTRLTOT 202 (H) 03/30/2021 10:26 PM     (H) 03/30/2021 10:26 PM    HDL 41 03/30/2021 10:26 PM    TRIGLYCERIDE 111 03/30/2021 10:26 PM       Lab Results   Component Value Date/Time    ALKPHOSPHAT 77 04/07/2021 06:19 AM    ASTSGOT 20 04/07/2021 06:19 AM    ALTSGPT 17 04/07/2021 06:19 AM    TBILIRUBIN 0.5 04/07/2021 06:19 AM        Imaging/Testing:    I interpreted and/or reviewed the patient's neuroimaging    CT-HEAD W/O   Final Result      No acute intracranial abnormality      DX-CHEST-PORTABLE (1 VIEW)    (Results Pending)   CT-CEREBRAL PERFUSION ANALYSIS    (Results Pending)   CT-CTA HEAD WITH & W/O-POST PROCESS    (Results Pending)   CT-CTA NECK WITH & W/O-POST PROCESSING    (Results Pending)       Assessment and Plan:    Cooper Harvey is a 86 y.o. man presenting for whom neurology has been consulted for visual changes.  [facility] when at 1300 his wife witnessed him hold his hands over his eyes as he complained that he was \"seeing double.\"  He suffered right posterior circulation infarctions in Feb-March 2021 with an extended cardiac event monitor placed recently; managed on ASA in the interim.  The complaint of diplopia is subacute, recurrent, and intermittent.  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  Ddx thromboembolic stroke affecting frontal eye fields and PPRF.  Not a candidate for tPA given history of recent ischemic stroke within the past 3 months, with symptoms recurrent, and reproducible, occurring on the order of days.  To be admitted for further workup.    Plan:    - Admit to the hospital for further workup of etiology and to provide secondary stroke prevention " measures  - Neurology checks and vital signs per protocol  - Permissive HTN for 24-48 hours from onset of symptoms followed by goal BP <140 systolic. Long term BP goal (s/p 1-2 weeks from onset) is normotension  - obtain normoglycemia and avoid hypo- or hyper -natremia; aim for normothermia  - secondary stroke prevention therapy with ASA and high intensity statin per SPARCL Trial  - cardiology interrogation of the Zio patch gateway device  - serum studies for stroke risk factors: lipid panel & hemoglobin A1C  - To identify evidence of acute stroke territory, obtain MRI brain  - evaluate and treat with PT/OT/ST    The evaluation of the patient, and recommended management, was discussed with Dr. Guy Gansert.    Mirza Estrada MD  Neurohospitalist, Acute Care Services   of Neurology    CRITICAL CARE    Upon my evaluation, this patient had a high probability of imminent or life-threatening deterioration due to cerebrovascular accident which required my direct attention, intervention, and personal management.  I personally provided 45 minutes of total critical care time outside of time spent on separately billable/documented procedures. Time includes: review of laboratory data, review of radiology studies, discussion with consultants, discussion with family/patient, determining candidacy for thrombolytics and/or IR intervention, and monitoring for potential decompensation.  Interventions were performed as documented in detail in the chart.

## 2021-04-13 NOTE — THERAPY
"Physical Therapy   Daily Treatment     Patient Name: Cooper Harvey  Age:  86 y.o., Sex:  male  Medical Record #: [id number]  Today's Date: 4/13/2021     Precautions  Precautions: Fall Risk, Other (See Comments)  Comments: Pt is currently wearing a Zio patch heart monitor (grey case needs to be within 6ft of pt at all times); Impulsive; [place], wander guard    Subjective    Patient laying in bed upon arrival, agreeable to therapy.      Objective       04/13/21 0901   [facility]   Documentation Review Approved    Precautions   Precautions Fall Risk;Other (See Comments)   Comments Pt is currently wearing a Zio patch heart monitor (grey case needs to be within 6ft of pt at all times); Impulsive; [place], wander guard   Vitals   Pulse 80   Patient BP Position Sitting   Blood Pressure  120/50   Pulse Oximetry 96 %   Cognition    Speech/ Communication Hard of Hearing   Gait Functional Level of Assist    Gait Level Of Assist Contact Guard Assist   Assistive Device Front Wheel Walker  (Grocery cart )   Distance (Feet) 125  (125ft x1 FWW; 125ft x1 grocery cart w/ object finding)   # of Times Distance was Traveled 2   Deviation Shuffled Gait;Decreased Heel Strike;Decreased Toe Off;Bradykinetic  (R lateral whip)   Stairs Functional Level of Assist   Level of Assist with Stairs Contact Guard Assist   # of Stairs Climbed 6  (6 consecutive 4\" step with 2 rails; 4 4\" steps in // bars)   Stairs Description Extra time;Limited by fatigue;Safety concerns;Supervision for safety;Verbal cueing   Transfer Functional Level of Assist   Bed, Chair, Wheelchair Transfer Contact Guard Assist   Bed Chair Wheelchair Transfer Description Increased time;Set-up of equipment;Supervision for safety;Verbal cueing  (SPT with FWW from bed to w/c)   Sitting Lower Body Exercises   Sit to Stand 1 set of 10   Standing Lower Body Exercises   Step Up Bilateral;1 set of 10  (10 4\" steps in // bars)   Bed Mobility    Supine to Sit Stand by Assist  (VC for hand " "placement)   Sit to Stand Stand by Assist   Neuro-Muscular Treatments   Neuro-Muscular Treatments Anterior weight shift;Postural Facilitation;Sequencing;Tactile Cuing;Verbal Cuing;Weight Shift Right;Weight Shift Left   Comments Posture facilitation with amb; environmental scanning and safety cues with amb; Verbal and tactile cues for STS sequencing; standing toe taps with cone 10reps x2sets in // bars for coordination; stepping over dowels for increased toe clearance x60' in // bars   Interdisciplinary Plan of Care Collaboration   IDT Collaboration with  Physician;Certified Nursing Assistant   Patient Position at End of Therapy Seated;Chair Alarm On;Self Releasing Lap Belt Applied;Call Light within Reach;Tray Table within Reach;Phone within Reach   Collaboration Comments CLOF; pt request to use restroom at end of session   Strengths & Barriers   Strengths Adequate strength;Independent prior level of function;Pleasant and cooperative;Willingly participates in therapeutic activities   Barriers Decreased endurance;Difficulty following instructions;Hearing impairment;Home accessibility;Impaired balance;Impaired carryover of learning;Impulsive;Limited mobility;Impaired activity tolerance   PT Total Time Spent   PT Individual Total Time Spent (Mins) 60   PT Charge Group   PT Gait Training 1   PT Neuromuscular Re-Education / Balance 2   PT Therapeutic Activities 1       Assessment    Patient demonstrates improvement with stair negotiation, progressing to 6 consecutive 4\" steps. CGA continues to be required for gait as patient will occasionally trip with decreased toe clearance on the R. Patient demonstrates carryover of learning in today's session with decreased cues for safe STS sequencing.    Strengths: Adequate strength, Independent prior level of function, Pleasant and cooperative, Willingly participates in therapeutic activities  Barriers: Decreased endurance, Difficulty following instructions, Hearing impairment, " "Home accessibility, Impaired balance, Impaired carryover of learning, Impulsive, Limited mobility, Impaired activity tolerance    Plan    Review STS sequencing, gait with FWW, step practice progressing to 6\" steps with one rail as appropriate, coordination exercise, transfer training, static and dynamic balance.      Passport items to be completed:  Get in/out of bed safely, in/out of a vehicle, safely use mobility device, walk or wheel around home/community, navigate up and down stairs, show how to get up/down from the ground, ensure home is accessible, demonstrate HEP, complete caregiver training          Physical Therapy Problems     Problem: Mobility     Dates: Start: 04/12/21       Goal: STG-Within one week, patient will ambulate community distances     Dates: Start: 04/12/21       Description: 1) Individualized goal:   feet with FWW and SBA  2) Interventions:  PT Gait Training, PT Therapeutic Exercises, PT Neuro Re-Ed/Balance, PT Therapeutic Activity, and PT Evaluation            Goal: STG-Within one week, patient will     Dates: Start: 04/12/21       Description: 1) Individualized goal: negotiate 4-6, 6\" steps with 2 rails and CGA  2) Interventions:  PT Gait Training, PT Therapeutic Exercises, PT Neuro Re-Ed/Balance, PT Therapeutic Activity, and PT Evaluation                  Problem: Mobility Transfers     Dates: Start: 04/12/21       Goal: STG-Within one week, patient will transfer bed to chair     Dates: Start: 04/12/21       Description: 1) Individualized goal:  SPT with FWW and supervision   2) Interventions:  PT Gait Training, PT Therapeutic Exercises, PT Neuro Re-Ed/Balance, PT Therapeutic Activity, and PT Evaluation                  Problem: PT-Long Term Goals     Dates: Start: 04/07/21       Goal: LTG-By discharge, patient will ambulate     Dates: Start: 04/07/21       Description: 1) Individualized goal: AMB 150ft with FWW and supervision  2) Interventions:  PT Gait Training, PT Therapeutic " Exercises, PT Neuro Re-Ed/Balance, PT Aquatic Therapy, PT Therapeutic Activity, and PT Evaluation            Goal: LTG-By discharge, patient will transfer one surface to another     Dates: Start: 04/07/21       Description: 1) Individualized goal: SPT with FWW and supervision  2) Interventions:  PT Gait Training, PT Therapeutic Exercises, PT Neuro Re-Ed/Balance, PT Aquatic Therapy, PT Therapeutic Activity, and PT Evaluation            Goal: LTG-By discharge, patient will ambulate up/down 4-6 stairs     Dates: Start: 04/07/21       Description: 1) Individualized goal:  Negotiate 5 steps with 2 handrails and CGA  2) Interventions:  PT Gait Training, PT Therapeutic Exercises, PT Neuro Re-Ed/Balance, PT Aquatic Therapy, PT Therapeutic Activity, and PT Evaluation            Goal: LTG-By discharge, patient will transfer in/out of a car     Dates: Start: 04/07/21       Description: 1) Individualized goal: Transfer with FWW and SBA  2) Interventions:  PT Gait Training, PT Therapeutic Exercises, PT Neuro Re-Ed/Balance, PT Aquatic Therapy, PT Therapeutic Activity, and PT Evaluation

## 2021-04-13 NOTE — DISCHARGE SUMMARY
Admission Date: 4/6/2021    Discharge Date: 4/13/2021    Attending Provider: Sugey Barkley MD    Admission Diagnosis:   Active Hospital Problems    Diagnosis    • *Acute CVA (cerebrovascular accident) (HCC)    • Carotid stenosis, bilateral    • Altered mental status, unspecified    • Mixed hyperlipidemia    • BPH (benign prostatic hyperplasia)    • Sundowning    • Hard of hearing    • Sinus bradycardia    • Hypokalemia    • Anemia    • S/P carotid endarterectomy    • Agitation        Discharge Diagnosis:  Active Hospital Problems    Diagnosis    • *Acute CVA (cerebrovascular accident) (HCC)    • Carotid stenosis, bilateral    • Altered mental status, unspecified    • Mixed hyperlipidemia    • BPH (benign prostatic hyperplasia)    • Sundowning    • Hard of hearing    • Sinus bradycardia    • Hypokalemia    • Anemia    • S/P carotid endarterectomy    • Agitation        HPI per H&P:  The patient is a 86 y.o. male with a past medical history of BPH, hearing loss, hyperlipidemia; now admitted for acute inpatient rehabilitation with severe functional debility after an acute stroke.       On admission the patient and medical record report, he was transferred from [facility] with complaints of weeks of vision changes and a possible syncopal event. Negative Head CT. CTA with bilateral carotid stenosis, for which he was then transferred here to [facility]. MRI with punctate infarctions in the right cerebellum, right occipital and right frontal lobes. NIHSS 2. Patient is now s/p right carotid endarterectomy with Dr. Beavers on 4/2. HgbA1c 5.2, , ECHO EF 70 %. EKG with sinus bradycardia. He had an EEG that was negative for seizures. He was started on Depakote for agitation.      Patient current reports double vision. He is very hard of hearing, has aides, and can only hear if you yell right next to his ear. He denies any focal weakness. He is ambidextrous. He reports some numbness and tingling in his right  "hand ever since he played rope tug of war a month ago. I remarked that he must be very strong, and he replied \"No just stupid,\" with a laugh. He reports one of his daughters is the person to talk to if there is any medical issues. Had discussion with him regarding his code status, which was CPR OK, but intubation not OK over at [facility]. Explained cannot do the first without the second and he reports he just doesn't want to be on a breathing machine for a long time or to be a burden to his children. Nursing report from [facility] indicates he's had some bladder incontinence.      Patient was evaluated by Rehab Medicine physician and Physical Therapy, Occupational Therapy and Speech Therapy and determined to be appropriate for acute inpatient rehab and was transferred to [facility] on 4/6/2021.     Rehab Hospital Course by Problem List:    Small punctate infarctions  Right frontal lobe, right cerebellum, right occipital lobe  Non-focal  Visual complaints  Cognitive impairment (suspect some is baseline)  Continue full rehab program  PT/OT/SLP, 1 hr each discipline, 5 days per week     Aspirin   Statin     Outpatient follow up with stroke bridge clinic, Dr Sales, referrals made     Zio patch cardiac monitor placed by neurology 4/9, follow up with cardiology at 6 weeks    Patient with worsening complaints of double vision on 4/13, for which he was transferred to the acute hospital for further work-up and evaluation     Right carotid stenosis  S/p CEA 4/2  Statin  Aspirin  Outpatient follow up with Dr. Beavers, referrals made     Agitation, resolved  Sundowning, resolved  Depakote started at acute, s/p titration, stop date 4/11  Scheduled Seroquel at night  Scheduled melatonin at night  PRN seroquel, not requiring  Qtc OK     Hypertension, improved  Amlodipine  Lisinopril, increased dose  PRN hydralazine     Anemia, resolved     Hyperlipidemia  Statin     Hypokalemia, resolved     Azotemia, resolved     Bowel " program  Continue bowel medications  Scheduled Sennakot  PRN Miralax, MOM, bisacodyl suppository  Last BM 4/13     Bladder program  BPH  With incontinence  Check PVRs - 106, 86, 20  Not retaining  Bladder scan for no voids  ICP for over 400 cc  Scheduled toileting     DVT prophylaxis  Lovenox    Functional Status at Discharge  Eating:  Independent  Eating Description:     Grooming:  Supervision  Grooming Description:  Supervision for safety, Seated in wheelchair at sink(to wash hands)  Bathing:  Supervision  Bathing Description:  Set-up of equipment, Supervision for safety, Verbal cueing, Set up for shower sleeve, Tub bench, Grab bar, Hand held shower(setup/sba with cues for safety and problem solving)  Upper Body Dressing:  Supervision  Upper Body Dressing Description:  Set-up of equipment  Lower Body Dressing:  Stand by Assist  Lower Body Dressing Description:  Set-up of equipment, Supervision for safety(setup/sba to don shoes and tie laces seated EOB)     Walk:  Contact Guard Assist  Distance Walked:  125(125ft x1 FWW; 125ft x1 grocery cart w/ object finding)  Number of Times Distance Was Traveled:  2  Assistive Device:  Front Wheel Walker(Grocery cart )  Gait Deviation:  Shuffled Gait, Decreased Heel Strike, Decreased Toe Off, Bradykinetic(R lateral whip)  Wheelchair:  Supervised  Distance Propelled:  150   Wheelchair Description:  Adaptive equipment, Supervision for safety  Stairs Contact Guard Assist  Stairs Description Extra time, Limited by fatigue, Safety concerns, Supervision for safety, Verbal cueing     Comprehension:  Moderate Assist  Comprehension Description:  Hearing aids/amplifiers, Verbal cues, Other (comment)(written supplements)  Expression:  Supervision  Expression Description:  Verbal cueing  Social Interaction:  Independent  Social Interaction Description:     Problem Solving:  Moderate Assist  Problem Solving Description:  Bed/chair alarm, Increased time, Seat belt, Supervision, Therapy  schedule, Verbal cueing  Memory:  Moderate Assist  Memory Description:  Bed/chair alarm, Increased time, Seat belt, Supervision, Therapy schedule, Verbal cueing       Sugey PERSAUD M.D., personally performed a complete drug regimen review and no potential clinically significant medication issues were identified.     Discharge Medication:     Medication List      ASK your doctor about these medications      Instructions   acetaminophen 325 MG Tabs  Commonly known as: Tylenol   Take 2 Tablets by mouth every 6 hours as needed (Mild Pain; (Pain scale 1-3); Temp greater than 100.5 F).  Dose: 650 mg     amLODIPine 10 MG Tabs  Commonly known as: NORVASC   Take 1 tablet by mouth every day.  Dose: 10 mg     aspirin 81 MG Chew chewable tablet  Commonly known as: ASA   Chew 1 tablet every day.  Dose: 81 mg     atorvastatin 80 MG tablet  Commonly known as: LIPITOR   Take 1 tablet by mouth every evening.  Dose: 80 mg     bisacodyl 10 MG Supp  Commonly known as: DULCOLAX   Insert 1 Suppository into the rectum 1 time a day as needed (if magnesium hydroxide ineffective after 24 hours).  Dose: 10 mg     divalproex 250 MG Tbec  Commonly known as: DEPAKOTE   Take 1 tablet by mouth every 8 hours.  Dose: 250 mg     enoxaparin 40 MG/0.4ML Soln inj  Commonly known as: LOVENOX   Inject 40 mg under the skin every day at 6 PM.  Dose: 40 mg     haloperidol lactate 5 MG/ML Soln  Commonly known as: HALDOL   Infuse 0.4 mL into a venous catheter every 6 hours as needed.  Dose: 2 mg     labetalol 5 MG/ML Soln  Commonly known as: NORMODYNE/TRANDATE   Infuse 2 mL into a venous catheter every four hours as needed (see admin instructions for parameters).  Dose: 10 mg     lisinopril 20 MG Tabs  Commonly known as: PRINIVIL   Take 1 tablet by mouth every day.  Dose: 20 mg     magnesium hydroxide 400 MG/5ML Susp  Commonly known as: MILK OF MAGNESIA   Take 30 mL by mouth 1 time a day as needed (if polyethylene glycol ineffective after 24  hours).  Dose: 30 mL     polyethylene glycol/lytes 17 g Pack  Commonly known as: MIRALAX   Take 1 Packet by mouth 1 time a day as needed (if sennosides and docusate ineffective after 24 hours).  Dose: 17 g     senna-docusate 8.6-50 MG Tabs  Commonly known as: PERICOLACE or SENOKOT S   Take 2 Tablets by mouth 2 times a day.  Dose: 2 tablet            Disposition:  Patient to discharge to the acute hospital for further evaluation of his strokelike symptoms.    Condition on Discharge:  Guarded.    More than 35 minutes was spent on discharging this patient, including face-to-face time, prescription management, and the dictation of this note.    Sugey Barkley M.D.    Date of Service: 4/13/2021

## 2021-04-13 NOTE — THERAPY
Missed Therapy     Patient Name: Cooper Harvey  Age:  86 y.o., Sex:  male  Medical Record #: [id number]  Today's Date: 4/13/2021    Discussed missed therapy with physician, .       04/13/21 1401   Therapy Missed   Missed Therapy (Minutes) 60   Reason For Missed Therapy Medical - Patient on Hold from Therapy;Medical - Other (Please Comment)  (transfered to ER for change in condition/ visual function.)

## 2021-04-13 NOTE — CARE PLAN
Problem: Safety  Goal: Will remain free from injury  Note:   Educated Patient to uses call light appropriately this shift, and to wait for assistance when needed and to not attempt self transfer.  Able to verbalize needs.          Problem: Bowel/Gastric:  Goal: Normal bowel function is maintained or improved  Note: Pt. Was continent of bowels during this shift. LBM 4/12/21.

## 2021-04-13 NOTE — ED TRIAGE NOTES
Cooper Harvey  Chief Complaint   Patient presents with   • Vision Change     Pt bib ems from [facility] where Pt has been for last month after suffering a stroke. Pt was brought in to ER for vision changes that Pt describes as flashes of light. Stroke code called. Pt immediately to CT.          /60   Pulse 70   Resp 17   SpO2 93%

## 2021-04-13 NOTE — ED NOTES
Assisted patient with urinal. Placed chux under patient and placed patient into gown. Wife continues to be at bedside

## 2021-04-13 NOTE — PROGRESS NOTES
Patient c/o worsening blurry vision. Dr Barkley assessed patient. Order to send patient to ED for evaluation . Wife is by bedside and updated. MELITA signed. Zio patch monitor sent with him. Left at about 1348 via [email or web address] by REILLY.

## 2021-04-14 ENCOUNTER — APPOINTMENT (OUTPATIENT)
Dept: RADIOLOGY | Facility: MEDICAL CENTER | Age: 86
End: 2021-04-14
Attending: PSYCHIATRY & NEUROLOGY
Payer: MEDICARE

## 2021-04-14 DIAGNOSIS — H53.2 TRANSIENT DIPLOPIA: ICD-10-CM

## 2021-04-14 LAB
CHOLEST SERPL-MCNC: 109 MG/DL (ref 100–199)
EST. AVERAGE GLUCOSE BLD GHB EST-MCNC: 105 MG/DL
HBA1C MFR BLD: 5.3 % (ref 4–5.6)
HDLC SERPL-MCNC: 28 MG/DL
LDLC SERPL CALC-MCNC: 64 MG/DL
SARS-COV-2 RNA RESP QL NAA+PROBE: NOTDETECTED
SPECIMEN SOURCE: NORMAL
TRIGL SERPL-MCNC: 84 MG/DL (ref 0–149)
TROPONIN T SERPL-MCNC: 22 NG/L (ref 6–19)

## 2021-04-14 PROCEDURE — A9270 NON-COVERED ITEM OR SERVICE: HCPCS | Performed by: PSYCHIATRY & NEUROLOGY

## 2021-04-14 PROCEDURE — A9270 NON-COVERED ITEM OR SERVICE: HCPCS | Performed by: STUDENT IN AN ORGANIZED HEALTH CARE EDUCATION/TRAINING PROGRAM

## 2021-04-14 PROCEDURE — 700102 HCHG RX REV CODE 250 W/ 637 OVERRIDE(OP): Performed by: STUDENT IN AN ORGANIZED HEALTH CARE EDUCATION/TRAINING PROGRAM

## 2021-04-14 PROCEDURE — 97166 OT EVAL MOD COMPLEX 45 MIN: CPT

## 2021-04-14 PROCEDURE — 80061 LIPID PANEL: CPT

## 2021-04-14 PROCEDURE — 70551 MRI BRAIN STEM W/O DYE: CPT | Mod: MG

## 2021-04-14 PROCEDURE — 700102 HCHG RX REV CODE 250 W/ 637 OVERRIDE(OP): Performed by: PSYCHIATRY & NEUROLOGY

## 2021-04-14 PROCEDURE — 99214 OFFICE O/P EST MOD 30 MIN: CPT | Performed by: NURSE PRACTITIONER

## 2021-04-14 PROCEDURE — 99225 PR SUBSEQUENT OBSERVATION CARE,LEVEL II: CPT | Performed by: HOSPITALIST

## 2021-04-14 PROCEDURE — 84484 ASSAY OF TROPONIN QUANT: CPT

## 2021-04-14 PROCEDURE — 770024 HCHG ROOM/CARE - REHAB LOA (180)

## 2021-04-14 PROCEDURE — G0378 HOSPITAL OBSERVATION PER HR: HCPCS

## 2021-04-14 PROCEDURE — 83036 HEMOGLOBIN GLYCOSYLATED A1C: CPT

## 2021-04-14 PROCEDURE — 97161 PT EVAL LOW COMPLEX 20 MIN: CPT

## 2021-04-14 RX ADMIN — DOCUSATE SODIUM 50 MG AND SENNOSIDES 8.6 MG 2 TABLET: 8.6; 5 TABLET, FILM COATED ORAL at 18:52

## 2021-04-14 RX ADMIN — QUETIAPINE FUMARATE 25 MG: 25 TABLET ORAL at 21:10

## 2021-04-14 RX ADMIN — ATORVASTATIN CALCIUM 40 MG: 40 TABLET, FILM COATED ORAL at 18:52

## 2021-04-14 ASSESSMENT — ENCOUNTER SYMPTOMS
DIZZINESS: 0
FEVER: 0
NAUSEA: 0
PALPITATIONS: 0
BLURRED VISION: 0
TINGLING: 0
TREMORS: 0
WEAKNESS: 0
CONSTIPATION: 0
HEADACHES: 0
SHORTNESS OF BREATH: 0
VOMITING: 0
DOUBLE VISION: 0
SORE THROAT: 0
MYALGIAS: 0
ABDOMINAL PAIN: 0
FOCAL WEAKNESS: 0
COUGH: 0
DIARRHEA: 0
CHILLS: 0

## 2021-04-14 ASSESSMENT — COGNITIVE AND FUNCTIONAL STATUS - GENERAL
TOILETING: A LITTLE
SUGGESTED CMS G CODE MODIFIER MOBILITY: CK
DRESSING REGULAR UPPER BODY CLOTHING: A LITTLE
DRESSING REGULAR LOWER BODY CLOTHING: A LITTLE
TURNING FROM BACK TO SIDE WHILE IN FLAT BAD: A LITTLE
CLIMB 3 TO 5 STEPS WITH RAILING: A LITTLE
HELP NEEDED FOR BATHING: A LITTLE
SUGGESTED CMS G CODE MODIFIER DAILY ACTIVITY: CK
WALKING IN HOSPITAL ROOM: A LITTLE
DAILY ACTIVITIY SCORE: 18
MOVING FROM LYING ON BACK TO SITTING ON SIDE OF FLAT BED: UNABLE
MOVING TO AND FROM BED TO CHAIR: A LITTLE
EATING MEALS: A LITTLE
STANDING UP FROM CHAIR USING ARMS: A LITTLE
PERSONAL GROOMING: A LITTLE
MOBILITY SCORE: 16

## 2021-04-14 ASSESSMENT — PATIENT HEALTH QUESTIONNAIRE - PHQ9
1. LITTLE INTEREST OR PLEASURE IN DOING THINGS: NOT AT ALL
SUM OF ALL RESPONSES TO PHQ9 QUESTIONS 1 AND 2: 0

## 2021-04-14 ASSESSMENT — ACTIVITIES OF DAILY LIVING (ADL): TOILETING: INDEPENDENT

## 2021-04-14 ASSESSMENT — GAIT ASSESSMENTS
DEVIATION: DECREASED BASE OF SUPPORT;BRADYKINETIC;DECREASED HEEL STRIKE;DECREASED TOE OFF
GAIT LEVEL OF ASSIST: MINIMAL ASSIST
DISTANCE (FEET): 100
ASSISTIVE DEVICE: HAND HELD ASSIST

## 2021-04-14 NOTE — ASSESSMENT & PLAN NOTE
Cont Seroquel 25mg bedtime  Minimize risk of delirium such as avoiding day time napping and promote night time sleep, monitor for constipation, remove lines/tubing that is not needed, avoid early lab draws and vital checks, limit polypharmacy as able, and keep close to the window

## 2021-04-14 NOTE — H&P
"Hospital Medicine History & Physical Note    Date of Service  4/13/2021    Primary Care Physician  Wali Clay M.D.    Consultants  neurology    Code Status  Full Code    Chief Complaint  Chief Complaint   Patient presents with   • Vision Change     Pt bib ems from [facility] where Pt has been for last month after suffering a stroke. Pt was brought in to ER for vision changes that Pt describes as flashes of light. Stroke code called. Pt immediately to CT.        History of Presenting Illness  86 y.o. male with past medical history of recent CVA of right cerebellar hemisphere, right occipital cortex and right frontal cortex, s/p r cartoid endarterectomy 4/2/2021, HTN, BPH, who presented 4/13/2021 from [facility] for vision changes. Patient was noted at 1300 that he held his hands over his eyes and complained \"seeing double\" and headache while he was doing physical therapy at the rehab and she was transferred to here to rule out stroke.   Patient was recently admitted 3/30-4/6 for transient vision changes. MRI of the brain showed punctate infarctions in the right cerebellum, right occipital and right frontal lobes. Patient is s/p right carotid endarterectomy with Dr. Beavers on 4/2. EEG that was negative for seizures. He was managed on ASA in the interim and discharged to [facility],   Upon arrival, neurology Dr. Estrada evaluated the patient and CT head, CTA neck and head stable, no acute hemorrhage. Not a candidate for tPA gien history of recent ischemic stroke within the past 3 months.  At the time of my evaluation, patient denies headache, double vision, chest pain, shortness of breath, nausea, vomiting, abdominal pain.  Patient will be admitted as observational status and MRI brain is ordered.     Review of Systems  Review of Systems   Eyes: Positive for double vision.   Neurological: Positive for headaches.   All other systems reviewed and are negative.      Past Medical History   has a past " medical history of [place] (hard of hearing) (04/02/2021), Hydrocele, unspecified, Hypertrophy of prostate without urinary obstruction and other lower urinary tract symptoms (LUTS), and Mixed hyperlipidemia.    Surgical History   has a past surgical history that includes hemorrhoidectomy (2004); plastic surgery (February 2009); eye surgery (6/05 ; 7/05); and pr thromboendartectmy neck,neck incis (Right, 4/2/2021).     Family History  family history includes Cancer in his mother.     Social History   reports that he quit smoking about 31 years ago. He has a 20.00 pack-year smoking history. He has never used smokeless tobacco. He reports current alcohol use. He reports that he does not use drugs.    Allergies  Allergies   Allergen Reactions   • Vicodin [Hydrocodone-Acetaminophen] Rash     rash       Medications  Prior to Admission Medications   Prescriptions Last Dose Informant Patient Reported? Taking?   QUEtiapine (SEROQUEL) 25 MG Tab 4/12/2021 at 1936 MAR from Other Facility Yes Yes   Sig: Take 25 mg by mouth at bedtime.   acetaminophen (TYLENOL) 325 MG Tab NOT TAKING at NOT TAKING MAR from Other Facility No No   Sig: Take 2 Tablets by mouth every 6 hours as needed (Mild Pain; (Pain scale 1-3); Temp greater than 100.5 F).   Patient not taking: Reported on 4/13/2021   amLODIPine (NORVASC) 10 MG Tab 4/13/2021 at 0511 MAR from Other Facility No No   Sig: Take 1 tablet by mouth every day.   aspirin (ASA) 81 MG Chew Tab chewable tablet 4/13/2021 at 0818 MAR from Other Facility No No   Sig: Chew 1 tablet every day.   atorvastatin (LIPITOR) 80 MG tablet 4/12/2021 at 1936 MAR from Other Facility No No   Sig: Take 1 tablet by mouth every evening.   bisacodyl (DULCOLAX) 10 MG Suppos NOT TAKING at NOT TAKING MAR from Other Facility No No   Sig: Insert 1 Suppository into the rectum 1 time a day as needed (if magnesium hydroxide ineffective after 24 hours).   Patient not taking: Reported on 4/13/2021   divalproex (DEPAKOTE) 250 MG  Tablet Delayed Response NOT TAKING at NOT TAKING MAR from Other Facility No No   Sig: Take 1 tablet by mouth every 8 hours.   Patient not taking: Reported on 4/13/2021   enoxaparin (LOVENOX) 40 MG/0.4ML Solution inj 4/12/2021 at 1749 MAR from Other Facility No No   Sig: Inject 40 mg under the skin every day at 6 PM.   haloperidol lactate (HALDOL) 5 MG/ML Solution NOT TAKING at NOT TAKING MAR from Other Facility No No   Sig: Infuse 0.4 mL into a venous catheter every 6 hours as needed.   Patient not taking: Reported on 4/13/2021   labetalol (NORMODYNE/TRANDATE) 5 MG/ML Solution NOT TAKING at NOT TAKING MAR from Other Facility No No   Sig: Infuse 2 mL into a venous catheter every four hours as needed (see admin instructions for parameters).   Patient not taking: Reported on 4/13/2021   lisinopril (PRINIVIL) 20 MG Tab NOT TAKING at NOT TAKING MAR from Other Facility No No   Sig: Take 1 tablet by mouth every day.   Patient not taking: Reported on 4/13/2021   lisinopril (PRINIVIL) 30 MG tablet 4/13/2021 at 0511 MAR from Other Facility Yes Yes   Sig: Take 30 mg by mouth every day.   magnesium hydroxide (MILK OF MAGNESIA) 400 MG/5ML Suspension NOT TAKING at NOT TAKING MAR from Other Facility No No   Sig: Take 30 mL by mouth 1 time a day as needed (if polyethylene glycol ineffective after 24 hours).   Patient not taking: Reported on 4/13/2021   melatonin 3 MG Tab 4/12/2021 at 1936 MAR from Other Facility Yes Yes   Sig: Take 3 mg by mouth at bedtime.   polyethylene glycol/lytes (MIRALAX) 17 g Pack NOT TAKING at NOT TAKING MAR from Other Facility No No   Sig: Take 1 Packet by mouth 1 time a day as needed (if sennosides and docusate ineffective after 24 hours).   Patient not taking: Reported on 4/13/2021   senna-docusate (PERICOLACE OR SENOKOT S) 8.6-50 MG Tab 4/13/2021 at 0818 MAR from Other Facility No No   Sig: Take 2 Tablets by mouth 2 times a day.      Facility-Administered Medications: None       Physical Exam  Temp:   [36.7 °C (98.1 °F)] 36.7 °C (98.1 °F)  Pulse:  [58-70] 60  Resp:  [13-20] 13  BP: (127-148)/(60-67) 147/66  SpO2:  [93 %-96 %] 96 %    Physical Exam  Vitals and nursing note reviewed.   Constitutional:       General: He is not in acute distress.     Appearance: Normal appearance.   HENT:      Head: Normocephalic and atraumatic.      Mouth/Throat:      Mouth: Mucous membranes are dry.      Pharynx: Oropharynx is clear.   Eyes:      Pupils: Pupils are equal, round, and reactive to light.   Cardiovascular:      Rate and Rhythm: Normal rate and regular rhythm.      Comments: ziopatch noted on the L upper chest  Pulmonary:      Effort: Pulmonary effort is normal.      Breath sounds: Normal breath sounds.   Abdominal:      General: Abdomen is flat. Bowel sounds are normal.      Palpations: Abdomen is soft.   Musculoskeletal:         General: No swelling or tenderness. Normal range of motion.   Skin:     General: Skin is warm and dry.   Neurological:      General: No focal deficit present.      Mental Status: He is alert and oriented to person, place, and time.      Cranial Nerves: No cranial nerve deficit.      Sensory: No sensory deficit.      Motor: No weakness.      Comments: Hard hearing, follows commands   Psychiatric:         Mood and Affect: Mood normal.         Behavior: Behavior normal.         Laboratory:  Recent Labs     04/13/21  1443   WBC 6.0   RBC 4.37*   HEMOGLOBIN 13.6*   HEMATOCRIT 42.1   MCV 96.3   MCH 31.1   MCHC 32.3*   RDW 44.6   PLATELETCT 282   MPV 11.2     Recent Labs     04/13/21  0609 04/13/21  1443   SODIUM 139 136   POTASSIUM 4.6 4.7   CHLORIDE 104 103   CO2 27 25   GLUCOSE 87 103*   BUN 24* 25*   CREATININE 0.97 1.16   CALCIUM 8.7 8.5     Recent Labs     04/13/21  0609 04/13/21  1443   ALTSGPT  --  25   ASTSGOT  --  19   ALKPHOSPHAT  --  86   TBILIRUBIN  --  0.2   GLUCOSE 87 103*     Recent Labs     04/13/21  1443   APTT 29.9   INR 1.09     No results for input(s): NTPROBNP in the last 72  hours.      Recent Labs     04/13/21  1443   TROPONINT 21*       Imaging:  DX-CHEST-PORTABLE (1 VIEW)   Final Result      No acute cardiac or pulmonary abnormalities are identified.      CT-CTA NECK WITH & W/O-POST PROCESSING   Final Result      Interval right sided endarterectomy without evidence of occlusion or stenosis.      Calcified and noncalcified plaque in the left carotid bulb and proximal left internal carotid artery with less than 50% stenosis.      CT-CEREBRAL PERFUSION ANALYSIS   Final Result      1.  Cerebral blood flow less than 30% likely representing completed infarct = 0 mL.      2.  T Max more than 6 seconds likely representing combination of completed infarct and ischemia = 0 mL.      3.  Mismatched volume likely representing ischemic brain/penumbra = None      4.  Please note that the cerebral perfusion was performed on the limited brain tissue around the basal ganglia region. Infarct/ischemia outside the CT perfusion sections can be missed in this study.      CT-CTA HEAD WITH & W/O-POST PROCESS   Final Result      No evidence of large vessel occlusion.      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      CT-HEAD W/O   Final Result      No acute intracranial abnormality      MR-BRAIN-W/O    (Results Pending)         Assessment/Plan:  I anticipate this patient is appropriate for observation status at this time.    * Diplopia  Assessment & Plan  Recurrent diplopia with recent hx of ischemic CVA on 3/31. MRI brain on 3/31 showed punctate infarctions in the right cerebellum, right occipital and right frontal lobes. Patient is s/p right carotid endarterectomy with Dr. Beavers on 4/2.  CT head, CTA head/neck, CT perfusion: no acute abnormalities  Evaluated by neurology: not a candidate for tPA   Neurology checks and vital signs per protocol  Permissive HTN for 24-48 hours from onset of symptoms followed by goal BP <140 systolic. Long term BP goal (s/p 1-2  weeks from onset) is normotension  Obtain normoglycemia and avoid hypo- or hyper -natremia; aim for normothermia  Secondary stroke prevention therapy with ASA and high intensity statin  Cardiology interrogation of the Zio patch gateway device  Serum studies for stroke risk factors: lipid panel & hemoglobin A1C  MRI brain  PT/OT/SLP  Neurology following, rec appreciated    History of CVA (cerebrovascular accident)  Assessment & Plan  Recent hx of ischemic CVA 3/31, s/p right carotid endarterectomy with Dr. Beavers on 4/2. EEG that was negative for seizures. He was managed on ASA in the interim and discharged to [facility] on 4/6  Cont ASA and statin    Carotid stenosis, bilateral- (present on admission)  Assessment & Plan  s/p right carotid endarterectomy with Dr. Beavers on 4/2.    Aneurysm of middle cerebral artery  Assessment & Plan  3mm aneurysm seen at the bifurcation of the R middle cerebral artery on CTA head, stable    Sundowning- (present on admission)  Assessment & Plan  Cont Seroquel 25mg bedtime  Minimize risk of delirium such as avoiding day time napping and promote night time sleep, monitor for constipation, remove lines/tubing that is not needed, avoid early lab draws and vital checks, limit polypharmacy as able, and keep close to the window

## 2021-04-14 NOTE — ASSESSMENT & PLAN NOTE
Recurrent diplopia with recent hx of ischemic CVA on 3/31. MRI brain on 3/31 showed punctate infarctions in the right cerebellum, right occipital and right frontal lobes. Patient is s/p right carotid endarterectomy with Dr. Beavers on 4/2.  CT head, CTA head/neck, CT perfusion: no acute abnormalities  Evaluated by neurology: not a candidate for tPA   Neurology checks and vital signs per protocol  Permissive HTN for 24-48 hours from onset of symptoms followed by goal BP <140 systolic. Long term BP goal (s/p 1-2 weeks from onset) is normotension  Obtain normoglycemia and avoid hypo- or hyper -natremia; aim for normothermia  Secondary stroke prevention therapy with ASA and high intensity statin  Cardiology interrogation of the Zio patch gateway device  Serum studies for stroke risk factors: lipid panel & hemoglobin A1C  MRI brain  PT/OT/SLP  Neurology following, rec appreciated  4/14: Diplopia resolved.  Pending MRI.  Also pending interrogation of ZIO Patch.

## 2021-04-14 NOTE — THERAPY
Physical Therapy   Initial Evaluation     Patient Name: Cooper Harvey  Age:  86 y.o., Sex:  male  Medical Record #: [id number]  Today's Date: 4/14/2021     Precautions: Fall Risk    Assessment  Patient is an 86 y.o. male transferred from acute rehab with c/o double vision. Pt recently admitted with R cerebellar, occipital, and frontal lobe CVA. Pt seen for PT evaluation at this time. Pt with decr problem solving and intermittent confusion. Pt required min A for STS and ambulates with HHA and min A for safety. Pt demos scissoring gait and short step lengths. Pt would benefit from return to acute rehab for continued intensive therapy prior to return home to maximize safety and functional independence. Will follow.     Plan  Recommend Physical Therapy 4 times per week until therapy goals are met for the following treatments:  Bed Mobility, Gait Training, Neuro Re-Education / Balance, Self Care/Home Evaluation, Stair Training, Therapeutic Activities and Therapeutic Exercises  DC Equipment Recommendations: Unable to determine at this time  Discharge Recommendations: Recommend post-acute placement for additional physical therapy services prior to discharge home (return to acute rehab)       04/14/21 0834   Prior Living Situation   Prior Services None   Housing / Facility 1 Story House   Steps Into Home 5   Steps In Home 0   Equipment Owned None   Lives with -  Spouse   Comments prior to last admit 3/30/21, pt was independent. was transferred to [facility] from [facility] (rehab for CVA)   Prior Level of Functional Mobility   Bed Mobility Independent   Transfer Status Independent   Ambulation Independent   Distance Ambulation (Feet) community distances   Assistive Devices Used None   Stairs Independent   Comments prior to last admit, has been at rehab   Cognition    Speech/ Communication Hard of Hearing   Level of Consciousness Alert (intermittent confusion)   Sequencing Impaired   Comments decr problem solving, needs  repeated cues. knows it is April but thinks his stroke was a few months ago.    Strength Lower Body   Comments grossly 5/5   Vision   Vision Comments admitted with double vision, no overt c/o vision changes at this time   Balance Assessment   Sitting Balance (Static) Fair   Sitting Balance (Dynamic) Fair   Standing Balance (Static) Fair -   Standing Balance (Dynamic) Poor +   Weight Shift Sitting Fair   Weight Shift Standing Fair   Comments standing with HHA   Gait Analysis   Gait Level Of Assist Minimal Assist   Assistive Device Hand Held Assist   Distance (Feet) 100   Deviation Decreased Base Of Support;Bradykinetic;Decreased Heel Strike;Decreased Toe Off (scissoring gait)   Weight Bearing Status no restrictions   Comments no overt LOB   Bed Mobility    Supine to Sit Supervised   Sit to Supine Supervised   Scooting Supervised   Functional Mobility   Sit to Stand Minimal Assist   Short Term Goals    Short Term Goal # 1 pt will perform all functional xfrs with SPV in 6 visits for improved independence   Short Term Goal # 2 pt will ambulate 500ft without AD with SPV in 6 visits for return to PLOF   Short Term Goal # 3 pt will negotiate 5 steps with SPV in 6 visits to access home

## 2021-04-14 NOTE — ASSESSMENT & PLAN NOTE
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 Beavers on 4/2. EEG that was negative for seizures. He was managed on ASA in the interim and discharged to [facility] on 4/6  Cont ASA and statin

## 2021-04-14 NOTE — CARE PLAN
Pt aox3, disoriented to time. VSS. Denies CP. Denies SOB. States he still is experiencing double vision. Pt did exhibit some increased confusion during the night, pt has hx of sundowning. Hard of hearing. Pt strict NPO, pending SLP eval. PT/OT on board. No events during shift. Now needs or concerns.       Problem: Communication  Goal: The ability to communicate needs accurately and effectively will improve  Outcome: PROGRESSING AS EXPECTED     Problem: Safety  Goal: Will remain free from injury  Outcome: PROGRESSING AS EXPECTED  Goal: Will remain free from falls  Outcome: PROGRESSING AS EXPECTED     Problem: Pain Management  Goal: Pain level will decrease to patient's comfort goal  Outcome: PROGRESSING AS EXPECTED     Problem: Infection  Goal: Will remain free from infection  Outcome: PROGRESSING AS EXPECTED     Problem: Venous Thromboembolism (VTW)/Deep Vein Thrombosis (DVT) Prevention:  Goal: Patient will participate in Venous Thrombosis (VTE)/Deep Vein Thrombosis (DVT)Prevention Measures  Outcome: PROGRESSING AS EXPECTED     Problem: Psychosocial Needs:  Goal: Level of anxiety will decrease  Outcome: PROGRESSING AS EXPECTED     Problem: Fluid Volume:  Goal: Will maintain balanced intake and output  Outcome: PROGRESSING AS EXPECTED     Problem: Respiratory:  Goal: Respiratory status will improve  Outcome: PROGRESSING AS EXPECTED     Problem: Bowel/Gastric:  Goal: Normal bowel function is maintained or improved  Outcome: PROGRESSING AS EXPECTED  Goal: Will not experience complications related to bowel motility  Outcome: PROGRESSING AS EXPECTED     Problem: Urinary Elimination:  Goal: Ability to reestablish a normal urinary elimination pattern will improve  Outcome: PROGRESSING AS EXPECTED     Problem: Skin Integrity  Goal: Risk for impaired skin integrity will decrease  Outcome: PROGRESSING AS EXPECTED     Problem: Mobility  Goal: Risk for activity intolerance will decrease  Outcome: PROGRESSING AS EXPECTED      Problem: Medication  Goal: Compliance with prescribed medication will improve  Outcome: PROGRESSING AS EXPECTED     Problem: Knowledge Deficit  Goal: Knowledge of disease process/condition, treatment plan, diagnostic tests, and medications will improve  Outcome: PROGRESSING AS EXPECTED  Goal: Knowledge of the prescribed therapeutic regimen will improve  Outcome: PROGRESSING AS EXPECTED     Problem: Discharge Barriers/Planning  Goal: Patient's continuum of care needs will be met  Outcome: PROGRESSING AS EXPECTED

## 2021-04-14 NOTE — PROGRESS NOTES
Discussed with cardiology.  Okay to temporarily remove Zio patch prior to imaging and replace afterwards.

## 2021-04-14 NOTE — PROGRESS NOTES
[facility] Medicine Daily Progress Note    Date of Service  4/14/2021    Chief Complaint  86 y.o. male admitted 4/13/2021 with vision changes.    Hospital Course  This is an 86-year-old male with past medical history of recent CVA of right cerebellar hemisphere, right occipital cortex and right frontal cortex, status post right carotid endarterectomy on 4/2/2021, hypertension, BPH who was [facility] for complaint of sudden onset visual changes.  The patient reports seeing double while watching television.  According to the patient and spouse this has been intermittent since his stroke.  On admission patient was evaluated by neurology.  CTA head and neck were negative for acute changes.  His symptoms are completely resolved on presentation.  Pending MRI.    Interval Problem Update  4/14: Patient denies visual change at this time.  He is neurologically intact.  According to spouse at bedside patient does have intermittent episodes of confusion, although this is persistent since stroke.  Pending MRI.  Vital signs stable.    Consultants/Specialty  Neurology    Code Status  Full Code    Disposition  TBD.    Review of Systems  Review of Systems   Constitutional: Negative for chills, fever and malaise/fatigue.   HENT: Negative for congestion and sore throat.    Eyes: Negative for blurred vision and double vision.   Respiratory: Negative for cough and shortness of breath.    Cardiovascular: Negative for chest pain, palpitations and leg swelling.   Gastrointestinal: Negative for abdominal pain, constipation, diarrhea, nausea and vomiting.   Genitourinary: Negative for dysuria.   Musculoskeletal: Negative for joint pain and myalgias.   Skin: Negative for itching and rash.   Neurological: Negative for dizziness, tingling, tremors, focal weakness, weakness and headaches.        Physical Exam  Temp:  [36.6 °C (97.8 °F)-37.1 °C (98.7 °F)] 37.1 °C (98.7 °F)  Pulse:  [58-73] 69  Resp:  [13-20] 18  BP:  (127-164)/(60-72) 164/72  SpO2:  [92 %-98 %] 95 %    Physical Exam  Vitals and nursing note reviewed.   Constitutional:       General: He is not in acute distress.     Appearance: Normal appearance. He is not ill-appearing.   HENT:      Head: Normocephalic and atraumatic.      Nose: Nose normal. No congestion.      Mouth/Throat:      Mouth: Mucous membranes are moist.      Pharynx: Oropharynx is clear. No oropharyngeal exudate.   Eyes:      General:         Right eye: No discharge.         Left eye: No discharge.      Conjunctiva/sclera: Conjunctivae normal.   Cardiovascular:      Rate and Rhythm: Normal rate and regular rhythm.      Pulses: Normal pulses.      Heart sounds: Normal heart sounds.   Pulmonary:      Effort: Pulmonary effort is normal. No respiratory distress.      Breath sounds: Normal breath sounds. No wheezing or rales.   Abdominal:      General: Bowel sounds are normal. There is no distension.      Palpations: Abdomen is soft.      Tenderness: There is no abdominal tenderness.   Musculoskeletal:         General: Normal range of motion.      Cervical back: Normal range of motion and neck supple. No rigidity. No muscular tenderness.      Right lower leg: No edema.      Left lower leg: No edema.   Skin:     General: Skin is warm and dry.      Coloration: Skin is not jaundiced or pale.   Neurological:      General: No focal deficit present.      Mental Status: He is alert and oriented to person, place, and time. Mental status is at baseline.      Cranial Nerves: No cranial nerve deficit.      Comments: Intermittent confusion.   Psychiatric:         Mood and Affect: Mood normal.         Fluids    Intake/Output Summary (Last 24 hours) at 4/14/2021 1455  Last data filed at 4/14/2021 1232  Gross per 24 hour   Intake 250 ml   Output 550 ml   Net -300 ml       Laboratory  Recent Labs     04/13/21  1443   WBC 6.0   RBC 4.37*   HEMOGLOBIN 13.6*   HEMATOCRIT 42.1   MCV 96.3   MCH 31.1   MCHC 32.3*   RDW 44.6    PLATELETCT 282   MPV 11.2     Recent Labs     04/13/21  0609 04/13/21  1443   SODIUM 139 136   POTASSIUM 4.6 4.7   CHLORIDE 104 103   CO2 27 25   GLUCOSE 87 103*   BUN 24* 25*   CREATININE 0.97 1.16   CALCIUM 8.7 8.5     Recent Labs     04/13/21  1443   APTT 29.9   INR 1.09         Recent Labs     04/14/21  0255   TRIGLYCERIDE 84   HDL 28*   LDL 64       Imaging  DX-CHEST-PORTABLE (1 VIEW)   Final Result      No acute cardiac or pulmonary abnormalities are identified.      CT-CTA NECK WITH & W/O-POST PROCESSING   Final Result      Interval right sided endarterectomy without evidence of occlusion or stenosis.      Calcified and noncalcified plaque in the left carotid bulb and proximal left internal carotid artery with less than 50% stenosis.      CT-CEREBRAL PERFUSION ANALYSIS   Final Result      1.  Cerebral blood flow less than 30% likely representing completed infarct = 0 mL.      2.  T Max more than 6 seconds likely representing combination of completed infarct and ischemia = 0 mL.      3.  Mismatched volume likely representing ischemic brain/penumbra = None      4.  Please note that the cerebral perfusion was performed on the limited brain tissue around the basal ganglia region. Infarct/ischemia outside the CT perfusion sections can be missed in this study.      CT-CTA HEAD WITH & W/O-POST PROCESS   Final Result      No evidence of large vessel occlusion.      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      CT-HEAD W/O   Final Result      No acute intracranial abnormality      MR-BRAIN-W/O    (Results Pending)        Assessment/Plan  * Diplopia  Assessment & Plan  Recurrent diplopia with recent hx of ischemic CVA on 3/31. MRI brain on 3/31 showed punctate infarctions in the right cerebellum, right occipital and right frontal lobes. Patient is s/p right carotid endarterectomy with Dr. Beavers on 4/2.  CT head, CTA head/neck, CT perfusion: no acute  abnormalities  Evaluated by neurology: not a candidate for tPA   Neurology checks and vital signs per protocol  Permissive HTN for 24-48 hours from onset of symptoms followed by goal BP <140 systolic. Long term BP goal (s/p 1-2 weeks from onset) is normotension  Obtain normoglycemia and avoid hypo- or hyper -natremia; aim for normothermia  Secondary stroke prevention therapy with ASA and high intensity statin  Cardiology interrogation of the Zio patch gateway device  Serum studies for stroke risk factors: lipid panel & hemoglobin A1C  MRI brain  PT/OT/SLP  Neurology following, rec appreciated  4/14: Diplopia resolved.  Pending MRI.  Also pending interrogation of ZIO Patch.    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 Beavers on 4/2. EEG that was negative for seizures. He was managed on ASA in the interim and discharged to [facility] on 4/6  Cont ASA and statin    Carotid stenosis, bilateral- (present on admission)  Assessment & Plan  s/p right carotid endarterectomy with Dr. Beavers on 4/2.    Aneurysm of middle cerebral artery  Assessment & Plan  3mm aneurysm seen at the bifurcation of the R middle cerebral artery on CTA head, stable    Sundowning- (present on admission)  Assessment & Plan  Cont Seroquel 25mg bedtime  Minimize risk of delirium such as avoiding day time napping and promote night time sleep, monitor for constipation, remove lines/tubing that is not needed, avoid early lab draws and vital checks, limit polypharmacy as able, and keep close to the window         VTE prophylaxis: SCDs.

## 2021-04-14 NOTE — CARE PLAN
Problem: PT-Long Term Goals  Goal: LTG-By discharge, patient will ambulate up/down 4-6 stairs  Description: 1) Individualized goal:  Negotiate 5 steps with 2 handrails and CGA  2) Interventions:  PT Gait Training, PT Therapeutic Exercises, PT Neuro Re-Ed/Balance, PT Aquatic Therapy, PT Therapeutic Activity, and PT Evaluation    Outcome: MET     Problem: PT-Long Term Goals  Goal: LTG-By discharge, patient will ambulate  Description: 1) Individualized goal: AMB 150ft with FWW and supervision  2) Interventions:  PT Gait Training, PT Therapeutic Exercises, PT Neuro Re-Ed/Balance, PT Aquatic Therapy, PT Therapeutic Activity, and PT Evaluation    Outcome: DISCHARGED-GOAL NOT MET  Goal: LTG-By discharge, patient will transfer one surface to another  Description: 1) Individualized goal: SPT with FWW and supervision  2) Interventions:  PT Gait Training, PT Therapeutic Exercises, PT Neuro Re-Ed/Balance, PT Aquatic Therapy, PT Therapeutic Activity, and PT Evaluation    Outcome: DISCHARGED-GOAL NOT MET  Goal: LTG-By discharge, patient will transfer in/out of a car  Description: 1) Individualized goal: Transfer with FWW and SBA  2) Interventions:  PT Gait Training, PT Therapeutic Exercises, PT Neuro Re-Ed/Balance, PT Aquatic Therapy, PT Therapeutic Activity, and PT Evaluation    Outcome: DISCHARGED-GOAL NOT MET

## 2021-04-14 NOTE — PROGRESS NOTES
Neurology Progress Note  Neurohospitalist Service, [facility]    Referring Physician: HERBIE Magana    Reason for referral: Stroke Code - [facility]     HPI: Refer to initial documented Neurology H&P, as detailed in the patient's chart.    Interval History: Had episode of SVT @ 0136; asymptomatic, non sustaining. Continues to report double vision. Per nursing, some increased confusion during the night, with history of sundowning. Upon exam today he has no complaints. He is reporting has visual disturbance has resolved. He denies headache, speech difficulty, focal motor weakness, sensation changes. Upon further history, the patient is providing the same complaint he gave to me on 3/31. He describes frequent episodes of transient visual disturbance over the past month or more. He reports that several times while he has been watching television in the evening, he feels his left eye wandering medially and experiences double vision. He reports these symptoms always improve spontaneously.     Review of systems: In addition to what is detailed in the HPI and/or updated in the interval history, all other systems reviewed and are negative.    Past Medical History:    has a past medical history of [place] (hard of hearing) (04/02/2021), Hydrocele, unspecified, Hypertrophy of prostate without urinary obstruction and other lower urinary tract symptoms (LUTS), and Mixed hyperlipidemia.    FHx:  family history includes Cancer in his mother.    SHx:   reports that he quit smoking about 31 years ago. He has a 20.00 pack-year smoking history. He has never used smokeless tobacco. He reports current alcohol use. He reports that he does not use drugs.    Medications:    Current Facility-Administered Medications:   •  aspirin (ASA) chewable tab 81 mg, 81 mg, Oral, DAILY, Mirza Estrada M.D., Stopped at 04/14/21 0600  •  atorvastatin (LIPITOR) tablet 40 mg, 40 mg, Oral, Q  EVENING, Mirza Estrada M.D., Stopped at 04/13/21 1800  •  QUEtiapine (Seroquel) tablet 25 mg, 25 mg, Oral, QHS, Guerline Walls M.D., Stopped at 04/13/21 2100  •  senna-docusate (PERICOLACE or SENOKOT S) 8.6-50 MG per tablet 2 tablet, 2 tablet, Oral, BID, Stopped at 04/13/21 1815 **AND** polyethylene glycol/lytes (MIRALAX) PACKET 1 Packet, 1 Packet, Oral, QDAY PRN **AND** magnesium hydroxide (MILK OF MAGNESIA) suspension 30 mL, 30 mL, Oral, QDAY PRN **AND** bisacodyl (DULCOLAX) suppository 10 mg, 10 mg, Rectal, QDAY PRN, Guerline Walls M.D.  •  acetaminophen (Tylenol) tablet 650 mg, 650 mg, Oral, Q6HRS PRN, Guerline Walls M.D.  •  Pharmacy consult request - Allow for permissive hypertension: SBP up to 220 mmHg/DBP up to 120 mmHg x 48 hours, , Other, PHARMACY TO DOSE, Guerline Walls M.D.    Facility-Administered Medications Ordered in Other Encounters:   •  [MAR Hold - Suspended Admission] QUEtiapine (Seroquel) tablet 25 mg, 25 mg, Oral, Nightly, Sugey Barkley M.D., 25 mg at 04/12/21 1936  •  [MAR Hold - Suspended Admission] lisinopril (PRINIVIL) tablet 30 mg, 30 mg, Oral, Q DAY, Sugey Barkley M.D., 30 mg at 04/13/21 0511  •  [MAR Hold - Suspended Admission] melatonin tablet 3 mg, 3 mg, Oral, QHS, Sugey Barkley M.D., 3 mg at 04/12/21 1936  •  [MAR Hold - Suspended Admission] ziprasidone (Geodon) injection 20 mg, 20 mg, Intramuscular, Q2HRS PRN, Sugey Barkley M.D.  •  [MAR Hold - Suspended Admission] hydrOXYzine HCl (ATARAX) tablet 50 mg, 50 mg, Oral, Q6HRS PRN, Sugey Barkley M.D.  •  [MAR Hold - Suspended Admission] Respiratory Therapy Consult, , Nebulization, Continuous RT, Sugey Barkley M.D.  •  [MAR Hold - Suspended Admission] Pharmacy Consult Request ...Pain Management Review 1 Each, 1 Each, Other, PHARMACY TO DOSE, Sugey aBrkley M.D.  •  [MAR Hold - Suspended Admission] hydrALAZINE (APRESOLINE) tablet 10 mg, 10 mg, Oral, Q8HRS PRN, Sugey Barkley M.D.  •  [MAR Hold -  Suspended Admission] acetaminophen (Tylenol) tablet 650 mg, 650 mg, Oral, Q4HRS PRN, Sugey Barkley M.D.  •  [MAR Hold - Suspended Admission] lactulose 20 GM/30ML solution 30 mL, 30 mL, Oral, QDAY PRN, Sugey Barkley M.D.  •  [MAR Hold - Suspended Admission] docusate sodium (ENEMEEZ) enema 283 mg, 283 mg, Rectal, QDAY PRN, Sugey Barkley M.D.  •  [MAR Hold - Suspended Admission] fleet enema 133 mL, 1 Each, Rectal, QDAY PRN, Sugey Barkley M.D.  •  [MAR Hold - Suspended Admission] artificial tears ophthalmic solution 1 Drop, 1 Drop, Both Eyes, PRN, Sugey Barkley M.D.  •  [MAR Hold - Suspended Admission] benzocaine-menthol (CEPACOL) lozenge 1 Lozenge, 1 Lozenge, Mouth/Throat, Q2HRS PRN, Sugey Barkley M.D.  •  [MAR Hold - Suspended Admission] mag hydrox-al hydrox-simeth (MAALOX PLUS ES or MYLANTA DS) suspension 20 mL, 20 mL, Oral, Q2HRS PRN, Sugey Barkley M.D.  •  [MAR Hold - Suspended Admission] ondansetron (ZOFRAN ODT) dispertab 4 mg, 4 mg, Oral, 4X/DAY PRN **OR** [MAR Hold - Suspended Admission] ondansetron (ZOFRAN) syringe/vial injection 4 mg, 4 mg, Intramuscular, 4X/DAY PRN, Sugey Barkley M.D.  •  [MAR Hold - Suspended Admission] traZODone (DESYREL) tablet 50 mg, 50 mg, Oral, QHS PRN, Sugey Barkley M.D.  •  [MAR Hold - Suspended Admission] sodium chloride (OCEAN) 0.65 % nasal spray 2 Spray, 2 Spray, Nasal, PRN, Sugey Barkley M.D.  •  [MAR Hold - Suspended Admission] midazolam (VERSED) 5 mg/mL (1 mL vial), 5 mg, Nasal, PRN, Sugey Barkley M.D.  •  [MAR Hold - Suspended Admission] senna-docusate (PERICOLACE or SENOKOT S) 8.6-50 MG per tablet 2 tablet, 2 tablet, Oral, BID, 2 tablet at 04/13/21 0818 **AND** [MAR Hold - Suspended Admission] polyethylene glycol/lytes (MIRALAX) PACKET 1 Packet, 1 Packet, Oral, QDAY PRN **AND** [MAR Hold - Suspended Admission] magnesium hydroxide (MILK OF MAGNESIA) suspension 30 mL, 30 mL, Oral, QDAY PRN **AND** [MAR Hold - Suspended  Admission] bisacodyl (DULCOLAX) suppository 10 mg, 10 mg, Rectal, QDAY PRN, Sugey Barkley M.D.  •  [MAR Hold - Suspended Admission] enoxaparin (LOVENOX) inj 40 mg, 40 mg, Subcutaneous, DAILY AT 1800, Sugey Barkley M.D., 40 mg at 04/12/21 1749  •  [MAR Hold - Suspended Admission] amLODIPine (NORVASC) tablet 10 mg, 10 mg, Oral, Q DAY, Sugey Barkley M.D., 10 mg at 04/13/21 0511  •  [MAR Hold - Suspended Admission] aspirin (ASA) chewable tab 81 mg, 81 mg, Oral, DAILY, Sugey Barkley M.D., 81 mg at 04/13/21 0818  •  [MAR Hold - Suspended Admission] atorvastatin (LIPITOR) tablet 80 mg, 80 mg, Oral, Q EVENING, Sugey Barkley M.D., 80 mg at 04/12/21 1936  •  [MAR Hold - Suspended Admission] QUEtiapine (Seroquel) tablet 25 mg, 25 mg, Oral, TID PRN, Sugey Barkley M.D.    Physical Examination:     Vitals:    04/13/21 1928 04/13/21 2304 04/14/21 0400 04/14/21 0739   BP: 141/68 145/69 160/69 149/67   Pulse: 60 61 73 70   Resp: 16 18 18 20   Temp: 37 °C (98.6 °F) 36.9 °C (98.4 °F) 36.6 °C (97.9 °F) 37 °C (98.6 °F)   TempSrc: Temporal Temporal Temporal Temporal   SpO2: 94% 95% 98% 96%   Weight:           General: Patient is awake and in no acute distress, hard of hearing with aides in place.  Eyes: examination of optic disks not indicated at this time  CV: NSR with episode of SVT overnight.    NEUROLOGICAL EXAM:     Mental status: Awake, alert and fully oriented, follows commands  Speech and language: speech is not dysarthric. The patient is able to name and repeat.  Cranial nerve exam: Pupils are equal, round and reactive to light bilaterally. Visual fields are full. Extraocular muscles are intact, no nystagmus. Sensation in the face is intact to light touch. Face is symmetric. Hearing to finger rub equal. Palate elevates symmetrically. Shoulder shrug is full. Tongue is midline.  Motor exam: Strength is 4+/5 in all extremities both distally and proximally. Tone is normal. No abnormal movements were  seen on exam.  Sensory exam: No sensory deficits identified   Deep tendon reflexes: 2+ and symmetric. Toes down-going bilaterally.  Coordination: no ataxia with finger emily  Gait: deferred given patient preference    Objective Data:    Labs:  Lab Results   Component Value Date/Time    PROTHROMBTM 14.4 04/13/2021 02:43 PM    INR 1.09 04/13/2021 02:43 PM      Lab Results   Component Value Date/Time    WBC 6.0 04/13/2021 02:43 PM    RBC 4.37 (L) 04/13/2021 02:43 PM    HEMOGLOBIN 13.6 (L) 04/13/2021 02:43 PM    HEMATOCRIT 42.1 04/13/2021 02:43 PM    MCV 96.3 04/13/2021 02:43 PM    MCH 31.1 04/13/2021 02:43 PM    MCHC 32.3 (L) 04/13/2021 02:43 PM    MPV 11.2 04/13/2021 02:43 PM    NEUTSPOLYS 74.20 (H) 04/13/2021 02:43 PM    LYMPHOCYTES 8.60 (L) 04/13/2021 02:43 PM    MONOCYTES 11.80 04/13/2021 02:43 PM    EOSINOPHILS 3.90 04/13/2021 02:43 PM    BASOPHILS 0.80 04/13/2021 02:43 PM      Lab Results   Component Value Date/Time    SODIUM 136 04/13/2021 02:43 PM    POTASSIUM 4.7 04/13/2021 02:43 PM    CHLORIDE 103 04/13/2021 02:43 PM    CO2 25 04/13/2021 02:43 PM    GLUCOSE 103 (H) 04/13/2021 02:43 PM    BUN 25 (H) 04/13/2021 02:43 PM    CREATININE 1.16 04/13/2021 02:43 PM    CREATININE 1.2 10/03/2008 11:30 AM      Lab Results   Component Value Date/Time    CHOLSTRLTOT 109 04/14/2021 02:55 AM    LDL 64 04/14/2021 02:55 AM    HDL 28 (A) 04/14/2021 02:55 AM    TRIGLYCERIDE 84 04/14/2021 02:55 AM       Lab Results   Component Value Date/Time    ALKPHOSPHAT 86 04/13/2021 02:43 PM    ASTSGOT 19 04/13/2021 02:43 PM    ALTSGPT 25 04/13/2021 02:43 PM    TBILIRUBIN 0.2 04/13/2021 02:43 PM        Imaging/Testing:    I interpreted and/or reviewed the patient's neuroimaging    DX-CHEST-PORTABLE (1 VIEW)   Final Result      No acute cardiac or pulmonary abnormalities are identified.      CT-CTA NECK WITH & W/O-POST PROCESSING   Final Result      Interval right sided endarterectomy without evidence of occlusion or stenosis.      Calcified  and noncalcified plaque in the left carotid bulb and proximal left internal carotid artery with less than 50% stenosis.      CT-CEREBRAL PERFUSION ANALYSIS   Final Result      1.  Cerebral blood flow less than 30% likely representing completed infarct = 0 mL.      2.  T Max more than 6 seconds likely representing combination of completed infarct and ischemia = 0 mL.      3.  Mismatched volume likely representing ischemic brain/penumbra = None      4.  Please note that the cerebral perfusion was performed on the limited brain tissue around the basal ganglia region. Infarct/ischemia outside the CT perfusion sections can be missed in this study.      CT-CTA HEAD WITH & W/O-POST PROCESS   Final Result      No evidence of large vessel occlusion.      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      CT-HEAD W/O   Final Result      No acute intracranial abnormality      MR-BRAIN-W/O    (Results Pending)       Assessment and Plan:    Cooper Harvey is a 86 y.o. male who presented to [facility] on 4/13/21 as a transfer from [facility] for visual changes concerning for acute stroke. The patient was recently diagnosed with right posterior circulation infarctions in March 2021. He has an extended cardiac event monitor to assess for arryhmia as contributory to his strokes. He is currently being managed on ASA. His complaint of diplopia is recurrent and intermittent. During previous admission he was worked up for myasthenia gravis as an explanation for his visual disturbance, however AChR and MuSK antibody titers were not elevated. CT head was negative for acute abnormality. CTA reveals calcified plaque in the aortic arch, calcified plaque in the left carotid bulb and proximal left internal carotid artery with less than 50% stenosis. Patient is status post right carotid endarterectomy, and there is no evidence of stenosis. Differential diagnoses include thromboembolic  stroke affecting frontal eye fields and PPRF vs complex migraines. MRI Brain is pending. The patient's daughter, Constantin, reports she took him to see an ophthalmologist within the past 2 months and was informed he did not have evidence of cornea of lens problems. Hemoglobin A1C and LDL are at goal. If MRI is negative for stroke, recommend return to acute rehab for ongoing post stroke care. He may also benefit from an additional outpatient opthalmologic exam to further explore his ongoing, intermittent complaint of diplopia. Additionally, I will refer him to neuroopthalmology outpatient. He remains admitted to the floor in improved condition.    Plan:    - Neurology checks and vital signs per protocol  - Permissive HTN for 24-48 hours from onset of symptoms followed by goal BP <140 systolic. Long term BP goal (s/p 1-2 weeks from onset) is normotension  - obtain normoglycemia and avoid hypo- or hyper -natremia; aim for normothermia  - Continue secondary stroke prevention therapy with ASA and high intensity statin  - Cardiology interrogation of the Zio patch gateway device  - To identify evidence of acute stroke territory, obtain MRI brain   - evaluate and treat with PT/OT/ST    The evaluation of the patient, and recommended management, was discussed with attending neurologist, Dr. Mirza Estrada and hospitalist, Dwayne GRADY.    Corinne E. Canavero, APRN  Neurohospitalist [facility], Acute Care Services

## 2021-04-14 NOTE — PROGRESS NOTES
Pt able to swallow water without difficulty. RN made On-Call Hospitalist, Dr. Pulido, notified due to pt being strict NPO. Per MD, pt is awaiting a swallow eval with speech. No diet ordered.

## 2021-04-14 NOTE — THERAPY
Occupational Therapy   Initial Evaluation     Patient Name: Cooper Harvey  Age:  86 y.o., Sex:  male  Medical Record #: [id number]  Today's Date: 4/14/2021     Precautions  Precautions: Fall Risk  Comments: R CVA early April    Assessment  Patient is 86 y.o. male admitted from [facility] following c/o double vision, pt was undergoing rehab following R CVA in early April this year. Pt currently demonstrates impaired cognition safety insight, impaired balance, impaired activity tolerance, impaired problem solving, generalized strength and endurance deficits. Pt will benefit from acute skilled OT services while in house and post acute placement recommended at this time.   Plan    Recommend Occupational Therapy 4 times per week until therapy goals are met for the following treatments:  Adaptive Equipment, Self Care/Activities of Daily Living, Therapeutic Activities and Therapeutic Exercises.    DC Equipment Recommendations: Unable to determine at this time  Discharge Recommendations: Recommend post-acute placement for additional occupational therapy services prior to discharge home      Objective       04/14/21 0821   Prior Living Situation   Prior Services None   Housing / Facility 1 Story House   Steps Into Home 5   Steps In Home 0   Bathroom Set up Walk In Shower   Equipment Owned None   Lives with - Patient's Self Care Capacity Spouse   Comments Prior to CVA last month pt was I with ADLs/IADLs. Admitted from [facility] following CVA   Prior Level of ADL Function   Self Feeding Independent   Grooming / Hygiene Independent   Bathing Independent   Dressing Independent   Toileting Independent   Comments prior to CVA    Prior Level of IADL Function   Medication Management Independent   Laundry Independent   Kitchen Mobility Independent   Finances Independent   Home Management Independent   Shopping Independent   Prior Level Of Mobility Independent Without Device in Community   Driving / Transportation Driving  Independent   Comments prior to CVA   Cognition    Cognition / Consciousness X   Speech/ Communication Hard of Hearing   Level of Consciousness Alert  (intermittent confusion)   Sequencing Impaired   Comments pt requires intermittent vc's for sequencing and problem solving, decreased insight into deficits, attention and safety awareness deficits noted   ADL Assessment   Eating Supervision   Grooming Supervision;Seated   Bathing   (NT )   Upper Body Dressing Minimal Assist  (for line management)   Lower Body Dressing Minimal Assist   Toileting   (declined need to use BR)   Comments cues for problem solving   Functional Mobility   Sit to Stand Minimal Assist   Bed, Chair, Wheelchair Transfer Minimal Assist   Toilet Transfers Minimal Assist   Transfer Method Stand Step   Mobility within room and short distance in hallway   Comments w/HHA   Short Term Goals   Short Term Goal # 1 Pt will perform LB dressing with supervision   Short Term Goal # 2 Pt will perform functional t/f's with supervision w/ no vc's for sequencing   Short Term Goal # 3 Pt will perform toileting task with supervision   Anticipated Discharge Equipment and Recommendations   DC Equipment Recommendations Unable to determine at this time

## 2021-04-14 NOTE — DISCHARGE PLANNING
Called and spoke with Antoni @ [phone] with [facility] and once patient medically cleared can coordinate transfer back to [facility]. Will call Antoni @ [phone] to arrange transport when medically cleared.

## 2021-04-14 NOTE — PROGRESS NOTES
2 RN Skin Check complete.     Skin intact, with some discoloration on BUE. Pt also has a healing surgical incision from an endarterectomy on 4/2. No signs of pressure ulcer formation. Pt able to turn self.

## 2021-04-14 NOTE — HOSPITAL COURSE
I have indepedently seen and examined Cooper Harvey. Cooper Harvey is a 86 y.o. male admitted ON 4/13/2021 with Double vision [H53.2].   Vitals were reviewed.  All new labs and imaging studies were reviewed. Nursing notes reviewed. Chart and APN's note reviewed. Case was discussed in detail with the nurse practitioner and plan of care formulated with the provider.  I attest to and agree with the mid-level provider's note, including assessment, plan, and physical exam.     This is a 86-year-old-year-old with medical history significant for ischemic CVA status post right carotid endarterectomy on 4/2/2021, hypertension, BPH presented to the ER on 4/13/2021 from [facility] for diplopia presented with headache.  Upon arrival neurology evaluated the patient, CT head CT of the head and neck unremarkable; pending MRI of the brain    Double vision/Diplopia:  -CT, CT of the head and neck unremarkable.  Continue aspirin, statin cardiology following.  Pending MRI  --Reports his diplopia and headache has gotten better at this time.    2.  History of CVA:  -Continue ASA, statin    3.  Carotid stenosis status post right carotid endarterectomy on 4/2    Aneurysm at the bifurcation of right middle cerebral artery:  --Good blood pressure control, need to follow-up with neurology as an outpatient

## 2021-04-14 NOTE — DISCHARGE PLANNING
Care Transition Team Assessment    Patient eating breakfast, Spoke with spouse at bedside and verified all information. LIves with Spouse in single story house in [facility]. PCP Dr. Luu in [facility]. Patient was admitted from Acute Rehab and plan is to return to [facility] when medically cleared. Spoke with Cynthia  Spouse and patient and sposue  Are in agreement with return to Rehab.    Information Source  Orientation : (Spoke with spouse at bedside patient eating breakfast.)  Information Given By: Spouse  Informant's Name: Cynthia     Interdisciplinary Discharge Planning  Primary Care Physician: Dr Luu in [place]  Lives with - Patient's Self Care Capacity: Spouse  Patient or legal guardian wants to designate a caregiver: No  Support Systems: Children, Spouse / Significant Other  Housing / Facility: [address] [facility]  Do You Take your Prescribed Medications Regularly: Yes  Able to Return to Previous ADL's: (Return to acute rehab)  Mobility Issues: Yes  Prior Services: None  Patient Prefers to be Discharged to:: Acute Rehab  Assistance Needed: Yes  Durable Medical Equipment: Other - Specify(Has walker but was someone elses.)    Discharge Preparedness  What are your discharge supports?: Child, Spouse    Finances  Prescription Coverage: Yes    Discharge Risks or Barriers  Patient risk factors: Vulnerable adult    Anticipated Discharge Information  Discharge Address: [facility]  Discharge Contact Phone Number: [phone]

## 2021-04-14 NOTE — PROGRESS NOTES
Pending MRI, Zio patch in place. Need order to remove patch by cards/neuro for MRI, updated Dwayne, NP.

## 2021-04-15 VITALS
RESPIRATION RATE: 16 BRPM | OXYGEN SATURATION: 95 % | DIASTOLIC BLOOD PRESSURE: 67 MMHG | WEIGHT: 167.55 LBS | HEART RATE: 74 BPM | TEMPERATURE: 97.1 F | BODY MASS INDEX: 25.48 KG/M2 | SYSTOLIC BLOOD PRESSURE: 155 MMHG

## 2021-04-15 PROBLEM — H53.2 DIPLOPIA: Status: RESOLVED | Noted: 2021-04-13 | Resolved: 2021-04-15

## 2021-04-15 PROCEDURE — 700102 HCHG RX REV CODE 250 W/ 637 OVERRIDE(OP): Performed by: STUDENT IN AN ORGANIZED HEALTH CARE EDUCATION/TRAINING PROGRAM

## 2021-04-15 PROCEDURE — A9270 NON-COVERED ITEM OR SERVICE: HCPCS | Performed by: PHYSICAL MEDICINE & REHABILITATION

## 2021-04-15 PROCEDURE — 770010 HCHG ROOM/CARE - REHAB SEMI PRIVAT*

## 2021-04-15 PROCEDURE — 99217 PR OBSERVATION CARE DISCHARGE: CPT | Performed by: HOSPITALIST

## 2021-04-15 PROCEDURE — A9270 NON-COVERED ITEM OR SERVICE: HCPCS | Performed by: PSYCHIATRY & NEUROLOGY

## 2021-04-15 PROCEDURE — A9270 NON-COVERED ITEM OR SERVICE: HCPCS | Performed by: STUDENT IN AN ORGANIZED HEALTH CARE EDUCATION/TRAINING PROGRAM

## 2021-04-15 PROCEDURE — G0378 HOSPITAL OBSERVATION PER HR: HCPCS

## 2021-04-15 PROCEDURE — 700111 HCHG RX REV CODE 636 W/ 250 OVERRIDE (IP): Performed by: PHYSICAL MEDICINE & REHABILITATION

## 2021-04-15 PROCEDURE — 99233 SBSQ HOSP IP/OBS HIGH 50: CPT | Performed by: PHYSICAL MEDICINE & REHABILITATION

## 2021-04-15 PROCEDURE — 700102 HCHG RX REV CODE 250 W/ 637 OVERRIDE(OP): Performed by: PHYSICAL MEDICINE & REHABILITATION

## 2021-04-15 PROCEDURE — 700102 HCHG RX REV CODE 250 W/ 637 OVERRIDE(OP): Performed by: PSYCHIATRY & NEUROLOGY

## 2021-04-15 PROCEDURE — U0005 INFEC AGEN DETEC AMPLI PROBE: HCPCS

## 2021-04-15 PROCEDURE — U0003 INFECTIOUS AGENT DETECTION BY NUCLEIC ACID (DNA OR RNA); SEVERE ACUTE RESPIRATORY SYNDROME CORONAVIRUS 2 (SARS-COV-2) (CORONAVIRUS DISEASE [COVID-19]), AMPLIFIED PROBE TECHNIQUE, MAKING USE OF HIGH THROUGHPUT TECHNOLOGIES AS DESCRIBED BY CMS-2020-01-R: HCPCS

## 2021-04-15 RX ORDER — ATORVASTATIN CALCIUM 40 MG/1
40 TABLET, FILM COATED ORAL EVERY EVENING
Status: DISCONTINUED | OUTPATIENT
Start: 2021-04-15 | End: 2021-04-24 | Stop reason: HOSPADM

## 2021-04-15 RX ORDER — LISINOPRIL 20 MG/1
10 TABLET ORAL DAILY
Qty: 30 TABLET | Refills: 0 | Status: SHIPPED | OUTPATIENT
Start: 2021-04-15 | End: 2021-04-23

## 2021-04-15 RX ORDER — LISINOPRIL 5 MG/1
10 TABLET ORAL
Status: DISCONTINUED | OUTPATIENT
Start: 2021-04-16 | End: 2021-04-24 | Stop reason: HOSPADM

## 2021-04-15 RX ADMIN — MELATONIN TAB 3 MG 3 MG: 3 TAB at 20:34

## 2021-04-15 RX ADMIN — QUETIAPINE FUMARATE 25 MG: 25 TABLET ORAL at 20:34

## 2021-04-15 RX ADMIN — ENOXAPARIN SODIUM 40 MG: 40 INJECTION SUBCUTANEOUS at 17:36

## 2021-04-15 RX ADMIN — ATORVASTATIN CALCIUM 40 MG: 40 TABLET, FILM COATED ORAL at 20:34

## 2021-04-15 RX ADMIN — DOCUSATE SODIUM 50 MG AND SENNOSIDES 8.6 MG 2 TABLET: 8.6; 5 TABLET, FILM COATED ORAL at 04:51

## 2021-04-15 RX ADMIN — ASPIRIN 81 MG: 81 TABLET, CHEWABLE ORAL at 04:51

## 2021-04-15 RX ADMIN — SENNOSIDES AND DOCUSATE SODIUM 2 TABLET: 8.6; 5 TABLET ORAL at 20:34

## 2021-04-15 ASSESSMENT — FIBROSIS 4 INDEX: FIB4 SCORE: 1.16

## 2021-04-15 ASSESSMENT — PATIENT HEALTH QUESTIONNAIRE - PHQ9
SUM OF ALL RESPONSES TO PHQ9 QUESTIONS 1 AND 2: 0
2. FEELING DOWN, DEPRESSED, IRRITABLE, OR HOPELESS: NOT AT ALL
1. LITTLE INTEREST OR PLEASURE IN DOING THINGS: NOT AT ALL

## 2021-04-15 ASSESSMENT — PAIN DESCRIPTION - PAIN TYPE: TYPE: ACUTE PAIN

## 2021-04-15 NOTE — THERAPY
Missed Therapy     Patient Name: Cooper Harvey  Age:  86 y.o., Sex:  male  Medical Record #: [id number]  Today's Date: 4/15/2021    Discussed missed therapy with RN       04/15/21 9927   Total Time Spent   Total Time Spent (Mins) 5   Initial Contact Note    Initial Contact Note  Order Received and Verified. Speech Therapy Evaluation NOT Completed Because Patient Does Not Require Acute Speech Therapy at this Time.   Interdisciplinary Plan of Care Collaboration   IDT Collaboration with  Nursing   Collaboration Comments This SLP received orders for clinical swallow evaluation. Per RN yesterday and today, evaluation is likely not warranted, as patient appears to be tolerating regular diet w/out difficulty and passed RN dysphagia screen. SLP will hold evaluation and RN/APN to cancel orders. Thank you.

## 2021-04-15 NOTE — PROGRESS NOTES
"Leonora from MRI states patient still on list for MRI, states they have him at the top of the list and will try their best to get it done tonight. \"Having many STATS all day\".  "

## 2021-04-15 NOTE — PROGRESS NOTES
IV dc'd.  Discharge instructions given to patient; patient verbalizes understanding, all questions answered.  Copy of DC summary provided, signed copy in chart.  2 prescriptions electronically sent to pt's pharmacy/provided to patient, copies in chart.  Pt states personal belongings are in possession.  Pt escorted off unit by Transport without incident.

## 2021-04-15 NOTE — DISCHARGE INSTRUCTIONS
Discharge Instructions    Discharged to other by medical transportation with escort. Discharged via wheelchair, hospital escort: Yes.  Special equipment needed: Not Applicable    Be sure to schedule a follow-up appointment with your primary care doctor or any specialists as instructed.     Discharge Plan:   Diet Plan: Discussed  Activity Level: Discussed  Confirmed Follow up Appointment: Appointment Scheduled  Confirmed Symptoms Management: Discussed  Medication Reconciliation Updated: No (Comments)    I understand that a diet low in cholesterol, fat, and sodium is recommended for good health. Unless I have been given specific instructions below for another diet, I accept this instruction as my diet prescription.   Other diet: heart Healthy    Special Instructions: None    · Is patient discharged on Warfarin / Coumadin?   No     Depression / Suicide Risk    As you are discharged from this [facility] facility, it is important to learn how to keep safe from harming yourself.    Recognize the warning signs:  · Abrupt changes in personality, positive or negative- including increase in energy   · Giving away possessions  · Change in eating patterns- significant weight changes-  positive or negative  · Change in sleeping patterns- unable to sleep or sleeping all the time   · Unwillingness or inability to communicate  · Depression  · Unusual sadness, discouragement and loneliness  · Talk of wanting to die  · Neglect of personal appearance   · Rebelliousness- reckless behavior  · Withdrawal from people/activities they love  · Confusion- inability to concentrate     If you or a loved one observes any of these behaviors or has concerns about self-harm, here's what you can do:  · Talk about it- your feelings and reasons for harming yourself  · Remove any means that you might use to hurt yourself (examples: pills, rope, extension cords, firearm)  · Get professional help from the community (Mental Health, Substance Abuse,  psychological counseling)  · Do not be alone:Call your Safe Contact- someone whom you trust who will be there for you.  · Call your local CRISIS HOTLINE 351-8458 or 657-801-6689  · Call your local Children's Mobile Crisis Response Team [place] (867) 730-3364 or www.[employer]  · Call the toll free National Suicide Prevention Hotlines   · National Suicide Prevention Lifeline 827-770-TRRS (7017)  · National Hope Line Network 800-SUICIDE (429-5241)

## 2021-04-15 NOTE — PROGRESS NOTES
Assessment completed. Pt A&Ox 4 hard of hearing. Respirations are even and unlabored on room air. Pt denies pain at this time. Monitors applied, VS stable, call light and belongings within reach. POC updated (MRI pending). Pt educated on room and call light, pt verbalized understanding. Communication board updated. Needs met.

## 2021-04-15 NOTE — DISCHARGE SUMMARY
Discharge Summary    CHIEF COMPLAINT ON ADMISSION  Chief Complaint   Patient presents with   • Vision Change     Pt bib ems from [facility] where Pt has been for last month after suffering a stroke. Pt was brought in to ER for vision changes that Pt describes as flashes of light. Stroke code called. Pt immediately to CT.        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  The patient reports seeing double while watching television.  According to the patient and spouse sudden onset visual changes have been intermittent since his stroke.  On admission  CTA head and neck were negative for acute changes.  MRI brain was negative for acute changes. His symptoms are completely resolved on presentation.     The patient was evaluated by neurology.  Patient is recommended to continue his medications as currently prescribed.  Referral for neuro-ophthalmology has been placed for further work-up as outpatient.  He may also benefit from outpatient ophthalmology follow-up as well.  The patient does currently have a Zio patch in place, which also could be interrogated as well.  The patient maintained stable heart rate over admission.    At this time the patient appears to be at his mental and functional baseline.  He is safe to return to rehab for ongoing therapy.    Therefore, he is discharged in good and stable condition to an inpatient rehabilitation hospital.    Discharge Date  4/15/2021    FOLLOW UP ITEMS POST DISCHARGE  -Outpatient neuro-ophthalmology and ophthalmology follow-up.  -Recommend interrogation of ZIO Patch.    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    MEDICATIONS ON DISCHARGE     Medication List      CHANGE how you take these medications      Instructions   lisinopril 20 MG Tabs  What changed:   · how much to take  · Another medication with the same name was removed. Continue taking this medication, and follow the directions you see here.  Commonly known as: PRINIVIL   Take 0.5 Tablets by mouth every day for 30 days.  Dose: 10 mg        CONTINUE taking these medications      Instructions   acetaminophen 325 MG Tabs  Commonly known as: Tylenol   Take 2 Tablets by mouth every 6 hours as needed (Mild Pain; (Pain scale 1-3); Temp greater than 100.5 F).  Dose: 650 mg     aspirin 81 MG Chew chewable tablet  Commonly known as: ASA   Chew 1 tablet every day.  Dose: 81 mg     atorvastatin 80 MG tablet  Commonly known as: LIPITOR   Take 1 tablet by mouth every evening.  Dose: 80 mg     bisacodyl 10 MG Supp  Commonly known as: DULCOLAX   Insert 1 Suppository into the rectum 1 time a day as needed (if magnesium hydroxide ineffective after 24 hours).  Dose: 10 mg     magnesium hydroxide 400 MG/5ML Susp  Commonly known as: MILK OF MAGNESIA   Take 30 mL by mouth 1 time a day as needed (if polyethylene glycol ineffective after 24 hours).  Dose: 30 mL     melatonin 3 MG Tabs   Take 3 mg by mouth at bedtime.  Dose: 3 mg     polyethylene glycol/lytes 17 g Pack  Commonly known as: MIRALAX   Take 1 Packet by mouth 1 time a day as needed (if sennosides and docusate ineffective after 24 hours).  Dose: 17 g     QUEtiapine 25 MG Tabs  Commonly known as: Seroquel   Take 25 mg by mouth at bedtime.  Dose: 25 mg     senna-docusate 8.6-50 MG Tabs  Commonly known as: PERICOLACE or SENOKOT S   Take 2  Tablets by mouth 2 times a day.  Dose: 2 tablet        STOP taking these medications    amLODIPine 10 MG Tabs  Commonly known as: NORVASC     divalproex 250 MG Tbec  Commonly known as: DEPAKOTE     enoxaparin 40 MG/0.4ML Soln inj  Commonly known as: LOVENOX     haloperidol lactate 5 MG/ML Soln  Commonly known as: HALDOL     labetalol 5 MG/ML Soln  Commonly known as: NORMODYNE/TRANDATE            Allergies  Allergies   Allergen Reactions   • Vicodin [Hydrocodone-Acetaminophen] Rash     rash       DIET  Orders Placed This Encounter   Procedures   • Diet Order Diet: Cardiac     Standing Status:   Standing     Number of Occurrences:   1     Order Specific Question:   Diet:     Answer:   Cardiac [6]       ACTIVITY  As tolerated.  Weight bearing as tolerated    CONSULTATIONS  Neurology    LABORATORY  Lab Results   Component Value Date    SODIUM 136 04/13/2021    POTASSIUM 4.7 04/13/2021    CHLORIDE 103 04/13/2021    CO2 25 04/13/2021    GLUCOSE 103 (H) 04/13/2021    BUN 25 (H) 04/13/2021    CREATININE 1.16 04/13/2021    CREATININE 1.2 10/03/2008        Lab Results   Component Value Date    WBC 6.0 04/13/2021    HEMOGLOBIN 13.6 (L) 04/13/2021    HEMATOCRIT 42.1 04/13/2021    PLATELETCT 282 04/13/2021

## 2021-04-15 NOTE — DISCHARGE PLANNING
Updated by Dwayne GRADY that patient is medically cleared. Sent message to Venkat BAEZ CM at [facility] that patient is clear to transfer back. Recieved call from Venkat to verify. Received message from Venkat that transport set for 1130. Called and spoke with Cynthia patients spouse and updated her on transfer time and received verbal consent. Thai filled out and given to Chino PATE.

## 2021-04-15 NOTE — PROGRESS NOTES
Assessment completed.  Pt A&Ox4, neuro intact. Pt denies pain at this time.  Sinus rhythm on telemetry monitor.  POC discussed (MRI),  communication board updated. Bed in locked, lowest position. Call light and belongings within reach.  All needs met at this time.

## 2021-04-15 NOTE — PROGRESS NOTES
"Rehab Progress Note     Encounter Date: 4/15/2021    CC: Tired    Interval Events (Subjective)  Vital signs stable.  Patient returned from acute.  Seen and examined in his room.  States that he is doing well just tired.  Wife is at bedside.  States that the symptoms he was having where he has diplopia and visual difficulties has been ongoing for quite some time even preceding the stroke.  Fortunately MRI was negative for acute stroke.  Patient will follow up with stroke Bridge clinic.  His antihypertensives were reduced.  Patient is to continue aspirin and statin for secondary prophylaxis of stroke.  Blood pressure was noted to be well controlled and lisinopril reduced to 10 mg and amlodipine discontinued.  The MRI did show aneurysm at the bifurcation of the middle cerebral artery and he is to follow-up with neuro as an outpatient.  Patient states he has no complaints of pain.  Denies shortness of breath, headache, dizziness at this time.    14 point ROS reviewed and negative except as stated above.     Objective:  VITAL SIGNS: /73   Pulse 78   Temp 36.6 °C (97.8 °F) (Oral)   Resp 18   Ht 1.768 m (5' 9.6\")   Wt 75 kg (165 lb 5.5 oz)   SpO2 95%   BMI 24.00 kg/m²     GEN: No apparent distress, laying comfortably in bed.  HEENT: Head normocephalic, atraumatic.  Sclera nonicteric bilaterally, no ocular discharge appreciated bilaterally.  CV: Extremities warm and well-perfused, no peripheral edema appreciated bilaterally.  PULMONARY: Breathing nonlabored on room air, no respiratory accessory muscle use.  Not requiring supplemental oxygen.  ABD: Soft, nontender.  SKIN: No appreciable skin breakdown on exposed areas of skin.  NEURO: Awake alert.  Logical thought content, answers questions appropriately.  Moving all extremities volitionally.  PSYCH: Mood and affect within normal limits.    Recent Results (from the past 72 hour(s))   Basic Metabolic Panel    Collection Time: 04/13/21  6:09 AM   Result Value Ref " Range    Sodium 139 135 - 145 mmol/L    Potassium 4.6 3.6 - 5.5 mmol/L    Chloride 104 96 - 112 mmol/L    Co2 27 20 - 33 mmol/L    Glucose 87 65 - 99 mg/dL    Bun 24 (H) 8 - 22 mg/dL    Creatinine 0.97 0.50 - 1.40 mg/dL    Calcium 8.7 8.5 - 10.5 mg/dL    Anion Gap 8.0 7.0 - 16.0   ESTIMATED GFR    Collection Time: 21  6:09 AM   Result Value Ref Range    GFR If African American >60 >60 mL/min/1.73 m 2    GFR If Non African American >60 >60 mL/min/1.73 m 2   EKG (NOW)    Collection Time: 21  2:21 PM   Result Value Ref Range    Report       [facility]    Test Date:  2021  Pt Name:    MAURO REYES       Department: ER  MRN:        [id number]                      Room:       [facility]  Gender:     Male                         Technician: MEDIC STUDENT  :        [id number]                   Requested By:GUY G GANSERT  Order #:    [id number]                    Reading MD:    Measurements  Intervals                                Axis  Rate:       67                           P:          67  AZ:         172                          QRS:        39  QRSD:       80                           T:          15  QT:         392  QTc:        414    Interpretive Statements  SINUS RHYTHM  BASELINE WANDER IN LEAD(S) V1  Compared to ECG 2021 07:34:00  Sinus bradycardia no longer present     CBC WITH DIFFERENTIAL    Collection Time: 21  2:43 PM   Result Value Ref Range    WBC 6.0 4.8 - 10.8 K/uL    RBC 4.37 (L) 4.70 - 6.10 M/uL    Hemoglobin 13.6 (L) 14.0 - 18.0 g/dL    Hematocrit 42.1 42.0 - 52.0 %    MCV 96.3 81.4 - 97.8 fL    MCH 31.1 27.0 - 33.0 pg    MCHC 32.3 (L) 33.7 - 35.3 g/dL    RDW 44.6 35.9 - 50.0 fL    Platelet Count 282 164 - 446 K/uL    MPV 11.2 9.0 - 12.9 fL    Neutrophils-Polys 74.20 (H) 44.00 - 72.00 %    Lymphocytes 8.60 (L) 22.00 - 41.00 %    Monocytes 11.80 0.00 - 13.40 %    Eosinophils 3.90 0.00 - 6.90 %    Basophils 0.80 0.00 - 1.80 %    Immature  Granulocytes 0.70 0.00 - 0.90 %    Nucleated RBC 0.00 /100 WBC    Neutrophils (Absolute) 4.42 1.82 - 7.42 K/uL    Lymphs (Absolute) 0.51 (L) 1.00 - 4.80 K/uL    Monos (Absolute) 0.70 0.00 - 0.85 K/uL    Eos (Absolute) 0.23 0.00 - 0.51 K/uL    Baso (Absolute) 0.05 0.00 - 0.12 K/uL    Immature Granulocytes (abs) 0.04 0.00 - 0.11 K/uL    NRBC (Absolute) 0.00 K/uL   COMP METABOLIC PANEL    Collection Time: 04/13/21  2:43 PM   Result Value Ref Range    Sodium 136 135 - 145 mmol/L    Potassium 4.7 3.6 - 5.5 mmol/L    Chloride 103 96 - 112 mmol/L    Co2 25 20 - 33 mmol/L    Anion Gap 8.0 7.0 - 16.0    Glucose 103 (H) 65 - 99 mg/dL    Bun 25 (H) 8 - 22 mg/dL    Creatinine 1.16 0.50 - 1.40 mg/dL    Calcium 8.5 8.5 - 10.5 mg/dL    AST(SGOT) 19 12 - 45 U/L    ALT(SGPT) 25 2 - 50 U/L    Alkaline Phosphatase 86 30 - 99 U/L    Total Bilirubin 0.2 0.1 - 1.5 mg/dL    Albumin 3.4 3.2 - 4.9 g/dL    Total Protein 6.4 6.0 - 8.2 g/dL    Globulin 3.0 1.9 - 3.5 g/dL    A-G Ratio 1.1 g/dL   PROTHROMBIN TIME    Collection Time: 04/13/21  2:43 PM   Result Value Ref Range    PT 14.4 12.0 - 14.6 sec    INR 1.09 0.87 - 1.13   APTT    Collection Time: 04/13/21  2:43 PM   Result Value Ref Range    APTT 29.9 24.7 - 36.0 sec   COD (ADULT)    Collection Time: 04/13/21  2:43 PM   Result Value Ref Range    ABO Grouping Only O     Rh Grouping Only POS     Antibody Screen-Cod NEG    TROPONIN    Collection Time: 04/13/21  2:43 PM   Result Value Ref Range    Troponin T 21 (H) 6 - 19 ng/L   ESTIMATED GFR    Collection Time: 04/13/21  2:43 PM   Result Value Ref Range    GFR If African American >60 >60 mL/min/1.73 m 2    GFR If Non African American 60 >60 mL/min/1.73 m 2   SARS-CoV-2 PCR (24 hour In-House): Collect NP swab in VTM    Collection Time: 04/13/21  4:54 PM    Specimen: Nasopharyngeal; Respirate   Result Value Ref Range    SARS-CoV-2 Source NP Swab     SARS-CoV-2 by PCR NotDetected    Hemoglobin A1C    Collection Time: 04/14/21  2:55 AM   Result  Value Ref Range    Glycohemoglobin 5.3 4.0 - 5.6 %    Est Avg Glucose 105 mg/dL   Lipid Profile    Collection Time: 04/14/21  2:55 AM   Result Value Ref Range    Cholesterol,Tot 109 100 - 199 mg/dL    Triglycerides 84 0 - 149 mg/dL    HDL 28 (A) >=40 mg/dL    LDL 64 <100 mg/dL   TROPONIN    Collection Time: 04/14/21  8:51 AM   Result Value Ref Range    Troponin T 22 (H) 6 - 19 ng/L       Current Facility-Administered Medications   Medication Frequency   • atorvastatin (LIPITOR) tablet 40 mg Q EVENING   • QUEtiapine (Seroquel) tablet 25 mg Nightly   • lisinopril (PRINIVIL) tablet 30 mg Q DAY   • melatonin tablet 3 mg QHS   • ziprasidone (Geodon) injection 20 mg Q2HRS PRN   • hydrOXYzine HCl (ATARAX) tablet 50 mg Q6HRS PRN   • Respiratory Therapy Consult Continuous RT   • Pharmacy Consult Request ...Pain Management Review 1 Each PHARMACY TO DOSE   • hydrALAZINE (APRESOLINE) tablet 10 mg Q8HRS PRN   • acetaminophen (Tylenol) tablet 650 mg Q4HRS PRN   • lactulose 20 GM/30ML solution 30 mL QDAY PRN   • docusate sodium (ENEMEEZ) enema 283 mg QDAY PRN   • fleet enema 133 mL QDAY PRN   • artificial tears ophthalmic solution 1 Drop PRN   • benzocaine-menthol (CEPACOL) lozenge 1 Lozenge Q2HRS PRN   • mag hydrox-al hydrox-simeth (MAALOX PLUS ES or MYLANTA DS) suspension 20 mL Q2HRS PRN   • ondansetron (ZOFRAN ODT) dispertab 4 mg 4X/DAY PRN    Or   • ondansetron (ZOFRAN) syringe/vial injection 4 mg 4X/DAY PRN   • traZODone (DESYREL) tablet 50 mg QHS PRN   • sodium chloride (OCEAN) 0.65 % nasal spray 2 Spray PRN   • midazolam (VERSED) 5 mg/mL (1 mL vial) PRN   • senna-docusate (PERICOLACE or SENOKOT S) 8.6-50 MG per tablet 2 tablet BID    And   • polyethylene glycol/lytes (MIRALAX) PACKET 1 Packet QDAY PRN    And   • magnesium hydroxide (MILK OF MAGNESIA) suspension 30 mL QDAY PRN    And   • bisacodyl (DULCOLAX) suppository 10 mg QDAY PRN   • enoxaparin (LOVENOX) inj 40 mg DAILY AT 1800   • aspirin (ASA) chewable tab 81 mg DAILY    • QUEtiapine (Seroquel) tablet 25 mg TID PRN       Orders Placed This Encounter   Procedures   • Diet Order Diet: Level 7 - Easy to Chew; Liquid level: Level 0 - Thin     Standing Status:   Standing     Number of Occurrences:   1     Order Specific Question:   Diet:     Answer:   Level 7 - Easy to Chew [22]     Order Specific Question:   Liquid level     Answer:   Level 0 - Thin       Assessment:  Active Hospital Problems    Diagnosis    • *Acute CVA (cerebrovascular accident) (HCC)    • Carotid stenosis, bilateral    • Altered mental status, unspecified    • Mixed hyperlipidemia    • BPH (benign prostatic hyperplasia)    • Sundowning    • Hard of hearing    • Sinus bradycardia    • Hypokalemia    • Anemia    • S/P carotid endarterectomy    • Agitation        Medical Decision Making and Plan: Adapted from assessment and plan done by Dr. Sugey Barkley 4/13.  Small punctate infarctions  Right frontal lobe, right cerebellum, right occipital lobe  Non-focal  Visual complaints  Cognitive impairment (suspect some is baseline)  Continue full rehab program  PT/OT/SLP, 1 hr each discipline, 5 days per week     Aspirin   Statin     Outpatient follow up with stroke bridge clinic, Dr Sales, referrals made     Zio patch cardiac monitor placed by neurology 4/9, follow up with cardiology at 6 weeks     Patient with worsening complaints of double vision on 4/13, for which he was transferred to the acute hospital for further work-up and evaluation  MRI negative.  Aneurysm noted at middle cerebral artery bifurcation, neurology follow-up outpatient     Right carotid stenosis  S/p CEA 4/2  Statin  Aspirin  Outpatient follow up with Dr. Beavers, referrals made     Agitation, resolved  Sundowning, resolved  Depakote started at acute, s/p titration, stop date 4/11  Scheduled Seroquel at night  Scheduled melatonin at night  PRN seroquel, not requiring  Qtc OK     Hypertension, improved  Amlodipine-discontinued at acute  hospital  Lisinopril-dose reduced to 10 mg daily at acute hospitall  Monitor blood pressure  PRN hydralazine     Anemia, resolved     Hyperlipidemia  Statin     Hypokalemia, resolved     Azotemia, resolved     Bowel program  Continue bowel medications  Scheduled Sennakot  PRN Miralax, MOM, bisacodyl suppository     Bladder program  BPH  With incontinence  Check PVRs - 106, 86, 20  Not retaining  Bladder scan for no voids  ICP for over 400 cc  Scheduled toileting     DVT prophylaxis  Lovenox    Repeat labs in a.m., had mild ANA LAURA and anemia, troponin anemia, troponin anemia, troponinemia     Total time: 37 minutes.  I spent greater than 50% of the time for patient care and coordination on this date, reviewed all medical records from acute hospitalization, including unit/floor time, and face-to-face time with the patient as per assessment and plan above.    Laura Sales D.O.

## 2021-04-15 NOTE — PROGRESS NOTES
Neurology Brief Note  Neurohospitalist Service, [facility]    Referring Physician: JELENA Magana.    MRI BRAIN 4/14/21 does not reveal acute infarct. Agree with plan to return to Acute Rehab and follow up in Stroke Bridge Clinic.    Corinne E. Canavero, APRN  Neurohospitalist [facility], Acute Care Services

## 2021-04-16 LAB
ALBUMIN SERPL BCP-MCNC: 3.6 G/DL (ref 3.2–4.9)
ALBUMIN/GLOB SERPL: 1.2 G/DL
ALP SERPL-CCNC: 88 U/L (ref 30–99)
ALT SERPL-CCNC: 25 U/L (ref 2–50)
ANION GAP SERPL CALC-SCNC: 7 MMOL/L (ref 7–16)
AST SERPL-CCNC: 22 U/L (ref 12–45)
BASOPHILS # BLD AUTO: 1 % (ref 0–1.8)
BASOPHILS # BLD: 0.07 K/UL (ref 0–0.12)
BILIRUB SERPL-MCNC: 0.3 MG/DL (ref 0.1–1.5)
BUN SERPL-MCNC: 35 MG/DL (ref 8–22)
CALCIUM SERPL-MCNC: 8.8 MG/DL (ref 8.5–10.5)
CHLORIDE SERPL-SCNC: 103 MMOL/L (ref 96–112)
CO2 SERPL-SCNC: 28 MMOL/L (ref 20–33)
CREAT SERPL-MCNC: 1.23 MG/DL (ref 0.5–1.4)
EOSINOPHIL # BLD AUTO: 0.36 K/UL (ref 0–0.51)
EOSINOPHIL NFR BLD: 5.3 % (ref 0–6.9)
ERYTHROCYTE [DISTWIDTH] IN BLOOD BY AUTOMATED COUNT: 44.7 FL (ref 35.9–50)
GLOBULIN SER CALC-MCNC: 3 G/DL (ref 1.9–3.5)
GLUCOSE SERPL-MCNC: 78 MG/DL (ref 65–99)
HCT VFR BLD AUTO: 43.1 % (ref 42–52)
HGB BLD-MCNC: 14.2 G/DL (ref 14–18)
IMM GRANULOCYTES # BLD AUTO: 0.03 K/UL (ref 0–0.11)
IMM GRANULOCYTES NFR BLD AUTO: 0.4 % (ref 0–0.9)
LYMPHOCYTES # BLD AUTO: 0.89 K/UL (ref 1–4.8)
LYMPHOCYTES NFR BLD: 13 % (ref 22–41)
MAGNESIUM SERPL-MCNC: 2.7 MG/DL (ref 1.5–2.5)
MCH RBC QN AUTO: 31.1 PG (ref 27–33)
MCHC RBC AUTO-ENTMCNC: 32.9 G/DL (ref 33.7–35.3)
MCV RBC AUTO: 94.5 FL (ref 81.4–97.8)
MONOCYTES # BLD AUTO: 0.82 K/UL (ref 0–0.85)
MONOCYTES NFR BLD AUTO: 12 % (ref 0–13.4)
NEUTROPHILS # BLD AUTO: 4.65 K/UL (ref 1.82–7.42)
NEUTROPHILS NFR BLD: 68.3 % (ref 44–72)
NRBC # BLD AUTO: 0 K/UL
NRBC BLD-RTO: 0 /100 WBC
PHOSPHATE SERPL-MCNC: 3.5 MG/DL (ref 2.5–4.5)
PLATELET # BLD AUTO: 316 K/UL (ref 164–446)
PMV BLD AUTO: 10.9 FL (ref 9–12.9)
POTASSIUM SERPL-SCNC: 4.6 MMOL/L (ref 3.6–5.5)
PROT SERPL-MCNC: 6.6 G/DL (ref 6–8.2)
RBC # BLD AUTO: 4.56 M/UL (ref 4.7–6.1)
SARS-COV-2 RNA RESP QL NAA+PROBE: NOTDETECTED
SODIUM SERPL-SCNC: 138 MMOL/L (ref 135–145)
SPECIMEN SOURCE: NORMAL
TROPONIN T SERPL-MCNC: 22 NG/L (ref 6–19)
WBC # BLD AUTO: 6.8 K/UL (ref 4.8–10.8)

## 2021-04-16 PROCEDURE — 99232 SBSQ HOSP IP/OBS MODERATE 35: CPT | Performed by: PHYSICAL MEDICINE & REHABILITATION

## 2021-04-16 PROCEDURE — 700102 HCHG RX REV CODE 250 W/ 637 OVERRIDE(OP): Performed by: PHYSICAL MEDICINE & REHABILITATION

## 2021-04-16 PROCEDURE — 97116 GAIT TRAINING THERAPY: CPT

## 2021-04-16 PROCEDURE — 97130 THER IVNTJ EA ADDL 15 MIN: CPT | Performed by: SPEECH-LANGUAGE PATHOLOGIST

## 2021-04-16 PROCEDURE — 97110 THERAPEUTIC EXERCISES: CPT

## 2021-04-16 PROCEDURE — A9270 NON-COVERED ITEM OR SERVICE: HCPCS | Performed by: PHYSICAL MEDICINE & REHABILITATION

## 2021-04-16 PROCEDURE — 770010 HCHG ROOM/CARE - REHAB SEMI PRIVAT*

## 2021-04-16 PROCEDURE — 83735 ASSAY OF MAGNESIUM: CPT

## 2021-04-16 PROCEDURE — 97535 SELF CARE MNGMENT TRAINING: CPT

## 2021-04-16 PROCEDURE — 85025 COMPLETE CBC W/AUTO DIFF WBC: CPT

## 2021-04-16 PROCEDURE — 97530 THERAPEUTIC ACTIVITIES: CPT

## 2021-04-16 PROCEDURE — 97129 THER IVNTJ 1ST 15 MIN: CPT | Performed by: SPEECH-LANGUAGE PATHOLOGIST

## 2021-04-16 PROCEDURE — 80053 COMPREHEN METABOLIC PANEL: CPT

## 2021-04-16 PROCEDURE — 700111 HCHG RX REV CODE 636 W/ 250 OVERRIDE (IP): Performed by: PHYSICAL MEDICINE & REHABILITATION

## 2021-04-16 PROCEDURE — 84484 ASSAY OF TROPONIN QUANT: CPT

## 2021-04-16 PROCEDURE — 84100 ASSAY OF PHOSPHORUS: CPT

## 2021-04-16 PROCEDURE — 36415 COLL VENOUS BLD VENIPUNCTURE: CPT

## 2021-04-16 RX ORDER — POLYETHYLENE GLYCOL 3350 17 G/17G
1 POWDER, FOR SOLUTION ORAL
Status: DISCONTINUED | OUTPATIENT
Start: 2021-04-16 | End: 2021-04-19

## 2021-04-16 RX ORDER — AMOXICILLIN 250 MG
1 CAPSULE ORAL 2 TIMES DAILY
Status: DISCONTINUED | OUTPATIENT
Start: 2021-04-16 | End: 2021-04-19

## 2021-04-16 RX ORDER — BISACODYL 10 MG
10 SUPPOSITORY, RECTAL RECTAL
Status: DISCONTINUED | OUTPATIENT
Start: 2021-04-16 | End: 2021-04-19

## 2021-04-16 RX ADMIN — ATORVASTATIN CALCIUM 40 MG: 40 TABLET, FILM COATED ORAL at 20:35

## 2021-04-16 RX ADMIN — SENNOSIDES AND DOCUSATE SODIUM 1 TABLET: 8.6; 5 TABLET ORAL at 20:36

## 2021-04-16 RX ADMIN — POLYETHYLENE GLYCOL 3350 1 PACKET: 17 POWDER, FOR SOLUTION ORAL at 20:36

## 2021-04-16 RX ADMIN — SENNOSIDES AND DOCUSATE SODIUM 2 TABLET: 8.6; 5 TABLET ORAL at 09:07

## 2021-04-16 RX ADMIN — ASPIRIN 81 MG CHEWABLE TABLET 81 MG: 81 TABLET CHEWABLE at 09:07

## 2021-04-16 RX ADMIN — MELATONIN TAB 3 MG 3 MG: 3 TAB at 20:36

## 2021-04-16 RX ADMIN — LISINOPRIL 10 MG: 5 TABLET ORAL at 05:45

## 2021-04-16 RX ADMIN — ENOXAPARIN SODIUM 40 MG: 40 INJECTION SUBCUTANEOUS at 17:54

## 2021-04-16 RX ADMIN — QUETIAPINE FUMARATE 25 MG: 25 TABLET ORAL at 20:36

## 2021-04-16 ASSESSMENT — ACTIVITIES OF DAILY LIVING (ADL)
BED_CHAIR_WHEELCHAIR_TRANSFER_DESCRIPTION: ADAPTIVE EQUIPMENT;SET-UP OF EQUIPMENT;SUPERVISION FOR SAFETY;VERBAL CUEING
TOILET_TRANSFER_DESCRIPTION: GRAB BAR;SET-UP OF EQUIPMENT;SUPERVISION FOR SAFETY;VERBAL CUEING
TOILETING_LEVEL_OF_ASSIST_DESCRIPTION: SET-UP OF EQUIPMENT;SUPERVISION FOR SAFETY
BED_CHAIR_WHEELCHAIR_TRANSFER_DESCRIPTION: SET-UP OF EQUIPMENT;SUPERVISION FOR SAFETY;VERBAL CUEING
TUB_SHOWER_TRANSFER_DESCRIPTION: SET-UP OF EQUIPMENT;SUPERVISION FOR SAFETY;VERBAL CUEING;GRAB BAR;SHOWER BENCH

## 2021-04-16 ASSESSMENT — GAIT ASSESSMENTS
DISTANCE (FEET): 205
DEVIATION: SHUFFLED GAIT;DECREASED HEEL STRIKE;DECREASED TOE OFF
GAIT LEVEL OF ASSIST: CONTACT GUARD ASSIST
ASSISTIVE DEVICE: FRONT WHEEL WALKER

## 2021-04-16 ASSESSMENT — PAIN DESCRIPTION - PAIN TYPE: TYPE: ACUTE PAIN

## 2021-04-16 NOTE — THERAPY
"Speech Language Pathology  Daily Treatment     Patient Name: Cooper Harvey  Age:  86 y.o., Sex:  male  Medical Record #: [id number]  Today's Date: 4/16/2021     Precautions  Precautions: Fall Risk  Comments: Pt is currently wearing a Zio patch heart monitor (grey case needs to be within 6ft of pt at all times); Impulsive; [place]    Subjective    Patient was seen for tx, seatedupright in bed. He was awake and alert. He expressed (joked) that the material was \"for children\" but was very cooperative with tasks.      Objective       04/16/21 1301   Precautions   Precautions Fall Risk   Reading Comprehension    Reading Sentences Supervision (5)   Functional Reading Materials Minimal (4)   Cognition   Simple Attention Minimal (4)   Sleep/Wake Cycle   Sleep & Rest Awake   Interdisciplinary Plan of Care Collaboration   Patient Position at End of Therapy In Bed;Tray Table within Reach;Call Light within Reach;Phone within Reach   SLP Total Time Spent   SLP Individual Total Time Spent (Mins) 60   Treatment Charges   SLP Cognitive Skill Development First 15 Minutes 1   SLP Cognitive Skill Development Additional 15 Minutes 3       Assessment    SLP provided patient with functional reading materials (medication labels, menus). Patient answered questions about the materials with 40% accuracy independently but was able to achieve 100% accuracy with minimal verbal/point prompts.      Strengths: Able to follow instructions, Alert and oriented, Pleasant and cooperative, Supportive family  Barriers: Impaired functional cognition    Plan    Cont tx to target below listed goals.    Passport items to be completed:  Express basic needs, understand food/liquid recommendations, consistently follow swallow precautions, manage finances, manage medications, arrive to therapy appointments on time, complete daily memory log entries, solve problems related to safety situations, review education related to hospitalization, complete caregiver " training     Speech Therapy Problems     Problem: Memory STGs     Dates: Start: 04/08/21       Goal: STG-Within one week, patient will     Dates: Start: 04/08/21       Description: 1) Individualized goal:  recall daily events and safety sequencing with 70% acc with use of memory log and memory strategies.  2) Interventions:  SLP Self Care / ADL Training , SLP Cognitive Skill Development, and SLP Group Treatment        Note:     Goal Note filed on 04/12/21 1210 by Meseret Steven MS,CCC-SLP    Mod A for 70% acc.  Difficulty reading own writing impacts ability to independently record on his memory log.                        Problem: Problem Solving STGs     Dates: Start: 04/07/21       Goal: STG-Within one week, patient will solve basic problems     Dates: Start: 04/08/21       Description: 1) Individualized goal:  related to safety and hospitalization with min A with 80% accuracy.   2) Interventions:  SLP Speech Language Treatment, SLP Self Care / ADL Training , SLP Cognitive Skill Development, and SLP Group Treatment        Note:     Goal Note filed on 04/12/21 1210 by Meseret Steven MS,CCC-SLP    Mod verbal cues and prompting needed.                  Goal: STG-Within one week, patient will     Dates: Start: 04/08/21       Description: 1) Individualized goal:  will perform selective attention tasks with 80% acc with min cues to improve to 100%  2) Interventions:  SLP Self Care / ADL Training , SLP Cognitive Skill Development, and SLP Group Treatment        Note:     Goal Note filed on 04/12/21 1210 by Meseret Steven MS,CCC-SLP    Min to mod cues needed for 80% acc.                        Problem: Speech/Swallowing LTGs     Dates: Start: 04/07/21       Goal: LTG-By discharge, patient will solve basic problems     Dates: Start: 04/07/21       Description: 1) Individualized goal:  given spv for a safe d/c home  2) Interventions:  SLP Speech Language Treatment, SLP Self Care / ADL Training , SLP Cognitive Skill  Development, and SLP Group Treatment

## 2021-04-16 NOTE — CARE PLAN
Problem: Safety  Goal: Will remain free from injury  Outcome: PROGRESSING AS EXPECTED  Note: Patient uses call light consistently and appropriately this shift.  Waits for assistance when needed and does not attempt self transfer.  Able to verbalize needs.       Problem: Pain Management  Goal: Pain level will decrease to patient's comfort goal  Outcome: PROGRESSING AS EXPECTED  Note: Patient able to verbalize needs.  Denies pain or discomfort this shift and no s/s same noted.

## 2021-04-16 NOTE — PROGRESS NOTES
"Rehab Progress Note     Encounter Date: 2021    CC: Diarrhea    Interval Events (Subjective)  Vital signs reviewed: Stable with only one episode of mild bradycardia at 58.  Last bowel movement  and 4/15.    Voiding volitionally with PVR of 21  Labs reviewed: CBC stable, CMP showing minimally elevated BUN/creatinine at 35/1.23.  Magnesium mildly elevated at 2.7.  Troponin stable at 22.    Patient seen and examined in his room.  Wife at bedside.  Updated him about labs.  Will redraw labs .  Encouraged fluids.  Admittedly patient is not taking in much water.  Denies urinary symptoms, no fevers, chills, shortness of breath, chest pain.    14 point ROS reviewed and negative except as stated above.     Objective:  VITAL SIGNS: /67   Pulse (!) 58   Temp 36.3 °C (97.4 °F) (Oral)   Resp 16   Ht 1.768 m (5' 9.6\")   Wt 75 kg (165 lb 5.5 oz)   SpO2 95%   BMI 24.00 kg/m²     GEN: No apparent distress, sitting in room eating lunch.  HEENT: Head normocephalic, atraumatic.  Sclera nonicteric bilaterally, no ocular discharge appreciated bilaterally.  Hard of hearing.  CV: Extremities warm and well-perfused, no peripheral edema appreciated bilaterally.  PULMONARY: Breathing nonlabored on room air, no respiratory accessory muscle use.  Not requiring supplemental oxygen.  ABD: Soft, nontender.  SKIN: No appreciable skin breakdown on exposed areas of skin.  PSYCH: Mood and affect within normal limits.  NEURO: Awake alert.  Conversational.  Logical thought content.  Answers questions appropriately.      Recent Results (from the past 72 hour(s))   EKG (NOW)    Collection Time: 21  2:21 PM   Result Value Ref Range    Report       [facility] Emergency Dept.    Test Date:  2021  Pt Name:    MAURO REYES       Department: ER  MRN:        [id number]                      Room:        04  Gender:     Male                         Technician: MEDIC STUDENT  :        [phone] "                   Requested By:GUY G GANSERT  Order #:    [id number]                    Reading MD:    Measurements  Intervals                                Axis  Rate:       67                           P:          67  MI:         172                          QRS:        39  QRSD:       80                           T:          15  QT:         392  QTc:        414    Interpretive Statements  SINUS RHYTHM  BASELINE WANDER IN LEAD(S) V1  Compared to ECG 03/31/2021 07:34:00  Sinus bradycardia no longer present     CBC WITH DIFFERENTIAL    Collection Time: 04/13/21  2:43 PM   Result Value Ref Range    WBC 6.0 4.8 - 10.8 K/uL    RBC 4.37 (L) 4.70 - 6.10 M/uL    Hemoglobin 13.6 (L) 14.0 - 18.0 g/dL    Hematocrit 42.1 42.0 - 52.0 %    MCV 96.3 81.4 - 97.8 fL    MCH 31.1 27.0 - 33.0 pg    MCHC 32.3 (L) 33.7 - 35.3 g/dL    RDW 44.6 35.9 - 50.0 fL    Platelet Count 282 164 - 446 K/uL    MPV 11.2 9.0 - 12.9 fL    Neutrophils-Polys 74.20 (H) 44.00 - 72.00 %    Lymphocytes 8.60 (L) 22.00 - 41.00 %    Monocytes 11.80 0.00 - 13.40 %    Eosinophils 3.90 0.00 - 6.90 %    Basophils 0.80 0.00 - 1.80 %    Immature Granulocytes 0.70 0.00 - 0.90 %    Nucleated RBC 0.00 /100 WBC    Neutrophils (Absolute) 4.42 1.82 - 7.42 K/uL    Lymphs (Absolute) 0.51 (L) 1.00 - 4.80 K/uL    Monos (Absolute) 0.70 0.00 - 0.85 K/uL    Eos (Absolute) 0.23 0.00 - 0.51 K/uL    Baso (Absolute) 0.05 0.00 - 0.12 K/uL    Immature Granulocytes (abs) 0.04 0.00 - 0.11 K/uL    NRBC (Absolute) 0.00 K/uL   COMP METABOLIC PANEL    Collection Time: 04/13/21  2:43 PM   Result Value Ref Range    Sodium 136 135 - 145 mmol/L    Potassium 4.7 3.6 - 5.5 mmol/L    Chloride 103 96 - 112 mmol/L    Co2 25 20 - 33 mmol/L    Anion Gap 8.0 7.0 - 16.0    Glucose 103 (H) 65 - 99 mg/dL    Bun 25 (H) 8 - 22 mg/dL    Creatinine 1.16 0.50 - 1.40 mg/dL    Calcium 8.5 8.5 - 10.5 mg/dL    AST(SGOT) 19 12 - 45 U/L    ALT(SGPT) 25 2 - 50 U/L    Alkaline Phosphatase 86 30 - 99 U/L    Total  Bilirubin 0.2 0.1 - 1.5 mg/dL    Albumin 3.4 3.2 - 4.9 g/dL    Total Protein 6.4 6.0 - 8.2 g/dL    Globulin 3.0 1.9 - 3.5 g/dL    A-G Ratio 1.1 g/dL   PROTHROMBIN TIME    Collection Time: 04/13/21  2:43 PM   Result Value Ref Range    PT 14.4 12.0 - 14.6 sec    INR 1.09 0.87 - 1.13   APTT    Collection Time: 04/13/21  2:43 PM   Result Value Ref Range    APTT 29.9 24.7 - 36.0 sec   COD (ADULT)    Collection Time: 04/13/21  2:43 PM   Result Value Ref Range    ABO Grouping Only O     Rh Grouping Only POS     Antibody Screen-Cod NEG    TROPONIN    Collection Time: 04/13/21  2:43 PM   Result Value Ref Range    Troponin T 21 (H) 6 - 19 ng/L   ESTIMATED GFR    Collection Time: 04/13/21  2:43 PM   Result Value Ref Range    GFR If African American >60 >60 mL/min/1.73 m 2    GFR If Non African American 60 >60 mL/min/1.73 m 2   SARS-CoV-2 PCR (24 hour In-House): Collect NP swab in [facility]    Collection Time: 04/13/21  4:54 PM    Specimen: Nasopharyngeal; Respirate   Result Value Ref Range    SARS-CoV-2 Source NP Swab     SARS-CoV-2 by PCR NotDetected    Hemoglobin A1C    Collection Time: 04/14/21  2:55 AM   Result Value Ref Range    Glycohemoglobin 5.3 4.0 - 5.6 %    Est Avg Glucose 105 mg/dL   Lipid Profile    Collection Time: 04/14/21  2:55 AM   Result Value Ref Range    Cholesterol,Tot 109 100 - 199 mg/dL    Triglycerides 84 0 - 149 mg/dL    HDL 28 (A) >=40 mg/dL    LDL 64 <100 mg/dL   TROPONIN    Collection Time: 04/14/21  8:51 AM   Result Value Ref Range    Troponin T 22 (H) 6 - 19 ng/L   SARS-CoV-2, PCR (In-House)    Collection Time: 04/15/21  8:30 PM   Result Value Ref Range    SARS-CoV-2 Source NP Swab    CBC WITH DIFFERENTIAL    Collection Time: 04/16/21  5:17 AM   Result Value Ref Range    WBC 6.8 4.8 - 10.8 K/uL    RBC 4.56 (L) 4.70 - 6.10 M/uL    Hemoglobin 14.2 14.0 - 18.0 g/dL    Hematocrit 43.1 42.0 - 52.0 %    MCV 94.5 81.4 - 97.8 fL    MCH 31.1 27.0 - 33.0 pg    MCHC 32.9 (L) 33.7 - 35.3 g/dL    RDW 44.7 35.9 -  50.0 fL    Platelet Count 316 164 - 446 K/uL    MPV 10.9 9.0 - 12.9 fL    Neutrophils-Polys 68.30 44.00 - 72.00 %    Lymphocytes 13.00 (L) 22.00 - 41.00 %    Monocytes 12.00 0.00 - 13.40 %    Eosinophils 5.30 0.00 - 6.90 %    Basophils 1.00 0.00 - 1.80 %    Immature Granulocytes 0.40 0.00 - 0.90 %    Nucleated RBC 0.00 /100 WBC    Neutrophils (Absolute) 4.65 1.82 - 7.42 K/uL    Lymphs (Absolute) 0.89 (L) 1.00 - 4.80 K/uL    Monos (Absolute) 0.82 0.00 - 0.85 K/uL    Eos (Absolute) 0.36 0.00 - 0.51 K/uL    Baso (Absolute) 0.07 0.00 - 0.12 K/uL    Immature Granulocytes (abs) 0.03 0.00 - 0.11 K/uL    NRBC (Absolute) 0.00 K/uL   Comp Metabolic Panel    Collection Time: 04/16/21  5:17 AM   Result Value Ref Range    Sodium 138 135 - 145 mmol/L    Potassium 4.6 3.6 - 5.5 mmol/L    Chloride 103 96 - 112 mmol/L    Co2 28 20 - 33 mmol/L    Anion Gap 7.0 7.0 - 16.0    Glucose 78 65 - 99 mg/dL    Bun 35 (H) 8 - 22 mg/dL    Creatinine 1.23 0.50 - 1.40 mg/dL    Calcium 8.8 8.5 - 10.5 mg/dL    AST(SGOT) 22 12 - 45 U/L    ALT(SGPT) 25 2 - 50 U/L    Alkaline Phosphatase 88 30 - 99 U/L    Total Bilirubin 0.3 0.1 - 1.5 mg/dL    Albumin 3.6 3.2 - 4.9 g/dL    Total Protein 6.6 6.0 - 8.2 g/dL    Globulin 3.0 1.9 - 3.5 g/dL    A-G Ratio 1.2 g/dL   MAGNESIUM    Collection Time: 04/16/21  5:17 AM   Result Value Ref Range    Magnesium 2.7 (H) 1.5 - 2.5 mg/dL   PHOSPHORUS    Collection Time: 04/16/21  5:17 AM   Result Value Ref Range    Phosphorus 3.5 2.5 - 4.5 mg/dL   TROPONIN    Collection Time: 04/16/21  5:17 AM   Result Value Ref Range    Troponin T 22 (H) 6 - 19 ng/L   ESTIMATED GFR    Collection Time: 04/16/21  5:17 AM   Result Value Ref Range    GFR If African American >60 >60 mL/min/1.73 m 2    GFR If Non  56 (A) >60 mL/min/1.73 m 2       Current Facility-Administered Medications   Medication Frequency   • atorvastatin (LIPITOR) tablet 40 mg Q EVENING   • lisinopril (PRINIVIL) tablet 10 mg Q DAY   • QUEtiapine  (Seroquel) tablet 25 mg Nightly   • melatonin tablet 3 mg QHS   • ziprasidone (Geodon) injection 20 mg Q2HRS PRN   • hydrOXYzine HCl (ATARAX) tablet 50 mg Q6HRS PRN   • Respiratory Therapy Consult Continuous RT   • Pharmacy Consult Request ...Pain Management Review 1 Each PHARMACY TO DOSE   • hydrALAZINE (APRESOLINE) tablet 10 mg Q8HRS PRN   • acetaminophen (Tylenol) tablet 650 mg Q4HRS PRN   • lactulose 20 GM/30ML solution 30 mL QDAY PRN   • docusate sodium (ENEMEEZ) enema 283 mg QDAY PRN   • fleet enema 133 mL QDAY PRN   • artificial tears ophthalmic solution 1 Drop PRN   • benzocaine-menthol (CEPACOL) lozenge 1 Lozenge Q2HRS PRN   • mag hydrox-al hydrox-simeth (MAALOX PLUS ES or MYLANTA DS) suspension 20 mL Q2HRS PRN   • ondansetron (ZOFRAN ODT) dispertab 4 mg 4X/DAY PRN    Or   • ondansetron (ZOFRAN) syringe/vial injection 4 mg 4X/DAY PRN   • traZODone (DESYREL) tablet 50 mg QHS PRN   • sodium chloride (OCEAN) 0.65 % nasal spray 2 Spray PRN   • midazolam (VERSED) 5 mg/mL (1 mL vial) PRN   • senna-docusate (PERICOLACE or SENOKOT S) 8.6-50 MG per tablet 2 tablet BID    And   • polyethylene glycol/lytes (MIRALAX) PACKET 1 Packet QDAY PRN    And   • magnesium hydroxide (MILK OF MAGNESIA) suspension 30 mL QDAY PRN    And   • bisacodyl (DULCOLAX) suppository 10 mg QDAY PRN   • enoxaparin (LOVENOX) inj 40 mg DAILY AT 1800   • aspirin (ASA) chewable tab 81 mg DAILY   • QUEtiapine (Seroquel) tablet 25 mg TID PRN       Orders Placed This Encounter   Procedures   • Diet Order Diet: Level 7 - Easy to Chew; Liquid level: Level 0 - Thin     Standing Status:   Standing     Number of Occurrences:   1     Order Specific Question:   Diet:     Answer:   Level 7 - Easy to Chew [22]     Order Specific Question:   Liquid level     Answer:   Level 0 - Thin       Assessment:  Active Hospital Problems    Diagnosis    • *Acute CVA (cerebrovascular accident) (HCC)    • Carotid stenosis, bilateral    • Altered mental status, unspecified     • Mixed hyperlipidemia    • BPH (benign prostatic hyperplasia)    • Sundowning    • Hard of hearing    • Sinus bradycardia    • Hypokalemia    • Anemia    • S/P carotid endarterectomy    • Agitation        Medical Decision Making and Plan: Adapted from assessment and plan done by Dr. Sugey Barkley 4/13.  Small punctate infarctions  Right frontal lobe, right cerebellum, right occipital lobe  Non-focal  Visual complaints  Cognitive impairment (suspect some is baseline)  Continue full rehab program  PT/OT/SLP, 1 hr each discipline, 5 days per week     Aspirin   Statin     Outpatient follow up with stroke bridge clinic, Dr Sales, referrals made     Zio patch cardiac monitor placed by neurology 4/9, follow up with cardiology at 6 weeks     Patient with worsening complaints of double vision on 4/13, for which he was transferred to the acute hospital for further work-up and evaluation.  Of note, patient and patient's wife in retrospect state that this is been ongoing intermittently even prior to the stroke.  MRI negative.  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     Troponinemia, mild  Troponin stable on admission at 22  Mild ANA LAURA  Repeat Troponin Sun 4/18    Right carotid stenosis  S/p CEA 4/2  Statin  Aspirin  Outpatient follow up with Dr. Beavers, referrals made     Agitation, resolved  Sundowning, resolved  Depakote started at acute, s/p titration, stop date 4/11  Scheduled Seroquel at night  Scheduled melatonin at night  PRN seroquel, not requiring  Qtc OK     Hypertension, improved  Amlodipine-discontinued at acute hospital  Lisinopril-dose reduced to 10 mg daily at acute hospitall  Monitor blood pressure  PRN hydralazine     Anemia, resolved     Hyperlipidemia  Statin reduced to 40 mg while at acute     Hypokalemia, resolved     Azotemia, had resolved, now returned on  readmission from acute 4/16  Likely poor PO intake  Encourage fluids  Repeat BMP Sun 4/18    Hypermagnesemia, mild 2.7  Monitor, in setting of mild ANA LAURA  Repeat Sun 4/18     Bowel program  Continue bowel medications  Scheduled Sennakot  PRN Miralax, MOM, bisacodyl suppository     Bladder program  BPH  With incontinence  Check PVRs - 106, 86, 20  Not retaining  Bladder scan for no voids  ICP for over 400 cc  Scheduled toileting     DVT prophylaxis  Lovenox    Discussed with patient's wife.    Total time: 29 minutes.  I spent greater than 50% of the time for patient care and coordination on this date, reviewed all medical records from acute hospitalization, including unit/floor time, and face-to-face time with the patient as per assessment and plan above.    Laura Sales D.O.

## 2021-04-16 NOTE — THERAPY
Occupational Therapy  Daily Treatment     Patient Name: Cooper Harvey  Age:  86 y.o., Sex:  male  Medical Record #: [id number]  Today's Date: 4/16/2021     Precautions  Precautions: (P) Fall Risk  Comments: Pt is currently wearing a Zio patch heart monitor (grey case needs to be within 6ft of pt at all times); Impulsive; [place], wander guard         Subjective    Patient asleep in bed.  Agreeable to OT and shower.  Stated he hasn't had a BM in a week.     Objective       04/16/21 0701   Precautions   Precautions Fall Risk   Cognition    Speech/ Communication Hard of Hearing   Safety Awareness Impaired   Attention Impaired   Functional Level of Assist   Eating Independent   Grooming Supervision   Grooming Description Seated in wheelchair at sink;Supervision for safety  (to comb hair)   Bathing Supervision   Bathing Description Grab bar;Tub bench;Set-up of equipment;Supervision for safety;Verbal cueing  (SBA with cues)   Upper Body Dressing Stand by Assist   Upper Body Dressing Description Set-up of equipment;Supervision for safety;Verbal cueing  (cues of where to thread R UE)   Lower Body Dressing Stand by Assist   Lower Body Dressing Description Initial preparation for task;Set-up of equipment;Supervision for safety;Verbal cueing   Toileting Stand by Assist   Toileting Description Set-up of equipment;Supervision for safety   Bed, Chair, Wheelchair Transfer Contact Guard Assist   Bed Chair Wheelchair Transfer Description Set-up of equipment;Supervision for safety;Verbal cueing   Toilet Transfers Stand by Assist   Toilet Transfer Description Grab bar;Set-up of equipment;Supervision for safety;Verbal cueing   Tub / Shower Transfers Stand by Assist   Tub Shower Transfer Description Set-up of equipment;Supervision for safety;Verbal cueing;Grab bar;Shower bench   Interdisciplinary Plan of Care Collaboration   Patient Position at End of Therapy Seated;Call Light within Reach;Tray Table within Reach;Phone within  Reach;Self Releasing Lap Belt Applied;Chair Alarm On   OT Total Time Spent   OT Individual Total Time Spent (Mins) 60   OT Charge Group   OT Self Care / ADL 4       Assessment    Patient completed adl routine with SBA to supervision with min cues for safety and problem solving.  Hard of hearing continues to be a barrier.  Patient sat on toilet x three attempts and was unsuccessful.  Strengths: Independent prior level of function, Motivated for self care and independence, Pleasant and cooperative, Willingly participates in therapeutic activities  Barriers: Bladder incontinence, Decreased endurance, Generalized weakness, Hearing impairment, Impaired activity tolerance, Impaired balance, Impulsive, Confused, Limited mobility, Visual impairment    Plan  ADLs, functional mobility, safety with transfers, strength/endurance/coordination        Occupational Therapy Goals     Problem: Dressing     Dates: Start: 04/12/21       Goal: STG-Within one week, patient will dress LB     Dates: Start: 04/12/21       Description: 1) Individualized Goal:  with supervision using AE/AD/techniques  2) Interventions:  OT Self Care/ADL, OT Cognitive Skill Dev, OT Neuro Re-Ed/Balance, OT Therapeutic Activity, OT Evaluation, and OT Therapeutic Exercise                  Problem: Functional Transfers     Dates: Start: 04/07/21       Goal: STG-Within one week, patient will transfer to toilet     Dates: Start: 04/07/21       Description: 1) Individualized Goal:  Sup/SBA for commode transfer via DME PRN  2) Interventions:  OT Self Care/ADL, OT Cognitive Skill Dev, OT Neuro Re-Ed/Balance, OT Therapeutic Activity, OT Evaluation, and OT Therapeutic Exercise        Note:     Goal Note filed on 04/12/21 1123 by Chema Funk, OT/L    CGA                        Problem: OT Long Term Goals     Dates: Start: 04/07/21       Goal: LTG-By discharge, patient will complete basic self care tasks     Dates: Start: 04/07/21       Description: 1) Individualized  Goal:  Mod I for BADL's via AE/DME PRN  2) Interventions:  OT Self Care/ADL, OT Cognitive Skill Dev, OT Neuro Re-Ed/Balance, OT Therapeutic Activity, OT Evaluation, and OT Therapeutic Exercise              Goal: LTG-By discharge, patient will perform bathroom transfers     Dates: Start: 04/07/21       Description: 1) Individualized Goal: Mod I for bathroom transfers via DME PRN  2) Interventions:  OT Self Care/ADL, OT Cognitive Skill Dev, OT Neuro Re-Ed/Balance, OT Therapeutic Activity, OT Evaluation, and OT Therapeutic Exercise                    Problem: Toileting     Dates: Start: 04/12/21       Goal: STG-Within one week, patient will complete toileting tasks     Dates: Start: 04/12/21       Description: 1) Individualized Goal:  Supervised with AE/AD/techniques  2) Interventions:  OT Self Care/ADL, OT Cognitive Skill Dev, OT Neuro Re-Ed/Balance, OT Therapeutic Activity, OT Evaluation, and OT Therapeutic Exercise

## 2021-04-16 NOTE — THERAPY
"Physical Therapy   Daily Treatment     Patient Name: Cooper Harvey  Age:  86 y.o., Sex:  male  Medical Record #: [id number]  Today's Date: 4/16/2021     Precautions  Precautions: (P) Fall Risk  Comments: (P) Pt is currently wearing a Zio patch heart monitor (grey case needs to be within 6ft of pt at all times); Impulsive; [place], wander guard    Subjective    Patient seated in w/c upon arrival, agreeable to therapy. Patient states that his head feels \"heavy\", however denies a headache.      Objective       04/16/21 0931   Precautions   Precautions Fall Risk   Comments Pt is currently wearing a Zio patch heart monitor (grey case needs to be within 6ft of pt at all times); Impulsive; [place], wander guard   Vitals   Pulse 88   Pulse Oximetry 100 %   O2 Delivery Device None - Room Air   Vitals Comments   (2 minutes following activity )   Cognition    Speech/ Communication Hard of Hearing   Gait Functional Level of Assist    Gait Level Of Assist Contact Guard Assist   Assistive Device Front Wheel Walker   Distance (Feet) 205   # of Times Distance was Traveled 2   Deviation Shuffled Gait;Decreased Heel Strike;Decreased Toe Off   Wheelchair Functional Level of Assist   Wheelchair Assist Supervised   Distance Wheelchair (Feet or Distance) 200   Wheelchair Description Adaptive equipment;Supervision for safety;Verbal cueing   Transfer Functional Level of Assist   Bed, Chair, Wheelchair Transfer Contact Guard Assist   Bed Chair Wheelchair Transfer Description Adaptive equipment;Set-up of equipment;Supervision for safety;Verbal cueing  (SPT FWW from bed <> w/c)   Supine Lower Body Exercise   Bridges 1 set of 10;Two Legged   Sitting Lower Body Exercises   Ankle Pumps 1 set of 10;Bilateral   Hip Abduction 1 set of 10;Bilateral;Medium Resistance Theraband   Hip Adduction 1 set of 10;Bilateral   Long Arc Quad 1 set of 10;Bilateral   Marching 1 set of 10   Hamstring Curl 1 set of 10;Bilateral;Medium Resistance Theraband   Comments " New seated exercise HEP handout provided; encouraged patient to complete exercises when not in therapy    Bed Mobility    Supine to Sit Stand by Assist   Sit to Supine Stand by Assist   Sit to Stand Contact Guard Assist   Scooting Stand by Assist   Neuro-Muscular Treatments   Neuro-Muscular Treatments Anterior weight shift;Postural Facilitation;Sequencing;Verbal Cuing   Comments Postural facilitation with amb; environmental scanning with amb   5x STS (Five Times Sit to Stand Test)   Height of Sitting Surface (inches) 19   5x Sit to STand (seconds) 18   Score Definition Fall Risk   Sit to Stand Comments no UE; from bed   Interdisciplinary Plan of Care Collaboration   IDT Collaboration with  Nursing   Patient Position at End of Therapy In Bed;Bed Alarm On;Call Light within Reach;Tray Table within Reach   Collaboration Comments Relayed COVID test result negative   Strengths & Barriers   Strengths Adequate strength;Independent prior level of function;Pleasant and cooperative;Willingly participates in therapeutic activities   Barriers Decreased endurance;Difficulty following instructions;Hearing impairment;Home accessibility;Impaired balance;Impaired carryover of learning;Impulsive;Limited mobility;Impaired activity tolerance   PT Total Time Spent   PT Individual Total Time Spent (Mins) 60   PT Charge Group   PT Gait Training 2   PT Therapeutic Exercise 1   PT Therapeutic Activities 1       Assessment    Patient demonstrates improvement in functional mobility, despite returning from acute. Ambulation endurance improving with less toe catching with fatigue. Patient short of breath following 5xSTS, however SpO2 100% 2 minutes post-activity. STS results indicate patient is at a fall risk. Mild/Mod ataxia noted with LE exercises (marching and leg curls).    Strengths: (P) Adequate strength, Independent prior level of function, Pleasant and cooperative, Willingly participates in therapeutic activities  Barriers: (P) Decreased  "endurance, Difficulty following instructions, Hearing impairment, Home accessibility, Impaired balance, Impaired carryover of learning, Impulsive, Limited mobility, Impaired activity tolerance    Plan    Review STS sequencing, gait with FWW, step practice progressing to 6\" steps with one rail as appropriate, coordination exercise, transfer training, static and dynamic balance.      Passport items to be completed:  Get in/out of bed safely, in/out of a vehicle, safely use mobility device, walk or wheel around home/community, navigate up and down stairs, show how to get up/down from the ground, ensure home is accessible, demonstrate HEP, complete caregiver training       Physical Therapy Problems     Problem: Mobility     Dates: Start: 04/12/21       Goal: STG-Within one week, patient will ambulate community distances     Dates: Start: 04/12/21       Description: 1) Individualized goal:   feet with FWW and SBA  2) Interventions:  PT Gait Training, PT Therapeutic Exercises, PT Neuro Re-Ed/Balance, PT Therapeutic Activity, and PT Evaluation            Goal: STG-Within one week, patient will     Dates: Start: 04/12/21       Description: 1) Individualized goal: negotiate 4-6, 6\" steps with 2 rails and CGA  2) Interventions:  PT Gait Training, PT Therapeutic Exercises, PT Neuro Re-Ed/Balance, PT Therapeutic Activity, and PT Evaluation                  Problem: Mobility Transfers     Dates: Start: 04/12/21       Goal: STG-Within one week, patient will transfer bed to chair     Dates: Start: 04/12/21       Description: 1) Individualized goal:  SPT with FWW and supervision   2) Interventions:  PT Gait Training, PT Therapeutic Exercises, PT Neuro Re-Ed/Balance, PT Therapeutic Activity, and PT Evaluation                      "

## 2021-04-16 NOTE — PROGRESS NOTES
2 RN skin check done with admitting RN Nishi and HERLINDA Yanes Face photo and skin photos documented in media. Appropriate LDAs opened.   Pt with Varghese score of 17, RN wound protocol in place, orders current. Prevention measures in place.

## 2021-04-16 NOTE — PROGRESS NOTES
Patient admitted to facility at 1220 via w/c; accompanied by hospital transport.  Patient assisted to room and positioned in bed for comfort and safety; call light within reach.  Patient assisted with stowing belongings and oriented to room and facility.  Admission assessment performed and documented in computer. Patient received and reviewed education binder. Admission paperwork completed; signed copies placed in chart.  Will continue to monitor.

## 2021-04-17 PROCEDURE — 97129 THER IVNTJ 1ST 15 MIN: CPT | Performed by: SPEECH-LANGUAGE PATHOLOGIST

## 2021-04-17 PROCEDURE — 97130 THER IVNTJ EA ADDL 15 MIN: CPT | Performed by: SPEECH-LANGUAGE PATHOLOGIST

## 2021-04-17 PROCEDURE — 97110 THERAPEUTIC EXERCISES: CPT

## 2021-04-17 PROCEDURE — 770010 HCHG ROOM/CARE - REHAB SEMI PRIVAT*

## 2021-04-17 PROCEDURE — 700102 HCHG RX REV CODE 250 W/ 637 OVERRIDE(OP): Performed by: PHYSICAL MEDICINE & REHABILITATION

## 2021-04-17 PROCEDURE — 700111 HCHG RX REV CODE 636 W/ 250 OVERRIDE (IP): Performed by: PHYSICAL MEDICINE & REHABILITATION

## 2021-04-17 PROCEDURE — A9270 NON-COVERED ITEM OR SERVICE: HCPCS | Performed by: PHYSICAL MEDICINE & REHABILITATION

## 2021-04-17 PROCEDURE — 97535 SELF CARE MNGMENT TRAINING: CPT

## 2021-04-17 PROCEDURE — 97116 GAIT TRAINING THERAPY: CPT

## 2021-04-17 RX ADMIN — LISINOPRIL 10 MG: 5 TABLET ORAL at 05:15

## 2021-04-17 RX ADMIN — ENOXAPARIN SODIUM 40 MG: 40 INJECTION SUBCUTANEOUS at 17:37

## 2021-04-17 RX ADMIN — ACETAMINOPHEN 650 MG: 325 TABLET, FILM COATED ORAL at 09:29

## 2021-04-17 RX ADMIN — ATORVASTATIN CALCIUM 40 MG: 40 TABLET, FILM COATED ORAL at 19:57

## 2021-04-17 RX ADMIN — SENNOSIDES AND DOCUSATE SODIUM 1 TABLET: 8.6; 5 TABLET ORAL at 19:57

## 2021-04-17 RX ADMIN — SENNOSIDES AND DOCUSATE SODIUM 1 TABLET: 8.6; 5 TABLET ORAL at 09:29

## 2021-04-17 RX ADMIN — MAGNESIUM HYDROXIDE 30 ML: 400 SUSPENSION ORAL at 19:58

## 2021-04-17 RX ADMIN — MELATONIN TAB 3 MG 3 MG: 3 TAB at 19:57

## 2021-04-17 RX ADMIN — QUETIAPINE FUMARATE 25 MG: 25 TABLET ORAL at 19:57

## 2021-04-17 RX ADMIN — ASPIRIN 81 MG CHEWABLE TABLET 81 MG: 81 TABLET CHEWABLE at 09:30

## 2021-04-17 ASSESSMENT — GAIT ASSESSMENTS
DISTANCE (FEET): 240
ASSISTIVE DEVICE: FRONT WHEEL WALKER
DEVIATION: SHUFFLED GAIT;DECREASED HEEL STRIKE;DECREASED TOE OFF;OTHER (COMMENT)
GAIT LEVEL OF ASSIST: CONTACT GUARD ASSIST
GAIT LEVEL OF ASSIST: CONTACT GUARD ASSIST
ASSISTIVE DEVICE: FRONT WHEEL WALKER
DEVIATION: SHUFFLED GAIT;DECREASED HEEL STRIKE;DECREASED TOE OFF

## 2021-04-17 ASSESSMENT — ACTIVITIES OF DAILY LIVING (ADL): TOILET_TRANSFER_DESCRIPTION: GRAB BAR;SET-UP OF EQUIPMENT;SUPERVISION FOR SAFETY;VERBAL CUEING

## 2021-04-17 ASSESSMENT — PAIN DESCRIPTION - PAIN TYPE
TYPE: ACUTE PAIN
TYPE: ACUTE PAIN

## 2021-04-17 NOTE — CARE PLAN
Problem: Safety  Goal: Will remain free from injury  Note: Call light with in reach. Redirection to use call light for assistance. Non skid socks. Upper siderails up x2 while in bed.      Problem: Bowel/Gastric:  Goal: Normal bowel function is maintained or improved  Note: PRN Miralax given for last BM 4/14/21. Encouraged increase fluids intake. Will monitor.

## 2021-04-17 NOTE — THERAPY
"Physical Therapy   Daily Treatment     Patient Name: Cooper Harvey  Age:  86 y.o., Sex:  male  Medical Record #: [id number]  Today's Date: 4/17/2021     Precautions  Precautions: Fall Risk  Comments: Pt is currently wearing a Zio patch heart monitor (grey case needs to be within 6ft of pt at all times); Impulsive; [place]; impaired cog    Subjective    Patient received sitting in w/c at bedside and agreeable to PT; pt reports that he is more than ready for therapy; pt reports would like to be able to return to [facility] after discharge since he has \"just been watching life go by\" lately.     Objective       04/17/21 1231   Vitals   O2 (LPM) 0   O2 Delivery Device None - Room Air   Gait Functional Level of Assist    Gait Level Of Assist Contact Guard Assist   Assistive Device Front Wheel Walker   Distance (Feet)   (270 ft, 410 ft, and 170 ft (last rep returning to room))   # of Times Distance was Traveled   (per above; indoors only today)   Deviation Shuffled Gait;Decreased Heel Strike;Decreased Toe Off;Other (Comment)  (cueing for path finding)   Bed Mobility    Sit to Stand Contact Guard Assist  (FWW, CGA-SBA for 18-20\"h including 1 rep without armrests)   Interdisciplinary Plan of Care Collaboration   IDT Collaboration with  Physical Therapist;Nursing   Patient Position at End of Therapy Seated;Chair Alarm On;Self Releasing Lap Belt Applied;Call Light within Reach;Tray Table within Reach;Phone within Reach   Collaboration Comments CLOF, mild change in treatment schedule, [place], and new name tag made for pt's room   PT Total Time Spent   PT Individual Total Time Spent (Mins) 30   PT Charge Group   PT Gait Training 2     Discussed pt's balance, treatment goals, safe return to activities, and use of call light.    Assessment    Patient's endurance and balance continue to improve throughout session; [place] evident but pt is highly motivated; improving activity tolerance; fatigue noted with longest rep of ambulation but " "mild otherwise.    Strengths: Adequate strength, Independent prior level of function, Pleasant and cooperative, Willingly participates in therapeutic activities  Barriers: Decreased endurance, Difficulty following instructions, Hearing impairment, Home accessibility, Impaired balance, Impaired carryover of learning, Impulsive, Limited mobility, Impaired activity tolerance    Plan    Prepare pt for d/c as D/C is scheduled for 4/21/2021.  Review STS sequencing, gait with FWW, step practice progressing to 6\" steps with one rail as appropriate, coordination exercise, transfer training, static and dynamic balance.      Passport items to be completed:  Get in/out of bed safely, in/out of a vehicle, safely use mobility device, walk or wheel around home/community, navigate up and down stairs, show how to get up/down from the ground, ensure home is accessible, demonstrate HEP, complete caregiver training      Physical Therapy Problems     Problem: Mobility     Dates: Start: 04/12/21       Goal: STG-Within one week, patient will ambulate community distances     Dates: Start: 04/12/21       Description: 1) Individualized goal:   feet with FWW and SBA  2) Interventions:  PT Gait Training, PT Therapeutic Exercises, PT Neuro Re-Ed/Balance, PT Therapeutic Activity, and PT Evaluation            Goal: STG-Within one week, patient will     Dates: Start: 04/12/21       Description: 1) Individualized goal: negotiate 4-6, 6\" steps with 2 rails and CGA  2) Interventions:  PT Gait Training, PT Therapeutic Exercises, PT Neuro Re-Ed/Balance, PT Therapeutic Activity, and PT Evaluation                  Problem: Mobility Transfers     Dates: Start: 04/12/21       Goal: STG-Within one week, patient will transfer bed to chair     Dates: Start: 04/12/21       Description: 1) Individualized goal:  SPT with FWW and supervision   2) Interventions:  PT Gait Training, PT Therapeutic Exercises, PT Neuro Re-Ed/Balance, PT Therapeutic Activity, " and PT Evaluation

## 2021-04-17 NOTE — THERAPY
"Physical Therapy   Daily Treatment     Patient Name: Cooper Harvey  Age:  86 y.o., Sex:  male  Medical Record #: [id number]  Today's Date: 4/17/2021     Precautions  Precautions: Fall Risk  Comments: Pt is currently wearing a Zio patch heart monitor (grey case needs to be within 6ft of pt at all times); Impulsive; [place]; impaired cog    Subjective    Pt received sitting in WC at bedside, agreeable to tx.      Objective       04/17/21 1501   Vitals   O2 (LPM) 0   O2 Delivery Device None - Room Air   Cognition    Level of Consciousness Alert   Sleep/Wake Cycle   Sleep & Rest Awake   Gait Functional Level of Assist    Gait Level Of Assist Contact Guard Assist   Assistive Device Front Wheel Walker   Distance (Feet) 240   # of Times Distance was Traveled 1   Deviation Shuffled Gait;Decreased Heel Strike;Decreased Toe Off   Stairs Functional Level of Assist   Level of Assist with Stairs Contact Guard Assist   # of Stairs Climbed   (3x 6\" steps, 1x4\" steps)   Stairs Description Extra time;Hand rails;Supervision for safety;Verbal cueing  (1 HR, step-to up/down)   Bed Mobility    Sit to Stand Stand by Assist   Interdisciplinary Plan of Care Collaboration   Patient Position at End of Therapy Seated;Chair Alarm On;Self Releasing Lap Belt Applied;Call Light within Reach;Tray Table within Reach;Phone within Reach   Provided pt education on sequencing and safety with FWW and stairs.     Assessment    Pt required verbal cueing for sequencing FWW to be moved off to side before stair climbing and bringing closeby FWW to sit in WC.   He used two hands on 1 HR to climb and descend stairs. 3 min rest between sets.   Increased shuffling gait with fatigue when walking with FWW. Verbal cuing for increasing NILO during turning with FWW. Pt responsive to all verbal cuing.      Strengths: Adequate strength, Independent prior level of function, Pleasant and cooperative, Willingly participates in therapeutic activities  Barriers: Decreased " "endurance, Difficulty following instructions, Hearing impairment, Home accessibility, Impaired balance, Impaired carryover of learning, Impulsive, Limited mobility, Impaired activity tolerance    Plan    Review STS sequencing, gait with FWW, step practice progressing to 6\" steps with one rail as appropriate, coordination exercise, transfer training, static and dynamic balance.     Passport items to be completed:  Get in/out of bed safely, in/out of a vehicle, safely use mobility device, walk or wheel around home/community, navigate up and down stairs, show how to get up/down from the ground, ensure home is accessible, demonstrate HEP, complete caregiver training    Physical Therapy Problems     Problem: Mobility     Dates: Start: 04/12/21       Goal: STG-Within one week, patient will ambulate community distances     Dates: Start: 04/12/21       Description: 1) Individualized goal:   feet with FWW and SBA  2) Interventions:  PT Gait Training, PT Therapeutic Exercises, PT Neuro Re-Ed/Balance, PT Therapeutic Activity, and PT Evaluation            Goal: STG-Within one week, patient will     Dates: Start: 04/12/21       Description: 1) Individualized goal: negotiate 4-6, 6\" steps with 2 rails and CGA  2) Interventions:  PT Gait Training, PT Therapeutic Exercises, PT Neuro Re-Ed/Balance, PT Therapeutic Activity, and PT Evaluation                  Problem: Mobility Transfers     Dates: Start: 04/12/21       Goal: STG-Within one week, patient will transfer bed to chair     Dates: Start: 04/12/21       Description: 1) Individualized goal:  SPT with FWW and supervision   2) Interventions:  PT Gait Training, PT Therapeutic Exercises, PT Neuro Re-Ed/Balance, PT Therapeutic Activity, and PT Evaluation                      "

## 2021-04-17 NOTE — CARE PLAN
Problem: Safety  Goal: Will remain free from falls  Outcome: PROGRESSING AS EXPECTED  Note: Patient uses call light consistently and appropriately this shift.  Waits for assistance when needed and does not attempt self transfer.  Able to verbalize needs.       Problem: Pain Management  Goal: Pain level will decrease to patient's comfort goal  Outcome: PROGRESSING AS EXPECTED  Note: Patient able to verbalize needs.  Denies pain or discomfort this shift and no s/s same noted.

## 2021-04-17 NOTE — PROGRESS NOTES
Received shift report and assumed care of patient.  Patient awake, calm and stable, currently positioned in bed for comfort and safety; call light within reach.  3/10 generalized pain at this time, see mar for medication.  Will continue to monitor.

## 2021-04-17 NOTE — THERAPY
Occupational Therapy  Daily Treatment     Patient Name: Cooper Harvey  Age:  86 y.o., Sex:  male  Medical Record #: [id number]  Today's Date: 4/17/2021     Precautions  Precautions: Fall Risk  Comments: Pt is currently wearing a Zio patch heart monitor (grey case needs to be within 6ft of pt at all times); Impulsive; [place]; impaired cog         Subjective    Expresses realizing with therapy how deconditioned he has gotten, but feels that he is improving     Objective       04/17/21 0931   Precautions   Precautions Fall Risk   Comments Pt is currently wearing a Zio patch heart monitor (grey case needs to be within 6ft of pt at all times); Impulsive; [place]; impaired cog   Pain   Intervention Declines   Sitting Upper Body Exercises   Side Arm Raise Right ;Left;Weight (See Comments for lbs);2 sets of 10  (4lb)   Bicep Curls 2 sets of 15;Bilateral;Weight (See Comments for lbs)  (4lb)   Other Exercise reciprocal front arm punches 2 sets of 15 4lb wt   Comments requires tactile and vc for initial coordination of movements   Fine Motor / Dexterity    Fine Motor / Dexterity Yes   Fine Motor / Dexterity Interventions thumb to finger opp, relative ease with R, dysmetria with L. Finger nose alternations able to perform with relative ease.   Sitting Lower Body Exercises   Sit to Stand Other Resistance (See Comments)  (1 set of 7)   Comments wc <> grab bars. Requires cues for pushing off and reaching back with 1 UE for first 3, able to self-correct with 4th, SBA for remaining 3   Interdisciplinary Plan of Care Collaboration   Patient Position at End of Therapy Seated;Chair Alarm On;Self Releasing Lap Belt Applied;Call Light within Reach;Tray Table within Reach   OT Total Time Spent   OT Individual Total Time Spent (Mins) 30   OT Charge Group   OT Therapeutic Exercise  2       Assessment    Pt tolerates BUE exercise; however, requires intermittent rest breaks and initial cues for coordination. Difficulty with brief FMC ext with  L hand. Focus on use of 1UE for push off and reaching back for sit to stands, able to accurately perform given prior trials.     Strengths: Independent prior level of function, Motivated for self care and independence, Pleasant and cooperative, Willingly participates in therapeutic activities  Barriers: Bladder incontinence, Decreased endurance, Generalized weakness, Hearing impairment, Impaired activity tolerance, Impaired balance, Impulsive, Confused, Limited mobility, Visual impairment    Plan    ADLs, functional mobility, safety with transfers, strength/endurance/coordination        Occupational Therapy Goals     Problem: Dressing     Dates: Start: 04/12/21       Goal: STG-Within one week, patient will dress LB     Dates: Start: 04/12/21       Description: 1) Individualized Goal:  with supervision using AE/AD/techniques  2) Interventions:  OT Self Care/ADL, OT Cognitive Skill Dev, OT Neuro Re-Ed/Balance, OT Therapeutic Activity, OT Evaluation, and OT Therapeutic Exercise                  Problem: Functional Transfers     Dates: Start: 04/07/21       Goal: STG-Within one week, patient will transfer to toilet     Dates: Start: 04/07/21       Description: 1) Individualized Goal:  Sup/SBA for commode transfer via DME PRN  2) Interventions:  OT Self Care/ADL, OT Cognitive Skill Dev, OT Neuro Re-Ed/Balance, OT Therapeutic Activity, OT Evaluation, and OT Therapeutic Exercise        Note:     Goal Note filed on 04/12/21 1123 by Chema Funk, OT/L    CGA                        Problem: OT Long Term Goals     Dates: Start: 04/07/21       Goal: LTG-By discharge, patient will complete basic self care tasks     Dates: Start: 04/07/21       Description: 1) Individualized Goal:  Mod I for BADL's via AE/DME PRN  2) Interventions:  OT Self Care/ADL, OT Cognitive Skill Dev, OT Neuro Re-Ed/Balance, OT Therapeutic Activity, OT Evaluation, and OT Therapeutic Exercise              Goal: LTG-By discharge, patient will perform  bathroom transfers     Dates: Start: 04/07/21       Description: 1) Individualized Goal: Mod I for bathroom transfers via DME PRN  2) Interventions:  OT Self Care/ADL, OT Cognitive Skill Dev, OT Neuro Re-Ed/Balance, OT Therapeutic Activity, OT Evaluation, and OT Therapeutic Exercise                    Problem: Toileting     Dates: Start: 04/12/21       Goal: STG-Within one week, patient will complete toileting tasks     Dates: Start: 04/12/21       Description: 1) Individualized Goal:  Supervised with AE/AD/techniques  2) Interventions:  OT Self Care/ADL, OT Cognitive Skill Dev, OT Neuro Re-Ed/Balance, OT Therapeutic Activity, OT Evaluation, and OT Therapeutic Exercise

## 2021-04-17 NOTE — THERAPY
"Occupational Therapy  Daily Treatment     Patient Name: Cooper Harvey  Age:  86 y.o., Sex:  male  Medical Record #: [id number]  Today's Date: 4/17/2021     Precautions  Precautions: (P) Fall Risk  Comments: (P) Pt is currently wearing a Zio patch heart monitor (grey case needs to be within 6ft of pt at all times); Impulsive; [place]; impaired cog         Subjective    \"I don't want to cut myself so I won't shave my neck.\"     Objective       04/17/21 1431   Precautions   Precautions Fall Risk   Comments Pt is currently wearing a Zio patch heart monitor (grey case needs to be within 6ft of pt at all times); Impulsive; [place]; impaired cog   Functional Level of Assist   Grooming Minimal Assist  (shaving at sink)   Grooming Description Increased time;Supervision for safety;Seated in wheelchair at sink   Toileting Stand by Assist   Toileting Description Grab bar;Initial preparation for task;Verbal cueing;Supervision for safety;Set-up of equipment   Toilet Transfers Stand by Assist   Toilet Transfer Description Grab bar;Set-up of equipment;Supervision for safety;Verbal cueing   Interdisciplinary Plan of Care Collaboration   IDT Collaboration with  Physical Therapist   Patient Position at End of Therapy Seated;Call Light within Reach;Tray Table within Reach;Phone within Reach   Collaboration Comments transition of care   OT Total Time Spent   OT Individual Total Time Spent (Mins) 30   OT Charge Group   OT Self Care / ADL 2       Assessment    Pt tolerated session well focused on grooming and toileting. Pt demonstrated safety awareness while shaving, but no awareness of w/c safety when transferring to toilet. Pt required cues to lock breaks and unbuckle seatbelt before standing.    Strengths: Independent prior level of function, Motivated for self care and independence, Pleasant and cooperative, Willingly participates in therapeutic activities  Barriers: Bladder incontinence, Decreased endurance, Generalized weakness, " Hearing impairment, Impaired activity tolerance, Impaired balance, Impulsive, Confused, Limited mobility, Visual impairment    Plan    ADLs, functional mobility, safety with transfers, strength/endurance/coordination      Occupational Therapy Goals     Problem: Dressing     Dates: Start: 04/12/21       Goal: STG-Within one week, patient will dress LB     Dates: Start: 04/12/21       Description: 1) Individualized Goal:  with supervision using AE/AD/techniques  2) Interventions:  OT Self Care/ADL, OT Cognitive Skill Dev, OT Neuro Re-Ed/Balance, OT Therapeutic Activity, OT Evaluation, and OT Therapeutic Exercise                  Problem: Functional Transfers     Dates: Start: 04/07/21       Goal: STG-Within one week, patient will transfer to toilet     Dates: Start: 04/07/21       Description: 1) Individualized Goal:  Sup/SBA for commode transfer via DME PRN  2) Interventions:  OT Self Care/ADL, OT Cognitive Skill Dev, OT Neuro Re-Ed/Balance, OT Therapeutic Activity, OT Evaluation, and OT Therapeutic Exercise        Note:     Goal Note filed on 04/12/21 1123 by Chema Funk, OT/L    CGA                        Problem: OT Long Term Goals     Dates: Start: 04/07/21       Goal: LTG-By discharge, patient will complete basic self care tasks     Dates: Start: 04/07/21       Description: 1) Individualized Goal:  Mod I for BADL's via AE/DME PRN  2) Interventions:  OT Self Care/ADL, OT Cognitive Skill Dev, OT Neuro Re-Ed/Balance, OT Therapeutic Activity, OT Evaluation, and OT Therapeutic Exercise              Goal: LTG-By discharge, patient will perform bathroom transfers     Dates: Start: 04/07/21       Description: 1) Individualized Goal: Mod I for bathroom transfers via DME PRN  2) Interventions:  OT Self Care/ADL, OT Cognitive Skill Dev, OT Neuro Re-Ed/Balance, OT Therapeutic Activity, OT Evaluation, and OT Therapeutic Exercise                    Problem: Toileting     Dates: Start: 04/12/21       Goal: STG-Within one  week, patient will complete toileting tasks     Dates: Start: 04/12/21       Description: 1) Individualized Goal:  Supervised with AE/AD/techniques  2) Interventions:  OT Self Care/ADL, OT Cognitive Skill Dev, OT Neuro Re-Ed/Balance, OT Therapeutic Activity, OT Evaluation, and OT Therapeutic Exercise

## 2021-04-17 NOTE — THERAPY
"Speech Language Pathology  Daily Treatment     Patient Name: Cooper Harvey  Age:  86 y.o., Sex:  male  Medical Record #: [id number]  Today's Date: 4/17/2021     Precautions  Precautions: Fall Risk  Comments: Pt is currently wearing a Zio patch heart monitor (grey case needs to be within 6ft of pt at all times); Impulsive; [place]; impaired cog    Subjective    Patient was finishing breakfast when SLP arrived. He requested assistance getting to the restroom to wash his hands to put in his HAs. He was agreeable to join SLP in the speech office for therapy. He demonstrated improved insight, stating \"This stuff makes me realize I haven't done anything since I retired. I used to be able to do this and can't now\" SLP discussed adding brain games to his hobbies to work on attention and memory.       Objective       04/17/21 0831   Precautions   Precautions Fall Risk   Reading Comprehension    Functional Reading Materials Minimal (4)   Cognition   Simple Attention Supervision (5)   Moderate Attention Minimal (4)   Sleep/Wake Cycle   Sleep & Rest Awake;Out of bed   Interdisciplinary Plan of Care Collaboration   Patient Position at End of Therapy Seated;Call Light within Reach;Tray Table within Reach;Phone within Reach   SLP Total Time Spent   SLP Individual Total Time Spent (Mins) 60   Treatment Charges   SLP Cognitive Skill Development First 15 Minutes 1   SLP Cognitive Skill Development Additional 15 Minutes 3       Assessment    SLP provided patient with functional reading material. He completed with 83-87% accuracy independently, with increased time to complete each item. His accuracy improved to 100% with minimal verbal/point cues for where to orient in the text.     Strengths: Able to follow instructions, Alert and oriented, Pleasant and cooperative, Supportive family  Barriers: Impaired functional cognition    Plan    Cont tx to target functional problem solving and attention.    Passport items to be " completed:  Express basic needs, understand food/liquid recommendations, consistently follow swallow precautions, manage finances, manage medications, arrive to therapy appointments on time, complete daily memory log entries, solve problems related to safety situations, review education related to hospitalization, complete caregiver training     Speech Therapy Problems     Problem: Memory STGs     Dates: Start: 04/08/21       Goal: STG-Within one week, patient will     Dates: Start: 04/08/21       Description: 1) Individualized goal:  recall daily events and safety sequencing with 70% acc with use of memory log and memory strategies.  2) Interventions:  SLP Self Care / ADL Training , SLP Cognitive Skill Development, and SLP Group Treatment        Note:     Goal Note filed on 04/12/21 1210 by Meseret Steven MS,CCC-SLP    Mod A for 70% acc.  Difficulty reading own writing impacts ability to independently record on his memory log.                        Problem: Problem Solving STGs     Dates: Start: 04/07/21       Goal: STG-Within one week, patient will solve basic problems     Dates: Start: 04/08/21       Description: 1) Individualized goal:  related to safety and hospitalization with min A with 80% accuracy.   2) Interventions:  SLP Speech Language Treatment, SLP Self Care / ADL Training , SLP Cognitive Skill Development, and SLP Group Treatment        Note:     Goal Note filed on 04/12/21 1210 by Meseret Setven MS,CCC-SLP    Mod verbal cues and prompting needed.                  Goal: STG-Within one week, patient will     Dates: Start: 04/08/21       Description: 1) Individualized goal:  will perform selective attention tasks with 80% acc with min cues to improve to 100%  2) Interventions:  SLP Self Care / ADL Training , SLP Cognitive Skill Development, and SLP Group Treatment        Note:     Goal Note filed on 04/12/21 1210 by Meseret Steven MS,CCC-SLP    Min to mod cues needed for 80% acc.                         Problem: Speech/Swallowing LTGs     Dates: Start: 04/07/21       Goal: LTG-By discharge, patient will solve basic problems     Dates: Start: 04/07/21       Description: 1) Individualized goal:  given spv for a safe d/c home  2) Interventions:  SLP Speech Language Treatment, SLP Self Care / ADL Training , SLP Cognitive Skill Development, and SLP Group Treatment

## 2021-04-18 LAB
ANION GAP SERPL CALC-SCNC: 3 MMOL/L (ref 7–16)
BUN SERPL-MCNC: 33 MG/DL (ref 8–22)
CALCIUM SERPL-MCNC: 8.6 MG/DL (ref 8.5–10.5)
CHLORIDE SERPL-SCNC: 104 MMOL/L (ref 96–112)
CO2 SERPL-SCNC: 27 MMOL/L (ref 20–33)
CREAT SERPL-MCNC: 1.12 MG/DL (ref 0.5–1.4)
GLUCOSE SERPL-MCNC: 85 MG/DL (ref 65–99)
MAGNESIUM SERPL-MCNC: 2.6 MG/DL (ref 1.5–2.5)
POTASSIUM SERPL-SCNC: 4.3 MMOL/L (ref 3.6–5.5)
SODIUM SERPL-SCNC: 134 MMOL/L (ref 135–145)
TROPONIN T SERPL-MCNC: 23 NG/L (ref 6–19)

## 2021-04-18 PROCEDURE — 700102 HCHG RX REV CODE 250 W/ 637 OVERRIDE(OP): Performed by: PHYSICAL MEDICINE & REHABILITATION

## 2021-04-18 PROCEDURE — A9270 NON-COVERED ITEM OR SERVICE: HCPCS | Performed by: PHYSICAL MEDICINE & REHABILITATION

## 2021-04-18 PROCEDURE — 99231 SBSQ HOSP IP/OBS SF/LOW 25: CPT | Performed by: PHYSICAL MEDICINE & REHABILITATION

## 2021-04-18 PROCEDURE — 83735 ASSAY OF MAGNESIUM: CPT

## 2021-04-18 PROCEDURE — 80048 BASIC METABOLIC PNL TOTAL CA: CPT

## 2021-04-18 PROCEDURE — 770010 HCHG ROOM/CARE - REHAB SEMI PRIVAT*

## 2021-04-18 PROCEDURE — 84484 ASSAY OF TROPONIN QUANT: CPT

## 2021-04-18 PROCEDURE — 97535 SELF CARE MNGMENT TRAINING: CPT

## 2021-04-18 PROCEDURE — 97116 GAIT TRAINING THERAPY: CPT

## 2021-04-18 PROCEDURE — 700111 HCHG RX REV CODE 636 W/ 250 OVERRIDE (IP): Performed by: PHYSICAL MEDICINE & REHABILITATION

## 2021-04-18 PROCEDURE — 97130 THER IVNTJ EA ADDL 15 MIN: CPT | Performed by: SPEECH-LANGUAGE PATHOLOGIST

## 2021-04-18 PROCEDURE — 97110 THERAPEUTIC EXERCISES: CPT

## 2021-04-18 PROCEDURE — 36415 COLL VENOUS BLD VENIPUNCTURE: CPT

## 2021-04-18 PROCEDURE — 97129 THER IVNTJ 1ST 15 MIN: CPT | Performed by: SPEECH-LANGUAGE PATHOLOGIST

## 2021-04-18 PROCEDURE — 97530 THERAPEUTIC ACTIVITIES: CPT

## 2021-04-18 PROCEDURE — 97112 NEUROMUSCULAR REEDUCATION: CPT

## 2021-04-18 RX ADMIN — LISINOPRIL 10 MG: 5 TABLET ORAL at 05:11

## 2021-04-18 RX ADMIN — ATORVASTATIN CALCIUM 40 MG: 40 TABLET, FILM COATED ORAL at 20:29

## 2021-04-18 RX ADMIN — ASPIRIN 81 MG CHEWABLE TABLET 81 MG: 81 TABLET CHEWABLE at 08:34

## 2021-04-18 RX ADMIN — MELATONIN TAB 3 MG 3 MG: 3 TAB at 20:29

## 2021-04-18 RX ADMIN — SENNOSIDES AND DOCUSATE SODIUM 1 TABLET: 8.6; 5 TABLET ORAL at 20:28

## 2021-04-18 RX ADMIN — BISACODYL 10 MG: 10 SUPPOSITORY RECTAL at 18:26

## 2021-04-18 RX ADMIN — SENNOSIDES AND DOCUSATE SODIUM 1 TABLET: 8.6; 5 TABLET ORAL at 08:34

## 2021-04-18 RX ADMIN — ENOXAPARIN SODIUM 40 MG: 40 INJECTION SUBCUTANEOUS at 17:45

## 2021-04-18 RX ADMIN — QUETIAPINE FUMARATE 25 MG: 25 TABLET ORAL at 20:29

## 2021-04-18 ASSESSMENT — GAIT ASSESSMENTS
ASSISTIVE DEVICE: FRONT WHEEL WALKER
GAIT LEVEL OF ASSIST: CONTACT GUARD ASSIST
DISTANCE (FEET): 480
DEVIATION: DECREASED HEEL STRIKE;DECREASED TOE OFF

## 2021-04-18 ASSESSMENT — ACTIVITIES OF DAILY LIVING (ADL)
TOILET_TRANSFER_DESCRIPTION: GRAB BAR;INCREASED TIME;INITIAL PREPARATION FOR TASK;SET-UP OF EQUIPMENT;SUPERVISION FOR SAFETY;VERBAL CUEING
BED_CHAIR_WHEELCHAIR_TRANSFER_DESCRIPTION: INCREASED TIME;INITIAL PREPARATION FOR TASK;SET-UP OF EQUIPMENT;SQUAT PIVOT TRANSFER TO WHEELCHAIR;SUPERVISION FOR SAFETY;VERBAL CUEING
TOILETING_LEVEL_OF_ASSIST_DESCRIPTION: GRAB BAR;INCREASED TIME;INITIAL PREPARATION FOR TASK;SET-UP OF EQUIPMENT;VERBAL CUEING;SUPERVISION FOR SAFETY

## 2021-04-18 ASSESSMENT — PATIENT HEALTH QUESTIONNAIRE - PHQ9
2. FEELING DOWN, DEPRESSED, IRRITABLE, OR HOPELESS: NOT AT ALL
1. LITTLE INTEREST OR PLEASURE IN DOING THINGS: NOT AT ALL
SUM OF ALL RESPONSES TO PHQ9 QUESTIONS 1 AND 2: 0

## 2021-04-18 ASSESSMENT — PAIN DESCRIPTION - PAIN TYPE
TYPE: ACUTE PAIN
TYPE: ACUTE PAIN

## 2021-04-18 ASSESSMENT — FIBROSIS 4 INDEX: FIB4 SCORE: 1.2

## 2021-04-18 NOTE — CARE PLAN
Problem: Safety  Goal: Will remain free from injury  Note: Call light with in reach. Redirection to use call light for assistance. Non skid socks. Upper siderails up x2 while in bed.      Problem: Bowel/Gastric:  Goal: Normal bowel function is maintained or improved  Note: PRN MOM given for no BM results from PRN Miralax. Last BM 4/14/21. Encouraged increase fluids intake, eat fiber foods and exercise. Will monitor.

## 2021-04-18 NOTE — THERAPY
"Occupational Therapy  Daily Treatment     Patient Name: Cooper Harvey  Age:  86 y.o., Sex:  male  Medical Record #: [id number]  Today's Date: 4/18/2021     Precautions  Precautions: Fall Risk  Comments: Pt is currently wearing a Zio patch heart monitor (grey case needs to be within 6ft of pt at all times); [facility], [place]         Subjective    \"I almost lost these in the hospital, I was so naomi that didn't happen and they were able to find them.\" pt reported about his hearing aides.      Objective       04/18/21 0831   Precautions   Precautions Fall Risk   Comments Pt is currently wearing a Zio patch heart monitor (grey case needs to be within 6ft of pt at all times); [facility] [place]   Pain   Intervention Declines   Pain 0 - 10 Group   Pain Rating Scale (NPRS) 0   Therapist Pain Assessment Post Activity Pain Same as Prior to Activity   Cognition    Speech/ Communication Hard of Hearing   Level of Consciousness Alert   Ability To Follow Commands 2 Step   Safety Awareness Impaired   Attention Impaired   ABS (Agitated Behavior Scale)   Agitated Behavior Scale Performed No   Functional Level of Assist   Grooming Supervision   Grooming Description Increased time;Seated in wheelchair at sink;Set-up of equipment;Supervision for safety  (oral care, brushing hair, washing hands)   Upper Body Dressing Supervision   Upper Body Dressing Description Increased time;Initial preparation for task;Set-up of equipment  (doff/don pull over shirt seated in w/c)   Lower Body Dressing Stand by Assist   Lower Body Dressing Description Increased time;Initial preparation for task;Set-up of equipment;Supervision for safety;Verbal cueing;Grab bar  (doff/don pants/underwear/socks)   Toileting Stand by Assist   Toileting Description Grab bar;Increased time;Initial preparation for task;Set-up of equipment;Verbal cueing;Supervision for safety   Bed, Chair, Wheelchair Transfer Contact Guard Assist   Bed Chair Wheelchair Transfer Description Increased time;Initial " preparation for task;Set-up of equipment;Squat pivot transfer to wheelchair;Supervision for safety;Verbal cueing   Toilet Transfers Stand by Assist   Toilet Transfer Description Grab bar;Increased time;Initial preparation for task;Set-up of equipment;Supervision for safety;Verbal cueing   Sitting Upper Body Exercises   Chest Fly 2 sets of 10;Bilateral;Heavy Resistance Theraband   Side Arm Raise 2 sets of 10;Bilateral;Heavy Resistance Theraband   Shoulder Press 1 set of 10;Bilateral;Heavy Resistance Theraband   Comments blue thera-band   Bed Mobility    Supine to Sit Stand by Assist   Scooting Stand by Assist   Rolling Supervised   Interdisciplinary Plan of Care Collaboration   Patient Position at End of Therapy Seated;Chair Alarm On;Self Releasing Lap Belt Applied;Call Light within Reach;Tray Table within Reach;Phone within Reach   OT Total Time Spent   OT Individual Total Time Spent (Mins) 60   OT Charge Group   OT Self Care / ADL 3   OT Therapeutic Exercise  1       Assessment    Pt tolerated session well. Pt motivated to complete morning ADL routine- able to complete all tasks at SBA-supervision level from w/c. Increased time needed to put in hearing aides. Pt reported difficulty with reading at this time, wife is going to bring in reading glasses. Educated pt on various UB strengthening exercises using blue thera-band in room- pt might benefit from lower resistive band next session.     Strengths: Independent prior level of function, Motivated for self care and independence, Pleasant and cooperative, Willingly participates in therapeutic activities  Barriers: Bladder incontinence, Decreased endurance, Generalized weakness, Hearing impairment, Impaired activity tolerance, Impaired balance, Impulsive, Confused, Limited mobility, Visual impairment    Plan    ADLs, functional mobility, safety with transfers, strength/endurance/coordination    Occupational Therapy Goals     Problem: Dressing     Dates: Start:  04/12/21       Goal: STG-Within one week, patient will dress LB     Dates: Start: 04/12/21       Description: 1) Individualized Goal:  with supervision using AE/AD/techniques  2) Interventions:  OT Self Care/ADL, OT Cognitive Skill Dev, OT Neuro Re-Ed/Balance, OT Therapeutic Activity, OT Evaluation, and OT Therapeutic Exercise                  Problem: Functional Transfers     Dates: Start: 04/07/21       Goal: STG-Within one week, patient will transfer to toilet     Dates: Start: 04/07/21       Description: 1) Individualized Goal:  Sup/SBA for commode transfer via DME PRN  2) Interventions:  OT Self Care/ADL, OT Cognitive Skill Dev, OT Neuro Re-Ed/Balance, OT Therapeutic Activity, OT Evaluation, and OT Therapeutic Exercise        Note:     Goal Note filed on 04/12/21 1123 by Chema Funk, OT/L    CGA                        Problem: OT Long Term Goals     Dates: Start: 04/07/21       Goal: LTG-By discharge, patient will complete basic self care tasks     Dates: Start: 04/07/21       Description: 1) Individualized Goal:  Mod I for BADL's via AE/DME PRN  2) Interventions:  OT Self Care/ADL, OT Cognitive Skill Dev, OT Neuro Re-Ed/Balance, OT Therapeutic Activity, OT Evaluation, and OT Therapeutic Exercise              Goal: LTG-By discharge, patient will perform bathroom transfers     Dates: Start: 04/07/21       Description: 1) Individualized Goal: Mod I for bathroom transfers via DME PRN  2) Interventions:  OT Self Care/ADL, OT Cognitive Skill Dev, OT Neuro Re-Ed/Balance, OT Therapeutic Activity, OT Evaluation, and OT Therapeutic Exercise                    Problem: Toileting     Dates: Start: 04/12/21       Goal: STG-Within one week, patient will complete toileting tasks     Dates: Start: 04/12/21       Description: 1) Individualized Goal:  Supervised with AE/AD/techniques  2) Interventions:  OT Self Care/ADL, OT Cognitive Skill Dev, OT Neuro Re-Ed/Balance, OT Therapeutic Activity, OT Evaluation, and OT  Therapeutic Exercise

## 2021-04-18 NOTE — THERAPY
"Speech Language Pathology  Daily Treatment     Patient Name: Cooper Harvey  Age:  86 y.o., Sex:  male  Medical Record #: [id number]  Today's Date: 4/18/2021     Precautions  Precautions: Fall Risk  Comments: Pt is currently wearing a Zio patch heart monitor (grey case needs to be within 6ft of pt at all times); [place]    Subjective    Patient was in bed when SLP arrived and agreeable to tx in the speech office. Patient is very [place] and requires all verbal information also be given in written form. Patient was alert and cooperative with tx.      Objective       04/18/21 1401   Precautions   Precautions Fall Risk   Cognition   Moderate Attention Minimal (4)   Medication Management  Moderate (3)   Sleep/Wake Cycle   Sleep & Rest Awake;Out of bed   Interdisciplinary Plan of Care Collaboration   Patient Position at End of Therapy In Bed;Call Light within Reach;Tray Table within Reach;Phone within Reach   SLP Total Time Spent   SLP Individual Total Time Spent (Mins) 60   Treatment Charges   SLP Cognitive Skill Development First 15 Minutes 1   SLP Cognitive Skill Development Additional 15 Minutes 3       Assessment    SLP provided patient with a list of his current medication. He reported that his wife manages his medication and stated \"I just take whatever she gives me\".  SLP assisted patient to create a list for scheduled medications and a list for things he can ask for if he needs them. Patient required mod-max cues to locate the name and frequency on the labels.     SLP also provided patient with a 36-unit word search to target attention and visual scanning. Patient completed with 100% accuracy and minimal verbal/point cues. He attempted a much larger word search but didn't finish due to time constraints. He requested to take it with him to finish in his room.     Strengths: Able to follow instructions, Alert and oriented, Pleasant and cooperative, Supportive family  Barriers: Impaired functional " cognition    Plan    Cont tx to target attention and memory.     Passport items to be completed:  Express basic needs, understand food/liquid recommendations, consistently follow swallow precautions, manage finances, manage medications, arrive to therapy appointments on time, complete daily memory log entries, solve problems related to safety situations, review education related to hospitalization, complete caregiver training     Speech Therapy Problems     Problem: Memory STGs     Dates: Start: 04/08/21       Goal: STG-Within one week, patient will     Dates: Start: 04/08/21       Description: 1) Individualized goal:  recall daily events and safety sequencing with 70% acc with use of memory log and memory strategies.  2) Interventions:  SLP Self Care / ADL Training , SLP Cognitive Skill Development, and SLP Group Treatment        Note:     Goal Note filed on 04/12/21 1210 by Meseret Steven MS,CCC-SLP    Mod A for 70% acc.  Difficulty reading own writing impacts ability to independently record on his memory log.                        Problem: Problem Solving STGs     Dates: Start: 04/07/21       Goal: STG-Within one week, patient will solve basic problems     Dates: Start: 04/08/21       Description: 1) Individualized goal:  related to safety and hospitalization with min A with 80% accuracy.   2) Interventions:  SLP Speech Language Treatment, SLP Self Care / ADL Training , SLP Cognitive Skill Development, and SLP Group Treatment        Note:     Goal Note filed on 04/12/21 1210 by Meseret Steven MS,CCC-SLP    Mod verbal cues and prompting needed.                  Goal: STG-Within one week, patient will     Dates: Start: 04/08/21       Description: 1) Individualized goal:  will perform selective attention tasks with 80% acc with min cues to improve to 100%  2) Interventions:  SLP Self Care / ADL Training , SLP Cognitive Skill Development, and SLP Group Treatment        Note:     Goal Note filed on 04/12/21 1210 by  Meseret Steven MS,CCC-SLP    Min to mod cues needed for 80% acc.                        Problem: Speech/Swallowing LTGs     Dates: Start: 04/07/21       Goal: LTG-By discharge, patient will solve basic problems     Dates: Start: 04/07/21       Description: 1) Individualized goal:  given spv for a safe d/c home  2) Interventions:  SLP Speech Language Treatment, SLP Self Care / ADL Training , SLP Cognitive Skill Development, and SLP Group Treatment

## 2021-04-18 NOTE — PROGRESS NOTES
"Rehab Progress Note     Encounter Date: 4/18/2021    CC: stroke    Interval Events (Subjective)  Patient was seen in his room comfortably sitting in his wheelchair.    Still having changes in vission lasting for about 15-30 min, intermittent. Goes away on its own.     Hasn't had a BM in last couple of days. Last BM: 04/14/21(per report), received prn bowel meds yesterday.     Participating well with therapy  Sleeping well at night    ROS:  Gen: No fatigue, confusion, significant weight loss  Eyes: Remittent blurry vision and double vision no pain in eyes  ENT: no changes in hearing, runny nose, nose bleeds, sinus pain  CV: No CP, palpitations,   Lungs: no shortness of breath, changes in secretions, changes in cough, pain with coughing  Abd: No abd pain, nausea, vomiting, pain with eating  : no blood in urine, pain during storage phase, bladder spasms, suprapubic pain  Ext: No swelling in legs, asymmetric atrophy  Neuro: no changes in strength or sensation  Skin: no new ulcers/skin breakdown appreciated by patient  Mood: No changes in mood today, increase in depression or anxiety  Heme: no bruising, or bleeding  Rest of 14 point review of systems is negative    Objective:  Vitals: /66   Pulse 62   Temp 36.6 °C (97.8 °F) (Oral)   Resp 18   Ht 1.768 m (5' 9.6\")   Wt 75 kg (165 lb 5.5 oz)   SpO2 96%   Gen: NAD, Body mass index is 24 kg/m².  HEENT:  NC/AT, PERRLA, moist mucous membranes  Cardio: RRR, no mumurs  Pulm: CTAB, with normal respiratory effort  Abd: Soft NTND, active bowel sounds,   Ext: No peripheral edema. No calf tenderness. No clubbing/cyanosis. +dorsal pedalis pulses bilaterally.      Recent Results (from the past 72 hour(s))   SARS-CoV-2, PCR (In-House)    Collection Time: 04/15/21  8:30 PM   Result Value Ref Range    SARS-CoV-2 Source NP Swab     SARS-CoV-2 by PCR NotDetected    CBC WITH DIFFERENTIAL    Collection Time: 04/16/21  5:17 AM   Result Value Ref Range    WBC 6.8 4.8 - 10.8 K/uL "    RBC 4.56 (L) 4.70 - 6.10 M/uL    Hemoglobin 14.2 14.0 - 18.0 g/dL    Hematocrit 43.1 42.0 - 52.0 %    MCV 94.5 81.4 - 97.8 fL    MCH 31.1 27.0 - 33.0 pg    MCHC 32.9 (L) 33.7 - 35.3 g/dL    RDW 44.7 35.9 - 50.0 fL    Platelet Count 316 164 - 446 K/uL    MPV 10.9 9.0 - 12.9 fL    Neutrophils-Polys 68.30 44.00 - 72.00 %    Lymphocytes 13.00 (L) 22.00 - 41.00 %    Monocytes 12.00 0.00 - 13.40 %    Eosinophils 5.30 0.00 - 6.90 %    Basophils 1.00 0.00 - 1.80 %    Immature Granulocytes 0.40 0.00 - 0.90 %    Nucleated RBC 0.00 /100 WBC    Neutrophils (Absolute) 4.65 1.82 - 7.42 K/uL    Lymphs (Absolute) 0.89 (L) 1.00 - 4.80 K/uL    Monos (Absolute) 0.82 0.00 - 0.85 K/uL    Eos (Absolute) 0.36 0.00 - 0.51 K/uL    Baso (Absolute) 0.07 0.00 - 0.12 K/uL    Immature Granulocytes (abs) 0.03 0.00 - 0.11 K/uL    NRBC (Absolute) 0.00 K/uL   Comp Metabolic Panel    Collection Time: 04/16/21  5:17 AM   Result Value Ref Range    Sodium 138 135 - 145 mmol/L    Potassium 4.6 3.6 - 5.5 mmol/L    Chloride 103 96 - 112 mmol/L    Co2 28 20 - 33 mmol/L    Anion Gap 7.0 7.0 - 16.0    Glucose 78 65 - 99 mg/dL    Bun 35 (H) 8 - 22 mg/dL    Creatinine 1.23 0.50 - 1.40 mg/dL    Calcium 8.8 8.5 - 10.5 mg/dL    AST(SGOT) 22 12 - 45 U/L    ALT(SGPT) 25 2 - 50 U/L    Alkaline Phosphatase 88 30 - 99 U/L    Total Bilirubin 0.3 0.1 - 1.5 mg/dL    Albumin 3.6 3.2 - 4.9 g/dL    Total Protein 6.6 6.0 - 8.2 g/dL    Globulin 3.0 1.9 - 3.5 g/dL    A-G Ratio 1.2 g/dL   MAGNESIUM    Collection Time: 04/16/21  5:17 AM   Result Value Ref Range    Magnesium 2.7 (H) 1.5 - 2.5 mg/dL   PHOSPHORUS    Collection Time: 04/16/21  5:17 AM   Result Value Ref Range    Phosphorus 3.5 2.5 - 4.5 mg/dL   TROPONIN    Collection Time: 04/16/21  5:17 AM   Result Value Ref Range    Troponin T 22 (H) 6 - 19 ng/L   ESTIMATED GFR    Collection Time: 04/16/21  5:17 AM   Result Value Ref Range    GFR If African American >60 >60 mL/min/1.73 m 2    GFR If Non  56 (A)  >60 mL/min/1.73 m 2   Basic Metabolic Panel    Collection Time: 04/18/21  5:40 AM   Result Value Ref Range    Sodium 134 (L) 135 - 145 mmol/L    Potassium 4.3 3.6 - 5.5 mmol/L    Chloride 104 96 - 112 mmol/L    Co2 27 20 - 33 mmol/L    Glucose 85 65 - 99 mg/dL    Bun 33 (H) 8 - 22 mg/dL    Creatinine 1.12 0.50 - 1.40 mg/dL    Calcium 8.6 8.5 - 10.5 mg/dL    Anion Gap 3.0 (L) 7.0 - 16.0   TROPONIN    Collection Time: 04/18/21  5:40 AM   Result Value Ref Range    Troponin T 23 (H) 6 - 19 ng/L   MAGNESIUM    Collection Time: 04/18/21  5:40 AM   Result Value Ref Range    Magnesium 2.6 (H) 1.5 - 2.5 mg/dL   ESTIMATED GFR    Collection Time: 04/18/21  5:40 AM   Result Value Ref Range    GFR If African American >60 >60 mL/min/1.73 m 2    GFR If Non African American >60 >60 mL/min/1.73 m 2       Current Facility-Administered Medications   Medication Frequency   • senna-docusate (PERICOLACE or SENOKOT S) 8.6-50 MG per tablet 1 tablet BID    And   • polyethylene glycol/lytes (MIRALAX) PACKET 1 Packet QDAY PRN    And   • magnesium hydroxide (MILK OF MAGNESIA) suspension 30 mL QDAY PRN    And   • bisacodyl (DULCOLAX) suppository 10 mg QDAY PRN   • atorvastatin (LIPITOR) tablet 40 mg Q EVENING   • lisinopril (PRINIVIL) tablet 10 mg Q DAY   • QUEtiapine (Seroquel) tablet 25 mg Nightly   • melatonin tablet 3 mg QHS   • ziprasidone (Geodon) injection 20 mg Q2HRS PRN   • hydrOXYzine HCl (ATARAX) tablet 50 mg Q6HRS PRN   • Respiratory Therapy Consult Continuous RT   • Pharmacy Consult Request ...Pain Management Review 1 Each PHARMACY TO DOSE   • hydrALAZINE (APRESOLINE) tablet 10 mg Q8HRS PRN   • acetaminophen (Tylenol) tablet 650 mg Q4HRS PRN   • lactulose 20 GM/30ML solution 30 mL QDAY PRN   • docusate sodium (ENEMEEZ) enema 283 mg QDAY PRN   • fleet enema 133 mL QDAY PRN   • artificial tears ophthalmic solution 1 Drop PRN   • benzocaine-menthol (CEPACOL) lozenge 1 Lozenge Q2HRS PRN   • mag hydrox-al hydrox-simeth (MAALOX PLUS ES  or MYLANTA DS) suspension 20 mL Q2HRS PRN   • ondansetron (ZOFRAN ODT) dispertab 4 mg 4X/DAY PRN    Or   • ondansetron (ZOFRAN) syringe/vial injection 4 mg 4X/DAY PRN   • traZODone (DESYREL) tablet 50 mg QHS PRN   • sodium chloride (OCEAN) 0.65 % nasal spray 2 Spray PRN   • midazolam (VERSED) 5 mg/mL (1 mL vial) PRN   • enoxaparin (LOVENOX) inj 40 mg DAILY AT 1800   • aspirin (ASA) chewable tab 81 mg DAILY   • QUEtiapine (Seroquel) tablet 25 mg TID PRN       Orders Placed This Encounter   Procedures   • Diet Order Diet: Level 7 - Easy to Chew; Liquid level: Level 0 - Thin     Standing Status:   Standing     Number of Occurrences:   1     Order Specific Question:   Diet:     Answer:   Level 7 - Easy to Chew [22]     Order Specific Question:   Liquid level     Answer:   Level 0 - Thin       Assessment:  Active Hospital Problems    Diagnosis    • *Acute CVA (cerebrovascular accident) (HCC)    • Carotid stenosis, bilateral    • Altered mental status, unspecified    • Mixed hyperlipidemia    • BPH (benign prostatic hyperplasia)    • Sundowning    • Hard of hearing    • Sinus bradycardia    • Hypokalemia    • Anemia    • S/P carotid endarterectomy    • Agitation        Medical Decision Making and Plan:  CVA: Right frontal lobe, right cerebellum, right occipital lobe with visual complaints and cognitive impairment  -Aspirin and statin for secondary prophylaxis  -Continue comprehensive acute inpatient rehabilitation    Hypertension: -142 in the last 24 hours, no episodes of orthostatic hypotension  -Goal of normotensive without episodes of hypotension  -Lisinopril 10 mg daily    Hyponatremia: Sodium of 134 on 4/18  -Check BMP in a.m.    Hypomagnesemia: Magnesium level 2.6 on 4/18  -Check magnesium level in a.m.    Total time:  15 minutes.  I spent greater than 50% of the time for patient care and coordination on this date, including unit/floor time, and face-to-face time with the patient as per assessment and plan  above including CVA, discussed visual symptoms, hypertension, no change and BP given episodes of SBP down to 110, hyponatremia and hypomagnesemia, check levels in a.jared.    Willie Babb D.O.

## 2021-04-18 NOTE — THERAPY
"Physical Therapy   Daily Treatment     Patient Name: Cooper Harvey  Age:  86 y.o., Sex:  male  Medical Record #: [id number]  Today's Date: 4/18/2021     Precautions  Precautions: (P) Fall Risk  Comments: (P) Pt is currently wearing a Zio patch heart monitor (grey case needs to be within 6ft of pt at all times); [place]    Subjective    Patient seated in w/c upon arrival agreeable to therapy. Patient reports having a headache this morning, however feeling better now.      Objective       04/18/21 1031   Precautions   Precautions Fall Risk   Comments Pt is currently wearing a Zio patch heart monitor (grey case needs to be within 6ft of pt at all times); [place]   Cognition    Speech/ Communication Hard of Hearing   Gait Functional Level of Assist    Gait Level Of Assist Contact Guard Assist   Assistive Device Front Wheel Walker   Distance (Feet) 480  (480' x1 (outdoors); 205' x1 (indoors))   # of Times Distance was Traveled 1   Deviation Decreased Heel Strike;Decreased Toe Off  (L lateral heel whip )   Stairs Functional Level of Assist   Level of Assist with Stairs Contact Guard Assist   # of Stairs Climbed 8  (Consecutive 6\" steps (4 step-to and 4 step-through) 2HR)   Stairs Description Hand rails;Supervision for safety;Verbal cueing  (2nd set 4 consecutive 6\" with one HR)   Bed Mobility    Sit to Stand Stand by Assist   Neuro-Muscular Treatments   Neuro-Muscular Treatments Anterior weight shift;Postural Facilitation;Sequencing;Verbal Cuing;Weight Shift Right;Weight Shift Left   Comments Environmental scanning with outdoor amb including ramps and curb step. Bowling with LUE support challenging dynamic balance 8 min   Interdisciplinary Plan of Care Collaboration   Patient Position at End of Therapy Seated;Chair Alarm On;Call Light within Reach;Tray Table within Reach   Strengths & Barriers   Strengths Adequate strength;Independent prior level of function;Pleasant and cooperative;Willingly participates in therapeutic " "activities   Barriers Decreased endurance;Difficulty following instructions;Hearing impairment;Home accessibility;Impaired balance;Impaired carryover of learning;Limited mobility;Impaired activity tolerance   PT Total Time Spent   PT Individual Total Time Spent (Mins) 60   PT Charge Group   PT Gait Training 2   PT Neuromuscular Re-Education / Balance 1   PT Therapeutic Activities 1     In/out of car transfer completed with SBA.  Educated patient in POC in context with the passport.     Assessment    Patient demonstrates improvement in functional mobility and endurance today. Patient progressed to completing 8 consecutive steps with 2HR and 4 consecutive steps with 1HR. Assistance still required at this time for safety. Patient able to navigate obstacles outdoors with good environmental scanning, including curb step with the FWW.     Strengths: (P) Adequate strength, Independent prior level of function, Pleasant and cooperative, Willingly participates in therapeutic activities  Barriers: (P) Decreased endurance, Difficulty following instructions, Hearing impairment, Home accessibility, Impaired balance, Impaired carryover of learning, Limited mobility, Impaired activity tolerance    Plan    Plan to d/c 4/21/2021, gait with FWW, 5 6\" stairs with one rail, coordination exercise, static and dynamic balance.     Passport items to be completed:  Get in/out of bed safely, walk or wheel around home/community, show how to get up/down from the ground,complete caregiver training      Physical Therapy Problems     Problem: Mobility     Dates: Start: 04/12/21       Goal: STG-Within one week, patient will ambulate community distances     Dates: Start: 04/12/21       Description: 1) Individualized goal:   feet with FWW and SBA  2) Interventions:  PT Gait Training, PT Therapeutic Exercises, PT Neuro Re-Ed/Balance, PT Therapeutic Activity, and PT Evaluation            Goal: STG-Within one week, patient will     Dates: Start: " "04/12/21       Description: 1) Individualized goal: negotiate 4-6, 6\" steps with 2 rails and CGA  2) Interventions:  PT Gait Training, PT Therapeutic Exercises, PT Neuro Re-Ed/Balance, PT Therapeutic Activity, and PT Evaluation                  Problem: Mobility Transfers     Dates: Start: 04/12/21       Goal: STG-Within one week, patient will transfer bed to chair     Dates: Start: 04/12/21       Description: 1) Individualized goal:  SPT with FWW and supervision   2) Interventions:  PT Gait Training, PT Therapeutic Exercises, PT Neuro Re-Ed/Balance, PT Therapeutic Activity, and PT Evaluation                      "

## 2021-04-19 LAB
ANION GAP SERPL CALC-SCNC: 4 MMOL/L (ref 7–16)
BUN SERPL-MCNC: 29 MG/DL (ref 8–22)
CALCIUM SERPL-MCNC: 8.8 MG/DL (ref 8.5–10.5)
CHLORIDE SERPL-SCNC: 102 MMOL/L (ref 96–112)
CO2 SERPL-SCNC: 29 MMOL/L (ref 20–33)
CREAT SERPL-MCNC: 1.17 MG/DL (ref 0.5–1.4)
GLUCOSE SERPL-MCNC: 79 MG/DL (ref 65–99)
MAGNESIUM SERPL-MCNC: 2.5 MG/DL (ref 1.5–2.5)
POTASSIUM SERPL-SCNC: 4.4 MMOL/L (ref 3.6–5.5)
SODIUM SERPL-SCNC: 135 MMOL/L (ref 135–145)

## 2021-04-19 PROCEDURE — 97129 THER IVNTJ 1ST 15 MIN: CPT

## 2021-04-19 PROCEDURE — 83735 ASSAY OF MAGNESIUM: CPT

## 2021-04-19 PROCEDURE — 97116 GAIT TRAINING THERAPY: CPT

## 2021-04-19 PROCEDURE — 97535 SELF CARE MNGMENT TRAINING: CPT

## 2021-04-19 PROCEDURE — A9270 NON-COVERED ITEM OR SERVICE: HCPCS | Performed by: PHYSICAL MEDICINE & REHABILITATION

## 2021-04-19 PROCEDURE — 80048 BASIC METABOLIC PNL TOTAL CA: CPT

## 2021-04-19 PROCEDURE — 700102 HCHG RX REV CODE 250 W/ 637 OVERRIDE(OP): Performed by: PHYSICAL MEDICINE & REHABILITATION

## 2021-04-19 PROCEDURE — 99233 SBSQ HOSP IP/OBS HIGH 50: CPT | Performed by: PHYSICAL MEDICINE & REHABILITATION

## 2021-04-19 PROCEDURE — 36415 COLL VENOUS BLD VENIPUNCTURE: CPT

## 2021-04-19 PROCEDURE — 770010 HCHG ROOM/CARE - REHAB SEMI PRIVAT*

## 2021-04-19 PROCEDURE — 97530 THERAPEUTIC ACTIVITIES: CPT

## 2021-04-19 PROCEDURE — 97130 THER IVNTJ EA ADDL 15 MIN: CPT

## 2021-04-19 PROCEDURE — 97112 NEUROMUSCULAR REEDUCATION: CPT

## 2021-04-19 RX ORDER — AMOXICILLIN 250 MG
2 CAPSULE ORAL 2 TIMES DAILY
Status: DISCONTINUED | OUTPATIENT
Start: 2021-04-19 | End: 2021-04-19

## 2021-04-19 RX ORDER — BISACODYL 10 MG
10 SUPPOSITORY, RECTAL RECTAL
Status: DISCONTINUED | OUTPATIENT
Start: 2021-04-19 | End: 2021-04-24 | Stop reason: HOSPADM

## 2021-04-19 RX ORDER — AMOXICILLIN 250 MG
2 CAPSULE ORAL 2 TIMES DAILY
Status: DISCONTINUED | OUTPATIENT
Start: 2021-04-19 | End: 2021-04-24 | Stop reason: HOSPADM

## 2021-04-19 RX ORDER — BISACODYL 10 MG
10 SUPPOSITORY, RECTAL RECTAL
Status: DISCONTINUED | OUTPATIENT
Start: 2021-04-19 | End: 2021-04-19

## 2021-04-19 RX ORDER — POLYETHYLENE GLYCOL 3350 17 G/17G
1 POWDER, FOR SOLUTION ORAL
Status: DISCONTINUED | OUTPATIENT
Start: 2021-04-19 | End: 2021-04-19

## 2021-04-19 RX ORDER — POLYETHYLENE GLYCOL 3350 17 G/17G
1 POWDER, FOR SOLUTION ORAL DAILY
Status: DISCONTINUED | OUTPATIENT
Start: 2021-04-19 | End: 2021-04-24 | Stop reason: HOSPADM

## 2021-04-19 RX ADMIN — POLYETHYLENE GLYCOL 3350 1 PACKET: 17 POWDER, FOR SOLUTION ORAL at 14:52

## 2021-04-19 RX ADMIN — LISINOPRIL 10 MG: 5 TABLET ORAL at 05:11

## 2021-04-19 RX ADMIN — MELATONIN TAB 3 MG 3 MG: 3 TAB at 20:50

## 2021-04-19 RX ADMIN — ASPIRIN 81 MG CHEWABLE TABLET 81 MG: 81 TABLET CHEWABLE at 08:00

## 2021-04-19 RX ADMIN — SENNOSIDES AND DOCUSATE SODIUM 1 TABLET: 8.6; 5 TABLET ORAL at 08:00

## 2021-04-19 RX ADMIN — SENNOSIDES AND DOCUSATE SODIUM 2 TABLET: 8.6; 5 TABLET ORAL at 20:49

## 2021-04-19 RX ADMIN — ATORVASTATIN CALCIUM 40 MG: 40 TABLET, FILM COATED ORAL at 20:50

## 2021-04-19 RX ADMIN — QUETIAPINE FUMARATE 25 MG: 25 TABLET ORAL at 20:49

## 2021-04-19 ASSESSMENT — PAIN DESCRIPTION - PAIN TYPE: TYPE: ACUTE PAIN

## 2021-04-19 ASSESSMENT — GAIT ASSESSMENTS
DISTANCE (FEET): 325
ASSISTIVE DEVICE: FRONT WHEEL WALKER;HAND HELD ASSIST
DEVIATION: DECREASED BASE OF SUPPORT;SHUFFLED GAIT;DECREASED HEEL STRIKE;DECREASED TOE OFF
GAIT LEVEL OF ASSIST: CONTACT GUARD ASSIST

## 2021-04-19 ASSESSMENT — PATIENT HEALTH QUESTIONNAIRE - PHQ9
1. LITTLE INTEREST OR PLEASURE IN DOING THINGS: NOT AT ALL
SUM OF ALL RESPONSES TO PHQ9 QUESTIONS 1 AND 2: 0
2. FEELING DOWN, DEPRESSED, IRRITABLE, OR HOPELESS: NOT AT ALL
SUM OF ALL RESPONSES TO PHQ9 QUESTIONS 1 AND 2: 0
2. FEELING DOWN, DEPRESSED, IRRITABLE, OR HOPELESS: NOT AT ALL
1. LITTLE INTEREST OR PLEASURE IN DOING THINGS: NOT AT ALL

## 2021-04-19 ASSESSMENT — ACTIVITIES OF DAILY LIVING (ADL)
BED_CHAIR_WHEELCHAIR_TRANSFER_DESCRIPTION: ADAPTIVE EQUIPMENT;INCREASED TIME;SET-UP OF EQUIPMENT;SUPERVISION FOR SAFETY
BED_CHAIR_WHEELCHAIR_TRANSFER_DESCRIPTION: ADAPTIVE EQUIPMENT;SUPERVISION FOR SAFETY;SET-UP OF EQUIPMENT
TOILETING_LEVEL_OF_ASSIST_DESCRIPTION: SUPERVISION FOR SAFETY;SET-UP OF EQUIPMENT
TOILET_TRANSFER_DESCRIPTION: GRAB BAR;SET-UP OF EQUIPMENT;SUPERVISION FOR SAFETY
TOILET_TRANSFER_DESCRIPTION: SUPERVISION FOR SAFETY;INCREASED TIME
TUB_SHOWER_TRANSFER_DESCRIPTION: GRAB BAR;SET-UP OF EQUIPMENT;SUPERVISION FOR SAFETY;VERBAL CUEING

## 2021-04-19 NOTE — THERAPY
Physical Therapy   Daily Treatment     Patient Name: Cooper Harvey  Age:  86 y.o., Sex:  male  Medical Record #: [id number]  Today's Date: 4/19/2021     Precautions: Fall Risk  Comments: Pt is currently wearing a Zio patch heart monitor (grey case needs to be within 6ft of pt at all times); [facility]    Subjective    Patient laying in bed upon arrival, agreeable to therapy.      Objective       04/19/21 1501   Cosignature   Documentation Review Approved    Precautions   Precautions Fall Risk   Comments Pt is currently wearing a Zio patch heart monitor (grey case needs to be within 6ft of pt at all times); [facility]   Vitals   Pulse (!) 57   Patient BP Position Supine   Blood Pressure  131/63   Pulse Oximetry 98 %   O2 Delivery Device None - Room Air   Vitals Comments 3 minutes post-activity   Cognition    Speech/ Communication Hard of Hearing   Safety Awareness Impaired   Gait Functional Level of Assist    Gait Level Of Assist Contact Guard Assist   Assistive Device Front Wheel Walker;Hand Held Assist   Distance (Feet) 325  (300ft x1 FWW; 30ft x2 HHA level; 10ft x1 HHA uneven )   # of Times Distance was Traveled 1   Deviation Decreased Base Of Support;Shuffled Gait;Decreased Heel Strike;Decreased Toe Off  (L lateral heel whip)   Transfer Functional Level of Assist   Bed, Chair, Wheelchair Transfer Stand by Assist   Bed Chair Wheelchair Transfer Description Adaptive equipment;Increased time;Set-up of equipment;Supervision for safety   Toilet Transfers Stand by Assist  (2 min standing with SBA with toileting )   Toilet Transfer Description Supervision for safety;Increased time   Bed Mobility    Supine to Sit Stand by Assist   Sit to Supine Stand by Assist   Sit to Stand Stand by Assist   Scooting Supervised   Rolling Supervised   Neuro-Muscular Treatments   Neuro-Muscular Treatments Postural Facilitation;Verbal Cuing;Sequencing;Weight Shift Right;Weight Shift Left   Comments Fall prevention/home safety and fall recovery  education with handout provided. Fall recovery demonstrated and practiced. Pt demonstrated ability to transition from supine>prone>quadruped>tall kneel on mat table with SBA. Transfered from mat table to floor and transitioned from tall kneel>stand using mat table with SBA.   Interdisciplinary Plan of Care Collaboration   IDT Collaboration with  Family / Caregiver   Patient Position at End of Therapy In Bed;Call Light within Reach;Tray Table within Reach;Phone within Reach   Collaboration Comments Called spouse to set-up family training    Strengths & Barriers   Strengths Adequate strength;Independent prior level of function;Pleasant and cooperative;Willingly participates in therapeutic activities   Barriers Decreased endurance;Difficulty following instructions;Hearing impairment;Home accessibility;Impaired balance;Impaired carryover of learning;Limited mobility;Impaired activity tolerance   PT Total Time Spent   PT Individual Total Time Spent (Mins) 60   PT Charge Group   PT Gait Training 1   PT Neuromuscular Re-Education / Balance 1   PT Therapeutic Activities 2     Passport reviewed with patient, appropriate items checked off.    Assessment    Patient receptive to fall prevention and home safety education, participating in discussion. Patient was also able to demonstrate ability to recover from a fall. Safety cues required with gait with the FWW, including not picking up the walker when turning. Patient was able to progress to amb with HHA and CGA on even and uneven surfaces.    Strengths: Adequate strength, Independent prior level of function, Pleasant and cooperative, Willingly participates in therapeutic activities  Barriers: Decreased endurance, Difficulty following instructions, Hearing impairment, Home accessibility, Impaired balance, Impaired carryover of learning, Limited mobility, Impaired activity tolerance    Plan    Family training scheduled 4/20/21 10:30-11:30; reinforce STS sequencing; gait with FWW  "or LRAD, 5 6\" stairs with one rail, coordination exercise, static and dynamic balance.      Passport items to be completed:  complete caregiver training    Physical Therapy Problems     Problem: Mobility     Dates: Start: 04/12/21       Goal: STG-Within one week, patient will ambulate community distances     Dates: Start: 04/12/21       Description: 1) Individualized goal:   feet with FWW and SBA  2) Interventions:  PT Gait Training, PT Therapeutic Exercises, PT Neuro Re-Ed/Balance, PT Therapeutic Activity, and PT Evaluation      Note:     Goal Note filed on 04/19/21 1232 by Daya Mares DPT    Partially met- CGA  + 480 indoors/ outdoors                             "

## 2021-04-19 NOTE — CARE PLAN
Problem: Dressing  Goal: STG-Within one week, patient will dress LB  Description: 1) Individualized Goal:  with supervision using AE/AD/techniques  2) Interventions:  OT Self Care/ADL, OT Cognitive Skill Dev, OT Neuro Re-Ed/Balance, OT Therapeutic Activity, OT Evaluation, and OT Therapeutic Exercise    Outcome: NOT MET  Note: SBA/supervised     Problem: Toileting  Goal: STG-Within one week, patient will complete toileting tasks  Description: 1) Individualized Goal:  Supervised with AE/AD/techniques  2) Interventions:  OT Self Care/ADL, OT Cognitive Skill Dev, OT Neuro Re-Ed/Balance, OT Therapeutic Activity, OT Evaluation, and OT Therapeutic Exercise    Outcome: NOT MET  Note: SBA     Problem: Functional Transfers  Goal: STG-Within one week, patient will transfer to toilet  Description: 1) Individualized Goal:  Sup/SBA for commode transfer via DME PRN  2) Interventions:  OT Self Care/ADL, OT Cognitive Skill Dev, OT Neuro Re-Ed/Balance, OT Therapeutic Activity, OT Evaluation, and OT Therapeutic Exercise      Outcome: MET  Note: SBA/supervised with grab bar

## 2021-04-19 NOTE — CARE PLAN
Problem: Problem Solving STGs  Goal: STG-Within one week, patient will solve basic problems  Description: 1) Individualized goal:  related to safety and hospitalization with min A with 80% accuracy.   2) Interventions:  SLP Speech Language Treatment, SLP Self Care / ADL Training , SLP Cognitive Skill Development, and SLP Group Treatment      Outcome: MET  Note: 80% with min A.  Goal: STG-Within one week, patient will  Description: 1) Individualized goal:  will perform selective attention tasks with 80% acc with min cues to improve to 100%  2) Interventions:  SLP Self Care / ADL Training , SLP Cognitive Skill Development, and SLP Group Treatment      Outcome: MET  Note: % with min A.     Problem: Memory STGs  Goal: STG-Within one week, patient will  Description: 1) Individualized goal:  recall daily events and safety sequencing with 70% acc with use of memory log and memory strategies.  2) Interventions:  SLP Self Care / ADL Training , SLP Cognitive Skill Development, and SLP Group Treatment      Outcome: MET  Note: 70% -80% acc with min A.

## 2021-04-19 NOTE — DISCHARGE PLANNING
CM spoke with patients daughter Angela to update on IDT and DC date of 4/24/21.  Answered questions.  CM LM on wife's VM to also update.  CM will continue to monitor for DC needs.

## 2021-04-19 NOTE — THERAPY
Occupational Therapy  Daily Treatment     Patient Name: Cooper Harvey  Age:  86 y.o., Sex:  male  Medical Record #: [id number]  Today's Date: 4/19/2021     Precautions  Precautions: (P) Fall Risk  Comments: (P) Pt is currently wearing a Zio patch heart monitor (grey case needs to be within 6ft of pt at all times); [place]         Subjective    Patient stated he wanted a shower this morning.  Spouse asking if he'll get to work out today.     Objective       04/19/21 0931   Precautions   Precautions Fall Risk   Comments Pt is currently wearing a Zio patch heart monitor (grey case needs to be within 6ft of pt at all times); [place]   Cognition    Safety Awareness Impaired   Attention Impaired   Functional Level of Assist   Eating Independent   Grooming Independent   Grooming Description Seated in wheelchair at sink   Bathing Supervision   Bathing Description Grab bar;Set-up of equipment;Supervision for safety;Verbal cueing;Set up for wound protection  (SBA standing)   Upper Body Dressing Supervision   Upper Body Dressing Description Set-up of equipment   Lower Body Dressing Supervision   Lower Body Dressing Description Set-up of equipment;Supervision for safety   Toileting Supervision   Toileting Description Supervision for safety;Set-up of equipment   Bed, Chair, Wheelchair Transfer Stand by Assist   Bed Chair Wheelchair Transfer Description Adaptive equipment;Supervision for safety;Set-up of equipment  (SBA with FWW)   Toilet Transfers Stand by Assist   Toilet Transfer Description Grab bar;Set-up of equipment;Supervision for safety   Tub / Shower Transfers Stand by Assist   Tub Shower Transfer Description Grab bar;Set-up of equipment;Supervision for safety;Verbal cueing   Bed Mobility    Supine to Sit Supervised   Scooting Supervised   Interdisciplinary Plan of Care Collaboration   IDT Collaboration with  Family / Caregiver   Patient Position at End of Therapy Seated;Chair Alarm On;Self Releasing Lap Belt Applied;Call  Light within Reach;Tray Table within Reach;Phone within Reach;Family / Friend in Room   Collaboration Comments spouse present and participated in family training during ADL session   OT Total Time Spent   OT Individual Total Time Spent (Mins) 60   OT Charge Group   OT Self Care / ADL 4     Functional mobility with FWW and SBA bed to shower.  Educated spouse on CLOF with self care and bathroom transfers and she saw him get dressed.    Assessment    Patient completed adl routine with setup and SBA/supervision with minimal cues for safety.  Stood in the shower to use the regular shower head, as handheld shower head tends to confuse him.    Strengths: Independent prior level of function, Motivated for self care and independence, Pleasant and cooperative, Willingly participates in therapeutic activities  Barriers: Bladder incontinence, Decreased endurance, Generalized weakness, Hearing impairment, Impaired activity tolerance, Impaired balance, Impulsive, Confused, Limited mobility, Visual impairment    Plan    ADLs, functional mobility, safety with transfers, strength/endurance/coordination    Occupational Therapy Goals     Problem: Dressing     Dates: Start: 04/12/21       Goal: STG-Within one week, patient will dress LB     Dates: Start: 04/12/21       Description: 1) Individualized Goal:  with supervision using AE/AD/techniques  2) Interventions:  OT Self Care/ADL, OT Cognitive Skill Dev, OT Neuro Re-Ed/Balance, OT Therapeutic Activity, OT Evaluation, and OT Therapeutic Exercise                  Problem: Functional Transfers     Dates: Start: 04/07/21       Goal: STG-Within one week, patient will transfer to toilet     Dates: Start: 04/07/21       Description: 1) Individualized Goal:  Sup/SBA for commode transfer via DME PRN  2) Interventions:  OT Self Care/ADL, OT Cognitive Skill Dev, OT Neuro Re-Ed/Balance, OT Therapeutic Activity, OT Evaluation, and OT Therapeutic Exercise        Note:     Goal Note filed on  04/12/21 1123 by Chema Funk, OT/L    CGA                        Problem: OT Long Term Goals     Dates: Start: 04/07/21       Goal: LTG-By discharge, patient will complete basic self care tasks     Dates: Start: 04/07/21       Description: 1) Individualized Goal:  Mod I for BADL's via AE/DME PRN  2) Interventions:  OT Self Care/ADL, OT Cognitive Skill Dev, OT Neuro Re-Ed/Balance, OT Therapeutic Activity, OT Evaluation, and OT Therapeutic Exercise              Goal: LTG-By discharge, patient will perform bathroom transfers     Dates: Start: 04/07/21       Description: 1) Individualized Goal: Mod I for bathroom transfers via DME PRN  2) Interventions:  OT Self Care/ADL, OT Cognitive Skill Dev, OT Neuro Re-Ed/Balance, OT Therapeutic Activity, OT Evaluation, and OT Therapeutic Exercise                    Problem: Toileting     Dates: Start: 04/12/21       Goal: STG-Within one week, patient will complete toileting tasks     Dates: Start: 04/12/21       Description: 1) Individualized Goal:  Supervised with AE/AD/techniques  2) Interventions:  OT Self Care/ADL, OT Cognitive Skill Dev, OT Neuro Re-Ed/Balance, OT Therapeutic Activity, OT Evaluation, and OT Therapeutic Exercise

## 2021-04-19 NOTE — CARE PLAN
Problem: Safety  Goal: Will remain free from injury  Note: Call light with in reach. Redirection to use call light for assistance. Non skid socks. Upper siderails up x2 while in bed.      Problem: Bowel/Gastric:  Goal: Normal bowel function is maintained or improved  Note: Had medium BM results from prn Dulcolax suppository.

## 2021-04-19 NOTE — CARE PLAN
"  Problem: Mobility  Goal: STG-Within one week, patient will ambulate community distances  Description: 1) Individualized goal:   feet with FWW and SBA  2) Interventions:  PT Gait Training, PT Therapeutic Exercises, PT Neuro Re-Ed/Balance, PT Therapeutic Activity, and PT Evaluation    Outcome: PROGRESSING AS EXPECTED  Note: Partially met- CGA  + 480 indoors/ outdoors      Problem: Mobility  Goal: STG-Within one week, patient will  Description: 1) Individualized goal: negotiate 4-6, 6\" steps with 2 rails and CGA  2) Interventions:  PT Gait Training, PT Therapeutic Exercises, PT Neuro Re-Ed/Balance, PT Therapeutic Activity, and PT Evaluation    Outcome: MET     Problem: Mobility Transfers  Goal: STG-Within one week, patient will transfer bed to chair  Description: 1) Individualized goal:  SPT with FWW and supervision   2) Interventions:  PT Gait Training, PT Therapeutic Exercises, PT Neuro Re-Ed/Balance, PT Therapeutic Activity, and PT Evaluation    Outcome: MET     "

## 2021-04-19 NOTE — THERAPY
Speech Language Pathology  Daily Treatment     Patient Name: Cooper Harvey  Age:  86 y.o., Sex:  male  Medical Record #: [id number]  Today's Date: 4/19/2021     Precautions  Precautions: Fall Risk  Comments: Pt is currently wearing a Zio patch heart monitor (grey case needs to be within 6ft of pt at all times); [facility]    Subjective    Patient pleasant and cooperative.     Objective       04/19/21 1401   Cognition   Functional Memory Activities Minimal (4)   Functional Problem Solving Minimal (4)   SLP Total Time Spent   SLP Individual Total Time Spent (Mins) 60   Treatment Charges   SLP Cognitive Skill Development First 15 Minutes 1   SLP Cognitive Skill Development Additional 15 Minutes 3       Assessment    Patient napping in bed upon tx'st arrival for ST session.  Assisted patient out of bed.  He used an ineffective technique for bed mobility ( throwing legs over the side of bed while lying flat on back- he was unable to bring torso into upright position using this technique.  Educated patient on use of postioning elbow and arm at his ribs and use of log roll and dropping of feet as a counter weight to torso, to improve bed mobility.  Patient needed min A to verbalize understanding of how this technique would be effective. Provided several repetitions of models for patient, then had him talk me through the steps.  Planned to have him demonstrate in his own bed at end of session.  Did not locate patient's old memory log from before he went to acute hospital for observation.  Patient did not recall that we had made one.  Initially he reported month to be April, date to be 12th, and year to be 2001. Provided a new memory log. Patient reports that his writing is not good.  Recommended asking for help recording in log.  Assisted patient with recording of the written steps for log roll and RWAVS memory strategies.  Worked with patient to identify other feedback for change to improve function and he identified (1)  feet too close together when he makes his turns.  Assisted him in recording this.  Patient returned to room.  Min to mod cues needed to recall to implement sequences for bed mobility.    Strengths: Able to follow instructions, Alert and oriented, Pleasant and cooperative, Supportive family  Barriers: Impaired functional cognition    Plan    Target family training.  Recall of safety sequencing, attention, safety planning and problem solving.    Passport items to be completed:  Express basic needs, understand food/liquid recommendations, consistently follow swallow precautions, manage finances, manage medications, arrive to therapy appointments on time, complete daily memory log entries, solve problems related to safety situations, review education related to hospitalization, complete caregiver training     Speech Therapy Problems     Problem: Memory STGs     Dates: Start: 04/19/21       Goal: STG-Within one week, patient will     Dates: Start: 04/19/21       Description: 1) Individualized goal:  recall safety sequencing and daily events with 80-90% acc with set up.  2) Interventions:  SLP Self Care / ADL Training , SLP Cognitive Skill Development, and SLP Group Treatment                    Problem: Problem Solving STGs     Dates: Start: 04/19/21       Goal: STG-Within one week, patient will     Dates: Start: 04/19/21       Description: 1) Individualized goal:  perform safety planning and problem solving with 90% acc mod I/spv  2) Interventions:  SLP Self Care / ADL Training , SLP Cognitive Skill Development, and SLP Group Treatment                    Problem: Speech/Swallowing LTGs     Dates: Start: 04/07/21       Goal: LTG-By discharge, patient will solve basic problems     Dates: Start: 04/07/21       Description: 1) Individualized goal:  given spv for a safe d/c home  2) Interventions:  SLP Speech Language Treatment, SLP Self Care / ADL Training , SLP Cognitive Skill Development, and SLP Group Treatment

## 2021-04-20 PROCEDURE — 770010 HCHG ROOM/CARE - REHAB SEMI PRIVAT*

## 2021-04-20 PROCEDURE — 97116 GAIT TRAINING THERAPY: CPT

## 2021-04-20 PROCEDURE — 97535 SELF CARE MNGMENT TRAINING: CPT

## 2021-04-20 PROCEDURE — 97112 NEUROMUSCULAR REEDUCATION: CPT

## 2021-04-20 PROCEDURE — 97530 THERAPEUTIC ACTIVITIES: CPT

## 2021-04-20 PROCEDURE — 700102 HCHG RX REV CODE 250 W/ 637 OVERRIDE(OP): Performed by: PHYSICAL MEDICINE & REHABILITATION

## 2021-04-20 PROCEDURE — 97130 THER IVNTJ EA ADDL 15 MIN: CPT

## 2021-04-20 PROCEDURE — A9270 NON-COVERED ITEM OR SERVICE: HCPCS | Performed by: PHYSICAL MEDICINE & REHABILITATION

## 2021-04-20 PROCEDURE — 99232 SBSQ HOSP IP/OBS MODERATE 35: CPT | Performed by: PHYSICAL MEDICINE & REHABILITATION

## 2021-04-20 PROCEDURE — 97129 THER IVNTJ 1ST 15 MIN: CPT

## 2021-04-20 RX ORDER — QUETIAPINE FUMARATE 25 MG/1
25 TABLET, FILM COATED ORAL
Status: DISCONTINUED | OUTPATIENT
Start: 2021-04-20 | End: 2021-04-24 | Stop reason: HOSPADM

## 2021-04-20 RX ORDER — LORATADINE 10 MG/1
10 TABLET ORAL DAILY
Status: DISCONTINUED | OUTPATIENT
Start: 2021-04-20 | End: 2021-04-24 | Stop reason: HOSPADM

## 2021-04-20 RX ADMIN — SENNOSIDES AND DOCUSATE SODIUM 2 TABLET: 8.6; 5 TABLET ORAL at 08:12

## 2021-04-20 RX ADMIN — LORATADINE 10 MG: 10 TABLET ORAL at 14:44

## 2021-04-20 RX ADMIN — MELATONIN TAB 3 MG 3 MG: 3 TAB at 20:18

## 2021-04-20 RX ADMIN — ATORVASTATIN CALCIUM 40 MG: 40 TABLET, FILM COATED ORAL at 20:18

## 2021-04-20 RX ADMIN — LISINOPRIL 10 MG: 5 TABLET ORAL at 05:38

## 2021-04-20 RX ADMIN — POLYETHYLENE GLYCOL 3350 1 PACKET: 17 POWDER, FOR SOLUTION ORAL at 08:11

## 2021-04-20 RX ADMIN — ASPIRIN 81 MG CHEWABLE TABLET 81 MG: 81 TABLET CHEWABLE at 08:12

## 2021-04-20 RX ADMIN — SENNOSIDES AND DOCUSATE SODIUM 2 TABLET: 8.6; 5 TABLET ORAL at 20:18

## 2021-04-20 ASSESSMENT — PATIENT HEALTH QUESTIONNAIRE - PHQ9
1. LITTLE INTEREST OR PLEASURE IN DOING THINGS: NOT AT ALL
SUM OF ALL RESPONSES TO PHQ9 QUESTIONS 1 AND 2: 0
2. FEELING DOWN, DEPRESSED, IRRITABLE, OR HOPELESS: NOT AT ALL
1. LITTLE INTEREST OR PLEASURE IN DOING THINGS: NOT AT ALL
2. FEELING DOWN, DEPRESSED, IRRITABLE, OR HOPELESS: NOT AT ALL
SUM OF ALL RESPONSES TO PHQ9 QUESTIONS 1 AND 2: 0

## 2021-04-20 ASSESSMENT — GAIT ASSESSMENTS
ASSISTIVE DEVICE: FRONT WHEEL WALKER
DISTANCE (FEET): 110
GAIT LEVEL OF ASSIST: STAND BY ASSIST
DEVIATION: DECREASED BASE OF SUPPORT;SHUFFLED GAIT;DECREASED HEEL STRIKE;DECREASED TOE OFF

## 2021-04-20 ASSESSMENT — ACTIVITIES OF DAILY LIVING (ADL)
BED_CHAIR_WHEELCHAIR_TRANSFER_DESCRIPTION: ADAPTIVE EQUIPMENT;SET-UP OF EQUIPMENT;SUPERVISION FOR SAFETY;VERBAL CUEING
TOILETING_LEVEL_OF_ASSIST_DESCRIPTION: SUPERVISION FOR SAFETY

## 2021-04-20 ASSESSMENT — PAIN DESCRIPTION - PAIN TYPE: TYPE: ACUTE PAIN

## 2021-04-20 NOTE — CARE PLAN
Problem: Safety  Goal: Will remain free from injury  Outcome: PROGRESSING AS EXPECTED   Pt uses call light consistently and appropriately. Waits for assistance does not attempt self transfer this shift. Able to verbalize needs.   Problem: Pain Management  Goal: Pain level will decrease to patient's comfort goal  Outcome: PROGRESSING AS EXPECTED   Patient able to verbalize pain level and verbalize an acceptable level of pain.

## 2021-04-20 NOTE — THERAPY
"Physical Therapy   Daily Treatment     Patient Name: Cooper Harvey  Age:  86 y.o., Sex:  male  Medical Record #: [id number]  Today's Date: 4/20/2021     Precautions  Precautions: Fall Risk  Comments: Pt is currently wearing a Zio patch heart monitor (grey case needs to be within 6ft of pt at all times); [place]    Subjective    Patient seated in w/c upon arrival, agreeable to therapy. Spouse present for duration of session and participatory in family training.      Objective       04/20/21 1031   Cosignature   Documentation Review Approved with modifications made by preceptor in flowsheet   Precautions   Precautions Fall Risk   Comments Pt is currently wearing a Zio patch heart monitor (grey case needs to be within 6ft of pt at all times); [place]   Cognition    Speech/ Communication Hard of Hearing   Gait Functional Level of Assist    Gait Level Of Assist Stand by Assist  (CGA progressing to SBA)   Assistive Device Front Wheel Walker   Distance (Feet) 110   # of Times Distance was Traveled 2   Deviation Decreased Base Of Support;Shuffled Gait;Decreased Heel Strike;Decreased Toe Off   Stairs Functional Level of Assist   Level of Assist with Stairs Contact Guard Assist   # of Stairs Climbed 4  (4 consecutive 6\" stairs with 1 HR on L x2 sets)   Stairs Description Hand rails;Supervision for safety;Verbal cueing  (Step through with ascending; step-to with descending)   Transfer Functional Level of Assist   Bed, Chair, Wheelchair Transfer Stand by Assist   Bed Chair Wheelchair Transfer Description Adaptive equipment;Set-up of equipment;Supervision for safety;Verbal cueing   Sitting Lower Body Exercises   Ankle Pumps 1 set of 10;Bilateral   Hip Abduction 1 set of 10;Bilateral;Medium Resistance Theraband   Hip Adduction 1 set of 10;Bilateral   Long Arc Quad 1 set of 10;Bilateral   Marching 1 set of 10;Reciprocal   Hamstring Curl 1 set of 10;Bilateral   Bed Mobility    Supine to Sit Supervised   Sit to Supine Supervised   Sit " "to Stand Stand by Assist  (VC for UE placement )   Neuro-Muscular Treatments   Neuro-Muscular Treatments Anterior weight shift;Sequencing;Verbal Cuing   Comments Fall prevention/home safety and fall recovery handout reviewed with patient and spouse. Spouse participated in family training with all functional mobility. VC for UE placement with STS. Spouse educated on proper guarding, biomechanics, CLOF, and safety cues. Car transfer completed x2 reps (one for demo, one with spouse). 6\" curb step completed with CGA with FWW 2x.    Interdisciplinary Plan of Care Collaboration   IDT Collaboration with  Family / Caregiver   Patient Position at End of Therapy Seated;Chair Alarm On;Self Releasing Lap Belt Applied;Call Light within Reach;Tray Table within Reach;Family / Friend in Room   Collaboration Comments Spouse participated in family training   Strengths & Barriers   Strengths Adequate strength;Independent prior level of function;Pleasant and cooperative;Willingly participates in therapeutic activities   Barriers Decreased endurance;Difficulty following instructions;Hearing impairment;Home accessibility;Impaired balance;Impaired carryover of learning;Limited mobility;Impaired activity tolerance   PT Total Time Spent   PT Individual Total Time Spent (Mins) 60   PT Charge Group   PT Gait Training 1   PT Neuromuscular Re-Education / Balance 1   PT Therapeutic Activities 2       Assessment    Patient and spouse participatory and receptive to recommendations made during family training. Spouse demonstrated ability to safety provided assistance with all functional mobility: gait, stairs, bed mobility, curb step, and car transfer. Fall prevention and home safety reviewed with spouse. Spouse was actively participating in home modification discussion.    Strengths: Adequate strength, Independent prior level of function, Pleasant and cooperative, Willingly participates in therapeutic activities  Barriers: Decreased endurance, " "Difficulty following instructions, Hearing impairment, Home accessibility, Impaired balance, Impaired carryover of learning, Limited mobility, Impaired activity tolerance    Plan    Sign-off passport; reinforce STS sequencing; gait with FWW or LRAD, 5 6\" stairs with one rail, coordination exercise, static and dynamic balance.      Passport items to be completed:  complete caregiver training    Physical Therapy Problems     Problem: Mobility     Dates: Start: 04/12/21       Goal: STG-Within one week, patient will ambulate community distances     Dates: Start: 04/12/21       Description: 1) Individualized goal:   feet with FWW and SBA  2) Interventions:  PT Gait Training, PT Therapeutic Exercises, PT Neuro Re-Ed/Balance, PT Therapeutic Activity, and PT Evaluation      Note:     Goal Note filed on 04/19/21 1232 by Daya Mares DPT    Partially met- CGA  + 480 indoors/ outdoors                             "

## 2021-04-20 NOTE — PROGRESS NOTES
"Rehab Progress Note     Date of Service: 4/20/2021  Chief Complaint: follow up stroke    Interval Events (Subjective)      Patient seen and examined today in his room.   His wife is present.  He is complaining of a runny nose.  He and his wife are pleased with the plan discharge date.    ROS: No changes to bowel, bladder, pain, mood, or sleep.              Objective:  VITAL SIGNS: /80   Pulse 61   Temp 36.8 °C (98.3 °F) (Oral)   Resp 18   Ht 1.768 m (5' 9.6\")   Wt 74.4 kg (164 lb 0.4 oz)   SpO2 95%   BMI 23.81 kg/m²   Gen: alert, no apparent distress  Neuro: notable for hard of hearing    Recent Results (from the past 72 hour(s))   Basic Metabolic Panel    Collection Time: 04/18/21  5:40 AM   Result Value Ref Range    Sodium 134 (L) 135 - 145 mmol/L    Potassium 4.3 3.6 - 5.5 mmol/L    Chloride 104 96 - 112 mmol/L    Co2 27 20 - 33 mmol/L    Glucose 85 65 - 99 mg/dL    Bun 33 (H) 8 - 22 mg/dL    Creatinine 1.12 0.50 - 1.40 mg/dL    Calcium 8.6 8.5 - 10.5 mg/dL    Anion Gap 3.0 (L) 7.0 - 16.0   TROPONIN    Collection Time: 04/18/21  5:40 AM   Result Value Ref Range    Troponin T 23 (H) 6 - 19 ng/L   MAGNESIUM    Collection Time: 04/18/21  5:40 AM   Result Value Ref Range    Magnesium 2.6 (H) 1.5 - 2.5 mg/dL   ESTIMATED GFR    Collection Time: 04/18/21  5:40 AM   Result Value Ref Range    GFR If African American >60 >60 mL/min/1.73 m 2    GFR If Non African American >60 >60 mL/min/1.73 m 2   Basic Metabolic Panel    Collection Time: 04/19/21  5:31 AM   Result Value Ref Range    Sodium 135 135 - 145 mmol/L    Potassium 4.4 3.6 - 5.5 mmol/L    Chloride 102 96 - 112 mmol/L    Co2 29 20 - 33 mmol/L    Glucose 79 65 - 99 mg/dL    Bun 29 (H) 8 - 22 mg/dL    Creatinine 1.17 0.50 - 1.40 mg/dL    Calcium 8.8 8.5 - 10.5 mg/dL    Anion Gap 4.0 (L) 7.0 - 16.0   MAGNESIUM    Collection Time: 04/19/21  5:31 AM   Result Value Ref Range    Magnesium 2.5 1.5 - 2.5 mg/dL   ESTIMATED GFR    Collection Time: 04/19/21  5:31 " AM   Result Value Ref Range    GFR If African American >60 >60 mL/min/1.73 m 2    GFR If Non African American 59 (A) >60 mL/min/1.73 m 2       Current Facility-Administered Medications   Medication Frequency   • polyethylene glycol/lytes (MIRALAX) PACKET 1 Packet DAILY    And   • senna-docusate (PERICOLACE or SENOKOT S) 8.6-50 MG per tablet 2 tablet BID    And   • magnesium hydroxide (MILK OF MAGNESIA) suspension 30 mL QDAY PRN    And   • bisacodyl (DULCOLAX) suppository 10 mg QDAY PRN   • atorvastatin (LIPITOR) tablet 40 mg Q EVENING   • lisinopril (PRINIVIL) tablet 10 mg Q DAY   • QUEtiapine (Seroquel) tablet 25 mg Nightly   • melatonin tablet 3 mg QHS   • ziprasidone (Geodon) injection 20 mg Q2HRS PRN   • hydrOXYzine HCl (ATARAX) tablet 50 mg Q6HRS PRN   • Respiratory Therapy Consult Continuous RT   • Pharmacy Consult Request ...Pain Management Review 1 Each PHARMACY TO DOSE   • hydrALAZINE (APRESOLINE) tablet 10 mg Q8HRS PRN   • acetaminophen (Tylenol) tablet 650 mg Q4HRS PRN   • lactulose 20 GM/30ML solution 30 mL QDAY PRN   • docusate sodium (ENEMEEZ) enema 283 mg QDAY PRN   • fleet enema 133 mL QDAY PRN   • artificial tears ophthalmic solution 1 Drop PRN   • benzocaine-menthol (CEPACOL) lozenge 1 Lozenge Q2HRS PRN   • mag hydrox-al hydrox-simeth (MAALOX PLUS ES or MYLANTA DS) suspension 20 mL Q2HRS PRN   • ondansetron (ZOFRAN ODT) dispertab 4 mg 4X/DAY PRN    Or   • ondansetron (ZOFRAN) syringe/vial injection 4 mg 4X/DAY PRN   • traZODone (DESYREL) tablet 50 mg QHS PRN   • sodium chloride (OCEAN) 0.65 % nasal spray 2 Spray PRN   • midazolam (VERSED) 5 mg/mL (1 mL vial) PRN   • aspirin (ASA) chewable tab 81 mg DAILY   • QUEtiapine (Seroquel) tablet 25 mg TID PRN       Orders Placed This Encounter   Procedures   • Diet Order Diet: Level 7 - Easy to Chew; Liquid level: Level 0 - Thin     Standing Status:   Standing     Number of Occurrences:   1     Order Specific Question:   Diet:     Answer:   Level 7 - Easy  to Chew [22]     Order Specific Question:   Liquid level     Answer:   Level 0 - Thin       Assessment:  Active Hospital Problems    Diagnosis    • *Acute CVA (cerebrovascular accident) (HCC)    • Carotid stenosis, bilateral    • Altered mental status, unspecified    • Mixed hyperlipidemia    • BPH (benign prostatic hyperplasia)    • Sundowning    • Hard of hearing    • Sinus bradycardia    • Hypokalemia    • Anemia    • S/P carotid endarterectomy    • Agitation      This patient is a 86 y.o. male admitted for acute inpatient rehabilitation with Acute CVA (cerebrovascular accident) (HCC).    I led and attended the weekly conference, and agree with the IDT conference documentation and plan of care as noted below.    Date of conference: 4/19/2021    Goals and barriers: See IDT note.    Biggest barriers: hard of hearing, constipation, impaired balance, impaired safety awareness and insight, impaired attention    CM/social support: wife supportive    Anticipated DC date: 4/24    Home health: PT/OT/SLP/RN    Equip: FWW    Follow up: PCP, Dr. Sales, stroke bridge clinic, cardiology (at 6 weeks), Dr. Beavers        Medical Decision Making and Plan:    Small punctate infarctions  Right frontal lobe, right cerebellum, right occipital lobe  Non-focal  Visual complaints, improved  Cognitive impairment (suspect some is baseline)  Continue full rehab program  PT/OT/SLP, 1 hr each discipline, 5 days per week    Aspirin   Statin    Outpatient follow up with stroke bridge clinic, Dr Sales, referrals made    Zio patch cardiac monitor placed by neurology 4/9, follow up with cardiology at 6 weeks    Returned to acute on 4/13 due to increased visual symptoms, work up negative for new stroke.     Right carotid stenosis  S/p CEA 4/2  Statin  Aspirin  Outpatient follow up with Dr. Beavers, referrals made    Sundowning, resolved  Scheduled Seroquel at night, trial PRN tonight  Scheduled melatonin at night  PRN seroquel, not requiring  Qtc  OK     Hypertension, improved  Lisinopril  Monitor for 1 more day prior to increasing dose  PRN hydralazine    Rhinorrhea  Start Claritin     Hyperlipidemia  Statin     Azotemia, improved  Increase oral fluids    Bowel program  Continue bowel medications  Last BM 4/18    Bladder program  BPH  With intermittent incontinence  Check PVRs  Not retaining  Bladder scan for no voids  ICP for over 400 cc  Scheduled toileting    DVT prophylaxis  Lovenox discontinued as patient is ambulating long distances, 480 ft    Total time:  26 minutes.  I spent greater than 50% of the time for patient care, counseling, and coordination on this date, including patient face-to face time, unit/floor time with review of records/pertinent lab data and studies, as well as discussing diagnostic evaluation/work up, planned therapeutic interventions, and future disposition of care, as per the interval events/subjective and the assessment and plan as noted above.    I have performed a physical exam, reviewed and updated ROS, as well as the assessment and plan today 4/20/2021. In review of note from 4/19/2021 there are no new changes except as documented above.              Sugey Barkley M.D.   Physical Medicine and Rehabilitation

## 2021-04-20 NOTE — THERAPY
"Occupational Therapy  Daily Treatment     Patient Name: Cooper Harvey  Age:  86 y.o., Sex:  male  Medical Record #: [id number]  Today's Date: 4/20/2021     Precautions  Precautions: (P) Fall Risk  Comments: Pt is currently wearing a Zio patch heart monitor (grey case needs to be within 6ft of pt at all times); [place]         Subjective    \"Terrible.  I can't have a bowel movement.\" re: when asked how he was doing this morning     Objective       04/20/21 0831   Precautions   Precautions Fall Risk   Cognition    Speech/ Communication Hard of Hearing   Functional Level of Assist   Eating Independent   Grooming Independent;Modified Independent   Grooming Description Increased time;Seated in wheelchair at sink   Lower Body Dressing Modified Independent   Lower Body Dressing Description   (retrieved and donned shoes w/ laces in w/c)   Toileting Supervision   Toileting Description Supervision for safety  (supervised standing to void)   Sitting Upper Body Exercises   Tricep Press 3 sets of 10;Bilateral;Weight (See Comments for lbs)  (35 lbs on rickshaw forward)   Sitting Lower Body Exercises   Sit to Stand 1 set of 10   Interdisciplinary Plan of Care Collaboration   Patient Position at End of Therapy Seated;Chair Alarm On;Self Releasing Lap Belt Applied;Call Light within Reach;Tray Table within Reach;Phone within Reach   OT Total Time Spent   OT Individual Total Time Spent (Mins) 60   OT Charge Group   OT Self Care / ADL 3   OT Therapy Activity 1     Dry stall shower transfer using two grab bars and shower chair with supervision and cues.    Assessment    Patient required extra time to clean, change batteries and insert hearing aides into ears while seated in w/c at sink, but was able to do with min A to clean one aide.  Patient is uncomfortable and frustrated he has been unable to have a BM. Strength/endurance/balance continue to improve.  Requires cues for safety at times.  Strengths: Independent prior level of " function, Motivated for self care and independence, Pleasant and cooperative, Willingly participates in therapeutic activities  Barriers: Bladder incontinence, Decreased endurance, Generalized weakness, Hearing impairment, Impaired activity tolerance, Impaired balance, Impulsive, Confused, Limited mobility, Visual impairment    Plan    ADLs, functional mobility, safety with transfers, strength/endurance/coordination      Occupational Therapy Goals     Problem: Dressing     Dates: Start: 04/12/21       Goal: STG-Within one week, patient will dress LB     Dates: Start: 04/12/21       Description: 1) Individualized Goal:  with supervision using AE/AD/techniques  2) Interventions:  OT Self Care/ADL, OT Cognitive Skill Dev, OT Neuro Re-Ed/Balance, OT Therapeutic Activity, OT Evaluation, and OT Therapeutic Exercise      Note:     Goal Note filed on 04/19/21 1145 by Chema Funk OT/RASHEED    SBA/supervised                        Problem: OT Long Term Goals     Dates: Start: 04/07/21       Goal: LTG-By discharge, patient will complete basic self care tasks     Dates: Start: 04/07/21       Description: 1) Individualized Goal:  Mod I for BADL's via AE/DME PRN  2) Interventions:  OT Self Care/ADL, OT Cognitive Skill Dev, OT Neuro Re-Ed/Balance, OT Therapeutic Activity, OT Evaluation, and OT Therapeutic Exercise              Goal: LTG-By discharge, patient will perform bathroom transfers     Dates: Start: 04/07/21       Description: 1) Individualized Goal: Mod I for bathroom transfers via DME PRN  2) Interventions:  OT Self Care/ADL, OT Cognitive Skill Dev, OT Neuro Re-Ed/Balance, OT Therapeutic Activity, OT Evaluation, and OT Therapeutic Exercise                    Problem: Toileting     Dates: Start: 04/12/21       Goal: STG-Within one week, patient will complete toileting tasks     Dates: Start: 04/12/21       Description: 1) Individualized Goal:  Supervised with AE/AD/techniques  2) Interventions:  OT Self Care/ADL, OT  Cognitive Skill Dev, OT Neuro Re-Ed/Balance, OT Therapeutic Activity, OT Evaluation, and OT Therapeutic Exercise      Note:     Goal Note filed on 04/19/21 1145 by Chema Funk OT/RASHEED FLOYD

## 2021-04-21 PROCEDURE — 97116 GAIT TRAINING THERAPY: CPT

## 2021-04-21 PROCEDURE — 97535 SELF CARE MNGMENT TRAINING: CPT

## 2021-04-21 PROCEDURE — 97112 NEUROMUSCULAR REEDUCATION: CPT

## 2021-04-21 PROCEDURE — A9270 NON-COVERED ITEM OR SERVICE: HCPCS | Performed by: PHYSICAL MEDICINE & REHABILITATION

## 2021-04-21 PROCEDURE — 97129 THER IVNTJ 1ST 15 MIN: CPT

## 2021-04-21 PROCEDURE — 700102 HCHG RX REV CODE 250 W/ 637 OVERRIDE(OP): Performed by: PHYSICAL MEDICINE & REHABILITATION

## 2021-04-21 PROCEDURE — 97530 THERAPEUTIC ACTIVITIES: CPT

## 2021-04-21 PROCEDURE — 97110 THERAPEUTIC EXERCISES: CPT

## 2021-04-21 PROCEDURE — 97130 THER IVNTJ EA ADDL 15 MIN: CPT

## 2021-04-21 PROCEDURE — 770010 HCHG ROOM/CARE - REHAB SEMI PRIVAT*

## 2021-04-21 PROCEDURE — 99231 SBSQ HOSP IP/OBS SF/LOW 25: CPT | Performed by: PHYSICAL MEDICINE & REHABILITATION

## 2021-04-21 RX ADMIN — ATORVASTATIN CALCIUM 40 MG: 40 TABLET, FILM COATED ORAL at 20:09

## 2021-04-21 RX ADMIN — SENNOSIDES AND DOCUSATE SODIUM 2 TABLET: 8.6; 5 TABLET ORAL at 07:55

## 2021-04-21 RX ADMIN — ASPIRIN 81 MG CHEWABLE TABLET 81 MG: 81 TABLET CHEWABLE at 07:55

## 2021-04-21 RX ADMIN — POLYETHYLENE GLYCOL 3350 1 PACKET: 17 POWDER, FOR SOLUTION ORAL at 07:54

## 2021-04-21 RX ADMIN — MAGNESIUM HYDROXIDE 30 ML: 400 SUSPENSION ORAL at 05:07

## 2021-04-21 RX ADMIN — LISINOPRIL 10 MG: 5 TABLET ORAL at 04:59

## 2021-04-21 RX ADMIN — SENNOSIDES AND DOCUSATE SODIUM 2 TABLET: 8.6; 5 TABLET ORAL at 20:09

## 2021-04-21 RX ADMIN — MELATONIN TAB 3 MG 3 MG: 3 TAB at 20:09

## 2021-04-21 RX ADMIN — LORATADINE 10 MG: 10 TABLET ORAL at 07:55

## 2021-04-21 ASSESSMENT — PATIENT HEALTH QUESTIONNAIRE - PHQ9
SUM OF ALL RESPONSES TO PHQ9 QUESTIONS 1 AND 2: 0
SUM OF ALL RESPONSES TO PHQ9 QUESTIONS 1 AND 2: 0
1. LITTLE INTEREST OR PLEASURE IN DOING THINGS: NOT AT ALL
1. LITTLE INTEREST OR PLEASURE IN DOING THINGS: NOT AT ALL
2. FEELING DOWN, DEPRESSED, IRRITABLE, OR HOPELESS: NOT AT ALL
2. FEELING DOWN, DEPRESSED, IRRITABLE, OR HOPELESS: NOT AT ALL

## 2021-04-21 ASSESSMENT — BALANCE ASSESSMENTS
SITTING UNSUPPORTED: 4
LONG VERSION TOTAL SCORE (MAX 56): 44
STANDING UNSUPPORTED: 4
STANDING UNSUPPORTED WITH FEET TOGETHER: 3
STANDING ON ONE LEG: 3
STANDING UNSUPPORTED ONE FOOT IN FRONT: 1
PICK UP OBJECT FROM THE FLOOR FROM A STANDING POSITION: 3
STANDING UNSUPPORTED WITH EYES CLOSED: 3
SITTING TO STANDING: 4
LONG VERSION TOTAL SCORE (MAX 56): 44
STANDING TO SITTING: 4
REACHING FORWARD WITH OUTSTRETCHED ARM WHILE STANDING: 3
PLACE ALTERNATE FOOT ON STEP OR STOOL WHILE STANDING UNSUPPORTED: 2
TRANSFERS: 2
LOOK OVER LEFT AND RIGHT SHOULDERS WHILE STANDING: 4
TURN 360 DEGREES: 4

## 2021-04-21 ASSESSMENT — ACTIVITIES OF DAILY LIVING (ADL)
BED_CHAIR_WHEELCHAIR_TRANSFER_DESCRIPTION: ADAPTIVE EQUIPMENT;SET-UP OF EQUIPMENT;SUPERVISION FOR SAFETY;VERBAL CUEING
TOILET_TRANSFER_DESCRIPTION: ADAPTIVE EQUIPMENT;GRAB BAR;SUPERVISION FOR SAFETY
TOILETING_LEVEL_OF_ASSIST_DESCRIPTION: GRAB BAR;INCREASED TIME;ADAPTIVE EQUIPMENT

## 2021-04-21 ASSESSMENT — GAIT ASSESSMENTS
DISTANCE (FEET): 250
GAIT LEVEL OF ASSIST: STAND BY ASSIST
ASSISTIVE DEVICE: QUAD CANE
ASSISTIVE DEVICE: FRONT WHEEL WALKER
DISTANCE (FEET): 125
GAIT LEVEL OF ASSIST: CONTACT GUARD ASSIST
DEVIATION: DECREASED BASE OF SUPPORT;SHUFFLED GAIT;DECREASED HEEL STRIKE;DECREASED TOE OFF
DEVIATION: DECREASED BASE OF SUPPORT;SHUFFLED GAIT;DECREASED TOE OFF;DECREASED HEEL STRIKE

## 2021-04-21 ASSESSMENT — PAIN DESCRIPTION - PAIN TYPE: TYPE: ACUTE PAIN

## 2021-04-21 NOTE — THERAPY
Occupational Therapy  Daily Treatment     Patient Name: Cooper Harvey  Age:  86 y.o., Sex:  male  Medical Record #: [id number]  Today's Date: 4/21/2021     Precautions  Precautions: (P) Fall Risk  Comments: (P) Pt is currently wearing a Zio patch heart monitor (grey case needs to be within 6ft of pt at all times); [place]         Subjective    Patient laying in bed awake and stating he still hasn't had a BM. Agreeable to get dressed     Objective       04/21/21 0831   Precautions   Precautions Fall Risk   Comments Pt is currently wearing a Zio patch heart monitor (grey case needs to be within 6ft of pt at all times); [place]   Functional Level of Assist   Grooming Stand by Assist;Standing   Grooming Description Standing at sink;Supervision for safety  (to clean teeth, comb hair and wash/dry hands and face)   Upper Body Dressing Supervision   Upper Body Dressing Description Set-up of equipment   Lower Body Dressing Stand by Assist   Lower Body Dressing Description Set-up of equipment;Supervision for safety  (to don shoes and shorts)   Bed Mobility    Supine to Sit Supervised   Scooting Supervised   Interdisciplinary Plan of Care Collaboration   Patient Position at End of Therapy Seated;Chair Alarm On;Self Releasing Lap Belt Applied;Call Light within Reach;Tray Table within Reach;Phone within Reach   OT Total Time Spent   OT Individual Total Time Spent (Mins) 30   OT Charge Group   OT Self Care / ADL 1   OT Therapy Activity 1     Functional mobility with FWW and SBA in room, hallways, bathroom and gym.    Assessment    SBA for adls, bed mobility and functional mobility today.  Doing well and should be read for d/c on Friday.  Strengths: Independent prior level of function, Motivated for self care and independence, Pleasant and cooperative, Willingly participates in therapeutic activities  Barriers: Bladder incontinence, Decreased endurance, Generalized weakness, Hearing impairment, Impaired activity tolerance, Impaired  balance, Impulsive, Confused, Limited mobility, Visual impairment    Plan    ADLs, functional mobility, safety with transfers, strength/endurance/coordination      Occupational Therapy Goals     Problem: Dressing     Dates: Start: 04/12/21       Goal: STG-Within one week, patient will dress LB     Dates: Start: 04/12/21       Description: 1) Individualized Goal:  with supervision using AE/AD/techniques  2) Interventions:  OT Self Care/ADL, OT Cognitive Skill Dev, OT Neuro Re-Ed/Balance, OT Therapeutic Activity, OT Evaluation, and OT Therapeutic Exercise      Note:     Goal Note filed on 04/19/21 1145 by Chema Funk OT/L    SBGUILLERMO/supervised                        Problem: OT Long Term Goals     Dates: Start: 04/07/21       Goal: LTG-By discharge, patient will complete basic self care tasks     Dates: Start: 04/07/21       Description: 1) Individualized Goal:  Mod I for BADL's via AE/DME PRN  2) Interventions:  OT Self Care/ADL, OT Cognitive Skill Dev, OT Neuro Re-Ed/Balance, OT Therapeutic Activity, OT Evaluation, and OT Therapeutic Exercise              Goal: LTG-By discharge, patient will perform bathroom transfers     Dates: Start: 04/07/21       Description: 1) Individualized Goal: Mod I for bathroom transfers via DME PRN  2) Interventions:  OT Self Care/ADL, OT Cognitive Skill Dev, OT Neuro Re-Ed/Balance, OT Therapeutic Activity, OT Evaluation, and OT Therapeutic Exercise                    Problem: Toileting     Dates: Start: 04/12/21       Goal: STG-Within one week, patient will complete toileting tasks     Dates: Start: 04/12/21       Description: 1) Individualized Goal:  Supervised with AE/AD/techniques  2) Interventions:  OT Self Care/ADL, OT Cognitive Skill Dev, OT Neuro Re-Ed/Balance, OT Therapeutic Activity, OT Evaluation, and OT Therapeutic Exercise      Note:     Goal Note filed on 04/19/21 1145 by Chema Funk OT/L    MARIEL

## 2021-04-21 NOTE — THERAPY
Speech Language Pathology  Daily Treatment     Patient Name: Cooper Harvey  Age:  86 y.o., Sex:  male  Medical Record #: [id number]  Today's Date: 4/21/2021     Precautions  Precautions: Fall Risk  Comments: Pt is currently wearing a Zio patch heart monitor (grey case needs to be within 6ft of pt at all times); [place]    Subjective    Patient pleasant and cooperative.  Spouse accompanied patient to session and participated in family training.     Objective       04/21/21 1001   Cognition   Moderate Attention Minimal (4)   Prospective Memory Moderate (3)   Functional Memory Activities Minimal (4)   Functional Problem Solving Minimal (4)   Written Sequencing Moderate (3)   Medication Management  Moderate (3)   SLP Total Time Spent   SLP Individual Total Time Spent (Mins) 60   Treatment Charges   SLP Cognitive Skill Development First 15 Minutes 1   SLP Cognitive Skill Development Additional 15 Minutes 3       Assessment    Educated patient and spouse on Patient's CLOF and recommendations for assist with medication, financial and health care management.  Reviewed strategies to facilitate patient problem solving and planning for safety needs.  Educated both on use of memory log to record training that he needs to practice to master.   Patient and spouse verbalized understanding.  Spouse identified that patient needs frequent reminders to reach back to arm rest to both stand up and sit down and refrain from holding on to walker while doing it.  She identified that patient needs reminders to refrain from lifting walker off the ground.  Patient needs min to mod verbal cue for safety recall, planning and problem solving.    Strengths: Able to follow instructions, Alert and oriented, Pleasant and cooperative, Supportive family  Barriers: Impaired functional cognition    Plan    safety recall, planning and problem solving  Passport items completed:      Speech Therapy Problems     Problem: Memory STGs     Dates: Start:  04/19/21       Goal: STG-Within one week, patient will     Dates: Start: 04/19/21       Description: 1) Individualized goal:  recall safety sequencing and daily events with 80-90% acc with set up.  2) Interventions:  SLP Self Care / ADL Training , SLP Cognitive Skill Development, and SLP Group Treatment                    Problem: Problem Solving STGs     Dates: Start: 04/19/21       Goal: STG-Within one week, patient will     Dates: Start: 04/19/21       Description: 1) Individualized goal:  perform safety planning and problem solving with 90% acc mod I/spv  2) Interventions:  SLP Self Care / ADL Training , SLP Cognitive Skill Development, and SLP Group Treatment                    Problem: Speech/Swallowing LTGs     Dates: Start: 04/07/21       Goal: LTG-By discharge, patient will solve basic problems     Dates: Start: 04/07/21       Description: 1) Individualized goal:  given spv for a safe d/c home  2) Interventions:  SLP Speech Language Treatment, SLP Self Care / ADL Training , SLP Cognitive Skill Development, and SLP Group Treatment

## 2021-04-21 NOTE — PROGRESS NOTES
"Rehab Progress Note     Date of Service: 4/21/2021  Chief Complaint: follow up stroke    Interval Events (Subjective)      Patient seen and examined today in his room.   Wife is present.  Alarm is going off as patient transferred himself into the bed.    PT to train wife for transfers today.      ROS: No changes to bowel, bladder, pain, mood, or sleep.       Objective:  VITAL SIGNS: /69   Pulse 61   Temp 36.7 °C (98 °F) (Oral)   Resp 18   Ht 1.768 m (5' 9.6\")   Wt 74.4 kg (164 lb 0.4 oz)   SpO2 94%   BMI 23.81 kg/m²   Gen: alert, no apparent distress  Neuro: notable for hard of hearing    Recent Results (from the past 72 hour(s))   Basic Metabolic Panel    Collection Time: 04/19/21  5:31 AM   Result Value Ref Range    Sodium 135 135 - 145 mmol/L    Potassium 4.4 3.6 - 5.5 mmol/L    Chloride 102 96 - 112 mmol/L    Co2 29 20 - 33 mmol/L    Glucose 79 65 - 99 mg/dL    Bun 29 (H) 8 - 22 mg/dL    Creatinine 1.17 0.50 - 1.40 mg/dL    Calcium 8.8 8.5 - 10.5 mg/dL    Anion Gap 4.0 (L) 7.0 - 16.0   MAGNESIUM    Collection Time: 04/19/21  5:31 AM   Result Value Ref Range    Magnesium 2.5 1.5 - 2.5 mg/dL   ESTIMATED GFR    Collection Time: 04/19/21  5:31 AM   Result Value Ref Range    GFR If African American >60 >60 mL/min/1.73 m 2    GFR If Non African American 59 (A) >60 mL/min/1.73 m 2       Current Facility-Administered Medications   Medication Frequency   • loratadine (CLARITIN) tablet 10 mg DAILY   • QUEtiapine (Seroquel) tablet 25 mg QHS PRN   • polyethylene glycol/lytes (MIRALAX) PACKET 1 Packet DAILY    And   • senna-docusate (PERICOLACE or SENOKOT S) 8.6-50 MG per tablet 2 tablet BID    And   • magnesium hydroxide (MILK OF MAGNESIA) suspension 30 mL QDAY PRN    And   • bisacodyl (DULCOLAX) suppository 10 mg QDAY PRN   • atorvastatin (LIPITOR) tablet 40 mg Q EVENING   • lisinopril (PRINIVIL) tablet 10 mg Q DAY   • melatonin tablet 3 mg QHS   • hydrOXYzine HCl (ATARAX) tablet 50 mg Q6HRS PRN   • " Respiratory Therapy Consult Continuous RT   • Pharmacy Consult Request ...Pain Management Review 1 Each PHARMACY TO DOSE   • hydrALAZINE (APRESOLINE) tablet 10 mg Q8HRS PRN   • acetaminophen (Tylenol) tablet 650 mg Q4HRS PRN   • lactulose 20 GM/30ML solution 30 mL QDAY PRN   • docusate sodium (ENEMEEZ) enema 283 mg QDAY PRN   • fleet enema 133 mL QDAY PRN   • artificial tears ophthalmic solution 1 Drop PRN   • benzocaine-menthol (CEPACOL) lozenge 1 Lozenge Q2HRS PRN   • mag hydrox-al hydrox-simeth (MAALOX PLUS ES or MYLANTA DS) suspension 20 mL Q2HRS PRN   • ondansetron (ZOFRAN ODT) dispertab 4 mg 4X/DAY PRN    Or   • ondansetron (ZOFRAN) syringe/vial injection 4 mg 4X/DAY PRN   • traZODone (DESYREL) tablet 50 mg QHS PRN   • sodium chloride (OCEAN) 0.65 % nasal spray 2 Spray PRN   • midazolam (VERSED) 5 mg/mL (1 mL vial) PRN   • aspirin (ASA) chewable tab 81 mg DAILY       Orders Placed This Encounter   Procedures   • Diet Order Diet: Level 7 - Easy to Chew; Liquid level: Level 0 - Thin     Standing Status:   Standing     Number of Occurrences:   1     Order Specific Question:   Diet:     Answer:   Level 7 - Easy to Chew [22]     Order Specific Question:   Liquid level     Answer:   Level 0 - Thin       Assessment:  Active Hospital Problems    Diagnosis    • *Acute CVA (cerebrovascular accident) (HCC)    • Carotid stenosis, bilateral    • Altered mental status, unspecified    • Mixed hyperlipidemia    • BPH (benign prostatic hyperplasia)    • Sundowning    • Hard of hearing    • Sinus bradycardia    • Hypokalemia    • Anemia    • S/P carotid endarterectomy    • Agitation      This patient is a 86 y.o. male admitted for acute inpatient rehabilitation with Acute CVA (cerebrovascular accident) (HCC).    I led and attended the weekly conference, and agree with the IDT conference documentation and plan of care as noted below.    Date of conference: 4/19/2021    Goals and barriers: See IDT note.    Biggest barriers: hard  of hearing, constipation, impaired balance, impaired safety awareness and insight, impaired attention    CM/social support: wife supportive    Anticipated DC date: 4/24    Home health: PT/OT/SLP/RN    Equip: FWW    Follow up: PCP, Dr. Sales, stroke bridge clinic, cardiology (at 6 weeks), Dr. Beavers      Medical Decision Making and Plan:    Small punctate infarctions  Right frontal lobe, right cerebellum, right occipital lobe  Non-focal  Visual complaints, improved  Cognitive impairment (suspect some is baseline)  Continue full rehab program  PT/OT/SLP, 1 hr each discipline, 5 days per week    Aspirin   Statin    Outpatient follow up with stroke bridge clinic, Dr Sales, referrals made    Zio patch cardiac monitor placed by neurology 4/9, follow up with cardiology at 6 weeks    Returned to acute on 4/13 due to increased visual symptoms, work up negative for new stroke.     Right carotid stenosis  S/p CEA 4/2  Statin  Aspirin  Outpatient follow up with Dr. Beavers, referrals made    Sundowning, resolved  Scheduled Seroquel at night, trial PRN 4/21, not needing  Scheduled melatonin at night  Qtc OK     Hypertension, improved  Lisinopril  PRN hydralazine    Rhinorrhea  Claritin     Hyperlipidemia  Statin     Azotemia, improved  Increase oral fluids    Bowel program  Continue bowel medications  Last BM 4/18    Bladder program  BPH  With intermittent incontinence  Check PVRs  Not retaining  Bladder scan for no voids  ICP for over 400 cc  Scheduled toileting    DVT prophylaxis  Lovenox discontinued as patient is ambulating long distances, 480 ft    Total time:  17 minutes.  I spent greater than 50% of the time for patient care, counseling, and coordination on this date, including patient face-to face time, unit/floor time with review of records/pertinent lab data and studies, as well as discussing diagnostic evaluation/work up, planned therapeutic interventions, and future disposition of care, as per the interval  events/subjective and the assessment and plan as noted above.    I have performed a physical exam, reviewed and updated ROS, as well as the assessment and plan today 4/21/2021. In review of note from 4/20/2021 there are no new changes except as documented above.    Sugey Barkley M.D.   Physical Medicine and Rehabilitation

## 2021-04-21 NOTE — THERAPY
"Occupational Therapy  Daily Treatment     Patient Name: Cooper Harvey  Age:  86 y.o., Sex:  male  Medical Record #: [id number]  Today's Date: 4/21/2021     Precautions  Precautions: (P) Fall Risk  Comments: (P) Pt is currently wearing a Zio patch heart monitor (grey case needs to be within 6ft of pt at all times); [place]         Subjective    \"I feel so much better after going to the bathroom\"     Objective       04/21/21 1301   Precautions   Precautions Fall Risk   Comments Pt is currently wearing a Zio patch heart monitor (grey case needs to be within 6ft of pt at all times); [place]   Functional Level of Assist   Toileting Modified Independent  (distant supervision)   Toileting Description Grab bar;Increased time;Adaptive equipment  (FWW)   Toilet Transfers Supervised   Toilet Transfer Description Adaptive equipment;Grab bar;Supervision for safety  (FWW)   Standing Upper Body Exercises   Other Exercises Fluido bike, 5 minutes, level 3    OT Total Time Spent   OT Individual Total Time Spent (Mins) 30   OT Charge Group   OT Self Care / ADL 1   OT Therapeutic Exercise  1   Pt ambulated from room to and from back gym w/ FWW and supervision. Pt required one seated break after completing fluido bike before returning to room.     Assessment  Pt tolerated session well focused on toileting and endurance. Pt required distant supervision for toilet transfer and was mod I for hygiene and clothing management. Pt demonstrated increased endurance throughout session.     Strengths: Independent prior level of function, Motivated for self care and independence, Pleasant and cooperative, Willingly participates in therapeutic activities  Barriers: Bladder incontinence, Decreased endurance, Generalized weakness, Hearing impairment, Impaired activity tolerance, Impaired balance, Impulsive, Confused, Limited mobility, Visual impairment    Plan    ADLs, functional mobility, safety with transfers, " strength/endurance/coordination    Occupational Therapy Goals     Problem: Dressing     Dates: Start: 04/12/21       Goal: STG-Within one week, patient will dress LB     Dates: Start: 04/12/21       Description: 1) Individualized Goal:  with supervision using AE/AD/techniques  2) Interventions:  OT Self Care/ADL, OT Cognitive Skill Dev, OT Neuro Re-Ed/Balance, OT Therapeutic Activity, OT Evaluation, and OT Therapeutic Exercise      Note:     Goal Note filed on 04/19/21 1145 by Chema Funk OT/L    MARIEL/supervised                        Problem: OT Long Term Goals     Dates: Start: 04/07/21       Goal: LTG-By discharge, patient will complete basic self care tasks     Dates: Start: 04/07/21       Description: 1) Individualized Goal:  Mod I for BADL's via AE/DME PRN  2) Interventions:  OT Self Care/ADL, OT Cognitive Skill Dev, OT Neuro Re-Ed/Balance, OT Therapeutic Activity, OT Evaluation, and OT Therapeutic Exercise              Goal: LTG-By discharge, patient will perform bathroom transfers     Dates: Start: 04/07/21       Description: 1) Individualized Goal: Mod I for bathroom transfers via DME PRN  2) Interventions:  OT Self Care/ADL, OT Cognitive Skill Dev, OT Neuro Re-Ed/Balance, OT Therapeutic Activity, OT Evaluation, and OT Therapeutic Exercise                    Problem: Toileting     Dates: Start: 04/12/21       Goal: STG-Within one week, patient will complete toileting tasks     Dates: Start: 04/12/21       Description: 1) Individualized Goal:  Supervised with AE/AD/techniques  2) Interventions:  OT Self Care/ADL, OT Cognitive Skill Dev, OT Neuro Re-Ed/Balance, OT Therapeutic Activity, OT Evaluation, and OT Therapeutic Exercise      Note:     Goal Note filed on 04/19/21 1145 by Chema Funk OT/L    MARIEL

## 2021-04-21 NOTE — THERAPY
"Physical Therapy   Daily Treatment     Patient Name: Cooper Harvey  Age:  86 y.o., Sex:  male  Medical Record #: [id number]  Today's Date: 4/21/2021     Precautions  Precautions: Fall Risk  Comments: Pt is currently wearing a Zio patch heart monitor (grey case needs to be within 6ft of pt at all times); [place]    Subjective    Patient seated in w/c upon arrival, agreeable to therapy.      Objective       04/21/21 1331   Precautions   Precautions Fall Risk   Comments Pt is currently wearing a Zio patch heart monitor (grey case needs to be within 6ft of pt at all times); [place]   Cognition    Speech/ Communication Hard of Hearing   Gait Functional Level of Assist    Gait Level Of Assist Contact Guard Assist   Assistive Device Quad Cane   Distance (Feet) 125   # of Times Distance was Traveled 1   Deviation Decreased Base Of Support;Shuffled Gait;Decreased Toe Off;Decreased Heel Strike  (Inconsistent gait pattern)   Stairs Functional Level of Assist   Level of Assist with Stairs Stand by Assist   # of Stairs Climbed 12  (Consecutive 6\" steps with 1 HR on R )   Stairs Description Hand rails;Supervision for safety;Verbal cueing  (Step-over-step ascend; step-to descend )   Transfer Functional Level of Assist   Bed, Chair, Wheelchair Transfer Stand by Assist   Bed Chair Wheelchair Transfer Description Adaptive equipment;Set-up of equipment;Supervision for safety;Verbal cueing   Bed Mobility    Sit to Supine Supervised   Sit to Stand Stand by Assist   Scooting Supervised   Neuro-Muscular Treatments   Neuro-Muscular Treatments Postural Facilitation;Anterior weight shift;Sequencing;Tactile Cuing;Verbal Cuing;Weight Shift Right;Weight Shift Left   Comments Obstacle course (40ft x4) FWW incorporating step-to airex foam, navigating around cones and step over object; VC for postural facilitation; sequencing for quad cane gait pattern    Greene Balance Scale   Sitting Unsupported (Score 0-4) 4   Change Of Positon: Sitting To Standing " (Score 0-4) 4   Change Of Positon: Standing To Sitting (Score 0-4) 4   Transfers (Score 0-4) 2   Standing Unsupported (Score 0-4) 4   Standing With Eyes Closed (Score 0-4) 3   Standing With Feet Together (Score 0-4) 3   Tandem Standing (Score 0-4) 1   Standing On One Leg (Score 0-4) 3  (6 seconds on R)   Turning Trunk (Feet Fixed) (Score 0-4) 4   Retrieving Objects From Floor (Score 0-4) 3   Turning 360 Degrees (Score 0-4) 4   Stool Stepping (Score 0-4) 2   Reaching Forward While Standing (Score 0-4) 3   Greene Balance Total Score (0-56) 44   Interdisciplinary Plan of Care Collaboration   Patient Position at End of Therapy In Bed;Call Light within Reach;Tray Table within Reach;Phone within Reach   Strengths & Barriers   Strengths Adequate strength;Independent prior level of function;Pleasant and cooperative;Willingly participates in therapeutic activities   Barriers Decreased endurance;Difficulty following instructions;Hearing impairment;Home accessibility;Impaired balance;Impaired carryover of learning;Limited mobility;Impaired activity tolerance   PT Total Time Spent   PT Individual Total Time Spent (Mins) 60   PT Charge Group   PT Gait Training 2   PT Neuromuscular Re-Education / Balance 2       Assessment    Patient demonstrates carryover of safe STS sequencing from morning session with no cuing. Patient scored a 44/56 on the Greene Balance assessment, indicating the patient is at a increase risk for a fall. Continue to recommend the use of a FWW for home. Patient demonstrates increased instability and inconsistent gait with the quad cane trial.    Strengths: Adequate strength, Independent prior level of function, Pleasant and cooperative, Willingly participates in therapeutic activities  Barriers: (P) Decreased endurance, Difficulty following instructions, Hearing impairment, Home accessibility, Impaired balance, Impaired carryover of learning, Limited mobility, Impaired activity tolerance    Plan    Reinforce  "safety with STS sequencing; gait with FWW or LRAD, 5 6\" stairs with one rail, coordination exercise, static and dynamic balance.      Passport items to be completed:  All complete    Physical Therapy Problems     Problem: Mobility     Dates: Start: 04/12/21       Goal: STG-Within one week, patient will ambulate community distances     Dates: Start: 04/12/21       Description: 1) Individualized goal:   feet with FWW and SBA  2) Interventions:  PT Gait Training, PT Therapeutic Exercises, PT Neuro Re-Ed/Balance, PT Therapeutic Activity, and PT Evaluation      Note:     Goal Note filed on 04/19/21 1232 by Daya Mares DPT    Partially met- CGA  + 480 indoors/ outdoors                             "

## 2021-04-21 NOTE — CARE PLAN
Problem: Safety  Goal: Will remain free from falls  Outcome: PROGRESSING AS EXPECTED  Intervention: Implement fall precautions  Flowsheets (Taken 4/21/2021 0133)  Bed Alarm: Yes - Alarm On  Note: Pt free from fall and injury, calls appropriately , needs attended. Pt free form fall and injury.     Problem: Infection  Goal: Will remain free from infection  Outcome: PROGRESSING AS EXPECTED  Intervention: Assess signs and symptoms of infection  Note: Afebrile, vital signs stable, no s/s of infection noted.     Problem: Pain Management  Goal: Pain level will decrease to patient's comfort goal  Outcome: PROGRESSING AS EXPECTED     Problem: Bowel/Gastric:  Goal: Normal bowel function is maintained or improved  Outcome: PROGRESSING SLOWER THAN EXPECTED  Intervention: Educate patient and significant other/support system about diet, fluid intake, medications and activity to promote bowel function  Note: Pt recorded bm 4/18/21 . Pt forgetful , unable to recall last bm, pt thinks it was 2 weeks ago. Will medicate miralax in am.

## 2021-04-21 NOTE — CARE PLAN
Problem: Safety  Goal: Will remain free from injury  Outcome: PROGRESSING AS EXPECTED   Pt uses call light consistently and appropriately. Waits for assistance does not attempt self transfer this shift. Able to verbalize needs.   Problem: Venous Thromboembolism (VTW)/Deep Vein Thrombosis (DVT) Prevention:  Goal: Patient will participate in Venous Thrombosis (VTE)/Deep Vein Thrombosis (DVT)Prevention Measures  Outcome: PROGRESSING AS EXPECTED   Pt is up and walking, participates in therapies, and ambulates to bathroom.    Problem: Pain Management  Goal: Pain level will decrease to patient's comfort goal  Outcome: PROGRESSING AS EXPECTED   Patient able to verbalize pain level and verbalize an acceptable level of pain.

## 2021-04-21 NOTE — THERAPY
Physical Therapy   Daily Treatment     Patient Name: Cooper Harvey  Age:  86 y.o., Sex:  male  Medical Record #: [id number]  Today's Date: 4/21/2021     Precautions  Precautions: (P) Fall Risk  Comments: (P) Pt is currently wearing a Zio patch heart monitor (grey case needs to be within 6ft of pt at all times); [place]    Subjective    Patient seated in w/c upon arrival, agreeable to therapy. Spouse present at end of session.      Objective       04/21/21 0931   Precautions   Precautions Fall Risk   Comments Pt is currently wearing a Zio patch heart monitor (grey case needs to be within 6ft of pt at all times); [place]   Cognition    Speech/ Communication Hard of Hearing   Safety Awareness Impaired   Gait Functional Level of Assist    Gait Level Of Assist Stand by Assist   Assistive Device Front Wheel Walker   Distance (Feet) 250  (125ft x1 with visual scanning of environment for cones)   # of Times Distance was Traveled 1   Deviation Decreased Base Of Support;Shuffled Gait;Decreased Heel Strike;Decreased Toe Off   Sitting Lower Body Exercises   Sit to Stand 1 set of 10  (VC to reinforce UE placement )   Bed Mobility    Sit to Stand Stand by Assist   Neuro-Muscular Treatments   Neuro-Muscular Treatments Anterior weight shift;Postural Facilitation;Sequencing;Weight Shift Right;Weight Shift Left;Verbal Cuing   Comments VC and reinforcement of STS sequencing with the FWW; standing catch and release with soccer ball with no UE support - CGA x3 minutes;   Interdisciplinary Plan of Care Collaboration   IDT Collaboration with  Family / Caregiver   Patient Position at End of Therapy Seated;Chair Alarm On;Self Releasing Lap Belt Applied;Call Light within Reach;Tray Table within Reach;Phone within Reach;Family / Friend in Room   Collaboration Comments CLOF   Strengths & Barriers   Strengths Adequate strength;Independent prior level of function;Pleasant and cooperative;Willingly participates in therapeutic activities   Barriers  "Decreased endurance;Difficulty following instructions;Hearing impairment;Home accessibility;Impaired balance;Impaired carryover of learning;Limited mobility;Impaired activity tolerance   PT Total Time Spent   PT Individual Total Time Spent (Mins) 30   PT Charge Group   PT Gait Training 1   PT Neuromuscular Re-Education / Balance 1       Assessment    Functional mobility continues to improve. Patient demonstrates ability to safely ambulate with FWW while scanning environment with object finding. Patient completes catch/release activity with no LOB. Patient shows good carryover of learning throughout session by demonstration of proper STS sequencing with FWW. Patient limited in session by hearing impairment, requiring student PT to talk loudly and clearly into ear with visual demonstrations.     Strengths: (P) Adequate strength, Independent prior level of function, Pleasant and cooperative, Willingly participates in therapeutic activities  Barriers: (P) Decreased endurance, Difficulty following instructions, Hearing impairment, Home accessibility, Impaired balance, Impaired carryover of learning, Limited mobility, Impaired activity tolerance    Plan    Reinforce safety with STS sequencing; gait with FWW or LRAD, 5 6\" stairs with one rail, coordination exercise, static and dynamic balance.      Passport items to be completed:  All complete      Physical Therapy Problems     Problem: Mobility     Dates: Start: 04/12/21       Goal: STG-Within one week, patient will ambulate community distances     Dates: Start: 04/12/21       Description: 1) Individualized goal:   feet with FWW and SBA  2) Interventions:  PT Gait Training, PT Therapeutic Exercises, PT Neuro Re-Ed/Balance, PT Therapeutic Activity, and PT Evaluation      Note:     Goal Note filed on 04/19/21 1232 by Daya Mares DPT    Partially met- CGA  + 480 indoors/ outdoors                             "

## 2021-04-21 NOTE — THERAPY
Speech Language Pathology  Daily Treatment     Patient Name: Cooper Harvey  Age:  86 y.o., Sex:  male  Medical Record #: [id number]  Today's Date: 4/20/2021     Precautions  Precautions: Fall Risk  Comments: Pt is currently wearing a Zio patch heart monitor (grey case needs to be within 6ft of pt at all times); [place]    Subjective    Pt pleasant and cooperative during ST session.      Objective       04/20/21 1531   Cognition   Attention to Task Within Functional Limits (6-7)   Moderate Attention Minimal (4)   Orientation  Supervision (5)   Prospective Memory Moderate (3)   Functional Memory Activities Minimal (4)   Functional Problem Solving Minimal (4)   Interdisciplinary Plan of Care Collaboration   IDT Collaboration with  Certified Nursing Assistant   Patient Position at End of Therapy In Bed;Bed Alarm On;Call Light within Reach;Tray Table within Reach;Phone within Reach   Collaboration Comments CLOF   SLP Total Time Spent   SLP Individual Total Time Spent (Mins) 60   Treatment Charges   SLP Cognitive Skill Development First 15 Minutes 1   SLP Cognitive Skill Development Additional 15 Minutes 3       Assessment    Pt completed Daily Log w/ MIN cues to recall OT session targets, IND recalled PT session. Pt able to verbally sequence transfer from bed to WC and demonstrated use of elbow to push self to seating before attempting to hoist himself to standing. Pt problem solved issue w/ vision - use of walker w/ seat for safely resting when it happens, use of bathroom hand rails and shower chair, no driving.     Strengths: Able to follow instructions, Alert and oriented, Pleasant and cooperative, Supportive family  Barriers: Impaired functional cognition    Plan    Continue to address safety problem solving, family training.       Speech Therapy Problems     Problem: Memory STGs     Dates: Start: 04/19/21       Goal: STG-Within one week, patient will     Dates: Start: 04/19/21       Description: 1) Individualized  goal:  recall safety sequencing and daily events with 80-90% acc with set up.  2) Interventions:  SLP Self Care / ADL Training , SLP Cognitive Skill Development, and SLP Group Treatment                    Problem: Problem Solving STGs     Dates: Start: 04/19/21       Goal: STG-Within one week, patient will     Dates: Start: 04/19/21       Description: 1) Individualized goal:  perform safety planning and problem solving with 90% acc mod I/spv  2) Interventions:  SLP Self Care / ADL Training , SLP Cognitive Skill Development, and SLP Group Treatment                    Problem: Speech/Swallowing LTGs     Dates: Start: 04/07/21       Goal: LTG-By discharge, patient will solve basic problems     Dates: Start: 04/07/21       Description: 1) Individualized goal:  given spv for a safe d/c home  2) Interventions:  SLP Speech Language Treatment, SLP Self Care / ADL Training , SLP Cognitive Skill Development, and SLP Group Treatment

## 2021-04-22 PROCEDURE — 99231 SBSQ HOSP IP/OBS SF/LOW 25: CPT | Performed by: PHYSICAL MEDICINE & REHABILITATION

## 2021-04-22 PROCEDURE — 700102 HCHG RX REV CODE 250 W/ 637 OVERRIDE(OP): Performed by: PHYSICAL MEDICINE & REHABILITATION

## 2021-04-22 PROCEDURE — 97116 GAIT TRAINING THERAPY: CPT

## 2021-04-22 PROCEDURE — 770010 HCHG ROOM/CARE - REHAB SEMI PRIVAT*

## 2021-04-22 PROCEDURE — 97112 NEUROMUSCULAR REEDUCATION: CPT

## 2021-04-22 PROCEDURE — 97530 THERAPEUTIC ACTIVITIES: CPT

## 2021-04-22 PROCEDURE — 97110 THERAPEUTIC EXERCISES: CPT

## 2021-04-22 PROCEDURE — 97129 THER IVNTJ 1ST 15 MIN: CPT

## 2021-04-22 PROCEDURE — 97130 THER IVNTJ EA ADDL 15 MIN: CPT

## 2021-04-22 PROCEDURE — A9270 NON-COVERED ITEM OR SERVICE: HCPCS | Performed by: PHYSICAL MEDICINE & REHABILITATION

## 2021-04-22 PROCEDURE — 97535 SELF CARE MNGMENT TRAINING: CPT

## 2021-04-22 RX ADMIN — ATORVASTATIN CALCIUM 40 MG: 40 TABLET, FILM COATED ORAL at 20:14

## 2021-04-22 RX ADMIN — SENNOSIDES AND DOCUSATE SODIUM 2 TABLET: 8.6; 5 TABLET ORAL at 08:14

## 2021-04-22 RX ADMIN — LISINOPRIL 10 MG: 5 TABLET ORAL at 05:05

## 2021-04-22 RX ADMIN — SENNOSIDES AND DOCUSATE SODIUM 2 TABLET: 8.6; 5 TABLET ORAL at 20:14

## 2021-04-22 RX ADMIN — LORATADINE 10 MG: 10 TABLET ORAL at 08:14

## 2021-04-22 RX ADMIN — ASPIRIN 81 MG CHEWABLE TABLET 81 MG: 81 TABLET CHEWABLE at 08:14

## 2021-04-22 RX ADMIN — MELATONIN TAB 3 MG 3 MG: 3 TAB at 20:14

## 2021-04-22 ASSESSMENT — GAIT ASSESSMENTS
GAIT LEVEL OF ASSIST: CONTACT GUARD ASSIST
DEVIATION: DECREASED BASE OF SUPPORT;DECREASED HEEL STRIKE;DECREASED TOE OFF
DISTANCE (FEET): 300

## 2021-04-22 ASSESSMENT — ACTIVITIES OF DAILY LIVING (ADL)
TUB_SHOWER_TRANSFER_DESCRIPTION: GRAB BAR;SHOWER BENCH;SET-UP OF EQUIPMENT;SUPERVISION FOR SAFETY
TOILET_TRANSFER_DESCRIPTION: ADAPTIVE EQUIPMENT;GRAB BAR;SUPERVISION FOR SAFETY
BED_CHAIR_WHEELCHAIR_TRANSFER_DESCRIPTION: SET-UP OF EQUIPMENT;SUPERVISION FOR SAFETY
TOILETING_LEVEL_OF_ASSIST_DESCRIPTION: SUPERVISION FOR SAFETY
BED_CHAIR_WHEELCHAIR_TRANSFER_DESCRIPTION: SET-UP OF EQUIPMENT;SUPERVISION FOR SAFETY;ADAPTIVE EQUIPMENT

## 2021-04-22 NOTE — CARE PLAN
Problem: Safety  Goal: Will remain free from injury  Note: Patient calling appropriately for assistance, does not attempt to transfer self without staff present. Call light within reach. Non skid footwear in place. Bed locked and in lowest position. Hourly rounding in place.       Problem: Bowel/Gastric:  Goal: Normal bowel function is maintained or improved  Note: Pt. Is continent of bowels. LBM 4/22/21. Bowel sounds active in all four quadrants

## 2021-04-22 NOTE — THERAPY
Occupational Therapy  Daily Treatment     Patient Name: Cooper Harvey  Age:  86 y.o., Sex:  male  Medical Record #: [id number]  Today's Date: 4/22/2021     Precautions  Precautions: (P) Fall Risk  Comments: Pt is currently wearing a Zio patch heart monitor (grey case needs to be within 6ft of pt at all times); [place]         Subjective    Patient awake in bed and agreeable to shower this morning.  Patient thought he was going home today.     Objective       04/22/21 0701   Precautions   Precautions Fall Risk   Cognition    Safety Awareness Impaired   Attention Impaired   Functional Level of Assist   Eating Modified Independent   Eating Description Adaptive equipment  (dentures)   Grooming Independent;Seated   Bathing Supervision   Bathing Description Tub bench;Initial preparation for task;Set-up of equipment;Supervision for safety;Verbal cueing  (SBA with gb and bench, cues for safety)   Upper Body Dressing Supervision   Upper Body Dressing Description Set-up of equipment   Lower Body Dressing Supervision   Lower Body Dressing Description Set-up of equipment;Supervision for safety   Toileting Supervision   Toileting Description Supervision for safety  (supervised standing to void)   Bed, Chair, Wheelchair Transfer Supervised   Bed Chair Wheelchair Transfer Description Set-up of equipment;Supervision for safety  (reach pivot bed to w/c after setup)   Tub / Shower Transfers Stand by Assist   Tub Shower Transfer Description Grab bar;Shower bench;Set-up of equipment;Supervision for safety   Bed Mobility    Supine to Sit Independent   Scooting Independent   Interdisciplinary Plan of Care Collaboration   Patient Position at End of Therapy Seated;Chair Alarm On;Self Releasing Lap Belt Applied;Call Light within Reach;Tray Table within Reach;Phone within Reach   OT Total Time Spent   OT Individual Total Time Spent (Mins) 60   OT Charge Group   OT Self Care / ADL 4       Assessment    Completed adl routine and bed to w/c  transfer with setup and SBA/supervision.  Requires SBA for bathing in shower as he prefers to stand, but does not always follow direction to keep one hand on grab bar for safety, as he has impaired standing balance and impulsive movements at times.  Continues to be a fall risk, especially in shower.  Strengths: Independent prior level of function, Motivated for self care and independence, Pleasant and cooperative, Willingly participates in therapeutic activities  Barriers: Bladder incontinence, Decreased endurance, Generalized weakness, Hearing impairment, Impaired activity tolerance, Impaired balance, Impulsive, Confused, Limited mobility, Visual impairment    Plan    ADLs, functional mobility, safety with transfers, strength/endurance/coordination      Occupational Therapy Goals     Problem: Dressing     Dates: Start: 04/12/21       Goal: STG-Within one week, patient will dress LB     Dates: Start: 04/12/21       Description: 1) Individualized Goal:  with supervision using AE/AD/techniques  2) Interventions:  OT Self Care/ADL, OT Cognitive Skill Dev, OT Neuro Re-Ed/Balance, OT Therapeutic Activity, OT Evaluation, and OT Therapeutic Exercise      Note:     Goal Note filed on 04/19/21 1145 by Chema Funk OT/L    SBA/supervised                        Problem: OT Long Term Goals     Dates: Start: 04/07/21       Goal: LTG-By discharge, patient will complete basic self care tasks     Dates: Start: 04/07/21       Description: 1) Individualized Goal:  Mod I for BADL's via AE/DME PRN  2) Interventions:  OT Self Care/ADL, OT Cognitive Skill Dev, OT Neuro Re-Ed/Balance, OT Therapeutic Activity, OT Evaluation, and OT Therapeutic Exercise              Goal: LTG-By discharge, patient will perform bathroom transfers     Dates: Start: 04/07/21       Description: 1) Individualized Goal: Mod I for bathroom transfers via DME PRN  2) Interventions:  OT Self Care/ADL, OT Cognitive Skill Dev, OT Neuro Re-Ed/Balance, OT  Therapeutic Activity, OT Evaluation, and OT Therapeutic Exercise                    Problem: Toileting     Dates: Start: 04/12/21       Goal: STG-Within one week, patient will complete toileting tasks     Dates: Start: 04/12/21       Description: 1) Individualized Goal:  Supervised with AE/AD/techniques  2) Interventions:  OT Self Care/ADL, OT Cognitive Skill Dev, OT Neuro Re-Ed/Balance, OT Therapeutic Activity, OT Evaluation, and OT Therapeutic Exercise      Note:     Goal Note filed on 04/19/21 1145 by Chema Funk OT/RASHEED FLOYD

## 2021-04-22 NOTE — PROGRESS NOTES
"Rehab Progress Note     Date of Service: 4/22/2021  Chief Complaint: follow up stroke    Interval Events (Subjective)      Patient seen and examined today in the therapy gym.  He is working with occupational therapy on upper body bands.  He has no complaints today.  Wife has questions about his discharge appointments and medications.  Advised we will go over this prior to his discharge.      ROS: No changes to bowel, bladder, pain, mood, or sleep.         Objective:  VITAL SIGNS: /71   Pulse 65   Temp 36.9 °C (98.5 °F) (Oral)   Resp 18   Ht 1.768 m (5' 9.6\")   Wt 74.4 kg (164 lb 0.4 oz)   SpO2 96%   BMI 23.81 kg/m²   Gen: alert, no apparent distress  Neuro: notable for hard of hearing    No results found for this or any previous visit (from the past 72 hour(s)).    Current Facility-Administered Medications   Medication Frequency   • loratadine (CLARITIN) tablet 10 mg DAILY   • QUEtiapine (Seroquel) tablet 25 mg QHS PRN   • polyethylene glycol/lytes (MIRALAX) PACKET 1 Packet DAILY    And   • senna-docusate (PERICOLACE or SENOKOT S) 8.6-50 MG per tablet 2 tablet BID    And   • magnesium hydroxide (MILK OF MAGNESIA) suspension 30 mL QDAY PRN    And   • bisacodyl (DULCOLAX) suppository 10 mg QDAY PRN   • atorvastatin (LIPITOR) tablet 40 mg Q EVENING   • lisinopril (PRINIVIL) tablet 10 mg Q DAY   • melatonin tablet 3 mg QHS   • hydrOXYzine HCl (ATARAX) tablet 50 mg Q6HRS PRN   • Respiratory Therapy Consult Continuous RT   • Pharmacy Consult Request ...Pain Management Review 1 Each PHARMACY TO DOSE   • hydrALAZINE (APRESOLINE) tablet 10 mg Q8HRS PRN   • acetaminophen (Tylenol) tablet 650 mg Q4HRS PRN   • lactulose 20 GM/30ML solution 30 mL QDAY PRN   • docusate sodium (ENEMEEZ) enema 283 mg QDAY PRN   • fleet enema 133 mL QDAY PRN   • artificial tears ophthalmic solution 1 Drop PRN   • benzocaine-menthol (CEPACOL) lozenge 1 Lozenge Q2HRS PRN   • mag hydrox-al hydrox-simeth (MAALOX PLUS ES or MYLANTA DS) " suspension 20 mL Q2HRS PRN   • ondansetron (ZOFRAN ODT) dispertab 4 mg 4X/DAY PRN    Or   • ondansetron (ZOFRAN) syringe/vial injection 4 mg 4X/DAY PRN   • traZODone (DESYREL) tablet 50 mg QHS PRN   • sodium chloride (OCEAN) 0.65 % nasal spray 2 Spray PRN   • midazolam (VERSED) 5 mg/mL (1 mL vial) PRN   • aspirin (ASA) chewable tab 81 mg DAILY       Orders Placed This Encounter   Procedures   • Diet Order Diet: Level 7 - Easy to Chew; Liquid level: Level 0 - Thin     Standing Status:   Standing     Number of Occurrences:   1     Order Specific Question:   Diet:     Answer:   Level 7 - Easy to Chew [22]     Order Specific Question:   Liquid level     Answer:   Level 0 - Thin       Assessment:  Active Hospital Problems    Diagnosis    • *Acute CVA (cerebrovascular accident) (HCC)    • Carotid stenosis, bilateral    • Altered mental status, unspecified    • Mixed hyperlipidemia    • BPH (benign prostatic hyperplasia)    • Sundowning    • Hard of hearing    • Sinus bradycardia    • Hypokalemia    • Anemia    • S/P carotid endarterectomy    • Agitation      This patient is a 86 y.o. male admitted for acute inpatient rehabilitation with Acute CVA (cerebrovascular accident) ([facility]).    I led and attended the weekly conference, and agree with the IDT conference documentation and plan of care as noted below.    Date of conference: 4/19/2021    Goals and barriers: See IDT note.    Biggest barriers: hard of hearing, constipation, impaired balance, impaired safety awareness and insight, impaired attention    CM/social support: wife supportive    Anticipated DC date: 4/24    Home health: PT/OT/SLP/RN    Equip: FWW    Follow up: PCP, Dr. Sales, stroke bridge clinic, cardiology (at 6 weeks), Dr. Beavers    Prescriptions to [facility] pharmacy      Medical Decision Making and Plan:    Small punctate infarctions  Right frontal lobe, right cerebellum, right occipital lobe  Non-focal  Visual complaints, improved/resolved  Cognitive  impairment (suspect some is baseline)  Continue full rehab program  PT/OT/SLP, 1 hr each discipline, 5 days per week    Aspirin   Statin    Outpatient follow up with stroke bridge clinic, Dr Sales, referrals made    Zio patch cardiac monitor placed by neurology 4/9, follow up with cardiology at 6 weeks    Returned to acute on 4/13 due to increased visual symptoms, work up negative for new stroke.     Right carotid stenosis  S/p CEA 4/2  Statin  Aspirin  Outpatient follow up with Dr. Beavers, referrals made    Sundowning, resolved  Scheduled Seroquel at night, trial PRN 4/21, not needing  Scheduled melatonin at night  Qtc OK     Hypertension, improved  Lisinopril  PRN hydralazine    Rhinorrhea  Claritin     Hyperlipidemia  Statin     Azotemia, improved  Increase oral fluids    Bowel program  Continue bowel medications  Last BM 4/21    Bladder program  BPH  With intermittent incontinence  Checked PVRs  Not retaining  Bladder scan for no voids  ICP for over 400 cc  Scheduled toileting    DVT prophylaxis  Lovenox discontinued as patient is ambulating long distances, 480 ft    Total time:  18 minutes.  I spent greater than 50% of the time for patient care, counseling, and coordination on this date, including patient face-to face time, unit/floor time with review of records/pertinent lab data and studies, as well as discussing diagnostic evaluation/work up, planned therapeutic interventions, and future disposition of care, as per the interval events/subjective and the assessment and plan as noted above.    I have performed a physical exam, reviewed and updated ROS, as well as the assessment and plan today 4/22/2021. In review of note from 4/21/2021 there are no new changes except as documented above.          Sugey Barkley M.D.   Physical Medicine and Rehabilitation

## 2021-04-22 NOTE — THERAPY
"Physical Therapy   Daily Treatment     Patient Name: Cooper Harvey  Age:  86 y.o., Sex:  male  Medical Record #: [id number]  Today's Date: 4/22/2021     Precautions  Precautions: (P) Fall Risk  Comments: (P) Pt is currently wearing a Zio patch heart monitor (grey case needs to be within 6ft of pt at all times); [place]    Subjective    Patient laying in bed upon arrival, agreeable to therapy. Patient looking forward to discharge on Saturday.      Objective       04/22/21 1401   Precautions   Precautions Fall Risk   Comments Pt is currently wearing a Zio patch heart monitor (grey case needs to be within 6ft of pt at all times); [place]   Cognition    Speech/ Communication Hard of Hearing   Gait Functional Level of Assist    Gait Level Of Assist Contact Guard Assist   Assistive Device None   Distance (Feet) 300  (300ft x1 FWW w/ SBA; 125ft x3 with no AD )   # of Times Distance was Traveled 1   Deviation Decreased Base Of Support;Decreased Heel Strike;Decreased Toe Off  (Increasaed step length and NILO with repetition)   Transfer Functional Level of Assist   Bed, Chair, Wheelchair Transfer Supervised   Bed Chair Wheelchair Transfer Description Set-up of equipment;Supervision for safety;Adaptive equipment  (SPT with no AD)   Toilet Transfers Supervised   Toilet Transfer Description Adaptive equipment;Grab bar;Supervision for safety  (Stand with grab bar to urinate )   Bed Mobility    Supine to Sit Independent   Sit to Supine Independent   Sit to Stand Stand by Assist   Scooting Independent   Rolling Independent   Neuro-Muscular Treatments   Neuro-Muscular Treatments Sequencing;Tactile Cuing;Verbal Cuing;Weight Shift Right;Weight Shift Left   Comments Tossing objects in standing to encourage weight shifting and dynamic balance x8 minutes; obstacle course (40ft x5) incorporating uneven surface, airex foam, stepping over objects, and 4\" stair. Completed 2x with FWW and 3x with no AD   Interdisciplinary Plan of Care " "Collaboration   Patient Position at End of Therapy Seated;Chair Alarm On;Self Releasing Lap Belt Applied;Call Light within Reach;Tray Table within Reach;Phone within Reach   Strengths & Barriers   Strengths Adequate strength;Independent prior level of function;Pleasant and cooperative;Willingly participates in therapeutic activities   Barriers Decreased endurance;Difficulty following instructions;Hearing impairment;Home accessibility;Impaired balance;Impaired carryover of learning;Limited mobility;Impaired activity tolerance   PT Total Time Spent   PT Individual Total Time Spent (Mins) 60   PT Charge Group   PT Gait Training 2   PT Neuromuscular Re-Education / Balance 1   PT Therapeutic Activities 1       Assessment    Session focused on ambulation with FWW vs no AD. Increased base of support and step length with no AD observed with repetition. One LOB with stepping on foam, requiring modA to regain balance. Functional mobility and endurance continues to improve. Anticipate patient will be able to progress to no AD after discharge.    Strengths: (P) Adequate strength, Independent prior level of function, Pleasant and cooperative, Willingly participates in therapeutic activities  Barriers: (P) Decreased endurance, Difficulty following instructions, Hearing impairment, Home accessibility, Impaired balance, Impaired carryover of learning, Limited mobility, Impaired activity tolerance    Plan    Anticipate d/c 4/24/21; IRF-SAMANTHA assessment; gait with FWW or LRAD, 5 6\" stairs with one rail, coordination exercise, static and dynamic balance.      Passport items to be completed:  All complete       Physical Therapy Problems     Problem: Mobility     Dates: Start: 04/12/21       Goal: STG-Within one week, patient will ambulate community distances     Dates: Start: 04/12/21       Description: 1) Individualized goal:   feet with FWW and SBA  2) Interventions:  PT Gait Training, PT Therapeutic Exercises, PT Neuro " Re-Ed/Balance, PT Therapeutic Activity, and PT Evaluation      Note:     Goal Note filed on 04/19/21 1232 by Daya Mares DPT    Partially met- CGA  + 480 indoors/ outdoors

## 2021-04-22 NOTE — THERAPY
Speech Language Pathology  Daily Treatment     Patient Name: Cooper Harvey  Age:  86 y.o., Sex:  male  Medical Record #: [id number]  Today's Date: 4/22/2021     Precautions  Precautions: Fall Risk  Comments: Pt is currently wearing a Zio patch heart monitor (grey case needs to be within 6ft of pt at all times); [place]    Subjective    Patient pleasant and cooperative.  Found patient's right hearing aid casing to be cracked so as removing it from ear once placed, could further break it. Patient did not know how it became damaged.  Notified charge nurse.      Objective       04/22/21 0831   Cognition   Simple Attention Supervision (5)   Moderate Attention Minimal (4)   Functional Memory Activities Minimal (4)   Functional Problem Solving Supervision (5)   SLP Total Time Spent   SLP Individual Total Time Spent (Mins) 60   Treatment Charges   SLP Cognitive Skill Development First 15 Minutes 1   SLP Cognitive Skill Development Additional 15 Minutes 3       Assessment    Patient worked on recall of safety training, use of memory log, recall of RWAVS, and safety planning and problem solving.  Patient recalled and implemented sequencing for safe transfer on and off the toilet with 100% acc.  He recalled 0/5 RWAVS from training in previous days, recalled 1/5 after multiple reviews.  He needs min verbal cues to recall skills his is working to improve, by referring to his memory log.  Provided stroke education related to FAST.    Strengths: Able to follow instructions, Alert and oriented, Pleasant and cooperative, Supportive family  Barriers: Impaired functional cognition    Plan    Out comes measures with SCCAN to be administered.    Passport items to be completed:  N/A all items completed.    Speech Therapy Problems     Problem: Memory STGs     Dates: Start: 04/19/21       Goal: STG-Within one week, patient will     Dates: Start: 04/19/21       Description: 1) Individualized goal:  recall safety sequencing and daily  events with 80-90% acc with set up.  2) Interventions:  SLP Self Care / ADL Training , SLP Cognitive Skill Development, and SLP Group Treatment                    Problem: Problem Solving STGs     Dates: Start: 04/19/21       Goal: STG-Within one week, patient will     Dates: Start: 04/19/21       Description: 1) Individualized goal:  perform safety planning and problem solving with 90% acc mod I/spv  2) Interventions:  SLP Self Care / ADL Training , SLP Cognitive Skill Development, and SLP Group Treatment                    Problem: Speech/Swallowing LTGs     Dates: Start: 04/07/21       Goal: LTG-By discharge, patient will solve basic problems     Dates: Start: 04/07/21       Description: 1) Individualized goal:  given spv for a safe d/c home  2) Interventions:  SLP Speech Language Treatment, SLP Self Care / ADL Training , SLP Cognitive Skill Development, and SLP Group Treatment

## 2021-04-22 NOTE — CARE PLAN
Problem: Safety  Goal: Will remain free from falls  Outcome: PROGRESSING AS EXPECTED  Intervention: Implement fall precautions  Flowsheets (Taken 4/22/2021 0134)  Bed Alarm: Yes - Alarm On  Environmental Precautions:   Treaded Slipper Socks on Patient   Bed in Low Position   Personal Belongings, Wastebasket, Call Bell etc. in Easy Reach  Note: Pt free from fall and injury , safety measures reinforced , bed at lowest level, Pt free from fall and injury.     Problem: Bowel/Gastric:  Goal: Will not experience complications related to bowel motility  Outcome: PROGRESSING AS EXPECTED     Problem: Pain Management  Goal: Pain level will decrease to patient's comfort goal  Outcome: PROGRESSING AS EXPECTED

## 2021-04-23 ENCOUNTER — PHARMACY VISIT (OUTPATIENT)
Dept: PHARMACY | Facility: MEDICAL CENTER | Age: 86
End: 2021-04-23
Payer: COMMERCIAL

## 2021-04-23 DIAGNOSIS — Z86.73 HISTORY OF CVA (CEREBROVASCULAR ACCIDENT): ICD-10-CM

## 2021-04-23 PROBLEM — R41.82 ALTERED MENTAL STATUS, UNSPECIFIED: Status: RESOLVED | Noted: 2021-04-01 | Resolved: 2021-04-23

## 2021-04-23 PROBLEM — F05 SUNDOWNING: Status: RESOLVED | Noted: 2021-04-07 | Resolved: 2021-04-23

## 2021-04-23 PROBLEM — Z78.9 IMPAIRED MOBILITY AND ADLS: Status: ACTIVE | Noted: 2021-04-23

## 2021-04-23 PROBLEM — Z74.09 IMPAIRED MOBILITY AND ADLS: Status: ACTIVE | Noted: 2021-04-23

## 2021-04-23 PROBLEM — R45.1 AGITATION: Status: RESOLVED | Noted: 2021-04-06 | Resolved: 2021-04-23

## 2021-04-23 PROBLEM — E87.6 HYPOKALEMIA: Status: RESOLVED | Noted: 2021-04-06 | Resolved: 2021-04-23

## 2021-04-23 PROCEDURE — 99231 SBSQ HOSP IP/OBS SF/LOW 25: CPT | Performed by: PHYSICAL MEDICINE & REHABILITATION

## 2021-04-23 PROCEDURE — A9270 NON-COVERED ITEM OR SERVICE: HCPCS | Performed by: PHYSICAL MEDICINE & REHABILITATION

## 2021-04-23 PROCEDURE — 97530 THERAPEUTIC ACTIVITIES: CPT

## 2021-04-23 PROCEDURE — 700102 HCHG RX REV CODE 250 W/ 637 OVERRIDE(OP): Performed by: PHYSICAL MEDICINE & REHABILITATION

## 2021-04-23 PROCEDURE — 97116 GAIT TRAINING THERAPY: CPT

## 2021-04-23 PROCEDURE — 97130 THER IVNTJ EA ADDL 15 MIN: CPT

## 2021-04-23 PROCEDURE — 770010 HCHG ROOM/CARE - REHAB SEMI PRIVAT*

## 2021-04-23 PROCEDURE — 97129 THER IVNTJ 1ST 15 MIN: CPT

## 2021-04-23 PROCEDURE — 97110 THERAPEUTIC EXERCISES: CPT

## 2021-04-23 PROCEDURE — RXMED WILLOW AMBULATORY MEDICATION CHARGE: Performed by: PHYSICAL MEDICINE & REHABILITATION

## 2021-04-23 PROCEDURE — 97535 SELF CARE MNGMENT TRAINING: CPT

## 2021-04-23 RX ORDER — ATORVASTATIN CALCIUM 40 MG/1
40 TABLET, FILM COATED ORAL EVERY EVENING
Qty: 30 TABLET | Refills: 2 | Status: ON HOLD | OUTPATIENT
Start: 2021-04-23 | End: 2022-12-15 | Stop reason: SDUPTHER

## 2021-04-23 RX ORDER — LORATADINE 10 MG/1
10 TABLET ORAL DAILY
Qty: 30 TABLET | Refills: 2 | Status: ON HOLD | OUTPATIENT
Start: 2021-04-23 | End: 2022-12-15 | Stop reason: SDUPTHER

## 2021-04-23 RX ORDER — POLYETHYLENE GLYCOL 3350 17 G/17G
17 POWDER, FOR SOLUTION ORAL
COMMUNITY
Start: 2021-04-23 | End: 2021-05-24

## 2021-04-23 RX ORDER — ASPIRIN 81 MG/1
81 TABLET, CHEWABLE ORAL DAILY
Qty: 100 TABLET | Status: ON HOLD | COMMUNITY
Start: 2021-04-23 | End: 2022-12-02

## 2021-04-23 RX ORDER — LANOLIN ALCOHOL/MO/W.PET/CERES
3 CREAM (GRAM) TOPICAL
Qty: 30 TABLET | COMMUNITY
Start: 2021-04-23 | End: 2021-05-24

## 2021-04-23 RX ORDER — AMOXICILLIN 250 MG
2 CAPSULE ORAL 2 TIMES DAILY
Qty: 30 TABLET | Refills: 0 | COMMUNITY
Start: 2021-04-23 | End: 2021-06-01

## 2021-04-23 RX ORDER — ACETAMINOPHEN 325 MG/1
650 TABLET ORAL EVERY 4 HOURS PRN
Qty: 30 TABLET | Refills: 0 | Status: ON HOLD | COMMUNITY
Start: 2021-04-23 | End: 2022-12-02

## 2021-04-23 RX ORDER — LISINOPRIL 10 MG/1
10 TABLET ORAL DAILY
Qty: 30 TABLET | Refills: 2 | Status: ON HOLD | OUTPATIENT
Start: 2021-04-24 | End: 2022-12-02

## 2021-04-23 RX ADMIN — ASPIRIN 81 MG CHEWABLE TABLET 81 MG: 81 TABLET CHEWABLE at 08:39

## 2021-04-23 RX ADMIN — MELATONIN TAB 3 MG 3 MG: 3 TAB at 19:52

## 2021-04-23 RX ADMIN — POLYETHYLENE GLYCOL 3350 1 PACKET: 17 POWDER, FOR SOLUTION ORAL at 08:38

## 2021-04-23 RX ADMIN — SENNOSIDES AND DOCUSATE SODIUM 2 TABLET: 8.6; 5 TABLET ORAL at 19:52

## 2021-04-23 RX ADMIN — ATORVASTATIN CALCIUM 40 MG: 40 TABLET, FILM COATED ORAL at 19:52

## 2021-04-23 RX ADMIN — SENNOSIDES AND DOCUSATE SODIUM 2 TABLET: 8.6; 5 TABLET ORAL at 08:39

## 2021-04-23 RX ADMIN — LORATADINE 10 MG: 10 TABLET ORAL at 08:39

## 2021-04-23 RX ADMIN — LISINOPRIL 10 MG: 5 TABLET ORAL at 05:19

## 2021-04-23 ASSESSMENT — ACTIVITIES OF DAILY LIVING (ADL)
BED_CHAIR_WHEELCHAIR_TRANSFER_DESCRIPTION: ADAPTIVE EQUIPMENT;SET-UP OF EQUIPMENT;SUPERVISION FOR SAFETY
TOILET_TRANSFER_DESCRIPTION: ADAPTIVE EQUIPMENT;GRAB BAR;SUPERVISION FOR SAFETY
TOILETING_LEVEL_OF_ASSIST_DESCRIPTION: SUPERVISION FOR SAFETY
TOILETING_LEVEL_OF_ASSIST: REQUIRES SUPERVISION WITH TOILETING
BED_CHAIR_WHEELCHAIR_TRANSFER_DESCRIPTION: ADAPTIVE EQUIPMENT;SUPERVISION FOR SAFETY
TOILET_TRANSFER_LEVEL_OF_ASSIST: REQUIRES SUPERVISION WITH TOILET TRANSFER
SHOWER_TRANSFER_LEVEL_OF_ASSIST: REQUIRES SUPERVISION WITH SHOWER TRANSFER

## 2021-04-23 ASSESSMENT — PAIN DESCRIPTION - PAIN TYPE: TYPE: ACUTE PAIN

## 2021-04-23 ASSESSMENT — GAIT ASSESSMENTS
DISTANCE (FEET): 250
GAIT LEVEL OF ASSIST: SUPERVISED
ASSISTIVE DEVICE: FRONT WHEEL WALKER
DEVIATION: BRADYKINETIC;DECREASED HEEL STRIKE;DECREASED TOE OFF

## 2021-04-23 NOTE — CARE PLAN
Problem: Safety  Goal: Will remain free from falls  4/23/2021 0319 by Ayleen Meng R.N.  Outcome: PROGRESSING AS EXPECTED  4/23/2021 0318 by Ayleen Meng R.N.  Outcome: PROGRESSING AS EXPECTED  Intervention: Implement fall precautions  Flowsheets (Taken 4/23/2021 0319)  Bed Alarm: Yes - Alarm On  Environmental Precautions:   Treaded Slipper Socks on Patient   Bed in Low Position   Personal Belongings, Wastebasket, Call Bell etc. in Easy Reach  Note: Pt free from fall and injury, needs anticipated and attended.      Problem: Pain Management  Goal: Pain level will decrease to patient's comfort goal  Outcome: PROGRESSING AS EXPECTED  Intervention: Educate and implement non-pharmacologic comfort measures. Examples: relaxation, distration, play therapy, activity therapy, massage, etc.  Note: Denies pain and discomfort.

## 2021-04-23 NOTE — DISCHARGE PLANNING
Per Rachel at [facility], referral accepted.  They will see patient on Tuesday, 4/27.  CM notified.

## 2021-04-23 NOTE — THERAPY
"Physical Therapy   Daily Treatment     Patient Name: Cooper Harvey  Age:  86 y.o., Sex:  male  Medical Record #: [id number]  Today's Date: 4/23/2021     Precautions  Precautions: Fall Risk  Comments: Pt is currently wearing a Zio patch heart monitor (grey case needs to be within 6ft of pt at all times); [place]    Subjective    Patient seated in w/c upon arrival, agreeable to therapy. Spouse present for part of session and agreeable to family training.      Objective       04/23/21 0931   Precautions   Precautions Fall Risk   Comments Pt is currently wearing a Zio patch heart monitor (grey case needs to be within 6ft of pt at all times); [place]   Cognition    Speech/ Communication Hard of Hearing   Gait Functional Level of Assist    Gait Level Of Assist Supervised   Assistive Device Front Wheel Walker   Distance (Feet) 250  (SPV indoor and outdoor amb; 125ft x1 no AD and CGA)   # of Times Distance was Traveled 2   Deviation Bradykinetic;Decreased Heel Strike;Decreased Toe Off   Stairs Functional Level of Assist   Level of Assist with Stairs Stand by Assist   # of Stairs Climbed 4  (6\" consecutive with 1 HR on right )   Stairs Description Hand rails;Extra time;Supervision for safety   Transfer Functional Level of Assist   Bed, Chair, Wheelchair Transfer Supervised   Bed Chair Wheelchair Transfer Description Adaptive equipment;Supervision for safety   Toilet Transfers Supervised   Toilet Transfer Description Adaptive equipment;Grab bar;Supervision for safety   Bed Mobility    Supine to Sit Independent   Sit to Supine Independent   Sit to Stand Independent   Scooting Independent   Rolling Independent   Neuro-Muscular Treatments   Neuro-Muscular Treatments Sequencing;Verbal Cuing;Weight Shift Right;Weight Shift Left   Comments VC to reinforce curb transfer with FWW   Interdisciplinary Plan of Care Collaboration   IDT Collaboration with  Family / Caregiver   Patient Position at End of Therapy Seated;Chair Alarm On;Self " Releasing Lap Belt Applied;Call Light within Reach;Tray Table within Reach;Family / Friend in Room   Collaboration Comments CLOF; d/c recommendations; spouse cleared for transfers in room, communicated on pt whiteboard   Strengths & Barriers   Strengths Adequate strength;Independent prior level of function;Pleasant and cooperative;Willingly participates in therapeutic activities   Barriers Decreased endurance;Difficulty following instructions;Hearing impairment;Home accessibility;Impaired balance;Impaired carryover of learning;Limited mobility;Impaired activity tolerance   PT Total Time Spent   PT Individual Total Time Spent (Mins) 60   PT Charge Group   PT Gait Training 2   PT Therapeutic Activities 2      object off floor with FWW and SBA  Car transfer with FWW and SPV, demonstrating ability to open and close car door   Up/down curb completed outdoors with SBA  Balloon volley with LUE support in standing with CGA, no LOB x3 minutes     Spouse educated in pt CLOF, discharge recommendations and participated in family training. Spouse demonstrated ability to safely transfer pt at the w/c level within hospital room.     Assessment    Patient continues to demonstrate improvement in functional mobility. Patient and spouse receptive to family training and verbalize understanding of safe mobility. Anticipate patient will progress to no AD following discharge, however recommend use of FWW for ambulation upon discharge. Patient and spouse receptive to recommendation.      Strengths: Adequate strength, Independent prior level of function, Pleasant and cooperative, Willingly participates in therapeutic activities  Barriers: Decreased endurance, Difficulty following instructions, Hearing impairment, Home accessibility, Impaired balance, Impaired carryover of learning, Limited mobility, Impaired activity tolerance    Plan    Anticipate discharge tomorrow, 4/24/21    Passport items to be completed:  All  complete    Physical Therapy Problems     Problem: Mobility     Dates: Start: 04/12/21       Goal: STG-Within one week, patient will ambulate community distances     Dates: Start: 04/12/21       Description: 1) Individualized goal:   feet with FWW and SBA  2) Interventions:  PT Gait Training, PT Therapeutic Exercises, PT Neuro Re-Ed/Balance, PT Therapeutic Activity, and PT Evaluation      Note:     Goal Note filed on 04/19/21 1232 by Daya Mares DPT    Partially met- CGA  + 480 indoors/ outdoors                         Problem: PT-Long Term Goals     Dates: Start: 04/07/21       Goal: LTG-By discharge, patient will ambulate     Dates: Start: 04/07/21       Description: 1) Individualized goal: AMB 150ft with FWW and supervision  2) Interventions:  PT Gait Training, PT Therapeutic Exercises, PT Neuro Re-Ed/Balance, PT Aquatic Therapy, PT Therapeutic Activity, and PT Evaluation            Goal: LTG-By discharge, patient will transfer one surface to another     Dates: Start: 04/07/21       Description: 1) Individualized goal: SPT with FWW and supervision  2) Interventions:  PT Gait Training, PT Therapeutic Exercises, PT Neuro Re-Ed/Balance, PT Aquatic Therapy, PT Therapeutic Activity, and PT Evaluation            Goal: LTG-By discharge, patient will ambulate up/down 4-6 stairs     Dates: Start: 04/07/21       Description: 1) Individualized goal:  Negotiate 5 steps with 2 handrails and CGA  2) Interventions:  PT Gait Training, PT Therapeutic Exercises, PT Neuro Re-Ed/Balance, PT Aquatic Therapy, PT Therapeutic Activity, and PT Evaluation            Goal: LTG-By discharge, patient will transfer in/out of a car     Dates: Start: 04/07/21       Description: 1) Individualized goal: Transfer with FWW and SBA  2) Interventions:  PT Gait Training, PT Therapeutic Exercises, PT Neuro Re-Ed/Balance, PT Aquatic Therapy, PT Therapeutic Activity, and PT Evaluation

## 2021-04-23 NOTE — CARE PLAN
Problem: OT Long Term Goals  Goal: LTG-By discharge, patient will complete basic self care tasks  Description: 1) Individualized Goal:  Mod I for BADL's via AE/DME PRN  2) Interventions:  OT Self Care/ADL, OT Cognitive Skill Dev, OT Neuro Re-Ed/Balance, OT Therapeutic Activity, OT Evaluation, and OT Therapeutic Exercise      Outcome: NOT MET  Goal: LTG-By discharge, patient will perform bathroom transfers  Description: 1) Individualized Goal: Mod I for bathroom transfers via DME PRN  2) Interventions:  OT Self Care/ADL, OT Cognitive Skill Dev, OT Neuro Re-Ed/Balance, OT Therapeutic Activity, OT Evaluation, and OT Therapeutic Exercise      Outcome: NOT MET

## 2021-04-23 NOTE — DISCHARGE PLANNING
Case management Summary:   Met with patient and his wife prior to discharge.     Reviewed all follow up appointments.     Referral made to [facility] and they have accepted referral and are ready to follow.      [facility] has delivered FWW to patient.      During hospitalization, I have provided support and education and have been available for questions and information during hours of operation, communicated with therapy team and MD along with providing links/resources  to outside services.    Patient verbalizes agreement with all plans and has an understanding of the next steps within the post acute services.     Individualized Goals:   Per wife:   1. Have home health at [place]   2. Have family training so I can be more confident when  comes home.   3. Gain strength     Outcome:   1. Patient and his wife have worked hard on goals and will continue with therapy in the home.  Patient has two very supportive daughters.

## 2021-04-23 NOTE — CARE PLAN
Problem: Safety  Goal: Will remain free from injury  Outcome: PROGRESSING AS EXPECTED     Problem: Bowel/Gastric:  Goal: Normal bowel function is maintained or improved  Outcome: PROGRESSING AS EXPECTED  Note: Patient remains continent of bowel. Last BM was on 4/23. Will continue to monitor.

## 2021-04-23 NOTE — DISCHARGE SUMMARY
Admission Date: 4/6/2021    Discharge Date: 4/24/2021    Attending Provider: Sugey Barkley MD    Admission Diagnosis:   Active Hospital Problems    Diagnosis    • *Acute CVA (cerebrovascular accident) (HCC)    • History of CVA (cerebrovascular accident)    • Carotid stenosis, bilateral    • Mixed hyperlipidemia    • BPH (benign prostatic hyperplasia)    • Impaired mobility and ADLs    • Hard of hearing    • Sinus bradycardia    • Anemia    • S/P carotid endarterectomy        Discharge Diagnosis:  Active Hospital Problems    Diagnosis    • *Acute CVA (cerebrovascular accident) (HCC)    • History of CVA (cerebrovascular accident)    • Carotid stenosis, bilateral    • Mixed hyperlipidemia    • BPH (benign prostatic hyperplasia)    • Impaired mobility and ADLs    • Hard of hearing    • Sinus bradycardia    • Anemia    • S/P carotid endarterectomy        HPI per H&P:  The patient is a 86 y.o. male with a past medical history of BPH, hearing loss, hyperlipidemia; now admitted for acute inpatient rehabilitation with severe functional debility after an acute stroke.       On admission the patient and medical record report, he was transferred from [facility] with complaints of weeks of vision changes and a possible syncopal event. Negative Head CT. CTA with bilateral carotid stenosis, for which he was then transferred here to [facility]. MRI with punctate infarctions in the right cerebellum, right occipital and right frontal lobes. NIHSS 2. Patient is now s/p right carotid endarterectomy with Dr. Beavers on 4/2. HgbA1c 5.2, , ECHO EF 70 %. EKG with sinus bradycardia. He had an EEG that was negative for seizures. He was started on Depakote for agitation.      Patient current reports double vision. He is very hard of hearing, has aides, and can only hear if you yell right next to his ear. He denies any focal weakness. He is ambidextrous. He reports some numbness and tingling in his right hand ever since he  "played rope tug of war a month ago. I remarked that he must be very strong, and he replied \"No just stupid,\" with a laugh. He reports one of his daughters is the person to talk to if there is any medical issues. Had discussion with him regarding his code status, which was CPR OK, but intubation not OK over at acute. Explained cannot do the first without the second and he reports he just doesn't want to be on a breathing machine for a long time or to be a burden to his children. Nursing report from acute indicates he's had some bladder incontinence.      Patient was evaluated by Rehab Medicine physician and Physical Therapy, Occupational Therapy and Speech Therapy and determined to be appropriate for acute inpatient rehab.      Patient was admitted to [facility] on 4/6/2021.     Rehab Hospital Course by Problem List:    Small punctate infarctions  Right frontal lobe, right cerebellum, right occipital lobe  Non-focal  Visual complaints, improved/resolved  Cognitive impairment (suspect some is baseline)     Aspirin   Statin     Outpatient follow up with stroke bridge clinic, Dr Sales, referrals made     Zio patch cardiac monitor placed by neurology 4/9, follow up with cardiology at 6 weeks     Returned to acute on 4/13 due to increased visual symptoms, work up negative for new stroke.      Right carotid stenosis  S/p CEA 4/2  Statin  Aspirin  Outpatient follow up with Dr. Beavers, referrals made     Sundowning, resolved  Scheduled Seroquel at night, trial PRN 4/21, not needing  Scheduled melatonin at night     Hypertension, improved  Lisinopril     Rhinorrhea  Claritin     Hyperlipidemia  Statin     Azotemia, improved  Increased oral fluids     Bowel program  Continue bowel medications  Last BM 4/22     Bladder program  BPH  With intermittent incontinence  Checked PVRs  Not retaining     DVT prophylaxis  Lovenox discontinued as patient is ambulating long distances, 480 ft    Functional Status at " Discharge  Eating:  Modified Independent  Eating Description:  Adaptive equipment(dentures)  Grooming:  Independent, Seated  Grooming Description:  Standing at sink, Supervision for safety(to clean teeth, comb hair and wash/dry hands and face)  Bathing:  Supervision  Bathing Description:  Tub bench, Initial preparation for task, Set-up of equipment, Supervision for safety, Verbal cueing(SBA with gb and bench, cues for safety)  Upper Body Dressing:  Supervision  Upper Body Dressing Description:  Set-up of equipment  Lower Body Dressing:  Supervision  Lower Body Dressing Description:  Set-up of equipment, Supervision for safety     Walk:  Contact Guard Assist  Distance Walked:  300(300ft x1 FWW w/ SBA; 125ft x3 with no AD )  Number of Times Distance Was Traveled:  1  Assistive Device:  None  Gait Deviation:  Decreased Base Of Support, Decreased Heel Strike, Decreased Toe Off(Increasaed step length and NILO with repetition)  Wheelchair:  Supervised  Distance Propelled:  200   Wheelchair Description:  Adaptive equipment, Supervision for safety, Verbal cueing  Stairs Stand by Assist  Stairs Description Hand rails, Supervision for safety, Verbal cueing(Step-over-step ascend; step-to descend )  Discharge Location: [facility]  Patient Discharging with Assist of: Spouse / Significant Other  Level of Supervision Required Upon Discharge: Twenty Four Hour Supervision  Recommended Equipment for Discharge: Front-Wheeled Walker;Manual Wheelchair  Recommeded Services Upon Discharge: Home Health Physical Therapy;Outpatient Physical Therapy  Long Term Goals Met: 1  Long Term Goals Not Met: 3  Reason(s) for Goals Not Met: Pt D/Lázaro to acute hospital  Criteria for Termination of Services: Patient Transferred to Acute Care for Medical Purposes  Comprehension:  Moderate Assist  Comprehension Description:  Hearing aids/amplifiers  Expression:  Modified Independent(written supplements.)  Expression Description:  Verbal  cueing  Social Interaction:  Independent  Social Interaction Description:     Problem Solving:  Minimal Assist  Problem Solving Description:  Bed/chair alarm, Increased time, Seat belt, Supervision, Therapy schedule, Verbal cueing  Memory:  Minimal Assist  Memory Description:  Bed/chair alarm, Increased time, Seat belt, Therapy schedule, Verbal cueing       Laura PERSAUD D.O., personally performed a complete drug regimen review and no potential clinically significant medication issues were identified.     Discharge Medication:     Medication List      START taking these medications      Instructions   loratadine 10 MG Tabs  Commonly known as: CLARITIN   Take 1 tablet by mouth every day.  Dose: 10 mg        CHANGE how you take these medications      Instructions   acetaminophen 325 MG Tabs  What changed:   · when to take this  · reasons to take this  Commonly known as: Tylenol   Take 2 Tablets by mouth every four hours as needed for Mild Pain or Fever (headache).  Dose: 650 mg     atorvastatin 40 MG Tabs  What changed:   · medication strength  · how much to take  Commonly known as: LIPITOR   Take 1 tablet by mouth every evening.  Dose: 40 mg     lisinopril 10 MG Tabs  What changed:   · medication strength  · how much to take  · Another medication with the same name was removed. Continue taking this medication, and follow the directions you see here.  Commonly known as: PRINIVIL   Take 1 tablet by mouth every day.  Dose: 10 mg     polyethylene glycol/lytes 17 g Pack  What changed: reasons to take this  Commonly known as: MIRALAX   Take 1 Packet by mouth 1 time a day as needed (constipation).  Dose: 17 g        CONTINUE taking these medications      Instructions   aspirin 81 MG Chew chewable tablet  Commonly known as: ASA   Chew 1 tablet every day.  Dose: 81 mg     melatonin 3 MG Tabs   Take 1 tablet by mouth at bedtime.  Dose: 3 mg     senna-docusate 8.6-50 MG Tabs  Commonly known as: PERICOLACE or SENOKOT S    Take 2 Tablets by mouth 2 times a day.  Dose: 2 tablet        STOP taking these medications    amLODIPine 10 MG Tabs  Commonly known as: NORVASC     bisacodyl 10 MG Supp  Commonly known as: DULCOLAX     divalproex 250 MG Tbec  Commonly known as: DEPAKOTE     enoxaparin 40 MG/0.4ML Soln inj  Commonly known as: LOVENOX     haloperidol lactate 5 MG/ML Soln  Commonly known as: HALDOL     labetalol 5 MG/ML Soln  Commonly known as: NORMODYNE/TRANDATE     magnesium hydroxide 400 MG/5ML Susp  Commonly known as: MILK OF MAGNESIA     QUEtiapine 25 MG Tabs  Commonly known as: Seroquel            Discharge Diet:  Heart healthy    Discharge Activity:  Do not return to driving until cleared by a physician.         Disposition:  Patient to discharge home with family support and community resources.     Home health: PT/OT/SLP/RN     Equip: FWW     Follow up: PCP, Dr. Sales, stroke bridge clinic, cardiology (at 6 weeks), Dr. Beavers     Prescriptions to [facility] pharmacy, meds to beds, ordered 4/23    Condition on Discharge:  Good    More than 35 minutes was spent on discharging this patient, including face-to-face time, prescription management, and the dictation of this note.    Dr. Laura Sales DO, MS  Department of Physical Medicine & Rehabilitation  [facility]  [facility]    Date of Service: 04/24/21

## 2021-04-23 NOTE — THERAPY
Speech Language Pathology  Daily Treatment     Patient Name: Cooper Harvey  Age:  86 y.o., Sex:  male  Medical Record #: [id number]  Today's Date: 4/23/2021     Precautions  Precautions: Fall Risk  Comments: Pt is currently wearing a Zio patch heart monitor (grey case needs to be within 6ft of pt at all times); [place]    Subjective    Patient pleasant and cooperative.     Objective       04/23/21 1401   Cognition   Moderate Attention Minimal (4)   Prospective Memory Moderate (3)   Functional Memory Activities Minimal (4)   Functional Problem Solving Supervision (5)   Written Arithmetic Minimal (4)   Clock Drawing Poor Planning;Impaired Hand Placement   SCCAN (Scales of Cognitive and Communicative Ability for Neurorehabilitation)   Oral Expression - Raw Score 15   Oral Expression - Scale Performance Score 79   Orientation - Raw Score 11   Orientation - Scale Performance Score 92   Memory - Raw Score 6   Memory - Scale Performance Score 32   Speech Comprehension - Raw Score 11   Speech Comprehension - Scale Performance Score 85   Reading Comprehension - Raw Score 7   Reading Comprehension - Scale Performance Score 58   Writing - Raw Score 5   Writing - Scale Performance Score 71   Attention - Raw Score 9   Attention - Scale Performance Score 56   Problem Solving - Raw Score 19   Problem Solving - Scale Performance Score 83   SCCAN Total Raw Score 67   SCCAN Degree of Severity Moderate Impairment   Functional Level of Assist   Comprehension Minimal Assist   Comprehension Description Hearing aids/amplifiers;Verbal cues   Expression Modified Independent   Expression Description Verbal cueing   Social Interaction Independent   Problem Solving Minimal Assist   Problem Solving Description Bed/chair alarm;Increased time;Seat belt;Supervision;Therapy schedule;Verbal cueing   Memory Moderate Assist   Memory Description Increased time;Seat belt;Supervision;Therapy schedule;Verbal cueing   SLP Total Time Spent   SLP  Individual Total Time Spent (Mins) 60   Treatment Charges   SLP Cognitive Skill Development First 15 Minutes 1   SLP Cognitive Skill Development Additional 15 Minutes 3       Assessment    Patient completed outcomes assessment with SCCAN administered.  Patient achieved a total raw score of 67 characteristic of moderate impairment( improved mildly from 64, with moderate impairment, on initial evaluation).   The patient achieved the following percentage scores for given subtests:  Oral Expression 79 (improved from 74),  Orientation 92 (unchanged from 92 ), Memory 33 ( declined from 42), Speech Comprehension 85 (improved from 77 ), Reading Comprehension 58 (unchanged from 58 ), Writing 71 (declined from 86), Attention 56 (improved from 44 ), and Problem solving 83 (improved from 70).   Patient displayed a mild decline in memory skill and writing, and improvement in attention, problem solving and oral expression. Significant hearing loss remains a barrier.    Strengths: Able to follow instructions, Alert and oriented, Pleasant and cooperative, Supportive family  Barriers: Impaired functional cognition    Plan    Patient plans to discharge home tomorrow with spouse, to assist him.    Passport items to be completed:  N/A passport items completed.    Speech Therapy Problems     Problem: Memory STGs     Dates: Start: 04/19/21       Goal: STG-Within one week, patient will     Dates: Start: 04/19/21       Description: 1) Individualized goal:  recall safety sequencing and daily events with 80-90% acc with set up.  2) Interventions:  SLP Self Care / ADL Training , SLP Cognitive Skill Development, and SLP Group Treatment                    Problem: Problem Solving STGs     Dates: Start: 04/19/21       Goal: STG-Within one week, patient will     Dates: Start: 04/19/21       Description: 1) Individualized goal:  perform safety planning and problem solving with 90% acc mod I/spv  2) Interventions:  SLP Self Care / ADL Training ,  SLP Cognitive Skill Development, and SLP Group Treatment                    Problem: Speech/Swallowing LTGs     Dates: Start: 04/07/21       Goal: LTG-By discharge, patient will solve basic problems     Dates: Start: 04/07/21       Description: 1) Individualized goal:  given spv for a safe d/c home  2) Interventions:  SLP Speech Language Treatment, SLP Self Care / ADL Training , SLP Cognitive Skill Development, and SLP Group Treatment

## 2021-04-23 NOTE — CARE PLAN
Problem: PT-Long Term Goals  Goal: LTG-By discharge, patient will ambulate  Description: 1) Individualized goal: AMB 150ft with FWW and supervision  2) Interventions:  PT Gait Training, PT Therapeutic Exercises, PT Neuro Re-Ed/Balance, PT Aquatic Therapy, PT Therapeutic Activity, and PT Evaluation    4/23/2021 1205 by Terrence Pagan  Outcome: MET  4/23/2021 0759 by Gudelia Sterling, Student  Reactivated  4/23/2021 0759 by Gudelia Sterling, Student  Reactivated  Goal: LTG-By discharge, patient will transfer one surface to another  Description: 1) Individualized goal: SPT with FWW and supervision  2) Interventions:  PT Gait Training, PT Therapeutic Exercises, PT Neuro Re-Ed/Balance, PT Aquatic Therapy, PT Therapeutic Activity, and PT Evaluation    4/23/2021 1205 by Gudelia Sterling, Student  Outcome: MET  4/23/2021 0759 by Gudelia Sterling, Student  Reactivated  4/23/2021 0759 by Gudelia Sterling, Student  Reactivated  Goal: LTG-By discharge, patient will ambulate up/down 4-6 stairs  Description: 1) Individualized goal:  Negotiate 5 steps with 2 handrails and CGA  2) Interventions:  PT Gait Training, PT Therapeutic Exercises, PT Neuro Re-Ed/Balance, PT Aquatic Therapy, PT Therapeutic Activity, and PT Evaluation    4/23/2021 1205 by Gudelia Sterling, Student  Outcome: MET  Note: One HR and SBA  4/23/2021 0759 by Gudelia Sterling, Student  Reactivated  4/23/2021 0759 by Gudelia Sterling, Student  Reactivated  Goal: LTG-By discharge, patient will transfer in/out of a car  Description: 1) Individualized goal: Transfer with FWW and SBA  2) Interventions:  PT Gait Training, PT Therapeutic Exercises, PT Neuro Re-Ed/Balance, PT Aquatic Therapy, PT Therapeutic Activity, and PT Evaluation    4/23/2021 1205 by Gudelia Sterling, Student  Outcome: MET  Note: FWW and SPV  4/23/2021 0759 by Gudelia Sterling, Student  Reactivated  4/23/2021 0759 by Gudelia Sterling, Student  Reactivated

## 2021-04-23 NOTE — PROGRESS NOTES
"Rehab Progress Note     Date of Service: 4/23/2021  Chief Complaint: follow up stroke    Interval Events (Subjective)      Patient seen and examined today in his room.  He has no complaints.  He feels ready for discharge home tomorrow.      ROS: No changes to bowel, bladder, pain, mood, or sleep.           Objective:  VITAL SIGNS: /71   Pulse 60   Temp 36.6 °C (97.8 °F) (Temporal)   Resp 17   Ht 1.768 m (5' 9.6\")   Wt 74.4 kg (164 lb 0.4 oz)   SpO2 96%   BMI 23.81 kg/m²   Gen: alert, no apparent distress  Neuro: notable for nonfocal, hard of hearing    No results found for this or any previous visit (from the past 72 hour(s)).    Current Facility-Administered Medications   Medication Frequency   • loratadine (CLARITIN) tablet 10 mg DAILY   • QUEtiapine (Seroquel) tablet 25 mg QHS PRN   • polyethylene glycol/lytes (MIRALAX) PACKET 1 Packet DAILY    And   • senna-docusate (PERICOLACE or SENOKOT S) 8.6-50 MG per tablet 2 tablet BID    And   • magnesium hydroxide (MILK OF MAGNESIA) suspension 30 mL QDAY PRN    And   • bisacodyl (DULCOLAX) suppository 10 mg QDAY PRN   • atorvastatin (LIPITOR) tablet 40 mg Q EVENING   • lisinopril (PRINIVIL) tablet 10 mg Q DAY   • melatonin tablet 3 mg QHS   • hydrOXYzine HCl (ATARAX) tablet 50 mg Q6HRS PRN   • Respiratory Therapy Consult Continuous RT   • Pharmacy Consult Request ...Pain Management Review 1 Each PHARMACY TO DOSE   • hydrALAZINE (APRESOLINE) tablet 10 mg Q8HRS PRN   • acetaminophen (Tylenol) tablet 650 mg Q4HRS PRN   • lactulose 20 GM/30ML solution 30 mL QDAY PRN   • docusate sodium (ENEMEEZ) enema 283 mg QDAY PRN   • fleet enema 133 mL QDAY PRN   • artificial tears ophthalmic solution 1 Drop PRN   • benzocaine-menthol (CEPACOL) lozenge 1 Lozenge Q2HRS PRN   • mag hydrox-al hydrox-simeth (MAALOX PLUS ES or MYLANTA DS) suspension 20 mL Q2HRS PRN   • ondansetron (ZOFRAN ODT) dispertab 4 mg 4X/DAY PRN    Or   • ondansetron (ZOFRAN) syringe/vial injection 4 mg " 4X/DAY PRN   • traZODone (DESYREL) tablet 50 mg QHS PRN   • sodium chloride (OCEAN) 0.65 % nasal spray 2 Spray PRN   • midazolam (VERSED) 5 mg/mL (1 mL vial) PRN   • aspirin (ASA) chewable tab 81 mg DAILY       Orders Placed This Encounter   Procedures   • Diet Order Diet: Level 7 - Easy to Chew; Liquid level: Level 0 - Thin     Standing Status:   Standing     Number of Occurrences:   1     Order Specific Question:   Diet:     Answer:   Level 7 - Easy to Chew [22]     Order Specific Question:   Liquid level     Answer:   Level 0 - Thin       Assessment:  Active Hospital Problems    Diagnosis    • *Acute CVA (cerebrovascular accident) (HCC)    • Carotid stenosis, bilateral    • Altered mental status, unspecified    • Mixed hyperlipidemia    • BPH (benign prostatic hyperplasia)    • Sundowning    • Hard of hearing    • Sinus bradycardia    • Hypokalemia    • Anemia    • S/P carotid endarterectomy    • Agitation      This patient is a 86 y.o. male admitted for acute inpatient rehabilitation with Acute CVA (cerebrovascular accident) ([facility]).    I led and attended the weekly conference, and agree with the IDT conference documentation and plan of care as noted below.    Date of conference: 4/19/2021    Goals and barriers: See IDT note.    Biggest barriers: hard of hearing, constipation, impaired balance, impaired safety awareness and insight, impaired attention    CM/social support: wife supportive    Anticipated DC date: 4/24    Home health: PT/OT/SLP/RN    Equip: FWW    Follow up: PCP, Dr. Sales, stroke bridge clinic, cardiology (at 6 weeks), Dr. Beavers    Prescriptions to [facility] pharmacy, meds to beds, ordered 4/23      Medical Decision Making and Plan:    Small punctate infarctions  Right frontal lobe, right cerebellum, right occipital lobe  Non-focal  Visual complaints, improved/resolved  Cognitive impairment (suspect some is baseline)  Continue full rehab program  PT/OT/SLP, 1 hr each discipline, 5 days per  week    Aspirin   Statin    Outpatient follow up with stroke bridge clinic, Dr Sales, referrals made    Zio patch cardiac monitor placed by neurology 4/9, follow up with cardiology at 6 weeks    Returned to acute on 4/13 due to increased visual symptoms, work up negative for new stroke.     Right carotid stenosis  S/p CEA 4/2  Statin  Aspirin  Outpatient follow up with Dr. Beavers, referrals made    Sundowning, resolved  Scheduled Seroquel at night, trial PRN 4/21, not needing  Scheduled melatonin at night  Qtc OK     Hypertension, improved  Lisinopril  PRN hydralazine    Rhinorrhea  Claritin     Hyperlipidemia  Statin     Azotemia, improved  Increase oral fluids    Bowel program  Continue bowel medications  Last BM 4/22    Bladder program  BPH  With intermittent incontinence  Checked PVRs  Not retaining  Bladder scan for no voids  ICP for over 400 cc  Scheduled toileting    DVT prophylaxis  Lovenox discontinued as patient is ambulating long distances, 480 ft    Total time:  15 minutes.  I spent greater than 50% of the time for patient care, counseling, and coordination on this date, including patient face-to face time, unit/floor time with review of records/pertinent lab data and studies, as well as discussing diagnostic evaluation/work up, planned therapeutic interventions, and future disposition of care, as per the interval events/subjective and the assessment and plan as noted above.    I have performed a physical exam, reviewed and updated ROS, as well as the assessment and plan today 4/23/2021. In review of note from 4/22/2021 there are no new changes except as documented above.            Sugey Barkley M.D.   Physical Medicine and Rehabilitation

## 2021-04-23 NOTE — DISCHARGE PLANNING
Case Management Discharge Instructions        Discharge Location: Home with Home Health.     Agency Name / Address / Phone: [facility]: [phone].     Home Health: Registered Nurse, Occupational Therapist, Physical Therapist, Speech Therapist.     DME Provider / Phone: [facility]: [phone].     Medical Equipment Ordered: Front Wheel Walker.     Follow-up Information:     Laura Sales D.O.-Physical Medicine and Rehab.  [address] [facility] [address]  [phone]-See attached map  Dr. Sales  Yocasta will call you to schedule appointment.       [facility]-Primary Care  [address]  [facility] [address]  [phone]  On 4/30/2021  10:15am  ([address]    Willie Beavers M.D.-Vascular Surgery  [address] [facility]  [address]  [facility] [address]  [phone]  On 5/4/2021  3:15pm     [facility]  [address] [place]  [address]  [phone]  On 5/13/2021  2pm     On 5/24/2021  1pm:[facility]: [address] [facility] [address] [place] [phone].

## 2021-04-23 NOTE — THERAPY
Occupational Therapy  Daily Treatment     Patient Name: Cooper Harvey  Age:  86 y.o., Sex:  male  Medical Record #: [id number]  Today's Date: 4/22/2021     Precautions  Precautions: Fall Risk  Comments: Pt is currently wearing a Zio patch heart monitor (grey case needs to be within 6ft of pt at all times); [place]         Subjective    Pt seated in w/c upon arrival, pleasant and cooperative, agreeable to therapy. Spouse present during session     Objective       04/22/21 1031   Sitting Upper Body Exercises   Other Exercise seated UB therex using blue resistance band - diagonal pulls, chest pulls 1 set x 10 each   Standing Upper Body Exercises   Other Exercises standing UBE - 5 min at workload 3 and 2 min at workload 8, 0 RB   Interdisciplinary Plan of Care Collaboration   Patient Position at End of Therapy Seated;Call Light within Reach;Tray Table within Reach   OT Total Time Spent   OT Individual Total Time Spent (Mins) 30   OT Charge Group   OT Therapeutic Exercise  2       Assessment    Pt completed UB therex to the best of their abilities with no complaints of pain    Strengths: Independent prior level of function, Motivated for self care and independence, Pleasant and cooperative, Willingly participates in therapeutic activities  Barriers: Bladder incontinence, Decreased endurance, Generalized weakness, Hearing impairment, Impaired activity tolerance, Impaired balance, Impulsive, Confused, Limited mobility, Visual impairment    Plan    Refer to Primary OT POC/goals    Occupational Therapy Goals     Problem: Dressing     Dates: Start: 04/12/21       Goal: STG-Within one week, patient will dress LB     Dates: Start: 04/12/21       Description: 1) Individualized Goal:  with supervision using AE/AD/techniques  2) Interventions:  OT Self Care/ADL, OT Cognitive Skill Dev, OT Neuro Re-Ed/Balance, OT Therapeutic Activity, OT Evaluation, and OT Therapeutic Exercise      Note:     Goal Note filed on 04/19/21 1145 by  Chema Funk OT/L    SBA/supervised                        Problem: OT Long Term Goals     Dates: Start: 04/07/21       Goal: LTG-By discharge, patient will complete basic self care tasks     Dates: Start: 04/07/21       Description: 1) Individualized Goal:  Mod I for BADL's via AE/DME PRN  2) Interventions:  OT Self Care/ADL, OT Cognitive Skill Dev, OT Neuro Re-Ed/Balance, OT Therapeutic Activity, OT Evaluation, and OT Therapeutic Exercise              Goal: LTG-By discharge, patient will perform bathroom transfers     Dates: Start: 04/07/21       Description: 1) Individualized Goal: Mod I for bathroom transfers via DME PRN  2) Interventions:  OT Self Care/ADL, OT Cognitive Skill Dev, OT Neuro Re-Ed/Balance, OT Therapeutic Activity, OT Evaluation, and OT Therapeutic Exercise                    Problem: Toileting     Dates: Start: 04/12/21       Goal: STG-Within one week, patient will complete toileting tasks     Dates: Start: 04/12/21       Description: 1) Individualized Goal:  Supervised with AE/AD/techniques  2) Interventions:  OT Self Care/ADL, OT Cognitive Skill Dev, OT Neuro Re-Ed/Balance, OT Therapeutic Activity, OT Evaluation, and OT Therapeutic Exercise      Note:     Goal Note filed on 04/19/21 1145 by Chema Funk OT/L    MARIEL

## 2021-04-23 NOTE — THERAPY
Occupational Therapy  Daily Treatment     Patient Name: Cooper Harvey  Age:  86 y.o., Sex:  male  Medical Record #: [id number]  Today's Date: 4/23/2021     Precautions  Precautions: Fall Risk  Comments: Zio patch heart monitor          Subjective    Patient agreeable to therapy.      Objective    See Flowsheet   Assessment    Patient pleasant and cooperative with treatment activities requiring fading cues for novel word finding activity (bananagrams).  Bilateral integration utilized with consistent L/R fine motor coordination.  Fading cues for spelling/general rules.  Outdoor mobility completed with FWW at sba level with fair environmental safety awareness, no RB, and no LOB.     Strengths: Independent prior level of function, Motivated for self care and independence, Pleasant and cooperative, Willingly participates in therapeutic activities  Barriers: Bladder incontinence, Decreased endurance, Generalized weakness, Hearing impairment, Impaired activity tolerance, Impaired balance, Impulsive, Confused, Limited mobility, Visual impairment    Plan    Anticipated D/C home tomorrow.     Passport items to be completed:  Perform bathroom transfers, complete dressing, complete feeding, get ready for the day, prepare a simple meal, participate in household tasks, adapt home for safety needs, demonstrate home exercise program, complete caregiver training     Occupational Therapy Goals     Problem: Dressing     Dates: Start: 04/12/21       Goal: STG-Within one week, patient will dress LB     Dates: Start: 04/12/21       Description: 1) Individualized Goal:  with supervision using AE/AD/techniques  2) Interventions:  OT Self Care/ADL, OT Cognitive Skill Dev, OT Neuro Re-Ed/Balance, OT Therapeutic Activity, OT Evaluation, and OT Therapeutic Exercise      Note:     Goal Note filed on 04/19/21 1145 by Chema Funk OT/RASHEED    SBA/supervised                        Problem: OT Long Term Goals     Dates: Start: 04/07/21        Goal: LTG-By discharge, patient will complete basic self care tasks     Dates: Start: 04/07/21       Description: 1) Individualized Goal:  Mod I for BADL's via AE/DME PRN  2) Interventions:  OT Self Care/ADL, OT Cognitive Skill Dev, OT Neuro Re-Ed/Balance, OT Therapeutic Activity, OT Evaluation, and OT Therapeutic Exercise              Goal: LTG-By discharge, patient will perform bathroom transfers     Dates: Start: 04/07/21       Description: 1) Individualized Goal: Mod I for bathroom transfers via DME PRN  2) Interventions:  OT Self Care/ADL, OT Cognitive Skill Dev, OT Neuro Re-Ed/Balance, OT Therapeutic Activity, OT Evaluation, and OT Therapeutic Exercise                    Problem: Toileting     Dates: Start: 04/12/21       Goal: STG-Within one week, patient will complete toileting tasks     Dates: Start: 04/12/21       Description: 1) Individualized Goal:  Supervised with AE/AD/techniques  2) Interventions:  OT Self Care/ADL, OT Cognitive Skill Dev, OT Neuro Re-Ed/Balance, OT Therapeutic Activity, OT Evaluation, and OT Therapeutic Exercise      Note:     Goal Note filed on 04/19/21 1145 by Chema Funk OT/RASHEED FLOYD

## 2021-04-23 NOTE — DISCHARGE PLANNING
DME referral sent to [facility].  HH referral sent to [facility] per choice form.  Awaiting responses.

## 2021-04-23 NOTE — THERAPY
Occupational Therapy  Daily Treatment     Patient Name: Cooper Harvey  Age:  86 y.o., Sex:  male  Medical Record #: [id number]  Today's Date: 4/23/2021     Precautions  Precautions: Fall Risk  Comments: Pt is currently wearing a Zio patch heart monitor (grey case needs to be within 6ft of pt at all times); [place]         Subjective    Patient laying in bed awake and without pants on.  Agreeable to get dressed and groomed.     Objective       04/23/21 0901   Precautions   Precautions Fall Risk   Functional Level of Assist   Grooming Supervision;Standing   Lower Body Dressing Supervision   Lower Body Dressing Description Set-up of equipment;Supervision for safety   Toileting Supervision   Toileting Description Supervision for safety  (standing to void)   Bed, Chair, Wheelchair Transfer Supervised   Bed Chair Wheelchair Transfer Description Adaptive equipment;Set-up of equipment;Supervision for safety   Bed Mobility    Supine to Sit Independent   Scooting Independent   Interdisciplinary Plan of Care Collaboration   Patient Position at End of Therapy Seated;Self Releasing Lap Belt Applied;Chair Alarm On;Call Light within Reach;Tray Table within Reach;Phone within Reach   OT Total Time Spent   OT Individual Total Time Spent (Mins) 30   OT Charge Group   OT Self Care / ADL 1   OT Therapy Activity 1     Functional mobility with FWW in room, bathroom, hallways and gym with SBA.    Assessment    Patient completed adl routine with SBA/supervision today.  No losses of balance during adls or mobility with FWW.  Strengths: Independent prior level of function, Motivated for self care and independence, Pleasant and cooperative, Willingly participates in therapeutic activities  Barriers: Bladder incontinence, Decreased endurance, Generalized weakness, Hearing impairment, Impaired activity tolerance, Impaired balance, Impulsive, Confused, Limited mobility, Visual impairment    Plan    D/C tomorrow    Occupational Therapy Goals      Problem: Dressing     Dates: Start: 04/12/21       Goal: STG-Within one week, patient will dress LB     Dates: Start: 04/12/21       Description: 1) Individualized Goal:  with supervision using AE/AD/techniques  2) Interventions:  OT Self Care/ADL, OT Cognitive Skill Dev, OT Neuro Re-Ed/Balance, OT Therapeutic Activity, OT Evaluation, and OT Therapeutic Exercise      Note:     Goal Note filed on 04/19/21 1145 by Chema Funk OT/L    MARIEL/supervised                        Problem: OT Long Term Goals     Dates: Start: 04/07/21       Goal: LTG-By discharge, patient will complete basic self care tasks     Dates: Start: 04/07/21       Description: 1) Individualized Goal:  Mod I for BADL's via AE/DME PRN  2) Interventions:  OT Self Care/ADL, OT Cognitive Skill Dev, OT Neuro Re-Ed/Balance, OT Therapeutic Activity, OT Evaluation, and OT Therapeutic Exercise              Goal: LTG-By discharge, patient will perform bathroom transfers     Dates: Start: 04/07/21       Description: 1) Individualized Goal: Mod I for bathroom transfers via DME PRN  2) Interventions:  OT Self Care/ADL, OT Cognitive Skill Dev, OT Neuro Re-Ed/Balance, OT Therapeutic Activity, OT Evaluation, and OT Therapeutic Exercise                    Problem: Toileting     Dates: Start: 04/12/21       Goal: STG-Within one week, patient will complete toileting tasks     Dates: Start: 04/12/21       Description: 1) Individualized Goal:  Supervised with AE/AD/techniques  2) Interventions:  OT Self Care/ADL, OT Cognitive Skill Dev, OT Neuro Re-Ed/Balance, OT Therapeutic Activity, OT Evaluation, and OT Therapeutic Exercise      Note:     Goal Note filed on 04/19/21 1145 by Chema Funk OT/L    MARIEL

## 2021-04-24 VITALS
DIASTOLIC BLOOD PRESSURE: 60 MMHG | BODY MASS INDEX: 23.48 KG/M2 | SYSTOLIC BLOOD PRESSURE: 137 MMHG | WEIGHT: 164.02 LBS | HEIGHT: 70 IN | TEMPERATURE: 98 F | RESPIRATION RATE: 19 BRPM | HEART RATE: 68 BPM | OXYGEN SATURATION: 95 %

## 2021-04-24 PROCEDURE — A9270 NON-COVERED ITEM OR SERVICE: HCPCS | Performed by: PHYSICAL MEDICINE & REHABILITATION

## 2021-04-24 PROCEDURE — 700102 HCHG RX REV CODE 250 W/ 637 OVERRIDE(OP): Performed by: PHYSICAL MEDICINE & REHABILITATION

## 2021-04-24 PROCEDURE — 99239 HOSP IP/OBS DSCHRG MGMT >30: CPT | Performed by: PHYSICAL MEDICINE & REHABILITATION

## 2021-04-24 RX ADMIN — LORATADINE 10 MG: 10 TABLET ORAL at 09:12

## 2021-04-24 RX ADMIN — ASPIRIN 81 MG CHEWABLE TABLET 81 MG: 81 TABLET CHEWABLE at 09:11

## 2021-04-24 RX ADMIN — LISINOPRIL 10 MG: 5 TABLET ORAL at 05:29

## 2021-04-24 NOTE — PROGRESS NOTES
Patient discharged to home per order.  Discharge instructions reviewed with patient and spouse; they verbalize understanding and signed copies placed in chart.  Patient has all belongings; signed copy of form in chart.  Patient left facility at 1010 via W/C accompanied by rehab staff and wife.  Have enjoyed working with this pleasant patient.

## 2021-04-24 NOTE — DISCHARGE INSTRUCTIONS
[facility] NURSING DISCHARGE INSTRUCTIONS    Blood Pressure : 107/54  Weight: 74.4 kg (164 lb 0.4 oz)  Nursing recommendations for Cooper Harvey at time of discharge are as follows:  Client and Family Member verbalized understanding of all discharge instructions and prescriptions.     Review all your home medications and newly ordered medications with your doctor and/or pharmacist. Follow medication instructions as directed by your doctor and/or pharmacist.    Pain Management:   Discharge Pain Medication Instructions:  Comfort Goal: Comfort with Movement, Perform Activity, Sleep Comfortably  Notify your primary care provider if pain is unrelieved with these measures, if the pain is new, or increased in intensity.    Discharge Skin Characteristics:    Discharge Skin Exam:    Wound 04/02/21 Incision Neck 4x4, tegaderm (Active)     Skin / Wound Care Instructions: Please contact your primary care physician for any change in skin integrity.     If You Have Surgical Incisions / Wounds:  Monitor surgical site(s) for signs of increased swelling, redness or symptoms of drainage from the site or fever as this could indicate signs and symptoms of infection. If these symptoms are noted, notifiy your primary care provider.      Discharge Safety Instructions:       Discharge Safety Concerns:    The interdisciplinary team has made recommendation that you should not be left alone  in the house due to weakness and unsteady gait  Anti-embolic stockings are required during the day and off at night to increase circulation to the lower extremities.    Discharge Diet:       Discharge Liquids:    Discharge Bowel Function:    Please contact your primary care physician for any changes in bowel habits.  Discharge Bowel Program:    Discharge Bladder Function:    Discharge Urinary Devices:        Nursing Discharge Plan:        Case Management Discharge Instructions:   Discharge Location: Home with Home  Health  Agency Name/Address/Phone: [facility]: [phone]  Home Health: Registered Nurse, Occupational Therapist, Physical Therapist, Speech Therapist  Outpatient Services:    [facility] Provider/Phone: [facility]: [phone]  Medical Equipment Ordered: Front Wheel Walker  Prescription Faxed to:        Discharge Medication Instructions:  Below are the medications your physician expects you to take upon discharge:                            Depression / Suicide Risk    As you are discharged from this [facility] facility, it is important to learn how to keep safe from harming yourself.    Recognize the warning signs:  · Abrupt changes in personality, positive or negative- including increase in energy   · Giving away possessions  · Change in eating patterns- significant weight changes-  positive or negative  · Change in sleeping patterns- unable to sleep or sleeping all the time   · Unwillingness or inability to communicate  · Depression  · Unusual sadness, discouragement and loneliness  · Talk of wanting to die  · Neglect of personal appearance   · Rebelliousness- reckless behavior  · Withdrawal from people/activities they love  · Confusion- inability to concentrate     If you or a loved one observes any of these behaviors or has concerns about self-harm, here's what you can do:  · Talk about it- your feelings and reasons for harming yourself  · Remove any means that you might use to hurt yourself (examples: pills, rope, extension cords, firearm)  · Get professional help from the community (Mental Health, Substance Abuse, psychological counseling)  · Do not be alone:Call your Safe Contact- someone whom you trust who will be there for you.  · Call your local CRISIS HOTLINE 233-7823 or 231-057-2858  · Call your local Children's Mobile Crisis Response Team [place] (610) 054-9029 or www.ADP  · Call the toll free National Suicide Prevention Hotlines   · National Suicide Prevention Lifeline  983-674-GUSJ (4501)  · [facility] 800-SUICIDE (261-3001)              Physical Therapy Discharge Instructions for Cooper Harvey    4/23/2021    Level of Assist Required for Ambulation: Supervision on Flat Surfaces, Supervision on Curbs, Supervision on Stairs  Distance Patient May Ambulate: Up to 250 feet or more as tolerated  Device Recommended for Ambulation: Front-Wheeled Walker  Level of Assist Required to Propel Wheelchair: Supervision  Level of Assist Required for Transfers: Supervision  Device Recommended for Transfers: Front-Wheeled Walker  Home Exercise Program: Refer to Home Exercise Program Handout for Details    It was great working with you Ion!  Take care and best wishes with your continued recovery!  Sophia Soni PT DPT/Gudelia [facility]      Occupational Therapy Discharge Instructions for Cooper Harvey    4/23/2021    Level of Assist Required for Eating: Able to Complete Eating without Assist  Level of Assist Required for Grooming: Requires Supervision with Grooming  Level of Assist Required for Dressing: Requires Supervision with Dressing  Level of Assist Required for Toileting: Requires Supervision with Toileting  Level of Assist Required for Toilet Transfer: Requires Supervision with Toilet Transfer  Equipment for Toilet Transfer: Grab Bars by Toilet  Level of Assist Required for Bathing: Requires Supervision with Bathing  Equipment for Bathing: Shower Chair, Grab Bars in Tub / Shower  Level of Assist Required for Shower Transfer: Requires Supervision with Shower Transfer  Equipment for Shower Transfer: Grab Bars in Tub / Shower, Shower Chair  Level of Assist Required for Home Mgmt: Requires Physical Assist with Home Management  Level of Assist Required for Meal Prep: Requires Physical Assist with Meal Preparation  Driving: May not Drive, Please Contact Physician for Further Information  Home Exercise Program: Refer to Home Exercise Program Handout for  Details    Speech Therapy Discharge Instructions for Cooper Harvey    4/24/2021    Supervision: He will require direct close intermittent supervision for safety, medication financial and health care. Management.    Cognition / Communication: Allow and budget extra time to get tasks done.  Help patient improve independence for memory by giving him/her enough extra time to process.  Talk together about potential challenges to transfers and ambulation before performing them, and review strategies for safety.    Use RWAVS memory strategies to reinforce new learning.    R - repeat, repeat, repeat.   Establish routines that will help reduce memory burden.  W - write it down or get it in writing.     A - associate the new information with something that you already know very well.     V - visualize it, practice in your mind's eye, when you aren't able to perform the action physically.    S - say it back, out loud.  This benefits you, as you repeat the information for practice. You also are seeking clarification from the educator, evaluating to see if all of the information was understood, and prompting feedback if needed.  And it slows down the exchange, buying extra time to process the information.    It has been a pleasure to work with you. --- Meseret Steven M.S. CCC-SLP        Stroke Prevention  Some medical conditions and lifestyle choices can lead to a higher risk for a stroke. You can help to prevent a stroke by making nutrition, lifestyle, and other changes.  What nutrition changes can be made?    · Eat healthy foods.  ? Choose foods that are high in fiber. These include:  § Fresh fruits.  § Fresh vegetables.  § Whole grains.  ? Eat at least 5 or more servings of fruits and vegetables each day. Try to fill half of your plate at each meal with fruits and vegetables.  ? Choose lean protein foods. These include:  § Lowfat (lean) cuts of meat.  § Chicken without skin.  § Fish.  § Tofu.  § Beans.  § Nuts.  ? Eat  low-fat dairy products.  ? Avoid foods that:  § Are high in salt (sodium).  § Have saturated fat.  § Have trans fat.  § Have cholesterol.  § Are processed.  § Are premade.  · Follow eating guidelines as told by your doctor. These may include:  ? Reducing how many calories you eat and drink each day.  ? Limiting how much salt you eat or drink each day to 1,500 milligrams (mg).  ? Using only healthy fats for cooking. These include:  § Olive oil.  § Canola oil.  § Sunflower oil.  ? Counting how many carbohydrates you eat and drink each day.  What lifestyle changes can be made?  · Try to stay at a healthy weight. Talk to your doctor about what a good weight is for you.  · Get at least 30 minutes of moderate physical activity at least 5 days a week. This can include:  ? Fast walking.  ? Biking.  ? Swimming.  · Do not use any products that have nicotine or tobacco. This includes cigarettes and e-cigarettes. If you need help quitting, ask your doctor. Avoid being around tobacco smoke in general.  · Limit how much alcohol you drink to no more than 1 drink a day for nonpregnant women and 2 drinks a day for men. One drink equals 12 oz of beer, 5 oz of wine, or 1½ oz of hard liquor.  · Do not use drugs.  · Avoid taking birth control pills. Talk to your doctor about the risks of taking birth control pills if:  ? You are over 35 years old.  ? You smoke.  ? You get migraines.  ? You have had a blood clot.  What other changes can be made?  · Manage your cholesterol.  ? It is important to eat a healthy diet.  ? If your cholesterol cannot be managed through your diet, you may also need to take medicines. Take medicines as told by your doctor.  · Manage your diabetes.  ? It is important to eat a healthy diet and to exercise regularly.  ? If your blood sugar cannot be managed through diet and exercise, you may need to take medicines. Take medicines as told by your doctor.  · Control your high blood pressure (hypertension).  ? Try to  "keep your blood pressure below 130/80. This can help lower your risk of stroke.  ? It is important to eat a healthy diet and to exercise regularly.  ? If your blood pressure cannot be managed through diet and exercise, you may need to take medicines. Take medicines as told by your doctor.  ? Ask your doctor if you should check your blood pressure at home.  ? Have your blood pressure checked every year. Do this even if your blood pressure is normal.  · Talk to your doctor about getting checked for a sleep disorder. Signs of this can include:  ? Snoring a lot.  ? Feeling very tired.  · Take over-the-counter and prescription medicines only as told by your doctor. These may include aspirin or blood thinners (antiplatelets or anticoagulants).  · Make sure that any other medical conditions you have are managed.  Where to find more information  · American Stroke Association: www.strokeassociation.org  · National Stroke Association: www.stroke.org  Get help right away if:  · You have any symptoms of stroke. \"BE FAST\" is an easy way to remember the main warning signs:  ? B - Balance. Signs are dizziness, sudden trouble walking, or loss of balance.  ? E - Eyes. Signs are trouble seeing or a sudden change in how you see.  ? F - Face. Signs are sudden weakness or loss of feeling of the face, or the face or eyelid drooping on one side.  ? A - Arms. Signs are weakness or loss of feeling in an arm. This happens suddenly and usually on one side of the body.  ? S - Speech. Signs are sudden trouble speaking, slurred speech, or trouble understanding what people say.  ? T - Time. Time to call emergency services. Write down what time symptoms started.  · You have other signs of stroke, such as:  ? A sudden, very bad headache with no known cause.  ? Feeling sick to your stomach (nausea).  ? Throwing up (vomiting).  ? Jerky movements you cannot control (seizure).  These symptoms may represent a serious problem that is an emergency. Do not " wait to see if the symptoms will go away. Get medical help right away. Call your local emergency services (911 in the [place] Do not drive yourself to the hospital.  Summary  · You can prevent a stroke by eating healthy, exercising, not smoking, drinking less alcohol, and treating other health problems, such as diabetes, high blood pressure, or high cholesterol.  · Do not use any products that contain nicotine or tobacco, such as cigarettes and e-cigarettes.  · Get help right away if you have any signs or symptoms of a stroke.  This information is not intended to replace advice given to you by your health care provider. Make sure you discuss any questions you have with your health care provider.  Document Released: 06/18/2013 Document Revised: 02/13/2020 Document Reviewed: 03/21/2018  [employer] Patient Education © 2020 [employer]      Carotid Endarterectomy, Care After  This sheet gives you information about how to care for yourself after your procedure. Your health care provider may also give you more specific instructions. If you have problems or questions, contact your health care provider.  What can I expect after the procedure?  After the procedure, it is common to have some pain or an ache in your neck for up to 2 weeks. This is normal.  Follow these instructions at home:  Medicines  · Take over-the-counter and prescription medicines only as told by your health care provider.  · If you are given a blood thinner (anticoagulant) after surgery, take it exactly as told.  · Do not drive for 24 hours if you were given a sedative during your procedure.  · Do not drive or use heavy machinery while taking prescription pain medicine.  Incision care    · Follow instructions from your health care provider about how to take care of your incision. Make sure you:  ? Wash your hands with soap and water before and after you change your bandage (dressing). If soap and water are not available, use hand .  ? Change your  dressing as told by your health care provider.  ? Leave stitches (sutures), skin glue, or adhesive strips in place. These skin closures may need to stay in place for 2 weeks or longer. If adhesive strip edges start to loosen and curl up, you may trim the loose edges. Do not remove adhesive strips completely unless your health care provider tells you to do that.  · Check your incision area every day for signs of infection. Check for:  ? Redness, swelling, or pain.  ? Fluid or blood.  ? Warmth.  ? Pus or a bad smell.  · Do not take baths, swim, or use a hot tub until your health care provider approves. Ask your health care provider if you may take showers. You may only be allowed to take sponge baths.  Activity  · Do not lift anything that is heavier than 10 lb (4.5 kg), or the limit that you are told, until your health care provider says that it is safe.  · Return to your normal activities as told by your health care provider. Ask your health care provider what activities are safe for you.  ? Recovery time varies depending on your age, general health, and other factors. You will likely be able to return to a normal lifestyle within a few weeks. While you recover, you may need help with some activities, such as cleaning the house or shopping.  · Exercise regularly, or as told by your health care provider.  Eating and drinking    · Follow instructions from your health care provider about eating or drinking restrictions.  · Drink enough fluid to keep your urine pale yellow.  · Eat a heart-healthy diet. This includes foods like fresh fruits and vegetables, whole grains, low-fat dairy products, and low-fat (lean) meats. Avoid foods that are:  ? High in salt, saturated fat, or sugar.  ? Canned or highly processed.  ? Fried.  Lifestyle  · If you drink alcohol:  ? Limit how much you use to:  § 0-1 drink a day for women.  § 0-2 drinks a day for men.  ? Be aware of how much alcohol is in your drink. In the [place] one drink  "equals one 12 oz bottle of beer (355 mL), one 5 oz glass of wine (148 mL), or one 1½ oz glass of hard liquor (44 mL).  · Maintain a healthy weight.  General instructions  · Avoid wearing tight clothing around your neck and your sutures.  · Work with your health care provider to keep your blood pressure under control.  · Do not use any products that contain nicotine or tobacco, such as cigarettes, e-cigarettes, and chewing tobacco. If you need help quitting, ask your health care provider.  · Keep all follow-up visits as told by your health care provider. This is important.  Contact a health care provider if you have:  · Signs of infection, such as:  ? Redness, swelling, or pain around your incision.  ? Fluid or blood coming from your incision.  ? Your incision feeling warm to the touch.  ? Pus or a bad smell coming from your incision.  ? A fever.  · A rash.  · Difficulty speaking or you have changes in your voice.  Get help right away if you have:  · Difficulty breathing.  · Chest pain or shortness of breath.  · Any symptoms of a stroke. Certain factors may put you at risk for a stroke even after this procedure. These may include chronic lung disease, chronic kidney disease, narrowed arteries (peripheral arterial disease), previous history of a stroke, and being 60 years of age or older. \"BE FAST\" is an easy way to remember the main warning signs of a stroke:  ? B - Balance. Signs are dizziness, sudden trouble walking, or loss of balance.  ? E - Eyes. Signs are trouble seeing or a sudden change in vision.  ? F - Face. Signs are sudden weakness or numbness of the face, or the face or eyelid drooping on one side.  ? A - Arms. Signs are weakness or numbness in an arm. This happens suddenly and usually on one side of the body.  ? S - Speech. Signs are sudden trouble speaking, slurred speech, or trouble understanding what people say.  ? T - Time. Time to call emergency services. Write down what time symptoms " started.  · Other signs of a stroke, such as:  ? A sudden, severe headache with no known cause.  ? Nausea or vomiting.  ? Seizure.  These symptoms may represent a serious problem that is an emergency. Do not wait to see if the symptoms will go away. Get medical help right away. Call your local emergency services (911 in the [place] Do not drive yourself to the hospital.  Summary  · After the procedure, it is common to have some pain or an ache in your neck for up to 2 weeks.  · Follow instructions from your health care provider about how to take care of your incision.  · Take over-the-counter and prescription medicines only as told by your health care provider.  · Do not use any products that contain nicotine or tobacco, such as cigarettes, e-cigarettes, and chewing tobacco. If you need help quitting, ask your health care provider.  · Keep all follow-up visits as told by your health care provider. This is important.  This information is not intended to replace advice given to you by your health care provider. Make sure you discuss any questions you have with your health care provider.  Document Released: 07/07/2006 Document Revised: 01/27/2020 Document Reviewed: 08/27/2019  [employer] Patient Education © 2020 [employer]    Benign Prostatic Hyperplasia  An enlarged prostate (benign prostatic hyperplasia) is common in older men. You may experience the following:  · Weak urine stream.  · Dribbling.  · Feeling like the bladder has not emptied completely.  · Difficulty starting urination.  · Getting up frequently at night to urinate.  · Urinating more frequently during the day.  HOME CARE INSTRUCTIONS   Monitor your prostatic hyperplasia for any changes. The following actions may help to alleviate any discomfort you are experiencing:  · Give yourself time when you urinate.  · Stay away from alcohol.  · Avoid beverages containing caffeine, such as coffee, tea, and lyndsay, because they can make the problem worse.  · Avoid  decongestants, antihistamines, and some prescription medicines that can make the problem worse.  · Follow up with your health care provider for further treatment as recommended.  SEEK MEDICAL CARE IF:  · You are experiencing progressive difficulty voiding.  · Your urine stream is progressively getting narrower.  · You are awaking from sleep with the urge to void more frequently.  · You are constantly feeling the need to void.  · You experience loss of urine, especially in small amounts.  SEEK IMMEDIATE MEDICAL CARE IF:   · You develop increased pain with urination or are unable to urinate.  · You develop severe abdominal pain, vomiting, a high fever, or fainting.  · You develop back pain or blood in your urine.  MAKE SURE YOU:   · Understand these instructions.  · Will watch your condition.  · Will get help right away if you are not doing well or get worse.  This information is not intended to replace advice given to you by your health care provider. Make sure you discuss any questions you have with your health care provider.  Document Released: 12/18/2006 Document Revised: 01/08/2016 Document Reviewed: 05/20/2014  [employer] Interactive Patient Education © 2017 [employer]

## 2021-04-24 NOTE — CARE PLAN
Problem: Safety  Goal: Will remain free from injury  Note: Pt remains free of accidental injury at this time. Able to verbalize needs and does not attempt self transfer. Bed rails x2 secured for safety. Call light and personal belongings within reach.      Problem: Bowel/Gastric:  Goal: Normal bowel function is maintained or improved  Note: Continent of bowels. Bowel meds given as scheduled. Denies s/sx of constipation. Prn bowel meds available if needed. No incontinence of bowels this shift. Bowel sounds present on all quadrants.

## 2021-04-24 NOTE — PROGRESS NOTES
Worried about the ride home this morning refused stool softeners and miralax. Resting quietly in bed with no c/o discomfort.

## 2021-04-28 ENCOUNTER — PATIENT OUTREACH (OUTPATIENT)
Dept: SCHEDULING | Facility: IMAGING CENTER | Age: 86
End: 2021-04-28

## 2021-04-28 NOTE — PROGRESS NOTES
Attempts:1    Reason for outreach: RETURNED MAIL    Outcome: SPOKE TO PT. DAUGHTER CONFIRMED ADDRESS/ RESENDING MAIL

## 2021-04-29 ENCOUNTER — NURSE TRIAGE (OUTPATIENT)
Dept: HEALTH INFORMATION MANAGEMENT | Facility: OTHER | Age: 86
End: 2021-04-29

## 2021-04-29 NOTE — TELEPHONE ENCOUNTER
"Reviewed discharge instructions and follow up appointments with his daughter. All questions answered.     Answer Assessment - Initial Assessment Questions  1. REASON FOR CALL or QUESTION: \"What is your reason for calling today?\" or \"How can I best help you?\" or \"What question do you have that I can help answer?\"      Clarify discharge instructions and follow up apointments    Protocols used: INFORMATION ONLY CALL - NO TRIAGE-A-[place]      "

## 2021-04-29 NOTE — TELEPHONE ENCOUNTER
Reason for Disposition  • Information only question and nurse able to answer    Additional Information  • Negative: Nursing judgment  • Negative: Nursing judgment  • Negative: Nursing judgment  • Negative: Nursing judgment    Protocols used: INFORMATION ONLY CALL - NO TRIAGE-A-[place]

## 2021-05-03 PROCEDURE — 93248 EXT ECG>7D<15D REV&INTERPJ: CPT | Performed by: INTERNAL MEDICINE

## 2021-05-13 ENCOUNTER — OFFICE VISIT (OUTPATIENT)
Dept: CARDIOLOGY | Facility: MEDICAL CENTER | Age: 86
End: 2021-05-13
Attending: PHYSICAL MEDICINE & REHABILITATION
Payer: MEDICARE

## 2021-05-13 VITALS
HEIGHT: 68 IN | OXYGEN SATURATION: 93 % | HEART RATE: 55 BPM | RESPIRATION RATE: 16 BRPM | DIASTOLIC BLOOD PRESSURE: 64 MMHG | BODY MASS INDEX: 27.28 KG/M2 | WEIGHT: 180 LBS | SYSTOLIC BLOOD PRESSURE: 108 MMHG

## 2021-05-13 DIAGNOSIS — I63.9 CEREBROVASCULAR ACCIDENT (CVA), UNSPECIFIED MECHANISM (HCC): ICD-10-CM

## 2021-05-13 DIAGNOSIS — Z98.890 S/P CAROTID ENDARTERECTOMY: ICD-10-CM

## 2021-05-13 DIAGNOSIS — R00.1 SINUS BRADYCARDIA: ICD-10-CM

## 2021-05-13 DIAGNOSIS — I49.1 PREMATURE ATRIAL COMPLEX: ICD-10-CM

## 2021-05-13 DIAGNOSIS — I10 HTN (HYPERTENSION), MALIGNANT: ICD-10-CM

## 2021-05-13 PROCEDURE — 93000 ELECTROCARDIOGRAM COMPLETE: CPT | Performed by: INTERNAL MEDICINE

## 2021-05-13 PROCEDURE — 99204 OFFICE O/P NEW MOD 45 MIN: CPT | Performed by: INTERNAL MEDICINE

## 2021-05-13 ASSESSMENT — ENCOUNTER SYMPTOMS
FALLS: 0
SPEECH CHANGE: 0
BLURRED VISION: 0
COUGH: 0
CLAUDICATION: 0
EYE PAIN: 0
WEIGHT LOSS: 0
PND: 0
PALPITATIONS: 0
HALLUCINATIONS: 0
DIZZINESS: 0
ABDOMINAL PAIN: 0
DOUBLE VISION: 0
BRUISES/BLEEDS EASILY: 0
LOSS OF CONSCIOUSNESS: 0
CHILLS: 0
VOMITING: 0
SENSORY CHANGE: 0
DEPRESSION: 0
EYE DISCHARGE: 0
MYALGIAS: 0
FEVER: 0
HEADACHES: 0
SHORTNESS OF BREATH: 0
BLOOD IN STOOL: 0
ORTHOPNEA: 0
NAUSEA: 0

## 2021-05-13 ASSESSMENT — FIBROSIS 4 INDEX: FIB4 SCORE: 1.21

## 2021-05-13 NOTE — PROGRESS NOTES
Chief Complaint   Patient presents with   • Hypertension       Subjective:   Ion Harvey is a 87 y.o. male who presents today for cardiac care and management in cardiology clinic after recent hospitalization due to acute stroke.  Patient does have a preserved LV function on transthoracic echocardiogram and no significant valvular disease seen.  I personally interpreted images of the transthoracic echocardiogram.    I have independently interpreted and reviewed patient's ECG with patient today, which showed normal sinus rhythm, normal MS, QT intervals. No evidence of acute coronary syndrome. + PACs.    Had neurology ordered Zio patch. No result yet.    Past Medical History:   Diagnosis Date   • [place] (hard of hearing) 2021   • Hydrocele, unspecified    • Hypertrophy of prostate without urinary obstruction and other lower urinary tract symptoms (LUTS)    • Mixed hyperlipidemia      Past Surgical History:   Procedure Laterality Date   • PB THROMBOENDARTECTMY NECK,NECK INCIS Right 2021    Procedure: ENDARTERECTOMY, CAROTID;  Surgeon: Willie Beavers M.D.;  Location: SURGERY [facility];  Service: Vascular   • PLASTIC SURGERY  2009    large skin cancer removed from the top of head with skin graft--donor site left anterior thigh   • HEMORRHOIDECTOMY     • EYE SURGERY   ;     Bilateral Cataracts     Family History   Problem Relation Age of Onset   • Cancer Mother      Social History     Socioeconomic History   • Marital status:      Spouse name: Not on file   • Number of children: Not on file   • Years of education: Not on file   • Highest education level: Not on file   Occupational History   • Not on file   Tobacco Use   • Smoking status: Former Smoker     Packs/day: 1.00     Years: 20.00     Pack years: 20.00     Quit date: 1990     Years since quittin.3   • Smokeless tobacco: Never Used   • Tobacco comment: 25 to 30 years a go   Substance and Sexual Activity   • Alcohol  use: Yes     Comment: slim and none   • Drug use: No   • Sexual activity: Yes     Partners: Female     Birth control/protection: Post-Menopausal   Other Topics Concern   • Not on file   Social History Narrative   • Not on file     Social Determinants of Health     Financial Resource Strain:    • Difficulty of Paying Living Expenses:    Food Insecurity:    • Worried About Running Out of Food in the Last Year:    • Ran Out of Food in the Last Year:    Transportation Needs:    • Lack of Transportation (Medical):    • Lack of Transportation (Non-Medical):    Physical Activity:    • Days of Exercise per Week:    • Minutes of Exercise per Session:    Stress:    • Feeling of Stress :    Social Connections:    • Frequency of Communication with Friends and Family:    • Frequency of Social Gatherings with Friends and Family:    • Attends Rastafari Services:    • Active Member of Clubs or Organizations:    • Attends Club or Organization Meetings:    • Marital Status:    Intimate Partner Violence:    • Fear of Current or Ex-Partner:    • Emotionally Abused:    • Physically Abused:    • Sexually Abused:      Allergies   Allergen Reactions   • Vicodin [Hydrocodone-Acetaminophen] Rash     rash     Outpatient Encounter Medications as of 5/13/2021   Medication Sig Dispense Refill   • aspirin (ASA) 81 MG Chew Tab chewable tablet Chew 1 tablet every day. 100 tablet    • melatonin 3 MG Tab Take 1 tablet by mouth at bedtime. 30 tablet    • acetaminophen (TYLENOL) 325 MG Tab Take 2 Tablets by mouth every four hours as needed for Mild Pain or Fever (headache). 30 tablet 0   • atorvastatin (LIPITOR) 40 MG Tab Take 1 tablet by mouth every evening. 30 tablet 2   • lisinopril (PRINIVIL) 10 MG Tab Take 1 tablet by mouth every day. 30 tablet 2   • loratadine (CLARITIN) 10 MG Tab Take 1 tablet by mouth every day. 30 tablet 2   • senna-docusate (PERICOLACE OR SENOKOT S) 8.6-50 MG Tab Take 2 Tablets by mouth 2 times a day. 30 tablet 0   •  "polyethylene glycol/lytes (MIRALAX) 17 g Pack Take 1 Packet by mouth 1 time a day as needed (constipation).       No facility-administered encounter medications on file as of 5/13/2021.     Review of Systems   Constitutional: Negative for chills, fever, malaise/fatigue and weight loss.   HENT: Negative for ear discharge, ear pain, hearing loss and nosebleeds.    Eyes: Negative for blurred vision, double vision, pain and discharge.   Respiratory: Negative for cough and shortness of breath.    Cardiovascular: Negative for chest pain, palpitations, orthopnea, claudication, leg swelling and PND.   Gastrointestinal: Negative for abdominal pain, blood in stool, melena, nausea and vomiting.   Genitourinary: Negative for dysuria and hematuria.   Musculoskeletal: Negative for falls, joint pain and myalgias.   Skin: Negative for itching and rash.   Neurological: Negative for dizziness, sensory change, speech change, loss of consciousness and headaches.   Endo/Heme/Allergies: Negative for environmental allergies. Does not bruise/bleed easily.   Psychiatric/Behavioral: Negative for depression, hallucinations and suicidal ideas.        Objective:   /64 (BP Location: Left arm, Patient Position: Sitting, BP Cuff Size: Adult)   Pulse (!) 55   Resp 16   Ht 1.727 m (5' 8\")   Wt 81.6 kg (180 lb)   SpO2 93%   BMI 27.37 kg/m²     Physical Exam   Constitutional: He is oriented to person, place, and time. No distress.   HENT:   Head: Normocephalic and atraumatic.   Right Ear: External ear normal.   Left Ear: External ear normal.   Eyes: Right eye exhibits no discharge. Left eye exhibits no discharge.   Neck: No JVD present. No thyromegaly present.   Cardiovascular: Normal rate, regular rhythm and normal heart sounds. Exam reveals no gallop and no friction rub.   No murmur heard.  Pulmonary/Chest: Breath sounds normal. No respiratory distress.   Abdominal: Bowel sounds are normal. He exhibits no distension. There is no " abdominal tenderness.   Musculoskeletal:         General: No tenderness.   Neurological: He is alert and oriented to person, place, and time. No cranial nerve deficit.   Skin: Skin is warm and dry. He is not diaphoretic.   Psychiatric: His behavior is normal.   Nursing note and vitals reviewed.      Assessment:     1. Sinus bradycardia  EKG   2. Cerebrovascular accident (CVA), unspecified mechanism (HCC)     3. HTN (hypertension), malignant     4. Premature atrial complex     5. S/P carotid endarterectomy         Medical Decision Making:  Today's Assessment / Status / Plan:   Based on prior history of established PAD s/p carotid endarterectomy and recent history of repeated TIAs and CVA, patient is indicated to be on Xarelto 2.5 mg bid in addition to ASA to further reduce CV risks and mortality.    We will investigate and see if we can remove his Zio patch today so that we can interpret the results from the company.

## 2021-05-14 LAB — EKG IMPRESSION: NORMAL

## 2021-05-24 ENCOUNTER — OFFICE VISIT (OUTPATIENT)
Dept: NEUROLOGY | Facility: MEDICAL CENTER | Age: 86
End: 2021-05-24
Attending: PHYSICAL MEDICINE & REHABILITATION
Payer: MEDICARE

## 2021-05-24 VITALS
HEART RATE: 60 BPM | OXYGEN SATURATION: 98 % | HEIGHT: 68 IN | SYSTOLIC BLOOD PRESSURE: 132 MMHG | WEIGHT: 171.08 LBS | TEMPERATURE: 98.3 F | RESPIRATION RATE: 12 BRPM | BODY MASS INDEX: 25.93 KG/M2 | DIASTOLIC BLOOD PRESSURE: 50 MMHG

## 2021-05-24 DIAGNOSIS — I67.1 ANEURYSM OF MIDDLE CEREBRAL ARTERY: ICD-10-CM

## 2021-05-24 DIAGNOSIS — I69.30 LATE EFFECT OF STROKE: ICD-10-CM

## 2021-05-24 DIAGNOSIS — E78.2 MIXED HYPERLIPIDEMIA: ICD-10-CM

## 2021-05-24 DIAGNOSIS — Z71.89 HEARING AID CONSULTATION: ICD-10-CM

## 2021-05-24 DIAGNOSIS — Z98.890 S/P CAROTID ENDARTERECTOMY: ICD-10-CM

## 2021-05-24 PROCEDURE — 99215 OFFICE O/P EST HI 40 MIN: CPT | Performed by: NURSE PRACTITIONER

## 2021-05-24 PROCEDURE — 99212 OFFICE O/P EST SF 10 MIN: CPT | Performed by: NURSE PRACTITIONER

## 2021-05-24 ASSESSMENT — ENCOUNTER SYMPTOMS
FEVER: 0
HEADACHES: 1
PALPITATIONS: 0
HEARTBURN: 0
NAUSEA: 0
FOCAL WEAKNESS: 0
BACK PAIN: 1
BRUISES/BLEEDS EASILY: 1
WEAKNESS: 0
DEPRESSION: 0
BLURRED VISION: 0
FALLS: 0
DOUBLE VISION: 1
DIZZINESS: 0
NERVOUS/ANXIOUS: 0
COUGH: 0
SPEECH CHANGE: 0

## 2021-05-24 ASSESSMENT — FIBROSIS 4 INDEX: FIB4 SCORE: 1.21

## 2021-05-24 ASSESSMENT — PATIENT HEALTH QUESTIONNAIRE - PHQ9: CLINICAL INTERPRETATION OF PHQ2 SCORE: 0

## 2021-05-24 NOTE — PATIENT INSTRUCTIONS
If any new signs of stroke:  sudden weakness, numbness, speech difficulty (slurring or difficulty finding words), imbalance, incoordination, worse headache of life or vision loss occur, call 911.      Take all medications as prescribed unless instructed by your provider.          Stroke Prevention  Some medical conditions and lifestyle choices can lead to a higher risk for a stroke. You can help to prevent a stroke by making nutrition, lifestyle, and other changes.  What nutrition changes can be made?    · Eat healthy foods.  ? Choose foods that are high in fiber. These include:  § Fresh fruits.  § Fresh vegetables.  § Whole grains.  ? Eat at least 5 or more servings of fruits and vegetables each day. Try to fill half of your plate at each meal with fruits and vegetables.  ? Choose lean protein foods. These include:  § Lowfat (lean) cuts of meat.  § Chicken without skin.  § Fish.  § Tofu.  § Beans.  § Nuts.  ? Eat low-fat dairy products.  ? Avoid foods that:  § Are high in salt (sodium).  § Have saturated fat.  § Have trans fat.  § Have cholesterol.  § Are processed.  § Are premade.  · Follow eating guidelines as told by your doctor. These may include:  ? Reducing how many calories you eat and drink each day.  ? Limiting how much salt you eat or drink each day to 1,500 milligrams (mg).  ? Using only healthy fats for cooking. These include:  § Olive oil.  § Canola oil.  § Sunflower oil.  ? Counting how many carbohydrates you eat and drink each day.  What lifestyle changes can be made?  · Try to stay at a healthy weight. Talk to your doctor about what a good weight is for you.  · Get at least 30 minutes of moderate physical activity at least 5 days a week. This can include:  ? Fast walking.  ? Biking.  ? Swimming.  · Do not use any products that have nicotine or tobacco. This includes cigarettes and e-cigarettes. If you need help quitting, ask your doctor. Avoid being around tobacco smoke in general.  · Limit how much  "alcohol you drink to no more than 1 drink a day for nonpregnant women and 2 drinks a day for men. One drink equals 12 oz of beer, 5 oz of wine, or 1½ oz of hard liquor.  · Do not use drugs.  · Avoid taking birth control pills. Talk to your doctor about the risks of taking birth control pills if:  ? You are over 35 years old.  ? You smoke.  ? You get migraines.  ? You have had a blood clot.  What other changes can be made?  · Manage your cholesterol.  ? It is important to eat a healthy diet.  ? If your cholesterol cannot be managed through your diet, you may also need to take medicines. Take medicines as told by your doctor.  · Manage your diabetes.  ? It is important to eat a healthy diet and to exercise regularly.  ? If your blood sugar cannot be managed through diet and exercise, you may need to take medicines. Take medicines as told by your doctor.  · Control your high blood pressure (hypertension).  ? Try to keep your blood pressure below 130/80. This can help lower your risk of stroke.  ? It is important to eat a healthy diet and to exercise regularly.  ? If your blood pressure cannot be managed through diet and exercise, you may need to take medicines. Take medicines as told by your doctor.  ? Ask your doctor if you should check your blood pressure at home.  ? Have your blood pressure checked every year. Do this even if your blood pressure is normal.  · Talk to your doctor about getting checked for a sleep disorder. Signs of this can include:  ? Snoring a lot.  ? Feeling very tired.  · Take over-the-counter and prescription medicines only as told by your doctor. These may include aspirin or blood thinners (antiplatelets or anticoagulants).  · Make sure that any other medical conditions you have are managed.  Where to find more information  · American Stroke Association: www.strokeassociation.org  · National Stroke Association: www.stroke.org  Get help right away if:  · You have any symptoms of stroke. \"BE " "FAST\" is an easy way to remember the main warning signs:  ? B - Balance. Signs are dizziness, sudden trouble walking, or loss of balance.  ? E - Eyes. Signs are trouble seeing or a sudden change in how you see.  ? F - Face. Signs are sudden weakness or loss of feeling of the face, or the face or eyelid drooping on one side.  ? A - Arms. Signs are weakness or loss of feeling in an arm. This happens suddenly and usually on one side of the body.  ? S - Speech. Signs are sudden trouble speaking, slurred speech, or trouble understanding what people say.  ? T - Time. Time to call emergency services. Write down what time symptoms started.  · You have other signs of stroke, such as:  ? A sudden, very bad headache with no known cause.  ? Feeling sick to your stomach (nausea).  ? Throwing up (vomiting).  ? Jerky movements you cannot control (seizure).  These symptoms may represent a serious problem that is an emergency. Do not wait to see if the symptoms will go away. Get medical help right away. Call your local emergency services (911 in the [place] Do not drive yourself to the hospital.  Summary  · You can prevent a stroke by eating healthy, exercising, not smoking, drinking less alcohol, and treating other health problems, such as diabetes, high blood pressure, or high cholesterol.  · Do not use any products that contain nicotine or tobacco, such as cigarettes and e-cigarettes.  · Get help right away if you have any signs or symptoms of a stroke.  This information is not intended to replace advice given to you by your health care provider. Make sure you discuss any questions you have with your health care provider.  Document Released: 06/18/2013 Document Revised: 02/13/2020 Document Reviewed: 03/21/2018  Elsevier Patient Education © 2020 [employer]    Cerebral Aneurysm    A cerebral aneurysm is a bulge that occurs in the blood vessel inside the brain. An aneurysm is caused when a weakened part of the blood vessel " expands. The blood vessel expands due to the constant pressure from the flow of blood through the weakened blood vessel. As the weakened aneurysm expands, the walls of the aneurysm become weaker.  Aneurysms are dangerous because they can leak or burst (rupture). When a cerebral aneurysm ruptures, it causes bleeding in the brain (subarachnoid hemorrhage). The blood flow to the area of the brain supplied by the artery is also reduced. This can cause stroke, seizures, or coma.  A ruptured cerebral aneurysm is a medical emergency. This can cause permanent brain damage or death.  What are the causes?  The exact cause of this condition is not known.  What increases the risk?  This condition is more likely to develop in people who:  · Are older. The condition is most common in people between the ages of 50-60.  · Are female  · Have a family history of aneurysm in two or more direct relatives.  · Have certain conditions that are passed along from parent to child (inherited). They include:  ? Autosomal dominant polycystic kidney disease. This is a condition in which small, fluid-filled sacs (cysts) develop in the kidney.  ? Neurofibromatosis type 1. In this condition, flat spots develop under the skin (pigmentation) and tumors grow along nerves in the skin, brain, and other parts of the body.  ? Ozzy-Danlos syndrome. This is a condition in which bad connective tissue causes loose or unstable joints and creates a very soft skin that bruises or tears easily.  · Smoke.  · Have high blood pressure (hypertension).  · Abuse alcohol.  What are the signs or symptoms?  The signs and symptoms of a cerebral aneurysm that has not leaked or ruptured can depend on its size and rate of growth. A small, unchanging aneurysm generally does not cause symptoms. A larger aneurysm that is steadily growing can increase pressure on the brain or nerves.   The increased pressure from a cerebral aneurysm that has not leaked or ruptured can  cause:  · A headache.  · Vision problems.  · Numbness or weakness in an arm or leg.  · Memory problems.  · Problems speaking.  · Seizures.  If an aneurysm leaks or ruptures, it can cause a life-threatening condition, such as stroke. Symptoms may include:  · A sudden, severe headache with no known cause. The headache is often described as the worst headache ever experienced.  · Stiff neck.  · Nausea or vomiting, especially when combined with other symptoms, such as a headache.  · Sudden weakness or numbness of the face, arm, or leg, especially on one side of the body.  · Sudden trouble walking or difficulty moving the arms or legs.  · Double vision.  · Sudden trouble seeing in one or both eyes.  · Trouble speaking or understanding speech (aphasia).  · Trouble swallowing.  · Dizziness.  · Loss of balance or coordination.  · Intolerance to light.  · Sudden confusion or loss of consciousness.  How is this diagnosed?  This condition is diagnosed using certain tests, including:  · CT scan.  · Computed tomographic angiogram (CTA). This test uses a dye and a scanner to produce images of your blood vessels.  · Magnetic resonance angiogram (MRA). This test uses an MRI machine to produce images of your blood vessels.  · Digital subtraction angiogram (DSA). This test involves placing a flexible, thin tube (catheter) into the artery in your thigh and guiding it up to the arteries in the brain. A dye is then injected into the area and X-rays are taken to create images of your blood vessels.  How is this treated?  Unruptured aneurysm  Treatment is complex when an aneurysm is found and it is not causing problems. Treatment is individualized, as each case is different. Many factors must be considered, such as the size and exact location of your aneurysm, your age, your overall health, and your preferences. Small aneurysms in certain locations of the brain have a very low chance of bleeding or rupturing. These small aneurysms may not  be treated.   In some cases, however, treatment may be required. Treatment depends on the size and location of the aneurysm. They may include:  · Coiling. During this procedure, a catheter is inserted and advanced through a blood vessel. Once the catheter reaches the aneurysm, tiny coils are used to block blood flow into the aneurysm. This procedure is sometimes done at the same time as a DSA.  · Surgical clipping. During surgery, a clip is placed at the base of the aneurysm. The clip prevents blood from continuing to enter the aneurysm.  · Flow diversion. This procedure is used to divert blood flow around the aneurysm with a stent that is placed across the opening of an aneurysm.  Ruptured aneurysm  Immediate emergency surgery or coiling may be needed to help prevent damage to the brain and to reduce the risk of rebleeding. The timing of treatment is an important factor in preventing complications. Successful early treatment of a ruptured aneurysm within the first 3 days of a bleed helps to prevent rebleeding and blood vessel spasm. In some cases, there may be a reason to treat 10-14 days after a rupture. Many factors are considered when making this decision, and each case is handled individually.  Follow these instructions at home:  If your aneurysm is not treated:  · Take over-the-counter and prescription medicines only as told by your health care provider.  · Follow a diet suggested by your health care provider. Certain dietary changes may be advised to address high blood pressure (hypertension), such as choosing foods that are low in salt (sodium), saturated fat, trans fat, and cholesterol.  · Stay physically active. It is recommended that you get at least 30 minutes of activity on most or all days.  · Do not use any products that contain nicotine or tobacco, such as cigarettes and e-cigarettes. If you need help quitting, ask your health care provider.  · Limit alcohol intake to no more than 1 drink a day for  nonpregnant women and 2 drinks a day for men. One drink equals 12 oz of beer, 5 oz of wine, or 1½ oz of hard liquor.  · Do not use street drugs. If you need help quitting, ask your health care provider.  · Keep all follow-up visits as told by your health care provider. This is important. This includes any referrals, imaging studies, and laboratory tests. Proper follow-up may prevent an aneurysm rupture or a stroke.  Get help right away if:  · You have a sudden, severe headache with no known cause. This may include a stiff neck.  · You have sudden nausea or vomiting with a severe headache.  · You have a seizure.  · You have other symptoms of stroke. The acronym BEFAST is an easy way to remember the main warning signs of stroke.  ? B = Balance problems. Signs include dizziness, sudden trouble walking, or loss of balance.  ? E = Eye problems. This includes trouble seeing or a sudden change in vision.  ? F = Face changes. This includes sudden weakness or numbness of the face, or the face or eyelid drooping to one side.  ? A = Arm weakness or numbness. This happens suddenly and usually on one side of the body.  ? S = Speech problems. This includes trouble speaking or trouble understanding.  ? T = Time. Time to call 911 or seek emergency care. Do not wait to see if symptoms will go away. Make note of the time your symptoms started.  These symptoms may represent a serious problem that is an emergency. Do not wait to see if the symptoms will go away. Get medical help right away. Call your local emergency services (911 in the [place] Do not drive yourself to the hospital.  Summary  · An aneurysm is a bulge in an artery.  · Aneurysms are dangerous because they can leak or burst (rupture). When a cerebral aneurysm ruptures, it causes bleeding in the brain.  · Treatment depends on whether the aneurysm is ruptured. A ruptured aneurysm is a medical emergency.  · Get help right away if you have symptoms of stroke. The acronym  BEFAST is an easy way to remember the main warning signs of stroke.  This information is not intended to replace advice given to you by your health care provider. Make sure you discuss any questions you have with your health care provider.  Document Released: 09/09/2003 Document Revised: 09/06/2019 Document Reviewed: 01/25/2018  Elsevier Patient Education © 2020 [employer]

## 2021-05-24 NOTE — PROGRESS NOTES
Subjective:      HPI   Ion Harvey is an 87  y.o. right handed male who presents to [facility] for evaluation of right cerebellar, right occipital and frontal cortex infarcts   He  presented to [facility] in [place] with complaints of visual deficits. He was transferred to  [facility] on 3/30/2021 where MRI showed multiple right cortical infarcts. NIHSS 2 on presentation for orientation.        PMH includes HLD, carotid stenosis, aneurysm of MCA, BPH, and anemia.  Social History:  Quit smoking about 45-50 years ago, denies alcohol  Family History:  Denies family history of heart disease, brother had stroke    He was discharged on ASA.  He has since seen cardiology who started him on Xarelto 2.5mg BID with aspirin for cardiac disease.. He complains of tingling of bilateral feet for over 1 year.     Review of Systems   Constitutional: Negative for fever.   HENT: Positive for hearing loss.    Eyes: Positive for double vision. Negative for blurred vision.   Respiratory: Negative for cough.    Cardiovascular: Negative for chest pain and palpitations.   Gastrointestinal: Negative for heartburn and nausea.   Genitourinary: Negative.    Musculoskeletal: Positive for back pain. Negative for falls.   Skin: Negative for rash.   Neurological: Positive for headaches. Negative for dizziness, speech change, focal weakness and weakness.   Endo/Heme/Allergies: Bruises/bleeds easily.   Psychiatric/Behavioral: Negative for depression. The patient is not nervous/anxious.      Current Outpatient Medications on File Prior to Visit   Medication Sig Dispense Refill   • rivaroxaban (XARELTO) 2.5 MG tablet Take 1 tablet by mouth 2 times a day. 60 tablet 11   • aspirin (ASA) 81 MG Chew Tab chewable tablet Chew 1 tablet every day. 100 tablet    • acetaminophen (TYLENOL) 325 MG Tab Take 2 Tablets by mouth every four hours as needed for Mild Pain or Fever (headache). 30 tablet 0   • atorvastatin  (LIPITOR) 40 MG Tab Take 1 tablet by mouth every evening. 30 tablet 2   • lisinopril (PRINIVIL) 10 MG Tab Take 1 tablet by mouth every day. 30 tablet 2   • loratadine (CLARITIN) 10 MG Tab Take 1 tablet by mouth every day. 30 tablet 2   • senna-docusate (PERICOLACE OR SENOKOT S) 8.6-50 MG Tab Take 2 Tablets by mouth 2 times a day. 30 tablet 0     No current facility-administered medications on file prior to visit.     Past Medical History:   Diagnosis Date   • [place] (hard of hearing) 04/02/2021   • Hydrocele, unspecified    • Hypertrophy of prostate without urinary obstruction and other lower urinary tract symptoms (LUTS)    • Mixed hyperlipidemia           Objective:     I personally reviewed imaging below and agree with the findings  MRI brain 3/31/2021   1 Punctate acute infarcts in the right cerebellar hemisphere, right occipital cortex and right frontal cortex.  2.  Linear focus of gradient echo signal hypointensity in the left occipital lobe consistent with chronic hypertensive microhemorrhage, remote trauma or infection.  3.  Moderate diffuse cerebral substance loss.  4.  Mild microangiopathic ischemic change versus demyelination or gliosis.    CTA head 4/1/2021  1.  No large vessel occlusion is identified.     2.  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     3.  Atherosclerotic disease. Resulting stenosis is greater than 50% in the small distal right vertebral artery.     1. No acute intracranial findings.    CTA neck  1  Atherosclerotic disease. Resulting stenosis in the carotid arteries is less than 50%.     2 Resulting stenosis in the small right vertebral artery is greater than 50% distally. Stenosis in the proximal right subclavian artery is consistent with approximately 50% stenosis    TTE:  LVEF 70%, bubble study negative, LA size WNL, SEDRICK 20.      Stroke Labs:  Creat 1.17, LDL 64 (164 @ OSH on admission),  A1C 5.3    Encounter Vitals  Standard Vitals  Vitals  Blood Pressure  ": 132/50  Temperature: 36.8 °C (98.3 °F)  Temp src: Temporal  Pulse: 60  Respiration: 12  Pulse Oximetry: 98 %  Height: 172.7 cm (5' 8\")  Weight: 77.6 kg (171 lb 1.2 oz)  Encounter Vitals  Temperature: 36.8 °C (98.3 °F)  Temp src: Temporal  Blood Pressure : 132/50  Pulse: 60  Respiration: 12  Pulse Oximetry: 98 %  Weight: 77.6 kg (171 lb 1.2 oz)  Height: 172.7 cm (5' 8\")  BMI (Calculated): 26.01       Physical Exam    Constitutional:  Alert, no apparent distress,  Psych:   mood and affect WNL  Neuro:  Oriented X 4, speech fluent, naming and memory intact  Muskuloskeletal:  Moves all extremities equally, strength 5/5 bilaterally, no drift  CN II: Visual fields are full to confrontation. Fundoscopic exam is normal with sharp discs and no vascular changes. Pupils are 3  mm and briskly reactive to light.   CN III, IV, VI  EOMs intact, no ptosis  CN V: Facial sensation is intact to pinprick in all 3 divisions bilaterally. Corneal responses are intact.  CN VII: Face is symmetric with normal eye closure and smile.  CN VII: Hearing is normal to rubbing fingers  CN IX, X: Palate elevates symmetrically. Phonation is normal.  CN XI: Head turning and shoulder shrug are intact  CN XII: Tongue is midline with normal movements and no atrophy.                           Sensation to PP equal bilaterally                 No limb ataxia with finger to nose and heel to shin                 Ambulates with steady gait.                 Rhomberg negative                Biceps,brachioradialis, tricep, patellar and ankle reflex all 2+     Cardiovascular:    S1S2, no abnormal rhythm auscultated, no peripheral edema  Neck:                     No carotid bruits noted   Pulmonary:            Respirations easy, lungs clear to auscultation all fields.     Skin:                     No obvious rashes.      Assessment/Plan:     1. Late effect of stroke, anterior and posterior infarcts, anterior could be explained by carotid stenosis, there is also " vertebral plaque, it is possible but unlikely that he had plaque rupture in 2 different vessels  14 day heart monitor negative so far, TTE  Is not suspicious for atrial fibrillation, he is following with cardiology.     Plan:  Presumed Mechanism by Toast:  __  Large Artery Atherosclerosis  __  Small Vessel (lacunar)  __   Cardioembolic  __   Other (Sickle cell, vasculitis, hypercoagulable)  __   Unknown  _XX_   ESUS  VS Large Vessel    Stroke risk factors include HLD, carotid stenosis.       Blood pressure goal less than 130/80, currently at goal.   Continue ASA 81mg and Xarelto 2.5mg BID per cardiology    14 day Zio patch has not shown anything, we will mail it back to [facility] today.     2. Mixed hyperlipidemia  LDL goal < 70, current 164, continue Atorvastatin 40mg discussed medication side effects, will need follow up with primary care to evaluate liver function at intervals and refill    3. S/P carotid endarterectomy    Follow up with vascular, Dr. Beavers.     4. 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 Romero) for aneurysm.         I spent a total of 58 minutes caring for patient,  my time includes counseling, review of systems, HPI and assessment, review of images, labs and testing as above.  I reviewed the hospital records, PMH, social and family history.   I have counseled patient on stroke prevention strategies, stroke symptoms and mimics.  Diet and exercise modifications.  We discussed medication side effects and instructions.       I have provided patient a written personalized stroke prevention plan, it is filed under the media tab under ‘[facility]”.

## 2021-06-01 ENCOUNTER — OFFICE VISIT (OUTPATIENT)
Dept: PHYSICAL MEDICINE AND REHAB | Facility: REHABILITATION | Age: 86
End: 2021-06-01
Payer: MEDICARE

## 2021-06-01 VITALS
BODY MASS INDEX: 25.91 KG/M2 | SYSTOLIC BLOOD PRESSURE: 106 MMHG | WEIGHT: 171 LBS | RESPIRATION RATE: 18 BRPM | HEART RATE: 70 BPM | DIASTOLIC BLOOD PRESSURE: 70 MMHG | HEIGHT: 68 IN | OXYGEN SATURATION: 97 %

## 2021-06-01 DIAGNOSIS — R35.1 NOCTURIA: ICD-10-CM

## 2021-06-01 DIAGNOSIS — Z86.73 HISTORY OF CVA (CEREBROVASCULAR ACCIDENT): Primary | ICD-10-CM

## 2021-06-01 DIAGNOSIS — R53.81 PHYSICAL DECONDITIONING: ICD-10-CM

## 2021-06-01 PROCEDURE — 99215 OFFICE O/P EST HI 40 MIN: CPT | Performed by: PHYSICAL MEDICINE & REHABILITATION

## 2021-06-01 RX ORDER — TAMSULOSIN HYDROCHLORIDE 0.4 MG/1
0.4 CAPSULE ORAL
Qty: 30 CAPSULE | Refills: 0 | Status: ON HOLD | OUTPATIENT
Start: 2021-06-01 | End: 2022-12-02

## 2021-06-01 ASSESSMENT — ENCOUNTER SYMPTOMS
WEAKNESS: 1
COUGH: 0
FALLS: 0
CHILLS: 0
PALPITATIONS: 0
CONSTIPATION: 0
FEVER: 0
DIARRHEA: 0
FOCAL WEAKNESS: 0
SHORTNESS OF BREATH: 0
BRUISES/BLEEDS EASILY: 1

## 2021-06-01 ASSESSMENT — FIBROSIS 4 INDEX: FIB4 SCORE: 1.21

## 2021-06-01 NOTE — PROGRESS NOTES
[facility]  [facility]  [facility] [place] [address]  Ph: [phone]    NEW PATIENT EVALUATION    *Patient established in PM&R practice, however, patient new to writer as patient is transferring care. Therefore, patient billed as established.     Patient Name: Cooper Harvey   Patient : 1934  Patient Age: 87 y.o.     Examining Physician: Dr. Laura Sales, DO    PM&R History to Date - Background Information:  Dates of Admission/Discharge to ARU: 2021-2021    SUBJECTIVE:   Patient Identification: Cooper Harvey is a 87 y.o. male with PMH significant for BPH, hearing loss, hyperlipidemia and rehabilitation history significant for acute ischemic stroke (right frontal lobe, right cerebellum, right occipital lobe) 2021 s/p right carotid endarterectomy 2021 Dr. Beavers and is presenting to PM&R clinic for a NEW OUTPATIENT evaluation with the following chief complaint/s:    Chief Complaint: Generally feels as though close to baseline.    Accompanied by Today: Daughters, wife  History of Present Illness:   -Records reviewed: Acute rehab discharge summary reviewed in its entirety.  -Therapy: Already discharged from therapy with home health given that he is progressing so well.  Would like to do outpatient therapy.  -Function: Ambulatory without assistive device.  Is doing significantly better per report of daughters.  States that he seems a little more shy during doctor's visits which is not a true representation of how well he is doing.  -Bowel: No issues.  -Bladder: Patient states that he gets urgency.  Also gets up several times a night to urinate.  Gets up at least 4-5 times per night.  Had been on Flomax.  -Had questions regarding Zio patch.  These were answered by neurology.  -Followed up with Dr. Beavers, will be seen for another year.  -Neurology notes reviewed referral placed to interventional radiology for evaluation of aneurysm  -Denies any  focal weakness.  Recently mowed the lawn.    Review of Systems:  Review of Systems   Constitutional: Negative for chills and fever.   Respiratory: Negative for cough and shortness of breath.    Cardiovascular: Negative for palpitations and leg swelling.   Gastrointestinal: Negative for constipation and diarrhea.   Genitourinary: Positive for frequency and urgency. Negative for dysuria.   Musculoskeletal: Negative for falls and joint pain.   Neurological: Positive for weakness ( Slightly more generalized weakness compared to prior to the stroke). Negative for focal weakness.   Endo/Heme/Allergies: Bruises/bleeds easily ( On Xarelto.).      All other pertinent positive review of systems are noted above in HPI.   All other systems reviewed and are negative.    Past Medical History:  Past Medical History:   Diagnosis Date   • [place] (hard of hearing) 04/02/2021   • Hydrocele, unspecified    • Hypertrophy of prostate without urinary obstruction and other lower urinary tract symptoms (LUTS)    • Mixed hyperlipidemia       Past Surgical History:   Procedure Laterality Date   • PB THROMBOENDARTECTMY NECK,NECK INCIS Right 4/2/2021    Procedure: ENDARTERECTOMY, CAROTID;  Surgeon: Willie Beavers M.D.;  Location: SURGERY [facility];  Service: Vascular   • PLASTIC SURGERY  February 2009    large skin cancer removed from the top of head with skin graft--donor site left anterior thigh   • HEMORRHOIDECTOMY  2004   • EYE SURGERY  6/05 ; 7/05    Bilateral Cataracts        Current Outpatient Medications:   •  tamsulosin (FLOMAX) 0.4 MG capsule, Take 1 capsule by mouth at bedtime., Disp: 30 capsule, Rfl: 0  •  rivaroxaban (XARELTO) 2.5 MG tablet, Take 1 tablet by mouth 2 times a day., Disp: 60 tablet, Rfl: 11  •  aspirin (ASA) 81 MG Chew Tab chewable tablet, Chew 1 tablet every day., Disp: 100 tablet, Rfl:   •  acetaminophen (TYLENOL) 325 MG Tab, Take 2 Tablets by mouth every four hours as needed for Mild Pain or Fever (headache).,  Disp: 30 tablet, Rfl: 0  •  atorvastatin (LIPITOR) 40 MG Tab, Take 1 tablet by mouth every evening., Disp: 30 tablet, Rfl: 2  •  lisinopril (PRINIVIL) 10 MG Tab, Take 1 tablet by mouth every day., Disp: 30 tablet, Rfl: 2  •  loratadine (CLARITIN) 10 MG Tab, Take 1 tablet by mouth every day., Disp: 30 tablet, Rfl: 2  Allergies   Allergen Reactions   • Vicodin [Hydrocodone-Acetaminophen] Rash     rash        Past Social History:  Social History     Socioeconomic History   • Marital status:      Spouse name: Not on file   • Number of children: Not on file   • Years of education: Not on file   • Highest education level: Not on file   Occupational History   • Not on file   Tobacco Use   • Smoking status: Former Smoker     Packs/day: 1.00     Years: 20.00     Pack years: 20.00     Quit date: 1990     Years since quittin.4   • Smokeless tobacco: Never Used   • Tobacco comment: 25 to 30 years a go   Substance and Sexual Activity   • Alcohol use: Yes     Comment: slim and none   • Drug use: No   • Sexual activity: Yes     Partners: Female     Birth control/protection: Post-Menopausal   Other Topics Concern   • Not on file   Social History Narrative   • Not on file     Social Determinants of Health     Financial Resource Strain:    • Difficulty of Paying Living Expenses:    Food Insecurity:    • Worried About Running Out of Food in the Last Year:    • Ran Out of Food in the Last Year:    Transportation Needs:    • Lack of Transportation (Medical):    • Lack of Transportation (Non-Medical):    Physical Activity:    • Days of Exercise per Week:    • Minutes of Exercise per Session:    Stress:    • Feeling of Stress :    Social Connections:    • Frequency of Communication with Friends and Family:    • Frequency of Social Gatherings with Friends and Family:    • Attends Worship Services:    • Active Member of Clubs or Organizations:    • Attends Club or Organization Meetings:    • Marital Status:    Intimate  Partner Violence:    • Fear of Current or Ex-Partner:    • Emotionally Abused:    • Physically Abused:    • Sexually Abused:         Family History:  Family History   Problem Relation Age of Onset   • Cancer Mother        Depression and Opioid Screening  PHQ-9:  Depression Screen (PHQ-2/PHQ-9) 4/21/2021 4/23/2021 5/24/2021   PHQ-2 Total Score 0 0 -   PHQ-2 Total Score - - -   PHQ-2 Total Score - - 0     Interpretation of PHQ-9 Total Score   Score Severity   1-4 No Depression   5-9 Mild Depression   10-14 Moderate Depression   15-19 Moderately Severe Depression   20-27 Severe Depression     OBJECTIVE:   Vital Signs:  Vitals:    06/01/21 1150   BP: 106/70   Pulse: 70   Resp: 18   SpO2: 97%        Pertinent Labs:  Lab Results   Component Value Date/Time    SODIUM 135 04/19/2021 05:31 AM    POTASSIUM 4.4 04/19/2021 05:31 AM    CHLORIDE 102 04/19/2021 05:31 AM    CO2 29 04/19/2021 05:31 AM    GLUCOSE 79 04/19/2021 05:31 AM    BUN 29 (H) 04/19/2021 05:31 AM    CREATININE 1.17 04/19/2021 05:31 AM    CREATININE 1.2 10/03/2008 11:30 AM       Lab Results   Component Value Date/Time    HBA1C 5.3 04/14/2021 02:55 AM       Lab Results   Component Value Date/Time    WBC 6.8 04/16/2021 05:17 AM    RBC 4.56 (L) 04/16/2021 05:17 AM    HEMOGLOBIN 14.2 04/16/2021 05:17 AM    HEMATOCRIT 43.1 04/16/2021 05:17 AM    MCV 94.5 04/16/2021 05:17 AM    MCH 31.1 04/16/2021 05:17 AM    MCHC 32.9 (L) 04/16/2021 05:17 AM    MPV 10.9 04/16/2021 05:17 AM    NEUTSPOLYS 68.30 04/16/2021 05:17 AM    LYMPHOCYTES 13.00 (L) 04/16/2021 05:17 AM    MONOCYTES 12.00 04/16/2021 05:17 AM    EOSINOPHILS 5.30 04/16/2021 05:17 AM    BASOPHILS 1.00 04/16/2021 05:17 AM       Lab Results   Component Value Date/Time    ASTSGOT 22 04/16/2021 05:17 AM    ALTSGPT 25 04/16/2021 05:17 AM        Physical Exam:   GEN: No apparent distress  HEENT: Head normocephalic, atraumatic.  Sclera nonicteric bilaterally, no ocular discharge appreciated bilaterally.  Hearing  difficulties bilaterally.  CV: Extremities warm and well-perfused, no peripheral edema appreciated bilaterally.  PULMONARY: Breathing nonlabored on room air, no respiratory accessory muscle use.  Not requiring supplemental oxygen.  ABD: Soft, nontender.  SKIN: No appreciable skin breakdown on exposed areas of skin.  PSYCH: Mood and affect within normal limits.  NEURO: Awake alert.  Answers questions appropriately though difficult of hearing so takes some prompting. Logical thought content.    Motor Exam Upper Extremities   ? Myotome R L   Shoulder Abduction C5 5 5   Elbow flexion C5 5 5   Wrist extension C6 5 5   Elbow extension C7 5 5   Finger flexion C8 5 5   Finger abduction T1 5 5     Motor Exam Lower Extremities  ? Myotome R L   Hip flexion L2 5 5   Knee extension L3 5 5   Ankle dorsiflexion L4 5 5   Toe extension L5 5 5   Ankle plantarflexion S1 5 5     No significant tone on exam  No clonus bilateral ankles.  Shelia's negative bilaterally, however patient did have some difficulty fully relaxing    Imaging:   MRI Brain 3/31/21  1.  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  2.  Linear focus of gradient echo signal hypointensity in the left occipital lobe consistent with chronic hypertensive microhemorrhage, remote trauma or infection.  3.  Moderate diffuse cerebral substance loss.  4.  Mild microangiopathic ischemic change versus demyelination or gliosis.    MRI Brain 4/13/21  1. Age-related cerebral atrophy.  2. Mild periventricular white matter changes consistent with chronic microvascular ischemic gliosis.  3. Small right frontal subdural hygroma which measures 13 mm in greatest diameter. This is unchanged from previous exam.  4. There is no evidence of acute infarction in the brain parenchyma.  5. Question of 4 mm right middle cerebral artery trifurcation aneurysm.    ASSESSMENT/PLAN: Cooper Harvey  is a 87 y.o. male with  rehabilitation history significant for acute ischemic stroke (right frontal lobe, right cerebellum, right occipital lobe) 4/2021 s/p right carotid endarterectomy 4/2/2021 Dr. Beavers, here for evaluation. The following plan was discussed with the patient who is in agreement.     Visit Diagnoses     ICD-10-CM   1. History of CVA (cerebrovascular accident)  Z86.73   2. Physical deconditioning  R53.81   3. Nocturia  R35.1        Daughters and wife assist with history.    Rehab/Neuro:   1. Acute ischemic stroke (right frontal lobe, right cerebellum, right occipital lobe) 4/2021 s/p right carotid endarterectomy 4/2/2021 Dr. Beavers  -Records reviewed as aforementioned in the HPI.  -Med management: Secondary stroke prophylaxis includes aspirin/statin  -Referral: [facility] therapy at [facility] for outpatient therapy  -Function: Improving well.  No significant physical deficits.  Some generalized weakness and patient very goal oriented, likes being in therapy for task guidance.  -Social support: Wife (primary) and daughters/son  -Occupation: Retired  -Discussed with them that while they are establishing with a new primary care I can refill their medication should they need it in the future.  -Consultants: Vascular surgery, interventional radiology, neurology  -Counseling/discussion with patient and his family regarding potential etiologies of his stroke.    Neurogenic Bladder: Urinary urgency and frequency during the day with nocturia  -Med management: Prescription for Flomax 0.4 mg to be taken at night  -Counseled extensively on potential for this to cause hypotension which could potentially put the patient at risk for falls or hypoperfusion.  -After extensive discussion conclusion was made the patient would like to try Flomax to see if it helps with urinary symptoms given that he had been on in the past.    Patient functionally doing well.  Only has generalized deconditioning which is very mild at that  compared to baseline.  Patient lives in [place] and I think it is reasonable to continue to follow-up on an as-needed basis for any ongoing needs though patient has made good recovery and is established with necessary consultants.    Follow up: As needed    Total time spent was 67 minutes.  Included in this time is the time spent preparing for the visit including record review, my exam and evaluation, counseling and education regarding that which is aforementioned in the assessment and plan. Time was spent ordering the appropriate labs, tests, procedures, referrals, medications. Included this time as the time spent obtaining history from someone other than the patient. Discussion involved the patient and wife, daughters    Please note that this dictation was created using voice recognition software. I have made every reasonable attempt to correct obvious errors but there may be errors of grammar and content that I may have overlooked prior to finalization of this note.    Dr. Laura Sales DO, MS  Department of Physical Medicine & Rehabilitation  [facility]  [facility]

## 2021-06-03 ENCOUNTER — TELEPHONE (OUTPATIENT)
Dept: CARDIOLOGY | Facility: MEDICAL CENTER | Age: 86
End: 2021-06-03

## 2022-09-20 ENCOUNTER — TELEPHONE (OUTPATIENT)
Dept: CARDIOLOGY | Facility: MEDICAL CENTER | Age: 87
End: 2022-09-20
Payer: MEDICARE

## 2022-09-20 NOTE — TELEPHONE ENCOUNTER
TT    Caller: Pascual Marie (spouse)    Medication Name and Dosage:    rivaroxaban (XARELTO) 2.5 MG tablet [[id number]]    Did patient contact pharmacy (If no, please call pharmacy first): yes    Medication amount left: 0    Preferred Pharmacy: [facility]    Other questions (Topic): N/A    Callback Number (Will only call for issues): [phone]     Thank you,   Kassandra JURADO

## 2022-11-23 ENCOUNTER — APPOINTMENT (OUTPATIENT)
Dept: RADIOLOGY | Facility: MEDICAL CENTER | Age: 87
DRG: 034 | End: 2022-11-23
Attending: EMERGENCY MEDICINE
Payer: MEDICARE

## 2022-11-23 ENCOUNTER — HOSPITAL ENCOUNTER (INPATIENT)
Facility: MEDICAL CENTER | Age: 87
LOS: 8 days | DRG: 034 | End: 2022-12-02
Attending: EMERGENCY MEDICINE | Admitting: INTERNAL MEDICINE
Payer: MEDICARE

## 2022-11-23 DIAGNOSIS — K59.01 CONSTIPATION, SLOW TRANSIT: ICD-10-CM

## 2022-11-23 DIAGNOSIS — R41.0 DELIRIUM: ICD-10-CM

## 2022-11-23 DIAGNOSIS — N40.1 BENIGN PROSTATIC HYPERPLASIA WITH URINARY RETENTION: ICD-10-CM

## 2022-11-23 DIAGNOSIS — K12.2 UVULITIS: ICD-10-CM

## 2022-11-23 DIAGNOSIS — I65.22 LEFT CAROTID STENOSIS: ICD-10-CM

## 2022-11-23 DIAGNOSIS — I65.22 STENOSIS OF LEFT CAROTID ARTERY: ICD-10-CM

## 2022-11-23 DIAGNOSIS — R52 PAIN: ICD-10-CM

## 2022-11-23 DIAGNOSIS — R33.8 BENIGN PROSTATIC HYPERPLASIA WITH URINARY RETENTION: ICD-10-CM

## 2022-11-23 DIAGNOSIS — I63.9 ACUTE CVA (CEREBROVASCULAR ACCIDENT) (HCC): ICD-10-CM

## 2022-11-23 PROBLEM — R82.998: Status: ACTIVE | Noted: 2022-11-23

## 2022-11-23 PROBLEM — R47.9 DIFFICULTY WITH SPEECH: Status: ACTIVE | Noted: 2022-11-23

## 2022-11-23 LAB
ABO GROUP BLD: NORMAL
ALBUMIN SERPL BCP-MCNC: 4 G/DL (ref 3.2–4.9)
ALBUMIN/GLOB SERPL: 1.5 G/DL
ALP SERPL-CCNC: 98 U/L (ref 30–99)
ALT SERPL-CCNC: 19 U/L (ref 2–50)
ANION GAP SERPL CALC-SCNC: 8 MMOL/L (ref 7–16)
APPEARANCE UR: CLEAR
APTT PPP: 31.2 SEC (ref 24.7–36)
AST SERPL-CCNC: 20 U/L (ref 12–45)
BACTERIA #/AREA URNS HPF: NEGATIVE /HPF
BASOPHILS # BLD AUTO: 0.6 % (ref 0–1.8)
BASOPHILS # BLD: 0.03 K/UL (ref 0–0.12)
BILIRUB SERPL-MCNC: 0.4 MG/DL (ref 0.1–1.5)
BILIRUB UR QL STRIP.AUTO: NEGATIVE
BLD GP AB SCN SERPL QL: NORMAL
BUN SERPL-MCNC: 20 MG/DL (ref 8–22)
CALCIUM SERPL-MCNC: 9.1 MG/DL (ref 8.5–10.5)
CHLORIDE SERPL-SCNC: 103 MMOL/L (ref 96–112)
CO2 SERPL-SCNC: 26 MMOL/L (ref 20–33)
COLOR UR: ABNORMAL
CREAT SERPL-MCNC: 1.35 MG/DL (ref 0.5–1.4)
EKG IMPRESSION: NORMAL
EOSINOPHIL # BLD AUTO: 0.2 K/UL (ref 0–0.51)
EOSINOPHIL NFR BLD: 3.7 % (ref 0–6.9)
EPI CELLS #/AREA URNS HPF: NEGATIVE /HPF
ERYTHROCYTE [DISTWIDTH] IN BLOOD BY AUTOMATED COUNT: 45.1 FL (ref 35.9–50)
EST. AVERAGE GLUCOSE BLD GHB EST-MCNC: 105 MG/DL
GFR SERPLBLD CREATININE-BSD FMLA CKD-EPI: 50 ML/MIN/1.73 M 2
GLOBULIN SER CALC-MCNC: 2.7 G/DL (ref 1.9–3.5)
GLUCOSE SERPL-MCNC: 104 MG/DL (ref 65–99)
GLUCOSE UR STRIP.AUTO-MCNC: NEGATIVE MG/DL
HBA1C MFR BLD: 5.3 % (ref 4–5.6)
HCT VFR BLD AUTO: 43.2 % (ref 42–52)
HGB BLD-MCNC: 14.6 G/DL (ref 14–18)
HYALINE CASTS #/AREA URNS LPF: ABNORMAL /LPF
IMM GRANULOCYTES # BLD AUTO: 0.01 K/UL (ref 0–0.11)
IMM GRANULOCYTES NFR BLD AUTO: 0.2 % (ref 0–0.9)
INR PPP: 1.25 (ref 0.87–1.13)
KETONES UR STRIP.AUTO-MCNC: NEGATIVE MG/DL
LEUKOCYTE ESTERASE UR QL STRIP.AUTO: NEGATIVE
LYMPHOCYTES # BLD AUTO: 0.47 K/UL (ref 1–4.8)
LYMPHOCYTES NFR BLD: 8.6 % (ref 22–41)
MCH RBC QN AUTO: 31.6 PG (ref 27–33)
MCHC RBC AUTO-ENTMCNC: 33.8 G/DL (ref 33.7–35.3)
MCV RBC AUTO: 93.5 FL (ref 81.4–97.8)
MICRO URNS: ABNORMAL
MONOCYTES # BLD AUTO: 0.49 K/UL (ref 0–0.85)
MONOCYTES NFR BLD AUTO: 9 % (ref 0–13.4)
NEUTROPHILS # BLD AUTO: 4.24 K/UL (ref 1.82–7.42)
NEUTROPHILS NFR BLD: 77.9 % (ref 44–72)
NITRITE UR QL STRIP.AUTO: NEGATIVE
NRBC # BLD AUTO: 0 K/UL
NRBC BLD-RTO: 0 /100 WBC
PH UR STRIP.AUTO: 7.5 [PH] (ref 5–8)
PLATELET # BLD AUTO: 173 K/UL (ref 164–446)
PMV BLD AUTO: 10.2 FL (ref 9–12.9)
POTASSIUM SERPL-SCNC: 4.5 MMOL/L (ref 3.6–5.5)
PROT SERPL-MCNC: 6.7 G/DL (ref 6–8.2)
PROT UR QL STRIP: NEGATIVE MG/DL
PROTHROMBIN TIME: 15.5 SEC (ref 12–14.6)
RBC # BLD AUTO: 4.62 M/UL (ref 4.7–6.1)
RBC # URNS HPF: >150 /HPF
RBC UR QL AUTO: ABNORMAL
RH BLD: NORMAL
SODIUM SERPL-SCNC: 137 MMOL/L (ref 135–145)
SP GR UR STRIP.AUTO: 1.04
TROPONIN T SERPL-MCNC: 18 NG/L (ref 6–19)
UROBILINOGEN UR STRIP.AUTO-MCNC: 0.2 MG/DL
WBC # BLD AUTO: 5.4 K/UL (ref 4.8–10.8)
WBC #/AREA URNS HPF: ABNORMAL /HPF

## 2022-11-23 PROCEDURE — 94760 N-INVAS EAR/PLS OXIMETRY 1: CPT

## 2022-11-23 PROCEDURE — 99215 OFFICE O/P EST HI 40 MIN: CPT | Performed by: PSYCHIATRY & NEUROLOGY

## 2022-11-23 PROCEDURE — 71045 X-RAY EXAM CHEST 1 VIEW: CPT

## 2022-11-23 PROCEDURE — 86901 BLOOD TYPING SEROLOGIC RH(D): CPT

## 2022-11-23 PROCEDURE — 99220 PR INITIAL OBSERVATION CARE,LEVL III: CPT | Performed by: HOSPITALIST

## 2022-11-23 PROCEDURE — 0042T CT-CEREBRAL PERFUSION ANALYSIS: CPT

## 2022-11-23 PROCEDURE — 85610 PROTHROMBIN TIME: CPT

## 2022-11-23 PROCEDURE — 81001 URINALYSIS AUTO W/SCOPE: CPT

## 2022-11-23 PROCEDURE — 85025 COMPLETE CBC W/AUTO DIFF WBC: CPT

## 2022-11-23 PROCEDURE — 86900 BLOOD TYPING SEROLOGIC ABO: CPT

## 2022-11-23 PROCEDURE — G0378 HOSPITAL OBSERVATION PER HR: HCPCS

## 2022-11-23 PROCEDURE — 36415 COLL VENOUS BLD VENIPUNCTURE: CPT

## 2022-11-23 PROCEDURE — 70496 CT ANGIOGRAPHY HEAD: CPT

## 2022-11-23 PROCEDURE — 70498 CT ANGIOGRAPHY NECK: CPT

## 2022-11-23 PROCEDURE — 96372 THER/PROPH/DIAG INJ SC/IM: CPT

## 2022-11-23 PROCEDURE — 70450 CT HEAD/BRAIN W/O DYE: CPT

## 2022-11-23 PROCEDURE — 700117 HCHG RX CONTRAST REV CODE 255: Performed by: EMERGENCY MEDICINE

## 2022-11-23 PROCEDURE — A9270 NON-COVERED ITEM OR SERVICE: HCPCS | Performed by: PSYCHIATRY & NEUROLOGY

## 2022-11-23 PROCEDURE — 85730 THROMBOPLASTIN TIME PARTIAL: CPT

## 2022-11-23 PROCEDURE — 84484 ASSAY OF TROPONIN QUANT: CPT

## 2022-11-23 PROCEDURE — 700111 HCHG RX REV CODE 636 W/ 250 OVERRIDE (IP): Performed by: HOSPITALIST

## 2022-11-23 PROCEDURE — 83036 HEMOGLOBIN GLYCOSYLATED A1C: CPT

## 2022-11-23 PROCEDURE — 80053 COMPREHEN METABOLIC PANEL: CPT

## 2022-11-23 PROCEDURE — 86850 RBC ANTIBODY SCREEN: CPT

## 2022-11-23 PROCEDURE — 700105 HCHG RX REV CODE 258: Performed by: HOSPITALIST

## 2022-11-23 PROCEDURE — 700102 HCHG RX REV CODE 250 W/ 637 OVERRIDE(OP): Performed by: PSYCHIATRY & NEUROLOGY

## 2022-11-23 PROCEDURE — 93005 ELECTROCARDIOGRAM TRACING: CPT | Performed by: EMERGENCY MEDICINE

## 2022-11-23 PROCEDURE — 99285 EMERGENCY DEPT VISIT HI MDM: CPT

## 2022-11-23 RX ORDER — SODIUM CHLORIDE 9 MG/ML
INJECTION, SOLUTION INTRAVENOUS CONTINUOUS
Status: DISCONTINUED | OUTPATIENT
Start: 2022-11-23 | End: 2022-11-26

## 2022-11-23 RX ORDER — BISACODYL 10 MG
10 SUPPOSITORY, RECTAL RECTAL
Status: DISCONTINUED | OUTPATIENT
Start: 2022-11-23 | End: 2022-12-02 | Stop reason: HOSPADM

## 2022-11-23 RX ORDER — POLYETHYLENE GLYCOL 3350 17 G/17G
1 POWDER, FOR SOLUTION ORAL
Status: DISCONTINUED | OUTPATIENT
Start: 2022-11-23 | End: 2022-12-02 | Stop reason: HOSPADM

## 2022-11-23 RX ORDER — CLOPIDOGREL BISULFATE 75 MG/1
300 TABLET ORAL ONCE
Status: COMPLETED | OUTPATIENT
Start: 2022-11-24 | End: 2022-11-24

## 2022-11-23 RX ORDER — HEPARIN SODIUM 5000 [USP'U]/ML
5000 INJECTION, SOLUTION INTRAVENOUS; SUBCUTANEOUS EVERY 8 HOURS
Status: DISCONTINUED | OUTPATIENT
Start: 2022-11-23 | End: 2022-12-02 | Stop reason: HOSPADM

## 2022-11-23 RX ORDER — PSYLLIUM SEED (WITH DEXTROSE)
5 POWDER (GRAM) ORAL 2 TIMES DAILY
Status: ON HOLD | COMMUNITY
End: 2022-12-02

## 2022-11-23 RX ORDER — LABETALOL HYDROCHLORIDE 5 MG/ML
10 INJECTION, SOLUTION INTRAVENOUS EVERY 4 HOURS PRN
Status: DISCONTINUED | OUTPATIENT
Start: 2022-11-23 | End: 2022-11-25

## 2022-11-23 RX ORDER — AMOXICILLIN 250 MG
2 CAPSULE ORAL 2 TIMES DAILY
Status: DISCONTINUED | OUTPATIENT
Start: 2022-11-23 | End: 2022-12-02 | Stop reason: HOSPADM

## 2022-11-23 RX ORDER — ASPIRIN 81 MG/1
81 TABLET, CHEWABLE ORAL DAILY
Status: DISCONTINUED | OUTPATIENT
Start: 2022-11-23 | End: 2022-11-23

## 2022-11-23 RX ORDER — LORATADINE 10 MG/1
10 TABLET ORAL DAILY
Status: DISCONTINUED | OUTPATIENT
Start: 2022-11-24 | End: 2022-12-02 | Stop reason: HOSPADM

## 2022-11-23 RX ORDER — CLOPIDOGREL BISULFATE 75 MG/1
75 TABLET ORAL DAILY
Status: DISCONTINUED | OUTPATIENT
Start: 2022-11-25 | End: 2022-12-02 | Stop reason: HOSPADM

## 2022-11-23 RX ORDER — ONDANSETRON 2 MG/ML
4 INJECTION INTRAMUSCULAR; INTRAVENOUS EVERY 4 HOURS PRN
Status: DISCONTINUED | OUTPATIENT
Start: 2022-11-23 | End: 2022-12-02 | Stop reason: HOSPADM

## 2022-11-23 RX ORDER — LISINOPRIL 10 MG/1
10 TABLET ORAL DAILY
Status: DISCONTINUED | OUTPATIENT
Start: 2022-11-24 | End: 2022-11-28

## 2022-11-23 RX ORDER — TAMSULOSIN HYDROCHLORIDE 0.4 MG/1
0.4 CAPSULE ORAL
Status: DISCONTINUED | OUTPATIENT
Start: 2022-11-23 | End: 2022-12-02 | Stop reason: HOSPADM

## 2022-11-23 RX ORDER — DOCUSATE SODIUM 100 MG/1
100 CAPSULE, LIQUID FILLED ORAL 2 TIMES DAILY
Status: ON HOLD | COMMUNITY
End: 2022-12-02

## 2022-11-23 RX ORDER — ACETAMINOPHEN 325 MG/1
650 TABLET ORAL EVERY 6 HOURS PRN
Status: DISCONTINUED | OUTPATIENT
Start: 2022-11-23 | End: 2022-12-02 | Stop reason: HOSPADM

## 2022-11-23 RX ORDER — ONDANSETRON 4 MG/1
4 TABLET, ORALLY DISINTEGRATING ORAL EVERY 4 HOURS PRN
Status: DISCONTINUED | OUTPATIENT
Start: 2022-11-23 | End: 2022-12-02 | Stop reason: HOSPADM

## 2022-11-23 RX ORDER — ATORVASTATIN CALCIUM 40 MG/1
40 TABLET, FILM COATED ORAL EVERY EVENING
Status: DISCONTINUED | OUTPATIENT
Start: 2022-11-23 | End: 2022-12-02 | Stop reason: HOSPADM

## 2022-11-23 RX ADMIN — SODIUM CHLORIDE: 9 INJECTION, SOLUTION INTRAVENOUS at 18:25

## 2022-11-23 RX ADMIN — IOHEXOL 75 ML: 350 INJECTION, SOLUTION INTRAVENOUS at 14:50

## 2022-11-23 RX ADMIN — IOHEXOL 40 ML: 350 INJECTION, SOLUTION INTRAVENOUS at 14:51

## 2022-11-23 RX ADMIN — HEPARIN SODIUM 5000 UNITS: 5000 INJECTION, SOLUTION INTRAVENOUS; SUBCUTANEOUS at 18:25

## 2022-11-23 RX ADMIN — ASPIRIN 81 MG: 81 TABLET, COATED ORAL at 16:30

## 2022-11-23 ASSESSMENT — FIBROSIS 4 INDEX
FIB4 SCORE: 2.24
FIB4 SCORE: 2.33

## 2022-11-23 ASSESSMENT — ENCOUNTER SYMPTOMS
VOMITING: 0
CONSTIPATION: 1
WHEEZING: 0
COUGH: 1
SHORTNESS OF BREATH: 0
NAUSEA: 0
DIARRHEA: 0
HEADACHES: 0
DIZZINESS: 1
CHILLS: 0
FEVER: 0

## 2022-11-23 NOTE — CONSULTS
Neurology STROKE CODE H&P  Neurohospitalist Service, [facility]    Referring Physician: Dashawn Saucedo D.O.    STROKE CODE: Right side weakness, slurred speech      To obtain the most accurate data regarding the time called, and time patient seen, refer to the stroke run-sheet and chart.  For time of CT, refer to the radiology report. See A&P below for TPA Decision and door to needle time if and when applicable.    HPI: Cooper Harvey is a 88 year old man with history of stroke in April 2021, s/p R CEA, on xarelto 2.5mg BID therapy, hypertension, presenting with stroke symptoms.  Per report, patient was having lunch with family, and they noted acute onset confusion, difficulty speaking, slurred speech.  On EMS arrival, they noted similar symptoms and possible R side weakness.  SBPs in 130s, FSBS 143. On arrival to [facility], NIHSS 2 as documented below.  Stroke protocol CT revealing no hemorrhage, no large territory stroke, no critical, or large vessel occlusion.  The left cervical carotid artery does appear to have a moderate-to-severe stenosis.  CT perfusion with no large area of delay.  On ROS, he is unable tell me his medications.  His previous stroke in 2021 was in multiple vascular territories.  ECHO with no structural heart disease, and long-term cardiac monitoring was reportedly negative for atrial fibrillation.   He received a R CEA during this hospitalization.  His cardiology placed him on xarelto 2.5mg BID along with his ASA therapy or long-term stroke/cardiovascular prevention.  He denies infectious prodrome or constitutional symptoms.    Review of systems: In addition to what is detailed in the HPI above, all other systems reviewed and are negative.    Past Medical History:    has a past medical history of [place] (hard of hearing) (04/02/2021), Hydrocele, unspecified, Hypertrophy of prostate without urinary obstruction and other lower urinary tract symptoms (LUTS), and Mixed  "hyperlipidemia.    FHx:  family history includes Cancer in his mother.    SHx:   reports that he quit smoking about 32 years ago. He has a 20.00 pack-year smoking history. He has never used smokeless tobacco. He reports current alcohol use. He reports that he does not use drugs.    Allergies:  Allergies   Allergen Reactions    Vicodin [Hydrocodone-Acetaminophen] Rash     rash       Medications:  No current facility-administered medications for this encounter.    Current Outpatient Medications:     rivaroxaban (XARELTO) 2.5 MG tablet, Take 1 Tablet by mouth 2 times a day., Disp: 60 Tablet, Rfl: 0    tamsulosin (FLOMAX) 0.4 MG capsule, Take 1 capsule by mouth at bedtime., Disp: 30 capsule, Rfl: 0    aspirin (ASA) 81 MG Chew Tab chewable tablet, Chew 1 tablet every day., Disp: 100 tablet, Rfl:     acetaminophen (TYLENOL) 325 MG Tab, Take 2 Tablets by mouth every four hours as needed for Mild Pain or Fever (headache)., Disp: 30 tablet, Rfl: 0    atorvastatin (LIPITOR) 40 MG Tab, Take 1 tablet by mouth every evening., Disp: 30 tablet, Rfl: 2    lisinopril (PRINIVIL) 10 MG Tab, Take 1 tablet by mouth every day., Disp: 30 tablet, Rfl: 2    loratadine (CLARITIN) 10 MG Tab, Take 1 tablet by mouth every day., Disp: 30 tablet, Rfl: 2    Physical Examination:    Vitals:    11/23/22 1445 11/23/22 1446 11/23/22 1451   BP: (!) 187/79     Pulse: 67 68    Resp:  18    SpO2: 97% 97%    Weight:   77.6 kg (171 lb)   Height:   1.727 m (5' 8\")         General: Patient is awake and in no acute distress  Eye: Examination of optic disks not indicated at this time given acuity of consult  Neck: There is normal range of motion  CV: Regular rate   Extremities:  Clear, dry, intact, without peripheral edema    NEUROLOGICAL EXAM:     Mental status: Awake, alert and fully oriented  Speech and language: Speech is mildly dysarthric but fluent. The patient is able to name and repeat, and follow commands  Cranial nerve exam: Visual fields are full to " threat.  There is no gaze deviation.  He tracks easily past midline.  Face is symmetric.   Motor exam: There is sustained antigravity with no downward drift in bilateral arms and left leg.  R leg with some bobbing, but not to bed.  Overall bradykinetic.  Action tremor noted in all extremities.  Sensory exam:  Reacts to tactile in all 4 distal extremities, there is no neglect to double stim.  Coordination: No ataxia on bilateral finger-to-nose testing.  Gait: Deferred due to patient preference.    NIHSS: National Institutes of Health Stroke Scale    [0] 1a:Level of Consciousness    0-alert 1-drowsy   2-stupor   3-coma  [0] 1b:LOC Questions                  0-both  1-one      2-neither  [0] 1c:LOC Commands                   0-both  1-one      2-neither  [0] 2: Best Gaze                     0-nl    1-partial  2-forced  [0] 3: Visual Fields                   0-nl    1-partial  2-complete 3-bilat  [0] 4: Facial Paresis                0-nl    1-minor    2-partial  3-full  MOTOR                       0-nl  [0] 5: Right Arm           1-drift  [0] 6: Left Arm             2-some effort vs gravity  [1] 7: Right Leg           3-no effort vs gravity  [0] 8: Left Leg             4-no movement                             x-untestable  [0] 9: Limb Ataxia                    0-abs   1-1_limb   2-2+_limbs       x-untestable  [0] 10:Sensory                        0-nl    1-partial  2-dense  [0] 11:Best Language/Aphasia         0-nl    1-mild/mod 2-severe   3-mute  [1] 12:Dysarthria                     0-nl    1-mild/mod 2-severe       x-untestable  [0] 13:Neglect/Inattention            0-none  1-partial  2-complete  [2] TOTAL    Baseline Modified Marcos Scale (MRS): 2 = Slight disability; unable to perform all previous activities but able to look after own affairs without assistance    Objective Data:    Labs:  Lab Results   Component Value Date/Time    PROTHROMBTM 14.4 04/13/2021 02:43 PM    INR 1.09 04/13/2021 02:43 PM      Lab  Results   Component Value Date/Time    WBC 6.8 04/16/2021 05:17 AM    RBC 4.56 (L) 04/16/2021 05:17 AM    HEMOGLOBIN 14.2 04/16/2021 05:17 AM    HEMATOCRIT 43.1 04/16/2021 05:17 AM    MCV 94.5 04/16/2021 05:17 AM    MCH 31.1 04/16/2021 05:17 AM    MCHC 32.9 (L) 04/16/2021 05:17 AM    MPV 10.9 04/16/2021 05:17 AM    NEUTSPOLYS 68.30 04/16/2021 05:17 AM    LYMPHOCYTES 13.00 (L) 04/16/2021 05:17 AM    MONOCYTES 12.00 04/16/2021 05:17 AM    EOSINOPHILS 5.30 04/16/2021 05:17 AM    BASOPHILS 1.00 04/16/2021 05:17 AM      Lab Results   Component Value Date/Time    SODIUM 135 04/19/2021 05:31 AM    POTASSIUM 4.4 04/19/2021 05:31 AM    CHLORIDE 102 04/19/2021 05:31 AM    CO2 29 04/19/2021 05:31 AM    GLUCOSE 79 04/19/2021 05:31 AM    BUN 29 (H) 04/19/2021 05:31 AM    CREATININE 1.17 04/19/2021 05:31 AM    CREATININE 1.2 10/03/2008 11:30 AM      Lab Results   Component Value Date/Time    CHOLSTRLTOT 109 04/14/2021 02:55 AM    LDL 64 04/14/2021 02:55 AM    HDL 28 (A) 04/14/2021 02:55 AM    TRIGLYCERIDE 84 04/14/2021 02:55 AM       Lab Results   Component Value Date/Time    ALKPHOSPHAT 88 04/16/2021 05:17 AM    ASTSGOT 22 04/16/2021 05:17 AM    ALTSGPT 25 04/16/2021 05:17 AM    TBILIRUBIN 0.3 04/16/2021 05:17 AM        Imaging/Testing:    I interpreted and/or reviewed the patient's neuroimaging    DX-CHEST-PORTABLE (1 VIEW)   Final Result      1.  There are changes of mild vascular congestion.      CT-CTA HEAD WITH & W/O-POST PROCESS   Final Result      CT angiogram of the [place] of [place] within normal limits.      CT-CTA NECK WITH & W/O-POST PROCESSING   Final Result      1.  Previous right-sided carotid endarterectomy without evidence of significant stenosis or occlusion.      2.  Moderate irregular calcific atherosclerotic plaquing of the left common carotid artery bifurcation and proximal left internal carotid artery with stenosis of approximately 50%.      CT-CEREBRAL PERFUSION ANALYSIS   Final Result      1.  Cerebral  blood flow less than 30% which could represent completed infarct = 5 mL. However this is not seen on the T Max greater than 6 seconds so it likely is artifactual      2.  T Max more than 6 seconds = 0 mL.      3.  Please note that the cerebral perfusion was performed on the limited brain tissue around the basal ganglia region. Infarct/ischemia outside the CT perfusion sections can be missed in this study.      CT-HEAD W/O   Final Result      No noncontrast CT evidence of acute intracranial hemorrhage.      Moderate white matter hypodensity is present.  This is a nonspecific finding which usually is found to represent chronic microvascular disease in patient's of this demographic.  Demyelination, age indeterminant ischemia and gliosis are also common    possibilities.      Chronic left basal ganglia and thalamic lacunar infarctions      Age-appropriate atrophy          Assessment and Plan:  Cooper Harvey is a 88 year old man with history of stroke, s/p R CEA, hypertension, hyperlipidemia, presenting with transient speech difficulties and right side weakness.  His neurologic exam is much improved, and he is essentially back at neurologic baseline, thus I do not recommend IV-thrombolytics.  Clinical semiology is consistent with a L hemispheric TIA and I suspect he has symptomatic carotid disease.  Discussed surgical revascularization options with family and patient, and they would like to proceed with carotid artery stenting.    Problem list:   Transient speech difficulties   History of stroke  S/p R CEA  L carotid stenosis  Hypertension  Hyperlipidemia    Recommendations:   - q4h neurochecks/NIHSS, admit to Neuroscience   - continue ASA 81mg daily, stop xarelto   - give plavix 300mg load on 11/24 AM, and continue plavix 75mg daily   - Neuro IR consult, plan for L carotid stent placement on 11/25   - allow for mild permissive HTN in setting of left carotid stenosis- aim for SBP goal 130-180, IV anti-HTN PRN  ok, but avoid hypotension   - stroke labs:  HgbA1c and lipid panel   - continue home atorvastatin 40mg daily for goal LDL < 70   - MRI brain without contrast   - may defer ECHO and long-term cardiac monitoring as less likely cardioembolic etiology   -post-operatively will need ICU-level care for strict BP monitoring (goal -140) to prevent reperfusion injury    - PT/OT/SLP ok   - lovenox SQ for DVT chemoppx ok    Benedict Boss MD  Neurohospitalist, [facility]

## 2022-11-23 NOTE — ED PROVIDER NOTES
ED Provider Note    ED Provider    Means of arrival: Ambulance  History obtained from: EMS and patient  History limited by: Patient has poor memory of recent events    CHIEF COMPLAINT  Chief Complaint   Patient presents with    Possible Stroke     LKW 1300. Pt started experiencing word finding difficulties, slowed speech, and R sided weakness.         HPI  Cooper Harvey is a 88 y.o. male who presents with a history of TIAs, is currently on Xarelto.  While he was getting in trouble speaking, and some right-sided weakness.  EMS was called.  Onset of symptoms was approximately 1 to 1-1/2 hours.    The patient was called as a code stroke, he was seen stat at the charge nurse station via on EMS gurney by myself and stroke neurology.  Initial assessment was completed.  The patient was brought down to CT scan    The patient has no complaints of a headache, he is having difficulty remembering if he took his Xarelto today.  He is oriented to name and date    REVIEW OF SYSTEMS  See HPI for further details. All other systems are negative.     PAST MEDICAL HISTORY   has a past medical history of [place] (hard of hearing) (2021), Hydrocele, unspecified, Hypertrophy of prostate without urinary obstruction and other lower urinary tract symptoms (LUTS), and Mixed hyperlipidemia.    SOCIAL HISTORY  Social History     Tobacco Use    Smoking status: Former     Packs/day: 1.00     Years: 20.00     Pack years: 20.00     Types: Cigarettes     Quit date: 1990     Years since quittin.9    Smokeless tobacco: Never    Tobacco comments:     25 to 30 years a go   Substance and Sexual Activity    Alcohol use: Yes     Comment: slim and none    Drug use: No    Sexual activity: Yes     Partners: Female     Birth control/protection: Post-Menopausal       SURGICAL HISTORY   has a past surgical history that includes hemorrhoidectomy (); plastic surgery (2009); eye surgery ( ; ); and thromboendartectmy  "neck,neck incis (Right, 4/2/2021).    CURRENT MEDICATIONS  Home Medications       Reviewed by Richy Block (Pharmacy Tech) on 11/23/22 at 1631  Med List Status: Complete     Medication Last Dose Status   acetaminophen (TYLENOL) 325 MG Tab UNK Active   aspirin (ASA) 81 MG Chew Tab chewable tablet 11/23/2022 Active   atorvastatin (LIPITOR) 40 MG Tab 11/22/2022 Active   docusate sodium (COLACE) 100 MG Cap > 1 WEEK Active   lisinopril (PRINIVIL) 10 MG Tab 11/23/2022 Active   loratadine (CLARITIN) 10 MG Tab 11/23/2022 Active   Psyllium (METAMUCIL) 28.3 % Powder 11/23/2022 Active   rivaroxaban (XARELTO) 2.5 MG tablet 11/23/2022 Active   tamsulosin (FLOMAX) 0.4 MG capsule 11/22/2022 Active                    ALLERGIES  Allergies   Allergen Reactions    Vicodin [Hydrocodone-Acetaminophen] Rash     rash       PHYSICAL EXAM  VITAL SIGNS: BP (!) 167/78 Comment: will notify RN  Pulse 64   Temp 37.2 °C (98.9 °F) (Temporal)   Resp 16   Ht 1.727 m (5' 8\")   Wt 76.1 kg (167 lb 12.3 oz)   SpO2 96%   BMI 25.51 kg/m²   Constitutional: Alert in no apparent distress.  HENT: No signs of trauma, Mucous membranes are moist   Eyes:  Conjunctiva normal, Non-icteric.   Neck: Normal range of motion, No tenderness, Supple,  Lymphatic: No lymphadenopathy noted.   Cardiovascular: Regular rate and rhythm, no murmurs.   Thorax & Lungs: Normal breath sounds, No respiratory distress, No wheezing, No chest tenderness.   Abdomen: Bowel sounds normal, Soft, No tenderness, No masses, No pulsatile masses. No peritoneal signs.  Skin: Warm, Dry,Normal color  Back: No bony tenderness, No CVA tenderness.   Extremities:No edema, No tenderness, No cyanosis,    Musculoskeletal: Good range of motion in all major joints. No tenderness to palpation or major deformities noted.   Neurologic: Oriented to name and the year, there is a pronator drift to the right lower extremity sensation is intact in all extremities.  There is mild dysarthria    MEDICAL " DECISION MAKING  This is a 88 y.o. male who presents as a code stroke due to his acute sudden onset of symptoms there is concerns for stroke, being on Xarelto bleed is also a consideration.  He has nonlateralizing symptoms and his NIH stroke scale is 2.    The case was discussed with Dr. Jimenez neurology and the patient is in CT scan and CT results are pending.  This time he does not have criteria for tPA, due to NIH stroke scale and clinical findings.  This was discussed with Dr. RICH    DIAGNOSTIC STUDIES / PROCEDURES    EKG      LABS  Results for orders placed or performed during the hospital encounter of 11/23/22   CBC WITH DIFFERENTIAL   Result Value Ref Range    WBC 5.4 4.8 - 10.8 K/uL    RBC 4.62 (L) 4.70 - 6.10 M/uL    Hemoglobin 14.6 14.0 - 18.0 g/dL    Hematocrit 43.2 42.0 - 52.0 %    MCV 93.5 81.4 - 97.8 fL    MCH 31.6 27.0 - 33.0 pg    MCHC 33.8 33.7 - 35.3 g/dL    RDW 45.1 35.9 - 50.0 fL    Platelet Count 173 164 - 446 K/uL    MPV 10.2 9.0 - 12.9 fL    Neutrophils-Polys 77.90 (H) 44.00 - 72.00 %    Lymphocytes 8.60 (L) 22.00 - 41.00 %    Monocytes 9.00 0.00 - 13.40 %    Eosinophils 3.70 0.00 - 6.90 %    Basophils 0.60 0.00 - 1.80 %    Immature Granulocytes 0.20 0.00 - 0.90 %    Nucleated RBC 0.00 /100 WBC    Neutrophils (Absolute) 4.24 1.82 - 7.42 K/uL    Lymphs (Absolute) 0.47 (L) 1.00 - 4.80 K/uL    Monos (Absolute) 0.49 0.00 - 0.85 K/uL    Eos (Absolute) 0.20 0.00 - 0.51 K/uL    Baso (Absolute) 0.03 0.00 - 0.12 K/uL    Immature Granulocytes (abs) 0.01 0.00 - 0.11 K/uL    NRBC (Absolute) 0.00 K/uL   COMP METABOLIC PANEL   Result Value Ref Range    Sodium 137 135 - 145 mmol/L    Potassium 4.5 3.6 - 5.5 mmol/L    Chloride 103 96 - 112 mmol/L    Co2 26 20 - 33 mmol/L    Anion Gap 8.0 7.0 - 16.0    Glucose 104 (H) 65 - 99 mg/dL    Bun 20 8 - 22 mg/dL    Creatinine 1.35 0.50 - 1.40 mg/dL    Calcium 9.1 8.5 - 10.5 mg/dL    AST(SGOT) 20 12 - 45 U/L    ALT(SGPT) 19 2 - 50 U/L    Alkaline Phosphatase 98 30 - 99  U/L    Total Bilirubin 0.4 0.1 - 1.5 mg/dL    Albumin 4.0 3.2 - 4.9 g/dL    Total Protein 6.7 6.0 - 8.2 g/dL    Globulin 2.7 1.9 - 3.5 g/dL    A-G Ratio 1.5 g/dL   PROTHROMBIN TIME   Result Value Ref Range    PT 15.5 (H) 12.0 - 14.6 sec    INR 1.25 (H) 0.87 - 1.13   APTT   Result Value Ref Range    APTT 31.2 24.7 - 36.0 sec   COD (ADULT)   Result Value Ref Range    ABO Grouping Only O     Rh Grouping Only POS     Antibody Screen-Cod NEG    TROPONIN   Result Value Ref Range    Troponin T 18 6 - 19 ng/L   ESTIMATED GFR   Result Value Ref Range    GFR (CKD-EPI) 50 (A) >60 mL/min/1.73 m 2   URINALYSIS    Specimen: Urine   Result Value Ref Range    Color Orange (A)     Character Clear     Specific Gravity 1.037 <1.035    Ph 7.5 5.0 - 8.0    Glucose Negative Negative mg/dL    Ketones Negative Negative mg/dL    Protein Negative Negative mg/dL    Bilirubin Negative Negative    Urobilinogen, Urine 0.2 Negative    Nitrite Negative Negative    Leukocyte Esterase Negative Negative    Occult Blood Large (A) Negative    Micro Urine Req Microscopic    URINE MICROSCOPIC (W/UA)   Result Value Ref Range    WBC 0-2 (A) /hpf    RBC >150 (A) /hpf    Bacteria Negative None /hpf    Epithelial Cells Negative /hpf    Hyaline Cast 0-2 /lpf         RADIOLOGY  DX-CHEST-PORTABLE (1 VIEW)   Final Result      1.  There are changes of mild vascular congestion.      CT-CTA HEAD WITH & W/O-POST PROCESS   Final Result      CT angiogram of the [place] of [place] within normal limits.      CT-CTA NECK WITH & W/O-POST PROCESSING   Final Result      1.  Previous right-sided carotid endarterectomy without evidence of significant stenosis or occlusion.      2.  Moderate irregular calcific atherosclerotic plaquing of the left common carotid artery bifurcation and proximal left internal carotid artery with stenosis of approximately 50%.      CT-CEREBRAL PERFUSION ANALYSIS   Final Result      1.  Cerebral blood flow less than 30% which could represent completed  infarct = 5 mL. However this is not seen on the T Max greater than 6 seconds so it likely is artifactual      2.  T Max more than 6 seconds = 0 mL.      3.  Please note that the cerebral perfusion was performed on the limited brain tissue around the basal ganglia region. Infarct/ischemia outside the CT perfusion sections can be missed in this study.      CT-HEAD W/O   Final Result      No noncontrast CT evidence of acute intracranial hemorrhage.      Moderate white matter hypodensity is present.  This is a nonspecific finding which usually is found to represent chronic microvascular disease in patient's of this demographic.  Demyelination, age indeterminant ischemia and gliosis are also common    possibilities.      Chronic left basal ganglia and thalamic lacunar infarctions      Age-appropriate atrophy      IR-NEURO INTERVENTIONAL CONSULT-IP    (Results Pending)   MR-BRAIN-W/O    (Results Pending)       COURSE  Pertinent Labs & Imaging studies reviewed. (See chart for details)    2:42 PM - Patient seen and examined at bedside. Discussed plan of care,     Patient was brought down to CT scan from from the charge nurse desk.  There is no signs of acute CVA, no signs of cerebral artery occlusions but there is a significant stenosis in the left carotid artery.    This may be the cause for the etiology of his symptoms.  I spoke with  patient is to be admitted to neuroscience, and planned stent placement on Friday.  He is to be n.p.o. on tomorrow night.    Critical care time 30 minutes  Admitted in guarded condition    FINAL IMPRESSION  1. Acute CVA (cerebrovascular accident) (HCC)    2. Stenosis of left carotid artery        The note accurately reflects work and decisions made by me.  Dashawn Saucedo D.O.  11/23/2022  8:55 PM

## 2022-11-23 NOTE — ED TRIAGE NOTES
Chief Complaint   Patient presents with   • Possible Stroke     LKW 1300. Pt started experiencing word finding difficulties, slowed speech, and R sided weakness.       Pt BIB EMS and immediately assessed at charge desk by ERP and neurologist. FSBG 143 for EMS.    Pt to R12 after CT. Report to HERLINDA Roblero.

## 2022-11-24 ENCOUNTER — HOSPITAL ENCOUNTER (OUTPATIENT)
Dept: RADIOLOGY | Facility: MEDICAL CENTER | Age: 87
End: 2022-11-24
Attending: PSYCHIATRY & NEUROLOGY
Payer: MEDICARE

## 2022-11-24 LAB
ALBUMIN SERPL BCP-MCNC: 3.8 G/DL (ref 3.2–4.9)
BUN SERPL-MCNC: 18 MG/DL (ref 8–22)
CALCIUM SERPL-MCNC: 9.1 MG/DL (ref 8.5–10.5)
CHLORIDE SERPL-SCNC: 104 MMOL/L (ref 96–112)
CHOLEST SERPL-MCNC: 107 MG/DL (ref 100–199)
CO2 SERPL-SCNC: 25 MMOL/L (ref 20–33)
CREAT SERPL-MCNC: 1.12 MG/DL (ref 0.5–1.4)
ERYTHROCYTE [DISTWIDTH] IN BLOOD BY AUTOMATED COUNT: 45.1 FL (ref 35.9–50)
GFR SERPLBLD CREATININE-BSD FMLA CKD-EPI: 63 ML/MIN/1.73 M 2
GLUCOSE SERPL-MCNC: 86 MG/DL (ref 65–99)
HCT VFR BLD AUTO: 43.2 % (ref 42–52)
HDLC SERPL-MCNC: 46 MG/DL
HGB BLD-MCNC: 14.7 G/DL (ref 14–18)
LDLC SERPL CALC-MCNC: 44 MG/DL
MCH RBC QN AUTO: 31.7 PG (ref 27–33)
MCHC RBC AUTO-ENTMCNC: 34 G/DL (ref 33.7–35.3)
MCV RBC AUTO: 93.3 FL (ref 81.4–97.8)
PHOSPHATE SERPL-MCNC: 3 MG/DL (ref 2.5–4.5)
PLATELET # BLD AUTO: 179 K/UL (ref 164–446)
PMV BLD AUTO: 10.7 FL (ref 9–12.9)
POTASSIUM SERPL-SCNC: 4.4 MMOL/L (ref 3.6–5.5)
RBC # BLD AUTO: 4.63 M/UL (ref 4.7–6.1)
SODIUM SERPL-SCNC: 138 MMOL/L (ref 135–145)
TRIGL SERPL-MCNC: 83 MG/DL (ref 0–149)
WBC # BLD AUTO: 6.2 K/UL (ref 4.8–10.8)

## 2022-11-24 PROCEDURE — 85027 COMPLETE CBC AUTOMATED: CPT

## 2022-11-24 PROCEDURE — 770020 HCHG ROOM/CARE - TELE (206)

## 2022-11-24 PROCEDURE — 96372 THER/PROPH/DIAG INJ SC/IM: CPT

## 2022-11-24 PROCEDURE — 70551 MRI BRAIN STEM W/O DYE: CPT

## 2022-11-24 PROCEDURE — 92610 EVALUATE SWALLOWING FUNCTION: CPT

## 2022-11-24 PROCEDURE — 700102 HCHG RX REV CODE 250 W/ 637 OVERRIDE(OP): Performed by: HOSPITALIST

## 2022-11-24 PROCEDURE — 99233 SBSQ HOSP IP/OBS HIGH 50: CPT | Performed by: INTERNAL MEDICINE

## 2022-11-24 PROCEDURE — A9270 NON-COVERED ITEM OR SERVICE: HCPCS | Performed by: HOSPITALIST

## 2022-11-24 PROCEDURE — 700102 HCHG RX REV CODE 250 W/ 637 OVERRIDE(OP): Performed by: PSYCHIATRY & NEUROLOGY

## 2022-11-24 PROCEDURE — 80069 RENAL FUNCTION PANEL: CPT

## 2022-11-24 PROCEDURE — 80061 LIPID PANEL: CPT

## 2022-11-24 PROCEDURE — 700111 HCHG RX REV CODE 636 W/ 250 OVERRIDE (IP): Performed by: HOSPITALIST

## 2022-11-24 PROCEDURE — 99233 SBSQ HOSP IP/OBS HIGH 50: CPT | Performed by: PSYCHIATRY & NEUROLOGY

## 2022-11-24 PROCEDURE — A9270 NON-COVERED ITEM OR SERVICE: HCPCS | Performed by: PSYCHIATRY & NEUROLOGY

## 2022-11-24 PROCEDURE — 700105 HCHG RX REV CODE 258: Performed by: HOSPITALIST

## 2022-11-24 PROCEDURE — 700101 HCHG RX REV CODE 250: Performed by: HOSPITALIST

## 2022-11-24 RX ADMIN — TAMSULOSIN HYDROCHLORIDE 0.4 MG: 0.4 CAPSULE ORAL at 20:30

## 2022-11-24 RX ADMIN — HEPARIN SODIUM 5000 UNITS: 5000 INJECTION, SOLUTION INTRAVENOUS; SUBCUTANEOUS at 20:30

## 2022-11-24 RX ADMIN — ATORVASTATIN CALCIUM 40 MG: 40 TABLET, FILM COATED ORAL at 17:44

## 2022-11-24 RX ADMIN — LABETALOL HYDROCHLORIDE 10 MG: 5 INJECTION, SOLUTION INTRAVENOUS at 17:50

## 2022-11-24 RX ADMIN — HEPARIN SODIUM 5000 UNITS: 5000 INJECTION, SOLUTION INTRAVENOUS; SUBCUTANEOUS at 14:45

## 2022-11-24 RX ADMIN — LABETALOL HYDROCHLORIDE 10 MG: 5 INJECTION, SOLUTION INTRAVENOUS at 16:39

## 2022-11-24 RX ADMIN — ASPIRIN 81 MG: 81 TABLET, COATED ORAL at 06:24

## 2022-11-24 RX ADMIN — SODIUM CHLORIDE: 9 INJECTION, SOLUTION INTRAVENOUS at 06:25

## 2022-11-24 RX ADMIN — HEPARIN SODIUM 5000 UNITS: 5000 INJECTION, SOLUTION INTRAVENOUS; SUBCUTANEOUS at 06:24

## 2022-11-24 RX ADMIN — LORATADINE 10 MG: 10 TABLET ORAL at 06:25

## 2022-11-24 RX ADMIN — HEPARIN SODIUM 5000 UNITS: 5000 INJECTION, SOLUTION INTRAVENOUS; SUBCUTANEOUS at 02:00

## 2022-11-24 RX ADMIN — DOCUSATE SODIUM 50 MG AND SENNOSIDES 8.6 MG 2 TABLET: 8.6; 5 TABLET, FILM COATED ORAL at 06:24

## 2022-11-24 RX ADMIN — LISINOPRIL 10 MG: 10 TABLET ORAL at 06:23

## 2022-11-24 RX ADMIN — CLOPIDOGREL BISULFATE 300 MG: 75 TABLET ORAL at 12:45

## 2022-11-24 ASSESSMENT — ENCOUNTER SYMPTOMS
BRUISES/BLEEDS EASILY: 0
BLOOD IN STOOL: 0
BLURRED VISION: 0
BACK PAIN: 0
PALPITATIONS: 0
CHILLS: 0
WHEEZING: 0
FEVER: 0
DIARRHEA: 0
SHORTNESS OF BREATH: 0
DIAPHORESIS: 0
SPUTUM PRODUCTION: 0
VOMITING: 0
COUGH: 0
SORE THROAT: 0
NAUSEA: 0
NECK PAIN: 0
HEADACHES: 0
SEIZURES: 0
FOCAL WEAKNESS: 0
ABDOMINAL PAIN: 0
MYALGIAS: 0
FLANK PAIN: 0
DIZZINESS: 0

## 2022-11-24 ASSESSMENT — LIFESTYLE VARIABLES
ON A TYPICAL DAY WHEN YOU DRINK ALCOHOL HOW MANY DRINKS DO YOU HAVE: 0
ALCOHOL_USE: NO
HAVE YOU EVER FELT YOU SHOULD CUT DOWN ON YOUR DRINKING: NO
TOTAL SCORE: 0
HOW MANY TIMES IN THE PAST YEAR HAVE YOU HAD 5 OR MORE DRINKS IN A DAY: 0
EVER FELT BAD OR GUILTY ABOUT YOUR DRINKING: NO
AVERAGE NUMBER OF DAYS PER WEEK YOU HAVE A DRINK CONTAINING ALCOHOL: 0
TOTAL SCORE: 0
TOTAL SCORE: 0
CONSUMPTION TOTAL: NEGATIVE
EVER HAD A DRINK FIRST THING IN THE MORNING TO STEADY YOUR NERVES TO GET RID OF A HANGOVER: NO
HAVE PEOPLE ANNOYED YOU BY CRITICIZING YOUR DRINKING: NO

## 2022-11-24 ASSESSMENT — PAIN DESCRIPTION - PAIN TYPE: TYPE: ACUTE PAIN

## 2022-11-24 NOTE — ASSESSMENT & PLAN NOTE
Family says stool softeners have not worked at home but Metamucil has  Bowel protocol plus Metamucil

## 2022-11-24 NOTE — ASSESSMENT & PLAN NOTE
Severe and symptomatic with TIA  S/p L carotid stent placement on 11/25  Neurology consulted, on plavix for 8 weeks, continue aspirin, statin

## 2022-11-24 NOTE — DISCHARGE PLANNING
[facility] Acute Rehabilitation Transitional Care Coordination    Referral from: Dr. Catalan    Insurance Provider on Facesheet: M[employer]    Potential Rehab Diagnosis: left carotid stenosis    Chart review indicates patient may have on going medical management and may have therapy needs to possibly meet inpatient rehab facility criteria with the goal of returning to community.    D/C support will need to be verified: Spouse    Physiatry consultation pended per protocol.  Neuro IR consult, plan for L carotid stent placement on 11/25.  Would appreciate TX evals s/p surgical intervention once appropriate.     Thank you for the referral.

## 2022-11-24 NOTE — ASSESSMENT & PLAN NOTE
Red-tinged urine Seen in ED  Patient denies dysuria although he is not a good historian due to speech difficulty  Urinalysis reveals >150 RBC

## 2022-11-24 NOTE — THERAPY
Physical Therapy Contact Note    Patient Name: Cooper Harvey  Age:  88 y.o., Sex:  male  Medical Record #: [id number]  Today's Date: 11/24/2022 11/24/22 0717   Interdisciplinary Plan of Care Collaboration   Collaboration Comments PT consult received.  Per chart review pt pending carotid artery stenting.  Will HOLD and initiate PT eval post-procedure as able/appropriate.     Tanisha Carrillo, PT, DPT

## 2022-11-24 NOTE — CARE PLAN
The patient is Stable - Low risk of patient condition declining or worsening    Shift Goals  Clinical Goals: CVA/TIA R/O  Patient Goals: rest  Family Goals: diagnostic results    Progress made toward(s) clinical / shift goals:    Problem: Optimal Care of the Stroke Patient  Goal: Optimal emergency care for the stroke patient  Outcome: Progressing  Goal: Optimal acute care for the stroke patient  Outcome: Progressing     Problem: Knowledge Deficit - Stroke Education  Goal: Patient's knowledge of stroke and risk factors will improve  Outcome: Progressing     Problem: Psychosocial - Patient Condition  Goal: Patient's ability to verbalize feelings about condition will improve  Outcome: Progressing  Goal: Patient's ability to re-evaluate and adapt role responsibilities will improve  Outcome: Progressing     Problem: Neuro Status  Goal: Neuro status will remain stable or improve  Outcome: Progressing     Problem: Hemodynamic Monitoring  Goal: Patient's hemodynamics, fluid balance and neurologic status will be stable or improve  Outcome: Progressing     Problem: Mobility - Stroke  Goal: Patient's capacity to carry out activities will improve  Outcome: Progressing  Goal: Subluxation will be prevented or improved  Outcome: Progressing     Problem: Self Care  Goal: Patient will have the ability to perform ADLs independently or with assistance (bathe, groom, dress, toilet and feed)  Outcome: Progressing     Problem: Knowledge Deficit - Standard  Goal: Patient and family/care givers will demonstrate understanding of plan of care, disease process/condition, diagnostic tests and medications  Outcome: Progressing     Problem: Fall Risk  Goal: Patient will remain free from falls  Outcome: Progressing       Patient is not progressing towards the following goals:

## 2022-11-24 NOTE — PROGRESS NOTES
Hospital Medicine Daily Progress Note    Date of Service  11/24/2022    Chief Complaint  Cooper Harvey is a 88 y.o. male admitted 11/23/2022 with speech difficulties and right-sided weakness    Hospital Course  88-year-old male with a past medical history of stroke, status post right carotid endarterectomy, hypertension, hyperlipidemia who presented with right-sided weakness and speech difficulties    Interval Problem Update  Patient underwent a CTA head and neck that revealed significant stenosis of left common carotid artery bifurcation and proximal left internal carotid artery.  He underwent an MRI brain that revealed no acute infarct but revealed chronic lacunar infarct in the left thalamus.  Patient is planned to undergo IR guided left carotid stent placement on 11/25.  N.p.o. at midnight.  Patient will need ICU level care postoperatively for strict BP control to prevent reperfusion injury.    I have discussed this patient's plan of care and discharge plan at IDT rounds today with Case Management, Nursing, Nursing leadership, and other members of the IDT team.    Consultants/Specialty  neurology    Code Status  Full Code    Disposition  Patient is not medically cleared for discharge.   Anticipate discharge to to home with close outpatient follow-up.  I have placed the appropriate orders for post-discharge needs.    Review of Systems  Review of Systems   Constitutional:  Negative for chills, diaphoresis and fever.   HENT:  Negative for hearing loss and sore throat.    Eyes:  Negative for blurred vision.   Respiratory:  Negative for cough, sputum production, shortness of breath and wheezing.    Cardiovascular:  Negative for chest pain, palpitations and leg swelling.   Gastrointestinal:  Negative for abdominal pain, blood in stool, diarrhea, nausea and vomiting.   Genitourinary:  Negative for dysuria, flank pain and urgency.   Musculoskeletal:  Negative for back pain, joint pain, myalgias and neck pain.    Skin:  Negative for rash.   Neurological:  Negative for dizziness, focal weakness, seizures and headaches.   Endo/Heme/Allergies:  Does not bruise/bleed easily.   Psychiatric/Behavioral:  Negative for suicidal ideas.    All other systems reviewed and are negative.     Physical Exam  Temp:  [35.9 °C (96.7 °F)-37.2 °C (98.9 °F)] 36.4 °C (97.5 °F)  Pulse:  [] 66  Resp:  [15-94] 16  BP: (145-204)/(65-85) 162/76  SpO2:  [93 %-98 %] 94 %    Physical Exam  Vitals and nursing note reviewed.   Constitutional:       General: He is not in acute distress.     Appearance: Normal appearance.   HENT:      Head: Normocephalic and atraumatic.      Nose: Nose normal.      Mouth/Throat:      Mouth: Mucous membranes are moist.   Eyes:      Extraocular Movements: Extraocular movements intact.      Conjunctiva/sclera: Conjunctivae normal.      Pupils: Pupils are equal, round, and reactive to light.   Cardiovascular:      Rate and Rhythm: Normal rate and regular rhythm.      Pulses: Normal pulses.      Heart sounds: Normal heart sounds.   Pulmonary:      Effort: Pulmonary effort is normal. No respiratory distress.      Breath sounds: Normal breath sounds. No wheezing, rhonchi or rales.   Abdominal:      General: Bowel sounds are normal. There is no distension.      Palpations: Abdomen is soft.      Tenderness: There is no abdominal tenderness.   Musculoskeletal:         General: No swelling or tenderness. Normal range of motion.      Cervical back: Normal range of motion and neck supple.   Lymphadenopathy:      Cervical: No cervical adenopathy.   Skin:     General: Skin is warm.      Coloration: Skin is not jaundiced.      Findings: No rash.   Neurological:      General: No focal deficit present.      Mental Status: He is alert and oriented to person, place, and time.      Cranial Nerves: No cranial nerve deficit.      Motor: No weakness.   Psychiatric:         Mood and Affect: Mood normal.         Behavior: Behavior normal.        Fluids    Intake/Output Summary (Last 24 hours) at 11/24/2022 1359  Last data filed at 11/24/2022 0916  Gross per 24 hour   Intake --   Output 700 ml   Net -700 ml       Laboratory  Recent Labs     11/23/22  1423 11/24/22  0352   WBC 5.4 6.2   RBC 4.62* 4.63*   HEMOGLOBIN 14.6 14.7   HEMATOCRIT 43.2 43.2   MCV 93.5 93.3   MCH 31.6 31.7   MCHC 33.8 34.0   RDW 45.1 45.1   PLATELETCT 173 179   MPV 10.2 10.7     Recent Labs     11/23/22  1423 11/24/22  0352   SODIUM 137 138   POTASSIUM 4.5 4.4   CHLORIDE 103 104   CO2 26 25   GLUCOSE 104* 86   BUN 20 18   CREATININE 1.35 1.12   CALCIUM 9.1 9.1     Recent Labs     11/23/22  1423   APTT 31.2   INR 1.25*         Recent Labs     11/24/22  0352   TRIGLYCERIDE 83   HDL 46   LDL 44       Imaging  MR-BRAIN-W/O   Final Result      1.  No acute infarct.   2.  Chronic punctate microhemorrhage in the left parieto-occipital region.   3.  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 This is stable since the previous MRI dated 4/13/2021. This is noted in the previous CT angiogram dated 4/01/2021.   4.  Chronic lacunar infarct in the left thalamus.   5.  Mild cerebral volume loss.   6.  Mild chronic microvascular ischemic disease.      DX-CHEST-PORTABLE (1 VIEW)   Final Result      1.  There are changes of mild vascular congestion.      CT-CTA HEAD WITH & W/O-POST PROCESS   Final Result      CT angiogram of the [place] [facility] [place] within normal limits.      CT-CTA NECK WITH & W/O-POST PROCESSING   Final Result      1.  Previous right-sided carotid endarterectomy without evidence of significant stenosis or occlusion.      2.  Moderate irregular calcific atherosclerotic plaquing of the left common carotid artery bifurcation and proximal left internal carotid artery with stenosis of approximately 50%.      CT-CEREBRAL PERFUSION ANALYSIS   Final Result      1.  Cerebral blood flow less than 30% which could represent completed infarct = 5 mL. However  this is not seen on the T Max greater than 6 seconds so it likely is artifactual      2.  T Max more than 6 seconds = 0 mL.      3.  Please note that the cerebral perfusion was performed on the limited brain tissue around the basal ganglia region. Infarct/ischemia outside the CT perfusion sections can be missed in this study.      CT-HEAD W/O   Final Result      No noncontrast CT evidence of acute intracranial hemorrhage.      Moderate white matter hypodensity is present.  This is a nonspecific finding which usually is found to represent chronic microvascular disease in patient's of this demographic.  Demyelination, age indeterminant ischemia and gliosis are also common    possibilities.      Chronic left basal ganglia and thalamic lacunar infarctions      Age-appropriate atrophy      IR-NEURO INTERVENTIONAL CONSULT-IP    (Results Pending)        Assessment/Plan  * Left carotid stenosis- (present on admission)  Assessment & Plan  IR consulted to place stent 11/25/2022  Will need to be NPO midnight 11/24  Will need to be transferred to ICU after stent placement for close BP monitoring     Reddish colored urine- (present on admission)  Assessment & Plan  Red-tinged urine Seen in ED  Patient denies dysuria although he is not a good historian due to speech difficulty  Urinalysis reveals >150 RBC  Check KUB ultrasound      Difficulty with speech- (present on admission)  Assessment & Plan  Transient speech difficulty  May be TIA versus stroke  Neurologist Dr. Boss consulted  Stroke protocol  No echo per neurology    Constipation, slow transit- (present on admission)  Assessment & Plan  Family says stool softeners have not worked at home but Metamucil has  Bowel protocol plus Metamucil    History of CVA (cerebrovascular accident)- (present on admission)  Assessment & Plan  Noted in the past  Continue aspirin, plavix and high intensity statin    BPH (benign prostatic hyperplasia)- (present on admission)  Assessment &  Plan  Continue tamsulosin    Mixed hyperlipidemia- (present on admission)  Assessment & Plan  Continue atorvastatin         VTE prophylaxis: enoxaparin ppx    I have performed a physical exam and reviewed and updated ROS and Plan today (11/24/2022). In review of yesterday's note (11/23/2022), there are no changes except as documented above.

## 2022-11-24 NOTE — ED NOTES
Med Rec complete per patient's family @ bedside  No oral antibiotics in the last 30 days  Allergies reviewed

## 2022-11-24 NOTE — H&P
Hospital Medicine History & Physical Note    Date of Service  11/23/2022    Primary Care Physician  AYDIN Casas.    Consultants  Neurologist Dr. Boss    Code Status  Full code per patient with family present    Chief Complaint  Chief Complaint   Patient presents with    Possible Stroke     LKW 1300. Pt started experiencing word finding difficulties, slowed speech, and R sided weakness.          History of Presenting Illness  88 y.o. male who presented 11/23/2022 with transient speech difficulty.  Patient has a history of TIAs as well as right endarterectomy and currently takes Xarelto.  Around 1300 hrs., patient was fine.  Then, patient started having word finding difficulties as well as slurred speech as well as some right-sided weakness.  His family brought him in and ER treated him as a code stroke.  He was taken to CT scan which showed only new left 50% heterogenous stenosis.  Neurology evaluated him and recommended carotid stent placement on 11/25.  In the meantime, hospitalist was consulted for admission for mild permissive hypertension and medical management.    I discussed the plan of care with patient and family and ER physician .    Review of Systems  Review of Systems   Reason unable to perform ROS: Unable to fully assess due to speech difficulty.   Constitutional:  Negative for chills and fever.   Respiratory:  Positive for cough (chronic). Negative for shortness of breath and wheezing.    Cardiovascular:  Negative for chest pain.   Gastrointestinal:  Positive for constipation. Negative for diarrhea, nausea and vomiting.   Genitourinary:  Negative for dysuria.   Neurological:  Positive for dizziness (yest after standing up too fast). Negative for headaches.   all other systems reviewed and are negative    Past Medical History   has a past medical history of [place] (hard of hearing) (04/02/2021), Hydrocele, unspecified, Hypertrophy of prostate without urinary obstruction and other lower urinary tract  "symptoms (LUTS), and Mixed hyperlipidemia.    Surgical History   has a past surgical history that includes hemorrhoidectomy (2004); plastic surgery (February 2009); eye surgery (6/05 ; 7/05); and pr thromboendartectmy neck,neck incis (Right, 4/2/2021).    Family History  family history includes Cancer in his mother.    Social History   reports that he quit smoking about 32 years ago. He has a 20.00 pack-year smoking history. He has never used smokeless tobacco. He reports current alcohol use. He reports that he does not use drugs.    Allergies  Allergies   Allergen Reactions    Vicodin [Hydrocodone-Acetaminophen] Rash     rash       Medications  No current facility-administered medications on file prior to encounter.     Current Outpatient Medications on File Prior to Encounter   Medication Sig Dispense Refill    docusate sodium (COLACE) 100 MG Cap Take 100 mg by mouth 2 times a day.      Psyllium (METAMUCIL) 28.3 % Powder Take 5 mL by mouth 2 times a day.      tamsulosin (FLOMAX) 0.4 MG capsule Take 1 capsule by mouth at bedtime. 30 capsule 0    aspirin (ASA) 81 MG Chew Tab chewable tablet Chew 1 tablet every day. 100 tablet     acetaminophen (TYLENOL) 325 MG Tab Take 2 Tablets by mouth every four hours as needed for Mild Pain or Fever (headache). 30 tablet 0    atorvastatin (LIPITOR) 40 MG Tab Take 1 tablet by mouth every evening. 30 tablet 2    lisinopril (PRINIVIL) 10 MG Tab Take 1 tablet by mouth every day. 30 tablet 2    loratadine (CLARITIN) 10 MG Tab Take 1 tablet by mouth every day. 30 tablet 2       Physical Exam  Weight/BMI: Body mass index is 26 kg/m².  BP (!) 167/79   Pulse 69   Resp (!) 28   Ht 1.727 m (5' 8\")   Wt 77.6 kg (171 lb)   SpO2 96%    Vitals:    11/23/22 1446 11/23/22 1451 11/23/22 1500 11/23/22 1540   BP:   (!) 204/85 (!) 167/79   Pulse: 68  79 69   Resp: 18  (!) 22 (!) 28   SpO2: 97%  98% 96%   Weight:  77.6 kg (171 lb)     Height:  1.727 m (5' 8\")      Oxygen Therapy:  Pulse " "Oximetry: 96 %, O2 (LPM): 0, O2 Delivery Device: None - Room Air  Physical Exam  Constitutional:       General: He is not in acute distress.     Appearance: He is well-developed. He is not diaphoretic.   HENT:      Head: Normocephalic and atraumatic.      Right Ear: External ear normal.      Left Ear: External ear normal.      Mouth/Throat:      Pharynx: No oropharyngeal exudate or posterior oropharyngeal erythema.   Eyes:      General:         Right eye: No discharge.         Left eye: No discharge.      Extraocular Movements: Extraocular movements intact.   Cardiovascular:      Rate and Rhythm: Normal rate.      Heart sounds:     No gallop.   Pulmonary:      Effort: No respiratory distress.      Breath sounds: No wheezing.   Abdominal:      General: There is no distension.      Tenderness: There is no abdominal tenderness. There is no guarding.   Skin:     General: Skin is warm.   Neurological:      Mental Status: He is alert. Mental status is at baseline.      Motor: No weakness.      Comments: 5 out of 5 strength upper and lower extremities bilaterally. Sensation intact to light touch distally all 4 extremities and face. Able to spell the word \"world\" forwards but not backwards. Finger-to-nose touch normal bilaterally. CN II through XII grossly normal  Some speech difficulty   Psychiatric:      Comments: Unable to fully assess due to speech difficulty       Laboratory:   Objective   Recent Results (from the past 24 hour(s))   CBC WITH DIFFERENTIAL    Collection Time: 11/23/22  2:23 PM   Result Value Ref Range    WBC 5.4 4.8 - 10.8 K/uL    RBC 4.62 (L) 4.70 - 6.10 M/uL    Hemoglobin 14.6 14.0 - 18.0 g/dL    Hematocrit 43.2 42.0 - 52.0 %    MCV 93.5 81.4 - 97.8 fL    MCH 31.6 27.0 - 33.0 pg    MCHC 33.8 33.7 - 35.3 g/dL    RDW 45.1 35.9 - 50.0 fL    Platelet Count 173 164 - 446 K/uL    MPV 10.2 9.0 - 12.9 fL    Neutrophils-Polys 77.90 (H) 44.00 - 72.00 %    Lymphocytes 8.60 (L) 22.00 - 41.00 %    Monocytes 9.00 " 0.00 - 13.40 %    Eosinophils 3.70 0.00 - 6.90 %    Basophils 0.60 0.00 - 1.80 %    Immature Granulocytes 0.20 0.00 - 0.90 %    Nucleated RBC 0.00 /100 WBC    Neutrophils (Absolute) 4.24 1.82 - 7.42 K/uL    Lymphs (Absolute) 0.47 (L) 1.00 - 4.80 K/uL    Monos (Absolute) 0.49 0.00 - 0.85 K/uL    Eos (Absolute) 0.20 0.00 - 0.51 K/uL    Baso (Absolute) 0.03 0.00 - 0.12 K/uL    Immature Granulocytes (abs) 0.01 0.00 - 0.11 K/uL    NRBC (Absolute) 0.00 K/uL   COMP METABOLIC PANEL    Collection Time: 11/23/22  2:23 PM   Result Value Ref Range    Sodium 137 135 - 145 mmol/L    Potassium 4.5 3.6 - 5.5 mmol/L    Chloride 103 96 - 112 mmol/L    Co2 26 20 - 33 mmol/L    Anion Gap 8.0 7.0 - 16.0    Glucose 104 (H) 65 - 99 mg/dL    Bun 20 8 - 22 mg/dL    Creatinine 1.35 0.50 - 1.40 mg/dL    Calcium 9.1 8.5 - 10.5 mg/dL    AST(SGOT) 20 12 - 45 U/L    ALT(SGPT) 19 2 - 50 U/L    Alkaline Phosphatase 98 30 - 99 U/L    Total Bilirubin 0.4 0.1 - 1.5 mg/dL    Albumin 4.0 3.2 - 4.9 g/dL    Total Protein 6.7 6.0 - 8.2 g/dL    Globulin 2.7 1.9 - 3.5 g/dL    A-G Ratio 1.5 g/dL   PROTHROMBIN TIME    Collection Time: 11/23/22  2:23 PM   Result Value Ref Range    PT 15.5 (H) 12.0 - 14.6 sec    INR 1.25 (H) 0.87 - 1.13   APTT    Collection Time: 11/23/22  2:23 PM   Result Value Ref Range    APTT 31.2 24.7 - 36.0 sec   COD (ADULT)    Collection Time: 11/23/22  2:23 PM   Result Value Ref Range    ABO Grouping Only O     Rh Grouping Only POS     Antibody Screen-Cod NEG    TROPONIN    Collection Time: 11/23/22  2:23 PM   Result Value Ref Range    Troponin T 18 6 - 19 ng/L   ESTIMATED GFR    Collection Time: 11/23/22  2:23 PM   Result Value Ref Range    GFR (CKD-EPI) 50 (A) >60 mL/min/1.73 m 2       (click the triangle to expand results)    Imaging:  DX-CHEST-PORTABLE (1 VIEW)   Final Result      1.  There are changes of mild vascular congestion.      CT-CTA HEAD WITH & W/O-POST PROCESS   Final Result      CT angiogram of the [place] [facility] [place] within  normal limits.      CT-CTA NECK WITH & W/O-POST PROCESSING   Final Result      1.  Previous right-sided carotid endarterectomy without evidence of significant stenosis or occlusion.      2.  Moderate irregular calcific atherosclerotic plaquing of the left common carotid artery bifurcation and proximal left internal carotid artery with stenosis of approximately 50%.      CT-CEREBRAL PERFUSION ANALYSIS   Final Result      1.  Cerebral blood flow less than 30% which could represent completed infarct = 5 mL. However this is not seen on the T Max greater than 6 seconds so it likely is artifactual      2.  T Max more than 6 seconds = 0 mL.      3.  Please note that the cerebral perfusion was performed on the limited brain tissue around the basal ganglia region. Infarct/ischemia outside the CT perfusion sections can be missed in this study.      CT-HEAD W/O   Final Result      No noncontrast CT evidence of acute intracranial hemorrhage.      Moderate white matter hypodensity is present.  This is a nonspecific finding which usually is found to represent chronic microvascular disease in patient's of this demographic.  Demyelination, age indeterminant ischemia and gliosis are also common    possibilities.      Chronic left basal ganglia and thalamic lacunar infarctions      Age-appropriate atrophy      IR-NEURO INTERVENTIONAL CONSULT-IP    (Results Pending)   MR-BRAIN-W/O    (Results Pending)       EKG:   per my independant read:  QTc: 394, HR: 66, Normal Sinus Rhythm, no ST/T changes    Assessment/Plan:  I anticipate this patient is appropriate for observation status at this time because of TIA work-up pending MRI after discussion with case management. After patient gets endarterectomy he will require ICU transfer and monitoring which will likely take greater than 48 hours.    * Left carotid stenosis- (present on admission)  Assessment & Plan  IR consulted to place stent 11/25/2022  Will need to be NPO midnight 11/24  Will  need to be transferred to ICU after stent placement for close BP monitoring     Reddish colored urine- (present on admission)  Assessment & Plan  Red-tinged urine Seen in ED  Patient denies dysuria although he is not a good historian due to speech difficulty  Urinalysis pending    Difficulty with speech- (present on admission)  Assessment & Plan  Transient speech difficulty  May be TIA versus stroke  Neurologist Dr. Boss consulted  Stroke protocol  No echo per neurology    Constipation, slow transit- (present on admission)  Assessment & Plan  Family says stool softeners have not worked at home but Metamucil has  Bowel protocol plus Metamucil    History of CVA (cerebrovascular accident)- (present on admission)  Assessment & Plan  Noted in the past    BPH (benign prostatic hyperplasia)- (present on admission)  Assessment & Plan  Continue tamsulosin    Mixed hyperlipidemia- (present on admission)  Assessment & Plan  Continue atorvastatin  Lipid panel PENDING      VTE prophylaxis:  sc heparin

## 2022-11-24 NOTE — PROGRESS NOTES
4 Eyes Skin Assessment Completed by HERLINDA Bustillo and HERLINDA Ruiz.    Head WDL  Ears WDL  Nose WDL  Mouth WDL  Neck WDL  Breast/Chest WDL  Shoulder Blades WDL  Spine WDL  (R) Arm/Elbow/Hand WDL  (L) Arm/Elbow/Hand WDL  Abdomen WDL  Groin WDL  Scrotum/Coccyx/Buttocks WDL  (R) Leg WDL  (L) Leg WDL  (R) Heel/Foot/Toe WDL  (L) Heel/Foot/Toe WDL          Devices In Places Tele Box, Blood Pressure Cuff, and Pulse Ox      Interventions In Place Pillows    Possible Skin Injury No    Pictures Uploaded Into Epic N/A  Wound Consult Placed N/A  RN Wound Prevention Protocol Ordered No

## 2022-11-24 NOTE — PROGRESS NOTES
Neurology Progress Note  Neurohospitalist Service, [facility]    Referring Physician: Josesito Catalan M.D.      Interval History: No acute events overnight.  At neurologic baseline per family.  MRI brain completed, with no acute stroke burden.    Review of systems: In addition to what is detailed in the HPI and/or updated in the interval history, all other systems reviewed and are negative.    Past Medical History, Past Surgical History and Social History reviewed and unchanged from prior    Medications:    Current Facility-Administered Medications:     clopidogrel (PLAVIX) tablet 300 mg, 300 mg, Oral, Once, Benedict Boss M.D.    aspirin EC (ECOTRIN) tablet 81 mg, 81 mg, Oral, DAILY, Benedict Boss M.D., 81 mg at 11/24/22 0624    [START ON 11/25/2022] clopidogrel (PLAVIX) tablet 75 mg, 75 mg, Oral, DAILY, Benedict Boss M.D.    tamsulosin (FLOMAX) capsule 0.4 mg, 0.4 mg, Oral, QHS, Josesito Catalan M.D.    psyllium (METAMUCIL/KONSYL) 1 Packet, 1 Packet, Oral, BID, Josesito Catalan M.D.    loratadine (CLARITIN) tablet 10 mg, 10 mg, Oral, DAILY, Josesito Catalan M.D., 10 mg at 11/24/22 0625    lisinopril (PRINIVIL) tablet 10 mg, 10 mg, Oral, DAILY, Josesito Catalan M.D., 10 mg at 11/24/22 0623    atorvastatin (LIPITOR) tablet 40 mg, 40 mg, Oral, Q EVENING, Josesito Catalan M.D.    senna-docusate (PERICOLACE or SENOKOT S) 8.6-50 MG per tablet 2 Tablet, 2 Tablet, Oral, BID, 2 Tablet at 11/24/22 0624 **AND** polyethylene glycol/lytes (MIRALAX) PACKET 1 Packet, 1 Packet, Oral, QDAY PRN **AND** magnesium hydroxide (MILK OF MAGNESIA) suspension 30 mL, 30 mL, Oral, QDAY PRN **AND** bisacodyl (DULCOLAX) suppository 10 mg, 10 mg, Rectal, QDAY PRN, Josesito Catalan M.D.    NS infusion, , Intravenous, Continuous, Josesito Catalan M.D., Last Rate: 75 mL/hr at 11/24/22 0625, New Bag at 11/24/22 0625    heparin injection 5,000 Units, 5,000 Units, Subcutaneous, Q8HRS, Josesito Catalan M.D., 5,000 Units at 11/24/22 0624    acetaminophen (Tylenol) tablet  "650 mg, 650 mg, Oral, Q6HRS PRN, Josesito Catalan M.D.    ondansetron (ZOFRAN) syringe/vial injection 4 mg, 4 mg, Intravenous, Q4HRS PRN, Josesito Catalan M.D.    ondansetron (ZOFRAN ODT) dispertab 4 mg, 4 mg, Oral, Q4HRS PRN, Josesito Catalan M.D.    labetalol (NORMODYNE/TRANDATE) injection 10 mg, 10 mg, Intravenous, Q4HRS PRN, Josesito Catalan M.D.    Physical Examination:   BP (!) 188/78 Comment: Will notify RN  Pulse 61   Temp 36.6 °C (97.8 °F) (Temporal)   Resp 16   Ht 1.727 m (5' 8\")   Wt 76.1 kg (167 lb 12.3 oz)   SpO2 95%   BMI 25.51 kg/m²       General: Patient is awake and in no acute distress  Neck: There is normal range of motion  CV: Regular rate   Extremities:  Warm, dry, and intact, without peripheral lower extremity edema    NEUROLOGICAL EXAM:     Mental status: Awake, alert and fully oriented  Speech and language: Speech is mildly dysarthric but fluent. The patient is able to name and repeat, and follow commands  Cranial nerve exam: Visual fields are full to threat.  There is no gaze deviation.  He tracks easily past midline.  Face is symmetric.   Motor exam: There is sustained antigravity with no downward drift in bilateral arms and legs.  Overall bradykinetic.  Action tremor noted in all extremities.  Sensory exam:  Reacts to tactile in all 4 distal extremities, there is no neglect to double stim.  Coordination: No ataxia on bilateral finger-to-nose testing.  Gait: Deferred due to patient preference.     Objective Data:    Labs:  Lab Results   Component Value Date/Time    PROTHROMBTM 15.5 (H) 11/23/2022 02:23 PM    INR 1.25 (H) 11/23/2022 02:23 PM      Lab Results   Component Value Date/Time    WBC 6.2 11/24/2022 03:52 AM    RBC 4.63 (L) 11/24/2022 03:52 AM    HEMOGLOBIN 14.7 11/24/2022 03:52 AM    HEMATOCRIT 43.2 11/24/2022 03:52 AM    MCV 93.3 11/24/2022 03:52 AM    MCH 31.7 11/24/2022 03:52 AM    MCHC 34.0 11/24/2022 03:52 AM    MPV 10.7 11/24/2022 03:52 AM    NEUTSPOLYS 77.90 (H) 11/23/2022 02:23 PM    " LYMPHOCYTES 8.60 (L) 11/23/2022 02:23 PM    MONOCYTES 9.00 11/23/2022 02:23 PM    EOSINOPHILS 3.70 11/23/2022 02:23 PM    BASOPHILS 0.60 11/23/2022 02:23 PM      Lab Results   Component Value Date/Time    SODIUM 138 11/24/2022 03:52 AM    POTASSIUM 4.4 11/24/2022 03:52 AM    CHLORIDE 104 11/24/2022 03:52 AM    CO2 25 11/24/2022 03:52 AM    GLUCOSE 86 11/24/2022 03:52 AM    BUN 18 11/24/2022 03:52 AM    CREATININE 1.12 11/24/2022 03:52 AM    CREATININE 1.2 10/03/2008 11:30 AM      Lab Results   Component Value Date/Time    CHOLSTRLTOT 107 11/24/2022 03:52 AM    LDL 44 11/24/2022 03:52 AM    HDL 46 11/24/2022 03:52 AM    TRIGLYCERIDE 83 11/24/2022 03:52 AM       Lab Results   Component Value Date/Time    ALKPHOSPHAT 98 11/23/2022 02:23 PM    ASTSGOT 20 11/23/2022 02:23 PM    ALTSGPT 19 11/23/2022 02:23 PM    TBILIRUBIN 0.4 11/23/2022 02:23 PM        Imaging/Testing:    I interpreted and/or reviewed the patient's neuroimaging    DX-CHEST-PORTABLE (1 VIEW)   Final Result      1.  There are changes of mild vascular congestion.      CT-CTA HEAD WITH & W/O-POST PROCESS   Final Result      CT angiogram of the [place] within normal limits.      CT-CTA NECK WITH & W/O-POST PROCESSING   Final Result      1.  Previous right-sided carotid endarterectomy without evidence of significant stenosis or occlusion.      2.  Moderate irregular calcific atherosclerotic plaquing of the left common carotid artery bifurcation and proximal left internal carotid artery with stenosis of approximately 50%.      CT-CEREBRAL PERFUSION ANALYSIS   Final Result      1.  Cerebral blood flow less than 30% which could represent completed infarct = 5 mL. However this is not seen on the T Max greater than 6 seconds so it likely is artifactual      2.  T Max more than 6 seconds = 0 mL.      3.  Please note that the cerebral perfusion was performed on the limited brain tissue around the basal ganglia region. Infarct/ischemia outside the CT  perfusion sections can be missed in this study.      CT-HEAD W/O   Final Result      No noncontrast CT evidence of acute intracranial hemorrhage.      Moderate white matter hypodensity is present.  This is a nonspecific finding which usually is found to represent chronic microvascular disease in patient's of this demographic.  Demyelination, age indeterminant ischemia and gliosis are also common    possibilities.      Chronic left basal ganglia and thalamic lacunar infarctions      Age-appropriate atrophy      IR-NEURO INTERVENTIONAL CONSULT-IP    (Results Pending)   MR-BRAIN-W/O    (Results Pending)       Assessment and Plan:  Cooper Harvey is a 88 year old man with history of stroke, s/p R CEA, hypertension, hyperlipidemia, presenting with transient speech difficulties and right side weakness.  Semiology consistent with L hemispheric TIA,  and I suspect he has symptomatic carotid disease.  Discussed surgical revascularization options with family and patient, and they would like to proceed with carotid artery stenting, now scheduled for 11/25     Problem list:   Transient speech difficulties   History of stroke  S/p R CEA  L carotid stenosis  Hypertension  Hyperlipidemia     Recommendations:              - q4h neurochecks/NIHSS              - continue ASA 81mg daily              - plavix 300mg load today, then will continue plavix 75mg daily              - Neuro IR consult, plan for L carotid stent placement on 11/25              - allow for mild permissive HTN in setting of left carotid stenosis- aim for SBP goal 130-180, IV anti-HTN PRN ok, but avoid hypotension              - stroke labs:  HgbA1c 5.3 and LDL 44              - continue home atorvastatin 40mg daily for goal LDL < 70              - may defer ECHO and long-term cardiac monitoring as less likely cardioembolic etiology              -post-operatively will need ICU-level care for strict BP monitoring (goal -140) to prevent reperfusion  injury               - PT/OT/SLP ok              - lovenox SQ for DVT chemoppx ok     The evaluation of the patient, and recommended management, was discussed with the attending hospitalist. I have performed a physical exam and reviewed and updated ROS and Plan today (11/24/2022).     Benedict Boss MD  Neurohospitalist, Acute Care Services

## 2022-11-24 NOTE — THERAPY
"Speech Language Pathology   Clinical Swallow Evaluation     Patient Name: Cooper Harvey  AGE:  88 y.o., SEX:  male  Medical Record #: [id number]  Today's Date: 11/24/2022     Precautions  Precautions: Fall Risk, Swallow Precautions ( See Comments)    Assessment  Cooper Harvey is a 88 year old man with history of stroke, s/p R CEA, hypertension, hyperlipidemia, presenting with transient speech difficulties and right side weakness. Per family report the patient was eating with his son and son's partner when he stopped communicated and slumped over on the table but appeared almost back to baseline once paramedics arrived. Semiology consistent with L hemispheric TIA,  and I suspect he has symptomatic carotid disease.  carotid artery stenting scheduled for 11/25    Per Neurology notes \"MRI brain completed, with no acute stroke burden.\"    Clinical swallow evaluation was ordered given reports of slurred speech which would conclude the RN dysphagia screen.      Level of Consciousness: Alert  Affect/Behavior: Appropriate  Follows Directives:  yes. Difficulty hearing and needs directions repeated.  Orientation: Oriented x 4  Hearing:  impaired both ears, hearing aids at home  Vision: Functional vision          Oral Mechanism Evaluation  Facial Symmetry: Equal  Facial Sensation: Equal  Labial Observations: WFL  Lingual Observations: Midline  Dentition: Edentulous, Upper denture, Lower denture  Comments:      Voice  Quality: WFL  Resonance: WFL  Intensity: Appropriate  Cough: WFL  Comments:      Current Method of Nutrition   NPO until cleared by speech pathology      Subjective  Calm and motivated to work with staff. Looking forward to getting back home.       Assessment  Positioning: Pineda's (60-90 degrees)  Bolus Administration: SLP and Patient  Oxygen Requirements: Room Air  Factor(s) Affecting Performance:  upper extremity shakiness. Baseline per family report. Occasional (x3) discordination feeding self  " but may be due to finger pulse ox on dominant hand (RN aware and to assess).    Swallowing Trials  Ice: WFL  Thin Liquid (TN0): WFL  Liquidised (LQ3): WFL  Pureed (PU4): WFL  Soft & Bite-Sized (SB6): WFL  Regular (RG7): WFL    Comments: Patient used a straw without change in swallow function but does not like to use them and does not use them at baseline.         Clinical Impressions  This patient does not present with dysphagia at this time. He may have some difficulty with self feeding (RN to further assess after removing pulse ox from dominant hand.) SLP to follow up post stent placement if patient's function changes.       Recommendations  1.  Regular, thins  2.  Instrumentation: None indicated at this time  3.  Swallowing Instructions & Precautions:   Supervision: Assist with meal tray set up  Positioning: Fully upright and midline during oral intake, Meals sitting upright in a chair, as tolerated  Medication: As tolerated  Strategies: No straws (per pt preference). Cups with a lid would be beneficial given his upper extremity shakiness.  Oral Care: Q4h      Plan    Recommend Speech Therapy 2 times per week for 2 visits if needed post procedure, for the following treatments:  Dysphagia Training and Patient / Family / Caregiver Education.    Discharge Recommendations: Anticipate that the patient will have no further speech therapy needs after discharge from the hospital       Objective       11/24/22 1255   Prior Living Situation   Prior Services None   Lives with - Patient's Self Care Capacity Spouse   Comments good family support with children close-by   Prior Level Of Function   Communication Within Functional Limits   Swallow Within Functional Limits   Dentition Edentulous   Dentures Uppers;Lowers   Hearing Impaired Both Ears   Hearing Aid Right;Left  (not at bedside)   Vision Within Functional Limits for Evaluation   Patient's Primary Language English   Dysphagia Rating   Nutritional Liquid Intake Rating  Scale Non thickened beverages   Nutritional Food Intake Rating Scale Total oral diet with no restrictions   Patient / Family Goals   Patient / Family Goal #1 to have some food today   Short Term Goals   Short Term Goal # 1 The patient will consume regular texture, thin liquids, independently without signs of dysphagia      Detail Level: Zone

## 2022-11-24 NOTE — ED NOTES
Hospitalist at bedside.   ACL (anterior cruciate ligament) tear  reconstruction 5/97  Admission for breast augmentation  2015  Carpal tunnel syndrome  1983 1985  H/O abdominal hysterectomy    H/O abdominoplasty  12/98  H/O hemorrhoidectomy  2006  S/P tonsillectomy  1982

## 2022-11-24 NOTE — THERAPY
Missed Therapy     Patient Name: Cooper Harvey  Age:  88 y.o., Sex:  male  Medical Record #: [id number]  Today's Date: 11/24/2022 11/24/22 0725   Interdisciplinary Plan of Care Collaboration   IDT Collaboration with  Other (See Comments)  (EMR)   Collaboration Comments Clinical swallow evaluation received and acknowledged. .  Per chart review pt pending carotid artery stenting. Will hold swallow evaluation and complete eval as appropriate.

## 2022-11-24 NOTE — ASSESSMENT & PLAN NOTE
Resolved  Due to TIA attributed to Left carotid artery stenosisTransient speech difficulty  Stroke due to L carotid artery stenosis, s/p L carotid stent

## 2022-11-25 ENCOUNTER — APPOINTMENT (OUTPATIENT)
Dept: RADIOLOGY | Facility: MEDICAL CENTER | Age: 87
DRG: 034 | End: 2022-11-25
Attending: PSYCHIATRY & NEUROLOGY
Payer: MEDICARE

## 2022-11-25 PROBLEM — I65.22 CAROTID STENOSIS, LEFT: Status: ACTIVE | Noted: 2022-11-25

## 2022-11-25 PROCEDURE — 700111 HCHG RX REV CODE 636 W/ 250 OVERRIDE (IP): Performed by: HOSPITALIST

## 2022-11-25 PROCEDURE — 700117 HCHG RX CONTRAST REV CODE 255: Performed by: RADIOLOGY

## 2022-11-25 PROCEDURE — 99232 SBSQ HOSP IP/OBS MODERATE 35: CPT | Performed by: PSYCHIATRY & NEUROLOGY

## 2022-11-25 PROCEDURE — A9270 NON-COVERED ITEM OR SERVICE: HCPCS | Performed by: HOSPITALIST

## 2022-11-25 PROCEDURE — 700101 HCHG RX REV CODE 250: Performed by: NURSE PRACTITIONER

## 2022-11-25 PROCEDURE — 99291 CRITICAL CARE FIRST HOUR: CPT | Performed by: NURSE PRACTITIONER

## 2022-11-25 PROCEDURE — 700102 HCHG RX REV CODE 250 W/ 637 OVERRIDE(OP): Performed by: NURSE PRACTITIONER

## 2022-11-25 PROCEDURE — 770022 HCHG ROOM/CARE - ICU (200)

## 2022-11-25 PROCEDURE — 700111 HCHG RX REV CODE 636 W/ 250 OVERRIDE (IP)

## 2022-11-25 PROCEDURE — A9270 NON-COVERED ITEM OR SERVICE: HCPCS | Performed by: NURSE PRACTITIONER

## 2022-11-25 PROCEDURE — 700102 HCHG RX REV CODE 250 W/ 637 OVERRIDE(OP): Performed by: HOSPITALIST

## 2022-11-25 PROCEDURE — 700105 HCHG RX REV CODE 258: Performed by: RADIOLOGY

## 2022-11-25 PROCEDURE — 700111 HCHG RX REV CODE 636 W/ 250 OVERRIDE (IP): Performed by: RADIOLOGY

## 2022-11-25 PROCEDURE — 700105 HCHG RX REV CODE 258: Performed by: HOSPITALIST

## 2022-11-25 PROCEDURE — A9270 NON-COVERED ITEM OR SERVICE: HCPCS | Performed by: PSYCHIATRY & NEUROLOGY

## 2022-11-25 PROCEDURE — 700105 HCHG RX REV CODE 258: Performed by: NURSE PRACTITIONER

## 2022-11-25 PROCEDURE — 93005 ELECTROCARDIOGRAM TRACING: CPT | Performed by: NURSE PRACTITIONER

## 2022-11-25 PROCEDURE — 700101 HCHG RX REV CODE 250: Performed by: RADIOLOGY

## 2022-11-25 PROCEDURE — 99153 MOD SED SAME PHYS/QHP EA: CPT

## 2022-11-25 PROCEDURE — 037L3DZ DILATION OF LEFT INTERNAL CAROTID ARTERY WITH INTRALUMINAL DEVICE, PERCUTANEOUS APPROACH: ICD-10-PCS | Performed by: RADIOLOGY

## 2022-11-25 PROCEDURE — 700102 HCHG RX REV CODE 250 W/ 637 OVERRIDE(OP): Performed by: PSYCHIATRY & NEUROLOGY

## 2022-11-25 RX ORDER — ONDANSETRON 2 MG/ML
4 INJECTION INTRAMUSCULAR; INTRAVENOUS PRN
Status: DISCONTINUED | OUTPATIENT
Start: 2022-11-25 | End: 2022-11-25 | Stop reason: HOSPADM

## 2022-11-25 RX ORDER — MIDAZOLAM HYDROCHLORIDE 1 MG/ML
INJECTION INTRAMUSCULAR; INTRAVENOUS
Status: COMPLETED
Start: 2022-11-25 | End: 2022-11-25

## 2022-11-25 RX ORDER — QUETIAPINE FUMARATE 25 MG/1
25 TABLET, FILM COATED ORAL 2 TIMES DAILY
Status: DISCONTINUED | OUTPATIENT
Start: 2022-11-25 | End: 2022-11-26

## 2022-11-25 RX ORDER — IODIXANOL 270 MG/ML
103 INJECTION, SOLUTION INTRAVASCULAR ONCE
Status: COMPLETED | OUTPATIENT
Start: 2022-11-25 | End: 2022-11-25

## 2022-11-25 RX ORDER — LABETALOL HYDROCHLORIDE 5 MG/ML
10-20 INJECTION, SOLUTION INTRAVENOUS EVERY 4 HOURS PRN
Status: DISCONTINUED | OUTPATIENT
Start: 2022-11-25 | End: 2022-11-29

## 2022-11-25 RX ORDER — PHENYLEPHRINE HCL IN 0.9% NACL 0.5 MG/5ML
SYRINGE (ML) INTRAVENOUS
Status: DISPENSED
Start: 2022-11-25 | End: 2022-11-25

## 2022-11-25 RX ORDER — SODIUM CHLORIDE 9 MG/ML
500 INJECTION, SOLUTION INTRAVENOUS
Status: DISCONTINUED | OUTPATIENT
Start: 2022-11-25 | End: 2022-11-25 | Stop reason: HOSPADM

## 2022-11-25 RX ORDER — CHOLECALCIFEROL (VITAMIN D3) 125 MCG
5 CAPSULE ORAL NIGHTLY
Status: DISCONTINUED | OUTPATIENT
Start: 2022-11-25 | End: 2022-12-02 | Stop reason: HOSPADM

## 2022-11-25 RX ORDER — HYDRALAZINE HYDROCHLORIDE 20 MG/ML
10-20 INJECTION INTRAMUSCULAR; INTRAVENOUS EVERY 4 HOURS PRN
Status: DISCONTINUED | OUTPATIENT
Start: 2022-11-25 | End: 2022-11-29

## 2022-11-25 RX ORDER — MIDAZOLAM HYDROCHLORIDE 1 MG/ML
.5-2 INJECTION INTRAMUSCULAR; INTRAVENOUS PRN
Status: DISCONTINUED | OUTPATIENT
Start: 2022-11-25 | End: 2022-11-25 | Stop reason: HOSPADM

## 2022-11-25 RX ORDER — DEXMEDETOMIDINE HYDROCHLORIDE 4 UG/ML
.1-1.5 INJECTION, SOLUTION INTRAVENOUS CONTINUOUS
Status: DISCONTINUED | OUTPATIENT
Start: 2022-11-25 | End: 2022-11-26

## 2022-11-25 RX ADMIN — FENTANYL CITRATE 50 MCG: 50 INJECTION INTRAMUSCULAR; INTRAVENOUS at 09:49

## 2022-11-25 RX ADMIN — MIDAZOLAM HYDROCHLORIDE 2 MG: 1 INJECTION INTRAMUSCULAR; INTRAVENOUS at 08:49

## 2022-11-25 RX ADMIN — IODIXANOL 103 ML: 270 INJECTION, SOLUTION INTRAVASCULAR at 10:30

## 2022-11-25 RX ADMIN — MIDAZOLAM HYDROCHLORIDE 2 MG: 1 INJECTION, SOLUTION INTRAMUSCULAR; INTRAVENOUS at 09:23

## 2022-11-25 RX ADMIN — NICARDIPINE HYDROCHLORIDE 5 MG/HR: 25 INJECTION, SOLUTION INTRAVENOUS at 11:22

## 2022-11-25 RX ADMIN — TAMSULOSIN HYDROCHLORIDE 0.4 MG: 0.4 CAPSULE ORAL at 21:03

## 2022-11-25 RX ADMIN — DEXMEDETOMIDINE 0.2 MCG/KG/HR: 200 INJECTION, SOLUTION INTRAVENOUS at 11:58

## 2022-11-25 RX ADMIN — ATROPINE SULFATE 0.2 MG: 0.1 INJECTION INTRAVENOUS at 09:45

## 2022-11-25 RX ADMIN — MIDAZOLAM HYDROCHLORIDE 2 MG: 1 INJECTION, SOLUTION INTRAMUSCULAR; INTRAVENOUS at 08:49

## 2022-11-25 RX ADMIN — FENTANYL CITRATE 50 MCG: 50 INJECTION INTRAMUSCULAR; INTRAVENOUS at 09:23

## 2022-11-25 RX ADMIN — FENTANYL CITRATE 50 MCG: 50 INJECTION INTRAMUSCULAR; INTRAVENOUS at 08:49

## 2022-11-25 RX ADMIN — QUETIAPINE FUMARATE 25 MG: 25 TABLET ORAL at 21:03

## 2022-11-25 RX ADMIN — HEPARIN SODIUM 5000 UNITS: 5000 INJECTION, SOLUTION INTRAVENOUS; SUBCUTANEOUS at 13:20

## 2022-11-25 RX ADMIN — ASPIRIN 81 MG: 81 TABLET, COATED ORAL at 05:47

## 2022-11-25 RX ADMIN — PSYLLIUM HUSK 1 PACKET: 3.4 POWDER ORAL at 17:25

## 2022-11-25 RX ADMIN — NICARDIPINE HYDROCHLORIDE 2.5 MG/HR: 25 INJECTION, SOLUTION INTRAVENOUS at 17:31

## 2022-11-25 RX ADMIN — CLOPIDOGREL BISULFATE 75 MG: 75 TABLET ORAL at 05:47

## 2022-11-25 RX ADMIN — DEXMEDETOMIDINE 0.8 MCG/KG/HR: 200 INJECTION, SOLUTION INTRAVENOUS at 21:53

## 2022-11-25 RX ADMIN — SODIUM CHLORIDE: 9 INJECTION, SOLUTION INTRAVENOUS at 10:32

## 2022-11-25 RX ADMIN — FENTANYL CITRATE 50 MCG: 50 INJECTION INTRAMUSCULAR; INTRAVENOUS at 09:05

## 2022-11-25 RX ADMIN — HEPARIN SODIUM 5000 UNITS: 5000 INJECTION, SOLUTION INTRAVENOUS; SUBCUTANEOUS at 21:03

## 2022-11-25 RX ADMIN — LORATADINE 10 MG: 10 TABLET ORAL at 05:47

## 2022-11-25 RX ADMIN — LISINOPRIL 10 MG: 10 TABLET ORAL at 05:47

## 2022-11-25 RX ADMIN — MIDAZOLAM HYDROCHLORIDE 2 MG: 1 INJECTION INTRAMUSCULAR; INTRAVENOUS at 09:23

## 2022-11-25 RX ADMIN — DOCUSATE SODIUM 50 MG AND SENNOSIDES 8.6 MG 2 TABLET: 8.6; 5 TABLET, FILM COATED ORAL at 17:25

## 2022-11-25 RX ADMIN — ATORVASTATIN CALCIUM 40 MG: 40 TABLET, FILM COATED ORAL at 17:25

## 2022-11-25 RX ADMIN — Medication 5 MG: at 21:03

## 2022-11-25 ASSESSMENT — ENCOUNTER SYMPTOMS
BLURRED VISION: 0
HEARTBURN: 0
FEVER: 0
COUGH: 0
VOMITING: 0
ABDOMINAL PAIN: 0
HEADACHES: 0
SHORTNESS OF BREATH: 0
NAUSEA: 0
DIZZINESS: 0

## 2022-11-25 ASSESSMENT — COGNITIVE AND FUNCTIONAL STATUS - GENERAL
STANDING UP FROM CHAIR USING ARMS: A LITTLE
MOVING TO AND FROM BED TO CHAIR: A LITTLE
DAILY ACTIVITIY SCORE: 20
MOVING FROM LYING ON BACK TO SITTING ON SIDE OF FLAT BED: A LITTLE
EATING MEALS: A LITTLE
SUGGESTED CMS G CODE MODIFIER DAILY ACTIVITY: CJ
SUGGESTED CMS G CODE MODIFIER MOBILITY: CK
TOILETING: A LITTLE
HELP NEEDED FOR BATHING: A LITTLE
TURNING FROM BACK TO SIDE WHILE IN FLAT BAD: A LITTLE
DRESSING REGULAR LOWER BODY CLOTHING: A LITTLE
MOBILITY SCORE: 19
CLIMB 3 TO 5 STEPS WITH RAILING: A LITTLE

## 2022-11-25 ASSESSMENT — PAIN DESCRIPTION - PAIN TYPE
TYPE: ACUTE PAIN
TYPE: ACUTE PAIN

## 2022-11-25 ASSESSMENT — FIBROSIS 4 INDEX: FIB4 SCORE: 2.26

## 2022-11-25 NOTE — PROGRESS NOTES
Report received from HERLINDA Bustillo.  Assumed care of patient.  Assessment complete.  Plan of care gone over with the patient and all concerns addressed.  Patient sitting up in bed watching TV.  Patient A & O  x 4.  No apparent signs of distress.  Safety precautions in place.  Patient educated to call for assistance.  Hourly rounding in place.

## 2022-11-25 NOTE — PROGRESS NOTES
I tried to call report to the HERLINDA Mina that is getting this patient in T821.  The RN is not currently available.  I will try to call again in a few minutes.

## 2022-11-25 NOTE — OR SURGEON
Immediate Post- Operative Note        Findings: Symptomatic left carotid stenosis  Procedure(s): Left carotid stent placement      Estimated Blood Loss: Less than 5 ml        Complications: None            11/25/2022     10:05 AM     Pastor Romero M.D.

## 2022-11-25 NOTE — DISCHARGE PLANNING
Case Management Discharge Planning    Admission Date: 11/23/2022  GMLOS: 2.9  ALOS: 1    6-Clicks ADL Score:    6-Clicks Mobility Score:    PT and/or OT Eval ordered: No  Post-acute Referrals Ordered: No  Post-acute Choice Obtained: No  Has referral(s) been sent to post-acute provider:  No      Anticipated Discharge Dispo:      DME Needed: No    Action(s) Taken: DC Assessment Complete (See below)    Escalations Completed: None    Medically Clear: No    Next Steps: Pending medical clearance.  Patient admitted for left carotid stenosis stent procedure.      Barriers to Discharge: Medical clearance    Care Management will continue to follow for discharge planning needs.

## 2022-11-25 NOTE — ASSESSMENT & PLAN NOTE
-11/25 underwent IR guided left carotid stent placement  for the treatment of symptomatic left carotid stenosis  -SBP goal while in acute setting [phone] with p.o. antihypertensives, IVP antihypertensives, Cardene infusion  -Long-term blood pressure goal of 110-130/60-80  -Continue Plavix -75 mg daily x8 weeks  -Continue ASA 81 mg daily  -Continue atorvastatin 40 mg daily-LDL goal is less than 70  -Okay with neurology to defer echo and long-term outpatient cardiac monitoring since etiology is less likely to be cardio embolic  -Okay to start Lovenox/heparin   for DVT prophylaxis  -[facility]roke [facility] for follow-up  -PT/OT/SLP

## 2022-11-25 NOTE — CARE PLAN
The patient is Watcher - Medium risk of patient condition declining or worsening    Shift Goals  Clinical Goals: IR stent placement in AM  Patient Goals: rest  Family Goals: diagnostic results    Progress made toward(s) clinical / shift goals:  IR carotid stent placement scheduled for this morning. Pt NPO for procedure.     Problem: Optimal Care of the Stroke Patient  Goal: Optimal acute care for the stroke patient  Outcome: Progressing  Note: Q shift NIHSS unchanged from previous shift, q4 neuro checks in place.     Problem: Hemodynamic Monitoring  Goal: Patient's hemodynamics, fluid balance and neurologic status will be stable or improve  Outcome: Progressing  Note: Mild permissive HTN per neurology note, -180. Labetalol available as needed.      Problem: Dysphagia  Goal: Dysphagia will improve  Outcome: Progressing       Patient is not progressing towards the following goals:

## 2022-11-25 NOTE — THERAPY
Missed Therapy     Patient Name: Cooper Harvey  Age:  88 y.o., Sex:  male  Medical Record #: [id number]  Today's Date: 11/25/2022    OT consult rec'd. Pt pending IR guided left carotid stent placement, planned for today. Will hold OT eval and will re-attempt as appropriate/able post procedure.

## 2022-11-25 NOTE — PROGRESS NOTES
Neurology Progress Note  Neurohospitalist Service, [facility]    Referring Physician: Josesito Catalan M.D.      Interval History: POD#0 from uncomplicated L cervical ICA stent placement.    Review of systems: In addition to what is detailed in the HPI and/or updated in the interval history, all other systems reviewed and are negative.    Past Medical History, Past Surgical History and Social History reviewed and unchanged from prior    Medications:    Current Facility-Administered Medications:     PHENYLEPHRINE HCL-NACL 1-0.9 MG/10ML-% IV SOSY, , , ,     atropine injection 0.2 mg, 0.2 mg, Intravenous, Once, Pastor Romero M.D.    niCARdipine (CARDENE) 25 mg in  mL Infusion, 0-15 mg/hr, Intravenous, Continuous, Pastor Romero M.D., Last Rate: 50 mL/hr at 11/25/22 1122, 5 mg/hr at 11/25/22 1122    dexmedetomidine (PRECEDEX) 400 mcg/100mL NS premix infusion, 0.1-1.5 mcg/kg/hr (Ideal), Intravenous, Continuous, GUILLERMO Rivera.P.R.N., Last Rate: 8.6 mL/hr at 11/25/22 1301, 0.5 mcg/kg/hr at 11/25/22 1301    melatonin tablet 5 mg, 5 mg, Oral, Nightly, GUILLERMO Rivera.P.R.N.    aspirin EC (ECOTRIN) tablet 81 mg, 81 mg, Oral, DAILY, Benedict Boss M.D., 81 mg at 11/25/22 0547    clopidogrel (PLAVIX) tablet 75 mg, 75 mg, Oral, DAILY, Benedict Boss M.D., 75 mg at 11/25/22 0547    tamsulosin (FLOMAX) capsule 0.4 mg, 0.4 mg, Oral, QHS, Josesito Catalan M.D., 0.4 mg at 11/24/22 2030    psyllium (METAMUCIL/KONSYL) 1 Packet, 1 Packet, Oral, BID, Josesito Catalan M.D.    loratadine (CLARITIN) tablet 10 mg, 10 mg, Oral, DAILY, Josesito Catalan M.D., 10 mg at 11/25/22 0547    lisinopril (PRINIVIL) tablet 10 mg, 10 mg, Oral, DAILY, Josesito Catalan M.D., 10 mg at 11/25/22 0547    atorvastatin (LIPITOR) tablet 40 mg, 40 mg, Oral, Q EVENING, Josesito Catalan M.D., 40 mg at 11/24/22 1744    senna-docusate (PERICOLACE or SENOKOT S) 8.6-50 MG per tablet 2 Tablet, 2 Tablet, Oral, BID, 2 Tablet at 11/24/22 0624 **AND** polyethylene  "glycol/lytes (MIRALAX) PACKET 1 Packet, 1 Packet, Oral, QDAY PRN **AND** magnesium hydroxide (MILK OF MAGNESIA) suspension 30 mL, 30 mL, Oral, QDAY PRN **AND** bisacodyl (DULCOLAX) suppository 10 mg, 10 mg, Rectal, QDAY PRN, Josesito Catalan M.D.    NS infusion, , Intravenous, Continuous, Josesito Catalan M.D., Last Rate: 75 mL/hr at 11/25/22 1032, New Bag at 11/25/22 1032    heparin injection 5,000 Units, 5,000 Units, Subcutaneous, Q8HRS, Josesito Catalan M.D., 5,000 Units at 11/25/22 1320    acetaminophen (Tylenol) tablet 650 mg, 650 mg, Oral, Q6HRS PRN, Josesito Catalan M.D.    ondansetron (ZOFRAN) syringe/vial injection 4 mg, 4 mg, Intravenous, Q4HRS PRN, Josesito Catalan M.D.    ondansetron (ZOFRAN ODT) dispertab 4 mg, 4 mg, Oral, Q4HRS PRN, Josesito Catalan M.D.    labetalol (NORMODYNE/TRANDATE) injection 10 mg, 10 mg, Intravenous, Q4HRS PRN, Josesito Catalan M.D., 10 mg at 11/24/22 1750    Physical Examination:   /57   Pulse 79   Temp 36.9 °C (98.5 °F) (Temporal)   Resp (!) 32   Ht 1.727 m (5' 8\")   Wt 73.1 kg (161 lb 2.5 oz)   SpO2 96%   BMI 24.50 kg/m²       General: Patient is awake and in no acute distress  Neck: There is normal range of motion  CV: Regular rate   Extremities:  Warm, dry, and intact, without peripheral lower extremity edema    NEUROLOGICAL EXAM:     Mental status: Awake, post-operatively he is disoriented  Speech and language: Speech is mildly dysarthric but fluent.   Cranial nerve exam: Visual fields are full to threat.  There is no gaze deviation.  He tracks easily past midline.  Face is symmetric.   Motor exam: There is sustained antigravity with no downward drift in bilateral arms and legs.  Overall bradykinetic.  Action tremor noted in all extremities.  Sensory exam:  Reacts to tactile in all 4 distal extremities, there is no neglect to double stim.  Coordination: No ataxia on elicited movements  Gait: Deferred due to patient preference.     Objective Data:    Labs:  Lab Results   Component Value " Date/Time    PROTHROMBTM 15.5 (H) 11/23/2022 02:23 PM    INR 1.25 (H) 11/23/2022 02:23 PM      Lab Results   Component Value Date/Time    WBC 6.2 11/24/2022 03:52 AM    RBC 4.63 (L) 11/24/2022 03:52 AM    HEMOGLOBIN 14.7 11/24/2022 03:52 AM    HEMATOCRIT 43.2 11/24/2022 03:52 AM    MCV 93.3 11/24/2022 03:52 AM    MCH 31.7 11/24/2022 03:52 AM    MCHC 34.0 11/24/2022 03:52 AM    MPV 10.7 11/24/2022 03:52 AM    NEUTSPOLYS 77.90 (H) 11/23/2022 02:23 PM    LYMPHOCYTES 8.60 (L) 11/23/2022 02:23 PM    MONOCYTES 9.00 11/23/2022 02:23 PM    EOSINOPHILS 3.70 11/23/2022 02:23 PM    BASOPHILS 0.60 11/23/2022 02:23 PM      Lab Results   Component Value Date/Time    SODIUM 138 11/24/2022 03:52 AM    POTASSIUM 4.4 11/24/2022 03:52 AM    CHLORIDE 104 11/24/2022 03:52 AM    CO2 25 11/24/2022 03:52 AM    GLUCOSE 86 11/24/2022 03:52 AM    BUN 18 11/24/2022 03:52 AM    CREATININE 1.12 11/24/2022 03:52 AM    CREATININE 1.2 10/03/2008 11:30 AM      Lab Results   Component Value Date/Time    CHOLSTRLTOT 107 11/24/2022 03:52 AM    LDL 44 11/24/2022 03:52 AM    HDL 46 11/24/2022 03:52 AM    TRIGLYCERIDE 83 11/24/2022 03:52 AM       Lab Results   Component Value Date/Time    ALKPHOSPHAT 98 11/23/2022 02:23 PM    ASTSGOT 20 11/23/2022 02:23 PM    ALTSGPT 19 11/23/2022 02:23 PM    TBILIRUBIN 0.4 11/23/2022 02:23 PM        Imaging/Testing:    I interpreted and/or reviewed the patient's neuroimaging    IR-NEURO INTERVENTIONAL CONSULT-IP   Final Result      1.  Symptomatic left carotid stenosis.   2.  Successful left carotid stent placement with embolic protection device.   3.  Widely patent right internal carotid artery-history of right carotid endarterectomy.      MR-BRAIN-W/O   Final Result      1.  No acute infarct.   2.  Chronic punctate microhemorrhage in the left parieto-occipital region.   3.  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 This is stable since the previous MRI dated 4/13/2021. This is  noted in the previous CT angiogram dated 4/01/2021.   4.  Chronic lacunar infarct in the left thalamus.   5.  Mild cerebral volume loss.   6.  Mild chronic microvascular ischemic disease.      DX-CHEST-PORTABLE (1 VIEW)   Final Result      1.  There are changes of mild vascular congestion.      CT-CTA HEAD WITH & W/O-POST PROCESS   Final Result      CT angiogram of the [place] within normal limits.      CT-CTA NECK WITH & W/O-POST PROCESSING   Final Result      1.  Previous right-sided carotid endarterectomy without evidence of significant stenosis or occlusion.      2.  Moderate irregular calcific atherosclerotic plaquing of the left common carotid artery bifurcation and proximal left internal carotid artery with stenosis of approximately 50%.      CT-CEREBRAL PERFUSION ANALYSIS   Final Result      1.  Cerebral blood flow less than 30% which could represent completed infarct = 5 mL. However this is not seen on the T Max greater than 6 seconds so it likely is artifactual      2.  T Max more than 6 seconds = 0 mL.      3.  Please note that the cerebral perfusion was performed on the limited brain tissue around the basal ganglia region. Infarct/ischemia outside the CT perfusion sections can be missed in this study.      CT-HEAD W/O   Final Result      No noncontrast CT evidence of acute intracranial hemorrhage.      Moderate white matter hypodensity is present.  This is a nonspecific finding which usually is found to represent chronic microvascular disease in patient's of this demographic.  Demyelination, age indeterminant ischemia and gliosis are also common    possibilities.      Chronic left basal ganglia and thalamic lacunar infarctions      Age-appropriate atrophy          Assessment and Plan:  Cooper Harvey is a 88 year old man with history of stroke, s/p R CEA, hypertension, hyperlipidemia, presenting with transient speech difficulties and right side weakness.  Semiology consistent with L  hemispheric TIA,  and I suspect he has symptomatic carotid disease.  Discussed surgical revascularization options with family and patient, and he is now POD#0 from L carotid artery stenting.     Problem list:   Transient speech difficulties   History of stroke  S/p R CEA  L carotid stenosis s/p KOFFI  Hypertension  Hyperlipidemia     Recommendations:              - q4h neurochecks/NIHSS              - continue ASA 81mg daily              - continue plavix 75mg daily x 8 weeks   - long-term BP goal is 110-130/60-80; acutely aim for -140 STRICT              - stroke labs:  HgbA1c 5.3 and LDL 44              - continue home atorvastatin 40mg daily for goal LDL < 70              - may defer ECHO and long-term cardiac monitoring as less likely cardioembolic etiology              - PT/OT/SLP ok              - lovenox SQ for DVT chemoppx ok  - stroke bridge clinic follow up  - please reconsult Neurology with any additional questions or concerns       The evaluation of the patient, and recommended management, was discussed with the attending hospitalist. I have performed a physical exam and reviewed and updated ROS and Plan today (11/25/2022).     Benedict Boss MD  Neurohospitalist, Acute Care Services

## 2022-11-25 NOTE — PROGRESS NOTES
Assumed care of patient, bedside report received from day RN. Updated on POC, call light within reach and fall precautions in place. Bed locked and in lowest position. Patient instructed to call for assistance before getting out of bed. All questions answered, no other needs at this time. Q4 neuros and q shift NIHSS in place, no change from previous shift exam.

## 2022-11-25 NOTE — DISCHARGE PLANNING
Plan for stent placement today. Physiatry consult pended, awaiting therapy evals as appropriate. TCC will follow.

## 2022-11-25 NOTE — PROGRESS NOTES
4 Eyes Skin Assessment Completed by HERLINDA Granados and HERLINDA Malagon.    Head Scab and Scar, skin graft on top of head  Ears Redness and Blanching  Nose WDL  Mouth WDL  Neck Scab  Breast/Chest WDL  Shoulder Blades WDL  Spine WDL  (R) Arm/Elbow/Hand Redness, Blanching, Bruising, and Scab  (L) Arm/Elbow/Hand Redness, Blanching, Bruising, and Scab  Abdomen WDL  Groin Redness and Blanching  Scrotum/Coccyx/Buttocks Redness and Blanching  (R) Leg Scab  (L) Leg WDL  (R) Heel/Foot/Toe Redness and Blanching  (L) Heel/Foot/Toe Redness and Blanching          Devices In Places Tele Box, Blood Pressure Cuff, and Pulse Ox      Interventions In Place Pillows and Pressure Redistribution Mattress    Possible Skin Injury No    Pictures Uploaded Into Epic N/A  Wound Consult Placed N/A  RN Wound Prevention Protocol Ordered Yes

## 2022-11-25 NOTE — CARE PLAN
Problem: Optimal Care of the Stroke Patient  Goal: Optimal emergency care for the stroke patient  Outcome: Progressing  Goal: Optimal acute care for the stroke patient  Outcome: Progressing     Problem: Knowledge Deficit - Stroke Education  Goal: Patient's knowledge of stroke and risk factors will improve  Outcome: Progressing     Problem: Psychosocial - Patient Condition  Goal: Patient's ability to verbalize feelings about condition will improve  Outcome: Progressing  Goal: Patient's ability to re-evaluate and adapt role responsibilities will improve  Outcome: Progressing     Problem: Discharge Planning - Stroke  Goal: Ensure Stroke Core Measures are met prior to discharge  Outcome: Progressing  Goal: Patient’s continuum of care needs will be met  Outcome: Progressing     Problem: Neuro Status  Goal: Neuro status will remain stable or improve  Outcome: Progressing     Problem: Hemodynamic Monitoring  Goal: Patient's hemodynamics, fluid balance and neurologic status will be stable or improve  Outcome: Progressing     Problem: Respiratory - Stroke Patient  Goal: Patient will achieve/maintain optimum respiratory rate/effort  Outcome: Progressing     Problem: Dysphagia  Goal: Dysphagia will improve  Outcome: Progressing     Problem: Risk for Aspiration  Goal: Patient's risk for aspiration will be absent or decrease  Outcome: Progressing     Problem: Urinary Elimination  Goal: Establish and maintain regular urinary output  Outcome: Progressing     Problem: Bowel Elimination  Goal: Establish and maintain regular bowel function  Outcome: Progressing     Problem: Mobility - Stroke  Goal: Patient's capacity to carry out activities will improve  Outcome: Progressing  Goal: Spasticity will be prevented or improved  Outcome: Progressing  Goal: Subluxation will be prevented or improved  Outcome: Progressing     Problem: Self Care  Goal: Patient will have the ability to perform ADLs independently or with assistance (bathe,  groom, dress, toilet and feed)  Outcome: Progressing     Problem: Knowledge Deficit - Standard  Goal: Patient and family/care givers will demonstrate understanding of plan of care, disease process/condition, diagnostic tests and medications  Outcome: Progressing     Problem: Fall Risk  Goal: Patient will remain free from falls  Outcome: Progressing   The patient is Stable - Low risk of patient condition declining or worsening    Shift Goals  Clinical Goals: MRI, IR Stent Placement  Patient Goals: rest  Family Goals: diagnostic results    Progress made toward(s) clinical / shift goals:  Pt has no pain.    Patient is not progressing towards the following goals:

## 2022-11-25 NOTE — ASSESSMENT & PLAN NOTE
11/25-postoperatively pt  grabbing and pulling at wires, attempting to get out of bed with strict bedrest despite reorienting and education. Pt post IR left carotid stent placement with right fem artery access site.  Precedex infusion started however weaned off this a.m-Seroquel initiated last night and will consider lower dose Seroquel during the day.   melatonin also added to help with sleep and sleep wake cycle.

## 2022-11-25 NOTE — CONSULTS
"Critical Care Consultation    Date of consult: 11/25/2022    Referring Physician  Baldomero Butler D.O.    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  Apparently he was eating with his family when they noticed Ion suddenly developed difficulty speaking, slurred speech, confusion and slight right-sided weakness.  Upon arrival to [facility] NIHSS of 2: CT of head was negative for any bleeding no LVO, no large territory stroke,however, he did have evidence of moderate to severe stenosis of his left cervical carotid artery.  MRI brain showed chronic lacunar infarct in the left thalamus echocardiogram did not show any structural cardiac disease, and there was no evidence of atrial fibrillation.  On 11/25/2022 he underwent IR guided left carotid stent placement  for the treatment of symptomatic left carotid stenosis. Postoperatively he was transferred to  for strict BP control and continuous critical care management    Code Status  Full Code    Review of Systems  Review of Systems   Constitutional:  Negative for fever.   HENT:  Positive for hearing loss.    Eyes:  Negative for blurred vision.   Respiratory:  Negative for cough and shortness of breath.    Cardiovascular:  Negative for chest pain.   Gastrointestinal:  Negative for abdominal pain, heartburn, nausea and vomiting.   Musculoskeletal:         \"Sleepy\"   Skin:  Negative for rash.   Neurological:  Negative for dizziness and headaches.     Past Medical History   has a past medical history of [place] (hard of hearing) (04/02/2021), " Hydrocele, unspecified, Hypertrophy of prostate without urinary obstruction and other lower urinary tract symptoms (LUTS), and Mixed hyperlipidemia.    Surgical History   has a past surgical history that includes hemorrhoidectomy (2004); plastic surgery (February 2009); eye surgery (6/05 ; 7/05); and pr thromboendartectmy neck,neck incis (Right, 4/2/2021).    Family History  family history includes Cancer in his mother.    Social History   reports that he quit smoking about 32 years ago. He has a 20.00 pack-year smoking history. He has never used smokeless tobacco. He reports current alcohol use. He reports that he does not use drugs.    Medications  Home Medications       Reviewed by Richy Block (Pharmacy Tech) on 11/23/22 at 1631  Med List Status: Complete     Medication Last Dose Status   acetaminophen (TYLENOL) 325 MG Tab UNK Active   aspirin (ASA) 81 MG Chew Tab chewable tablet 11/23/2022 Active   atorvastatin (LIPITOR) 40 MG Tab 11/22/2022 Active   docusate sodium (COLACE) 100 MG Cap > 1 WEEK Active   lisinopril (PRINIVIL) 10 MG Tab 11/23/2022 Active   loratadine (CLARITIN) 10 MG Tab 11/23/2022 Active   Psyllium (METAMUCIL) 28.3 % Powder 11/23/2022 Active   rivaroxaban (XARELTO) 2.5 MG tablet 11/23/2022 Active   tamsulosin (FLOMAX) 0.4 MG capsule 11/22/2022 Active                  Current Facility-Administered Medications   Medication Dose Route Frequency Provider Last Rate Last Admin    PHENYLEPHRINE HCL-NACL 1-0.9 MG/10ML-% IV SOSY             atropine injection 0.2 mg  0.2 mg Intravenous Once Pastor Romero M.D.        dexmedetomidine (PRECEDEX) 400 mcg/100mL NS premix infusion  0.1-1.5 mcg/kg/hr (Ideal) Intravenous Continuous Ailin Freire, A.P.R.N. 8.6 mL/hr at 11/25/22 1301 0.5 mcg/kg/hr at 11/25/22 1301    melatonin tablet 5 mg  5 mg Oral Nightly Ailin Freire, A.P.R.N.        labetalol (NORMODYNE/TRANDATE) injection 10-20 mg  10-20 mg Intravenous Q4HRS PRN Ailin Freire, A.P.R.N.        hydrALAZINE  (APRESOLINE) injection 10-20 mg  10-20 mg Intravenous Q4HRS PRN LASHAE RiveraPTatyanaRHUGH        niCARdipine (CARDENE) 25 mg in  mL Infusion  0-15 mg/hr Intravenous Continuous LASHAE RiveraP.RHUGH        aspirin EC (ECOTRIN) tablet 81 mg  81 mg Oral DAILY Benedict Boss M.D.   81 mg at 11/25/22 0547    clopidogrel (PLAVIX) tablet 75 mg  75 mg Oral DAILY Benedict Boss M.D.   75 mg at 11/25/22 0547    tamsulosin (FLOMAX) capsule 0.4 mg  0.4 mg Oral QHS Josesito Catalan M.D.   0.4 mg at 11/24/22 2030    psyllium (METAMUCIL/KONSYL) 1 Packet  1 Packet Oral BID Josesito Catalan M.D.        loratadine (CLARITIN) tablet 10 mg  10 mg Oral DAILY Josesito Catalan M.D.   10 mg at 11/25/22 0547    lisinopril (PRINIVIL) tablet 10 mg  10 mg Oral DAILY LASHAE RiveraPTatyanaRHUGH   10 mg at 11/25/22 0547    atorvastatin (LIPITOR) tablet 40 mg  40 mg Oral Q EVENING Josesito Catalan M.D.   40 mg at 11/24/22 1744    senna-docusate (PERICOLACE or SENOKOT S) 8.6-50 MG per tablet 2 Tablet  2 Tablet Oral BID Josesito Catalan M.D.   2 Tablet at 11/24/22 0624    And    polyethylene glycol/lytes (MIRALAX) PACKET 1 Packet  1 Packet Oral QDAY PRN Josesito Catalan M.D.        And    magnesium hydroxide (MILK OF MAGNESIA) suspension 30 mL  30 mL Oral QDAY PRN Josesito Catalan M.D.        And    bisacodyl (DULCOLAX) suppository 10 mg  10 mg Rectal QDAY PRN Josesito Catalan M.D.        NS infusion   Intravenous Continuous Josesito Catalan M.D. 75 mL/hr at 11/25/22 1032 New Bag at 11/25/22 1032    heparin injection 5,000 Units  5,000 Units Subcutaneous Q8HRS Josesito Catalan M.D.   5,000 Units at 11/25/22 1320    acetaminophen (Tylenol) tablet 650 mg  650 mg Oral Q6HRS PRN Josesito Catalan M.D.        ondansetron (ZOFRAN) syringe/vial injection 4 mg  4 mg Intravenous Q4HRS PRN Josesito Catalan M.D.        ondansetron (ZOFRAN ODT) dispertab 4 mg  4 mg Oral Q4HRS PRN Josesito Catalan M.D.           Allergies  Allergies   Allergen Reactions    Vicodin [Hydrocodone-Acetaminophen] Rash     rash       Vital Signs last 24  hours  Temp:  [36.3 °C (97.4 °F)-37.1 °C (98.8 °F)] 36.9 °C (98.5 °F)  Pulse:  [] 72  Resp:  [0-57] 28  BP: (113-202)/() 126/60  SpO2:  [91 %-99 %] 96 %    Physical Exam  Physical Exam  Vitals and nursing note reviewed.   HENT:      Nose: Nose normal.      Mouth/Throat:      Mouth: Mucous membranes are dry.      Pharynx: Oropharynx is clear.   Cardiovascular:      Rate and Rhythm: Regular rhythm. Tachycardia present.      Pulses:           Radial pulses are 2+ on the right side and 2+ on the left side.        Dorsalis pedis pulses are 2+ on the right side and 2+ on the left side.      Heart sounds: Murmur heard.      Comments: R groin site from cath- slight bloody drainage noted on dressing.  Pulmonary:      Effort: Pulmonary effort is normal.      Breath sounds: Normal air entry. Examination of the right-lower field reveals decreased breath sounds. Examination of the left-lower field reveals decreased breath sounds. Decreased breath sounds present.      Comments: NC in place with supplemental 02   Abdominal:      General: Bowel sounds are normal.      Palpations: Abdomen is soft.   Skin:     General: Skin is warm and dry.      Capillary Refill: Capillary refill takes less than 2 seconds.   Neurological:      Comments: Sleepy-wakes to stimuli  Oriented to person, place however confused to event, time and becomes easily disoriented.  Pt continues to fall asleep with questioning-he will follow commands  Slight dysarthria  Face is symmetric  Moving all extremities equally with generalized weakness   Psychiatric:      Comments: sleepy       Fluids    Intake/Output Summary (Last 24 hours) at 11/25/2022 1444  Last data filed at 11/25/2022 1400  Gross per 24 hour   Intake 407.05 ml   Output 200 ml   Net 207.05 ml       Laboratory  Recent Results (from the past 48 hour(s))   EKG (NOW)    Collection Time: 11/23/22  3:16 PM   Result Value Ref Range    Report       [facility] Emergency  Dept.    Test Date:  2022  Pt Name:    MAURO SMITHONDepartment: ER  MRN:        [id number]                      Room:       T821  Gender:     Male                         Technician: [id number]  :        [id number]                   Requested By:LORNA DARNELL  Order #:    [id number]                    Reading MD:    Measurements  Intervals                                Axis  Rate:       66                           P:          71  MS:         177                          QRS:        4  QRSD:       83                           T:          32  QT:         376  QTc:        394    Interpretive Statements  Sinus rhythm  Borderline low voltage, extremity leads  Compared to ECG 2021 12:50:18  Atrial premature complex(es) no longer present  T-wave abnormality no longer present     Hemoglobin A1C    Collection Time: 22  5:10 PM   Result Value Ref Range    Glycohemoglobin 5.3 4.0 - 5.6 %    Est Avg Glucose 105 mg/dL   URINALYSIS    Collection Time: 22  5:10 PM    Specimen: Urine   Result Value Ref Range    Color Orange (A)     Character Clear     Specific Gravity 1.037 <1.035    Ph 7.5 5.0 - 8.0    Glucose Negative Negative mg/dL    Ketones Negative Negative mg/dL    Protein Negative Negative mg/dL    Bilirubin Negative Negative    Urobilinogen, Urine 0.2 Negative    Nitrite Negative Negative    Leukocyte Esterase Negative Negative    Occult Blood Large (A) Negative    Micro Urine Req Microscopic    URINE MICROSCOPIC (W/UA)    Collection Time: 22  5:10 PM   Result Value Ref Range    WBC 0-2 (A) /hpf    RBC >150 (A) /hpf    Bacteria Negative None /hpf    Epithelial Cells Negative /hpf    Hyaline Cast 0-2 /lpf   CBC WITHOUT DIFFERENTIAL    Collection Time: 22  3:52 AM   Result Value Ref Range    WBC 6.2 4.8 - 10.8 K/uL    RBC 4.63 (L) 4.70 - 6.10 M/uL    Hemoglobin 14.7 14.0 - 18.0 g/dL    Hematocrit 43.2 42.0 - 52.0 %    MCV 93.3 81.4 - 97.8 fL    MCH 31.7 27.0 - 33.0 pg     MCHC 34.0 33.7 - 35.3 g/dL    RDW 45.1 35.9 - 50.0 fL    Platelet Count 179 164 - 446 K/uL    MPV 10.7 9.0 - 12.9 fL   Renal Function Panel    Collection Time: 11/24/22  3:52 AM   Result Value Ref Range    Sodium 138 135 - 145 mmol/L    Potassium 4.4 3.6 - 5.5 mmol/L    Chloride 104 96 - 112 mmol/L    Co2 25 20 - 33 mmol/L    Glucose 86 65 - 99 mg/dL    Creatinine 1.12 0.50 - 1.40 mg/dL    Bun 18 8 - 22 mg/dL    Calcium 9.1 8.5 - 10.5 mg/dL    Phosphorus 3.0 2.5 - 4.5 mg/dL    Albumin 3.8 3.2 - 4.9 g/dL   Lipid Profile    Collection Time: 11/24/22  3:52 AM   Result Value Ref Range    Cholesterol,Tot 107 100 - 199 mg/dL    Triglycerides 83 0 - 149 mg/dL    HDL 46 >=40 mg/dL    LDL 44 <100 mg/dL   ESTIMATED GFR    Collection Time: 11/24/22  3:52 AM   Result Value Ref Range    GFR (CKD-EPI) 63 >60 mL/min/1.73 m 2       Imaging  IR-NEURO INTERVENTIONAL CONSULT-IP   Final Result      1.  Symptomatic left carotid stenosis.   2.  Successful left carotid stent placement with embolic protection device.   3.  Widely patent right internal carotid artery-history of right carotid endarterectomy.      MR-BRAIN-W/O   Final Result      1.  No acute infarct.   2.  Chronic punctate microhemorrhage in the left parieto-occipital region.   3.  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 This is stable since the previous MRI dated 4/13/2021. This is noted in the previous CT angiogram dated 4/01/2021.   4.  Chronic lacunar infarct in the left thalamus.   5.  Mild cerebral volume loss.   6.  Mild chronic microvascular ischemic disease.      DX-CHEST-PORTABLE (1 VIEW)   Final Result      1.  There are changes of mild vascular congestion.      CT-CTA HEAD WITH & W/O-POST PROCESS   Final Result      CT angiogram of the [place] within normal limits.      CT-CTA NECK WITH & W/O-POST PROCESSING   Final Result      1.  Previous right-sided carotid endarterectomy without evidence of significant  stenosis or occlusion.      2.  Moderate irregular calcific atherosclerotic plaquing of the left common carotid artery bifurcation and proximal left internal carotid artery with stenosis of approximately 50%.      CT-CEREBRAL PERFUSION ANALYSIS   Final Result      1.  Cerebral blood flow less than 30% which could represent completed infarct = 5 mL. However this is not seen on the T Max greater than 6 seconds so it likely is artifactual      2.  T Max more than 6 seconds = 0 mL.      3.  Please note that the cerebral perfusion was performed on the limited brain tissue around the basal ganglia region. Infarct/ischemia outside the CT perfusion sections can be missed in this study.      CT-HEAD W/O   Final Result      No noncontrast CT evidence of acute intracranial hemorrhage.      Moderate white matter hypodensity is present.  This is a nonspecific finding which usually is found to represent chronic microvascular disease in patient's of this demographic.  Demyelination, age indeterminant ischemia and gliosis are also common    possibilities.      Chronic left basal ganglia and thalamic lacunar infarctions      Age-appropriate atrophy          Assessment/Plan  Carotid stenosis, left- (present on admission)  Assessment & Plan  -11/25 underwent IR guided left carotid stent placement  for the treatment of symptomatic left carotid stenosis  -SBP goal while in acute setting [id number] with p.o. antihypertensives when able; as needed IVP antihypertensives, Cardene infusion  -Long-term blood pressure goal of 110-130/60-80  -Continue Plavix -75 mg daily x8 weeks  -Continue ASA 81 mg daily  -Continue atorvastatin 40 mg daily-LDL goal is less than 70  -Okay with neurology to defer echo and long-term outpatient cardiac monitoring since etiology is less likely to be cardio embolic  -Okay to start Lovenox  for DVT prophylaxis  -[facility] for follow-up  -PT/OT/SLP         Constipation, slow transit- (present on  admission)  Assessment & Plan  BM protocol      History of CVA (cerebrovascular accident)- (present on admission)  Assessment & Plan  -4/21 Stroke in multiple vascular territories , s/p right CEA on Xarelto 2.5 mg -Continue ASA, Plavix, high density statin.     Agitation  Assessment & Plan  Pt  grabbing and pulling at wires, attempting to get out of bed with strict bedrest despite reorienting and education. Pt post IR left carotid stent placement with right fem artery access site.  Precedex infusion started.  Once able to sit upright  In bed and once more awake then we will begin to titrate Precedex down.   Start melatonin to help with sleep and sleep wake cycle.     BPH (benign prostatic hyperplasia)- (present on admission)  Assessment & Plan  -Continue Flomax once taking PO  -Bladder scan for low UO  -Bhatia cath for retention    Mixed hyperlipidemia- (present on admission)  Assessment & Plan  -Continue high intensity statin-Atorvastatin 80 mg         Discussed patient condition and risk of morbidity and/or mortality with Family, RN, RT, Therapies, Pharmacy, Dietary, Charge nurse / hot rounds, Patient, and neurology.    The patient remains critically ill.  Critical care time =55 minutes in directly providing and coordinating critical care and extensive data review.  No time overlap and excludes procedures.  Please note that this dictation was created using voice recognition software. The accuracy of the dictation is limited to the abilities of the software. I have made every reasonable attempt to correct obvious errors, but I expect that there are errors of grammar and possibly content that I did not discover before finalizing the note.

## 2022-11-25 NOTE — ASSESSMENT & PLAN NOTE
-4/21 Stroke in multiple vascular territories , s/p right CEA on Xarelto 2.5 mg -Continue ASA, Plavix, high density statin.

## 2022-11-25 NOTE — PROGRESS NOTES
Pt presents to IR2. Pt was consented by MD at bedside, confirmed by this RN and consent at bedside. Pt transferred to IR table in supine position. Patient underwent a carotid stent placement by Dr. rao. Procedure site was marked by MD and verified using imaging guidance. Pt placed on monitor, prepped and draped in a sterile fashion. Vitals were taken every 5 minutes and remained stable during procedure (see doc flow sheet for results). CO2 waveform capnography was monitored and remained WNL throughout procedure. Report called to Sam PATE. Pt transported by melody with RN to [facility].         Xact carotid stent system  REF: [id number]  LOT: [id number]  Exp: 07/31/2025        Angioseal 6 fr vip  Ref [id number]  [id number]  Exp 08/31/2023

## 2022-11-25 NOTE — HOSPITAL COURSE
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  Apparently he was eating with his family when they noticed Ion suddenly developed difficulty speaking, slurred speech, confusion and slight right-sided weakness.  Upon arrival to [facility] NIHSS of 2: CT of head was negative for any bleeding no LVO, no large territory stroke,however, he did have evidence of moderate to severe stenosis of his left cervical carotid artery.  MRI brain showed chronic lacunar infarct in the left thalamus echocardiogram did not show any structural cardiac disease, and there was no evidence of atrial fibrillation.  On 11/25/2022 he underwent IR guided left carotid stent placement postoperatively he was transferred to  for strict BP control and continuous critical care management

## 2022-11-26 PROBLEM — K12.2 UVULITIS: Status: ACTIVE | Noted: 2022-11-26

## 2022-11-26 PROBLEM — I10 PRIMARY HYPERTENSION: Status: ACTIVE | Noted: 2022-11-26

## 2022-11-26 LAB
ANION GAP SERPL CALC-SCNC: 9 MMOL/L (ref 7–16)
BUN SERPL-MCNC: 21 MG/DL (ref 8–22)
CALCIUM SERPL-MCNC: 7.9 MG/DL (ref 8.5–10.5)
CHLORIDE SERPL-SCNC: 111 MMOL/L (ref 96–112)
CO2 SERPL-SCNC: 21 MMOL/L (ref 20–33)
CREAT SERPL-MCNC: 1.04 MG/DL (ref 0.5–1.4)
EKG IMPRESSION: NORMAL
ERYTHROCYTE [DISTWIDTH] IN BLOOD BY AUTOMATED COUNT: 43.8 FL (ref 35.9–50)
GFR SERPLBLD CREATININE-BSD FMLA CKD-EPI: 69 ML/MIN/1.73 M 2
GLUCOSE SERPL-MCNC: 109 MG/DL (ref 65–99)
HCT VFR BLD AUTO: 39.2 % (ref 42–52)
HGB BLD-MCNC: 13.5 G/DL (ref 14–18)
MAGNESIUM SERPL-MCNC: 2 MG/DL (ref 1.5–2.5)
MCH RBC QN AUTO: 31.8 PG (ref 27–33)
MCHC RBC AUTO-ENTMCNC: 34.4 G/DL (ref 33.7–35.3)
MCV RBC AUTO: 92.5 FL (ref 81.4–97.8)
PHOSPHATE SERPL-MCNC: 3.1 MG/DL (ref 2.5–4.5)
PLATELET # BLD AUTO: 159 K/UL (ref 164–446)
PMV BLD AUTO: 10.2 FL (ref 9–12.9)
POTASSIUM SERPL-SCNC: 4.2 MMOL/L (ref 3.6–5.5)
RBC # BLD AUTO: 4.24 M/UL (ref 4.7–6.1)
SODIUM SERPL-SCNC: 141 MMOL/L (ref 135–145)
WBC # BLD AUTO: 5.7 K/UL (ref 4.8–10.8)

## 2022-11-26 PROCEDURE — 80048 BASIC METABOLIC PNL TOTAL CA: CPT

## 2022-11-26 PROCEDURE — 700105 HCHG RX REV CODE 258: Performed by: HOSPITALIST

## 2022-11-26 PROCEDURE — 700102 HCHG RX REV CODE 250 W/ 637 OVERRIDE(OP): Performed by: HOSPITALIST

## 2022-11-26 PROCEDURE — A9270 NON-COVERED ITEM OR SERVICE: HCPCS | Performed by: PSYCHIATRY & NEUROLOGY

## 2022-11-26 PROCEDURE — 93010 ELECTROCARDIOGRAM REPORT: CPT | Performed by: INTERNAL MEDICINE

## 2022-11-26 PROCEDURE — 700102 HCHG RX REV CODE 250 W/ 637 OVERRIDE(OP): Performed by: NURSE PRACTITIONER

## 2022-11-26 PROCEDURE — 99233 SBSQ HOSP IP/OBS HIGH 50: CPT | Performed by: NURSE PRACTITIONER

## 2022-11-26 PROCEDURE — 83735 ASSAY OF MAGNESIUM: CPT

## 2022-11-26 PROCEDURE — A9270 NON-COVERED ITEM OR SERVICE: HCPCS | Performed by: NURSE PRACTITIONER

## 2022-11-26 PROCEDURE — A9270 NON-COVERED ITEM OR SERVICE: HCPCS | Performed by: HOSPITALIST

## 2022-11-26 PROCEDURE — 84100 ASSAY OF PHOSPHORUS: CPT

## 2022-11-26 PROCEDURE — 770001 HCHG ROOM/CARE - MED/SURG/GYN PRIV*

## 2022-11-26 PROCEDURE — 85027 COMPLETE CBC AUTOMATED: CPT

## 2022-11-26 PROCEDURE — 700111 HCHG RX REV CODE 636 W/ 250 OVERRIDE (IP): Performed by: HOSPITALIST

## 2022-11-26 PROCEDURE — 700102 HCHG RX REV CODE 250 W/ 637 OVERRIDE(OP): Performed by: PSYCHIATRY & NEUROLOGY

## 2022-11-26 PROCEDURE — 99233 SBSQ HOSP IP/OBS HIGH 50: CPT | Performed by: HOSPITALIST

## 2022-11-26 RX ORDER — HALOPERIDOL 5 MG/ML
2 INJECTION INTRAMUSCULAR EVERY 4 HOURS PRN
Status: DISCONTINUED | OUTPATIENT
Start: 2022-11-26 | End: 2022-11-28

## 2022-11-26 RX ORDER — QUETIAPINE FUMARATE 25 MG/1
50 TABLET, FILM COATED ORAL NIGHTLY
Status: DISCONTINUED | OUTPATIENT
Start: 2022-11-26 | End: 2022-11-27

## 2022-11-26 RX ORDER — QUETIAPINE FUMARATE 25 MG/1
25 TABLET, FILM COATED ORAL EVERY MORNING
Status: DISCONTINUED | OUTPATIENT
Start: 2022-11-26 | End: 2022-11-27

## 2022-11-26 RX ORDER — AMOXICILLIN AND CLAVULANATE POTASSIUM 875; 125 MG/1; MG/1
1 TABLET, FILM COATED ORAL EVERY 12 HOURS
Status: DISCONTINUED | OUTPATIENT
Start: 2022-11-26 | End: 2022-12-02 | Stop reason: HOSPADM

## 2022-11-26 RX ADMIN — PSYLLIUM HUSK 1 PACKET: 3.4 POWDER ORAL at 17:41

## 2022-11-26 RX ADMIN — ATORVASTATIN CALCIUM 40 MG: 40 TABLET, FILM COATED ORAL at 17:41

## 2022-11-26 RX ADMIN — QUETIAPINE FUMARATE 25 MG: 25 TABLET ORAL at 17:41

## 2022-11-26 RX ADMIN — AMOXICILLIN AND CLAVULANATE POTASSIUM 1 TABLET: 875; 125 TABLET, FILM COATED ORAL at 13:23

## 2022-11-26 RX ADMIN — TAMSULOSIN HYDROCHLORIDE 0.4 MG: 0.4 CAPSULE ORAL at 21:40

## 2022-11-26 RX ADMIN — Medication 5 MG: at 21:40

## 2022-11-26 RX ADMIN — HEPARIN SODIUM 5000 UNITS: 5000 INJECTION, SOLUTION INTRAVENOUS; SUBCUTANEOUS at 21:39

## 2022-11-26 RX ADMIN — ASPIRIN 81 MG: 81 TABLET, COATED ORAL at 05:43

## 2022-11-26 RX ADMIN — DOCUSATE SODIUM 50 MG AND SENNOSIDES 8.6 MG 2 TABLET: 8.6; 5 TABLET, FILM COATED ORAL at 17:41

## 2022-11-26 RX ADMIN — DOCUSATE SODIUM 50 MG AND SENNOSIDES 8.6 MG 2 TABLET: 8.6; 5 TABLET, FILM COATED ORAL at 05:43

## 2022-11-26 RX ADMIN — LISINOPRIL 10 MG: 10 TABLET ORAL at 05:43

## 2022-11-26 RX ADMIN — PSYLLIUM HUSK 1 PACKET: 3.4 POWDER ORAL at 05:43

## 2022-11-26 RX ADMIN — HEPARIN SODIUM 5000 UNITS: 5000 INJECTION, SOLUTION INTRAVENOUS; SUBCUTANEOUS at 05:43

## 2022-11-26 RX ADMIN — LORATADINE 10 MG: 10 TABLET ORAL at 05:43

## 2022-11-26 RX ADMIN — CLOPIDOGREL BISULFATE 75 MG: 75 TABLET ORAL at 05:43

## 2022-11-26 RX ADMIN — HEPARIN SODIUM 5000 UNITS: 5000 INJECTION, SOLUTION INTRAVENOUS; SUBCUTANEOUS at 13:23

## 2022-11-26 RX ADMIN — SODIUM CHLORIDE: 9 INJECTION, SOLUTION INTRAVENOUS at 11:47

## 2022-11-26 ASSESSMENT — PAIN DESCRIPTION - PAIN TYPE
TYPE: ACUTE PAIN

## 2022-11-26 ASSESSMENT — ENCOUNTER SYMPTOMS
DIZZINESS: 0
SHORTNESS OF BREATH: 0
VOMITING: 0
CHILLS: 0
HEADACHES: 0
NAUSEA: 0
ABDOMINAL PAIN: 0
WEAKNESS: 0
FEVER: 0
BLURRED VISION: 0

## 2022-11-26 NOTE — PROGRESS NOTES
Critical Care Progress Note    Date of admission  11/23/2022    Chief Complaint  88 y.o. 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  Apparently he was eating with his family when they noticed Ion suddenly developed difficulty speaking, slurred speech, confusion and slight right-sided weakness.  Upon arrival to [facility] NIHSS of 2: CT of head was negative for any bleeding no LVO, no large territory stroke,however, he did have evidence of moderate to severe stenosis of his left cervical carotid artery.  MRI brain showed chronic lacunar infarct in the left thalamus echocardiogram did not show any structural cardiac disease, and there was no evidence of atrial fibrillation.  On 11/25/2022 he underwent IR guided left carotid stent placement postoperatively he was transferred to  for strict BP control and continuous critical care management    Interval Problem Update  Reviewed last 24 hour events:    -Agitated and confused Precedx initiated yesterday afternoon-titrated off this a.m.  -Seroquel started at night -can consider lower dose this a.m.  -Nicardipine weaned off last night- briefly turned back on this am for SBP greater than 140 however no PRN antihypertensives were administered prior to initiating nicardipine infusion.  Patient also did not require any IVP antihypertensives last night  -T max 98.8  -1 L NC  -I/O- +500  Pt no longer requires critical care management and will be transferred to the floor. Discussed with my attending, Dr. Butler and the Hospitalist, Dr. Peña who will assume care. Please contact Critical Care service for any questions and or  concerns  -Antibiotics for possible uvulitis    Answering some ROS- confused with conversation   Review of Systems  Review of Systems   Constitutional:  Negative for chills and fever.   HENT:  Positive for hearing loss.    Eyes:  Negative for blurred vision.   Respiratory:  Negative for shortness of breath.    Cardiovascular:  Negative for chest pain.   Gastrointestinal:  Negative for abdominal pain, nausea and vomiting.   Skin:  Negative for rash.   Neurological:  Negative for dizziness, weakness and headaches.      Vital Signs for last 24 hours   Temp:  [36.2 °C (97.2 °F)-36.6 °C (97.8 °F)] 36.2 °C (97.2 °F)  Pulse:  [] 54  Resp:  [10-93] 16  BP: ()/() 128/60  SpO2:  [88 %-100 %] 95 %    Hemodynamic parameters for last 24 hours       Respiratory Information for the last 24 hours       Physical Exam   Physical Exam  Vitals and nursing note reviewed.   HENT:      Head: Normocephalic and atraumatic.      Mouth/Throat:      Mouth: Mucous membranes are moist.      Pharynx: Oropharynx is clear.   Eyes:      Extraocular Movements: Extraocular movements intact.      Pupils: Pupils are equal, round, and reactive to light.   Cardiovascular:      Rate and Rhythm: Normal rate and regular rhythm.      Pulses:           Radial pulses are 2+ on the right side and 2+ on the left side.        Dorsalis pedis pulses are 1+ on the right side and 1+ on the left side.      Heart sounds: Murmur heard.      Comments: Right groin cath site WNL  Pulmonary:      Effort: No respiratory distress.      Breath sounds: Normal breath sounds and air entry.   Abdominal:      General: Abdomen is flat.      Palpations: Abdomen is soft.   Genitourinary:     Comments: voiding  Musculoskeletal:      Cervical back: Full passive range of motion without pain and normal range of motion.   Skin:     General: Skin is warm.      Capillary Refill: Capillary refill takes 2 to 3 seconds.   Neurological:      Comments: RAJINDER 2mm  Sleeping however  wakes to stimuli  Oriented to person, place however confused to time and event- also confused with conversation.   Following commands  MAEE and strong     Psychiatric:         Behavior: Behavior is cooperative.      Comments: Sleeping  Cooperative and confused       Medications  Current Facility-Administered Medications   Medication Dose Route Frequency Provider Last Rate Last Admin    dexmedetomidine (PRECEDEX) 400 mcg/100mL NS premix infusion  0.1-1.5 mcg/kg/hr (Ideal) Intravenous Continuous Ailin Freire A.P.R.N.   Paused at 11/26/22 0615    melatonin tablet 5 mg  5 mg Oral Nightly Ailin Freire A.P.R.N.   5 mg at 11/25/22 2103    labetalol (NORMODYNE/TRANDATE) injection 10-20 mg  10-20 mg Intravenous Q4HRS PRN Ailin Freire A.P.R.N.        hydrALAZINE (APRESOLINE) injection 10-20 mg  10-20 mg Intravenous Q4HRS PRN Ailin Freire A.P.R.N.        QUEtiapine (Seroquel) tablet 25 mg  25 mg Oral BID Odalis Grimm A.P.R.N.   25 mg at 11/25/22 2103    aspirin EC (ECOTRIN) tablet 81 mg  81 mg Oral DAILY Benedict Boss M.D.   81 mg at 11/26/22 0543    clopidogrel (PLAVIX) tablet 75 mg  75 mg Oral DAILY Benedict Boss M.D.   75 mg at 11/26/22 0543    tamsulosin (FLOMAX) capsule 0.4 mg  0.4 mg Oral QHS Josesito Catalan M.D.   0.4 mg at 11/25/22 2103    psyllium (METAMUCIL/KONSYL) 1 Packet  1 Packet Oral BID Josesito Catalan M.D.   1 Packet at 11/26/22 0543    loratadine (CLARITIN) tablet 10 mg  10 mg Oral DAILY Josesito Catalan M.D.   10 mg at 11/26/22 0543    lisinopril (PRINIVIL) tablet 10 mg  10 mg Oral DAILY GUILLERMO Rivera.P.R.N.   10 mg at 11/26/22 0543    atorvastatin (LIPITOR) tablet 40 mg  40 mg Oral Q EVENING Josesito Catalan M.D.   40 mg at 11/25/22 1725    senna-docusate (PERICOLACE or SENOKOT S) 8.6-50 MG per tablet 2 Tablet  2 Tablet Oral BID Josesito Catalan M.D.   2 Tablet at 11/26/22 0543    And    polyethylene glycol/lytes (MIRALAX) PACKET 1 Packet  1 Packet Oral QDAY PRN Josesito Catalan M.D.        And    magnesium hydroxide (MILK OF  MAGNESIA) suspension 30 mL  30 mL Oral QDAY PRN Josesito Catalan M.D.        And    bisacodyl (DULCOLAX) suppository 10 mg  10 mg Rectal QDAY PRN Josesito Catalan M.D.        NS infusion   Intravenous Continuous Josesito Catalan M.D. 75 mL/hr at 11/26/22 1147 New Bag at 11/26/22 1147    heparin injection 5,000 Units  5,000 Units Subcutaneous Q8HRS Josesito Catalan M.D.   5,000 Units at 11/26/22 0543    acetaminophen (Tylenol) tablet 650 mg  650 mg Oral Q6HRS PRN Josesito Catalan M.D.        ondansetron (ZOFRAN) syringe/vial injection 4 mg  4 mg Intravenous Q4HRS PRN Josesito Catalan M.D.        ondansetron (ZOFRAN ODT) dispertab 4 mg  4 mg Oral Q4HRS PRN Josesito Catalan M.D.           Fluids    Intake/Output Summary (Last 24 hours) at 11/26/2022 1212  Last data filed at 11/26/2022 1100  Gross per 24 hour   Intake 2546.92 ml   Output 575 ml   Net 1971.92 ml       Laboratory          Recent Labs     11/23/22  1423 11/24/22 0352 11/26/22  0032   SODIUM 137 138 141   POTASSIUM 4.5 4.4 4.2   CHLORIDE 103 104 111   CO2 26 25 21   BUN 20 18 21   CREATININE 1.35 1.12 1.04   MAGNESIUM  --   --  2.0   PHOSPHORUS  --  3.0 3.1   CALCIUM 9.1 9.1 7.9*     Recent Labs     11/23/22  1423 11/24/22  0352 11/26/22  0032   ALTSGPT 19  --   --    ASTSGOT 20  --   --    ALKPHOSPHAT 98  --   --    TBILIRUBIN 0.4  --   --    GLUCOSE 104* 86 109*     Recent Labs     11/23/22  1423 11/24/22 0352 11/26/22  0032   WBC 5.4 6.2 5.7   NEUTSPOLYS 77.90*  --   --    LYMPHOCYTES 8.60*  --   --    MONOCYTES 9.00  --   --    EOSINOPHILS 3.70  --   --    BASOPHILS 0.60  --   --    ASTSGOT 20  --   --    ALTSGPT 19  --   --    ALKPHOSPHAT 98  --   --    TBILIRUBIN 0.4  --   --      Recent Labs     11/23/22  1423 11/24/22  0352 11/26/22  0032   RBC 4.62* 4.63* 4.24*   HEMOGLOBIN 14.6 14.7 13.5*   HEMATOCRIT 43.2 43.2 39.2*   PLATELETCT 173 179 159*   PROTHROMBTM 15.5*  --   --    APTT 31.2  --   --    INR 1.25*  --   --        Imaging  X-Ray:  I have personally reviewed the images and  compared with prior images.  CT:    Reviewed    Assessment/Plan  Primary hypertension  Assessment & Plan  History of  -SBP goal in acute setting 100-1 40-IVP antihypertensives as needed  -Long-term blood pressure goal 110-130/60-80  -Restarted p.o. lisinopril    Uvulitis  Assessment & Plan  -Pt with sore throat and redness with some edema to uvula  -Will start ABX to cover-continuous reassessment of signs and symptoms    Carotid stenosis, left- (present on admission)  Assessment & Plan  -11/25 underwent IR guided left carotid stent placement  for the treatment of symptomatic left carotid stenosis  -SBP goal while in acute setting 850-126-omlaay with p.o. antihypertensives, IVP antihypertensives, Cardene infusion  -Long-term blood pressure goal of 110-130/60-80  -Continue Plavix -75 mg daily x8 weeks  -Continue ASA 81 mg daily  -Continue atorvastatin 40 mg daily-LDL goal is less than 70  -Okay with neurology to defer echo and long-term outpatient cardiac monitoring since etiology is less likely to be cardio embolic  -Okay to start Lovenox/heparin   for DVT prophylaxis  -[facility] for follow-up  -PT/OT/SLP         Constipation, slow transit- (present on admission)  Assessment & Plan  BM protocol      History of CVA (cerebrovascular accident)- (present on admission)  Assessment & Plan  -4/21 Stroke in multiple vascular territories , s/p right CEA on Xarelto 2.5 mg -Continue ASA, Plavix, high density statin.     Agitation  Assessment & Plan  11/25-postoperatively pt  grabbing and pulling at wires, attempting to get out of bed with strict bedrest despite reorienting and education. Pt post IR left carotid stent placement with right fem artery access site.  Precedex infusion started however weaned off this a.m-Seroquel initiated last night and will consider lower dose Seroquel during the day.   melatonin also added to help with sleep and sleep wake cycle.     BPH (benign prostatic hyperplasia)- (present on  admission)  Assessment & Plan  -Continue Flomax once taking PO  -Bladder scan for low UO  -Bhatia cath for evidence of retention    Mixed hyperlipidemia- (present on admission)  Assessment & Plan  -Continue high intensity statin-Atorvastatin 80 mg        VTE:  Heparin  Ulcer: Not Indicated  Lines: None    I have performed a physical exam and reviewed and updated ROS and Plan today (11/26/2022). In review of yesterday's note (11/25/2022), there are no changes except as documented above.     Discussed patient condition and risk of morbidity and/or mortality with Family, RN, RT, Therapies, Pharmacy, Charge nurse / hot rounds, Patient, and neurology  .  Please note that this dictation was created using voice recognition software. The accuracy of the dictation is limited to the abilities of the software. I have made every reasonable attempt to correct obvious errors, but I expect that there are errors of grammar and possibly content that I did not discover before finalizing the note.

## 2022-11-26 NOTE — CARE PLAN
The patient is Stable - Low risk of patient condition declining or worsening    Shift Goals  Clinical Goals: -140  Patient Goals: Reorient to reality  Family Goals: Support    Progress made toward(s) clinical / shift goals:    Problem: Optimal Care of the Stroke Patient  Goal: Optimal emergency care for the stroke patient  Outcome: Progressing  Goal: Optimal acute care for the stroke patient  Outcome: Progressing     Problem: Knowledge Deficit - Stroke Education  Goal: Patient's knowledge of stroke and risk factors will improve  Outcome: Progressing     Problem: Psychosocial - Patient Condition  Goal: Patient's ability to verbalize feelings about condition will improve  Outcome: Progressing  Goal: Patient's ability to re-evaluate and adapt role responsibilities will improve  Outcome: Progressing     Problem: Discharge Planning - Stroke  Goal: Ensure Stroke Core Measures are met prior to discharge  Outcome: Progressing  Goal: Patient’s continuum of care needs will be met  Outcome: Progressing     Problem: Neuro Status  Goal: Neuro status will remain stable or improve  Outcome: Progressing     Problem: Hemodynamic Monitoring  Goal: Patient's hemodynamics, fluid balance and neurologic status will be stable or improve  Outcome: Progressing     Problem: Respiratory - Stroke Patient  Goal: Patient will achieve/maintain optimum respiratory rate/effort  Outcome: Progressing     Problem: Dysphagia  Goal: Dysphagia will improve  Outcome: Progressing     Problem: Risk for Aspiration  Goal: Patient's risk for aspiration will be absent or decrease  Outcome: Progressing     Problem: Urinary Elimination  Goal: Establish and maintain regular urinary output  Outcome: Progressing     Problem: Bowel Elimination  Goal: Establish and maintain regular bowel function  Outcome: Progressing     Problem: Mobility - Stroke  Goal: Patient's capacity to carry out activities will improve  Outcome: Progressing  Goal: Spasticity will be  prevented or improved  Outcome: Progressing  Goal: Subluxation will be prevented or improved  Outcome: Progressing     Problem: Self Care  Goal: Patient will have the ability to perform ADLs independently or with assistance (bathe, groom, dress, toilet and feed)  Outcome: Progressing     Problem: Knowledge Deficit - Standard  Goal: Patient and family/care givers will demonstrate understanding of plan of care, disease process/condition, diagnostic tests and medications  Outcome: Progressing     Problem: Fall Risk  Goal: Patient will remain free from falls  Outcome: Progressing     Problem: Skin Integrity  Goal: Skin integrity is maintained or improved  Outcome: Progressing     Problem: Safety - Medical Restraint  Goal: Remains free of injury from restraints (Restraint for Interference with Medical Device)  Outcome: Progressing  Goal: Free from restraint(s) (Restraint for Interference with Medical Device)  Outcome: Progressing       Patient is not progressing towards the following goals:

## 2022-11-26 NOTE — ASSESSMENT & PLAN NOTE
-Pt with sore throat and redness with some edema to uvula  -Will start ABX to cover-continuous reassessment of signs and symptoms

## 2022-11-26 NOTE — CARE PLAN
The patient is Stable - Low risk of patient condition declining or worsening    Shift Goals  Clinical Goals: SBP < 140  Patient Goals: Stable neuro status  Family Goals: Communication    Progress made toward(s) clinical / shift goals:    Problem: Optimal Care of the Stroke Patient  Goal: Optimal emergency care for the stroke patient  Outcome: Progressing  Goal: Optimal acute care for the stroke patient  Outcome: Progressing     Problem: Knowledge Deficit - Stroke Education  Goal: Patient's knowledge of stroke and risk factors will improve  Outcome: Progressing     Problem: Psychosocial - Patient Condition  Goal: Patient's ability to verbalize feelings about condition will improve  Outcome: Progressing  Goal: Patient's ability to re-evaluate and adapt role responsibilities will improve  Outcome: Progressing     Problem: Discharge Planning - Stroke  Goal: Ensure Stroke Core Measures are met prior to discharge  Outcome: Progressing  Goal: Patient’s continuum of care needs will be met  Outcome: Progressing     Problem: Neuro Status  Goal: Neuro status will remain stable or improve  Outcome: Progressing     Problem: Hemodynamic Monitoring  Goal: Patient's hemodynamics, fluid balance and neurologic status will be stable or improve  Outcome: Progressing     Problem: Respiratory - Stroke Patient  Goal: Patient will achieve/maintain optimum respiratory rate/effort  Outcome: Progressing     Problem: Dysphagia  Goal: Dysphagia will improve  Outcome: Progressing     Problem: Risk for Aspiration  Goal: Patient's risk for aspiration will be absent or decrease  Outcome: Progressing     Problem: Urinary Elimination  Goal: Establish and maintain regular urinary output  Outcome: Progressing     Problem: Bowel Elimination  Goal: Establish and maintain regular bowel function  Outcome: Progressing     Problem: Mobility - Stroke  Goal: Patient's capacity to carry out activities will improve  Outcome: Progressing  Goal: Spasticity will be  prevented or improved  Outcome: Progressing  Goal: Subluxation will be prevented or improved  Outcome: Progressing     Problem: Self Care  Goal: Patient will have the ability to perform ADLs independently or with assistance (bathe, groom, dress, toilet and feed)  Outcome: Progressing     Problem: Knowledge Deficit - Standard  Goal: Patient and family/care givers will demonstrate understanding of plan of care, disease process/condition, diagnostic tests and medications  Outcome: Progressing     Problem: Fall Risk  Goal: Patient will remain free from falls  Outcome: Progressing     Problem: Skin Integrity  Goal: Skin integrity is maintained or improved  Outcome: Progressing     Problem: Safety - Medical Restraint  Goal: Remains free of injury from restraints (Restraint for Interference with Medical Device)  Outcome: Progressing  Goal: Free from restraint(s) (Restraint for Interference with Medical Device)  Outcome: Progressing       Patient is not progressing towards the following goals:

## 2022-11-26 NOTE — ASSESSMENT & PLAN NOTE
History of  -SBP goal in acute setting 100-1 40-IVP antihypertensives as needed  -Long-term blood pressure goal 110-130/60-80  -Restarted p.o. lisinopril

## 2022-11-27 LAB
ANION GAP SERPL CALC-SCNC: 9 MMOL/L (ref 7–16)
BUN SERPL-MCNC: 29 MG/DL (ref 8–22)
CALCIUM SERPL-MCNC: 8.6 MG/DL (ref 8.5–10.5)
CHLORIDE SERPL-SCNC: 110 MMOL/L (ref 96–112)
CO2 SERPL-SCNC: 22 MMOL/L (ref 20–33)
CREAT SERPL-MCNC: 1.09 MG/DL (ref 0.5–1.4)
ERYTHROCYTE [DISTWIDTH] IN BLOOD BY AUTOMATED COUNT: 45.6 FL (ref 35.9–50)
GFR SERPLBLD CREATININE-BSD FMLA CKD-EPI: 65 ML/MIN/1.73 M 2
GLUCOSE SERPL-MCNC: 77 MG/DL (ref 65–99)
HCT VFR BLD AUTO: 41 % (ref 42–52)
HGB BLD-MCNC: 13.8 G/DL (ref 14–18)
MAGNESIUM SERPL-MCNC: 2.1 MG/DL (ref 1.5–2.5)
MCH RBC QN AUTO: 31.9 PG (ref 27–33)
MCHC RBC AUTO-ENTMCNC: 33.7 G/DL (ref 33.7–35.3)
MCV RBC AUTO: 94.9 FL (ref 81.4–97.8)
PHOSPHATE SERPL-MCNC: 3 MG/DL (ref 2.5–4.5)
PLATELET # BLD AUTO: 164 K/UL (ref 164–446)
PMV BLD AUTO: 10.8 FL (ref 9–12.9)
POTASSIUM SERPL-SCNC: 4.5 MMOL/L (ref 3.6–5.5)
RBC # BLD AUTO: 4.32 M/UL (ref 4.7–6.1)
SODIUM SERPL-SCNC: 141 MMOL/L (ref 135–145)
WBC # BLD AUTO: 5.9 K/UL (ref 4.8–10.8)

## 2022-11-27 PROCEDURE — 700101 HCHG RX REV CODE 250: Performed by: NURSE PRACTITIONER

## 2022-11-27 PROCEDURE — 84100 ASSAY OF PHOSPHORUS: CPT

## 2022-11-27 PROCEDURE — 700102 HCHG RX REV CODE 250 W/ 637 OVERRIDE(OP): Performed by: PSYCHIATRY & NEUROLOGY

## 2022-11-27 PROCEDURE — 80048 BASIC METABOLIC PNL TOTAL CA: CPT

## 2022-11-27 PROCEDURE — 700102 HCHG RX REV CODE 250 W/ 637 OVERRIDE(OP): Performed by: HOSPITALIST

## 2022-11-27 PROCEDURE — 700102 HCHG RX REV CODE 250 W/ 637 OVERRIDE(OP): Performed by: NURSE PRACTITIONER

## 2022-11-27 PROCEDURE — 770001 HCHG ROOM/CARE - MED/SURG/GYN PRIV*

## 2022-11-27 PROCEDURE — A9270 NON-COVERED ITEM OR SERVICE: HCPCS | Performed by: PSYCHIATRY & NEUROLOGY

## 2022-11-27 PROCEDURE — 99233 SBSQ HOSP IP/OBS HIGH 50: CPT | Performed by: STUDENT IN AN ORGANIZED HEALTH CARE EDUCATION/TRAINING PROGRAM

## 2022-11-27 PROCEDURE — A9270 NON-COVERED ITEM OR SERVICE: HCPCS | Performed by: NURSE PRACTITIONER

## 2022-11-27 PROCEDURE — 85027 COMPLETE CBC AUTOMATED: CPT

## 2022-11-27 PROCEDURE — A9270 NON-COVERED ITEM OR SERVICE: HCPCS | Performed by: HOSPITALIST

## 2022-11-27 PROCEDURE — 700111 HCHG RX REV CODE 636 W/ 250 OVERRIDE (IP): Performed by: NURSE PRACTITIONER

## 2022-11-27 PROCEDURE — 83735 ASSAY OF MAGNESIUM: CPT

## 2022-11-27 PROCEDURE — 700111 HCHG RX REV CODE 636 W/ 250 OVERRIDE (IP): Performed by: HOSPITALIST

## 2022-11-27 RX ORDER — QUETIAPINE FUMARATE 25 MG/1
50 TABLET, FILM COATED ORAL
Status: DISCONTINUED | OUTPATIENT
Start: 2022-11-27 | End: 2022-11-29

## 2022-11-27 RX ADMIN — HYDRALAZINE HYDROCHLORIDE 20 MG: 20 INJECTION INTRAMUSCULAR; INTRAVENOUS at 20:16

## 2022-11-27 RX ADMIN — TAMSULOSIN HYDROCHLORIDE 0.4 MG: 0.4 CAPSULE ORAL at 20:16

## 2022-11-27 RX ADMIN — DOCUSATE SODIUM 50 MG AND SENNOSIDES 8.6 MG 2 TABLET: 8.6; 5 TABLET, FILM COATED ORAL at 05:49

## 2022-11-27 RX ADMIN — HEPARIN SODIUM 5000 UNITS: 5000 INJECTION, SOLUTION INTRAVENOUS; SUBCUTANEOUS at 22:06

## 2022-11-27 RX ADMIN — PSYLLIUM HUSK 1 PACKET: 3.4 POWDER ORAL at 05:50

## 2022-11-27 RX ADMIN — LABETALOL HYDROCHLORIDE 20 MG: 5 INJECTION, SOLUTION INTRAVENOUS at 12:08

## 2022-11-27 RX ADMIN — LORATADINE 10 MG: 10 TABLET ORAL at 05:49

## 2022-11-27 RX ADMIN — HEPARIN SODIUM 5000 UNITS: 5000 INJECTION, SOLUTION INTRAVENOUS; SUBCUTANEOUS at 13:53

## 2022-11-27 RX ADMIN — PSYLLIUM HUSK 1 PACKET: 3.4 POWDER ORAL at 17:10

## 2022-11-27 RX ADMIN — LABETALOL HYDROCHLORIDE 20 MG: 5 INJECTION, SOLUTION INTRAVENOUS at 16:35

## 2022-11-27 RX ADMIN — CLOPIDOGREL BISULFATE 75 MG: 75 TABLET ORAL at 05:49

## 2022-11-27 RX ADMIN — ASPIRIN 81 MG: 81 TABLET, COATED ORAL at 05:49

## 2022-11-27 RX ADMIN — AMOXICILLIN AND CLAVULANATE POTASSIUM 1 TABLET: 875; 125 TABLET, FILM COATED ORAL at 17:10

## 2022-11-27 RX ADMIN — QUETIAPINE FUMARATE 50 MG: 25 TABLET ORAL at 00:58

## 2022-11-27 RX ADMIN — LISINOPRIL 10 MG: 10 TABLET ORAL at 05:49

## 2022-11-27 RX ADMIN — DOCUSATE SODIUM 50 MG AND SENNOSIDES 8.6 MG 2 TABLET: 8.6; 5 TABLET, FILM COATED ORAL at 17:10

## 2022-11-27 RX ADMIN — HEPARIN SODIUM 5000 UNITS: 5000 INJECTION, SOLUTION INTRAVENOUS; SUBCUTANEOUS at 05:44

## 2022-11-27 RX ADMIN — ATORVASTATIN CALCIUM 40 MG: 40 TABLET, FILM COATED ORAL at 17:10

## 2022-11-27 RX ADMIN — AMOXICILLIN AND CLAVULANATE POTASSIUM 1 TABLET: 875; 125 TABLET, FILM COATED ORAL at 05:49

## 2022-11-27 RX ADMIN — LABETALOL HYDROCHLORIDE 10 MG: 5 INJECTION, SOLUTION INTRAVENOUS at 23:06

## 2022-11-27 ASSESSMENT — ENCOUNTER SYMPTOMS
CHILLS: 0
COUGH: 0
SHORTNESS OF BREATH: 0
HEADACHES: 0
PALPITATIONS: 0
VOMITING: 0
BLURRED VISION: 0
FALLS: 0
ABDOMINAL PAIN: 0
FEVER: 0
BACK PAIN: 0
SENSORY CHANGE: 0
INSOMNIA: 0
DIZZINESS: 0
FOCAL WEAKNESS: 0
NAUSEA: 0
EYE PAIN: 0

## 2022-11-27 ASSESSMENT — LIFESTYLE VARIABLES: SUBSTANCE_ABUSE: 0

## 2022-11-27 ASSESSMENT — PAIN DESCRIPTION - PAIN TYPE
TYPE: ACUTE PAIN
TYPE: ACUTE PAIN

## 2022-11-27 ASSESSMENT — FIBROSIS 4 INDEX: FIB4 SCORE: 2.46

## 2022-11-27 NOTE — PROGRESS NOTES
"Hospital Medicine Daily Progress Note    Date of Service  11/26/2022    Chief Complaint  Cooper Harvey is a 88 y.o. male admitted 11/23/2022 with speech difficulties and right-sided weakness    Hospital Course  88-year-old male with a past medical history of stroke, status post right carotid endarterectomy, hypertension, hyperlipidemia who presented with right-sided weakness and speech difficulties.  He underwent CTA head and neck that revealed significant stenosis of left common carotid artery bifurcation and proximal left ICA.  MRI brain revealed no acute infarct but revealed lacunar infarct in the left thalamus.  Patient underwent IR guided left carotid stent placement on 11/25 and was thereafter monitored in ICU.      Interval Problem Update  No acute events overnight  Weaned off of Precedex drip due to agitation and confusion  Patient's BP remained well controlled  He is feeling well, he is very hard of hearing    I have discussed this patient's plan of care and discharge plan at IDT rounds today with Case Management, Nursing, Nursing leadership, and other members of the IDT team.    Consultants/Specialty  neurology    Code Status  Full Code    Disposition  Patient is not medically cleared for discharge.   Anticipate discharge to to home with close outpatient follow-up.  I have placed the appropriate orders for post-discharge needs.    Review of Systems  Review of Systems   Unable to perform ROS: Other (pt extremely hard of hearing. however states he is doing \"ok\")      Physical Exam  Temp:  [36.2 °C (97.1 °F)-37 °C (98.6 °F)] 37 °C (98.6 °F)  Pulse:  [40-72] 54  Resp:  [10-46] 23  BP: ()/(50-80) 130/60  SpO2:  [93 %-100 %] 95 %    Physical Exam  Vitals and nursing note reviewed.   Constitutional:       Appearance: Normal appearance.   Cardiovascular:      Rate and Rhythm: Normal rate and regular rhythm.      Heart sounds: No murmur heard.  Pulmonary:      Effort: Pulmonary effort is normal. No " respiratory distress.      Breath sounds: Normal breath sounds.   Neurological:      General: No focal deficit present.      Mental Status: He is alert and oriented to person, place, and time.       Fluids    Intake/Output Summary (Last 24 hours) at 11/26/2022 1719  Last data filed at 11/26/2022 1600  Gross per 24 hour   Intake 2116.03 ml   Output 1145 ml   Net 971.03 ml       Laboratory  Recent Labs     11/24/22 0352 11/26/22 0032   WBC 6.2 5.7   RBC 4.63* 4.24*   HEMOGLOBIN 14.7 13.5*   HEMATOCRIT 43.2 39.2*   MCV 93.3 92.5   MCH 31.7 31.8   MCHC 34.0 34.4   RDW 45.1 43.8   PLATELETCT 179 159*   MPV 10.7 10.2     Recent Labs     11/24/22 0352 11/26/22 0032   SODIUM 138 141   POTASSIUM 4.4 4.2   CHLORIDE 104 111   CO2 25 21   GLUCOSE 86 109*   BUN 18 21   CREATININE 1.12 1.04   CALCIUM 9.1 7.9*               Recent Labs     11/24/22 0352   TRIGLYCERIDE 83   HDL 46   LDL 44       Imaging  IR-NEURO INTERVENTIONAL CONSULT-IP   Final Result      1.  Symptomatic left carotid stenosis.   2.  Successful left carotid stent placement with embolic protection device.   3.  Widely patent right internal carotid artery-history of right carotid endarterectomy.      MR-BRAIN-W/O   Final Result      1.  No acute infarct.   2.  Chronic punctate microhemorrhage in the left parieto-occipital region.   3.  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 This is stable since the previous MRI dated 4/13/2021. This is noted in the previous CT angiogram dated 4/01/2021.   4.  Chronic lacunar infarct in the left thalamus.   5.  Mild cerebral volume loss.   6.  Mild chronic microvascular ischemic disease.      DX-CHEST-PORTABLE (1 VIEW)   Final Result      1.  There are changes of mild vascular congestion.      CT-CTA HEAD WITH & W/O-POST PROCESS   Final Result      CT angiogram of the [place] within normal limits.      CT-CTA NECK WITH & W/O-POST PROCESSING   Final Result      1.  Previous  right-sided carotid endarterectomy without evidence of significant stenosis or occlusion.      2.  Moderate irregular calcific atherosclerotic plaquing of the left common carotid artery bifurcation and proximal left internal carotid artery with stenosis of approximately 50%.      CT-CEREBRAL PERFUSION ANALYSIS   Final Result      1.  Cerebral blood flow less than 30% which could represent completed infarct = 5 mL. However this is not seen on the T Max greater than 6 seconds so it likely is artifactual      2.  T Max more than 6 seconds = 0 mL.      3.  Please note that the cerebral perfusion was performed on the limited brain tissue around the basal ganglia region. Infarct/ischemia outside the CT perfusion sections can be missed in this study.      CT-HEAD W/O   Final Result      No noncontrast CT evidence of acute intracranial hemorrhage.      Moderate white matter hypodensity is present.  This is a nonspecific finding which usually is found to represent chronic microvascular disease in patient's of this demographic.  Demyelination, age indeterminant ischemia and gliosis are also common    possibilities.      Chronic left basal ganglia and thalamic lacunar infarctions      Age-appropriate atrophy           Assessment/Plan  * Left carotid stenosis- (present on admission)  Assessment & Plan  Severe and symptomatic  S/p L carotid stent placement on 11/25    Reddish colored urine- (present on admission)  Assessment & Plan  Red-tinged urine Seen in ED  Patient denies dysuria although he is not a good historian due to speech difficulty  Urinalysis reveals >150 RBC  Check KUB ultrasound      Difficulty with speech- (present on admission)  Assessment & Plan  Transient speech difficulty  May be TIA versus stroke  Neurologist Dr. Boss consulted  Stroke protocol  No echo per neurology    Constipation, slow transit- (present on admission)  Assessment & Plan  Family says stool softeners have not worked at home but Metamucil  has  Bowel protocol plus Metamucil    History of CVA (cerebrovascular accident)- (present on admission)  Assessment & Plan  Noted in the past  Continue aspirin, plavix and high intensity statin    BPH (benign prostatic hyperplasia)- (present on admission)  Assessment & Plan  Continue tamsulosin    Mixed hyperlipidemia- (present on admission)  Assessment & Plan  Continue atorvastatin         VTE prophylaxis: enoxaparin ppx    I have performed a physical exam and reviewed and updated ROS and Plan today (11/26/2022). In review of yesterday's note (11/25/2022), there are no changes except as documented above.

## 2022-11-27 NOTE — PROGRESS NOTES
Hospital Medicine Daily Progress Note    Date of Service  11/27/2022    Chief Complaint  Cooper Harvey is a 88 y.o. male admitted 11/23/2022 with speech difficulties and right-sided weakness    Hospital Course  88-year-old male with a past medical history of stroke, status post right carotid endarterectomy, hypertension, hyperlipidemia who presented with right-sided weakness and speech difficulties.  He underwent CTA head and neck that revealed significant stenosis of left common carotid artery bifurcation and proximal left ICA.  MRI brain revealed no acute infarct but revealed lacunar infarct in the left thalamus.  Patient underwent IR guided left carotid stent placement on 11/25 and was thereafter monitored in ICU.      Interval Problem Update  No acute events overnight  Patient feeling well, has no complaints. He is alert and oriented to self, place, situation.  Out of restraints.  BP well controlled, continue lisinopril.  On plavix for 8 weeks, aspirin, statin.  R sided strength intact.  PT/OT evaluation for disposition planning.  On 1L oxygen, continue to wean.    I have discussed this patient's plan of care and discharge plan at IDT rounds today with Case Management, Nursing, Nursing leadership, and other members of the IDT team.    Consultants/Specialty  neurology    Code Status  Full Code    Disposition  Patient is not medically cleared for discharge.   Anticipate discharge to to home with close outpatient follow-up.  I have placed the appropriate orders for post-discharge needs.    Review of Systems  Review of Systems   Constitutional:  Negative for chills and fever.   Eyes:  Negative for blurred vision and pain.   Respiratory:  Negative for cough and shortness of breath.    Cardiovascular:  Negative for chest pain, palpitations and leg swelling.   Gastrointestinal:  Negative for abdominal pain, nausea and vomiting.   Genitourinary:  Negative for dysuria and urgency.   Musculoskeletal:  Negative  for back pain and falls.   Skin:  Negative for itching and rash.   Neurological:  Negative for dizziness, sensory change, focal weakness and headaches.   Psychiatric/Behavioral:  Negative for substance abuse. The patient does not have insomnia.       Physical Exam  Temp:  [36.2 °C (97.1 °F)-37 °C (98.6 °F)] 36.9 °C (98.5 °F)  Pulse:  [] 77  Resp:  [15-35] 22  BP: ()/(56-75) 137/75  SpO2:  [93 %-97 %] 95 %    Physical Exam  Vitals and nursing note reviewed.   Constitutional:       General: He is not in acute distress.     Appearance: Normal appearance. He is not ill-appearing.   HENT:      Head: Normocephalic and atraumatic.      Right Ear: External ear normal.      Left Ear: External ear normal.      Mouth/Throat:      Pharynx: No oropharyngeal exudate or posterior oropharyngeal erythema.   Eyes:      Extraocular Movements: Extraocular movements intact.      Pupils: Pupils are equal, round, and reactive to light.   Cardiovascular:      Rate and Rhythm: Normal rate and regular rhythm.      Pulses: Normal pulses.      Heart sounds: Normal heart sounds. No murmur heard.  Pulmonary:      Effort: Pulmonary effort is normal. No respiratory distress.      Breath sounds: Normal breath sounds.   Abdominal:      General: Bowel sounds are normal.      Palpations: Abdomen is soft.   Musculoskeletal:         General: No swelling or tenderness.      Cervical back: Normal range of motion and neck supple.   Skin:     General: Skin is warm and dry.   Neurological:      General: No focal deficit present.      Mental Status: He is alert and oriented to person, place, and time.      Cranial Nerves: No cranial nerve deficit.      Sensory: No sensory deficit.      Motor: No weakness.   Psychiatric:         Mood and Affect: Mood normal.         Behavior: Behavior normal.       Fluids    Intake/Output Summary (Last 24 hours) at 11/27/2022 1101  Last data filed at 11/27/2022 1000  Gross per 24 hour   Intake 652.15 ml   Output  800 ml   Net -147.85 ml       Laboratory  Recent Labs     11/26/22  0032 11/27/22  0528   WBC 5.7 5.9   RBC 4.24* 4.32*   HEMOGLOBIN 13.5* 13.8*   HEMATOCRIT 39.2* 41.0*   MCV 92.5 94.9   MCH 31.8 31.9   MCHC 34.4 33.7   RDW 43.8 45.6   PLATELETCT 159* 164   MPV 10.2 10.8     Recent Labs     11/26/22 0032 11/27/22  0528   SODIUM 141 141   POTASSIUM 4.2 4.5   CHLORIDE 111 110   CO2 21 22   GLUCOSE 109* 77   BUN 21 29*   CREATININE 1.04 1.09   CALCIUM 7.9* 8.6                       Imaging  IR-NEURO INTERVENTIONAL CONSULT-IP   Final Result      1.  Symptomatic left carotid stenosis.   2.  Successful left carotid stent placement with embolic protection device.   3.  Widely patent right internal carotid artery-history of right carotid endarterectomy.      MR-BRAIN-W/O   Final Result      1.  No acute infarct.   2.  Chronic punctate microhemorrhage in the left parieto-occipital region.   3.  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 This is stable since the previous MRI dated 4/13/2021. This is noted in the previous CT angiogram dated 4/01/2021.   4.  Chronic lacunar infarct in the left thalamus.   5.  Mild cerebral volume loss.   6.  Mild chronic microvascular ischemic disease.      DX-CHEST-PORTABLE (1 VIEW)   Final Result      1.  There are changes of mild vascular congestion.      CT-CTA HEAD WITH & W/O-POST PROCESS   Final Result      CT angiogram of the [place] of [place] within normal limits.      CT-CTA NECK WITH & W/O-POST PROCESSING   Final Result      1.  Previous right-sided carotid endarterectomy without evidence of significant stenosis or occlusion.      2.  Moderate irregular calcific atherosclerotic plaquing of the left common carotid artery bifurcation and proximal left internal carotid artery with stenosis of approximately 50%.      CT-CEREBRAL PERFUSION ANALYSIS   Final Result      1.  Cerebral blood flow less than 30% which could represent completed infarct = 5  mL. However this is not seen on the T Max greater than 6 seconds so it likely is artifactual      2.  T Max more than 6 seconds = 0 mL.      3.  Please note that the cerebral perfusion was performed on the limited brain tissue around the basal ganglia region. Infarct/ischemia outside the CT perfusion sections can be missed in this study.      CT-HEAD W/O   Final Result      No noncontrast CT evidence of acute intracranial hemorrhage.      Moderate white matter hypodensity is present.  This is a nonspecific finding which usually is found to represent chronic microvascular disease in patient's of this demographic.  Demyelination, age indeterminant ischemia and gliosis are also common    possibilities.      Chronic left basal ganglia and thalamic lacunar infarctions      Age-appropriate atrophy           Assessment/Plan  * Left carotid stenosis- (present on admission)  Assessment & Plan  Severe and symptomatic  S/p L carotid stent placement on 11/25  Right sided weakness improving  On plavix, aspirin, statin    Primary hypertension  Assessment & Plan  On lisinopril, well controlled  monitor    Uvulitis  Assessment & Plan  On augmentin  improving    Carotid stenosis, left- (present on admission)  Assessment & Plan  S/p L CEA  On plavix, aspirin, statin    Reddish colored urine- (present on admission)  Assessment & Plan  Red-tinged urine Seen in ED  Patient denies dysuria although he is not a good historian due to speech difficulty  Urinalysis reveals >150 RBC    Difficulty with speech- (present on admission)  Assessment & Plan  Transient speech difficulty  Stroke due to L carotid artery stenosis, s/p L CEA  Stroke protocol  No echo per neurology    Constipation, slow transit- (present on admission)  Assessment & Plan  Family says stool softeners have not worked at home but Metamucil has  Bowel protocol plus Metamucil    History of CVA (cerebrovascular accident)- (present on admission)  Assessment & Plan  Noted in the  past  Continue aspirin, plavix and high intensity statin    Agitation  Assessment & Plan  After CEA  Required restraints, precedex  Resolved    BPH (benign prostatic hyperplasia)- (present on admission)  Assessment & Plan  Continue tamsulosin    Mixed hyperlipidemia- (present on admission)  Assessment & Plan  Continue atorvastatin         VTE prophylaxis: enoxaparin ppx    I have performed a physical exam and reviewed and updated ROS and Plan today (11/27/2022). In review of yesterday's note (11/26/2022), there are no changes except as documented above.

## 2022-11-27 NOTE — ASSESSMENT & PLAN NOTE
On lisinopril, well controlled  Goal SBP ~130-180, gradual reduction to <130  Blood pressure decreased last night but now it has improved.

## 2022-11-27 NOTE — CARE PLAN
Progress made toward(s) clinical / shift goals:    Problem: Neuro Status  Goal: Neuro status will remain stable or improve  Outcome: Progressing     Problem: Hemodynamic Monitoring  Goal: Patient's hemodynamics, fluid balance and neurologic status will be stable or improve  Outcome: Progressing     Problem: Fall Risk  Goal: Patient will remain free from falls  Outcome: Progressing

## 2022-11-28 ENCOUNTER — APPOINTMENT (OUTPATIENT)
Dept: RADIOLOGY | Facility: MEDICAL CENTER | Age: 87
DRG: 034 | End: 2022-11-28
Attending: STUDENT IN AN ORGANIZED HEALTH CARE EDUCATION/TRAINING PROGRAM
Payer: MEDICARE

## 2022-11-28 LAB
ANION GAP SERPL CALC-SCNC: 11 MMOL/L (ref 7–16)
BUN SERPL-MCNC: 26 MG/DL (ref 8–22)
CALCIUM SERPL-MCNC: 8.8 MG/DL (ref 8.5–10.5)
CHLORIDE SERPL-SCNC: 108 MMOL/L (ref 96–112)
CO2 SERPL-SCNC: 22 MMOL/L (ref 20–33)
CREAT SERPL-MCNC: 1.08 MG/DL (ref 0.5–1.4)
EKG IMPRESSION: NORMAL
ERYTHROCYTE [DISTWIDTH] IN BLOOD BY AUTOMATED COUNT: 43.9 FL (ref 35.9–50)
GFR SERPLBLD CREATININE-BSD FMLA CKD-EPI: 66 ML/MIN/1.73 M 2
GLUCOSE SERPL-MCNC: 110 MG/DL (ref 65–99)
HCT VFR BLD AUTO: 39.4 % (ref 42–52)
HGB BLD-MCNC: 13.4 G/DL (ref 14–18)
MAGNESIUM SERPL-MCNC: 1.9 MG/DL (ref 1.5–2.5)
MCH RBC QN AUTO: 31.5 PG (ref 27–33)
MCHC RBC AUTO-ENTMCNC: 34 G/DL (ref 33.7–35.3)
MCV RBC AUTO: 92.7 FL (ref 81.4–97.8)
PHOSPHATE SERPL-MCNC: 3 MG/DL (ref 2.5–4.5)
PLATELET # BLD AUTO: 173 K/UL (ref 164–446)
PMV BLD AUTO: 10.9 FL (ref 9–12.9)
POTASSIUM SERPL-SCNC: 3.6 MMOL/L (ref 3.6–5.5)
RBC # BLD AUTO: 4.25 M/UL (ref 4.7–6.1)
SARS-COV+SARS-COV-2 AG RESP QL IA.RAPID: NOTDETECTED
SODIUM SERPL-SCNC: 141 MMOL/L (ref 135–145)
SPECIMEN SOURCE: NORMAL
WBC # BLD AUTO: 6.1 K/UL (ref 4.8–10.8)

## 2022-11-28 PROCEDURE — 700102 HCHG RX REV CODE 250 W/ 637 OVERRIDE(OP): Performed by: HOSPITALIST

## 2022-11-28 PROCEDURE — 87426 SARSCOV CORONAVIRUS AG IA: CPT

## 2022-11-28 PROCEDURE — 700102 HCHG RX REV CODE 250 W/ 637 OVERRIDE(OP): Performed by: NURSE PRACTITIONER

## 2022-11-28 PROCEDURE — 70450 CT HEAD/BRAIN W/O DYE: CPT

## 2022-11-28 PROCEDURE — 83735 ASSAY OF MAGNESIUM: CPT

## 2022-11-28 PROCEDURE — A9270 NON-COVERED ITEM OR SERVICE: HCPCS | Performed by: HOSPITALIST

## 2022-11-28 PROCEDURE — 700102 HCHG RX REV CODE 250 W/ 637 OVERRIDE(OP): Performed by: PSYCHIATRY & NEUROLOGY

## 2022-11-28 PROCEDURE — 700111 HCHG RX REV CODE 636 W/ 250 OVERRIDE (IP): Performed by: NURSE PRACTITIONER

## 2022-11-28 PROCEDURE — 84100 ASSAY OF PHOSPHORUS: CPT

## 2022-11-28 PROCEDURE — 93005 ELECTROCARDIOGRAM TRACING: CPT | Performed by: STUDENT IN AN ORGANIZED HEALTH CARE EDUCATION/TRAINING PROGRAM

## 2022-11-28 PROCEDURE — A9270 NON-COVERED ITEM OR SERVICE: HCPCS | Performed by: NURSE PRACTITIONER

## 2022-11-28 PROCEDURE — 93010 ELECTROCARDIOGRAM REPORT: CPT | Performed by: INTERNAL MEDICINE

## 2022-11-28 PROCEDURE — 700102 HCHG RX REV CODE 250 W/ 637 OVERRIDE(OP): Performed by: STUDENT IN AN ORGANIZED HEALTH CARE EDUCATION/TRAINING PROGRAM

## 2022-11-28 PROCEDURE — 770001 HCHG ROOM/CARE - MED/SURG/GYN PRIV*

## 2022-11-28 PROCEDURE — 80048 BASIC METABOLIC PNL TOTAL CA: CPT

## 2022-11-28 PROCEDURE — 97166 OT EVAL MOD COMPLEX 45 MIN: CPT

## 2022-11-28 PROCEDURE — 99223 1ST HOSP IP/OBS HIGH 75: CPT | Performed by: PHYSICAL MEDICINE & REHABILITATION

## 2022-11-28 PROCEDURE — 700111 HCHG RX REV CODE 636 W/ 250 OVERRIDE (IP): Performed by: HOSPITALIST

## 2022-11-28 PROCEDURE — A9270 NON-COVERED ITEM OR SERVICE: HCPCS | Performed by: PSYCHIATRY & NEUROLOGY

## 2022-11-28 PROCEDURE — 97163 PT EVAL HIGH COMPLEX 45 MIN: CPT

## 2022-11-28 PROCEDURE — 99233 SBSQ HOSP IP/OBS HIGH 50: CPT | Performed by: STUDENT IN AN ORGANIZED HEALTH CARE EDUCATION/TRAINING PROGRAM

## 2022-11-28 PROCEDURE — A9270 NON-COVERED ITEM OR SERVICE: HCPCS | Performed by: STUDENT IN AN ORGANIZED HEALTH CARE EDUCATION/TRAINING PROGRAM

## 2022-11-28 PROCEDURE — 700101 HCHG RX REV CODE 250: Performed by: NURSE PRACTITIONER

## 2022-11-28 PROCEDURE — 85027 COMPLETE CBC AUTOMATED: CPT

## 2022-11-28 RX ORDER — HALOPERIDOL 5 MG/ML
5 INJECTION INTRAMUSCULAR EVERY 4 HOURS PRN
Status: DISCONTINUED | OUTPATIENT
Start: 2022-11-28 | End: 2022-11-29

## 2022-11-28 RX ORDER — LISINOPRIL 20 MG/1
20 TABLET ORAL ONCE
Status: DISCONTINUED | OUTPATIENT
Start: 2022-11-28 | End: 2022-11-28

## 2022-11-28 RX ORDER — LISINOPRIL 20 MG/1
20 TABLET ORAL DAILY
Status: DISCONTINUED | OUTPATIENT
Start: 2022-11-29 | End: 2022-12-02 | Stop reason: HOSPADM

## 2022-11-28 RX ORDER — QUETIAPINE FUMARATE 25 MG/1
25 TABLET, FILM COATED ORAL NIGHTLY
Status: DISCONTINUED | OUTPATIENT
Start: 2022-11-28 | End: 2022-11-28

## 2022-11-28 RX ORDER — LISINOPRIL 10 MG/1
10 TABLET ORAL ONCE
Status: COMPLETED | OUTPATIENT
Start: 2022-11-28 | End: 2022-11-28

## 2022-11-28 RX ORDER — QUETIAPINE FUMARATE 25 MG/1
25 TABLET, FILM COATED ORAL ONCE
Status: COMPLETED | OUTPATIENT
Start: 2022-11-28 | End: 2022-11-28

## 2022-11-28 RX ADMIN — HYDRALAZINE HYDROCHLORIDE 20 MG: 20 INJECTION INTRAMUSCULAR; INTRAVENOUS at 19:52

## 2022-11-28 RX ADMIN — HEPARIN SODIUM 5000 UNITS: 5000 INJECTION, SOLUTION INTRAVENOUS; SUBCUTANEOUS at 06:04

## 2022-11-28 RX ADMIN — LISINOPRIL 10 MG: 10 TABLET ORAL at 16:27

## 2022-11-28 RX ADMIN — QUETIAPINE FUMARATE 50 MG: 25 TABLET ORAL at 19:04

## 2022-11-28 RX ADMIN — Medication 5 MG: at 21:07

## 2022-11-28 RX ADMIN — CLOPIDOGREL BISULFATE 75 MG: 75 TABLET ORAL at 06:03

## 2022-11-28 RX ADMIN — DOCUSATE SODIUM 50 MG AND SENNOSIDES 8.6 MG 2 TABLET: 8.6; 5 TABLET, FILM COATED ORAL at 06:03

## 2022-11-28 RX ADMIN — ASPIRIN 81 MG: 81 TABLET, COATED ORAL at 06:04

## 2022-11-28 RX ADMIN — PSYLLIUM HUSK 1 PACKET: 3.4 POWDER ORAL at 06:03

## 2022-11-28 RX ADMIN — HEPARIN SODIUM 5000 UNITS: 5000 INJECTION, SOLUTION INTRAVENOUS; SUBCUTANEOUS at 14:06

## 2022-11-28 RX ADMIN — AMOXICILLIN AND CLAVULANATE POTASSIUM 1 TABLET: 875; 125 TABLET, FILM COATED ORAL at 06:03

## 2022-11-28 RX ADMIN — HALOPERIDOL LACTATE 5 MG: 5 INJECTION, SOLUTION INTRAMUSCULAR at 19:24

## 2022-11-28 RX ADMIN — AMOXICILLIN AND CLAVULANATE POTASSIUM 1 TABLET: 875; 125 TABLET, FILM COATED ORAL at 17:22

## 2022-11-28 RX ADMIN — LABETALOL HYDROCHLORIDE 20 MG: 5 INJECTION, SOLUTION INTRAVENOUS at 06:20

## 2022-11-28 RX ADMIN — LABETALOL HYDROCHLORIDE 10 MG: 5 INJECTION, SOLUTION INTRAVENOUS at 17:33

## 2022-11-28 RX ADMIN — TAMSULOSIN HYDROCHLORIDE 0.4 MG: 0.4 CAPSULE ORAL at 21:07

## 2022-11-28 RX ADMIN — LISINOPRIL 10 MG: 10 TABLET ORAL at 06:04

## 2022-11-28 RX ADMIN — ATORVASTATIN CALCIUM 40 MG: 40 TABLET, FILM COATED ORAL at 17:21

## 2022-11-28 RX ADMIN — HYDRALAZINE HYDROCHLORIDE 20 MG: 20 INJECTION INTRAMUSCULAR; INTRAVENOUS at 15:38

## 2022-11-28 RX ADMIN — LABETALOL HYDROCHLORIDE 20 MG: 5 INJECTION, SOLUTION INTRAVENOUS at 15:07

## 2022-11-28 RX ADMIN — HEPARIN SODIUM 5000 UNITS: 5000 INJECTION, SOLUTION INTRAVENOUS; SUBCUTANEOUS at 21:07

## 2022-11-28 RX ADMIN — LORATADINE 10 MG: 10 TABLET ORAL at 06:03

## 2022-11-28 RX ADMIN — QUETIAPINE FUMARATE 25 MG: 25 TABLET ORAL at 17:21

## 2022-11-28 ASSESSMENT — ENCOUNTER SYMPTOMS
HEADACHES: 0
VOMITING: 0
FOCAL WEAKNESS: 0
EYE PAIN: 0
INSOMNIA: 0
SENSORY CHANGE: 0
FEVER: 0
BACK PAIN: 0
PALPITATIONS: 0
ABDOMINAL PAIN: 0
COUGH: 0
FALLS: 0
CHILLS: 0
DIZZINESS: 0
BLURRED VISION: 0
NAUSEA: 0
SHORTNESS OF BREATH: 0

## 2022-11-28 ASSESSMENT — COGNITIVE AND FUNCTIONAL STATUS - GENERAL
DAILY ACTIVITIY SCORE: 16
TURNING FROM BACK TO SIDE WHILE IN FLAT BAD: A LITTLE
CLIMB 3 TO 5 STEPS WITH RAILING: A LOT
DRESSING REGULAR UPPER BODY CLOTHING: A LITTLE
SUGGESTED CMS G CODE MODIFIER MOBILITY: CL
HELP NEEDED FOR BATHING: A LOT
PERSONAL GROOMING: A LITTLE
DRESSING REGULAR LOWER BODY CLOTHING: A LOT
MOVING TO AND FROM BED TO CHAIR: UNABLE
EATING MEALS: A LITTLE
TOILETING: A LITTLE
MOVING FROM LYING ON BACK TO SITTING ON SIDE OF FLAT BED: A LOT
MOBILITY SCORE: 14
STANDING UP FROM CHAIR USING ARMS: A LITTLE
SUGGESTED CMS G CODE MODIFIER DAILY ACTIVITY: CK
WALKING IN HOSPITAL ROOM: A LITTLE

## 2022-11-28 ASSESSMENT — PAIN DESCRIPTION - PAIN TYPE
TYPE: ACUTE PAIN
TYPE: ACUTE PAIN

## 2022-11-28 ASSESSMENT — GAIT ASSESSMENTS
DEVIATION: BRADYKINETIC;SHUFFLED GAIT;DECREASED HEEL STRIKE;DECREASED TOE OFF
GAIT LEVEL OF ASSIST: MINIMAL ASSIST
ASSISTIVE DEVICE: FRONT WHEEL WALKER
DISTANCE (FEET): 50

## 2022-11-28 ASSESSMENT — LIFESTYLE VARIABLES: SUBSTANCE_ABUSE: 0

## 2022-11-28 ASSESSMENT — ACTIVITIES OF DAILY LIVING (ADL): TOILETING: INDEPENDENT

## 2022-11-28 NOTE — DISCHARGE PLANNING
Care Transition Team Discharge Planning    Anticipated Discharge Information  Discharge Disposition: Discharged to home/self care (01)  Discharge Contact Phone Number: [phone]              Discharge Plan:  Mr. Harvey is waiting to be assessed by PT, he is hoping to be trx to [facility], he has signed the choice document for [facility] - family is supportive of this.

## 2022-11-28 NOTE — CARE PLAN
The patient is Stable - Low risk of patient condition declining or worsening    Shift Goals  Clinical Goals: -140  Patient Goals: Mobilize  Family Goals: Support    Progress made toward(s) clinical / shift goals:     Patient is not progressing towards the following goals:    Report received from day shift RN. Neuro assessment conducted with day shift RN. Report given to oncoming RN.

## 2022-11-28 NOTE — PROGRESS NOTES
Hospital Medicine Daily Progress Note    Date of Service  11/28/2022    Chief Complaint  Cooper Harvey is a 88 y.o. male admitted 11/23/2022 with speech difficulties and right-sided weakness    Hospital Course  88-year-old male with a past medical history of stroke, status post right carotid endarterectomy, hypertension, hyperlipidemia who presented with right-sided weakness and speech difficulties.  He underwent CTA head and neck that revealed significant stenosis of left common carotid artery bifurcation and proximal left ICA.  MRI brain revealed no acute infarct but revealed lacunar infarct in the left thalamus.  Patient underwent IR guided left carotid stent placement on 11/25 and was thereafter monitored in ICU.  Patient did well post op. Blood pressure normotensive. On plavix for 8 weeks, aspirin and statin indefinitely. He is feeling well and is medically cleared to discharge to SNF.    Interval Problem Update  No acute events overnight  On 1L oxygen, continue to wean.  PT recommend SNF.  OT eval pending.  BP well controlled, continue lisinopril.  On plavix for 8 weeks, aspirin, statin.  EKG obtained for abnormal tele strip, EKG with no acute ischemic changes, reassuring.  Patient is medically cleared to discharge to expected SNF.    Addendum: Notified by nursing that patient with increasing confusion, headache, and elevated blood pressure 181/80 after prn given. Will try and control BP with additional prn's. Increase lisinopril dose to 20 mg daily. STAT CT head ordered to evaluate for bleed.    I have discussed this patient's plan of care and discharge plan at IDT rounds today with Case Management, Nursing, Nursing leadership, and other members of the IDT team.    Consultants/Specialty  neurology    Code Status  Full Code    Disposition  Patient is medically cleared for discharge.   Anticipate discharge to SNF.  I have placed the appropriate orders for post-discharge needs.    Review of  Systems  Review of Systems   Constitutional:  Negative for chills and fever.   Eyes:  Negative for blurred vision and pain.   Respiratory:  Negative for cough and shortness of breath.    Cardiovascular:  Negative for chest pain, palpitations and leg swelling.   Gastrointestinal:  Negative for abdominal pain, nausea and vomiting.   Genitourinary:  Negative for dysuria and urgency.   Musculoskeletal:  Negative for back pain and falls.   Skin:  Negative for itching and rash.   Neurological:  Negative for dizziness, sensory change, focal weakness and headaches.   Psychiatric/Behavioral:  Negative for substance abuse. The patient does not have insomnia.       Physical Exam  Temp:  [36.6 °C (97.8 °F)-36.8 °C (98.3 °F)] 36.8 °C (98.3 °F)  Pulse:  [54-79] 57  Resp:  [12-49] 20  BP: (125-177)/(59-82) 129/59  SpO2:  [93 %-97 %] 94 %    Physical Exam  Vitals and nursing note reviewed.   Constitutional:       General: He is not in acute distress.     Appearance: Normal appearance. He is not ill-appearing.   HENT:      Head: Normocephalic and atraumatic.      Right Ear: External ear normal.      Left Ear: External ear normal.      Mouth/Throat:      Pharynx: No oropharyngeal exudate or posterior oropharyngeal erythema.   Eyes:      Extraocular Movements: Extraocular movements intact.      Pupils: Pupils are equal, round, and reactive to light.   Cardiovascular:      Rate and Rhythm: Normal rate and regular rhythm.      Pulses: Normal pulses.      Heart sounds: Normal heart sounds. No murmur heard.  Pulmonary:      Effort: Pulmonary effort is normal. No respiratory distress.      Breath sounds: Normal breath sounds.   Abdominal:      General: Bowel sounds are normal.      Palpations: Abdomen is soft.   Musculoskeletal:         General: No swelling or tenderness.      Cervical back: Normal range of motion and neck supple.   Skin:     General: Skin is warm and dry.   Neurological:      General: No focal deficit present.      Mental  Status: He is alert and oriented to person, place, and time.      Cranial Nerves: No cranial nerve deficit.      Sensory: No sensory deficit.      Motor: No weakness.   Psychiatric:         Mood and Affect: Mood normal.         Behavior: Behavior normal.       Fluids    Intake/Output Summary (Last 24 hours) at 11/28/2022 1246  Last data filed at 11/28/2022 0600  Gross per 24 hour   Intake 360 ml   Output 1400 ml   Net -1040 ml       Laboratory  Recent Labs     11/26/22 0032 11/27/22 0528 11/28/22 0311   WBC 5.7 5.9 6.1   RBC 4.24* 4.32* 4.25*   HEMOGLOBIN 13.5* 13.8* 13.4*   HEMATOCRIT 39.2* 41.0* 39.4*   MCV 92.5 94.9 92.7   MCH 31.8 31.9 31.5   MCHC 34.4 33.7 34.0   RDW 43.8 45.6 43.9   PLATELETCT 159* 164 173   MPV 10.2 10.8 10.9     Recent Labs     11/26/22 0032 11/27/22 0528 11/28/22 0311   SODIUM 141 141 141   POTASSIUM 4.2 4.5 3.6   CHLORIDE 111 110 108   CO2 21 22 22   GLUCOSE 109* 77 110*   BUN 21 29* 26*   CREATININE 1.04 1.09 1.08   CALCIUM 7.9* 8.6 8.8                       Imaging  IR-NEURO INTERVENTIONAL CONSULT-IP   Final Result      1.  Symptomatic left carotid stenosis.   2.  Successful left carotid stent placement with embolic protection device.   3.  Widely patent right internal carotid artery-history of right carotid endarterectomy.      MR-BRAIN-W/O   Final Result      1.  No acute infarct.   2.  Chronic punctate microhemorrhage in the left parieto-occipital region.   3.  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 This is stable since the previous MRI dated 4/13/2021. This is noted in the previous CT angiogram dated 4/01/2021.   4.  Chronic lacunar infarct in the left thalamus.   5.  Mild cerebral volume loss.   6.  Mild chronic microvascular ischemic disease.      DX-CHEST-PORTABLE (1 VIEW)   Final Result      1.  There are changes of mild vascular congestion.      CT-CTA HEAD WITH & W/O-POST PROCESS   Final Result      CT angiogram of the [place] of  [place] within normal limits.      CT-CTA NECK WITH & W/O-POST PROCESSING   Final Result      1.  Previous right-sided carotid endarterectomy without evidence of significant stenosis or occlusion.      2.  Moderate irregular calcific atherosclerotic plaquing of the left common carotid artery bifurcation and proximal left internal carotid artery with stenosis of approximately 50%.      CT-CEREBRAL PERFUSION ANALYSIS   Final Result      1.  Cerebral blood flow less than 30% which could represent completed infarct = 5 mL. However this is not seen on the T Max greater than 6 seconds so it likely is artifactual      2.  T Max more than 6 seconds = 0 mL.      3.  Please note that the cerebral perfusion was performed on the limited brain tissue around the basal ganglia region. Infarct/ischemia outside the CT perfusion sections can be missed in this study.      CT-HEAD W/O   Final Result      No noncontrast CT evidence of acute intracranial hemorrhage.      Moderate white matter hypodensity is present.  This is a nonspecific finding which usually is found to represent chronic microvascular disease in patient's of this demographic.  Demyelination, age indeterminant ischemia and gliosis are also common    possibilities.      Chronic left basal ganglia and thalamic lacunar infarctions      Age-appropriate atrophy           Assessment/Plan  * Left carotid stenosis- (present on admission)  Assessment & Plan  Severe and symptomatic  S/p L carotid stent placement on 11/25  Right sided weakness improving  On plavix for 8 weeks, continue aspirin, statin    Primary hypertension  Assessment & Plan  On lisinopril, well controlled  monitor    Uvulitis  Assessment & Plan  On augmentin  improving    Carotid stenosis, left- (present on admission)  Assessment & Plan  S/p L CEA  On plavix, aspirin, statin    Reddish colored urine- (present on admission)  Assessment & Plan  Red-tinged urine Seen in ED  Patient denies dysuria although he is not  a good historian due to speech difficulty  Urinalysis reveals >150 RBC    Difficulty with speech- (present on admission)  Assessment & Plan  Transient speech difficulty  Stroke due to L carotid artery stenosis, s/p L CEA  Stroke protocol  No echo per neurology  improving    Constipation, slow transit- (present on admission)  Assessment & Plan  Family says stool softeners have not worked at home but Metamucil has  Bowel protocol plus Metamucil    History of CVA (cerebrovascular accident)- (present on admission)  Assessment & Plan  Noted in the past  Continue aspirin, plavix and high intensity statin    Agitation  Assessment & Plan  After CEA  Required restraints, precedex  Resolved    BPH (benign prostatic hyperplasia)- (present on admission)  Assessment & Plan  Continue tamsulosin    Mixed hyperlipidemia- (present on admission)  Assessment & Plan  Continue atorvastatin         VTE prophylaxis: enoxaparin ppx    I have performed a physical exam and reviewed and updated ROS and Plan today (11/28/2022). In review of yesterday's note (11/27/2022), there are no changes except as documented above.

## 2022-11-28 NOTE — PROGRESS NOTES
Assumed care of patient, report received from HERLINDA Escobedo.  Patient resting in bed, oriented to self.  Verified lines, IVSL.  Bed in low and locked position, side rails up x3, pt on monitor. No voiced needs at this time.

## 2022-11-28 NOTE — PROGRESS NOTES
Dr Ramos notified patient becoming increasingly confused and c/o headache. , 20mg IV labetolol given, repeat .  Per Dr Ramos will place order for STAT head CT and will give PRN hydralazine now.

## 2022-11-28 NOTE — CARE PLAN
The patient is Stable - Low risk of patient condition declining or worsening    Shift Goals  Clinical Goals: -140  Patient Goals: Mobilize  Family Goals: Support    Progress made toward(s) clinical / shift goals:    Problem: Optimal Care of the Stroke Patient  Goal: Optimal emergency care for the stroke patient  Outcome: Progressing  Goal: Optimal acute care for the stroke patient  Outcome: Progressing     Problem: Knowledge Deficit - Stroke Education  Goal: Patient's knowledge of stroke and risk factors will improve  Outcome: Progressing     Problem: Psychosocial - Patient Condition  Goal: Patient's ability to verbalize feelings about condition will improve  Outcome: Progressing  Goal: Patient's ability to re-evaluate and adapt role responsibilities will improve  Outcome: Progressing     Problem: Discharge Planning - Stroke  Goal: Ensure Stroke Core Measures are met prior to discharge  Outcome: Progressing  Goal: Patient’s continuum of care needs will be met  Outcome: Progressing     Problem: Neuro Status  Goal: Neuro status will remain stable or improve  Outcome: Progressing     Problem: Hemodynamic Monitoring  Goal: Patient's hemodynamics, fluid balance and neurologic status will be stable or improve  Outcome: Progressing     Problem: Respiratory - Stroke Patient  Goal: Patient will achieve/maintain optimum respiratory rate/effort  Outcome: Progressing     Problem: Dysphagia  Goal: Dysphagia will improve  Outcome: Progressing     Problem: Risk for Aspiration  Goal: Patient's risk for aspiration will be absent or decrease  Outcome: Progressing     Problem: Urinary Elimination  Goal: Establish and maintain regular urinary output  Outcome: Progressing     Problem: Bowel Elimination  Goal: Establish and maintain regular bowel function  Outcome: Progressing     Problem: Mobility - Stroke  Goal: Patient's capacity to carry out activities will improve  Outcome: Progressing  Goal: Spasticity will be prevented or  improved  Outcome: Progressing  Goal: Subluxation will be prevented or improved  Outcome: Progressing     Problem: Self Care  Goal: Patient will have the ability to perform ADLs independently or with assistance (bathe, groom, dress, toilet and feed)  Outcome: Progressing     Problem: Knowledge Deficit - Standard  Goal: Patient and family/care givers will demonstrate understanding of plan of care, disease process/condition, diagnostic tests and medications  Outcome: Progressing     Problem: Fall Risk  Goal: Patient will remain free from falls  Outcome: Progressing     Problem: Skin Integrity  Goal: Skin integrity is maintained or improved  Outcome: Progressing     Problem: Safety - Medical Restraint  Goal: Remains free of injury from restraints (Restraint for Interference with Medical Device)  Outcome: Progressing  Goal: Free from restraint(s) (Restraint for Interference with Medical Device)  Outcome: Progressing       Patient is not progressing towards the following goals:

## 2022-11-28 NOTE — THERAPY
"Physical Therapy   Initial Evaluation     Patient Name: Cooper Harvey  Age:  88 y.o., Sex:  male  Medical Record #: [id number]  Today's Date: 11/28/2022     Precautions: Fall Risk    Assessment  Patient is 88 y.o. male presenting acutely with R-sided deficits and impaired speech, now POD #3 L carotid stent. Pt is very [place] and appears confused at this time, perseverative on contacting wife to find out if his granddaughter is in labor. Pt presents with equal strength in BLE although formal strength assessment deferred due to poor command following. Pt requiring min/mod A for fxnl mob at this time. Per OT who spoke to dtr on the phone, pt was ind without AD prior. Recommend post-acute placement at this time.     Plan    Recommend Physical Therapy 4 times per week until therapy goals are met for the following treatments:  Bed Mobility, Community Re-integration, Equipment, Gait Training, Neuro Re-Education / Balance, Stair Training, Therapeutic Activities, and Therapeutic Exercises    DC Equipment Recommendations: Unable to determine at this time  Discharge Recommendations: Recommend post-acute placement for additional physical therapy services prior to discharge home       Subjective    \"Can you call my wife, I think my granddaughter is having her baby right now\"     Objective    Prior Living Situation   Prior Services None   Housing / Facility 1 Story House   Steps Into Home 5   Rail (unable to determine if pt has 1 or 2 rails, per dtr pt relies heavily on railing)   Equipment Owned Front-Wheel Walker   Lives with - Patient's Self Care Capacity Spouse   Prior Level of Functional Mobility   Bed Mobility Independent   Transfer Status Independent   Ambulation Independent   Distance Ambulation (Feet) (limited community distances)   Assistive Devices Used None   Stairs Independent   Comments info from OT after speaking to dtr on the phone   Cognition    Cognition / Consciousness X   Speech/ Communication Hard of " Hearing   Orientation Level Not Oriented to Reason   Level of Consciousness Alert   Attention Impaired   Sequencing Impaired   Comments Very [place], hears better out of L ear. Pt perseveratuve on contacting wife to find out if his granddaughter was in labor.   Active ROM Lower Body    Active ROM Lower Body  WDL   Strength Lower Body   Lower Body Strength  X   Gross Strength Generalized Weakness, Equal Bilaterally   Comments diffculty performing formal LE MMT due to pts [place] and difficulty following commands, BLE ~3/5, appear equal with no knee buckling noted during gait   Sensation Lower Body   Lower Extremity Sensation   Not Tested   Balance Assessment   Sitting Balance (Static) Fair   Sitting Balance (Dynamic) Fair -   Standing Balance (Static) Fair -   Standing Balance (Dynamic) Poor +   Weight Shift Sitting Fair   Weight Shift Standing Fair   Comments w/ FWW   Gait Analysis   Gait Level Of Assist Minimal Assist  (progressed to CGA with duration)   Assistive Device Front Wheel Walker   Distance (Feet) 50   Deviation Bradykinetic;Shuffled Gait;Decreased Heel Strike;Decreased Toe Off  (fwd flexed trunk)   Level of Assist with Stairs Unable to Participate   Weight Bearing Status No restrictions   Bed Mobility    Supine to Sit Moderate Assist   Sit to Supine   (Up in chair post session)   Functional Mobility   Sit to Stand Minimal Assist   Bed, Chair, Wheelchair Transfer Minimal Assist   Transfer Method Stand Step   ICU Target Mobility Level   ICU Mobility - Targeted Level Level 4   How much difficulty does the patient currently have...   Turning over in bed (including adjusting bedclothes, sheets and blankets)? 3   Sitting down on and standing up from a chair with arms (e.g., wheelchair, bedside commode, etc.) 2   Moving from lying on back to sitting on the side of the bed? 1   How much help from another person does the patient currently need...   Moving to and from a bed to a chair (including a wheelchair)? 3   Need to  walk in a hospital room? 3   Climbing 3-5 steps with a railing? 2   6 clicks Mobility Score 14   Activity Tolerance   Sitting in Chair post assessmebt   Sitting Edge of Bed 10 mins   Standing 6 mins   Short Term Goals    Short Term Goal # 1 Pt will perform supine<>sit with HOB flat with SPV within 6 visits to improve bed mob.   Short Term Goal # 2 Pt will perform bed<>chair transfers with LRAD and SPV within 6 visits.   Short Term Goal # 3 Pt will amb >150ft with LRAD and SPV within 6 visits.   Short Term Goal # 4 Pt will ascend/descend 5 steps with U/B HR and min A within 6 visits.

## 2022-11-28 NOTE — THERAPY
Occupational Therapy   Initial Evaluation     Patient Name: Cooper Harvey  Age:  88 y.o., Sex:  male  Medical Record #: [id number]  Today's Date: 11/28/2022     Precautions  Precautions: Fall Risk  Comments: SBP <140    Assessment  Patient is 88 y.o. male with a diagnosis of symptomatic L cartoid stenosis, s/p L carotid stent placement.  Additional factors influencing patient status / progress: weakness, fatigue, impaired balance, impaired cognition.      Plan    Recommend Occupational Therapy 4 times per week until therapy goals are met for the following treatments:  Adaptive Equipment, Cognitive Skill Development, Neuro Re-Education / Balance, Self Care/Activities of Daily Living, Therapeutic Activities, and Therapeutic Exercises.    DC Equipment Recommendations: Unable to determine at this time  Discharge Recommendations: Recommend post-acute placement for additional occupational therapy services prior to discharge home     Objective       11/28/22 0820   Prior Living Situation   Prior Services Home-Independent   Housing / Facility 1 Story House   Steps Into Home 5   Bathroom Set up Walk In Shower;Shower Chair;Grab Bars   Equipment Owned Front-Wheel Walker;Tub / Shower Seat;Grab Bar(s) In Tub / Shower   Lives with - Patient's Self Care Capacity Spouse   Comments Info gathered from dtr Juan, as pt is a poor historian at this time. Pt lives with his wife in [place]. Dtrs live in [place]. Wife drives around [facility]. Dtrs drive pt to [facility] in [place]. Per dtr he is normally independent with ADLs and IADLs at home without AD. Falls sometimes while collecting wood to put in his wood burning stove.   Prior Level of ADL Function   Self Feeding Independent   Grooming / Hygiene Independent   Bathing Independent   Dressing Independent   Toileting Independent   Prior Level of IADL Function   Medication Management Independent   Laundry Independent   Kitchen Mobility Independent   Finances Independent   Home Management  "Independent   Shopping Requires Assist   Prior Level Of Mobility Independent Without Device in Community;Independent Without Device in Home   Driving / Transportation Relatives / Others Provide Transportation   Occupation (Pre-Hospital Vocational) Retired Due To Age   History of Falls   History of Falls Yes   Precautions   Precautions Fall Risk   Comments SBP <140   Pain 0 - 10 Group   Therapist Pain Assessment Nurse Notified;0   Cognition    Cognition / Consciousness X   Speech/ Communication Hard of Hearing   Level of Consciousness Alert   Attention Impaired   Sequencing Impaired   Comments intermittent confusion. pleasant and cooperative, extremely [place], Hears better out of L ear; pt's dtr going to bring in his hearing aids. Perseverative on certain subjects throughout evaluation despite giving pt an answer. Dtr reports he is normally \"with it\" at home.   Strength Upper Body   Comments pt having a difficult time following complex directions w/ formal testing, appeared functional during ADLs; will continue to assess   Balance Assessment   Sitting Balance (Static) Fair   Sitting Balance (Dynamic) Fair -   Standing Balance (Static) Fair -   Standing Balance (Dynamic) Poor +   Weight Shift Sitting Fair   Weight Shift Standing Fair   Comments w/ FWW   Bed Mobility    Supine to Sit Moderate Assist   Scooting Minimal Assist   ADL Assessment   Grooming Contact Guard Assist;Standing  (wash hands)   Lower Body Dressing Maximal Assist   Toileting   (catheter, declined need for BM)   How much help from another person does the patient currently need...   Putting on and taking off regular lower body clothing? 2   Bathing (including washing, rinsing, and drying)? 2   Toileting, which includes using a toilet, bedpan, or urinal? 3   Putting on and taking off regular upper body clothing? 3   Taking care of personal grooming such as brushing teeth? 3   Eating meals? 3   6 Clicks Daily Activity Score 16   mRS Prior to admission "   Prior to admission mRS 1   Modified [place] (mRS)   Modified Haskell Score 4   Functional Mobility   Sit to Stand Minimal Assist   Bed, Chair, Wheelchair Transfer Minimal Assist   Mobility EOB>Peace>sink>Recliner   Comments w/ fww   Patient / Family Goals   Patient / Family Goal #1 to find out if his granddtr is having a baby   Short Term Goals   Short Term Goal # 1 pt will demo toilet txf with supv   Short Term Goal # 2 pt will dress LB with supv   Short Term Goal # 3 pt will groom in stance at the sink with supv   Short Term Goal # 4 pt will demo toileting with supv

## 2022-11-28 NOTE — CONSULTS
Physical Medicine and Rehabilitation Consultation              Date of initial consultation: 11/28/2022  Consulting provider: Josesito Catalan M.D.  Reason for consultation: assess for acute inpatient rehab appropriateness  LOS: 4 Day(s)    Chief complaint: TIA    HPI: The patient is a 88 y.o. right hand dominant male with a past medical history of stroke, right carotid endarterectomy, hypertension, hyperlipidemia;  who presented on 11/23/2022  2:20 PM with cute onset right-sided weakness and speech difficulties.  Patient was found to have left common carotid artery stenosis, MR brain did not find acute infarct.  Patient underwent left carotid stent placement on 11/25.  Patient's spouse and children are at bedside.  They report that he is confused and hard of hearing at baseline.  Patient does not carry a diagnosis of dementia, but is often very forgetful.  Patient's family also reports that he does not walk completely upright since his prior stroke.  Patient's knees are almost always bent while walking.  Patient has large healed scar on the top of his head from basal cell carcinoma removal.  Family tells me patient has [facility] in [facility] past, he really enjoyed it, he did well and was able to return home.      ROS  Pertinent positives are mentioned in the HPI, all others reviewed and are negative.    Social Hx:  1 SH  4 GIGI  With: Spouse, in [place].  Patient has 2 daughters in [place]    THERAPY:  Restrictions: Fall risk  PT: Functional mobility   11/28: Patient walked 50 feet with front wheel walker at min assist    OT: ADLs  11/28: Max assist lower body dressing    SLP:   11/24: Regular diet thin liquids    IMAGING:  MR brain 11/24/2022  1.  No acute infarct.  2.  Chronic punctate microhemorrhage in the left parieto-occipital region.  3.  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 This is stable since the previous MRI dated 4/13/2021. This is noted in the previous CT  angiogram dated 4/01/2021.  4.  Chronic lacunar infarct in the left thalamus.  5.  Mild cerebral volume loss.  6.  Mild chronic microvascular ischemic disease.    PROCEDURES:  Dr. Pastor Romero M.D 11/25/2022  Left carotid stent placement    PMH:  Past Medical History:   Diagnosis Date    [place] (hard of hearing) 04/02/2021    Hydrocele, unspecified     Hypertrophy of prostate without urinary obstruction and other lower urinary tract symptoms (LUTS)     Mixed hyperlipidemia        PSH:  Past Surgical History:   Procedure Laterality Date    PB THROMBOENDARTECTMY NECK,NECK INCIS Right 4/2/2021    Procedure: ENDARTERECTOMY, CAROTID;  Surgeon: Willie Beavers M.D.;  Location: SURGERY [facility];  Service: Vascular    PLASTIC SURGERY  February 2009    large skin cancer removed from the top of head with skin graft--donor site left anterior thigh    HEMORRHOIDECTOMY  2004    EYE SURGERY  6/05 ; 7/05    Bilateral Cataracts       FHX:  Family History   Problem Relation Age of Onset    Cancer Mother        Medications:  Current Facility-Administered Medications   Medication Dose    QUEtiapine (Seroquel) tablet 50 mg  50 mg    amoxicillin-clavulanate (AUGMENTIN) 875-125 MG per tablet 1 Tablet  1 Tablet    haloperidol lactate (HALDOL) injection 2 mg  2 mg    melatonin tablet 5 mg  5 mg    labetalol (NORMODYNE/TRANDATE) injection 10-20 mg  10-20 mg    hydrALAZINE (APRESOLINE) injection 10-20 mg  10-20 mg    aspirin EC (ECOTRIN) tablet 81 mg  81 mg    clopidogrel (PLAVIX) tablet 75 mg  75 mg    tamsulosin (FLOMAX) capsule 0.4 mg  0.4 mg    psyllium (METAMUCIL/KONSYL) 1 Packet  1 Packet    loratadine (CLARITIN) tablet 10 mg  10 mg    lisinopril (PRINIVIL) tablet 10 mg  10 mg    atorvastatin (LIPITOR) tablet 40 mg  40 mg    senna-docusate (PERICOLACE or SENOKOT S) 8.6-50 MG per tablet 2 Tablet  2 Tablet    And    polyethylene glycol/lytes (MIRALAX) PACKET 1 Packet  1 Packet    And    magnesium hydroxide (MILK OF MAGNESIA)  "suspension 30 mL  30 mL    And    bisacodyl (DULCOLAX) suppository 10 mg  10 mg    heparin injection 5,000 Units  5,000 Units    acetaminophen (Tylenol) tablet 650 mg  650 mg    ondansetron (ZOFRAN) syringe/vial injection 4 mg  4 mg    ondansetron (ZOFRAN ODT) dispertab 4 mg  4 mg       Allergies:  Allergies   Allergen Reactions    Vicodin [Hydrocodone-Acetaminophen] Rash     rash         Physical Exam:  Vitals: /59   Pulse (!) 57   Temp 36.8 °C (98.3 °F) (Temporal)   Resp 20   Ht 1.727 m (5' 7.99\")   Wt 73 kg (160 lb 15 oz)   SpO2 94%   Gen: NAD  Head: Large scar on the top of the head from basal cell carcinoma removal  Eyes/ Nose/ Mouth:  moist mucous membranes  Cardio: RRR, good distal perfusion, warm extremities  Pulm: normal respiratory effort, no cyanosis   Abd: Soft NTND, negative borborygmi   Ext: No peripheral edema. No calf tenderness. No clubbing.    Mental status: answers questions appropriately follows commands  Speech: fluent, no aphasia or dysarthria      Motor:      Upper Extremity  Myotome R L   Shoulder flexion C5 5 5   Elbow flexion C5 5 5   Wrist extension C6 5 5   Elbow extension C7 5 5   Finger flexion C8 5 5   Finger abduction T1 5 5     Lower Extremity Myotome R L   Hip flexion L2 5 5   Knee extension L3 5 5   Ankle dorsiflexion L4 5 5   Toe extension L5 5 5   Ankle plantarflexion S1 5 5       Sensory:   intact to light touch through out    No clonus at bilateral ankles  Negative babinski b/l  Negative Lazo b/l     Tone: no spasticity noted, no cogwheeling noted    Coordination:   intact finger emily bilaterally      Labs: Reviewed and significant for   Recent Labs     11/26/22  0032 11/27/22  0528 11/28/22  0311   RBC 4.24* 4.32* 4.25*   HEMOGLOBIN 13.5* 13.8* 13.4*   HEMATOCRIT 39.2* 41.0* 39.4*   PLATELETCT 159* 164 173     Recent Labs     11/26/22  0032 11/27/22  0528 11/28/22  0311   SODIUM 141 141 141   POTASSIUM 4.2 4.5 3.6   CHLORIDE 111 110 108   CO2 21 22 22   GLUCOSE " 109* 77 110*   BUN 21 29* 26*   CREATININE 1.04 1.09 1.08   CALCIUM 7.9* 8.6 8.8     Recent Results (from the past 24 hour(s))   CBC WITHOUT DIFFERENTIAL    Collection Time: 22  3:11 AM   Result Value Ref Range    WBC 6.1 4.8 - 10.8 K/uL    RBC 4.25 (L) 4.70 - 6.10 M/uL    Hemoglobin 13.4 (L) 14.0 - 18.0 g/dL    Hematocrit 39.4 (L) 42.0 - 52.0 %    MCV 92.7 81.4 - 97.8 fL    MCH 31.5 27.0 - 33.0 pg    MCHC 34.0 33.7 - 35.3 g/dL    RDW 43.9 35.9 - 50.0 fL    Platelet Count 173 164 - 446 K/uL    MPV 10.9 9.0 - 12.9 fL   Basic Metabolic Panel    Collection Time: 22  3:11 AM   Result Value Ref Range    Sodium 141 135 - 145 mmol/L    Potassium 3.6 3.6 - 5.5 mmol/L    Chloride 108 96 - 112 mmol/L    Co2 22 20 - 33 mmol/L    Glucose 110 (H) 65 - 99 mg/dL    Bun 26 (H) 8 - 22 mg/dL    Creatinine 1.08 0.50 - 1.40 mg/dL    Calcium 8.8 8.5 - 10.5 mg/dL    Anion Gap 11.0 7.0 - 16.0   MAGNESIUM    Collection Time: 22  3:11 AM   Result Value Ref Range    Magnesium 1.9 1.5 - 2.5 mg/dL   PHOSPHORUS    Collection Time: 22  3:11 AM   Result Value Ref Range    Phosphorus 3.0 2.5 - 4.5 mg/dL   ESTIMATED GFR    Collection Time: 22  3:11 AM   Result Value Ref Range    GFR (CKD-EPI) 66 >60 mL/min/1.73 m 2   EKG    Collection Time: 22  8:23 AM   Result Value Ref Range    Report       [facility]    Test Date:  2022  Pt Name:    MAURO REYES SARAHONDepartment: [facility]  MRN:        [id number]                      Room:       [facility]  Gender:     Male                         Technician: OMAR  :        [id number]                   Requested By:CLINT BAL  Order #:    [id number]                    Reading MD: Wali Adorno MD    Measurements  Intervals                                Axis  Rate:       56                           P:          44  AL:         158                          QRS:        15  QRSD:       83                           T:          43  QT:         412  QTc:         398    Interpretive Statements  Sinus bradycardia  Compared to ECG 11/25/2022 23:41:32  Atrial premature complex(es) no longer present  ST (T wave) deviation no longer present  Electronically Signed On 11- 9:03:22 PST by Wali Adorno MD           ASSESSMENT:  Patient is a 88 y.o. male admitted with acute neurologic change, found to have left carotid stenosis now s/p stenting on 11/25    [facility] Code / Diagnosis to Support: 0016 - Debility (Non-Cardiac, Non-Pulmonary)    Rehabilitation: Impaired ADLs and mobility  Patient is a good candidate for inpatient rehab based on needs for PT, OT.  Patient will also benefit from family training.  Patient has a good discharge situation which will be home with spouse and children.     Barriers to transfer include: Insurance authorization, TCCs to verify disposition, medical clearance and bed availability     All cases are subject to administrative review and recommendations may change    Disposition recommendations:  -Good candidate for IPR.  Patient does not have tissue damage from stroke seen on MRI, but does have newly worsened functional deficits with mobility and ADLs.   -Continue PT OT while in house  -Plan for IPR      Medical Complexity:    Acute neurologic change  -No ischemia seen on MR brain  -This is likely TIA due to left carotid stenosis  -Continue PT OT while in-house  -Recommend SLP for cog deficits  -Plan for IPR    Left carotid stenosis  -Status post stent placement 11/25  -Plavix for 8 weeks  -Aspirin statin indefinitely  -History of right carotid endarterectomy    Hypertension  -Lisinopril 10 mg daily    DVT PPX: Heparin 5K 3 times daily      Thank you for allowing us to participate in the care of this patient.     Patient was seen for 82 minutes on unit/floor of which > 50% of time was spent on counseling and coordination of care regarding the above, including prognosis, risk reduction, benefits of treatment, and options for next stage of  care.    Bear Trinh, DO   Physical Medicine and Rehabilitation     Please note that this dictation was created using voice recognition software. I have made every reasonable attempt to correct obvious errors, but there may be errors of grammar and possibly content that I did not discover before finalizing the note.

## 2022-11-29 PROBLEM — R41.0 DELIRIUM: Status: ACTIVE | Noted: 2021-04-06

## 2022-11-29 PROBLEM — G45.9 TIA (TRANSIENT ISCHEMIC ATTACK): Status: ACTIVE | Noted: 2022-11-29

## 2022-11-29 PROBLEM — R31.0 GROSS HEMATURIA: Status: ACTIVE | Noted: 2022-11-29

## 2022-11-29 PROBLEM — R47.1 DYSARTHRIA: Status: ACTIVE | Noted: 2022-11-23

## 2022-11-29 PROCEDURE — 700111 HCHG RX REV CODE 636 W/ 250 OVERRIDE (IP): Performed by: HOSPITALIST

## 2022-11-29 PROCEDURE — 700111 HCHG RX REV CODE 636 W/ 250 OVERRIDE (IP)

## 2022-11-29 PROCEDURE — A9270 NON-COVERED ITEM OR SERVICE: HCPCS | Performed by: NURSE PRACTITIONER

## 2022-11-29 PROCEDURE — 99233 SBSQ HOSP IP/OBS HIGH 50: CPT | Performed by: STUDENT IN AN ORGANIZED HEALTH CARE EDUCATION/TRAINING PROGRAM

## 2022-11-29 PROCEDURE — 700102 HCHG RX REV CODE 250 W/ 637 OVERRIDE(OP): Performed by: NURSE PRACTITIONER

## 2022-11-29 PROCEDURE — 700105 HCHG RX REV CODE 258: Performed by: STUDENT IN AN ORGANIZED HEALTH CARE EDUCATION/TRAINING PROGRAM

## 2022-11-29 PROCEDURE — 770001 HCHG ROOM/CARE - MED/SURG/GYN PRIV*

## 2022-11-29 PROCEDURE — A9270 NON-COVERED ITEM OR SERVICE: HCPCS | Performed by: HOSPITALIST

## 2022-11-29 PROCEDURE — 700102 HCHG RX REV CODE 250 W/ 637 OVERRIDE(OP): Performed by: HOSPITALIST

## 2022-11-29 PROCEDURE — A9270 NON-COVERED ITEM OR SERVICE: HCPCS | Performed by: STUDENT IN AN ORGANIZED HEALTH CARE EDUCATION/TRAINING PROGRAM

## 2022-11-29 PROCEDURE — 700102 HCHG RX REV CODE 250 W/ 637 OVERRIDE(OP): Performed by: STUDENT IN AN ORGANIZED HEALTH CARE EDUCATION/TRAINING PROGRAM

## 2022-11-29 RX ORDER — QUETIAPINE FUMARATE 100 MG/1
100 TABLET, FILM COATED ORAL 2 TIMES DAILY
Status: DISCONTINUED | OUTPATIENT
Start: 2022-11-29 | End: 2022-11-29

## 2022-11-29 RX ORDER — SODIUM CHLORIDE, SODIUM LACTATE, POTASSIUM CHLORIDE, CALCIUM CHLORIDE 600; 310; 30; 20 MG/100ML; MG/100ML; MG/100ML; MG/100ML
1000 INJECTION, SOLUTION INTRAVENOUS ONCE
Status: COMPLETED | OUTPATIENT
Start: 2022-11-29 | End: 2022-11-29

## 2022-11-29 RX ORDER — LORAZEPAM 2 MG/ML
1 INJECTION INTRAMUSCULAR ONCE
Status: COMPLETED | OUTPATIENT
Start: 2022-11-29 | End: 2022-11-29

## 2022-11-29 RX ORDER — QUETIAPINE FUMARATE 100 MG/1
100 TABLET, FILM COATED ORAL NIGHTLY
Status: DISCONTINUED | OUTPATIENT
Start: 2022-11-29 | End: 2022-11-29

## 2022-11-29 RX ORDER — QUETIAPINE FUMARATE 100 MG/1
100 TABLET, FILM COATED ORAL NIGHTLY
Status: DISCONTINUED | OUTPATIENT
Start: 2022-11-29 | End: 2022-12-02 | Stop reason: HOSPADM

## 2022-11-29 RX ADMIN — LORAZEPAM 1 MG: 2 INJECTION INTRAMUSCULAR; INTRAVENOUS at 03:54

## 2022-11-29 RX ADMIN — HEPARIN SODIUM 5000 UNITS: 5000 INJECTION, SOLUTION INTRAVENOUS; SUBCUTANEOUS at 13:56

## 2022-11-29 RX ADMIN — ATORVASTATIN CALCIUM 40 MG: 40 TABLET, FILM COATED ORAL at 17:22

## 2022-11-29 RX ADMIN — QUETIAPINE FUMARATE 100 MG: 100 TABLET ORAL at 22:55

## 2022-11-29 RX ADMIN — HEPARIN SODIUM 5000 UNITS: 5000 INJECTION, SOLUTION INTRAVENOUS; SUBCUTANEOUS at 05:52

## 2022-11-29 RX ADMIN — Medication 5 MG: at 22:54

## 2022-11-29 RX ADMIN — HEPARIN SODIUM 5000 UNITS: 5000 INJECTION, SOLUTION INTRAVENOUS; SUBCUTANEOUS at 22:54

## 2022-11-29 RX ADMIN — SODIUM CHLORIDE, POTASSIUM CHLORIDE, SODIUM LACTATE AND CALCIUM CHLORIDE 1000 ML: 600; 310; 30; 20 INJECTION, SOLUTION INTRAVENOUS at 10:06

## 2022-11-29 RX ADMIN — QUETIAPINE FUMARATE 100 MG: 100 TABLET ORAL at 10:06

## 2022-11-29 RX ADMIN — TAMSULOSIN HYDROCHLORIDE 0.4 MG: 0.4 CAPSULE ORAL at 22:54

## 2022-11-29 RX ADMIN — PSYLLIUM HUSK 1 PACKET: 3.4 POWDER ORAL at 17:23

## 2022-11-29 RX ADMIN — AMOXICILLIN AND CLAVULANATE POTASSIUM 1 TABLET: 875; 125 TABLET, FILM COATED ORAL at 17:22

## 2022-11-29 RX ADMIN — HALOPERIDOL LACTATE 5 MG: 5 INJECTION, SOLUTION INTRAMUSCULAR at 00:58

## 2022-11-29 ASSESSMENT — ENCOUNTER SYMPTOMS
HEADACHES: 0
SORE THROAT: 0
BACK PAIN: 0
EYE PAIN: 0
SINUS PAIN: 0
NECK PAIN: 0
MYALGIAS: 0
FLANK PAIN: 0
SHORTNESS OF BREATH: 0
ABDOMINAL PAIN: 0

## 2022-11-29 ASSESSMENT — PAIN DESCRIPTION - PAIN TYPE: TYPE: ACUTE PAIN

## 2022-11-29 NOTE — PROGRESS NOTES
Pt's penis was cleaned and prepped for jones insertion. There was a red area on the foreskin and the urinary meatus on the glans penis prior to jones insertion.

## 2022-11-29 NOTE — DISCHARGE PLANNING
Per physiatry patient is a candidate for IPR, has support from spouse and children. Patient currently on restraints, following for medical clearance.

## 2022-11-29 NOTE — PROGRESS NOTES
Notified Dr Ramos that patient SBP down to 151 and CT results show no acute changes.  Patient is becoming increasingly agitated and confused.  Received telephone order from Dr Ramos for one time dose of seroquel to be given now.

## 2022-11-29 NOTE — CARE PLAN
The patient is Watcher - Medium risk of patient condition declining or worsening    Shift Goals  Clinical Goals: work with PT/OT, -140, remain at neuro baseline  Patient Goals: rest  Family Goals: SUpport    Progress made toward(s) clinical / shift goals:    Problem: Respiratory - Stroke Patient  Goal: Patient will achieve/maintain optimum respiratory rate/effort  Outcome: Progressing     Problem: Fall Risk  Goal: Patient will remain free from falls  Outcome: Progressing     Problem: Skin Integrity  Goal: Skin integrity is maintained or improved  Outcome: Progressing       Patient is not progressing towards the following goals:      Problem: Neuro Status  Goal: Neuro status will remain stable or improve  Outcome: Not Progressing    Patient becoming increasingly confused and agitated.

## 2022-11-29 NOTE — PROGRESS NOTES
Called about patient agitation and hypertension  Patient was previously weaned off of Precedex drip and transferred out of ICU  Now agitated with elevated BP  Received multiple blood pressure medications including 30 mg of IV labetalol, 20 mg of hydralazine, 10 mg lisinopril as well as 25 mg of Seroquel.  Continues to be confused and trying to crawl out of bed  Also, keeps pulling off Bhatia and becoming incontinent and soiling himself requiring multiple cleanings  Trialing Haldol 5 mg IV as well as restraint  Inserting Bhatia  Discussed with critical care.  If patient does not improve, may need transfer back to ICU    Patient is critically ill.   The patient continues to have: Hypertension despite requiring strict blood pressure control after carotid endarterectomy to prevent reperfusion injury  The vital organ system that is affected is the: Brain  If untreated there is a high chance of deterioration into: Reperfusion injury/brain damage  And eventually death.   The critical care that I am providing today is: Close and careful monitoring, discussion with critical care, Haldol, other medical management  The critical that has been undertaken is medically complex.   There has been no overlap in critical care time.   Critical Care Time not including procedures: 6:48pm-7:12pm

## 2022-11-29 NOTE — PROGRESS NOTES
Patient continues to become increasingly agitated and attempting to climb out of bed.  SBP continues to increase with agitation, Dr Catalan to bedside.  Orders received for IV haldol and posey vest.  Report given to HERLINDA Escobedo.

## 2022-11-29 NOTE — CARE PLAN
The patient is Watcher - Medium risk of patient condition declining or worsening    Shift Goals  Clinical Goals: Maintain SBP goals and montior neuro status  Patient Goals: rest  Family Goals: Support    Progress made toward(s) clinical / shift goals:      Patient is not progressing towards the following goals:      Problem: Neuro Status  Goal: Neuro status will remain stable or improve  Outcome: Not Progressing     Problem: Hemodynamic Monitoring  Goal: Patient's hemodynamics, fluid balance and neurologic status will be stable or improve  Outcome: Not Progressing     Report received from day shift RN. The patient's neuro assessment was conducted with day shift RN. The patient's neuro and cognitive changes warranted a CT which was negative. The patient required a vest as a restraint to prevent the patient from getting out of bed or removing any medical equipment. This RN stayed in the patient's room to prevent any impulsiveness that could result in an injury to the patient. Pt on flomax, has a blanchable sacrum and has multiple episodes of incontinence. Jones cath placed to prevent any further skin breakdown or irritation from urine and repeated cleaning. Patient had two areas of redness on their penis prior to jones insertion. The patient utilized a condom with moderate success the night night prior and throughout the day. The patient would pull on the condom cath until it was removed from their penis and lying in the bed. This occurred three times the prior night. The patient was educated on the importance of not removing the condom cath but was not receptive to the education. The patient continued this behavior as they repeated pulled and tugged on their jones. Although, initially the urine was clear and yellow when the jones was first placed, after repeated movement by the patient pulling on the line and kicking their feet in attempts to get out of bed the urine in the line was bloody. The patient was educated  on the importance of not pulling the jones and this teaching was reinforced throughout the night. However, the patient continued to pull and tug on the jones. Pt ripped vest and attempted to slide out of vest restraints. The hospitalist on call was contacted. Orders were placed for Ativan and Wrist Restraints.  Report given to oncoming day shift RN.

## 2022-11-29 NOTE — PROGRESS NOTES
Patient taken for STAT head CT by this RN and HERLINDA Null on transport monitor.  VSS throughout, returned to ICU rm 112 without issue.

## 2022-11-29 NOTE — PROGRESS NOTES
NOC HOSPITALIST CROSS COVER    Notified by RN regarding ongoing agitation despite PRN haldol. He was previously weaned off of a precedex drip and downgraded from ICU status.       Vitals:    11/29/22 0300   BP: (!) 130/94   Pulse: (!) 112   Resp: (!) 45   Temp:    SpO2: 98%          Plan:  #Agitation  -Modified restraints to include bilat soft wrist restraints as patient is attempting to pull out his jones  -Ativan 1 mg IV stat        -----------------------------------------------------------------------------------------------------------    Electronically signed by:  MIGUEL A Issa AGACNP-BC  Hospitalist Services

## 2022-11-29 NOTE — THERAPY
Missed Therapy     Patient Name: Cooper Harvey  Age:  88 y.o., Sex:  male  Medical Record #: [id number]  Today's Date: 11/29/2022    Discussed missed therapy with HERLINDA Tucker.    Attempted to see pt for dysphagia tx and determine if cognitive-linguistic evaluation is necessary given MRI was negative for acute CVA. However, pt is unarousable at this time per RN report. SLP will hold and see as able/appropriate.        11/29/22 0809   Treatment Variance   Reason For Missed Therapy Medical - Patient Unarousable

## 2022-11-30 LAB
ALBUMIN SERPL BCP-MCNC: 3.6 G/DL (ref 3.2–4.9)
BUN SERPL-MCNC: 28 MG/DL (ref 8–22)
CALCIUM SERPL-MCNC: 8.7 MG/DL (ref 8.5–10.5)
CHLORIDE SERPL-SCNC: 107 MMOL/L (ref 96–112)
CO2 SERPL-SCNC: 25 MMOL/L (ref 20–33)
CREAT SERPL-MCNC: 1 MG/DL (ref 0.5–1.4)
ERYTHROCYTE [DISTWIDTH] IN BLOOD BY AUTOMATED COUNT: 46.3 FL (ref 35.9–50)
GFR SERPLBLD CREATININE-BSD FMLA CKD-EPI: 72 ML/MIN/1.73 M 2
GLUCOSE SERPL-MCNC: 145 MG/DL (ref 65–99)
HCT VFR BLD AUTO: 42.2 % (ref 42–52)
HGB BLD-MCNC: 14.1 G/DL (ref 14–18)
MCH RBC QN AUTO: 31.1 PG (ref 27–33)
MCHC RBC AUTO-ENTMCNC: 33.4 G/DL (ref 33.7–35.3)
MCV RBC AUTO: 93 FL (ref 81.4–97.8)
PHOSPHATE SERPL-MCNC: 2.8 MG/DL (ref 2.5–4.5)
PLATELET # BLD AUTO: 175 K/UL (ref 164–446)
PMV BLD AUTO: 10.3 FL (ref 9–12.9)
POTASSIUM SERPL-SCNC: 3.7 MMOL/L (ref 3.6–5.5)
RBC # BLD AUTO: 4.54 M/UL (ref 4.7–6.1)
SODIUM SERPL-SCNC: 141 MMOL/L (ref 135–145)
WBC # BLD AUTO: 6.7 K/UL (ref 4.8–10.8)

## 2022-11-30 PROCEDURE — 85027 COMPLETE CBC AUTOMATED: CPT

## 2022-11-30 PROCEDURE — 80069 RENAL FUNCTION PANEL: CPT

## 2022-11-30 PROCEDURE — A9270 NON-COVERED ITEM OR SERVICE: HCPCS | Performed by: STUDENT IN AN ORGANIZED HEALTH CARE EDUCATION/TRAINING PROGRAM

## 2022-11-30 PROCEDURE — 51798 US URINE CAPACITY MEASURE: CPT

## 2022-11-30 PROCEDURE — 700102 HCHG RX REV CODE 250 W/ 637 OVERRIDE(OP): Performed by: HOSPITALIST

## 2022-11-30 PROCEDURE — 700102 HCHG RX REV CODE 250 W/ 637 OVERRIDE(OP): Performed by: STUDENT IN AN ORGANIZED HEALTH CARE EDUCATION/TRAINING PROGRAM

## 2022-11-30 PROCEDURE — 99233 SBSQ HOSP IP/OBS HIGH 50: CPT | Performed by: HOSPITALIST

## 2022-11-30 PROCEDURE — 700111 HCHG RX REV CODE 636 W/ 250 OVERRIDE (IP): Performed by: HOSPITALIST

## 2022-11-30 PROCEDURE — A9270 NON-COVERED ITEM OR SERVICE: HCPCS | Performed by: HOSPITALIST

## 2022-11-30 PROCEDURE — 700102 HCHG RX REV CODE 250 W/ 637 OVERRIDE(OP): Performed by: NURSE PRACTITIONER

## 2022-11-30 PROCEDURE — 36415 COLL VENOUS BLD VENIPUNCTURE: CPT

## 2022-11-30 PROCEDURE — A9270 NON-COVERED ITEM OR SERVICE: HCPCS | Performed by: PSYCHIATRY & NEUROLOGY

## 2022-11-30 PROCEDURE — A9270 NON-COVERED ITEM OR SERVICE: HCPCS | Performed by: NURSE PRACTITIONER

## 2022-11-30 PROCEDURE — 770001 HCHG ROOM/CARE - MED/SURG/GYN PRIV*

## 2022-11-30 PROCEDURE — 700102 HCHG RX REV CODE 250 W/ 637 OVERRIDE(OP): Performed by: PSYCHIATRY & NEUROLOGY

## 2022-11-30 RX ADMIN — PSYLLIUM HUSK 1 PACKET: 3.4 POWDER ORAL at 16:17

## 2022-11-30 RX ADMIN — CLOPIDOGREL BISULFATE 75 MG: 75 TABLET ORAL at 05:01

## 2022-11-30 RX ADMIN — Medication 5 MG: at 20:51

## 2022-11-30 RX ADMIN — LORATADINE 10 MG: 10 TABLET ORAL at 05:01

## 2022-11-30 RX ADMIN — ASPIRIN 81 MG: 81 TABLET, COATED ORAL at 05:01

## 2022-11-30 RX ADMIN — DOCUSATE SODIUM 50 MG AND SENNOSIDES 8.6 MG 2 TABLET: 8.6; 5 TABLET, FILM COATED ORAL at 05:01

## 2022-11-30 RX ADMIN — AMOXICILLIN AND CLAVULANATE POTASSIUM 1 TABLET: 875; 125 TABLET, FILM COATED ORAL at 05:01

## 2022-11-30 RX ADMIN — QUETIAPINE FUMARATE 100 MG: 100 TABLET ORAL at 20:51

## 2022-11-30 RX ADMIN — ATORVASTATIN CALCIUM 40 MG: 40 TABLET, FILM COATED ORAL at 16:16

## 2022-11-30 RX ADMIN — TAMSULOSIN HYDROCHLORIDE 0.4 MG: 0.4 CAPSULE ORAL at 20:51

## 2022-11-30 RX ADMIN — AMOXICILLIN AND CLAVULANATE POTASSIUM 1 TABLET: 875; 125 TABLET, FILM COATED ORAL at 16:17

## 2022-11-30 RX ADMIN — LISINOPRIL 20 MG: 20 TABLET ORAL at 05:02

## 2022-11-30 RX ADMIN — DOCUSATE SODIUM 50 MG AND SENNOSIDES 8.6 MG 2 TABLET: 8.6; 5 TABLET, FILM COATED ORAL at 16:17

## 2022-11-30 RX ADMIN — HEPARIN SODIUM 5000 UNITS: 5000 INJECTION, SOLUTION INTRAVENOUS; SUBCUTANEOUS at 05:01

## 2022-11-30 ASSESSMENT — ENCOUNTER SYMPTOMS
SPUTUM PRODUCTION: 0
HEADACHES: 0
HEARTBURN: 0
NAUSEA: 0
FEVER: 0
ABDOMINAL PAIN: 0
COUGH: 0
NERVOUS/ANXIOUS: 1
BLURRED VISION: 0
DIZZINESS: 1
VOMITING: 0
PALPITATIONS: 0
HEMOPTYSIS: 0
DOUBLE VISION: 0
MYALGIAS: 1
DEPRESSION: 0

## 2022-11-30 NOTE — CARE PLAN
Problem: Knowledge Deficit - Stroke Education  Goal: Patient's knowledge of stroke and risk factors will improve  Outcome: Progressing  Note: Discussed reason for patient being in hospital and POC. Patient is [place] and may be mistaken for confusion. Patient able to hear if spoken to loudly and clearly; hearing aids charging at bedside. Patient also drowsy arriving to the floor from ICU due to being given 1mg Ativan in the afternoon.      Problem: Neuro Status  Goal: Neuro status will remain stable or improve  Outcome: Progressing  Note: Q4 neuros, meds given per MAR orders, discussed POC and had patient repeat back parts of discussed POC.   The patient is Watcher - Medium risk of patient condition declining or worsening    Shift Goals  Clinical Goals: Stable neuro  Patient Goals: SHANIQUE  Family Goals: SHANIQUE    Progress made toward(s) clinical / shift goals:  see notes for progress    Patient is not progressing towards the following goals:

## 2022-11-30 NOTE — PROGRESS NOTES
Report called to HERLINDA Bear. All questions answered. Patient will be sent with hearing aides and all belongings.

## 2022-11-30 NOTE — HOSPITAL COURSE
This is a 82-year-old male with a past medical history significant for CVA status post right carotid endarterectomy, hypertension, hyperlipidemia presented on 11/23/2022 with a complaint of right-sided weakness along with his fist difficulties.    CTA head and neck that revealed significant stenosis of left common carotid artery bifurcation and proximal left ICA.  MRI brain revealed no acute infarct but revealed lacunar infarct in the left thalamus.  Patient underwent IR guided left carotid stent on 11/25/2022.  Post procedure, patient was monitored in ICU.  Patient was transferred out of ICU on 11/27/2022.    Patient need to be on Plavix for 8 weeks, and aspirin forever.  Along with a statin. His precautions complicated with hematuria, was started on CBI, renal ultrasound was completed which shows no evidence of kidney stones or acute findings.  BROOK was stopped, patient still has pink urine output however no evidence of clotting.  Hemoglobin trended down slightly, however remains over 11, no indication for transfusion.  Per neurology and recent stent will need to continue Plavix and aspirin, Plavix for at least an additional 7 weeks.  Patient to follow-up with neurosurgery outpatient.  Continue statin therapy.    Patient did have episode of hypotension, however this resolved with small fluid bolus, blood pressures are better today.  We will hold lisinopril, may need to restart at later date to maintain goal blood pressures of 130-160.

## 2022-11-30 NOTE — PROGRESS NOTES
[facility] Medicine Daily Progress Note    Date of Service  11/30/2022    Chief Complaint  Cooper Harvey is a 88 y.o. male admitted 11/23/2022 with   Chief Complaint   Patient presents with    Possible Stroke     LKW 1300. Pt started experiencing word finding difficulties, slowed speech, and R sided weakness.           Hospital Course  This is a 82-year-old male with a past medical history significant for CVA status post right carotid endarterectomy, hypertension, hyperlipidemia presented on 11/23/2022 with a complaint of right-sided weakness along with his fist difficulties.    CTA head and neck that revealed significant stenosis of left common carotid artery bifurcation and proximal left ICA.  MRI brain revealed no acute infarct but revealed lacunar infarct in the left thalamus.  Patient underwent IR guided left carotid stent on 11/25/2022.  Post procedure, patient was monitored in ICU.  Patient was transferred out of ICU on 11/27/2022.    Patient need to be on Plavix for 8 weeks, and aspirin forever.  Along with a statin. His precautions complicated with hematuria, I ordered continuous bladder irrigation.     Interval events:  -- No acute events overnight, vital signs stable, heart rate 78-89, blood pressure 139/80, saturating 98% on room air.  Patient is alert, awake, answering questions, very hard of hearing.  CBC, BMP reviewed, unremarkable.  --Upon my examination, noted to have hematuria, discussed with nursing staff to obtain continuous bladder irrigation.  Discussed with the acute rehab person Frederick Otoole in regards to starting the patient on continuous bladder irrigation.    PT/OT has evaluate the patient medical and postacute.  Once patient hematuria is resolved, patient will be discharged to acute rehab    Plan of care has been discussed the patient, nursing staff, case management    Consultants/Specialty  neurology and IT, ICU    Code Status  Full Code    Disposition  Patient is not medically  cleared for discharge.   Anticipate discharge to to an inpatient rehabilitation hospital.  I have placed the appropriate orders for post-discharge needs.    Review of Systems  Review of Systems   Constitutional:  Positive for malaise/fatigue. Negative for fever.   HENT:  Positive for hearing loss.    Eyes:  Negative for blurred vision and double vision.   Respiratory:  Negative for cough, hemoptysis and sputum production.    Cardiovascular:  Negative for chest pain and palpitations.   Gastrointestinal:  Negative for abdominal pain, heartburn, nausea and vomiting.   Musculoskeletal:  Positive for myalgias.   Neurological:  Positive for dizziness. Negative for headaches.   Psychiatric/Behavioral:  Negative for depression. The patient is nervous/anxious.    All other systems reviewed and are negative.     Physical Exam  Temp:  [36.4 °C (97.5 °F)-37.1 °C (98.8 °F)] 36.8 °C (98.2 °F)  Pulse:  [75-89] 89  Resp:  [14-40] 18  BP: (117-177)/(55-80) 139/80  SpO2:  [85 %-96 %] 96 %    Physical Exam  Vitals and nursing note reviewed.   Constitutional:       Appearance: Normal appearance.   HENT:      Head: Normocephalic and atraumatic.   Eyes:      Extraocular Movements: Extraocular movements intact.      Pupils: Pupils are equal, round, and reactive to light.   Cardiovascular:      Rate and Rhythm: Normal rate.   Pulmonary:      Breath sounds: Rales present.   Abdominal:      General: There is no distension.      Tenderness: There is no abdominal tenderness.   Musculoskeletal:      Cervical back: Neck supple.      Right lower leg: No edema.      Left lower leg: No edema.   Neurological:      Mental Status: He is alert.      Cranial Nerves: No cranial nerve deficit.      Motor: No weakness.      Comments: Alert and orineted x 2-3   Psychiatric:         Mood and Affect: Mood normal.       Fluids    Intake/Output Summary (Last 24 hours) at 11/30/2022 1324  Last data filed at 11/30/2022 1200  Gross per 24 hour   Intake 978.78 ml    Output 295 ml   Net 683.78 ml       Laboratory  Recent Labs     11/28/22  0311 11/30/22  1014   WBC 6.1 6.7   RBC 4.25* 4.54*   HEMOGLOBIN 13.4* 14.1   HEMATOCRIT 39.4* 42.2   MCV 92.7 93.0   MCH 31.5 31.1   MCHC 34.0 33.4*   RDW 43.9 46.3   PLATELETCT 173 175   MPV 10.9 10.3     Recent Labs     11/28/22  0311 11/30/22  1014   SODIUM 141 141   POTASSIUM 3.6 3.7   CHLORIDE 108 107   CO2 22 25   GLUCOSE 110* 145*   BUN 26* 28*   CREATININE 1.08 1.00   CALCIUM 8.8 8.7                   Imaging  CT-HEAD W/O   Final Result      1.  No evidence of acute intracranial process.      2.  Cerebral atrophy as well as periventricular chronic small vessel ischemic change.         IR-NEURO INTERVENTIONAL CONSULT-IP   Final Result      1.  Symptomatic left carotid stenosis.   2.  Successful left carotid stent placement with embolic protection device.   3.  Widely patent right internal carotid artery-history of right carotid endarterectomy.      MR-BRAIN-W/O   Final Result      1.  No acute infarct.   2.  Chronic punctate microhemorrhage in the left parieto-occipital region.   3.  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 This is stable since the previous MRI dated 4/13/2021. This is noted in the previous CT angiogram dated 4/01/2021.   4.  Chronic lacunar infarct in the left thalamus.   5.  Mild cerebral volume loss.   6.  Mild chronic microvascular ischemic disease.      DX-CHEST-PORTABLE (1 VIEW)   Final Result      1.  There are changes of mild vascular congestion.      CT-CTA HEAD WITH & W/O-POST PROCESS   Final Result      CT angiogram of the [place] within normal limits.      CT-CTA NECK WITH & W/O-POST PROCESSING   Final Result      1.  Previous right-sided carotid endarterectomy without evidence of significant stenosis or occlusion.      2.  Moderate irregular calcific atherosclerotic plaquing of the left common carotid artery bifurcation and proximal left internal carotid  artery with stenosis of approximately 50%.      CT-CEREBRAL PERFUSION ANALYSIS   Final Result      1.  Cerebral blood flow less than 30% which could represent completed infarct = 5 mL. However this is not seen on the T Max greater than 6 seconds so it likely is artifactual      2.  T Max more than 6 seconds = 0 mL.      3.  Please note that the cerebral perfusion was performed on the limited brain tissue around the basal ganglia region. Infarct/ischemia outside the CT perfusion sections can be missed in this study.      CT-HEAD W/O   Final Result      No noncontrast CT evidence of acute intracranial hemorrhage.      Moderate white matter hypodensity is present.  This is a nonspecific finding which usually is found to represent chronic microvascular disease in patient's of this demographic.  Demyelination, age indeterminant ischemia and gliosis are also common    possibilities.      Chronic left basal ganglia and thalamic lacunar infarctions      Age-appropriate atrophy           Assessment/Plan  * TIA (transient ischemic attack)- (present on admission)  Assessment & Plan  Due to Left carotid artery stenosis s/p stenting - see separate plan  MRI negative for acute CVA  PT/OT recommending post-acute placement   That has evaluated, stated patient is a good candidate for rehab likely discharging to rehab tomorrow once hematuria resolved    Left carotid stenosis- (present on admission)  Assessment & Plan  Severe and symptomatic with TIA  S/p L carotid stent placement on 11/25  Neurology consulted, on plavix for 8 weeks, continue aspirin, statin    History of CVA (cerebrovascular accident)- (present on admission)  Assessment & Plan  Noted in the past  Continue aspirin, plavix and high intensity statin    Gross hematuria  Assessment & Plan  Due to jones trauma when he attempted to self-extricate it   --Noted to have hematuria, will start CBI    Primary hypertension- (present on admission)  Assessment & Plan  On lisinopril,  well controlled  Goal SBP ~130-180, gradual reduction to <130    Uvulitis- (present on admission)  Assessment & Plan  Continue augmentin    Mixed hyperlipidemia- (present on admission)  Assessment & Plan  Continue atorvastatin      Dysarthria- (present on admission)  Assessment & Plan  Resolved  Due to TIA attributed to Left carotid artery stenosisTransient speech difficulty  Stroke due to L carotid artery stenosis, s/p L carotid stent      Constipation, slow transit- (present on admission)  Assessment & Plan  Family says stool softeners have not worked at home but Metamucil has  Bowel protocol plus Metamucil    Delirium  Assessment & Plan  Improving   Seroquel 100 mg QHS  Avoid benzodiazepines and restraints    BPH (benign prostatic hyperplasia)- (present on admission)  Assessment & Plan  Continue tamsulosin       VTE prophylaxis: heparin ppx

## 2022-11-30 NOTE — ASSESSMENT & PLAN NOTE
Due to jones trauma when he attempted to self-extricate it  This morning he found to hematuria and requested RN to start him on CBI again.  Later this afternoon his hematuria improved.  I requested RN to discontinue CBI.  Order ultrasound renal.

## 2022-11-30 NOTE — PROGRESS NOTES
"Hospital Medicine Daily Progress Note    Date of Service  11/29/2022    Chief Complaint  Cooper Harvey is a 88 y.o. male admitted 11/23/2022 with speech difficulties and right-sided weakness    Hospital Course  88-year-old male with a past medical history of stroke, status post right carotid endarterectomy, hypertension, hyperlipidemia who presented with right-sided weakness and speech difficulties.  He underwent CTA head and neck that revealed significant stenosis of left common carotid artery bifurcation and proximal left ICA.  MRI brain revealed no acute infarct but revealed lacunar infarct in the left thalamus.  Patient underwent IR guided left carotid stent placement on 11/25 and was thereafter monitored in ICU.  Patient did well post op. Blood pressure normotensive. On plavix for 8 weeks, aspirin and statin indefinitely. He is feeling well and is medically cleared to discharge to SNF.    Interval Problem Update  Delirious and agitated overnight. Attempted to remove his indwelling urinary catheter.  Did not respond to haldol, so received lorazepam and was restrained.  This morning he is somnolent. Family was at bedside.  He is able to open eyes and respond \"no\" when asked if he has pain.  Took a deep breath and responded \"no\" when asked if short of breath.  Said his visit with his wife was \"pretty good.\"  UOP <20 cc per hour and bloody. LR given with slight increase in UOP.  Scheduled quetiapine at night.  SBP between 100-180. Transient SBP >180 spontaneously improved.    POC discussed with daughters and wife. Counseled on delirium risk from his underlying age and vascular disease. Discussed reduction in unnecessary medications, minimizing sedation, increasing daytime activity, and family visits during the day. He is a candidate for AR from his support, but if he cannot participate with therapy he may not be able to undergo post-acute placement. Counseled that he likely has underlying cognitive " impairment and at-risk of further strokes. Advised that eventually he should consider hospice for family support and EOL care if he is unable to functionally improve.    I have discussed this patient's plan of care and discharge plan at IDT rounds today with Case Management, Nursing, Nursing leadership, and other members of the IDT team.    Consultants/Specialty  neurology    Code Status  Full Code    Disposition  Patient is medically cleared for discharge.   Anticipate discharge to SNF or Acute Rehabilitation.  I have placed the appropriate orders for post-discharge needs.    Review of Systems  Review of Systems   Unable to perform ROS: Mental acuity   Constitutional:  Negative for malaise/fatigue.   HENT:  Negative for ear pain, sinus pain and sore throat.    Eyes:  Negative for pain.   Respiratory:  Negative for shortness of breath.    Cardiovascular:  Negative for chest pain.   Gastrointestinal:  Negative for abdominal pain.   Genitourinary:  Negative for dysuria and flank pain.   Musculoskeletal:  Negative for back pain, joint pain, myalgias and neck pain.   Neurological:  Negative for headaches.      Physical Exam  Temp:  [36.7 °C (98.1 °F)-37.1 °C (98.8 °F)] 37 °C (98.6 °F)  Pulse:  [] 77  Resp:  [16-58] 18  BP: (103-173)/(52-94) 123/60  SpO2:  [93 %-98 %] 98 %    Physical Exam  Vitals and nursing note reviewed.   Constitutional:       Comments: Somnolent but arousable   HENT:      Head:      Comments: Bitemporal wasting     Nose: Nose normal.      Mouth/Throat:      Mouth: Mucous membranes are dry.      Pharynx: No oropharyngeal exudate or posterior oropharyngeal erythema.   Eyes:      General: No scleral icterus.     Conjunctiva/sclera: Conjunctivae normal.   Cardiovascular:      Rate and Rhythm: Normal rate and regular rhythm.      Pulses: Normal pulses.      Heart sounds: Normal heart sounds. No murmur heard.    No friction rub. No gallop.   Pulmonary:      Effort: Pulmonary effort is normal. No  respiratory distress.      Breath sounds: Normal breath sounds. No wheezing, rhonchi or rales.   Abdominal:      General: Bowel sounds are normal. There is no distension.      Palpations: Abdomen is soft.      Tenderness: There is no abdominal tenderness. There is no guarding or rebound.   Genitourinary:     Comments: +Bhatia. Urine dark blood without clots.  Musculoskeletal:         General: No swelling or tenderness.      Cervical back: Neck supple.   Skin:     General: Skin is warm and dry.   Neurological:      Comments: Appropriately conversant, follows commands.   Psychiatric:         Attention and Perception: He is inattentive.         Mood and Affect: Mood and affect normal.         Speech: Speech is delayed.         Behavior: Behavior is slowed. Behavior is cooperative.      Comments: Unable to fully assess due to somnolence       Fluids    Intake/Output Summary (Last 24 hours) at 11/29/2022 1642  Last data filed at 11/29/2022 1000  Gross per 24 hour   Intake 120 ml   Output 780 ml   Net -660 ml         Laboratory  Recent Labs     11/27/22 0528 11/28/22 0311   WBC 5.9 6.1   RBC 4.32* 4.25*   HEMOGLOBIN 13.8* 13.4*   HEMATOCRIT 41.0* 39.4*   MCV 94.9 92.7   MCH 31.9 31.5   MCHC 33.7 34.0   RDW 45.6 43.9   PLATELETCT 164 173   MPV 10.8 10.9       Recent Labs     11/27/22 0528 11/28/22 0311   SODIUM 141 141   POTASSIUM 4.5 3.6   CHLORIDE 110 108   CO2 22 22   GLUCOSE 77 110*   BUN 29* 26*   CREATININE 1.09 1.08   CALCIUM 8.6 8.8                         Imaging  CT-HEAD W/O   Final Result      1.  No evidence of acute intracranial process.      2.  Cerebral atrophy as well as periventricular chronic small vessel ischemic change.         IR-NEURO INTERVENTIONAL CONSULT-IP   Final Result      1.  Symptomatic left carotid stenosis.   2.  Successful left carotid stent placement with embolic protection device.   3.  Widely patent right internal carotid artery-history of right carotid endarterectomy.       MR-BRAIN-W/O   Final Result      1.  No acute infarct.   2.  Chronic punctate microhemorrhage in the left parieto-occipital region.   3.  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 This is stable since the previous MRI dated 4/13/2021. This is noted in the previous CT angiogram dated 4/01/2021.   4.  Chronic lacunar infarct in the left thalamus.   5.  Mild cerebral volume loss.   6.  Mild chronic microvascular ischemic disease.      DX-CHEST-PORTABLE (1 VIEW)   Final Result      1.  There are changes of mild vascular congestion.      CT-CTA HEAD WITH & W/O-POST PROCESS   Final Result      CT angiogram of the [place] within normal limits.      CT-CTA NECK WITH & W/O-POST PROCESSING   Final Result      1.  Previous right-sided carotid endarterectomy without evidence of significant stenosis or occlusion.      2.  Moderate irregular calcific atherosclerotic plaquing of the left common carotid artery bifurcation and proximal left internal carotid artery with stenosis of approximately 50%.      CT-CEREBRAL PERFUSION ANALYSIS   Final Result      1.  Cerebral blood flow less than 30% which could represent completed infarct = 5 mL. However this is not seen on the T Max greater than 6 seconds so it likely is artifactual      2.  T Max more than 6 seconds = 0 mL.      3.  Please note that the cerebral perfusion was performed on the limited brain tissue around the basal ganglia region. Infarct/ischemia outside the CT perfusion sections can be missed in this study.      CT-HEAD W/O   Final Result      No noncontrast CT evidence of acute intracranial hemorrhage.      Moderate white matter hypodensity is present.  This is a nonspecific finding which usually is found to represent chronic microvascular disease in patient's of this demographic.  Demyelination, age indeterminant ischemia and gliosis are also common    possibilities.      Chronic left basal ganglia and thalamic lacunar  infarctions      Age-appropriate atrophy             Assessment/Plan  * TIA (transient ischemic attack)- (present on admission)  Assessment & Plan  Due to Left carotid artery stenosis s/p stenting - see separate plan  MRI negative for acute CVA  PT/OT recommending post-acute placement  PMR consulted, may be a candidate for AR if participating with therapy    Gross hematuria  Assessment & Plan  Due to jones trauma when he attempted to self-extricate it  Bladder scan PRN, discontinue if not passing clots    Primary hypertension- (present on admission)  Assessment & Plan  On lisinopril, well controlled  Goal SBP ~130-180, gradual reduction to <130    Uvulitis- (present on admission)  Assessment & Plan  Continue augmentin    Dysarthria- (present on admission)  Assessment & Plan  Resolved  Due to TIA attributed to Left carotid artery stenosisTransient speech difficulty  Stroke due to L carotid artery stenosis, s/p L carotid stent    Constipation, slow transit- (present on admission)  Assessment & Plan  Family says stool softeners have not worked at home but Metamucil has  Bowel protocol plus Metamucil    Left carotid stenosis- (present on admission)  Assessment & Plan  Severe and symptomatic with TIA  S/p L carotid stent placement on 11/25  Neurology consulted, on plavix for 8 weeks, continue aspirin, statin    History of CVA (cerebrovascular accident)- (present on admission)  Assessment & Plan  Noted in the past  Continue aspirin, plavix and high intensity statin    Delirium  Assessment & Plan  Multifactorial including underlying MCI, sedatives, ICU care, indwelling catheter, restraints  Minimize centrally-acting substances  Encourage sleep-wake cycle, family visits, reorientation  Seroquel 100 mg QHS  Avoid benzodiazepines and restraints    BPH (benign prostatic hyperplasia)- (present on admission)  Assessment & Plan  Continue tamsulosin    Mixed hyperlipidemia- (present on admission)  Assessment & Plan  Continue  atorvastatin         VTE prophylaxis: heparin ppx    I have performed a physical exam and reviewed and updated ROS and Plan today (11/29/2022). In review of yesterday's note (11/28/2022), there are no changes except as documented above.

## 2022-11-30 NOTE — CARE PLAN
The patient is Stable - Low risk of patient condition declining or worsening    Shift Goals  Clinical Goals: Maintain hemdynamic and oxygenation levels. Notify MD of any critical changes.  Patient Goals: SHANIQUE  Family Goals: SHANIQUE    Progress made toward(s) clinical / shift goals:    Problem: Optimal Care of the Stroke Patient  Goal: Optimal emergency care for the stroke patient  Outcome: Progressing    Neuro checks performed as ordered. Agitation managed with reorientation, distraction, and medication.      Problem: Knowledge Deficit - Stroke Education  Goal: Patient's knowledge of stroke and risk factors will improve  Outcome: Progressing     Problem: Discharge Planning - Stroke  Goal: Ensure Stroke Core Measures are met prior to discharge  Outcome: Progressing       Patient is not progressing towards the following goals:

## 2022-11-30 NOTE — DISCHARGE PLANNING
Ion is no longer in restraints.  Msg placed to Dr. Valera-seeking medical clearance.  Msg placed to Cynthia, spouse to discuss a return to [facility].    0905-Spoker with Cynthia, spouse & Angela, daughter regarding a return to [facility] and they are hopeful for a return as he did well there in the past.  Ion will return to his [address] home with 4ST to enter with Cynthia whom will provide 24/7.  Daughters, Angela & Constantin, are able to assist as needed.     1208-CBI initiated.  TCC remains following.   JAMARCUS Woodward MD

## 2022-11-30 NOTE — CARE PLAN
Problem: Optimal Care of the Stroke Patient  Goal: Optimal emergency care for the stroke patient  Outcome: Progressing  Goal: Optimal acute care for the stroke patient  Outcome: Progressing     Problem: Knowledge Deficit - Stroke Education  Goal: Patient's knowledge of stroke and risk factors will improve  Outcome: Progressing     Problem: Psychosocial - Patient Condition  Goal: Patient's ability to verbalize feelings about condition will improve  Outcome: Progressing  Goal: Patient's ability to re-evaluate and adapt role responsibilities will improve  Outcome: Progressing     Problem: Discharge Planning - Stroke  Goal: Ensure Stroke Core Measures are met prior to discharge  Outcome: Progressing  Goal: Patient’s continuum of care needs will be met  Outcome: Progressing     Problem: Neuro Status  Goal: Neuro status will remain stable or improve  Outcome: Progressing     Problem: Hemodynamic Monitoring  Goal: Patient's hemodynamics, fluid balance and neurologic status will be stable or improve  Outcome: Progressing     Problem: Respiratory - Stroke Patient  Goal: Patient will achieve/maintain optimum respiratory rate/effort  Outcome: Progressing     Problem: Dysphagia  Goal: Dysphagia will improve  Outcome: Progressing     Problem: Risk for Aspiration  Goal: Patient's risk for aspiration will be absent or decrease  Outcome: Progressing     Problem: Urinary Elimination  Goal: Establish and maintain regular urinary output  Outcome: Progressing     Problem: Bowel Elimination  Goal: Establish and maintain regular bowel function  Outcome: Progressing     Problem: Mobility - Stroke  Goal: Patient's capacity to carry out activities will improve  Outcome: Progressing  Goal: Spasticity will be prevented or improved  Outcome: Progressing  Goal: Subluxation will be prevented or improved  Outcome: Progressing     Problem: Self Care  Goal: Patient will have the ability to perform ADLs independently or with assistance (bathe,  groom, dress, toilet and feed)  Outcome: Progressing     Problem: Knowledge Deficit - Standard  Goal: Patient and family/care givers will demonstrate understanding of plan of care, disease process/condition, diagnostic tests and medications  Outcome: Progressing     Problem: Fall Risk  Goal: Patient will remain free from falls  Outcome: Progressing     Problem: Skin Integrity  Goal: Skin integrity is maintained or improved  Outcome: Progressing     Problem: Safety - Medical Restraint  Goal: Remains free of injury from restraints (Restraint for Interference with Medical Device)  Outcome: Progressing  Goal: Free from restraint(s) (Restraint for Interference with Medical Device)  Outcome: Progressing     Problem: Pain - Standard  Goal: Alleviation of pain or a reduction in pain to the patient’s comfort goal  Outcome: Progressing   The patient is Stable - Low risk of patient condition declining or worsening    Shift Goals  Clinical Goals: Stable neuro  Patient Goals: SHANIQUE  Family Goals: SHANIQUE    Progress made toward(s) clinical / shift goals:  stable neuro status    Patient is not progressing towards the following goals:

## 2022-11-30 NOTE — PROGRESS NOTES
Patient transferred to [facility] with belongings, including hearing aides and , on ICU bed. Telemetry discontinued per transfer order.

## 2022-11-30 NOTE — ASSESSMENT & PLAN NOTE
Due to Left carotid artery stenosis s/p stenting - see separate plan  MRI negative for acute CVA  PT/OT recommending post-acute placement  Mild hematuria.

## 2022-11-30 NOTE — PROGRESS NOTES
4 Eyes Skin Assessment Completed by HERLINDA Bear and HERLINDA Diego.    Head WDL  Ears WDL  Nose WDL  Mouth WDL  Neck WDL  Breast/Chest Redness and Blanching  Shoulder Blades Redness and Blanching  Spine Redness and Blanching  (R) Arm/Elbow/Hand Bruising  (L) Arm/Elbow/Hand Bruising  Abdomen Redness and Blanching  Groin Redness, Bruising, and Incision  Scrotum/Coccyx/Buttocks Blanching  (R) Leg WDL  (L) Leg WDL  (R) Heel/Foot/Toe WDL  (L) Heel/Foot/Toe WDL          Devices In Places Bhatia and SCD's      Interventions In Place Sacral Mepilex, Waffle Overlay, Pillows, Q2 Turns, and Pressure Redistribution Mattress    Possible Skin Injury No    Pictures Uploaded Into Epic N/A  Wound Consult Placed N/A  RN Wound Prevention Protocol Ordered No

## 2022-12-01 ENCOUNTER — APPOINTMENT (OUTPATIENT)
Dept: RADIOLOGY | Facility: MEDICAL CENTER | Age: 87
DRG: 034 | End: 2022-12-01
Payer: MEDICARE

## 2022-12-01 LAB
ALBUMIN SERPL BCP-MCNC: 3.3 G/DL (ref 3.2–4.9)
ALBUMIN/GLOB SERPL: 1.3 G/DL
ALP SERPL-CCNC: 83 U/L (ref 30–99)
ALT SERPL-CCNC: 33 U/L (ref 2–50)
ANION GAP SERPL CALC-SCNC: 11 MMOL/L (ref 7–16)
AST SERPL-CCNC: 37 U/L (ref 12–45)
BILIRUB SERPL-MCNC: 0.7 MG/DL (ref 0.1–1.5)
BUN SERPL-MCNC: 34 MG/DL (ref 8–22)
CALCIUM SERPL-MCNC: 8.4 MG/DL (ref 8.5–10.5)
CHLORIDE SERPL-SCNC: 107 MMOL/L (ref 96–112)
CO2 SERPL-SCNC: 21 MMOL/L (ref 20–33)
CREAT SERPL-MCNC: 1.34 MG/DL (ref 0.5–1.4)
ERYTHROCYTE [DISTWIDTH] IN BLOOD BY AUTOMATED COUNT: 45.9 FL (ref 35.9–50)
GFR SERPLBLD CREATININE-BSD FMLA CKD-EPI: 51 ML/MIN/1.73 M 2
GLOBULIN SER CALC-MCNC: 2.6 G/DL (ref 1.9–3.5)
GLUCOSE SERPL-MCNC: 106 MG/DL (ref 65–99)
HCT VFR BLD AUTO: 38.1 % (ref 42–52)
HGB BLD-MCNC: 12.9 G/DL (ref 14–18)
LACTATE SERPL-SCNC: 0.9 MMOL/L (ref 0.5–2)
LACTATE SERPL-SCNC: 1.6 MMOL/L (ref 0.5–2)
LACTATE SERPL-SCNC: 2.4 MMOL/L (ref 0.5–2)
MCH RBC QN AUTO: 31.5 PG (ref 27–33)
MCHC RBC AUTO-ENTMCNC: 33.9 G/DL (ref 33.7–35.3)
MCV RBC AUTO: 93.2 FL (ref 81.4–97.8)
PLATELET # BLD AUTO: 184 K/UL (ref 164–446)
PMV BLD AUTO: 10.7 FL (ref 9–12.9)
POTASSIUM SERPL-SCNC: 3.9 MMOL/L (ref 3.6–5.5)
PROCALCITONIN SERPL-MCNC: 0.16 NG/ML
PROT SERPL-MCNC: 5.9 G/DL (ref 6–8.2)
RBC # BLD AUTO: 4.09 M/UL (ref 4.7–6.1)
SODIUM SERPL-SCNC: 139 MMOL/L (ref 135–145)
WBC # BLD AUTO: 11.9 K/UL (ref 4.8–10.8)

## 2022-12-01 PROCEDURE — 71045 X-RAY EXAM CHEST 1 VIEW: CPT

## 2022-12-01 PROCEDURE — A9270 NON-COVERED ITEM OR SERVICE: HCPCS | Performed by: STUDENT IN AN ORGANIZED HEALTH CARE EDUCATION/TRAINING PROGRAM

## 2022-12-01 PROCEDURE — 700105 HCHG RX REV CODE 258

## 2022-12-01 PROCEDURE — A9270 NON-COVERED ITEM OR SERVICE: HCPCS | Performed by: PSYCHIATRY & NEUROLOGY

## 2022-12-01 PROCEDURE — 97116 GAIT TRAINING THERAPY: CPT | Mod: CQ

## 2022-12-01 PROCEDURE — 700102 HCHG RX REV CODE 250 W/ 637 OVERRIDE(OP): Performed by: STUDENT IN AN ORGANIZED HEALTH CARE EDUCATION/TRAINING PROGRAM

## 2022-12-01 PROCEDURE — A9270 NON-COVERED ITEM OR SERVICE: HCPCS | Performed by: HOSPITALIST

## 2022-12-01 PROCEDURE — 92526 ORAL FUNCTION THERAPY: CPT

## 2022-12-01 PROCEDURE — 700102 HCHG RX REV CODE 250 W/ 637 OVERRIDE(OP): Performed by: PSYCHIATRY & NEUROLOGY

## 2022-12-01 PROCEDURE — 85027 COMPLETE CBC AUTOMATED: CPT

## 2022-12-01 PROCEDURE — 770001 HCHG ROOM/CARE - MED/SURG/GYN PRIV*

## 2022-12-01 PROCEDURE — 700102 HCHG RX REV CODE 250 W/ 637 OVERRIDE(OP): Performed by: HOSPITALIST

## 2022-12-01 PROCEDURE — 83605 ASSAY OF LACTIC ACID: CPT

## 2022-12-01 PROCEDURE — 84145 PROCALCITONIN (PCT): CPT

## 2022-12-01 PROCEDURE — 80053 COMPREHEN METABOLIC PANEL: CPT

## 2022-12-01 PROCEDURE — 36415 COLL VENOUS BLD VENIPUNCTURE: CPT

## 2022-12-01 PROCEDURE — 97530 THERAPEUTIC ACTIVITIES: CPT | Mod: CQ

## 2022-12-01 PROCEDURE — 700102 HCHG RX REV CODE 250 W/ 637 OVERRIDE(OP): Performed by: NURSE PRACTITIONER

## 2022-12-01 PROCEDURE — A9270 NON-COVERED ITEM OR SERVICE: HCPCS | Performed by: NURSE PRACTITIONER

## 2022-12-01 PROCEDURE — 99232 SBSQ HOSP IP/OBS MODERATE 35: CPT | Performed by: INTERNAL MEDICINE

## 2022-12-01 RX ORDER — SODIUM CHLORIDE 9 MG/ML
250 INJECTION, SOLUTION INTRAVENOUS ONCE
Status: COMPLETED | OUTPATIENT
Start: 2022-12-01 | End: 2022-12-01

## 2022-12-01 RX ORDER — SODIUM CHLORIDE 9 MG/ML
500 INJECTION, SOLUTION INTRAVENOUS CONTINUOUS
Status: DISCONTINUED | OUTPATIENT
Start: 2022-12-01 | End: 2022-12-02 | Stop reason: HOSPADM

## 2022-12-01 RX ADMIN — QUETIAPINE FUMARATE 100 MG: 100 TABLET ORAL at 20:05

## 2022-12-01 RX ADMIN — SODIUM CHLORIDE 250 ML: 9 INJECTION, SOLUTION INTRAVENOUS at 03:01

## 2022-12-01 RX ADMIN — ASPIRIN 81 MG: 81 TABLET, COATED ORAL at 05:11

## 2022-12-01 RX ADMIN — DOCUSATE SODIUM 50 MG AND SENNOSIDES 8.6 MG 2 TABLET: 8.6; 5 TABLET, FILM COATED ORAL at 05:11

## 2022-12-01 RX ADMIN — TAMSULOSIN HYDROCHLORIDE 0.4 MG: 0.4 CAPSULE ORAL at 20:05

## 2022-12-01 RX ADMIN — PSYLLIUM HUSK 1 PACKET: 3.4 POWDER ORAL at 05:11

## 2022-12-01 RX ADMIN — ATORVASTATIN CALCIUM 40 MG: 40 TABLET, FILM COATED ORAL at 16:48

## 2022-12-01 RX ADMIN — LORATADINE 10 MG: 10 TABLET ORAL at 05:11

## 2022-12-01 RX ADMIN — Medication 5 MG: at 20:04

## 2022-12-01 RX ADMIN — SODIUM CHLORIDE 500 ML: 9 INJECTION, SOLUTION INTRAVENOUS at 05:13

## 2022-12-01 RX ADMIN — AMOXICILLIN AND CLAVULANATE POTASSIUM 1 TABLET: 875; 125 TABLET, FILM COATED ORAL at 05:11

## 2022-12-01 RX ADMIN — CLOPIDOGREL BISULFATE 75 MG: 75 TABLET ORAL at 05:11

## 2022-12-01 RX ADMIN — AMOXICILLIN AND CLAVULANATE POTASSIUM 1 TABLET: 875; 125 TABLET, FILM COATED ORAL at 16:48

## 2022-12-01 ASSESSMENT — ENCOUNTER SYMPTOMS
MYALGIAS: 0
TREMORS: 0
CONSTIPATION: 0
PALPITATIONS: 0
WEIGHT LOSS: 0
DIZZINESS: 0
BLURRED VISION: 0
ABDOMINAL PAIN: 0
DIARRHEA: 0
BACK PAIN: 0
FEVER: 0
FOCAL WEAKNESS: 0
SPEECH CHANGE: 0
NAUSEA: 0
HALLUCINATIONS: 0
CHILLS: 0
ORTHOPNEA: 0
COUGH: 0
PHOTOPHOBIA: 0
SENSORY CHANGE: 0
VOMITING: 0
SHORTNESS OF BREATH: 0
HEADACHES: 0
EYE PAIN: 0
NECK PAIN: 0
TINGLING: 0
DOUBLE VISION: 0
SPUTUM PRODUCTION: 0

## 2022-12-01 ASSESSMENT — GAIT ASSESSMENTS
DISTANCE (FEET): 60
ASSISTIVE DEVICE: FRONT WHEEL WALKER
GAIT LEVEL OF ASSIST: MINIMAL ASSIST
DEVIATION: BRADYKINETIC;SHUFFLED GAIT;DECREASED BASE OF SUPPORT;DECREASED HEEL STRIKE;DECREASED TOE OFF

## 2022-12-01 ASSESSMENT — COGNITIVE AND FUNCTIONAL STATUS - GENERAL
MOBILITY SCORE: 14
WALKING IN HOSPITAL ROOM: A LITTLE
MOVING FROM LYING ON BACK TO SITTING ON SIDE OF FLAT BED: A LOT
SUGGESTED CMS G CODE MODIFIER MOBILITY: CL
STANDING UP FROM CHAIR USING ARMS: A LITTLE
CLIMB 3 TO 5 STEPS WITH RAILING: A LOT
MOVING TO AND FROM BED TO CHAIR: UNABLE
TURNING FROM BACK TO SIDE WHILE IN FLAT BAD: A LITTLE

## 2022-12-01 ASSESSMENT — LIFESTYLE VARIABLES: SUBSTANCE_ABUSE: 0

## 2022-12-01 NOTE — DISCHARGE PLANNING
CBI D/C'tobias.  Selene BSN to transition mask to N/C @ 2L.  Dr. Cruz to assess and let me know regarding medical clearance.  Will discuss this case further with the Admissions Team this morning.     0900-CBI re-started d/t mild hematuria and renal u/s pending.

## 2022-12-01 NOTE — PROGRESS NOTES
Hospital Medicine Daily Progress Note    Date of Service  12/1/2022    Chief Complaint  Cooper Harvey is a 88 y.o. male admitted 11/23/2022 with   Chief Complaint   Patient presents with    Possible Stroke     LKW 1300. Pt started experiencing word finding difficulties, slowed speech, and R sided weakness.           Hospital Course    This is a 82-year-old male with a past medical history significant for CVA status post right carotid endarterectomy, hypertension, hyperlipidemia presented on 11/23/2022 with a complaint of right-sided weakness along with his fist difficulties.    CTA head and neck that revealed significant stenosis of left common carotid artery bifurcation and proximal left ICA.  MRI brain revealed no acute infarct but revealed lacunar infarct in the left thalamus.  Patient underwent IR guided left carotid stent on 11/25/2022.  Post procedure, patient was monitored in ICU.  Patient was transferred out of ICU on 11/27/2022.    Patient need to be on Plavix for 8 weeks, and aspirin forever.  Along with a statin. His precautions complicated with hematuria, I ordered continuous bladder irrigation.     Interval events:    12/01/22    I evaluated and examined him at the bedside.  Overnight he became hypotensive and received IV fluid bolus.  Labs showed elevated white blood cell count and normal procalcitonin level.  He found to have normal lactic acid.  This morning his blood pressure is stable current blood pressure is 109/51.  He found to have significant hematuria and I started CBI and later this afternoon his hematuria has been improving.  I requested RN to stop CBI and monitor.  I ordered ultrasound renal.  He is requiring 2 L of oxygen and is current oxygen saturation is 97%.  The patient's symptoms of hematuria will resolve and ultrasound all came back negative patient can be discharged to rehab.  I updated rehab team.      Plan of care has been discussed the patient, nursing staff, case  management    Consultants/Specialty  neurology and IT, ICU    Code Status  Full Code    Disposition  Patient is not medically cleared for discharge.   Anticipate discharge to to an inpatient rehabilitation hospital.  I have placed the appropriate orders for post-discharge needs.    Review of Systems  Review of Systems   Constitutional:  Negative for chills, fever and weight loss.   HENT:  Positive for hearing loss. Negative for tinnitus.    Eyes:  Negative for blurred vision, double vision, photophobia and pain.   Respiratory:  Negative for cough, sputum production and shortness of breath.    Cardiovascular:  Negative for chest pain, palpitations, orthopnea and leg swelling.   Gastrointestinal:  Negative for abdominal pain, constipation, diarrhea, nausea and vomiting.   Genitourinary:  Negative for dysuria, frequency and urgency.   Musculoskeletal:  Negative for back pain, joint pain, myalgias and neck pain.   Skin:  Negative for rash.   Neurological:  Negative for dizziness, tingling, tremors, sensory change, speech change, focal weakness and headaches.   Psychiatric/Behavioral:  Negative for hallucinations and substance abuse.    All other systems reviewed and are negative.     Physical Exam  Temp:  [36.7 °C (98.1 °F)-38 °C (100.4 °F)] 36.7 °C (98.1 °F)  Pulse:  [81-98] 81  Resp:  [18-20] 18  BP: ()/(43-80) 109/51  SpO2:  [82 %-98 %] 97 %    Physical Exam  Vitals reviewed.   Constitutional:       General: He is not in acute distress.     Comments: He was sitting in chair without any acute distress.   HENT:      Head: Normocephalic and atraumatic. No contusion.      Right Ear: External ear normal.      Left Ear: External ear normal.      Nose: Nose normal.      Comments: Oxygen nasal cannula     Mouth/Throat:      Pharynx: No oropharyngeal exudate.   Eyes:      General:         Right eye: No discharge.         Left eye: No discharge.      Pupils: Pupils are equal, round, and reactive to light.    Cardiovascular:      Rate and Rhythm: Normal rate and regular rhythm.      Heart sounds: No murmur heard.    No friction rub. No gallop.   Pulmonary:      Effort: Pulmonary effort is normal.      Breath sounds: No wheezing or rhonchi.   Abdominal:      General: Bowel sounds are normal. There is no distension.      Palpations: Abdomen is soft.      Tenderness: There is no abdominal tenderness. There is no rebound.   Musculoskeletal:         General: No swelling or tenderness. Normal range of motion.      Cervical back: No rigidity. No muscular tenderness.   Skin:     General: Skin is warm and dry.      Coloration: Skin is not jaundiced.   Neurological:      General: No focal deficit present.      Mental Status: He is alert and oriented to person, place, and time.      Cranial Nerves: No cranial nerve deficit.      Sensory: No sensory deficit.      Comments: He is following commands.   Psychiatric:         Mood and Affect: Mood normal.       Fluids    Intake/Output Summary (Last 24 hours) at 12/1/2022 0746  Last data filed at 12/1/2022 0600  Gross per 24 hour   Intake 6311.25 ml   Output 6110 ml   Net 201.25 ml         Laboratory  Recent Labs     11/30/22  1014 12/01/22  0332   WBC 6.7 11.9*   RBC 4.54* 4.09*   HEMOGLOBIN 14.1 12.9*   HEMATOCRIT 42.2 38.1*   MCV 93.0 93.2   MCH 31.1 31.5   MCHC 33.4* 33.9   RDW 46.3 45.9   PLATELETCT 175 184   MPV 10.3 10.7       Recent Labs     11/30/22  1014 12/01/22  0332   SODIUM 141 139   POTASSIUM 3.7 3.9   CHLORIDE 107 107   CO2 25 21   GLUCOSE 145* 106*   BUN 28* 34*   CREATININE 1.00 1.34   CALCIUM 8.7 8.4*                     Imaging  DX-CHEST-PORTABLE (1 VIEW)   Final Result         1.  Interstitial pulmonary parenchymal prominence suggest chronic underlying lung disease, component of interstitial edema and/or infiltrates not excluded.   2.  Trace left pleural effusion   3.  Atherosclerosis      CT-HEAD W/O   Final Result      1.  No evidence of acute intracranial  process.      2.  Cerebral atrophy as well as periventricular chronic small vessel ischemic change.         IR-NEURO INTERVENTIONAL CONSULT-IP   Final Result      1.  Symptomatic left carotid stenosis.   2.  Successful left carotid stent placement with embolic protection device.   3.  Widely patent right internal carotid artery-history of right carotid endarterectomy.      MR-BRAIN-W/O   Final Result      1.  No acute infarct.   2.  Chronic punctate microhemorrhage in the left parieto-occipital region.   3.  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 This is stable since the previous MRI dated 4/13/2021. This is noted in the previous CT angiogram dated 4/01/2021.   4.  Chronic lacunar infarct in the left thalamus.   5.  Mild cerebral volume loss.   6.  Mild chronic microvascular ischemic disease.      DX-CHEST-PORTABLE (1 VIEW)   Final Result      1.  There are changes of mild vascular congestion.      CT-CTA HEAD WITH & W/O-POST PROCESS   Final Result      CT angiogram of the [place] within normal limits.      CT-CTA NECK WITH & W/O-POST PROCESSING   Final Result      1.  Previous right-sided carotid endarterectomy without evidence of significant stenosis or occlusion.      2.  Moderate irregular calcific atherosclerotic plaquing of the left common carotid artery bifurcation and proximal left internal carotid artery with stenosis of approximately 50%.      CT-CEREBRAL PERFUSION ANALYSIS   Final Result      1.  Cerebral blood flow less than 30% which could represent completed infarct = 5 mL. However this is not seen on the T Max greater than 6 seconds so it likely is artifactual      2.  T Max more than 6 seconds = 0 mL.      3.  Please note that the cerebral perfusion was performed on the limited brain tissue around the basal ganglia region. Infarct/ischemia outside the CT perfusion sections can be missed in this study.      CT-HEAD W/O   Final Result      No  noncontrast CT evidence of acute intracranial hemorrhage.      Moderate white matter hypodensity is present.  This is a nonspecific finding which usually is found to represent chronic microvascular disease in patient's of this demographic.  Demyelination, age indeterminant ischemia and gliosis are also common    possibilities.      Chronic left basal ganglia and thalamic lacunar infarctions      Age-appropriate atrophy             Assessment/Plan  * TIA (transient ischemic attack)- (present on admission)  Assessment & Plan  Due to Left carotid artery stenosis s/p stenting - see separate plan  MRI negative for acute CVA  PT/OT recommending post-acute placement  Mild hematuria.    Gross hematuria  Assessment & Plan  Due to jones trauma when he attempted to self-extricate it  This morning he found to hematuria and requested RN to start him on CBI again.  Later this afternoon his hematuria improved.  I requested RN to discontinue CBI.  Order ultrasound renal.    Primary hypertension- (present on admission)  Assessment & Plan  On lisinopril, well controlled  Goal SBP ~130-180, gradual reduction to <130  Blood pressure decreased last night but now it has improved.    Uvulitis- (present on admission)  Assessment & Plan  Continue augmentin    Dysarthria- (present on admission)  Assessment & Plan  Resolved  Due to TIA attributed to Left carotid artery stenosisTransient speech difficulty  Stroke due to L carotid artery stenosis, s/p L carotid stent      Constipation, slow transit- (present on admission)  Assessment & Plan  Family says stool softeners have not worked at home but Metamucil has  Bowel protocol plus Metamucil    Left carotid stenosis- (present on admission)  Assessment & Plan  Severe and symptomatic with TIA  S/p L carotid stent placement on 11/25  Neurology consulted, on plavix for 8 weeks, continue aspirin, statin    History of CVA (cerebrovascular accident)- (present on admission)  Assessment & Plan  Noted in  the past  Continue aspirin, plavix and high intensity statin    Delirium  Assessment & Plan  Improving   Seroquel 100 mg QHS  Avoid benzodiazepines and restraints    BPH (benign prostatic hyperplasia)- (present on admission)  Assessment & Plan  Continue tamsulosin    Mixed hyperlipidemia- (present on admission)  Assessment & Plan  Continue atorvastatin      I discussed plan of care with bedside RN.    VTE prophylaxis: heparin ppx

## 2022-12-01 NOTE — PROGRESS NOTES
0220: Pt c/o SOB. Checked SpO2 and he was satting 83%. Placed on 2L NC with no recovery and gradually increased to oxymask and then non-rebreather with SpO2 90%.    0228: MIGUEL A Villela notified of Pt increased O2 requirements. Respiratory paged and STAT CXR ordered    0237: APRN notified of BP 82/46 MAP 58    0250: APRN at bedside    0254: BP 82/47 MAP 59, order for 250mL NS bolus. Current SpO2 97% on 3L oxymask.    0326: Bolus complete. BP 96/49 MAP 65

## 2022-12-01 NOTE — CARE PLAN
The patient is Watcher - Medium risk of patient condition declining or worsening    Shift Goals  Clinical Goals: Stable neuro  Patient Goals: SHANIQUE  Family Goals: SHANIQUE    Progress made toward(s) clinical / shift goals:    Problem: Neuro Status  Goal: Neuro status will remain stable or improve  Outcome: Progressing     Problem: Fall Risk  Goal: Patient will remain free from falls  Outcome: Progressing     Problem: Skin Integrity  Goal: Skin integrity is maintained or improved  Outcome: Progressing     Problem: Pain - Standard  Goal: Alleviation of pain or a reduction in pain to the patient’s comfort goal  Outcome: Progressing       Patient is not progressing towards the following goals:      Problem: Hemodynamic Monitoring  Goal: Patient's hemodynamics, fluid balance and neurologic status will be stable or improve  Outcome: Not Met     Problem: Respiratory - Stroke Patient  Goal: Patient will achieve/maintain optimum respiratory rate/effort  Outcome: Not Met

## 2022-12-01 NOTE — THERAPY
Missed Therapy     Patient Name: Cooper Harvey  Age:  88 y.o., Sex:  male  Medical Record #: [id number]  Today's Date: 12/1/2022    Discussed missed therapy with CHET RODRIGUEZ. Cognitive evaluation not indicated during acute stay. MD approved for orders to be completed without evaluation.        12/01/22 1527   Treatment Variance   Reason For Missed Therapy Medical - Other (Please Comment)  (Evaluation not indicated. Confrimed with MD.)   Initial Contact Note    Initial Contact Note  Order Received and Verified. Speech Therapy Evaluation NOT Completed Because Patient Does Not Require Acute Speech Therapy at this Time.   Interdisciplinary Plan of Care Collaboration   IDT Collaboration with  Nursing;Physician   Patient Position at End of Therapy In Bed;Bed Alarm On;Call Light within Reach;Tray Table within Reach   Collaboration Comments MD approved to cancel orders in the absence of acute infarct on MRI. May benefit from cog/lang services in post-acute but does not demonstrate need for acute SLP service for cognition at this time.

## 2022-12-01 NOTE — THERAPY
"Speech Language Pathology  Daily Treatment     Patient Name: Cooper Harvey  Age:  88 y.o., Sex:  male  Medical Record #: [id number]  Today's Date: 12/1/2022     Precautions  Precautions: Fall Risk  Comments: SPB <140    HPI: 89 y/o male with hx of CVA presenting for transient speech difficulties and R side weakness.     Brain MRI w/o:  \"1.  No acute infarct.  2.  Chronic punctate microhemorrhage in the left parieto-occipital region.  3.  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 This is stable since the previous MRI dated 4/13/2021. This is noted in the previous CT angiogram dated 4/01/2021.  4.  Chronic lacunar infarct in the left thalamus.  5.  Mild cerebral volume loss.  6.  Mild chronic microvascular ischemic disease.\"    Subjective  Pt states no difficulty with current diet. Reports \"Oh, I don't like that\" with cheese sticks but otherwise cooperative and participatory.     Assessment  Pt demonstrated effective self-feeding throughout swallowing tx following min A with set-up. Pt with total clearance of RG finger foods following end of trials. Slightly prolonged but overall functional and complete mastication of all trials. Functional bolus formation and total AP transit of all consistencies trialed. Functional bite with all trials. No overt s/sx of aspiration given trials of TN via single and sequential straw sips, including with 3 oz test.     Clinical Impressions  Pt presents without s/sx concerning for oropharyngeal dysphagia this date. Recommend continue of RG/TN diet with PO medication administration. Given that pt appears to be at baseline without dysphagia concern, SLP will not follow. Please re-consult SLP with s/sx concerning for aspiration or change in pt status.    Recommendations  1.  Regular solids with thin liquids  2.  Swallowing Instructions & Precautions:   Supervision: Independent  Positioning: HOB at 90* or up in chair as tolerated  Medication: " With liquid wash  Strategies: Small bites/sips, slow rate of intake  Oral Care: BID  3.  SLP will discharge 2' to goals met. May benefit from cognitive/linguistic services in post-acute.       Plan    Discharge secondary to goals met. Cognitive evaluation not indicated during acute stay, will complete orders.     Discharge Recommendations: Anticipate that the patient will have no further speech therapy needs after discharge from the hospital (No needs for dysphagia.)       Objective   12/01/22 1544   Vitals   O2 (LPM) 2   O2 Delivery Device Silicone Nasal Cannula   Dysphagia    Diet / Liquid Recommendation Thin (0);Regular (7)   Nutritional Liquid Intake Rating Scale Non thickened beverages   Nutritional Food Intake Rating Scale Total oral diet with multiple consistencies without special preparation but with specific food limitations   Recommended Route of Medication Administration   Medication Administration  Whole with Liquid Wash   Patient / Family Goals   Patient / Family Goal #1 to have some food today   Goal #1 Outcome Goal met   Short Term Goals   Short Term Goal # 1 The patient will consume regular texture, thin liquids, independently without signs of dysphagia   Goal Outcome # 1 Goal met   Education Group   Education Provided Dysphagia;Role of Speech Therapy   Anticipated Discharge Needs   Discharge Recommendations Anticipate that the patient will have no further speech therapy needs after discharge from the hospital  (No needs for dysphagia.)   Interdisciplinary Plan of Care Collaboration   Collaboration Comments RN updated

## 2022-12-01 NOTE — CARE PLAN
Problem: Optimal Care of the Stroke Patient  Goal: Optimal emergency care for the stroke patient  Outcome: Progressing  Goal: Optimal acute care for the stroke patient  Outcome: Progressing     Problem: Knowledge Deficit - Stroke Education  Goal: Patient's knowledge of stroke and risk factors will improve  Outcome: Progressing     Problem: Psychosocial - Patient Condition  Goal: Patient's ability to verbalize feelings about condition will improve  Outcome: Progressing  Goal: Patient's ability to re-evaluate and adapt role responsibilities will improve  Outcome: Progressing     Problem: Discharge Planning - Stroke  Goal: Ensure Stroke Core Measures are met prior to discharge  Outcome: Progressing  Goal: Patient’s continuum of care needs will be met  Outcome: Progressing     Problem: Neuro Status  Goal: Neuro status will remain stable or improve  Outcome: Progressing     Problem: Hemodynamic Monitoring  Goal: Patient's hemodynamics, fluid balance and neurologic status will be stable or improve  Outcome: Progressing     Problem: Respiratory - Stroke Patient  Goal: Patient will achieve/maintain optimum respiratory rate/effort  Outcome: Progressing     Problem: Dysphagia  Goal: Dysphagia will improve  Outcome: Progressing     Problem: Risk for Aspiration  Goal: Patient's risk for aspiration will be absent or decrease  Outcome: Progressing     Problem: Urinary Elimination  Goal: Establish and maintain regular urinary output  Outcome: Progressing     Problem: Bowel Elimination  Goal: Establish and maintain regular bowel function  Outcome: Progressing     Problem: Mobility - Stroke  Goal: Patient's capacity to carry out activities will improve  Outcome: Progressing  Goal: Spasticity will be prevented or improved  Outcome: Progressing  Goal: Subluxation will be prevented or improved  Outcome: Progressing     Problem: Self Care  Goal: Patient will have the ability to perform ADLs independently or with assistance (bathe,  groom, dress, toilet and feed)  Outcome: Progressing     Problem: Knowledge Deficit - Standard  Goal: Patient and family/care givers will demonstrate understanding of plan of care, disease process/condition, diagnostic tests and medications  Outcome: Progressing     Problem: Fall Risk  Goal: Patient will remain free from falls  Outcome: Progressing     Problem: Skin Integrity  Goal: Skin integrity is maintained or improved  Outcome: Progressing     Problem: Pain - Standard  Goal: Alleviation of pain or a reduction in pain to the patient’s comfort goal  Outcome: Progressing   The patient is Stable - Low risk of patient condition declining or worsening    Shift Goals  Clinical Goals: adequate UO, hemodynamically stable  Patient Goals: rest  Family Goals: billy    Progress made toward(s) clinical / shift goals:  stable neuro status    Patient is not progressing towards the following goals:

## 2022-12-01 NOTE — PROGRESS NOTES
0855-CBI restarted per orders. Spoke with daughter Angela, updates given and questions answered.  1400-jones output clear, CBI stopped per MD order

## 2022-12-02 ENCOUNTER — APPOINTMENT (OUTPATIENT)
Dept: RADIOLOGY | Facility: MEDICAL CENTER | Age: 87
DRG: 034 | End: 2022-12-02
Attending: INTERNAL MEDICINE
Payer: MEDICARE

## 2022-12-02 ENCOUNTER — HOSPITAL ENCOUNTER (INPATIENT)
Facility: REHABILITATION | Age: 87
LOS: 14 days | DRG: 949 | End: 2022-12-16
Attending: PHYSICAL MEDICINE & REHABILITATION | Admitting: PHYSICAL MEDICINE & REHABILITATION
Payer: MEDICARE

## 2022-12-02 VITALS
OXYGEN SATURATION: 97 % | BODY MASS INDEX: 24.39 KG/M2 | TEMPERATURE: 97.9 F | SYSTOLIC BLOOD PRESSURE: 135 MMHG | HEART RATE: 68 BPM | WEIGHT: 160.94 LBS | DIASTOLIC BLOOD PRESSURE: 68 MMHG | HEIGHT: 68 IN | RESPIRATION RATE: 18 BRPM

## 2022-12-02 DIAGNOSIS — Z86.73 HISTORY OF CVA (CEREBROVASCULAR ACCIDENT): ICD-10-CM

## 2022-12-02 DIAGNOSIS — R33.8 BENIGN PROSTATIC HYPERPLASIA WITH URINARY RETENTION: ICD-10-CM

## 2022-12-02 DIAGNOSIS — G47.00 INSOMNIA, UNSPECIFIED TYPE: ICD-10-CM

## 2022-12-02 DIAGNOSIS — J30.2 SEASONAL ALLERGIES: ICD-10-CM

## 2022-12-02 DIAGNOSIS — I65.22 LEFT CAROTID STENOSIS: ICD-10-CM

## 2022-12-02 DIAGNOSIS — N40.1 BENIGN PROSTATIC HYPERPLASIA WITH URINARY RETENTION: ICD-10-CM

## 2022-12-02 PROBLEM — G45.9 TIA (TRANSIENT ISCHEMIC ATTACK): Status: RESOLVED | Noted: 2022-11-29 | Resolved: 2022-12-02

## 2022-12-02 PROBLEM — R41.0 DELIRIUM: Status: RESOLVED | Noted: 2021-04-06 | Resolved: 2022-12-02

## 2022-12-02 PROBLEM — R31.0 GROSS HEMATURIA: Status: RESOLVED | Noted: 2022-11-29 | Resolved: 2022-12-02

## 2022-12-02 PROBLEM — G45.9 TRANSIENT ISCHEMIC ATTACK (TIA): Status: ACTIVE | Noted: 2022-12-02

## 2022-12-02 PROBLEM — R53.81 DEBILITY: Status: ACTIVE | Noted: 2022-12-02

## 2022-12-02 PROBLEM — I95.9 HYPOTENSION: Status: ACTIVE | Noted: 2022-12-02

## 2022-12-02 PROBLEM — K59.01 CONSTIPATION, SLOW TRANSIT: Status: RESOLVED | Noted: 2022-11-23 | Resolved: 2022-12-02

## 2022-12-02 LAB
ALBUMIN SERPL BCP-MCNC: 3.2 G/DL (ref 3.2–4.9)
ALBUMIN/GLOB SERPL: 1.3 G/DL
ALP SERPL-CCNC: 85 U/L (ref 30–99)
ALT SERPL-CCNC: 28 U/L (ref 2–50)
ANION GAP SERPL CALC-SCNC: 8 MMOL/L (ref 7–16)
AST SERPL-CCNC: 30 U/L (ref 12–45)
BILIRUB SERPL-MCNC: 0.3 MG/DL (ref 0.1–1.5)
BUN SERPL-MCNC: 38 MG/DL (ref 8–22)
CALCIUM SERPL-MCNC: 8.4 MG/DL (ref 8.5–10.5)
CHLORIDE SERPL-SCNC: 106 MMOL/L (ref 96–112)
CO2 SERPL-SCNC: 24 MMOL/L (ref 20–33)
CREAT SERPL-MCNC: 1.08 MG/DL (ref 0.5–1.4)
ERYTHROCYTE [DISTWIDTH] IN BLOOD BY AUTOMATED COUNT: 46.1 FL (ref 35.9–50)
FLUAV RNA SPEC QL NAA+PROBE: NEGATIVE
FLUBV RNA SPEC QL NAA+PROBE: NEGATIVE
GFR SERPLBLD CREATININE-BSD FMLA CKD-EPI: 66 ML/MIN/1.73 M 2
GLOBULIN SER CALC-MCNC: 2.5 G/DL (ref 1.9–3.5)
GLUCOSE SERPL-MCNC: 115 MG/DL (ref 65–99)
HCT VFR BLD AUTO: 35.4 % (ref 42–52)
HGB BLD-MCNC: 11.9 G/DL (ref 14–18)
LACTATE SERPL-SCNC: 0.7 MMOL/L (ref 0.5–2)
MAGNESIUM SERPL-MCNC: 2.1 MG/DL (ref 1.5–2.5)
MCH RBC QN AUTO: 31.4 PG (ref 27–33)
MCHC RBC AUTO-ENTMCNC: 33.6 G/DL (ref 33.7–35.3)
MCV RBC AUTO: 93.4 FL (ref 81.4–97.8)
PLATELET # BLD AUTO: 168 K/UL (ref 164–446)
PMV BLD AUTO: 11.1 FL (ref 9–12.9)
POTASSIUM SERPL-SCNC: 3.8 MMOL/L (ref 3.6–5.5)
PROT SERPL-MCNC: 5.7 G/DL (ref 6–8.2)
RBC # BLD AUTO: 3.79 M/UL (ref 4.7–6.1)
RSV RNA SPEC QL NAA+PROBE: NEGATIVE
SARS-COV-2 RNA RESP QL NAA+PROBE: NOTDETECTED
SODIUM SERPL-SCNC: 138 MMOL/L (ref 135–145)
SPECIMEN SOURCE: NORMAL
WBC # BLD AUTO: 7.3 K/UL (ref 4.8–10.8)

## 2022-12-02 PROCEDURE — 85027 COMPLETE CBC AUTOMATED: CPT

## 2022-12-02 PROCEDURE — 99239 HOSP IP/OBS DSCHRG MGMT >30: CPT | Mod: FS | Performed by: NURSE PRACTITIONER

## 2022-12-02 PROCEDURE — 99223 1ST HOSP IP/OBS HIGH 75: CPT | Performed by: PHYSICAL MEDICINE & REHABILITATION

## 2022-12-02 PROCEDURE — 700102 HCHG RX REV CODE 250 W/ 637 OVERRIDE(OP): Performed by: PSYCHIATRY & NEUROLOGY

## 2022-12-02 PROCEDURE — A9270 NON-COVERED ITEM OR SERVICE: HCPCS | Performed by: PHYSICAL MEDICINE & REHABILITATION

## 2022-12-02 PROCEDURE — A9270 NON-COVERED ITEM OR SERVICE: HCPCS | Performed by: NURSE PRACTITIONER

## 2022-12-02 PROCEDURE — 0241U HCHG SARS-COV-2 COVID-19 NFCT DS RESP RNA 4 TRGT MIC: CPT

## 2022-12-02 PROCEDURE — 76775 US EXAM ABDO BACK WALL LIM: CPT

## 2022-12-02 PROCEDURE — 83605 ASSAY OF LACTIC ACID: CPT

## 2022-12-02 PROCEDURE — 700102 HCHG RX REV CODE 250 W/ 637 OVERRIDE(OP): Performed by: HOSPITALIST

## 2022-12-02 PROCEDURE — 80053 COMPREHEN METABOLIC PANEL: CPT

## 2022-12-02 PROCEDURE — 700102 HCHG RX REV CODE 250 W/ 637 OVERRIDE(OP): Performed by: NURSE PRACTITIONER

## 2022-12-02 PROCEDURE — 770010 HCHG ROOM/CARE - REHAB SEMI PRIVAT*

## 2022-12-02 PROCEDURE — 700102 HCHG RX REV CODE 250 W/ 637 OVERRIDE(OP): Performed by: PHYSICAL MEDICINE & REHABILITATION

## 2022-12-02 PROCEDURE — A9270 NON-COVERED ITEM OR SERVICE: HCPCS | Performed by: PSYCHIATRY & NEUROLOGY

## 2022-12-02 PROCEDURE — 83735 ASSAY OF MAGNESIUM: CPT

## 2022-12-02 PROCEDURE — 94760 N-INVAS EAR/PLS OXIMETRY 1: CPT

## 2022-12-02 PROCEDURE — 36415 COLL VENOUS BLD VENIPUNCTURE: CPT

## 2022-12-02 PROCEDURE — A9270 NON-COVERED ITEM OR SERVICE: HCPCS | Performed by: HOSPITALIST

## 2022-12-02 RX ORDER — QUETIAPINE FUMARATE 100 MG/1
100 TABLET, FILM COATED ORAL NIGHTLY
Qty: 60 TABLET | Refills: 3 | Status: ON HOLD
Start: 2022-12-02 | End: 2022-12-15

## 2022-12-02 RX ORDER — OMEPRAZOLE 20 MG/1
20 CAPSULE, DELAYED RELEASE ORAL
Status: DISCONTINUED | OUTPATIENT
Start: 2022-12-03 | End: 2022-12-16 | Stop reason: HOSPADM

## 2022-12-02 RX ORDER — CLOPIDOGREL BISULFATE 75 MG/1
75 TABLET ORAL DAILY
Qty: 49 TABLET | Refills: 0 | Status: ON HOLD
Start: 2022-12-03 | End: 2022-12-15 | Stop reason: SDUPTHER

## 2022-12-02 RX ORDER — QUETIAPINE FUMARATE 100 MG/1
100 TABLET, FILM COATED ORAL NIGHTLY
Status: CANCELLED | OUTPATIENT
Start: 2022-12-02

## 2022-12-02 RX ORDER — ACETAMINOPHEN 325 MG/1
650 TABLET ORAL EVERY 4 HOURS PRN
Status: DISCONTINUED | OUTPATIENT
Start: 2022-12-02 | End: 2022-12-16 | Stop reason: HOSPADM

## 2022-12-02 RX ORDER — AMOXICILLIN 250 MG
2 CAPSULE ORAL 2 TIMES DAILY
Qty: 30 TABLET | Refills: 0 | Status: ON HOLD | OUTPATIENT
Start: 2022-12-02 | End: 2022-12-15

## 2022-12-02 RX ORDER — ATORVASTATIN CALCIUM 40 MG/1
40 TABLET, FILM COATED ORAL EVERY EVENING
Status: CANCELLED | OUTPATIENT
Start: 2022-12-02

## 2022-12-02 RX ORDER — ONDANSETRON 4 MG/1
4 TABLET, ORALLY DISINTEGRATING ORAL EVERY 4 HOURS PRN
Qty: 10 TABLET | Refills: 0 | Status: ON HOLD
Start: 2022-12-02 | End: 2022-12-15

## 2022-12-02 RX ORDER — HYDROXYZINE HYDROCHLORIDE 25 MG/1
50 TABLET, FILM COATED ORAL EVERY 6 HOURS PRN
Status: DISCONTINUED | OUTPATIENT
Start: 2022-12-02 | End: 2022-12-16 | Stop reason: HOSPADM

## 2022-12-02 RX ORDER — LORATADINE 10 MG/1
10 TABLET ORAL DAILY
Status: CANCELLED | OUTPATIENT
Start: 2022-12-03

## 2022-12-02 RX ORDER — POLYETHYLENE GLYCOL 3350 17 G/17G
1 POWDER, FOR SOLUTION ORAL
Status: DISCONTINUED | OUTPATIENT
Start: 2022-12-02 | End: 2022-12-13

## 2022-12-02 RX ORDER — AMOXICILLIN AND CLAVULANATE POTASSIUM 875; 125 MG/1; MG/1
1 TABLET, FILM COATED ORAL EVERY 12 HOURS
Refills: 0 | Status: ON HOLD
Start: 2022-12-02 | End: 2022-12-15

## 2022-12-02 RX ORDER — TRAZODONE HYDROCHLORIDE 50 MG/1
50 TABLET ORAL
Status: DISCONTINUED | OUTPATIENT
Start: 2022-12-02 | End: 2022-12-16 | Stop reason: HOSPADM

## 2022-12-02 RX ORDER — ASPIRIN 81 MG/1
81 TABLET ORAL DAILY
Qty: 30 TABLET | Status: ON HOLD
Start: 2022-12-03 | End: 2022-12-15 | Stop reason: SDUPTHER

## 2022-12-02 RX ORDER — AMOXICILLIN 250 MG
2 CAPSULE ORAL 2 TIMES DAILY
Status: DISCONTINUED | OUTPATIENT
Start: 2022-12-02 | End: 2022-12-13

## 2022-12-02 RX ORDER — TAMSULOSIN HYDROCHLORIDE 0.4 MG/1
0.4 CAPSULE ORAL
Status: CANCELLED | OUTPATIENT
Start: 2022-12-02

## 2022-12-02 RX ORDER — LACTULOSE 20 G/30ML
30 SOLUTION ORAL
Status: DISCONTINUED | OUTPATIENT
Start: 2022-12-02 | End: 2022-12-16 | Stop reason: HOSPADM

## 2022-12-02 RX ORDER — ATORVASTATIN CALCIUM 40 MG/1
40 TABLET, FILM COATED ORAL EVERY EVENING
Status: DISCONTINUED | OUTPATIENT
Start: 2022-12-02 | End: 2022-12-16 | Stop reason: HOSPADM

## 2022-12-02 RX ORDER — ALUMINA, MAGNESIA, AND SIMETHICONE 2400; 2400; 240 MG/30ML; MG/30ML; MG/30ML
20 SUSPENSION ORAL
Status: DISCONTINUED | OUTPATIENT
Start: 2022-12-02 | End: 2022-12-16 | Stop reason: HOSPADM

## 2022-12-02 RX ORDER — LANOLIN ALCOHOL/MO/W.PET/CERES
3 CREAM (GRAM) TOPICAL NIGHTLY PRN
Status: DISCONTINUED | OUTPATIENT
Start: 2022-12-02 | End: 2022-12-16 | Stop reason: HOSPADM

## 2022-12-02 RX ORDER — QUETIAPINE FUMARATE 100 MG/1
100 TABLET, FILM COATED ORAL NIGHTLY
Status: DISCONTINUED | OUTPATIENT
Start: 2022-12-02 | End: 2022-12-05

## 2022-12-02 RX ORDER — ACETAMINOPHEN 325 MG/1
650 TABLET ORAL EVERY 6 HOURS PRN
Status: ON HOLD
Start: 2022-12-02 | End: 2022-12-15

## 2022-12-02 RX ORDER — POLYETHYLENE GLYCOL 3350 17 G/17G
1 POWDER, FOR SOLUTION ORAL
Status: CANCELLED | OUTPATIENT
Start: 2022-12-02

## 2022-12-02 RX ORDER — BISACODYL 10 MG
10 SUPPOSITORY, RECTAL RECTAL
Refills: 0 | Status: ON HOLD
Start: 2022-12-02 | End: 2022-12-15

## 2022-12-02 RX ORDER — BISACODYL 10 MG
10 SUPPOSITORY, RECTAL RECTAL
Status: DISCONTINUED | OUTPATIENT
Start: 2022-12-02 | End: 2022-12-13

## 2022-12-02 RX ORDER — LORATADINE 10 MG/1
10 TABLET ORAL DAILY
Status: DISCONTINUED | OUTPATIENT
Start: 2022-12-03 | End: 2022-12-16 | Stop reason: HOSPADM

## 2022-12-02 RX ORDER — TAMSULOSIN HYDROCHLORIDE 0.4 MG/1
0.4 CAPSULE ORAL
Qty: 30 CAPSULE | Status: ON HOLD
Start: 2022-12-02 | End: 2022-12-15

## 2022-12-02 RX ORDER — CHOLECALCIFEROL (VITAMIN D3) 125 MCG
5 CAPSULE ORAL NIGHTLY
Qty: 30 TABLET | Status: ON HOLD
Start: 2022-12-02 | End: 2022-12-15

## 2022-12-02 RX ORDER — ECHINACEA PURPUREA EXTRACT 125 MG
2 TABLET ORAL PRN
Status: DISCONTINUED | OUTPATIENT
Start: 2022-12-02 | End: 2022-12-16 | Stop reason: HOSPADM

## 2022-12-02 RX ORDER — CLOPIDOGREL BISULFATE 75 MG/1
75 TABLET ORAL DAILY
Status: DISCONTINUED | OUTPATIENT
Start: 2022-12-03 | End: 2022-12-16 | Stop reason: HOSPADM

## 2022-12-02 RX ORDER — AMOXICILLIN 250 MG
2 CAPSULE ORAL 2 TIMES DAILY
Status: CANCELLED | OUTPATIENT
Start: 2022-12-02

## 2022-12-02 RX ORDER — ONDANSETRON 2 MG/ML
4 INJECTION INTRAMUSCULAR; INTRAVENOUS 4 TIMES DAILY PRN
Status: DISCONTINUED | OUTPATIENT
Start: 2022-12-02 | End: 2022-12-16 | Stop reason: HOSPADM

## 2022-12-02 RX ORDER — AMOXICILLIN AND CLAVULANATE POTASSIUM 875; 125 MG/1; MG/1
1 TABLET, FILM COATED ORAL EVERY 12 HOURS
Status: COMPLETED | OUTPATIENT
Start: 2022-12-02 | End: 2022-12-03

## 2022-12-02 RX ORDER — ONDANSETRON 4 MG/1
4 TABLET, ORALLY DISINTEGRATING ORAL 4 TIMES DAILY PRN
Status: DISCONTINUED | OUTPATIENT
Start: 2022-12-02 | End: 2022-12-16 | Stop reason: HOSPADM

## 2022-12-02 RX ORDER — POLYETHYLENE GLYCOL 3350 17 G/17G
17 POWDER, FOR SOLUTION ORAL
Refills: 3 | Status: ON HOLD
Start: 2022-12-02 | End: 2022-12-15

## 2022-12-02 RX ORDER — TAMSULOSIN HYDROCHLORIDE 0.4 MG/1
0.4 CAPSULE ORAL
Status: DISCONTINUED | OUTPATIENT
Start: 2022-12-02 | End: 2022-12-16 | Stop reason: HOSPADM

## 2022-12-02 RX ORDER — AMOXICILLIN AND CLAVULANATE POTASSIUM 875; 125 MG/1; MG/1
1 TABLET, FILM COATED ORAL EVERY 12 HOURS
Status: CANCELLED | OUTPATIENT
Start: 2022-12-02 | End: 2022-12-03

## 2022-12-02 RX ORDER — BISACODYL 10 MG
10 SUPPOSITORY, RECTAL RECTAL
Status: CANCELLED | OUTPATIENT
Start: 2022-12-02

## 2022-12-02 RX ORDER — CLOPIDOGREL BISULFATE 75 MG/1
75 TABLET ORAL DAILY
Status: CANCELLED | OUTPATIENT
Start: 2022-12-03

## 2022-12-02 RX ADMIN — CLOPIDOGREL BISULFATE 75 MG: 75 TABLET ORAL at 04:26

## 2022-12-02 RX ADMIN — TAMSULOSIN HYDROCHLORIDE 0.4 MG: 0.4 CAPSULE ORAL at 23:37

## 2022-12-02 RX ADMIN — ATORVASTATIN CALCIUM 40 MG: 40 TABLET, FILM COATED ORAL at 23:37

## 2022-12-02 RX ADMIN — PSYLLIUM HUSK 1 PACKET: 3.4 POWDER ORAL at 04:26

## 2022-12-02 RX ADMIN — AMOXICILLIN AND CLAVULANATE POTASSIUM 1 TABLET: 875; 125 TABLET, FILM COATED ORAL at 04:25

## 2022-12-02 RX ADMIN — AMOXICILLIN AND CLAVULANATE POTASSIUM 1 TABLET: 875; 125 TABLET, FILM COATED ORAL at 23:37

## 2022-12-02 RX ADMIN — SENNOSIDES AND DOCUSATE SODIUM 2 TABLET: 50; 8.6 TABLET ORAL at 23:37

## 2022-12-02 RX ADMIN — ASPIRIN 81 MG: 81 TABLET, COATED ORAL at 04:26

## 2022-12-02 RX ADMIN — QUETIAPINE FUMARATE 100 MG: 100 TABLET ORAL at 23:37

## 2022-12-02 RX ADMIN — LORATADINE 10 MG: 10 TABLET ORAL at 04:26

## 2022-12-02 ASSESSMENT — LIFESTYLE VARIABLES
HAVE PEOPLE ANNOYED YOU BY CRITICIZING YOUR DRINKING: NO
CONSUMPTION TOTAL: NEGATIVE
TOTAL SCORE: 0
HAVE YOU EVER FELT YOU SHOULD CUT DOWN ON YOUR DRINKING: NO
HOW MANY TIMES IN THE PAST YEAR HAVE YOU HAD 5 OR MORE DRINKS IN A DAY: 0
ALCOHOL_USE: NO
AVERAGE NUMBER OF DAYS PER WEEK YOU HAVE A DRINK CONTAINING ALCOHOL: 0
ON A TYPICAL DAY WHEN YOU DRINK ALCOHOL HOW MANY DRINKS DO YOU HAVE: 0
EVER HAD A DRINK FIRST THING IN THE MORNING TO STEADY YOUR NERVES TO GET RID OF A HANGOVER: NO
EVER FELT BAD OR GUILTY ABOUT YOUR DRINKING: NO
TOTAL SCORE: 0
TOTAL SCORE: 0

## 2022-12-02 ASSESSMENT — PATIENT HEALTH QUESTIONNAIRE - PHQ9
2. FEELING DOWN, DEPRESSED, IRRITABLE, OR HOPELESS: NOT AT ALL
SUM OF ALL RESPONSES TO PHQ9 QUESTIONS 1 AND 2: 0
1. LITTLE INTEREST OR PLEASURE IN DOING THINGS: NOT AT ALL

## 2022-12-02 ASSESSMENT — FIBROSIS 4 INDEX: FIB4 SCORE: 2.97

## 2022-12-02 NOTE — H&P
"REHABILITATION HISTORY AND PHYSICAL/POST ADMISSION EVALUATION    12/2/2022  3:28 PM  Cooper Harvey  [id number]/02  Admission  12/2/2022  2:26 PM  [facility] Code/Reason for admission: 0016 - Debility (Non-Cardiac, Non-Pulmonary)   Etiologic diagnosis/problem: Transient ischemic attack (TIA)  Chief Complaint: Generalized weakness    HPI:  Per Dr. Trinh's consult, \"The patient is a 88 y.o. right hand dominant male with a past medical history of stroke, right carotid endarterectomy, hypertension, hyperlipidemia, hard of hearing, cognitive impairment, BPH;  who presented on 11/23/2022  2:20 PM with acute onset right-sided weakness and speech difficulties.  Patient was found to have left common carotid and left ICA stenosis, and MR brain did not find acute infarct.  Patient underwent left carotid stent placement on 11/25 with Dr. Romero.\"    In addition to the above, patient's Head CT showed chronic left basal ganglia and thalamic lacunar infarctions, moderate white matter ischemic changes. 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      He was seen and evaluated by neurology and thought to have had a TIA. He will need to be on Plavix for 8 weeks with aspirin, then aspirin only indefinitely. Acute stay was complicated by hematuria (patient tried to remove his catheter) for which he was treated with continuous bladder irrigation. He had a renal US without any findings of stones or hydronephrosis. His hematuria has improved and his hemoglobin stabilized.He had some hypotension that was treated with IVF and his lisinopril was held. He had a chest xray with mild vascular congestion. Patient was started on oral antibiotics for uvulitis. He was treated with 100 mg Seroquel at night for delirium.     Patient has a history of inpatient rehab stay in 4/2021 after suffering strokes in the right cerebellum, right occipital, " and right frontal lobes. During that stay he had sundowing at night that was initially treated with Seroquel. He discharged with home health therapy, saw Dr. Sales in clinic, and switched to outpatient therapy. At the time of his clinic visit in 2021, he was ambulating without an assistive device.     Patient currently reports generalized weakness.  He reports he has floaters in the left eye but otherwise denies any changes in his vision.  He does report some pain with his urethra.    Patient was evaluated by Rehab Medicine physician and Physical Therapy and Occupational Therapy and determined to be appropriate for acute inpatient rehab and was transferred to [facility] on 12/2/2022  2:26 PM.    With this acute therapeutic intervention, this patient hopes to improve his functional status, and return to independent living with the supportive care of spouse.    REVIEW OF SYSTEMS:     A complete review of systems was performed and was negative in detail with the exception of items mentioned elsewhere in this document.    PMH:  Past Medical History:   Diagnosis Date    Cognitive impairment     Council (hard of hearing) 04/02/2021    Hydrocele, unspecified     Hypertrophy of prostate without urinary obstruction and other lower urinary tract symptoms (LUTS)     Mixed hyperlipidemia     Stroke (cerebrum) (HCC)        PSH:  Past Surgical History:   Procedure Laterality Date    PB THROMBOENDARTECTMY NECK,NECK INCIS Right 4/2/2021    Procedure: ENDARTERECTOMY, CAROTID;  Surgeon: Willie Beavers M.D.;  Location: SURGERY [facility];  Service: Vascular    PLASTIC SURGERY  February 2009    large skin cancer removed from the top of head with skin graft--donor site left anterior thigh    HEMORRHOIDECTOMY  2004    EYE SURGERY  6/05 ; 7/05    Bilateral Cataracts       Family History   Problem Relation Age of Onset    Cancer Mother         MEDICATIONS:  Current Facility-Administered Medications   Medication Dose  "   hydrOXYzine HCl (ATARAX) tablet 50 mg  50 mg    melatonin tablet 3 mg  3 mg    Respiratory Therapy Consult      acetaminophen (Tylenol) tablet 650 mg  650 mg    lactulose 20 GM/30ML solution 30 mL  30 mL    docusate sodium (ENEMEEZ) enema 283 mg  283 mg    [START ON 12/3/2022] omeprazole (PRILOSEC) capsule 20 mg  20 mg    mag hydrox-al hydrox-simeth (MAALOX PLUS ES or MYLANTA DS) suspension 20 mL  20 mL    ondansetron (ZOFRAN ODT) dispertab 4 mg  4 mg    Or    ondansetron (ZOFRAN) syringe/vial injection 4 mg  4 mg    traZODone (DESYREL) tablet 50 mg  50 mg    sodium chloride (OCEAN) 0.65 % nasal spray 2 Spray  2 Spray    amoxicillin-clavulanate (AUGMENTIN) 875-125 MG per tablet 1 Tablet  1 Tablet    [START ON 12/3/2022] aspirin EC (ECOTRIN) tablet 81 mg  81 mg    atorvastatin (LIPITOR) tablet 40 mg  40 mg    [START ON 12/3/2022] clopidogrel (PLAVIX) tablet 75 mg  75 mg    [START ON 12/3/2022] loratadine (CLARITIN) tablet 10 mg  10 mg    psyllium (METAMUCIL/KONSYL) 1 Packet  1 Packet    QUEtiapine (Seroquel) tablet 100 mg  100 mg    senna-docusate (PERICOLACE or SENOKOT S) 8.6-50 MG per tablet 2 Tablet  2 Tablet    And    polyethylene glycol/lytes (MIRALAX) PACKET 1 Packet  1 Packet    And    magnesium hydroxide (MILK OF MAGNESIA) suspension 30 mL  30 mL    And    bisacodyl (DULCOLAX) suppository 10 mg  10 mg    tamsulosin (FLOMAX) capsule 0.4 mg  0.4 mg       ALLERGIES:  Vicodin [hydrocodone-acetaminophen]    PSYCHOSOCIAL HISTORY:  Pre-mobidly, the patient lived in a single level home with 4 steps to enter, in [place] with spouse. Patient has 2 adult daughters that live in [place].     Per OT garret at [facility] on 11/28: \"Info gathered from dtr Juan, as pt is a poor historian at this time. Pt lives with his wife in [place]. Dtrs live in [place]. Wife drives around town. Dtrs drive pt to appts in [place]. Per dtr he is normally independent with ADLs and IADLs at home without AD. Falls sometimes while collecting wood to put " "in his wood burning stove.\"    LEVEL OF FUNCTION PRIOR TO DISABILTY:  Independent, without assistive device    LEVEL OF FUNCTION PRIOR TO ADMISSION to [facility]:  PT 12/1:    Balance   Sitting Balance (Static) Fair   Sitting Balance (Dynamic) Fair -   Standing Balance (Static) Poor +   Standing Balance (Dynamic) Poor +   Weight Shift Sitting Fair   Weight Shift Standing Fair   Skilled Intervention Verbal Cuing   Comments w/ FWW, posterior lean during corby cares   Gait Analysis   Gait Level Of Assist Minimal Assist  (- CGA at times)   Assistive Device Front Wheel Walker   Distance (Feet) 60   # of Times Distance was Traveled 1   Deviation Bradykinetic;Shuffled Gait;Decreased Base Of Support;Decreased Heel Strike;Decreased Toe Off  (flexed trunk)   Skilled Intervention Verbal Cuing;Tactile Cuing   Comments pt req's increase assist with initial walking after static standing but improves to CGA as walking progresses.   Bed Mobility    Comments pre/post up in chair   Functional Mobility   Sit to Stand Minimal Assist   Bed, Chair, Wheelchair Transfer Minimal Assist   Toilet Transfers Minimal Assist   Transfer Method Stand Step   Mobility walking in hallway, bathroom, back to chair   Skilled Intervention Verbal Cuing;Tactile Cuing   Comments verbal cues for hand placement, req'd increased assist for stand during corby cares       OT 11/28:    Cognition    Cognition / Consciousness X   Speech/ Communication Hard of Hearing   Level of Consciousness Alert   Attention Impaired   Sequencing Impaired   Comments intermittent confusion. pleasant and cooperative, extremely [place], Hears better out of L ear; pt's dtr going to bring in his hearing aids. Perseverative on certain subjects throughout evaluation despite giving pt an answer. Dtr reports he is normally \"with it\" at home.   Strength Upper Body   Comments pt having a difficult time following complex directions w/ formal testing, appeared functional during " "ADLs; will continue to assess   Balance Assessment   Sitting Balance (Static) Fair   Sitting Balance (Dynamic) Fair -   Standing Balance (Static) Fair -   Standing Balance (Dynamic) Poor +   Weight Shift Sitting Fair   Weight Shift Standing Fair   Comments w/ FWW   Bed Mobility    Supine to Sit Moderate Assist   Scooting Minimal Assist   ADL Assessment   Grooming Contact Guard Assist;Standing  (wash hands)   Lower Body Dressing Maximal Assist   Toileting    (catheter, declined need for BM)       SLP 12/1:    Assessment  Pt demonstrated effective self-feeding throughout swallowing tx following min A with set-up. Pt with total clearance of RG finger foods following end of trials. Slightly prolonged but overall functional and complete mastication of all trials. Functional bolus formation and total AP transit of all consistencies trialed. Functional bite with all trials. No overt s/sx of aspiration given trials of TN via single and sequential straw sips, including with 3 oz test.      Clinical Impressions  Pt presents without s/sx concerning for oropharyngeal dysphagia this date. Recommend continue of RG/TN diet with PO medication administration. Given that pt appears to be at baseline without dysphagia concern, SLP will not follow. Please re-consult SLP with s/sx concerning for aspiration or change in pt status.     Recommendations  1.  Regular solids with thin liquids  2.  Swallowing Instructions & Precautions:   Supervision: Independent  Positioning: HOB at 90* or up in chair as tolerated  Medication: With liquid wash  Strategies: Small bites/sips, slow rate of intake  Oral Care: BID  3.  SLP will discharge 2' to goals met. May benefit from cognitive/linguistic services in post-acute.     CURRENT LEVEL OF FUNCTION:   Same as level of function prior to admission to [facility]    PHYSICAL EXAM:     VITAL SIGNS:   height is 1.727 m (5' 8\") and weight is 76 kg (167 lb 8.8 oz). His oral temperature is " 36.4 °C (97.6 °F). His blood pressure is 96/64 (abnormal) and his pulse is 96. His respiration is 24 (abnormal) and oxygen saturation is 97%.     GENERAL: No apparent distress  HEENT: Normocephalic, moist mucous membranes  CARDIAC: Regular rate and rhythm, normal S1, S2, no murmurs, no peripheral edema   LUNGS: Clear to auscultation, normal respiratory effort, on room air   ABDOMINAL: bowel sounds present, soft, nontender, and nondistended    EXTREMITIES: no edema or 2+ bilateral DP/PT pulses  MSK: No joint swelling    NEURO:    Mental status: alert  Speech: fluent, no aphasia or dysarthria    CRANIAL NERVES:  2,3: visual acuity grossly intact  7: no facial asymmetry  8: very hard of hearing  12: tongue protrudes midline    Motor:  Shoulder flexors:  Right -  5/5, Left -  5/5  Elbow flexors:  Right -  5/5, Left -  5/5  Elbow extensors:  Right -  5/5, Left -  5/5  Symmetrical   Hip flexors:  Right -  4/5, Left -  4/5  Dorsiflexors:  Right -  5/5, Left -  5/5  Plantar flexors:  Right -  5/5, Left -  5/5         RADIOLOGY:        Results for orders placed during the hospital encounter of 11/23/22    MR-BRAIN-W/O    Impression  1.  No acute infarct.  2.  Chronic punctate microhemorrhage in the left parieto-occipital region.  3.  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 This is stable since the previous MRI dated 4/13/2021. This is noted in the previous CT angiogram dated 4/01/2021.  4.  Chronic lacunar infarct in the left thalamus.  5.  Mild cerebral volume loss.  6.  Mild chronic microvascular ischemic disease.                                                   Results for orders placed during the hospital encounter of 11/23/22    CT-CTA NECK WITH & W/O-POST PROCESSING    Impression  1.  Previous right-sided carotid endarterectomy without evidence of significant stenosis or occlusion.    2.  Moderate irregular calcific atherosclerotic plaquing of the left common carotid  artery bifurcation and proximal left internal carotid artery with stenosis of approximately 50%.                    LABS:  Recent Labs     11/30/22  1014 12/01/22  0332 12/02/22  0030   SODIUM 141 139 138   POTASSIUM 3.7 3.9 3.8   CHLORIDE 107 107 106   CO2 25 21 24   GLUCOSE 145* 106* 115*   BUN 28* 34* 38*   CREATININE 1.00 1.34 1.08   CALCIUM 8.7 8.4* 8.4*     Recent Labs     11/30/22  1014 12/01/22  0332 12/02/22  0030   WBC 6.7 11.9* 7.3   RBC 4.54* 4.09* 3.79*   HEMOGLOBIN 14.1 12.9* 11.9*   HEMATOCRIT 42.2 38.1* 35.4*   MCV 93.0 93.2 93.4   MCH 31.1 31.5 31.4   MCHC 33.4* 33.9 33.6*   RDW 46.3 45.9 46.1   PLATELETCT 175 184 168   MPV 10.3 10.7 11.1         PRIMARY REHAB DIAGNOSIS:    This patient is a 88 y.o. male admitted for acute inpatient rehabilitation with debility after acute hospital stay for   Transient ischemic attack (TIA).    IMPAIRMENTS:   Cognitive  ADLs/IADLs  Mobility    SECONDARY DIAGNOSIS/MEDICAL CO-MORBIDITIES AFFECTING FUNCTION:    Encephalopathy  Sundowning  Carotid stenosis  Uvulitis   Hypotension  BPH  Hematuria  Constipation  Allergies/rhinorrhea   Anemia      RELEVANT CHANGES SINCE PREADMISSION EVALUATION:    Status unchanged    The patient's rehabilitation potential is Very Good  The patient's medical prognosis is good    PLAN:   Discussion and Recommendations, discussed with the patient and/or family:   1. The patient requires an acute inpatient rehabilitation program with a coordinated program of care at an intensity and frequency not available at a lower level of care. This recommendation is substantiated by the patient's medical physicians who recommend that the patient's intervention and assessment of medical issues needs to be done at an acute level of care for patient's safety and maximum outcome.     2. A coordinated program of care will be supplied by an interdisciplinary team of physical therapy, occupational therapy, rehab physician, rehab nursing, and, if needed, speech  therapy and rehab psychology. Rehab team presents a patient-specific rehabilitation and education program concentrating on prevention of future problems related to accessibility, mobility, skin, bowel, bladder, sexuality, and psychosocial and medical/surgical problems.     3. Need for Rehabilitation Physician: The rehab physician will be evaluating the patient on a multi-weekly basis to help coordinate the program of care. The rehab physician communicates between medical physicians, therapists, and nurses to maximize the patient's potential outcome. Specific areas in which the rehab physician will be providing daily assessment include the following:   A. Assessing the patient's heart rate and blood pressure response (vitals monitoring) to activity and making adjustments in medications or conservative measures as needed.   B. The rehab physician will be assessing the frequency at which the program can be increased to allow the patient to reach optimal functional outcome.   C. The rehab physician will also provide assessments in daily skin care, especially in light of patient's impairments in mobility.   D. The rehab physician will provide special expertise in understanding how to work with functional impairment and recommend appropriate interventions, compensatory techniques, and education that will facilitate the patient's outcome.     4. Rehab R.N.   The rehab RN will be working with patient to carry over in room mobility and activities of daily living when the patient is not in 3 hours of skilled therapy. Rehab nursing will be working in conjunction with rehab physician to address all the medical issues above and continue to assess laboratory work and discuss abnormalities with the treating physicians, assess vitals, and response to activity, and discuss and report abnormalities with the rehab physician. Rehab RN will also continue daily skin care, supervise bladder/bowel program, instruct in medication  administration, and ensure patient safety.     5. Therapies to treat at intensity and frequency of (may change after completion of evaluation by all therapeutic disciplines):       PT:  Physical therapy to address mobility, transfer, gait training and evaluation for adaptive equipment needs 1hour/day at least 5 days/week for the duration of the ELOS (see below)       OT:  Occupational therapy to address ADLs, self-care, home management training, functional mobility/transfers and assistive device evaluation, and community re-integration 1hour/day at least 5 days/week for the duration of the ELOS (see below).        ST/Dysphagia:  Speech therapy to address speech, language, and cognitive deficits as well as swallowing difficulties with retraining/dysphagia management and community re-integration with comprehension, expression, cognitive training 1hour/day at least 5 days/week for the duration of the ELOS (see below).     6. Medical management / Rehabilitation Issues/Adverse Potential affecting function as part of rehabilitation plan.    Encephalopathy  Sundowning  Occurred during last hospitalization as well  Multi-factorial - history of stroke, hard of hearing  Continue Seroquel at night for now, will titrate down/off    Carotid stenosis  S/p left stent 11/25 Dr. Norm Hollins for 8 weeks  Aspirin indefinitely    Uvulitis   Completed Augmentin    Hypotension  Hold home medication lisinopril  May need to discontinue Flomax - this was started by Dr. Sales in outpatient clinic last year    BPH  Continue Flomax    Hematuria  From traumatic catheter pulling  Continue Bhatia for now  Will remove once clears  Monitor Hgb    Constipation  Continue bowel meds and metamucil    Allergies/rhinorrhea   Claritin    Anemia  Check am labs    I performed a complete drug regimen review and did not identify any potential clinically significant medication issues.    The patient's CODE STATUS was confirmed as FULL CODE on admission, with the  patient and/or family at bedside.    REHABILITATION ISSUES/ADVERSE POTENTIAL:  1.  TIA: Continue aspirin and Plavix for secondary prophylaxis as well as lipid and blood pressure management. Patient demonstrates functional deficits in strength, balance, coordination, and ADL's. Patient is admitted to [facility] for comprehensive rehabilitation therapy as described below.   Rehabilitation nursing monitors bowel and bladder control, educates on medication administration, co-morbidities and monitors patient safety.    2.  DVT prophylaxis:  Patient is on nothing for anticoagulation upon transfer due to his hematuria and is already on aspirin and Plavix for his carotid stent. Encourage OOB. Monitor daily for signs and symptoms of DVT including but not limited to swelling and pain to prevent the development of DVT that may interfere with therapies.    3.  Pain: Urethral: Controlled with as needed oral analgesics.    4.  Nutrition/Dysphagia: Dietician monitors nutrient intake, recommend supplements prn and provide nutrition education to pt/family to promote optimal nutrition for wound healing/recovery.     5.  Bladder/bowel:  Start bowel and bladder program, to prevent constipation, urinary retention (which may lead to UTI), and urinary incontinence (which will impact upon pt's functional independence).   - TV Q3h while awake with post void bladder scans, I&O cath for PVRs >400  - up to commode after meal     6.  Skin/dermal ulcer prophylaxis: Monitor for new skin conditions with q.2 h. turns as required to prevent the development of skin breakdown.     7.  GI prophylaxis: Omeprazole to prevent gastritis or GI bleed that would interfere with therapies.    8. Respiratory therapy: RT performs O2 management prn, breathing retraining, pulmonary hygiene and bronchospasm management prn to optimize participation in therapies.    Pt was seen today for 73 min, and entire time spent in face-to-face contact was  >50% in counseling and coordination of care as detailed in A/P above.        GOALS/EXPECTED LEVEL OF FUNCTION BASED ON CURRENT MEDICAL AND FUNCTIONAL STATUS (may change based on patient's medical status and rate of impairment recovery):  Transfers:   Supervision  Mobility/Gait:   Supervision  ADL's:   Supervision  Cognition:  Least Verbal Cues      DISPOSITION: Discharge to pre-morbid independent living setting with the supportive care of patient's spouse.      ELOS: 10-12 days    Sugey Barkley M.D.  Physical Medicine and Rehabilitation

## 2022-12-02 NOTE — PROGRESS NOTES
Admit to [facility] from [facility] via [facility]. Pt positioned in bed for comfort and safety. Max x1 assist transfer into bed. VSS. Dr. Barkley to follow. Initial assessment started. Orientation to [facility] process, safety in room, bathroom, and call light. Monitoring in progress.

## 2022-12-02 NOTE — CARE PLAN
The patient is Stable - Low risk of patient condition declining or worsening    Shift Goals  Clinical Goals: Monitor neuro status and urine output  Patient Goals: Sleep  Family Goals: billy    Progress made toward(s) clinical / shift goals:      Problem: Neuro Status  Goal: Neuro status will remain stable or improve  Outcome: Progressing    Q4H neuro checks in place. Pt's neurological status remains unchanged throughout shift. Bed alarm is on, call light within reach.     Problem: Urinary Elimination  Goal: Establish and maintain regular urinary output  Outcome: Progressing   Pt put on CBI d/t recurring blood in urine. Urine output and clarity monitored throughout shift.    Patient is not progressing towards the following goals:

## 2022-12-02 NOTE — PROGRESS NOTES
1915 - During change of shift, Dayshift RN and oncoming NOC RN noticed pt's urine had become slightly bloody in appearance. Hospitalist, Linsey Wegener, notified.     2000 - CBI restarted per hospitalist orders.

## 2022-12-02 NOTE — DISCHARGE SUMMARY
Discharge Summary    CHIEF COMPLAINT ON ADMISSION  Chief Complaint   Patient presents with    Possible Stroke     LKW 1300. Pt started experiencing word finding difficulties, slowed speech, and R sided weakness.         Reason for Admission  Stroke     Admission Date  11/23/2022    CODE STATUS  Full Code    HPI & HOSPITAL COURSE    This is a 82-year-old male with a past medical history significant for CVA status post right carotid endarterectomy, hypertension, hyperlipidemia presented on 11/23/2022 with a complaint of right-sided weakness along with his fist difficulties.    CTA head and neck that revealed significant stenosis of left common carotid artery bifurcation and proximal left ICA.  MRI brain revealed no acute infarct but revealed lacunar infarct in the left thalamus.  Patient underwent IR guided left carotid stent on 11/25/2022.  Post procedure, patient was monitored in ICU.  Patient was transferred out of ICU on 11/27/2022.    Patient need to be on Plavix for 8 weeks, and aspirin forever.  Along with a statin. His precautions complicated with hematuria, was started on CBI, renal ultrasound was completed which shows no evidence of kidney stones or acute findings.  BROKO was stopped, patient still has pink urine output however no evidence of clotting.  Hemoglobin trended down slightly, however remains over 11, no indication for transfusion.  Per neurology and recent stent will need to continue Plavix and aspirin, Plavix for at least an additional 7 weeks.  Patient to follow-up with neurosurgery outpatient.  Continue statin therapy.    Patient did have episode of hypotension, however this resolved with small fluid bolus, blood pressures are better today.  We will hold lisinopril, may need to restart at later date to maintain goal blood pressures of 130-160.    Family at the bedside, patient oriented x2.  Discussed plan to answer to acute rehab.  Bhatia catheter remain in place, will need to continue to monitor,  hematuria has improved but given patient's need for ongoing anticoagulation therapy with Plavix and aspirin there is a risk for reoccurrence.      I have performed a physical exam and reviewed and updated ROS and Plan today (12/2/2022). In review of yesterday's note (12/1/2022), there are no changes except as documented above.      Therefore, he is discharged in guarded and stable condition to a long-term acute care hospital.    The patient met 2-midnight criteria for an inpatient stay at the time of discharge.    Discharge Date  12/2/2022      DISCHARGE DIAGNOSES  Principal Problem (Resolved):    TIA (transient ischemic attack) POA: Yes  Active Problems:    Mixed hyperlipidemia POA: Yes    BPH (benign prostatic hyperplasia) POA: Yes    History of CVA (cerebrovascular accident) POA: Yes    Left carotid stenosis POA: Yes    Dysarthria POA: Yes    Uvulitis POA: Yes    Primary hypertension POA: Yes  Resolved Problems:    Delirium POA: No    Constipation, slow transit POA: Yes    Gross hematuria POA: No      FOLLOW UP  Follow-up with neurosurgery    MEDICATIONS ON DISCHARGE     Medication List        START taking these medications        Instructions   amoxicillin-clavulanate 875-125 MG Tabs  Commonly known as: AUGMENTIN   Take 1 Tablet by mouth every 12 hours for 2 doses.  Dose: 1 Tablet     aspirin 81 MG EC tablet  Start taking on: December 3, 2022  Replaces: aspirin 81 MG Chew chewable tablet   Take 1 Tablet by mouth every day.  Dose: 81 mg     bisacodyl 10 MG Supp  Commonly known as: DULCOLAX   Insert 1 Suppository into the rectum 1 time a day as needed (if magnesium hydroxide ineffective after 24 hours).  Dose: 10 mg     clopidogrel 75 MG Tabs  Start taking on: December 3, 2022  Commonly known as: PLAVIX   Take 1 Tablet by mouth every day for 49 days.  Dose: 75 mg     magnesium hydroxide 400 MG/5ML Susp  Commonly known as: MILK OF MAGNESIA   Take 30 mL by mouth 1 time a day as needed (if polyethylene glycol  ineffective after 24 hours).  Dose: 30 mL     melatonin 5 mg Tabs   Take 1 Tablet by mouth every evening.  Dose: 5 mg     ondansetron 4 MG Tbdp  Commonly known as: ZOFRAN ODT   Take 1 Tablet by mouth every four hours as needed for Nausea/Vomiting (give PO if IV route is unavailable.).  Dose: 4 mg     polyethylene glycol/lytes 17 g Pack  Commonly known as: MIRALAX   Take 1 Packet by mouth 1 time a day as needed (if sennosides and docusate ineffective after 24 hours).  Dose: 17 g     Psyllium 28 % packet  Commonly known as: METAMUCIL  Replaces: Metamucil 28.3 % Powd   Take 1 Packet by mouth 2 times a day.  Dose: 1 Packet     QUEtiapine 100 MG Tabs  Commonly known as: Seroquel   Take 1 Tablet by mouth every evening.  Dose: 100 mg     senna-docusate 8.6-50 MG Tabs  Commonly known as: PERICOLACE or SENOKOT S   Take 2 Tablets by mouth 2 times a day.  Dose: 2 Tablet            CHANGE how you take these medications        Instructions   acetaminophen 325 MG Tabs  What changed:   when to take this  reasons to take this  Commonly known as: Tylenol   Take 2 Tablets by mouth every 6 hours as needed for Mild Pain or Fever.  Dose: 650 mg            CONTINUE taking these medications        Instructions   atorvastatin 40 MG Tabs  Commonly known as: LIPITOR   Take 1 tablet by mouth every evening.  Dose: 40 mg     loratadine 10 MG Tabs  Commonly known as: CLARITIN   Take 1 tablet by mouth every day.  Dose: 10 mg     tamsulosin 0.4 MG capsule  Commonly known as: FLOMAX   Take 1 Capsule by mouth at bedtime.  Dose: 0.4 mg            STOP taking these medications      aspirin 81 MG Chew chewable tablet  Commonly known as: ASA  Replaced by: aspirin 81 MG EC tablet     docusate sodium 100 MG Caps  Commonly known as: COLACE     lisinopril 10 MG Tabs  Commonly known as: PRINIVIL     Metamucil 28.3 % Powd  Generic drug: Psyllium  Replaced by: Psyllium 28 % packet              Allergies  Allergies   Allergen Reactions    Vicodin  [Hydrocodone-Acetaminophen] Rash     rash       DIET  Orders Placed This Encounter   Procedures    Diet Order Diet: Cardiac     Standing Status:   Standing     Number of Occurrences:   1     Order Specific Question:   Diet:     Answer:   Cardiac [6]       ACTIVITY  As tolerated and directed by rehab.  Weight bearing as tolerated    CONSULTATIONS  Neurology  Physiatry    PROCEDURES  Left carotid stent placement    LABORATORY  Lab Results   Component Value Date    SODIUM 138 12/02/2022    POTASSIUM 3.8 12/02/2022    CHLORIDE 106 12/02/2022    CO2 24 12/02/2022    GLUCOSE 115 (H) 12/02/2022    BUN 38 (H) 12/02/2022    CREATININE 1.08 12/02/2022    CREATININE 1.2 10/03/2008        Lab Results   Component Value Date    WBC 7.3 12/02/2022    HEMOGLOBIN 11.9 (L) 12/02/2022    HEMATOCRIT 35.4 (L) 12/02/2022    PLATELETCT 168 12/02/2022      I have performed a physical exam and reviewed and updated ROS and Plan today (12/2/2022). In review of yesterday's note (12/1/2022), there are no changes except as documented above.      Total time of the discharge process took 34 minutes.

## 2022-12-02 NOTE — PREADMISSION SCREENING NOTE
Pre-Admission Screening Form    Patient Information:   Name: Cooper Harvey     MRN: [id number]       : 1934      Age: 88 y.o.   Gender: male      Race: White [7]       Marital Status:  [2]  Family Contact: Cynthia Harvey  Daughter,Angela Lee        Relationship: Spouse [17]  Daughter [2]  Daughter [2]  Home Phone: [phone]               Cell Phone: [phone]  Advanced Directives: None  Code Status:  FULL  Current Attending Provider: HERBIE Awad  Referring Physician: Dr. Catalan  Physiatrist Consult: Dr. Trinh   Referral Date: 22  Primary Payor Source:  MEDICARE  Secondary Payor Source:  [facility]    Medical Information:   Date of Admission to Acute Care [place]  Room Number: S176/02  Rehabilitation Diagnosis: 0016 - Debility (Non-Cardiac, Non-Pulmonary)  Immunization History   Administered Date(s) Administered    INFLUENZA TIV (IM) 2012    Influenza Vaccine Quad Inj (Pf) 2015    Pneumococcal polysaccharide vaccine (PPSV-23) 2012     Allergies   Allergen Reactions    Vicodin [Hydrocodone-Acetaminophen] Rash     rash     Past Medical History:   Diagnosis Date    [place] (hard of hearing) 2021    Hydrocele, unspecified     Hypertrophy of prostate without urinary obstruction and other lower urinary tract symptoms (LUTS)     Mixed hyperlipidemia      Past Surgical History:   Procedure Laterality Date    PB THROMBOENDARTECTMY NECK,NECK INCIS Right 2021    Procedure: ENDARTERECTOMY, CAROTID;  Surgeon: Willie Beavers M.D.;  Location: SURGERY [facility];  Service: Vascular    PLASTIC SURGERY  2009    large skin cancer removed from the top of head with skin graft--donor site left anterior thigh    HEMORRHOIDECTOMY      EYE SURGERY   ;     Bilateral Cataracts     History Leading to Admission, Conditions that Caused the Need for Rehab (CMS):     Dr. Catalan H&P:  Chief Complaint  Patient  presents with   Possible Stroke      LKW 1300. Pt started experiencing word finding difficulties, slowed speech, and R sided weakness.       History of Presenting Illness  88 y.o. male who presented 11/23/2022 with transient speech difficulty.  Patient has a history of TIAs as well as right endarterectomy and currently takes Xarelto.  Around 1300 hrs., patient was fine.  Then, patient started having word finding difficulties as well as slurred speech as well as some right-sided weakness.  His family brought him in and ER treated him as a code stroke.  He was taken to CT scan which showed only new left 50% heterogenous stenosis.  Neurology evaluated him and recommended carotid stent placement on 11/25.  In the meantime, hospitalist was consulted for admission for mild permissive hypertension and medical management.     Assessment/Plan:  I anticipate this patient is appropriate for observation status at this time because of TIA work-up pending MRI after discussion with case management. After patient gets endarterectomy he will require ICU transfer and monitoring which will likely take greater than 48 hours.     * Left carotid stenosis- (present on admission)  Assessment & Plan  IR consulted to place stent 11/25/2022  Will need to be NPO midnight 11/24  Will need to be transferred to ICU after stent placement for close BP monitoring      Reddish colored urine- (present on admission)  Assessment & Plan  Red-tinged urine Seen in ED  Patient denies dysuria although he is not a good historian due to speech difficulty  Urinalysis pending     Difficulty with speech- (present on admission)  Assessment & Plan  Transient speech difficulty  May be TIA versus stroke  Neurologist Dr. Boss consulted  Stroke protocol  No echo per neurology     Constipation, slow transit- (present on admission)  Assessment & Plan  Family says stool softeners have not worked at home but Metamucil has  Bowel protocol plus Metamucil     History of CVA  (cerebrovascular accident)- (present on admission)  Assessment & Plan  Noted in the past     BPH (benign prostatic hyperplasia)- (present on admission)  Assessment & Plan  Continue tamsulosin     Mixed hyperlipidemia- (present on admission)  Assessment & Plan  Continue atorvastatin  Lipid panel PENDING     VTE prophylaxis:  sc heparin    Dr. Boss (Neurology) recommendations:  Assessment and Plan:  Cooper Harvey is a 88 year old man with history of stroke, s/p R CEA, hypertension, hyperlipidemia, presenting with transient speech difficulties and right side weakness.  His neurologic exam is much improved, and he is essentially back at neurologic baseline, thus I do not recommend IV-thrombolytics.  Clinical semiology is consistent with a L hemispheric TIA and I suspect he has symptomatic carotid disease.  Discussed surgical revascularization options with family and patient, and they would like to proceed with carotid artery stenting.     Problem list:  1.  Transient speech difficulties   2. History of stroke  3. S/p R CEA  4. L carotid stenosis  5. Hypertension  6. Hyperlipidemia     Recommendations:              - q4h neurochecks/NIHSS, admit to Neuroscience              - continue ASA 81mg daily, stop xarelto              - give plavix 300mg load on 11/24 AM, and continue plavix 75mg daily              - Neuro IR consult, plan for L carotid stent placement on 11/25              - allow for mild permissive HTN in setting of left carotid stenosis- aim for SBP goal 130-180, IV anti-HTN PRN ok, but avoid hypotension              - stroke labs:  HgbA1c and lipid panel              - continue home atorvastatin 40mg daily for goal LDL < 70              - MRI brain without contrast              - may defer ECHO and long-term cardiac monitoring as less likely cardioembolic etiology              -post-operatively will need ICU-level care for strict BP monitoring (goal -140) to prevent reperfusion injury                - PT/OT/SLP ok              - lovenox SQ for DVT chemoppx ok    Dr. Trinh (Physiatry) recommendations:  ASSESSMENT:  Patient is a 88 y.o. male admitted with acute neurologic change, found to have left carotid stenosis now s/p stenting on 11/25     [facility] Code / Diagnosis to Support: 0016 - Debility (Non-Cardiac, Non-Pulmonary)     Rehabilitation: Impaired ADLs and mobility  Patient is a good candidate for inpatient rehab based on needs for PT, OT.  Patient will also benefit from family training.  Patient has a good discharge situation which will be home with spouse and children.      Barriers to transfer include: Insurance authorization, TCCs to verify disposition, medical clearance and bed availability      All cases are subject to administrative review and recommendations may change     Disposition recommendations:  -Good candidate for IPR.  Patient does not have tissue damage from stroke seen on MRI, but does have newly worsened functional deficits with mobility and ADLs.   -Continue PT OT while in house  -Plan for IPR     Medical Complexity:     Acute neurologic change  -No ischemia seen on MR brain  -This is likely TIA due to left carotid stenosis  -Continue PT OT while in-house  -Recommend SLP for cog deficits  -Plan for IPR     Left carotid stenosis  -Status post stent placement 11/25  -Plavix for 8 weeks  -Aspirin statin indefinitely  -History of right carotid endarterectomy     Hypertension  -Lisinopril 10 mg daily     DVT PPX: Heparin 5K 3 times daily    Ty Cruz M.D.  Physician  Hospital Medicine  Progress Notes     Signed  Date of Service:  12/1/2022  7:46 AM  Hospital Medicine Daily Progress Note     Date of Service  12/1/2022     Chief Complaint  Cooper Harvey is a 88 y.o. male admitted 11/23/2022 with     Chief Complaint  Patient presents with   Possible Stroke      LKW 1300. Pt started experiencing word finding difficulties, slowed speech, and R sided weakness.        Hospital Course     This is a 82-year-old male with a past medical history significant for CVA status post right carotid endarterectomy, hypertension, hyperlipidemia presented on 11/23/2022 with a complaint of right-sided weakness along with his fist difficulties.     CTA head and neck that revealed significant stenosis of left common carotid artery bifurcation and proximal left ICA.  MRI brain revealed no acute infarct but revealed lacunar infarct in the left thalamus.  Patient underwent IR guided left carotid stent on 11/25/2022.  Post procedure, patient was monitored in ICU.  Patient was transferred out of ICU on 11/27/2022.     Patient need to be on Plavix for 8 weeks, and aspirin forever.  Along with a statin. His precautions complicated with hematuria, I ordered continuous bladder irrigation.      Interval events:     12/01/22     I evaluated and examined him at the bedside.  Overnight he became hypotensive and received IV fluid bolus.  Labs showed elevated white blood cell count and normal procalcitonin level.  He found to have normal lactic acid.  This morning his blood pressure is stable current blood pressure is 109/51.  He found to have significant hematuria and I started CBI and later this afternoon his hematuria has been improving.  I requested RN to stop CBI and monitor.  I ordered ultrasound renal.  He is requiring 2 L of oxygen and is current oxygen saturation is 97%.  The patient's symptoms of hematuria will resolve and ultrasound all came back negative patient can be discharged to rehab.  I updated rehab team.     Plan of care has been discussed the patient, nursing staff, case management     Consultants/Specialty  neurology and IT, ICU     Code Status  Full Code     Disposition  Patient is not medically cleared for discharge.   Anticipate discharge to to an inpatient rehabilitation hospital.  I have placed the appropriate orders for post-discharge needs.     Review of Systems  Review of Systems    Constitutional:  Negative for chills, fever and weight loss.   HENT:  Positive for hearing loss. Negative for tinnitus.    Eyes:  Negative for blurred vision, double vision, photophobia and pain.   Respiratory:  Negative for cough, sputum production and shortness of breath.    Cardiovascular:  Negative for chest pain, palpitations, orthopnea and leg swelling.   Gastrointestinal:  Negative for abdominal pain, constipation, diarrhea, nausea and vomiting.   Genitourinary:  Negative for dysuria, frequency and urgency.   Musculoskeletal:  Negative for back pain, joint pain, myalgias and neck pain.   Skin:  Negative for rash.   Neurological:  Negative for dizziness, tingling, tremors, sensory change, speech change, focal weakness and headaches.   Psychiatric/Behavioral:  Negative for hallucinations and substance abuse.    All other systems reviewed and are negative.      Physical Exam  Temp:  [36.7 °C (98.1 °F)-38 °C (100.4 °F)] 36.7 °C (98.1 °F)  Pulse:  [81-98] 81  Resp:  [18-20] 18  BP: ()/(43-80) 109/51  SpO2:  [82 %-98 %] 97 %     Physical Exam  Vitals reviewed.   Constitutional:       General: He is not in acute distress.     Comments: He was sitting in chair without any acute distress.   HENT:      Head: Normocephalic and atraumatic. No contusion.      Right Ear: External ear normal.      Left Ear: External ear normal.      Nose: Nose normal.      Comments: Oxygen nasal cannula     Mouth/Throat:      Pharynx: No oropharyngeal exudate.   Eyes:      General:         Right eye: No discharge.         Left eye: No discharge.      Pupils: Pupils are equal, round, and reactive to light.   Cardiovascular:      Rate and Rhythm: Normal rate and regular rhythm.      Heart sounds: No murmur heard.    No friction rub. No gallop.   Pulmonary:      Effort: Pulmonary effort is normal.      Breath sounds: No wheezing or rhonchi.   Abdominal:      General: Bowel sounds are normal. There is no distension.      Palpations:  Abdomen is soft.      Tenderness: There is no abdominal tenderness. There is no rebound.   Musculoskeletal:         General: No swelling or tenderness. Normal range of motion.      Cervical back: No rigidity. No muscular tenderness.   Skin:     General: Skin is warm and dry.      Coloration: Skin is not jaundiced.   Neurological:      General: No focal deficit present.      Mental Status: He is alert and oriented to person, place, and time.      Cranial Nerves: No cranial nerve deficit.      Sensory: No sensory deficit.      Comments: He is following commands.   Psychiatric:         Mood and Affect: Mood normal.     Fluids     Intake/Output Summary (Last 24 hours) at 12/1/2022 0746  Last data filed at 12/1/2022 0600    Gross per 24 hour  Intake 6311.25 ml  Output 6110 ml  Net 201.25 ml     Laboratory    Recent Labs    11/30/22  1014 12/01/22  0332  WBC 6.7 11.9*  RBC 4.54* 4.09*  HEMOGLOBIN 14.1 12.9*  HEMATOCRIT 42.2 38.1*  MCV 93.0 93.2  MCH 31.1 31.5  MCHC 33.4* 33.9  RDW 46.3 45.9  PLATELETCT 175 184  MPV 10.3 10.7    Recent Labs    11/30/22  1014 12/01/22  0332  SODIUM 141 139  POTASSIUM 3.7 3.9  CHLORIDE 107 107  CO2 25 21  GLUCOSE 145* 106*  BUN 28* 34*  CREATININE 1.00 1.34  CALCIUM 8.7 8.4*     Imaging    DX-CHEST-PORTABLE (1 VIEW)  Final Result     1.  Interstitial pulmonary parenchymal prominence suggest chronic underlying lung disease, component of interstitial edema and/or infiltrates not excluded.  2.  Trace left pleural effusion  3.  Atherosclerosis     CT-HEAD W/O  Final Result     1.  No evidence of acute intracranial process.     2.  Cerebral atrophy as well as periventricular chronic small vessel ischemic change.        IR-NEURO INTERVENTIONAL CONSULT-IP  Final Result     1.  Symptomatic left carotid stenosis.  2.  Successful left carotid stent placement with embolic protection device.  3.  Widely patent right internal carotid artery-history of right carotid endarterectomy.     MR-BRAIN-W/O  Final  Result     1.  No acute infarct.  2.  Chronic punctate microhemorrhage in the left parieto-occipital region.  3.  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 This is stable since the previous MRI dated 4/13/2021. This is noted in the previous CT angiogram dated 4/01/2021.  4.  Chronic lacunar infarct in the left thalamus.  5.  Mild cerebral volume loss.  6.  Mild chronic microvascular ischemic disease.     DX-CHEST-PORTABLE (1 VIEW)  Final Result     1.  There are changes of mild vascular congestion.     CT-CTA HEAD WITH & W/O-POST PROCESS  Final Result     CT angiogram of the [place] within normal limits.     CT-CTA NECK WITH & W/O-POST PROCESSING  Final Result     1.  Previous right-sided carotid endarterectomy without evidence of significant stenosis or occlusion.     2.  Moderate irregular calcific atherosclerotic plaquing of the left common carotid artery bifurcation and proximal left internal carotid artery with stenosis of approximately 50%.     CT-CEREBRAL PERFUSION ANALYSIS  Final Result     1.  Cerebral blood flow less than 30% which could represent completed infarct = 5 mL. However this is not seen on the T Max greater than 6 seconds so it likely is artifactual     2.  T Max more than 6 seconds = 0 mL.     3.  Please note that the cerebral perfusion was performed on the limited brain tissue around the basal ganglia region. Infarct/ischemia outside the CT perfusion sections can be missed in this study.     CT-HEAD W/O  Final Result     No noncontrast CT evidence of acute intracranial hemorrhage.     Moderate white matter hypodensity is present.  This is a nonspecific finding which usually is found to represent chronic microvascular disease in patient's of this demographic.  Demyelination, age indeterminant ischemia and gliosis are also common   possibilities.     Chronic left basal ganglia and thalamic lacunar infarctions     Age-appropriate  atrophy    Assessment/Plan  * TIA (transient ischemic attack)- (present on admission)  Assessment & Plan  Due to Left carotid artery stenosis s/p stenting - see separate plan  MRI negative for acute CVA  PT/OT recommending post-acute placement  Mild hematuria.     Gross hematuria  Assessment & Plan  Due to jones trauma when he attempted to self-extricate it  This morning he found to hematuria and requested RN to start him on CBI again.  Later this afternoon his hematuria improved.  I requested RN to discontinue CBI.  Order ultrasound renal.     Primary hypertension- (present on admission)  Assessment & Plan  On lisinopril, well controlled  Goal SBP ~130-180, gradual reduction to <130  Blood pressure decreased last night but now it has improved.     Uvulitis- (present on admission)  Assessment & Plan  Continue augmentin     Dysarthria- (present on admission)  Assessment & Plan  Resolved  Due to TIA attributed to Left carotid artery stenosisTransient speech difficulty  Stroke due to L carotid artery stenosis, s/p L carotid stent     Constipation, slow transit- (present on admission)  Assessment & Plan  Family says stool softeners have not worked at home but Metamucil has  Bowel protocol plus Metamucil     Left carotid stenosis- (present on admission)  Assessment & Plan  Severe and symptomatic with TIA  S/p L carotid stent placement on 11/25  Neurology consulted, on plavix for 8 weeks, continue aspirin, statin     History of CVA (cerebrovascular accident)- (present on admission)  Assessment & Plan  Noted in the past  Continue aspirin, plavix and high intensity statin     Delirium  Assessment & Plan  Improving   Seroquel 100 mg QHS  Avoid benzodiazepines and restraints     BPH (benign prostatic hyperplasia)- (present on admission)  Assessment & Plan  Continue tamsulosin     Mixed hyperlipidemia- (present on admission)  Assessment & Plan  Continue atorvastatin     I discussed plan of care with bedside RN.     VTE  prophylaxis: heparin ppx    Pastor Romero M.D.  Physician  Radiology  OR Surgeon     Signed  Date of Service:  11/25/2022 10:04 AM  Immediate Post- Operative Note     Findings: Symptomatic left carotid stenosis  Procedure(s): Left carotid stent placement     Estimated Blood Loss: Less than 5 ml     Complications: None    Neck CT 11-23-22:  IMPRESSION:     1.  Previous right-sided carotid endarterectomy without evidence of significant stenosis or occlusion.     2.  Moderate irregular calcific atherosclerotic plaquing of the left common carotid artery bifurcation and proximal left internal carotid artery with stenosis of approximately 50%.    Co-morbidities:  See PMH  Potential Risk - Complications: Cognitive Impairment, Contractures, Deep Vein Thrombosis, Incontinence, Malnutrition, Pain, Perceptual Impairment, Pneumonia, Pressure Ulcer, and Urinary Tract Infection  Level of Risk: High    Ongoing Medical Management Needed (Medical/Nursing Needs):   Patient Active Problem List    Diagnosis Date Noted    Gross hematuria 11/29/2022    TIA (transient ischemic attack) 11/29/2022    Uvulitis 11/26/2022    Primary hypertension 11/26/2022    Left carotid stenosis 11/23/2022    Constipation, slow transit 11/23/2022    Dysarthria 11/23/2022    Late effect of stroke 05/24/2021    Impaired mobility and ADLs 04/23/2021    History of CVA (cerebrovascular accident) 04/13/2021    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    Hard of hearing 04/06/2021    Sinus bradycardia 04/06/2021    Anemia 04/06/2021    S/P carotid endarterectomy 04/06/2021    Delirium 04/06/2021    ANA LAURA (acute kidney injury) (formerly [facility]) 04/03/2021    Acute CVA (cerebrovascular accident) (formerly [facility]) 03/31/2021    Carotid stenosis, bilateral 03/31/2021    Do not intubate, cardiopulmonary resuscitation (CPR)-only code status 03/31/2021    Rectus diastasis 08/04/2011    Hydrocele, left 08/04/2011    BPH (benign prostatic hyperplasia) 06/29/2010    Arthritis 06/29/2010     "Skin cancer 09/04/2009    Mixed hyperlipidemia 09/04/2009    HYDROCELE 09/04/2009     Alert with cognitive impairment.    Current Vital Signs:   Temperature: 36.6 °C (97.9 °F) Pulse: 74 Respiration: 18 Blood Pressure : 112/56  Weight: 73 kg (160 lb 15 oz) Height: 172.7 cm (5' 7.99\")  Pulse Oximetry: 99 % O2 (LPM): 2      Completed Laboratory Reports:  Recent Labs     11/30/22  1014 12/01/22  0332 12/02/22  0030   WBC 6.7 11.9* 7.3   HEMOGLOBIN 14.1 12.9* 11.9*   HEMATOCRIT 42.2 38.1* 35.4*   PLATELETCT 175 184 168   SODIUM 141 139 138   POTASSIUM 3.7 3.9 3.8   BUN 28* 34* 38*   CREATININE 1.00 1.34 1.08   ALBUMIN 3.6 3.3 3.2   GLUCOSE 145* 106* 115*     Additional Labs: Not Applicable    Prior Living Situation:   Housing / Facility: 1 Story House  Steps Into Home: 5  Lives with - Patient's Self Care Capacity: Spouse  Equipment Owned: Front-Wheel Walker    Prior Level of Function / Living Situation:   Physical Therapy: Prior Services: Home-Independent  Housing / Facility: 1 Story House  Steps Into Home: 5  Rail:  (unable to determine if pt has 1 or 2 rails, per dtr pt relies heavily on railing)  Bathroom Set up: Walk In Shower, Shower Chair, Grab Bars  Equipment Owned: Front-Wheel Walker  Lives with - Patient's Self Care Capacity: Spouse  Bed Mobility: Independent  Transfer Status: Independent  Ambulation: Independent  Distance Ambulation (Feet):  (limited community distances)  Assistive Devices Used: None  Stairs: Independent  Current Level of Function:   Gait Level Of Assist: Minimal Assist (- CGA at times)  Assistive Device: Front Wheel Walker  Distance (Feet): 60  Deviation: Bradykinetic, Shuffled Gait, Decreased Base Of Support, Decreased Heel Strike, Decreased Toe Off (flexed trunk)  Level of Assist with Stairs: Unable to Participate  Weight Bearing Status: No restrictions  Skilled Intervention: Verbal Cuing, Tactile Cuing  Supine to Sit: Moderate Assist  Sit to Supine:  (Up in chair post session)  Scooting: " Minimal Assist  Comments: pre/post up in chair  Sit to Stand: Minimal Assist  Bed, Chair, Wheelchair Transfer: Minimal Assist  Toilet Transfers: Minimal Assist  Transfer Method: Stand Step  Skilled Intervention: Verbal Cuing, Tactile Cuing  Sitting in Chair: post assessmebt  Sitting Edge of Bed: 10 mins  Standin mins  Occupational Therapy:   Self Feeding: Independent  Grooming / Hygiene: Independent  Bathing: Independent  Dressing: Independent  Toileting: Independent  Medication Management: Independent  Laundry: Independent  Kitchen Mobility: Independent  Finances: Independent  Home Management: Independent  Shopping: Requires Assist  Prior Level Of Mobility: Independent Without Device in Community, Independent Without Device in Home  Driving / Transportation: Relatives / Others Provide Transportation  Prior Services: Home-Independent  Housing / Facility: [address]  Occupation (Pre-Hospital Vocational): Retired Due To Age  Current Level of Function:   Lower Body Dressing: Maximal Assist  Toileting:  (catheter, declined need for BM)  Speech Language Pathology:   Problem List: Hearing Deficit  Diet / Liquid Recommendation: Thin (0), Regular (7)  Rehabilitation Prognosis/Potential: Good  Estimated Length of Stay: 10-12 days    Nursing:      Bhatia in Place    Scope/Intensity of Services Recommended:  Physical Therapy: 1 hr / day  5 days / week. Therapeutic Interventions Required: Maximize Endurance, Mobility, Strength, and Safety  Occupational Therapy: 1 hr / day 5 days / week. Therapeutic Interventions Required: Maximize Self Care, ADLs, IADLs, and Energy Conservation  Speech & Language Pathology: 1 hr / day 5 days / week. Therapeutic Interventions Required: Maximize Cognition and Safety  Rehabilitation Nursin/7. Therapeutic Interventions Required: Monitor Pain, Skin, Wound(s), Vital Signs, Intake and Output, Labs, Safety, and Family Training  Rehabilitation Physician: 3 - 5 days / week. Therapeutic  Interventions Required: Medical Management  Respiratory Care: Pulmonary Toileting. Therapeutic Interventions Required: Pulmonary Toileting and O2 Weaning    He requires 24-hour rehabilitation nursing to manage bowel and bladder function, skin care, surgical incision, nutrition and fluid intake, pulmonary hygiene, pain control, safety, medication management, and patient/family goals. In addition, rehabilitation nursing will reiterate and reinforce therapy skills and equipment use, including ADLs, as well as provide education to the patient and family. Cooper Harvey is willing to participate in and is able to tolerate the proposed plan of care.    Rehabilitation Goals and Plan (Expected frequency & duration of treatment in the IRF):   Return to the Community, Modified Independent Level of Care, and Family Able to Provide 24/7 Assistance  Anticipated Date of Rehabilitation Admission: 12-02-22  Patient/Family oriented IRF level of care/facility/plan: Yes  Patient/Family willing to participate in IRF care/facility/plan: Yes  Patient able to tolerate IRF level of care proposed: Yes  Patient has potential to benefit IRF level of care proposed: Yes  Comments: Not Applicable    Special Needs or Precautions - Medical Necessity:  Safety Concerns/Precautions:  Fall Risk / High Risk for Falls, Balance, Cognition, and Bed / Chair Alarm  Complex Wound Care: Surgical  Pain Management  IV Site: Peripheral  Requires Oxygen  Current Medications:    Current Facility-Administered Medications Ordered in Epic   Medication Dose Route Frequency Provider Last Rate Last Admin    NS infusion 500 mL  500 mL Intravenous Continuous Lynette Hein A.P.R.N.   Stopped at 12/01/22 1200    QUEtiapine (Seroquel) tablet 100 mg  100 mg Oral Nightly Buster Hartmann M.D.   100 mg at 12/01/22 2005    [Held by provider] lisinopril (PRINIVIL) tablet 20 mg  20 mg Oral DAILY Ney Ramos M.D.   20 mg at 11/30/22 0502    amoxicillin-clavulanate  (AUGMENTIN) 875-125 MG per tablet 1 Tablet  1 Tablet Oral Q12HRS Ailin Freire A.P.R.N.   1 Tablet at 12/02/22 0425    melatonin tablet 5 mg  5 mg Oral Nightly GUILLERMO Rivera.P.R.N.   5 mg at 12/01/22 2004    aspirin EC (ECOTRIN) tablet 81 mg  81 mg Oral DAILY Benedict Boss M.D.   81 mg at 12/02/22 0426    clopidogrel (PLAVIX) tablet 75 mg  75 mg Oral DAILY Benedict Boss M.D.   75 mg at 12/02/22 0426    tamsulosin (FLOMAX) capsule 0.4 mg  0.4 mg Oral QHS Josesito Catalan M.D.   0.4 mg at 12/01/22 2005    psyllium (METAMUCIL/KONSYL) 1 Packet  1 Packet Oral BID Josesito Catalan M.D.   1 Packet at 12/02/22 0426    loratadine (CLARITIN) tablet 10 mg  10 mg Oral DAILY Josesito Catalan M.D.   10 mg at 12/02/22 0426    atorvastatin (LIPITOR) tablet 40 mg  40 mg Oral Q EVENING Josesito Catalan M.D.   40 mg at 12/01/22 1648    senna-docusate (PERICOLACE or SENOKOT S) 8.6-50 MG per tablet 2 Tablet  2 Tablet Oral BID Josesito Catalan M.D.   2 Tablet at 12/01/22 0511    And    polyethylene glycol/lytes (MIRALAX) PACKET 1 Packet  1 Packet Oral QDAY PRN Josesito Catalan M.D.        And    magnesium hydroxide (MILK OF MAGNESIA) suspension 30 mL  30 mL Oral QDAY PRN Josesito Catalan M.D.        And    bisacodyl (DULCOLAX) suppository 10 mg  10 mg Rectal QDAY PRN Josesito Catalan M.D.        [Held by provider] heparin injection 5,000 Units  5,000 Units Subcutaneous Q8HRS Josesito Catalan M.D.   5,000 Units at 11/30/22 0501    acetaminophen (Tylenol) tablet 650 mg  650 mg Oral Q6HRS PRN Josesito Catalan M.D.        ondansetron (ZOFRAN) syringe/vial injection 4 mg  4 mg Intravenous Q4HRS PRN Josesito Catalan M.D.        ondansetron (ZOFRAN ODT) dispertab 4 mg  4 mg Oral Q4HRS PRN Josesito Catalan M.D.         No current [facility]-ordered outpatient medications on file.     Diet:   DIET ORDERS (From admission to next 24h)       Start     Ordered    11/25/22 1505  Diet Order Diet: Cardiac  ALL MEALS        Question:  Diet:  Answer:  Cardiac    11/25/22 1506                    Anticipated Discharge  Destination / Patient/Family Goal:  Destination: Home with Assistance Support System: Spouse and Family   Anticipated home health services: OT and PT  Previously used HH service/ provider: Not Applicable  Anticipated DME Needs: [employer]  Outpatient Services: OT and PT  Alternative resources to address additional identified needs:   No future appointments.   Pre-Screen Completed: 12/2/2022 9:57 AM Frederick Meredith L.P.N.

## 2022-12-02 NOTE — DISCHARGE PLANNING
[facility] D/C'd this am.  Msg placed to Dr. Cruz-seeking medical clearance.  Will discuss this case further with the Admissions team this morning.     0819-MIGUEL A Paz has medically cleared.      0957-Case is under review by Dr. Barkley.     1029-Dr. Barkley would like wait for the renal u/s results prior to a decision.     1048-Spoke with [facility]-requesting an expedited read if possible d/t pending D/C.  He will work on it.     1124-Renal u/s results are available.  Ion remains medically cleared by MIGUEL A Paz.  Dr. Barkley has accepted.  Transport has been arranged via GMT between 7868-8817. MIGUEL A Paz & Angela daughter are aware.  Msg placed to Indra SINGH & Pedro THORNTONN.

## 2022-12-02 NOTE — THERAPY
Physical Therapy   Daily Treatment     Patient Name: Cooper Harvey  Age:  88 y.o., Sex:  male  Medical Record #: [id number]  Today's Date: 12/1/2022     Precautions  Precautions: Fall Risk  Comments: SPB <140    Assessment    Pt greeted and agreed to therapy, son present for session, pt up in chair pre/post session. Pt req'd Chandra and FWW for all mobility. As walking progressed, pt progressed to CGA, but as pace slows req's Chandra for balance again. Pt demo'd posterior lean with standing. Pt is [place]. Pt is currently limited by decreased balance and activity tolerance and will continue to benefit from skilled PT to address deficits.     Plan    Continue current treatment plan.    DC Equipment Recommendations: Unable to determine at this time  Discharge Recommendations: Recommend post-acute placement for additional physical therapy services prior to discharge home       12/01/22 1335   Balance   Sitting Balance (Static) Fair   Sitting Balance (Dynamic) Fair -   Standing Balance (Static) Poor +   Standing Balance (Dynamic) Poor +   Weight Shift Sitting Fair   Weight Shift Standing Fair   Skilled Intervention Verbal Cuing   Comments w/ FWW, posterior lean during corby cares   Gait Analysis   Gait Level Of Assist Minimal Assist  (- CGA at times)   Assistive Device Front Wheel Walker   Distance (Feet) 60   # of Times Distance was Traveled 1   Deviation Bradykinetic;Shuffled Gait;Decreased Base Of Support;Decreased Heel Strike;Decreased Toe Off  (flexed trunk)   Skilled Intervention Verbal Cuing;Tactile Cuing   Comments pt req's increase assist with initial walking after static standing but improves to CGA as walking progresses.   Bed Mobility    Comments pre/post up in chair   Functional Mobility   Sit to Stand Minimal Assist   Bed, Chair, Wheelchair Transfer Minimal Assist   Toilet Transfers Minimal Assist   Transfer Method Stand Step   Mobility walking in hallway, bathroom, back to chair   Skilled Intervention Verbal  Cuing;Tactile Cuing   Comments verbal cues for hand placement, req'd increased assist for stand during corby cares   Short Term Goals    Short Term Goal # 1 Pt will perform supine<>sit with HOB flat with SPV within 6 visits to improve bed mob.   Goal Outcome # 1   (NT today, up pre/post)   Short Term Goal # 2 Pt will perform bed<>chair transfers with LRAD and SPV within 6 visits.   Goal Outcome # 2 Goal not met   Short Term Goal # 3 Pt will amb >150ft with LRAD and SPV within 6 visits.   Goal Outcome # 3 Goal not met   Supervising Physical Therapist (PTA Treatments Only)   Supervising Physical Therapist Sherrie Rios

## 2022-12-02 NOTE — DISCHARGE PLANNING
Patient medically clear for discharge to [facility] today.    [facility]MT transport scheduled for [id number].    COBRA completed.    APRN, charge RN, RN, family aware.

## 2022-12-02 NOTE — FLOWSHEET NOTE
12/02/22 1537   Events/Summary/Plan   Events/Summary/Plan o2 spot check RT consult   Vital Signs   Pulse 97   Respiration 20   Pulse Oximetry 97 %   $ Pulse Oximetry (Spot Check) Yes   Respiratory Assessment   Level of Consciousness Responds to voice   Chest Exam   Work Of Breathing / Effort Within Normal Limits;Shallow   Breath Sounds   RUL Breath Sounds Clear   RML Breath Sounds Clear   RLL Breath Sounds Clear   DAVEY Breath Sounds Clear   LLL Breath Sounds Clear   Oxygen   O2 Delivery Device None - Room Air

## 2022-12-02 NOTE — FLOWSHEET NOTE
12/02/22 1539   Patient History   Pulmonary Diagnosis none   Procedures Relevant to Respiratory Status none   Home O2 No   Nocturnal CPAP No   Home Treatments/Frequency No   Sleep Apnea Screening   Have you had a sleep study? No   Have you been diagnosed with sleep apnea? No

## 2022-12-03 LAB
ALBUMIN SERPL BCP-MCNC: 3.2 G/DL (ref 3.2–4.9)
ALBUMIN/GLOB SERPL: 1.2 G/DL
ALP SERPL-CCNC: 77 U/L (ref 30–99)
ALT SERPL-CCNC: 28 U/L (ref 2–50)
ANION GAP SERPL CALC-SCNC: 8 MMOL/L (ref 7–16)
AST SERPL-CCNC: 28 U/L (ref 12–45)
BASOPHILS # BLD AUTO: 0.6 % (ref 0–1.8)
BASOPHILS # BLD: 0.04 K/UL (ref 0–0.12)
BILIRUB SERPL-MCNC: 0.4 MG/DL (ref 0.1–1.5)
BUN SERPL-MCNC: 27 MG/DL (ref 8–22)
CALCIUM SERPL-MCNC: 8.6 MG/DL (ref 8.5–10.5)
CHLORIDE SERPL-SCNC: 108 MMOL/L (ref 96–112)
CO2 SERPL-SCNC: 24 MMOL/L (ref 20–33)
CREAT SERPL-MCNC: 0.97 MG/DL (ref 0.5–1.4)
EOSINOPHIL # BLD AUTO: 0.31 K/UL (ref 0–0.51)
EOSINOPHIL NFR BLD: 4.6 % (ref 0–6.9)
ERYTHROCYTE [DISTWIDTH] IN BLOOD BY AUTOMATED COUNT: 45.1 FL (ref 35.9–50)
GFR SERPLBLD CREATININE-BSD FMLA CKD-EPI: 75 ML/MIN/1.73 M 2
GLOBULIN SER CALC-MCNC: 2.7 G/DL (ref 1.9–3.5)
GLUCOSE SERPL-MCNC: 102 MG/DL (ref 65–99)
HCT VFR BLD AUTO: 38 % (ref 42–52)
HGB BLD-MCNC: 12.7 G/DL (ref 14–18)
IMM GRANULOCYTES # BLD AUTO: 0.02 K/UL (ref 0–0.11)
IMM GRANULOCYTES NFR BLD AUTO: 0.3 % (ref 0–0.9)
LYMPHOCYTES # BLD AUTO: 0.66 K/UL (ref 1–4.8)
LYMPHOCYTES NFR BLD: 9.8 % (ref 22–41)
MAGNESIUM SERPL-MCNC: 2.1 MG/DL (ref 1.5–2.5)
MCH RBC QN AUTO: 31.1 PG (ref 27–33)
MCHC RBC AUTO-ENTMCNC: 33.4 G/DL (ref 33.7–35.3)
MCV RBC AUTO: 93.1 FL (ref 81.4–97.8)
MONOCYTES # BLD AUTO: 0.66 K/UL (ref 0–0.85)
MONOCYTES NFR BLD AUTO: 9.8 % (ref 0–13.4)
NEUTROPHILS # BLD AUTO: 5.06 K/UL (ref 1.82–7.42)
NEUTROPHILS NFR BLD: 74.9 % (ref 44–72)
NRBC # BLD AUTO: 0 K/UL
NRBC BLD-RTO: 0 /100 WBC
PLATELET # BLD AUTO: 202 K/UL (ref 164–446)
PMV BLD AUTO: 10.7 FL (ref 9–12.9)
POTASSIUM SERPL-SCNC: 4.1 MMOL/L (ref 3.6–5.5)
PROT SERPL-MCNC: 5.9 G/DL (ref 6–8.2)
RBC # BLD AUTO: 4.08 M/UL (ref 4.7–6.1)
SODIUM SERPL-SCNC: 140 MMOL/L (ref 135–145)
WBC # BLD AUTO: 6.8 K/UL (ref 4.8–10.8)

## 2022-12-03 PROCEDURE — 700102 HCHG RX REV CODE 250 W/ 637 OVERRIDE(OP): Performed by: PHYSICAL MEDICINE & REHABILITATION

## 2022-12-03 PROCEDURE — 97535 SELF CARE MNGMENT TRAINING: CPT

## 2022-12-03 PROCEDURE — 83735 ASSAY OF MAGNESIUM: CPT

## 2022-12-03 PROCEDURE — A9270 NON-COVERED ITEM OR SERVICE: HCPCS | Performed by: PHYSICAL MEDICINE & REHABILITATION

## 2022-12-03 PROCEDURE — 97166 OT EVAL MOD COMPLEX 45 MIN: CPT

## 2022-12-03 PROCEDURE — 97162 PT EVAL MOD COMPLEX 30 MIN: CPT

## 2022-12-03 PROCEDURE — 99233 SBSQ HOSP IP/OBS HIGH 50: CPT | Performed by: PHYSICAL MEDICINE & REHABILITATION

## 2022-12-03 PROCEDURE — 36415 COLL VENOUS BLD VENIPUNCTURE: CPT

## 2022-12-03 PROCEDURE — 85025 COMPLETE CBC W/AUTO DIFF WBC: CPT

## 2022-12-03 PROCEDURE — 97530 THERAPEUTIC ACTIVITIES: CPT

## 2022-12-03 PROCEDURE — 92523 SPEECH SOUND LANG COMPREHEN: CPT

## 2022-12-03 PROCEDURE — 770010 HCHG ROOM/CARE - REHAB SEMI PRIVAT*

## 2022-12-03 PROCEDURE — 80053 COMPREHEN METABOLIC PANEL: CPT

## 2022-12-03 RX ADMIN — SENNOSIDES AND DOCUSATE SODIUM 2 TABLET: 50; 8.6 TABLET ORAL at 21:38

## 2022-12-03 RX ADMIN — OMEPRAZOLE 20 MG: 20 CAPSULE, DELAYED RELEASE ORAL at 08:22

## 2022-12-03 RX ADMIN — ATORVASTATIN CALCIUM 40 MG: 40 TABLET, FILM COATED ORAL at 21:38

## 2022-12-03 RX ADMIN — AMOXICILLIN AND CLAVULANATE POTASSIUM 1 TABLET: 875; 125 TABLET, FILM COATED ORAL at 08:22

## 2022-12-03 RX ADMIN — TAMSULOSIN HYDROCHLORIDE 0.4 MG: 0.4 CAPSULE ORAL at 21:38

## 2022-12-03 RX ADMIN — Medication 1 PACKET: at 08:22

## 2022-12-03 RX ADMIN — CLOPIDOGREL BISULFATE 75 MG: 75 TABLET ORAL at 08:22

## 2022-12-03 RX ADMIN — LORATADINE 10 MG: 10 TABLET ORAL at 08:22

## 2022-12-03 RX ADMIN — QUETIAPINE FUMARATE 100 MG: 100 TABLET ORAL at 21:41

## 2022-12-03 RX ADMIN — SENNOSIDES AND DOCUSATE SODIUM 2 TABLET: 50; 8.6 TABLET ORAL at 08:22

## 2022-12-03 RX ADMIN — ASPIRIN 81 MG: 81 TABLET, COATED ORAL at 08:22

## 2022-12-03 ASSESSMENT — GAIT ASSESSMENTS
DISTANCE (FEET): 80
GAIT LEVEL OF ASSIST: MODERATE ASSIST
ASSISTIVE DEVICE: FRONT WHEEL WALKER
DEVIATION: DECREASED BASE OF SUPPORT;BRADYKINETIC;DECREASED HEEL STRIKE;DECREASED TOE OFF

## 2022-12-03 ASSESSMENT — BRIEF INTERVIEW FOR MENTAL STATUS (BIMS)
ASKED TO RECALL SOCK: NO, COULD NOT RECALL
ASKED TO RECALL BLUE: NO, COULD NOT RECALL
WHAT MONTH IS IT: ACCURATE WITHIN 5 DAYS
ASKED TO RECALL BED: YES, NO CUE REQUIRED
BIMS SUMMARY SCORE: 5
WHAT DAY OF THE WEEK IS IT: INCORRECT
ASKED TO RECALL BLUE: NO, COULD NOT RECALL
ASKED TO RECALL BED: NO, COULD NOT RECALL
WHAT DAY OF THE WEEK IS IT: INCORRECT
INITIAL REPETITION OF BED BLUE SOCK - FIRST ATTEMPT: 3
WHAT YEAR IS IT: CORRECT
ASKED TO RECALL SOCK: NO, COULD NOT RECALL
WHAT YEAR IS IT: MISSED BY MORE THAN 5 YEARS
INITIAL REPETITION OF BED BLUE SOCK - FIRST ATTEMPT: 3
BIMS SUMMARY SCORE: 10
WHAT MONTH IS IT: ACCURATE WITHIN 5 DAYS

## 2022-12-03 ASSESSMENT — ACTIVITIES OF DAILY LIVING (ADL)
TOILETING_LEVEL_OF_ASSIST_DESCRIPTION: GRAB BAR;SET-UP OF EQUIPMENT;SUPERVISION FOR SAFETY
TOILETING: INDEPENDENT
TUB_SHOWER_TRANSFER_DESCRIPTION: GRAB BAR;SHOWER BENCH;SET-UP OF EQUIPMENT;SUPERVISION FOR SAFETY;VERBAL CUEING
TOILET_TRANSFER_DESCRIPTION: GRAB BAR;SET-UP OF EQUIPMENT;SUPERVISION FOR SAFETY;VERBAL CUEING
BED_CHAIR_WHEELCHAIR_TRANSFER_DESCRIPTION: SET-UP OF EQUIPMENT;SUPERVISION FOR SAFETY;VERBAL CUEING

## 2022-12-03 ASSESSMENT — PAIN DESCRIPTION - PAIN TYPE: TYPE: ACUTE PAIN

## 2022-12-03 NOTE — PROGRESS NOTES
4 Eyes Skin Assessment Completed by HERLINDA Francois and HERLINDA Tucker.    Head Scar  Ears WDL  Nose WDL  Mouth WDL  Neck WDL  Breast/Chest WDL  Shoulder Blades WDL  Spine WDL  (R) Arm/Elbow/Hand Scab, bruising  (L) Arm/Elbow/Hand WDL  Abdomen WDL  Groin Incision  Scrotum/Coccyx/Buttocks WDL  (R) Leg WDL  (L) Leg  WDL  (R) Heel/Foot/Toe WDL  (L) Heel/Foot/Toe WDL          Devices In Places Bhatia      Interventions In Place Pillows for positioning, barrier paste, biatain to right elbow scab    Possible Skin Injury No    Pictures Uploaded Into Epic Yes  Wound Consult Placed N/A  RN Wound Prevention Protocol Ordered No

## 2022-12-03 NOTE — THERAPY
Physical Therapy   Initial Evaluation     Patient Name: Cooper Harvey  Age:  88 y.o., Sex:  male  Medical Record #: [id number]  Today's Date: 12/3/2022     Subjective    Pt was seated in w/c upon arrival and agreeable to treatment.  Pt is very [place]; utilized Kaai dagoberto in order to assist with communication.  Home set up verified by pt's daughters that arrived at the end of the session.       Objective       12/03/22 0852   PT Charge Group   PT Therapeutic Activities 1   PT Evaluation PT Evaluation Mod   PT Total Time Spent   PT Individual Total Time Spent (Mins) 60   Prior Living Situation   Prior Services Intermittent Physical Support for ADL Per Family   Housing / Facility 1 Story House   Steps Into Home 4  (in garage (usual entrance), 1 GIGI in front, 5 GIGI in back (deck))   Steps In Home 0   Rail Right Rail (Steps into Home)   Elevator No   Equipment Owned Front-Wheel Walker;Grab Bar(s) In Tub / Shower;Grab Bar(s) By Toilet   Lives with - Patient's Self Care Capacity Spouse   Comments Information verified by pt's daughters   Prior Level of Functional Mobility   Bed Mobility Independent   Transfer Status Independent   Ambulation Independent   Distance Ambulation (Feet)   (limited community)   Assistive Devices Used None   Stairs Independent   Prior Functioning: Everyday Activities   Self Care Independent   Indoor Mobility (Ambulation) Independent   Stairs Independent   Functional Cognition Independent   Prior Device Use None of the given options   Pain 0 - 10 Group   Therapist Pain Assessment Post Activity Pain Same as Prior to Activity;Nurse Notified;0   Cognition    Level of Consciousness Alert   ABS (Agitated Behavior Scale)   Agitated Behavior Scale Performed No   Passive ROM Lower Body   Passive ROM Lower Body WDL   Active ROM Lower Body    Active ROM Lower Body  WDL   Strength Lower Body   Rt Hip Flexion Strength 3+ (F+)   Rt Knee Flexion Strength 5 (N)   Rt Knee Extension Strength 5 (N)   Rt Ankle  Dorsiflexion Strength 5 (N)   Lt Hip Flexion Strength 4 (G)   Lt Knee Flexion Strength 5 (N)   Lt Knee Extension Strength 5 (N)   Lt Ankle Dorsiflexion Strength 5 (N)   Sensation Lower Body   Lower Extremity Sensation   WDL   Comments Intact light touch and proprioception BLE   Lower Body Muscle Tone   Lower Body Muscle Tone  WDL   Comments No tone or clonus noted   Bed Mobility    Supine to Sit Standby Assist   Sit to Supine Standby Assist   Sit to Stand Moderate Assist  (to min)   Scooting Standby Assist   Rolling Standby Assist   Coordination Lower Body    Comments intact heel to shin BLE   Roll Left and Right   Assistance Needed Supervision;Adaptive equipment   CARE Score - Roll Left and Right 4   Roll Left and Right Discharge Goal   Discharge Goal 6   Sit to Lying   Assistance Needed Supervision;Adaptive equipment   CARE Score - Sit to Lying 4   Sit to Lying Discharge Goal   Discharge Goal 6   Lying to Sitting on Side of Bed   Assistance Needed Supervision;Adaptive equipment   CARE Score - Lying to Sitting on Side of Bed 4   Lying to Sitting on Side of Bed Discharge Goal   Discharge Goal 6   Sit to Stand   Assistance Needed Physical assistance   Physical Assistance Level 51%-75%   CARE Score - Sit to Stand 2   Sit to Stand Discharge Goal   Discharge Goal 6   Chair/Bed-to-Chair Transfer   Assistance Needed Physical assistance   Physical Assistance Level 51%-75%   CARE Score - Chair/Bed-to-Chair Transfer 2   Chair/Bed-to-Chair Transfer Discharge Goal   Discharge Goal 6   Car Transfer   Reason if not Attempted Safety concerns   CARE Score - Car Transfer 88   Car Transfer Discharge Goal   Discharge Goal 4   Walk 10 Feet   Assistance Needed Physical assistance   Physical Assistance Level 51%-75%   CARE Score - Walk 10 Feet 2   Walk 10 Feet Discharge Goal   Discharge Goal 6   Walk 50 Feet with Two Turns   Assistance Needed Physical assistance   Physical Assistance Level 51%-75%   CARE Score - Walk 50 Feet with Two  "Turns 2   Walk 50 Feet with Two Turns Discharge Goal   Discharge Goal 6   Walk 150 Feet   Reason if not Attempted Safety concerns   CARE Score - Walk 150 Feet 88   Walk 150 Feet Discharge Goal   Discharge Goal 6   Walking 10 Feet on Uneven Surfaces   Reason if not Attempted Safety concerns   CARE Score - Walking 10 Feet on Uneven Surfaces 88   Walking 10 Feet on Uneven Surfaces Discharge Goal   Discharge Goal 6   1 Step (Curb)   Assistance Needed Physical assistance   Physical Assistance Level 51%-75%   CARE Score - 1 Step (Curb) 2   1 Step (Curb) Discharge Goal   Discharge Goal 6   4 Steps   Reason if not Attempted Safety concerns   CARE Score - 4 Steps 88   4 Steps Discharge Goal   Discharge Goal 6   12 Steps   Reason if not Attempted Safety concerns   CARE Score - 12 Steps 88   12 Steps Discharge Goal   Discharge Goal 6   Picking Up Object   Reason if not Attempted Safety concerns   CARE Score - Picking Up Object 88   Picking Up Object Discharge Goal   Discharge Goal 4   Wheel 50 Feet with Two Turns   Reason if not Attempted Activity not applicable   CARE Score - Wheel 50 Feet with Two Turns 9   Wheel 50 Feet with Two Turns Discharge Goal   Discharge Goal 9   Wheel 150 Feet   Reason if not Attempted Activity not applicable   CARE Score - Wheel 150 Feet 9   Wheel 150 Feet Discharge Goal   Discharge Goal 9   Gait Functional Level of Assist    Gait Level Of Assist Moderate Assist  (to min A)   Assistive Device Front Wheel Walker   Distance (Feet) 80   # of Times Distance was Traveled 1   Deviation Decreased Base Of Support;Bradykinetic;Decreased Heel Strike;Decreased Toe Off  (increased B lateral sway)   Wheelchair Functional Level of Assist   Wheelchair Assist   (N/A as pt is ambulatory)   Stairs Functional Level of Assist   Level of Assist with Stairs Moderate Assist   # of Stairs Climbed 1  (4\" step in // bars)   Stairs Description Extra time;Hand rails;Limited by fatigue;Safety concerns;Supervision for " safety;Verbal cueing;Requires incidental assist   Transfer Functional Level of Assist   Bed, Chair, Wheelchair Transfer Moderate Assist  (to min A)   Bed Chair Wheelchair Transfer Description Adaptive equipment;Requires lift;Set-up of equipment;Supervision for safety;Verbal cueing  (SPT no AD)   Problem List    Problems Impaired Bed Mobility;Impaired Transfers;Impaired Ambulation;Functional Strength Deficit;Impaired Balance;Decreased Activity Tolerance   Precautions   Precautions Fall Risk   Comments Very [place]   Current Discharge Plan   Current Discharge Plan Return to Prior Living Situation   Interdisciplinary Plan of Care Collaboration   IDT Collaboration with  Nursing;Occupational Therapist   Patient Position at End of Therapy Seated;Chair Alarm On;Call Light within Reach;Tray Table within Reach;Phone within Reach;Family / Friend in Room   Collaboration Comments CLOF   Benefit   Therapy Benefit Patient Would Benefit from Inpatient Rehabilitation Physical Therapy to Maximize Functional [place] with ADLs, IADLs and Mobility.   Strengths & Barriers   Strengths Able to follow instructions;Effective communication skills;Independent prior level of function;Manages pain appropriately;Motivated for self care and independence;Pleasant and cooperative;Supportive family;Willingly participates in therapeutic activities   Barriers Decreased endurance;Fatigue;Generalized weakness;Home accessibility;Impaired activity tolerance;Impaired balance;Limited mobility     Pt educated on PT role, PT POC, rehab orientation.      Assessment  Patient is 88 y.o. male with a diagnosis of TIA and L carotid stent placement.  Additional factors influencing patient status / progress (ie: cognitive factors, co-morbidities, social support, etc): good social support, independent PLOF, PMH of stroke, right carotid endarterectomy, hypertension, hyperlipidemia, hard of hearing, cognitive impairment, BPH.      Plan  Recommend Physical Therapy   minutes per day 5-7 days per week for 10-14 days for the following treatments:  PT Group Therapy, PT E Stim Attended, PT Gait Training, PT Therapeutic Exercises, PT Neuro Re-Ed/Balance, PT Aquatic Therapy, PT Therapeutic Activity, PT Manual Therapy, and PT Evaluation.    Passport items to be completed:  Get in/out of bed safely, in/out of a vehicle, safely use mobility device, walk or wheel around home/community, navigate up and down stairs, show how to get up/down from the ground, ensure home is accessible, demonstrate HEP, complete caregiver training    Goals:  Long term and short term goals have been discussed with patient and they are in agreement.    Physical Therapy Problems (Active)       Problem: Mobility       Dates: Start: 12/03/22         Goal: STG-Within one week, patient will ambulate community distances x 100 feet with FWW and CGA       Dates: Start: 12/03/22            Goal: STG-Within one week, patient will ascend and descend four to six stairs with BHR and CGA       Dates: Start: 12/03/22               Problem: Mobility Transfers       Dates: Start: 12/03/22         Goal: STG-Within one week, patient will transfer bed to chair with FWW and CGA       Dates: Start: 12/03/22               Problem: PT-Long Term Goals       Dates: Start: 12/03/22         Goal: LTG-By discharge, patient will ambulate x 150 feet with FWW and SPV       Dates: Start: 12/03/22            Goal: LTG-By discharge, patient will transfer one surface to another with FWW and SPV       Dates: Start: 12/03/22            Goal: LTG-By discharge, patient will ambulate up/down 4-6 stairs with one HR and SBA       Dates: Start: 12/03/22            Goal: LTG-By discharge, patient will transfer in/out of a car with FWW and SBA       Dates: Start: 12/03/22

## 2022-12-03 NOTE — THERAPY
Speech Language Pathology   Initial Assessment     Patient Name: Cooper Harvey  AGE:  88 y.o., SEX:  male  Medical Record #: [id number]  Today's Date: 12/3/2022     Subjective    Pt pleasant and cooperative during tx.  Pt [place].  Rock Contentr dagoberto utilized for communication (written cues dictated).  Per Pt, spouse managed finances, medications, and cooking prior to onset.     Objective       12/03/22 1031   Evaluation Charges   SLP Speech Language Evaluation Speech Sound Language Comprehension   SLP Total Time Spent   SLP Individual Total Time Spent (Mins) 60   Prior Living Situation   Prior Services Intermittent Physical Support for ADL Per Service   Housing / Facility 1 Story House   Lives with - Patient's Self Care Capacity Spouse   Prior Level Of Function   Communication Within Functional Limits   Swallow Within Functional Limits   Dentition Edentulous   Dentures Lowers;Uppers   Hearing Impaired Both Ears   Hearing Aid Left;Right  (not present)   Vision Wears Corrective Lenses;Reading    Patient's Primary Language English   Education High School Graduate or GED   Occupation (Pre-Hospital Vocational) Retired Due To Age   Comments    Receptive Language / Auditory Comprehension   Receptive Language / Auditory Comprehension   (Need written cues secondary to [place])   Identifies Objects Minimal (4)  ([place])   Answers Yes / No Simple / Contextual Questions Minimal (4)  ([place])   Follows One Unit Commands Minimal (4)  ([place])   Expressive Language   Naming Within Functional Limits (6-7)   Word Finding Deficits Minimal (4)  (decreased verbal fluency)   Written Language Expression   Dominant Hand Right   Cognition   Moderate Attention Moderate (3)   Orientation  Minimal (4)   Clock Drawing Poor Planning;Disorganization;Impaired Hand Placement   ABS (Agitated Behavior Scale)   Agitated Behavior Scale Performed No   Cognitive Pattern Assessment   Cognitive Pattern Assessment Used BIMS   Brief Interview for Mental Status  "(BIMS)   Repetition of Three Words (First Attempt) 3   Temporal Orientation: Year Missed by more than 5 years   Temporal Orientation: Month Accurate within 5 days   Temporal Orientation: Day Incorrect   Recall: \"Sock\" No, could not recall   Recall: \"Blue\" No, could not recall   Recall: \"Bed\" No, could not recall   BIMS Summary Score 5   Confusion Assessment Method (CAM)   Is there evidence of an acute change in mental status from the patient's baseline? Yes   Inattention Behavior present, fluctuates (comes and goes, changes in severity)   Disorganized thinking Behavior not present   Altered level of consciousness Behavior not present   Social / Pragmatic Communication   Social / Pragmatic Communication WDL   Labial Function   Labial Function (WDL) WDL   Lingual Function   Lingual Function (WDL) WDL   Swallowing/Nutritional Status   Swallowing/Nutritional Status Regular food   Functional Level of Assist   Eating Supervision   Eating Description Set-up of equipment or meal/tube feeding   Comprehension Moderate Assist   Comprehension Description Visual devices;Hearing aids/amplifiers   Expression Minimal Assist   Expression Description Verbal cueing   Social Interaction Independent   Problem Solving Minimal Assist   Problem Solving Description Verbal cueing;Therapy schedule;Seat belt;Increased time;Bed/chair alarm   Memory Moderate Assist   Memory Description Seat belt;Therapy schedule;Verbal cueing;Increased time;Bed/chair alarm   Outcome Measures   Outcome Measures Utilized SCCAN   SCCAN (Scales of Cognitive and Communicative Ability for Neurorehabilitation)   Oral Expression - Raw Score 15   Oral Expression - Scale Performance Score 79   Orientation - Raw Score 8   Orientation - Scale Performance Score 67   Problem List   Problem List Cognitive-Linguistic Deficits;Hearing Deficit   Current Discharge Plan   Current Discharge Plan Return to Prior Living Situation   Benefit   Therapy Benefit Patient would benefit from " Inpatient Rehab Speech-Language Pathology to address above identified deficits.   Interdisciplinary Plan of Care Collaboration   IDT Collaboration with  Physical Therapist   Collaboration Comments Regarding eyeIndexTankr dagoberto for ease of communication   Strengths & Barriers   Strengths Able to follow instructions;Motivated for self care and independence;Pleasant and cooperative;Willingly participates in therapeutic activities   Barriers Hearing impairment;Impaired functional cognition   Speech Language Pathologist Assigned   Assigned SLP / Extension LC 60 cog       Assessment    Patient is 88 y.o. male with a diagnosis of TIA.  Additional factors influencing patient status/progress (ie: cognitive factors, co-morbidities, social support, etc): Speech language evaluation completed this day. Pt presented with deficits mild-moderate deficits in word finding, attn, and orientation.  SCCAN was unable to be completed due to time constraint.  Recommend skilled speech therapy to target aforementioned deficits, and any further deficits noted, upon completion of the SCCAN.      Plan  Recommend Speech Therapy 30-60 minutes per day 5-6 days per week for 4 weeks for the following treatments:  SLP Speech Language Treatment, SLP Cognitive Skill Development, and SLP Group Treatment.    Passport items to be completed:  Express basic needs, understand food/liquid recommendations, consistently follow swallow precautions, manage finances, manage medications, arrive to therapy appointments on time, complete daily memory log entries, solve problems related to safety situations, review education related to hospitalization, complete caregiver training     Goals:  Long term and short term goals have been discussed with patient and they are in agreement.    Speech Therapy Problems (Active)       Problem: Expression STGs       Dates: Start: 12/03/22         Goal: STG-Within one week, patient will complete word finding tasks with 90% accuracy given  min verbal cues.         Dates: Start: 12/03/22               Problem: Memory STGs       Dates: Start: 12/03/22         Goal: STG-Within one week, patient will recall daily events, and safety strategies, given min verbal cues.         Dates: Start: 12/03/22               Problem: Problem Solving STGs       Dates: Start: 12/03/22         Goal: STG-Within one week, patient will score 25 or greater on the O-log over 2 sessions.         Dates: Start: 12/03/22            Goal: STG-Within one week, patient will complete basic attn tasks with 80% accuracy given min verbal cues.         Dates: Start: 12/03/22            Goal: STG-Within one week, patient will complete SCCAN       Dates: Start: 12/03/22               Problem: Speech/Swallowing LTGs       Dates: Start: 12/03/22         Goal: LTG-By discharge, patient will solve basic problems Markie for safe discharge home.         Dates: Start: 12/03/22

## 2022-12-03 NOTE — CARE PLAN
The patient is Stable - Low risk of patient condition declining or worsening    Problem: Skin Integrity  Goal: Patient's skin integrity will be maintained or improve  Outcome: Progressing: gauze and transparent dressing to angio puncture site right groin changed. Barrier paste to buttocks. Pillows used for positioning and comfort. Soiled stat lock for jones catheter changed.      Problem: VTE Prevention  Goal: Patient will remain free from venous thromboembolism (VTE)  Outcome: Progressing: No s/s VTE noted. Pt on asa and plavix.

## 2022-12-03 NOTE — PROGRESS NOTES
CNA notified this RN that patient had an episode of epistaxis with clots. Upon assessment, bleeding noted to have stopped, though hematuria still present. Notified on-call hospitalist. Instructions to monitor hematuria and if epistaxis continues.

## 2022-12-03 NOTE — CARE PLAN
The patient is Watcher - Medium risk of patient condition declining or worsening    Shift Goals  Clinical Goals: no hematuria or epistaxis  Patient Goals: rest    Progress made toward(s) clinical / shift goals:    Patient is not progressing towards the following goals:      Problem: Bladder / Voiding  Goal: Patient will establish and maintain regular urinary output  12/3/2022 0259 by Myah Scott R.N.  Outcome: Not Progressing  Patient continues to present with gross hematuria & clots    Problem: Nutrition  Goal: Patient's nutritional and fluid intake will be adequate or improve  Outcome: Not Progressing  Patient unable to tolerate meds floated in applesauce    Problem: Knowledge Deficit - Standard  Goal: Patient and family/care givers will demonstrate understanding of plan of care, disease process/condition, diagnostic tests and medications  12/3/2022 0259 by Myah Scott R.N.  Outcome: Progressing     Problem: Discharge Barriers/Planning  Goal: Patient's continuum of care needs are met  12/3/2022 0259 by Myah Scott R.N.  Outcome: Progressing     Problem: Skin Integrity  Goal: Patient's skin integrity will be maintained or improve  Outcome: Progressing     Problem: Mobility  Goal: Patient's capacity to carry out activities will improve  Outcome: Progressing

## 2022-12-03 NOTE — THERAPY
Occupational Therapy   Initial Evaluation     Patient Name: Cooper Harvey  Age:  88 y.o., Sex:  male  Medical Record #: [id number]  Today's Date: 12/3/2022     Subjective    Patient was asleep in bed upon OT arrival to room.  He was agreeable to OT evaluation and asked if he was going to shower.  He stated he had no pain.     Objective       12/03/22 0701   OT Charge Group   Charges Yes   OT Self Care / ADL 1   OT Evaluation OT Evaluation Mod   OT Total Time Spent   OT Individual Total Time Spent (Mins) 60   Prior Living Situation   Prior Services Intermittent Physical Support for ADL Per Family   Housing / Facility [address]   Steps Into Home 5  (5-6 steps per patient)   Steps In Home 0   Bathroom Set up Walk In Shower;Grab Bars;Shower Chair   Equipment Owned Front-Wheel Walker;Wheelchair;Tub / Shower Seat;Grab Bar(s) In Tub / Shower;Grab Bar(s) By Toilet   Lives with - Patient's Self Care Capacity Spouse   Comments Above info should be confirmed by spouse as patient may be unreliable historian   Prior Level of ADL Function   Self Feeding Independent   Grooming / Hygiene Independent   Bathing Independent   Dressing Independent   Toileting Independent   Comments per chart   Prior Level of IADL Function   Medication Management Requires Assist   Laundry Unable To Determine At This Time   Kitchen Mobility Independent   Finances Requires Assist   Home Management Requires Assist   Shopping Dependent   Prior Level Of Mobility Independent Without Device in Home   Driving / Transportation Unable To Determine At This Time   Occupation (Pre-Hospital Vocational) Retired Due To Age   Leisure Interests Unable To Determine At This Time   Prior Functioning: Everyday Activities   Self Care Independent   Functional Cognition Needed some help   Vitals   O2 Delivery Device None - Room Air   Pain 0 - 10 Group   Therapist Pain Assessment 0   Cognition    Cognition / Consciousness X   Speech/ Communication Hard of Hearing  "  Orientation Level Not Oriented to Day   Level of Consciousness Alert   New Learning Impaired   ABS (Agitated Behavior Scale)   Agitated Behavior Scale Performed No   Cognitive Pattern Assessment   Cognitive Pattern Assessment Used BIMS   Brief Interview for Mental Status (BIMS)   Repetition of Three Words (First Attempt) 3   Temporal Orientation: Year Correct   Temporal Orientation: Month Accurate within 5 days   Temporal Orientation: Day Incorrect   Recall: \"Sock\" No, could not recall   Recall: \"Blue\" No, could not recall   Recall: \"Bed\" Yes, no cue required   BIMS Summary Score 10   Confusion Assessment Method (CAM)   Is there evidence of an acute change in mental status from the patient's baseline? Yes   Inattention Behavior not present   Disorganized thinking Behavior not present   Altered level of consciousness Behavior not present   Vision Screen   Vision Not tested   Passive ROM Upper Body   Passive ROM Upper Body WDL   Active ROM Upper Body   Active ROM Upper Body  WDL   Dominant Hand Right   Strength Upper Body   Upper Body Strength  X   Gross Strength Generalized Weakness, Equal Bilaterally.    Sensation Upper Body   Upper Extremity Sensation  Not Tested   Upper Body Muscle Tone   Upper Body Muscle Tone  Not Tested   Balance Assessment   Sitting Balance (Static) Good   Sitting Balance (Dynamic) Fair +   Standing Balance (Static) Fair -   Standing Balance (Dynamic) Poor +   Weight Shift Sitting Fair   Weight Shift Standing Fair   Bed Mobility    Supine to Sit Minimal Assist   Sit to Stand Contact Guard Assist   Scooting Contact Guard Assist   Coordination Upper Body   Coordination WDL   Eating   Assistance Needed Set-up / clean-up   CARE Score - Eating 5   Eating Discharge Goal   Discharge Goal 6   Oral Hygiene   Assistance Needed Set-up / clean-up;Supervision   CARE Score - Oral Hygiene 4   Oral Hygiene Discharge Goal   Discharge Goal 6   Shower/Bathe Self   Assistance Needed Set-up / " clean-up;Supervision;Verbal cues;Adaptive equipment   CARE Score - Shower/Bathe Self 4   Shower/Bathe Self Discharge Goal   Discharge Goal 6   Upper Body Dressing   Reason if not Attempted Environmental limitations  (pt did not have a shirt)   CARE Score - Upper Body Dressing 10   Upper Body Dressing Discharge Goal   Discharge Goal 4   Lower Body Dressing   Assistance Needed Physical assistance   Physical Assistance Level 26%-50%   CARE Score - Lower Body Dressing 3   Lower Body Dressing Discharge Goal   Discharge Goal 4   Putting On/Taking Off Footwear   Assistance Needed Physical assistance   Physical Assistance Level 26%-50%   CARE Score - Putting On/Taking Off Footwear 3   Putting On/Taking Off Footwear Discharge Goal   Discharge Goal 6   Toileting Hygiene   Assistance Needed Set-up / clean-up;Supervision;Verbal cues   CARE Score - Toileting Hygiene 4   Toileting Hygiene Discharge Goal   Discharge Goal 6   Toilet Transfer   Assistance Needed Set-up / clean-up;Supervision;Adaptive equipment   CARE Score - Toilet Transfer 4   Toilet Transfer Discharge Goal   Discharge Goal 6   Hearing, Speech, and Vision   Ability to Hear Highly impaired   Ability to See in Adequate Light Adequate   Expression of Ideas and Wants Without difficulty   Understanding Verbal and Non-Verbal Content Sometimes understands   Functional Level of Assist   Eating Supervision   Eating Description Set-up of equipment or meal/tube feeding   Grooming Standby Assist;Seated   Grooming Description Supervision for safety   Bathing Standby Assist   Bathing Description Grab bar;Hand held shower;Tub bench;Set-up of equipment;Supervision for safety;Verbal cueing   Upper Body Dressing   (n/t as patient did not have a shirt available)   Lower Body Dressing Moderate Assist   Lower Body Dressing Description Grab bar;Set-up of equipment;Supervision for safety;Verbal cueing  (assist w/ 5/10 tasks to don/doff socks, don pullup and pants)   Toileting Standby  Assist   Toileting Description Grab bar;Set-up of equipment;Supervision for safety   Bed, Chair, Wheelchair Transfer Contact Guard Assist   Bed Chair Wheelchair Transfer Description Set-up of equipment;Supervision for safety;Verbal cueing  (CGA SPT bed to w/c without AD)   Toilet Transfers Standby Assist   Toilet Transfer Description Grab bar;Set-up of equipment;Supervision for safety;Verbal cueing   Tub / Shower Transfers Standby Assist   Tub Shower Transfer Description Grab bar;Shower bench;Set-up of equipment;Supervision for safety;Verbal cueing   Problem List   Problem List Decreased Active Daily Living Skills;Decreased Homemaking Skills;Decreased Upper Extremity Strength Right;Decreased Upper Extremity Strength Left;Decreased Functional Mobility;Decreased Activity Tolerance;Safety Awareness Deficits / Cognition;Impaired Cognitive Function;Impaired Postural Control / Balance;Other (Comments)  ([place])   Precautions   Precautions Fall Risk   Comments Very [place]   Current Discharge Plan   Current Discharge Plan Return to Prior Living Situation   Benefit    Therapy Benefit Patient Would Benefit from Inpatient Rehab Occupational Therapy to Maximize [place] with ADLs, IADLs and Functional Mobility.   Interdisciplinary Plan of Care Collaboration   Patient Position at End of Therapy Seated;Chair Alarm On;Self Releasing Lap Belt Applied;Call Light within Reach;Tray Table within Reach;Phone within Reach   Equipment Needs   Assistive Device / DME Parallel Bars;Front-Wheel Walker;Wheelchair;Shower Chair;Grab Bars In Shower / Tub;Grab Bars By Toilet   Adaptive Equipment None   Strengths & Barriers   Strengths Able to follow instructions;Alert and oriented;Independent prior level of function;Manages pain appropriately;Motivated for self care and independence;Pleasant and cooperative;Supportive family;Willingly participates in therapeutic activities   Barriers Decreased endurance;Fatigue;Generalized weakness;Home  accessibility;Hypertension;Impaired activity tolerance;Impaired balance;Impaired functional cognition;Limited mobility  (Hard of hearing)       Assessment  Patient is 88 y.o. male with a diagnosis of debility secondary to TIA.  Additional factors influencing patient status / progress (ie: cognitive factors, co-morbidities, social support, etc): history of CVA, very [place], HTN, history of cognitive impairment, lives with spouse.      Plan  Recommend Occupational Therapy  minutes per day 5-7 days per week for 10-14 days for the following treatments:  OT Group Therapy, OT Self Care/ADL, OT Cognitive Skill Dev, OT Community Reintegration, OT Manual Ther Technique, OT Neuro Re-Ed/Balance, OT Sensory Int Techniques, OT Therapeutic Activity, OT Evaluation, and OT Therapeutic Exercise.    Passport items to be completed:  Perform bathroom transfers, complete dressing, complete feeding, get ready for the day, prepare a simple meal, participate in household tasks, adapt home for safety needs, demonstrate home exercise program, complete caregiver training     Goals:  Long term and short term goals have been discussed with patient and they are in agreement.    Occupational Therapy Goals (Active)       Problem: Dressing       Dates: Start: 12/03/22         Goal: STG-Within one week, patient will dress LB with min A       Dates: Start: 12/03/22               Problem: Functional Transfers       Dates: Start: 12/03/22         Goal: STG-Within one week, patient will transfer to toilet supervised with grab bar       Dates: Start: 12/03/22               Problem: OT Long Term Goals       Dates: Start: 12/03/22         Goal: LTG-By discharge, patient will complete basic self care tasks with supervision using AE/AD       Dates: Start: 12/03/22            Goal: LTG-By discharge, patient will perform bathroom transfers with supervision using AE/AD       Dates: Start: 12/03/22               Problem: Toileting       Dates: Start: 12/03/22          Goal: STG-Within one week, patient will complete toileting tasks with SBA       Dates: Start: 12/03/22

## 2022-12-03 NOTE — CARE PLAN
The patient is Stable - Low risk of patient condition declining or worsening    Shift Goals  Clinical Goals: skin integrity, monitor s/s bleeding  Patient Goals: rest    Progress made toward(s) clinical / shift goals:  No s/s of bleeding during shift. Skin is fragile and is CDI at this time. Denies any pain or discomfort.     Problem: Bladder / Voiding  Goal: Patient will establish and maintain regular urinary output  Outcome: Progressing     Problem: Mobility  Goal: Patient's capacity to carry out activities will improve  Outcome: Progressing     Problem: Nutrition  Goal: Patient's nutritional and fluid intake will be adequate or improve  Outcome: Progressing

## 2022-12-04 PROCEDURE — 99233 SBSQ HOSP IP/OBS HIGH 50: CPT | Performed by: PHYSICAL MEDICINE & REHABILITATION

## 2022-12-04 PROCEDURE — A9270 NON-COVERED ITEM OR SERVICE: HCPCS | Performed by: PHYSICAL MEDICINE & REHABILITATION

## 2022-12-04 PROCEDURE — 700102 HCHG RX REV CODE 250 W/ 637 OVERRIDE(OP): Performed by: PHYSICAL MEDICINE & REHABILITATION

## 2022-12-04 PROCEDURE — 770010 HCHG ROOM/CARE - REHAB SEMI PRIVAT*

## 2022-12-04 RX ADMIN — OMEPRAZOLE 20 MG: 20 CAPSULE, DELAYED RELEASE ORAL at 08:34

## 2022-12-04 RX ADMIN — CLOPIDOGREL BISULFATE 75 MG: 75 TABLET ORAL at 08:34

## 2022-12-04 RX ADMIN — ATORVASTATIN CALCIUM 40 MG: 40 TABLET, FILM COATED ORAL at 21:12

## 2022-12-04 RX ADMIN — TAMSULOSIN HYDROCHLORIDE 0.4 MG: 0.4 CAPSULE ORAL at 21:12

## 2022-12-04 RX ADMIN — QUETIAPINE FUMARATE 100 MG: 100 TABLET ORAL at 21:12

## 2022-12-04 RX ADMIN — LORATADINE 10 MG: 10 TABLET ORAL at 08:34

## 2022-12-04 RX ADMIN — SENNOSIDES AND DOCUSATE SODIUM 2 TABLET: 50; 8.6 TABLET ORAL at 08:34

## 2022-12-04 RX ADMIN — ASPIRIN 81 MG: 81 TABLET, COATED ORAL at 08:34

## 2022-12-04 SDOH — ECONOMIC STABILITY: TRANSPORTATION INSECURITY
IN THE PAST 12 MONTHS, HAS LACK OF RELIABLE TRANSPORTATION KEPT YOU FROM MEDICAL APPOINTMENTS, MEETINGS, WORK OR FROM GETTING THINGS NEEDED FOR DAILY LIVING?: NO

## 2022-12-04 SDOH — ECONOMIC STABILITY: TRANSPORTATION INSECURITY
IN THE PAST 12 MONTHS, HAS THE LACK OF TRANSPORTATION KEPT YOU FROM MEDICAL APPOINTMENTS OR FROM GETTING MEDICATIONS?: NO

## 2022-12-04 ASSESSMENT — FIBROSIS 4 INDEX: FIB4 SCORE: 2.31

## 2022-12-04 ASSESSMENT — PAIN DESCRIPTION - PAIN TYPE
TYPE: ACUTE PAIN
TYPE: ACUTE PAIN

## 2022-12-04 NOTE — DISCHARGE PLANNING
CASE MANAGEMENT INITIAL ASSESSMENT    Admit Date:  12/2/2022     Case Management has reviewed the medical chart and will meet with patient and family to discuss role of case management / discharge planning / team conference.     Patient is a  88 y.o. male transferred from [facility].    Attending physician: Dr Barkley   PCP: Candida AGUILA     Diagnosis: Left carotid stenosis [I65.22]  Carotid stenosis, left [I65.22]  Transient ischemic attack (TIA) [G45.9]    Co-morbidities:   Patient Active Problem List    Diagnosis Date Noted    Transient ischemic attack (TIA) 12/02/2022    Debility 12/02/2022    Hypotension 12/02/2022    Gross hematuria 11/29/2022    Uvulitis 11/26/2022    Primary hypertension 11/26/2022    Left carotid stenosis 11/23/2022    Dysarthria 11/23/2022    Late effect of stroke 05/24/2021    Impaired mobility and ADLs 04/23/2021    History of CVA (cerebrovascular accident) 04/13/2021    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    Hard of hearing 04/06/2021    Sinus bradycardia 04/06/2021    Anemia 04/06/2021    S/P carotid endarterectomy 04/06/2021    ANA LAURA (acute kidney injury) ([facility]) 04/03/2021    Acute CVA (cerebrovascular accident) ([facility]) 03/31/2021    Carotid stenosis, bilateral 03/31/2021    Do not intubate, cardiopulmonary resuscitation (CPR)-only code status 03/31/2021    Rectus diastasis 08/04/2011    Hydrocele, left 08/04/2011    BPH (benign prostatic hyperplasia) 06/29/2010    Arthritis 06/29/2010    Skin cancer 09/04/2009    Mixed hyperlipidemia 09/04/2009    HYDROCELE 09/04/2009     Prior Living Situation:  Housing / Facility: 1 Story House  Lives with - Patient's Self Care Capacity: Spouse    Prior Level of Function:  Medication Management: Requires Assist  Finances: Requires Assist  Home Management: Requires Assist  Shopping: Dependent  Prior Level Of Mobility: Independent Without Device in Home  Driving / Transportation: Unable To Determine At This Time    Support Systems:  Primary Contact  Person: Cynthia Harvey (spouse) [phone], Constantin (daughter) [phone]    Previous Services Utilized:   Equipment Owned: Front-Wheel Walker, Grab Bar(s) In Tub / Shower, Grab Bar(s) By Toilet  Prior Services: Intermittent Physical Support for ADL Per Service    Other Information:  Occupation (Pre-Hospital Vocational): Retired Due To Age  Primary Payor Source: Medicare A, Medicare B  Secondary Payor Source: Other (Comments) (aarp)  Primary Care Practitioner : Candida GRADY  Other MDs: Dr. Romero    Patient / Family Goal:  Patient / Family Goal: CM will meet with patient and family to discuss goals    Plan:  1. Continue to follow patient through hospitalization and provide discharge planning in collaboration with patient, family, physicians and ancillary services.     2. Utilize community resources to ensure a safe discharge.

## 2022-12-04 NOTE — CARE PLAN
The patient is Stable - Low risk of patient condition declining or worsening    Shift Goals  Clinical Goals: monitor bleeding, mobility  Patient Goals: rest    Progress made toward(s) clinical / shift goals:      Patient is not progressing towards the following goals:      Problem: Knowledge Deficit - Standard  Goal: Patient and family/care givers will demonstrate understanding of plan of care, disease process/condition, diagnostic tests and medications  Outcome: Not Met  Patient needs reorientation and explanation. A+O x person and place.     Problem: Bladder / Voiding  Goal: Patient will establish and maintain regular urinary output  Outcome: Not Progressing   Hematuria continuing, jones catheter in place.      Problem: Discharge Barriers/Planning  Goal: Patient's continuum of care needs are met  Outcome: Progressing     Problem: Psychosocial  Goal: Patient's ability to verbalize feelings about condition will improve  Outcome: Progressing     Problem: Neurogenic Bladder  Goal: Patient will demonstrate ability to take care of indwelling catheter  Outcome: Progressing     Problem: Skin Integrity  Goal: Patient's skin integrity will be maintained or improve  Outcome: Progressing     Problem: Mobility  Goal: Patient's capacity to carry out activities will improve  Outcome: Progressing     Problem: Skin Integrity  Goal: Skin integrity is maintained or improved  Outcome: Progressing

## 2022-12-04 NOTE — CARE PLAN
The patient is Stable - Low risk of patient condition declining or worsening    Shift Goals  Clinical Goals: Monitor Bhatia cath  Patient Goals: Rest  Family Goals: \    Progress made toward(s) clinical / shift goals:  STAT lock adjusted    Patient is not progressing towards the following goals:      Problem: Neurogenic Bladder  Goal: Patient will demonstrate ability to take care of indwelling catheter  Outcome: Not Progressing  Note: Indwelling catheter cared for by staff. Routine cleaning and adjustment of STAT lock completed today. Urine is brown/red.     Problem: Mobility  Goal: Patient's capacity to carry out activities will improve  Outcome: Progressing  Note: Patient safely transferred in and out of bed and on and off the toilet with staff assistance.

## 2022-12-04 NOTE — PROGRESS NOTES
Rehab Progress Note     Encounter Date: 12/3/2022     CC: Debility    Interval Events (Subjective)  Traumatic catheterization.  Bhatia in place.    Objective:  VITAL SIGNS:   Vitals:    12/02/22 1946 12/03/22 0625 12/03/22 1330 12/03/22 1835   BP: 138/77 123/64 119/71 125/88   Pulse: 88 77 87 69   Resp: 20 18 20 18   Temp: 37.1 °C (98.8 °F) 36.6 °C (97.9 °F) 37.1 °C (98.8 °F) 36.9 °C (98.4 °F)   TempSrc: Oral Oral Oral Oral   SpO2: 93% 92% 93% 94%   Weight:       Height:             Constitutional: No apparent distress  Cardiovascular: Regular rate and rhythm, normal S1/S2, no murmurs.  Thorax & Lungs: Clear to auscultation bilaterally   Abdomen: soft, non-tender, non-distended      Recent Results (from the past 72 hour(s))   CBC WITHOUT DIFFERENTIAL    Collection Time: 12/01/22  3:32 AM   Result Value Ref Range    WBC 11.9 (H) 4.8 - 10.8 K/uL    RBC 4.09 (L) 4.70 - 6.10 M/uL    Hemoglobin 12.9 (L) 14.0 - 18.0 g/dL    Hematocrit 38.1 (L) 42.0 - 52.0 %    MCV 93.2 81.4 - 97.8 fL    MCH 31.5 27.0 - 33.0 pg    MCHC 33.9 33.7 - 35.3 g/dL    RDW 45.9 35.9 - 50.0 fL    Platelet Count 184 164 - 446 K/uL    MPV 10.7 9.0 - 12.9 fL   Comp Metabolic Panel    Collection Time: 12/01/22  3:32 AM   Result Value Ref Range    Sodium 139 135 - 145 mmol/L    Potassium 3.9 3.6 - 5.5 mmol/L    Chloride 107 96 - 112 mmol/L    Co2 21 20 - 33 mmol/L    Anion Gap 11.0 7.0 - 16.0    Glucose 106 (H) 65 - 99 mg/dL    Bun 34 (H) 8 - 22 mg/dL    Creatinine 1.34 0.50 - 1.40 mg/dL    Calcium 8.4 (L) 8.5 - 10.5 mg/dL    AST(SGOT) 37 12 - 45 U/L    ALT(SGPT) 33 2 - 50 U/L    Alkaline Phosphatase 83 30 - 99 U/L    Total Bilirubin 0.7 0.1 - 1.5 mg/dL    Albumin 3.3 3.2 - 4.9 g/dL    Total Protein 5.9 (L) 6.0 - 8.2 g/dL    Globulin 2.6 1.9 - 3.5 g/dL    A-G Ratio 1.3 g/dL   PROCALCITONIN    Collection Time: 12/01/22  3:32 AM   Result Value Ref Range    Procalcitonin 0.16 <0.25 ng/mL   ESTIMATED GFR    Collection Time: 12/01/22  3:32 AM   Result Value  Ref Range    GFR (CKD-EPI) 51 (A) >60 mL/min/1.73 m 2   LACTIC ACID    Collection Time: 12/01/22  5:58 AM   Result Value Ref Range    Lactic Acid 0.9 0.5 - 2.0 mmol/L   LACTIC ACID    Collection Time: 12/01/22 10:19 AM   Result Value Ref Range    Lactic Acid 1.6 0.5 - 2.0 mmol/L   LACTIC ACID    Collection Time: 12/01/22  2:46 PM   Result Value Ref Range    Lactic Acid 2.4 (H) 0.5 - 2.0 mmol/L   CBC WITHOUT DIFFERENTIAL    Collection Time: 12/02/22 12:30 AM   Result Value Ref Range    WBC 7.3 4.8 - 10.8 K/uL    RBC 3.79 (L) 4.70 - 6.10 M/uL    Hemoglobin 11.9 (L) 14.0 - 18.0 g/dL    Hematocrit 35.4 (L) 42.0 - 52.0 %    MCV 93.4 81.4 - 97.8 fL    MCH 31.4 27.0 - 33.0 pg    MCHC 33.6 (L) 33.7 - 35.3 g/dL    RDW 46.1 35.9 - 50.0 fL    Platelet Count 168 164 - 446 K/uL    MPV 11.1 9.0 - 12.9 fL   Comp Metabolic Panel    Collection Time: 12/02/22 12:30 AM   Result Value Ref Range    Sodium 138 135 - 145 mmol/L    Potassium 3.8 3.6 - 5.5 mmol/L    Chloride 106 96 - 112 mmol/L    Co2 24 20 - 33 mmol/L    Anion Gap 8.0 7.0 - 16.0    Glucose 115 (H) 65 - 99 mg/dL    Bun 38 (H) 8 - 22 mg/dL    Creatinine 1.08 0.50 - 1.40 mg/dL    Calcium 8.4 (L) 8.5 - 10.5 mg/dL    AST(SGOT) 30 12 - 45 U/L    ALT(SGPT) 28 2 - 50 U/L    Alkaline Phosphatase 85 30 - 99 U/L    Total Bilirubin 0.3 0.1 - 1.5 mg/dL    Albumin 3.2 3.2 - 4.9 g/dL    Total Protein 5.7 (L) 6.0 - 8.2 g/dL    Globulin 2.5 1.9 - 3.5 g/dL    A-G Ratio 1.3 g/dL   MAGNESIUM    Collection Time: 12/02/22 12:30 AM   Result Value Ref Range    Magnesium 2.1 1.5 - 2.5 mg/dL   ESTIMATED GFR    Collection Time: 12/02/22 12:30 AM   Result Value Ref Range    GFR (CKD-EPI) 66 >60 mL/min/1.73 m 2   LACTIC ACID    Collection Time: 12/02/22  3:17 AM   Result Value Ref Range    Lactic Acid 0.7 0.5 - 2.0 mmol/L   COV-2, FLU A/B, AND RSV BY PCR (2-4 HOURS CEPHEID): Collect NP swab in VTM    Collection Time: 12/02/22  4:05 PM    Specimen: Respirate   Result Value Ref Range    Influenza virus  A RNA Negative Negative    Influenza virus B, PCR Negative Negative    RSV, PCR Negative Negative    SARS-CoV-2 by PCR NotDetected     SARS-CoV-2 Source NP Swab    CBC with Differential    Collection Time: 12/03/22  6:09 AM   Result Value Ref Range    WBC 6.8 4.8 - 10.8 K/uL    RBC 4.08 (L) 4.70 - 6.10 M/uL    Hemoglobin 12.7 (L) 14.0 - 18.0 g/dL    Hematocrit 38.0 (L) 42.0 - 52.0 %    MCV 93.1 81.4 - 97.8 fL    MCH 31.1 27.0 - 33.0 pg    MCHC 33.4 (L) 33.7 - 35.3 g/dL    RDW 45.1 35.9 - 50.0 fL    Platelet Count 202 164 - 446 K/uL    MPV 10.7 9.0 - 12.9 fL    Neutrophils-Polys 74.90 (H) 44.00 - 72.00 %    Lymphocytes 9.80 (L) 22.00 - 41.00 %    Monocytes 9.80 0.00 - 13.40 %    Eosinophils 4.60 0.00 - 6.90 %    Basophils 0.60 0.00 - 1.80 %    Immature Granulocytes 0.30 0.00 - 0.90 %    Nucleated RBC 0.00 /100 WBC    Neutrophils (Absolute) 5.06 1.82 - 7.42 K/uL    Lymphs (Absolute) 0.66 (L) 1.00 - 4.80 K/uL    Monos (Absolute) 0.66 0.00 - 0.85 K/uL    Eos (Absolute) 0.31 0.00 - 0.51 K/uL    Baso (Absolute) 0.04 0.00 - 0.12 K/uL    Immature Granulocytes (abs) 0.02 0.00 - 0.11 K/uL    NRBC (Absolute) 0.00 K/uL   Comp Metabolic Panel (CMP)    Collection Time: 12/03/22  6:09 AM   Result Value Ref Range    Sodium 140 135 - 145 mmol/L    Potassium 4.1 3.6 - 5.5 mmol/L    Chloride 108 96 - 112 mmol/L    Co2 24 20 - 33 mmol/L    Anion Gap 8.0 7.0 - 16.0    Glucose 102 (H) 65 - 99 mg/dL    Bun 27 (H) 8 - 22 mg/dL    Creatinine 0.97 0.50 - 1.40 mg/dL    Calcium 8.6 8.5 - 10.5 mg/dL    AST(SGOT) 28 12 - 45 U/L    ALT(SGPT) 28 2 - 50 U/L    Alkaline Phosphatase 77 30 - 99 U/L    Total Bilirubin 0.4 0.1 - 1.5 mg/dL    Albumin 3.2 3.2 - 4.9 g/dL    Total Protein 5.9 (L) 6.0 - 8.2 g/dL    Globulin 2.7 1.9 - 3.5 g/dL    A-G Ratio 1.2 g/dL   Magnesium    Collection Time: 12/03/22  6:09 AM   Result Value Ref Range    Magnesium 2.1 1.5 - 2.5 mg/dL   ESTIMATED GFR    Collection Time: 12/03/22  6:09 AM   Result Value Ref Range    GFR  (CKD-EPI) 75 >60 mL/min/1.73 m 2       Current Facility-Administered Medications   Medication Frequency    hydrOXYzine HCl (ATARAX) tablet 50 mg Q6HRS PRN    melatonin tablet 3 mg HS PRN    Respiratory Therapy Consult Continuous RT    acetaminophen (Tylenol) tablet 650 mg Q4HRS PRN    lactulose 20 GM/30ML solution 30 mL QDAY PRN    docusate sodium (ENEMEEZ) enema 283 mg QDAY PRN    omeprazole (PRILOSEC) capsule 20 mg QAM AC    mag hydrox-al hydrox-simeth (MAALOX PLUS ES or MYLANTA DS) suspension 20 mL Q2HRS PRN    ondansetron (ZOFRAN ODT) dispertab 4 mg 4X/DAY PRN    Or    ondansetron (ZOFRAN) syringe/vial injection 4 mg 4X/DAY PRN    traZODone (DESYREL) tablet 50 mg QHS PRN    sodium chloride (OCEAN) 0.65 % nasal spray 2 Spray PRN    aspirin EC (ECOTRIN) tablet 81 mg DAILY    atorvastatin (LIPITOR) tablet 40 mg Q EVENING    clopidogrel (PLAVIX) tablet 75 mg DAILY    loratadine (CLARITIN) tablet 10 mg DAILY    psyllium (METAMUCIL/KONSYL) 1 Packet BID    QUEtiapine (Seroquel) tablet 100 mg Nightly    senna-docusate (PERICOLACE or SENOKOT S) 8.6-50 MG per tablet 2 Tablet BID    And    polyethylene glycol/lytes (MIRALAX) PACKET 1 Packet QDAY PRN    And    magnesium hydroxide (MILK OF MAGNESIA) suspension 30 mL QDAY PRN    And    bisacodyl (DULCOLAX) suppository 10 mg QDAY PRN    tamsulosin (FLOMAX) capsule 0.4 mg QHS       Orders Placed This Encounter   Procedures    Diet Order Diet: Cardiac     Standing Status:   Standing     Number of Occurrences:   1     Order Specific Question:   Diet:     Answer:   Cardiac [6]         Intake/Output Summary (Last 24 hours) at 12/3/2022 2330  Last data filed at 12/3/2022 1300  Gross per 24 hour   Intake 180 ml   Output 600 ml   Net -420 ml         Assessment:  Active Hospital Problems    Diagnosis     *Transient ischemic attack (TIA)     Debility     Hypotension     Gross hematuria     Uvulitis     Impaired mobility and ADLs     History of CVA (cerebrovascular accident)     Aneurysm  of middle cerebral artery     Hard of hearing     S/P carotid endarterectomy     BPH (benign prostatic hyperplasia)     Mixed hyperlipidemia        Medical Decision Making and Plan:  Debility with history of encephalopathy, sundowning, history of stroke  Continue comprehensive inpatient rehabilitation with physical therapy, Occupational Therapy, speech therapy  Seroquel nightly for now.  Consider tapering in the future  Labs reviewed    Carotid stenosis status post left stent 11/25 by Dr. Zheng  Continue aspirin Plavix    Uveitis completed Augmentin    BPH  Continue Flomax    Hematuria from traumatic catheter point.  Continue Bhatia for now.  Plan to remove Bhatia when hematuria clears.  Monitor hemoglobin.    Neurogenic bowel. Constipation  Continue Metamucil, bowel program with senna, MiraLAX, milk of magnesia, Dulcolax    Anemia stable    Prerenal azotemia.  Renal function improved.  GFR 51-> 66      Total time:  35 minutes.  I spent greater than 50% of the time for patient care and coordination on this date, including unit/floor time, and face-to-face time with the patient as per assessment and plan above.    Gomez Nix M.D.  Physical medicine rehabilitation  [facility]

## 2022-12-05 PROCEDURE — 97129 THER IVNTJ 1ST 15 MIN: CPT

## 2022-12-05 PROCEDURE — A9270 NON-COVERED ITEM OR SERVICE: HCPCS | Performed by: PHYSICAL MEDICINE & REHABILITATION

## 2022-12-05 PROCEDURE — 97130 THER IVNTJ EA ADDL 15 MIN: CPT

## 2022-12-05 PROCEDURE — 97530 THERAPEUTIC ACTIVITIES: CPT | Mod: CQ

## 2022-12-05 PROCEDURE — 770010 HCHG ROOM/CARE - REHAB SEMI PRIVAT*

## 2022-12-05 PROCEDURE — 97116 GAIT TRAINING THERAPY: CPT | Mod: CQ

## 2022-12-05 PROCEDURE — 97535 SELF CARE MNGMENT TRAINING: CPT | Mod: CO

## 2022-12-05 PROCEDURE — 97110 THERAPEUTIC EXERCISES: CPT | Mod: CQ

## 2022-12-05 PROCEDURE — 99233 SBSQ HOSP IP/OBS HIGH 50: CPT | Performed by: PHYSICAL MEDICINE & REHABILITATION

## 2022-12-05 PROCEDURE — 700102 HCHG RX REV CODE 250 W/ 637 OVERRIDE(OP): Performed by: PHYSICAL MEDICINE & REHABILITATION

## 2022-12-05 RX ORDER — QUETIAPINE FUMARATE 25 MG/1
75 TABLET, FILM COATED ORAL NIGHTLY
Status: DISCONTINUED | OUTPATIENT
Start: 2022-12-05 | End: 2022-12-06

## 2022-12-05 RX ADMIN — OMEPRAZOLE 20 MG: 20 CAPSULE, DELAYED RELEASE ORAL at 08:40

## 2022-12-05 RX ADMIN — ASPIRIN 81 MG: 81 TABLET, COATED ORAL at 08:40

## 2022-12-05 RX ADMIN — TAMSULOSIN HYDROCHLORIDE 0.4 MG: 0.4 CAPSULE ORAL at 20:21

## 2022-12-05 RX ADMIN — LORATADINE 10 MG: 10 TABLET ORAL at 08:40

## 2022-12-05 RX ADMIN — CLOPIDOGREL BISULFATE 75 MG: 75 TABLET ORAL at 08:40

## 2022-12-05 RX ADMIN — Medication 1 PACKET: at 20:22

## 2022-12-05 RX ADMIN — QUETIAPINE FUMARATE 75 MG: 25 TABLET ORAL at 20:22

## 2022-12-05 RX ADMIN — ATORVASTATIN CALCIUM 40 MG: 40 TABLET, FILM COATED ORAL at 20:22

## 2022-12-05 ASSESSMENT — PAIN DESCRIPTION - PAIN TYPE
TYPE: ACUTE PAIN
TYPE: ACUTE PAIN

## 2022-12-05 ASSESSMENT — ACTIVITIES OF DAILY LIVING (ADL)
TUB_SHOWER_TRANSFER_DESCRIPTION: GRAB BAR
BED_CHAIR_WHEELCHAIR_TRANSFER_DESCRIPTION: OTHER (COMMENT)
TOILET_TRANSFER_DESCRIPTION: SUPERVISION FOR SAFETY;VERBAL CUEING;INCREASED TIME;GRAB BAR

## 2022-12-05 ASSESSMENT — GAIT ASSESSMENTS
ASSISTIVE DEVICE: FRONT WHEEL WALKER
DEVIATION: DECREASED BASE OF SUPPORT;BRADYKINETIC;DECREASED HEEL STRIKE;DECREASED TOE OFF
GAIT LEVEL OF ASSIST: CONTACT GUARD ASSIST
DISTANCE (FEET): 120

## 2022-12-05 NOTE — CARE PLAN
The patient is Stable - Low risk of patient condition declining or worsening    Shift Goals  Clinical Goals: Monitor I&Os  Patient Goals: Safety  Family Goals: \    Progress made toward(s) clinical / shift goals:    Problem: Knowledge Deficit - Standard  Goal: Patient and family/care givers will demonstrate understanding of plan of care, disease process/condition, diagnostic tests and medications  Outcome: Progressing  Note: Patient educated on the POC and medications administered     Problem: Skin Integrity  Goal: Patient's skin integrity will be maintained or improve  12/5/2022 1529 by nAa Patterson R.N.  Outcome: Progressing  Note: Bhatia properly secured to leg, cleansed with soap and water.       Patient is not progressing towards the following goals:

## 2022-12-05 NOTE — THERAPY
Physical Therapy   Daily Treatment     Patient Name: Cooper Harvey  Age:  88 y.o., Sex:  male  Medical Record #: [id number]  Today's Date: 12/5/2022     Precautions  Precautions: Fall Risk  Comments: very [place]  hearing aids .    Subjective    Pt seated in wc upon arrival, agreeable to PT session     Objective    [id number]     12/05/22 1031   PT Charge Group   PT Gait Training 1   PT Therapeutic Exercise 1   PT Therapeutic Activities 2   Supervising Physical Therapist Vikram Ingram   PT Total Time Spent   PT Individual Total Time Spent (Mins) 60   Gait Functional Level of Assist    Gait Level Of Assist Contact Guard Assist   Assistive Device Front Wheel Walker   Distance (Feet) 120   # of Times Distance was Traveled 3   Deviation Decreased Base Of Support;Bradykinetic;Decreased Heel Strike;Decreased Toe Off   Transfer Functional Level of Assist   Bed, Chair, Wheelchair Transfer Minimal Assist   Bed Chair Wheelchair Transfer Description Other (comment)  (HHA EOB > wc)   Toilet Transfers Standby Assist   Toilet Transfer Description Supervision for safety;Verbal cueing;Increased time;Grab bar   Sitting Lower Body Exercises   Sitting Lower Body Exercises Yes   Nustep Resistance Level 3;Time (See Comments)  (8' B LE/UE for CV endurance)   Bed Mobility    Supine to Sit Minimal Assist   Sit to Stand Minimal Assist   Interdisciplinary Plan of Care Collaboration   IDT Collaboration with  Family / Caregiver;Certified O.T. Assistant  ([employer])   Patient Position at End of Therapy Seated;Self Releasing Lap Belt Applied;Tray Table within Reach;Phone within Reach;Family / Friend in Room   Collaboration Comments wife/son present for most of PT session         [id number]     12/05/22 1301   PT Charge Group   PT Gait Training 1   PT Therapeutic Exercise 1   Supervising Physical Therapist Vikram Ingram   PT Total Time Spent   PT Individual Total Time Spent (Mins) 30   Stairs Functional Level of Assist   Level of Assist with Stairs  "Minimal Assist   # of Stairs Climbed 12   Stairs Description Extra time;Hand rails;Limited by fatigue;Supervision for safety;Verbal cueing;Safety concerns  (reciprocal)   Standing Lower Body Exercises   Standing Lower Body Exercises Yes   Hamstring Curl 1 set of 10   Hip Flexion 1 set of 10   Marching 1 set of 10   Heel Rise 1 set of 10   Toe Rise 1 set of 10   Mini Squat Partial;1 set of 10   Comments B UE support on //   Bed Mobility    Sit to Supine Standby Assist   Interdisciplinary Plan of Care Collaboration   Patient Position at End of Therapy In Bed;Call Light within Reach;Tray Table within Reach     6\" curb step with FWW CGA x 2 trials    Reach pivot transfer with hsp bed rail CGA    Assessment    The patient tolerated the session well with focus on LE strength and mobility.  Pt tolerated an increased gait distance and less physical assistance this session, gait quality will improve with shoes  Pt presents with decreased activity tolerance, requires cues to initiate seated rest breaks and for breathing in through the nose as opposed to mouth breathing.  Strengths: Able to follow instructions, Effective communication skills, Independent prior level of function, Manages pain appropriately, Motivated for self care and independence, Pleasant and cooperative, Supportive family, Willingly participates in therapeutic activities  Barriers: Decreased endurance, Fatigue, Generalized weakness, Home accessibility, Impaired activity tolerance, Impaired balance, Limited mobility    Plan      Cont gait training FWW, standing balance, LE strengthening, consecutive stairs,     Physical Therapy Problems (Active)       Problem: Mobility       Dates: Start: 12/03/22         Goal: STG-Within one week, patient will ambulate community distances x 100 feet with FWW and CGA       Dates: Start: 12/03/22            Goal: STG-Within one week, patient will ascend and descend four to six stairs with BHR and CGA       Dates: Start: " 12/03/22               Problem: Mobility Transfers       Dates: Start: 12/03/22         Goal: STG-Within one week, patient will transfer bed to chair with FWW and CGA       Dates: Start: 12/03/22               Problem: PT-Long Term Goals       Dates: Start: 12/03/22         Goal: LTG-By discharge, patient will ambulate x 150 feet with FWW and SPV       Dates: Start: 12/03/22            Goal: LTG-By discharge, patient will transfer one surface to another with FWW and SPV       Dates: Start: 12/03/22            Goal: LTG-By discharge, patient will ambulate up/down 4-6 stairs with one HR and SBA       Dates: Start: 12/03/22            Goal: LTG-By discharge, patient will transfer in/out of a car with FWW and SBA       Dates: Start: 12/03/22

## 2022-12-05 NOTE — PROGRESS NOTES
Rehab Progress Note     Encounter Date: 12/4/2022     CC: Debility    Interval Events (Subjective)  Previous traumatic catheterization with hematuria.  Bhatia in place.  Monitor for now.  Patient with family with wife and son Ion in the room with him today.  He does have his hearing aids but still has difficulty hearing.  This required loud and slow communication.  Patient denies pain.  Denies insomnia.  He notes that he is sleeping well.        Objective:  VITAL SIGNS:   Vitals:    12/03/22 1835 12/04/22 0637 12/04/22 1345 12/04/22 1835   BP: 125/88 112/65 115/64 (!) 141/73   Pulse: 69 76 91 (!) 101   Resp: 18 18 18 18   Temp: 36.9 °C (98.4 °F) 36.5 °C (97.7 °F) 36.6 °C (97.9 °F) 36.4 °C (97.6 °F)   TempSrc: Oral Oral Oral Oral   SpO2: 94% 93% 96% 95%   Weight:  74 kg (163 lb 2.3 oz)     Height:             Constitutional: No apparent distress  Cardiovascular: Regular rate and rhythm, normal S1/S2, no murmurs.  Thorax & Lungs: Clear to auscultation bilaterally   Abdomen: soft, non-tender, non-distended      Recent Results (from the past 72 hour(s))   CBC WITHOUT DIFFERENTIAL    Collection Time: 12/02/22 12:30 AM   Result Value Ref Range    WBC 7.3 4.8 - 10.8 K/uL    RBC 3.79 (L) 4.70 - 6.10 M/uL    Hemoglobin 11.9 (L) 14.0 - 18.0 g/dL    Hematocrit 35.4 (L) 42.0 - 52.0 %    MCV 93.4 81.4 - 97.8 fL    MCH 31.4 27.0 - 33.0 pg    MCHC 33.6 (L) 33.7 - 35.3 g/dL    RDW 46.1 35.9 - 50.0 fL    Platelet Count 168 164 - 446 K/uL    MPV 11.1 9.0 - 12.9 fL   Comp Metabolic Panel    Collection Time: 12/02/22 12:30 AM   Result Value Ref Range    Sodium 138 135 - 145 mmol/L    Potassium 3.8 3.6 - 5.5 mmol/L    Chloride 106 96 - 112 mmol/L    Co2 24 20 - 33 mmol/L    Anion Gap 8.0 7.0 - 16.0    Glucose 115 (H) 65 - 99 mg/dL    Bun 38 (H) 8 - 22 mg/dL    Creatinine 1.08 0.50 - 1.40 mg/dL    Calcium 8.4 (L) 8.5 - 10.5 mg/dL    AST(SGOT) 30 12 - 45 U/L    ALT(SGPT) 28 2 - 50 U/L    Alkaline Phosphatase 85 30 - 99 U/L    Total  Bilirubin 0.3 0.1 - 1.5 mg/dL    Albumin 3.2 3.2 - 4.9 g/dL    Total Protein 5.7 (L) 6.0 - 8.2 g/dL    Globulin 2.5 1.9 - 3.5 g/dL    A-G Ratio 1.3 g/dL   MAGNESIUM    Collection Time: 12/02/22 12:30 AM   Result Value Ref Range    Magnesium 2.1 1.5 - 2.5 mg/dL   ESTIMATED GFR    Collection Time: 12/02/22 12:30 AM   Result Value Ref Range    GFR (CKD-EPI) 66 >60 mL/min/1.73 m 2   LACTIC ACID    Collection Time: 12/02/22  3:17 AM   Result Value Ref Range    Lactic Acid 0.7 0.5 - 2.0 mmol/L   COV-2, FLU A/B, AND RSV BY PCR (2-4 HOURS CEPHEID): Collect NP swab in VTM    Collection Time: 12/02/22  4:05 PM    Specimen: Respirate   Result Value Ref Range    Influenza virus A RNA Negative Negative    Influenza virus B, PCR Negative Negative    RSV, PCR Negative Negative    SARS-CoV-2 by PCR NotDetected     SARS-CoV-2 Source NP Swab    CBC with Differential    Collection Time: 12/03/22  6:09 AM   Result Value Ref Range    WBC 6.8 4.8 - 10.8 K/uL    RBC 4.08 (L) 4.70 - 6.10 M/uL    Hemoglobin 12.7 (L) 14.0 - 18.0 g/dL    Hematocrit 38.0 (L) 42.0 - 52.0 %    MCV 93.1 81.4 - 97.8 fL    MCH 31.1 27.0 - 33.0 pg    MCHC 33.4 (L) 33.7 - 35.3 g/dL    RDW 45.1 35.9 - 50.0 fL    Platelet Count 202 164 - 446 K/uL    MPV 10.7 9.0 - 12.9 fL    Neutrophils-Polys 74.90 (H) 44.00 - 72.00 %    Lymphocytes 9.80 (L) 22.00 - 41.00 %    Monocytes 9.80 0.00 - 13.40 %    Eosinophils 4.60 0.00 - 6.90 %    Basophils 0.60 0.00 - 1.80 %    Immature Granulocytes 0.30 0.00 - 0.90 %    Nucleated RBC 0.00 /100 WBC    Neutrophils (Absolute) 5.06 1.82 - 7.42 K/uL    Lymphs (Absolute) 0.66 (L) 1.00 - 4.80 K/uL    Monos (Absolute) 0.66 0.00 - 0.85 K/uL    Eos (Absolute) 0.31 0.00 - 0.51 K/uL    Baso (Absolute) 0.04 0.00 - 0.12 K/uL    Immature Granulocytes (abs) 0.02 0.00 - 0.11 K/uL    NRBC (Absolute) 0.00 K/uL   Comp Metabolic Panel (CMP)    Collection Time: 12/03/22  6:09 AM   Result Value Ref Range    Sodium 140 135 - 145 mmol/L    Potassium 4.1 3.6 - 5.5  mmol/L    Chloride 108 96 - 112 mmol/L    Co2 24 20 - 33 mmol/L    Anion Gap 8.0 7.0 - 16.0    Glucose 102 (H) 65 - 99 mg/dL    Bun 27 (H) 8 - 22 mg/dL    Creatinine 0.97 0.50 - 1.40 mg/dL    Calcium 8.6 8.5 - 10.5 mg/dL    AST(SGOT) 28 12 - 45 U/L    ALT(SGPT) 28 2 - 50 U/L    Alkaline Phosphatase 77 30 - 99 U/L    Total Bilirubin 0.4 0.1 - 1.5 mg/dL    Albumin 3.2 3.2 - 4.9 g/dL    Total Protein 5.9 (L) 6.0 - 8.2 g/dL    Globulin 2.7 1.9 - 3.5 g/dL    A-G Ratio 1.2 g/dL   Magnesium    Collection Time: 12/03/22  6:09 AM   Result Value Ref Range    Magnesium 2.1 1.5 - 2.5 mg/dL   ESTIMATED GFR    Collection Time: 12/03/22  6:09 AM   Result Value Ref Range    GFR (CKD-EPI) 75 >60 mL/min/1.73 m 2       Current Facility-Administered Medications   Medication Frequency    hydrOXYzine HCl (ATARAX) tablet 50 mg Q6HRS PRN    melatonin tablet 3 mg HS PRN    Respiratory Therapy Consult Continuous RT    acetaminophen (Tylenol) tablet 650 mg Q4HRS PRN    lactulose 20 GM/30ML solution 30 mL QDAY PRN    docusate sodium (ENEMEEZ) enema 283 mg QDAY PRN    omeprazole (PRILOSEC) capsule 20 mg QAM AC    mag hydrox-al hydrox-simeth (MAALOX PLUS ES or MYLANTA DS) suspension 20 mL Q2HRS PRN    ondansetron (ZOFRAN ODT) dispertab 4 mg 4X/DAY PRN    Or    ondansetron (ZOFRAN) syringe/vial injection 4 mg 4X/DAY PRN    traZODone (DESYREL) tablet 50 mg QHS PRN    sodium chloride (OCEAN) 0.65 % nasal spray 2 Spray PRN    aspirin EC (ECOTRIN) tablet 81 mg DAILY    atorvastatin (LIPITOR) tablet 40 mg Q EVENING    clopidogrel (PLAVIX) tablet 75 mg DAILY    loratadine (CLARITIN) tablet 10 mg DAILY    psyllium (METAMUCIL/KONSYL) 1 Packet BID    QUEtiapine (Seroquel) tablet 100 mg Nightly    senna-docusate (PERICOLACE or SENOKOT S) 8.6-50 MG per tablet 2 Tablet BID    And    polyethylene glycol/lytes (MIRALAX) PACKET 1 Packet QDAY PRN    And    magnesium hydroxide (MILK OF MAGNESIA) suspension 30 mL QDAY PRN    And    bisacodyl (DULCOLAX) suppository  10 mg QDAY PRN    tamsulosin (FLOMAX) capsule 0.4 mg QHS       Orders Placed This Encounter   Procedures    Diet Order Diet: Cardiac     Standing Status:   Standing     Number of Occurrences:   1     Order Specific Question:   Diet:     Answer:   Cardiac [6]         Intake/Output Summary (Last 24 hours) at 12/4/2022 1936  Last data filed at 12/4/2022 1545  Gross per 24 hour   Intake 360 ml   Output 1200 ml   Net -840 ml           Assessment:  Active Hospital Problems    Diagnosis     *Transient ischemic attack (TIA)     Debility     Hypotension     Gross hematuria     Uvulitis     Impaired mobility and ADLs     History of CVA (cerebrovascular accident)     Aneurysm of middle cerebral artery     Hard of hearing     S/P carotid endarterectomy     BPH (benign prostatic hyperplasia)     Mixed hyperlipidemia        Medical Decision Making and Plan:  Debility with history of encephalopathy, sundowning, history of stroke  Continue comprehensive inpatient rehabilitation with physical therapy, Occupational Therapy, speech therapy  Seroquel nightly for now.  Consider tapering in the future  Labs reviewed    Carotid stenosis status post left stent 11/25 by Dr. Zheng  Continue aspirin Plavix    Uveitis completed Augmentin    BPH  Continue Flomax    Hematuria from traumatic catheter point.  Continue Bhatia for now.   -Plan to remove Bhatia when able.    Neurogenic bowel. Constipation  Continue Metamucil, bowel program with senna, MiraLAX, milk of magnesia, Dulcolax    Anemia stable    Prerenal azotemia.  Renal function improved.  GFR 51-> 66    Chronic hearing loss  With hearing aids.  Still requiring loud voices for communication.  Consider audiology consultation post discharge.    Case discussed with family who was in the room with the patient today including the patient's son Ion Trinh as well as his wife.    Total time:  36 minutes.  I spent greater than 50% of the time for patient care and coordination on this date, including  unit/floor time, and face-to-face time with the patient as per assessment and plan above.        Gomez Nix M.D.  Physical medicine rehabilitation  [facility]

## 2022-12-05 NOTE — THERAPY
Physical Therapy   Daily Treatment     Patient Name: Cooper Harvey  Age:  88 y.o., Sex:  male  Medical Record #: [id number]  Today's Date: 12/5/2022     Precautions  Precautions: Fall Risk  Comments: very [place]  hearing aids .    Subjective    Pt agreeable to therapy    Assisted pt to digna B hearing aides for participation in therapy     Objective       12/05/22 1031   PT Charge Group   PT Gait Training 1   PT Therapeutic Exercise 1   PT Therapeutic Activities 2   Supervising Physical Therapist Vikram Ingram   PT Total Time Spent   PT Individual Total Time Spent (Mins) 60   Gait Functional Level of Assist    Gait Level Of Assist Contact Guard Assist   Assistive Device Front Wheel Walker   Distance (Feet) 120   # of Times Distance was Traveled 3   Deviation Decreased Base Of Support;Bradykinetic;Decreased Heel Strike;Decreased Toe Off   Transfer Functional Level of Assist   Bed, Chair, Wheelchair Transfer Minimal Assist   Bed Chair Wheelchair Transfer Description Other (comment)  (HHA EOB > wc)   Toilet Transfers Standby Assist   Toilet Transfer Description Supervision for safety;Verbal cueing;Increased time;Grab bar   Sitting Lower Body Exercises   Sitting Lower Body Exercises Yes   Nustep Resistance Level 3;Time (See Comments)  (8' B LE/UE for CV endurance)   Bed Mobility    Supine to Sit Minimal Assist   Sit to Stand Minimal Assist   Interdisciplinary Plan of Care Collaboration   IDT Collaboration with  Family / Caregiver;Certified O.T. Assistant  ([employer])   Patient Position at End of Therapy Seated;Self Releasing Lap Belt Applied;Tray Table within Reach;Phone within Reach;Family / Friend in Room   Collaboration Comments wife/son present for most of PT session       Assessment    The patient tolerated the session well with focus on LE strength and mobility.  Pt tolerated an increased gait distance and less physical assistance this session, gait quality will improve with shoes      Strengths: Able to follow  instructions, Effective communication skills, Independent prior level of function, Manages pain appropriately, Motivated for self care and independence, Pleasant and cooperative, Supportive family, Willingly participates in therapeutic activities  Barriers: Decreased endurance, Fatigue, Generalized weakness, Home accessibility, Impaired activity tolerance, Impaired balance, Limited mobility    Plan    Cont gait training FWW, standing balance, LE strengthening, consecutive stairs,     Physical Therapy Problems (Active)       Problem: Mobility       Dates: Start: 12/03/22         Goal: STG-Within one week, patient will ambulate community distances x 100 feet with FWW and CGA       Dates: Start: 12/03/22            Goal: STG-Within one week, patient will ascend and descend four to six stairs with BHR and CGA       Dates: Start: 12/03/22               Problem: Mobility Transfers       Dates: Start: 12/03/22         Goal: STG-Within one week, patient will transfer bed to chair with FWW and CGA       Dates: Start: 12/03/22               Problem: PT-Long Term Goals       Dates: Start: 12/03/22         Goal: LTG-By discharge, patient will ambulate x 150 feet with FWW and SPV       Dates: Start: 12/03/22            Goal: LTG-By discharge, patient will transfer one surface to another with FWW and SPV       Dates: Start: 12/03/22            Goal: LTG-By discharge, patient will ambulate up/down 4-6 stairs with one HR and SBA       Dates: Start: 12/03/22            Goal: LTG-By discharge, patient will transfer in/out of a car with FWW and SBA       Dates: Start: 12/03/22

## 2022-12-05 NOTE — THERAPY
"Occupational Therapy  Daily Treatment     Patient Name: Cooper Harvey  Age:  88 y.o., Sex:  male  Medical Record #: [id number]  Today's Date: 12/5/2022     Precautions  Precautions: (P) Fall Risk  Comments: (P) very [place]  hearing aids .         Subjective    \" I didn't even know I did that.\" Referriing to bowel incon.      Objective       12/05/22 0701   OT Charge Group   OT Self Care / ADL 6   OT Total Time Spent   OT Individual Total Time Spent (Mins) 90   Precautions   Precautions Fall Risk   Comments very [place]  hearing aids .   Functional Level of Assist   Eating Supervision  (setup food  tray)   Grooming Supervision  (seated at sink)   Bathing Minimal Assist  (assist due to  large bowel incontinence clean up)   Upper Body Dressing Minimal Assist  (assist to pulldown back of shirt)   Lower Body Dressing Maximal Assist  (total assist  doff/don tread socks    max assist to don pants 1 time and mod assist  2nd time  staff threads jones through pant leg)   Toileting Minimal Assist  (assist with wiping post BM)   Toilet Transfers Standby Assist   Tub / Shower Transfers Standby Assist   Tub Shower Transfer Description Grab bar   Interdisciplinary Plan of Care Collaboration   IDT Collaboration with  Nursing   Patient Position at End of Therapy Seated;Tray Table within Reach;Call Light within Reach;Chair Alarm On;Self Releasing Lap Belt Applied   Collaboration Comments regarding large bowel incon.    changing of elbow dressing post shower     urine / brief fowel smelling   presents as though urine may be leaking around the jones cath tube       Assessment      Participates in self care tasks to the best of his ability  limited by decreased strength/endurance     Strengths: Able to follow instructions, Alert and oriented, Independent prior level of function, Manages pain appropriately, Motivated for self care and independence, Pleasant and cooperative, Supportive family, Willingly participates in therapeutic " activities  Barriers: Decreased endurance, Fatigue, Generalized weakness, Home accessibility, Hypertension, Impaired activity tolerance, Impaired balance, Impaired functional cognition, Limited mobility (Hard of hearing)    Plan     ADL  related mobility   strength/endurance building  standing tolerance and balance activity          Occupational Therapy Goals (Active)       Problem: Dressing       Dates: Start: 12/03/22         Goal: STG-Within one week, patient will dress LB with min A       Dates: Start: 12/03/22               Problem: Functional Transfers       Dates: Start: 12/03/22         Goal: STG-Within one week, patient will transfer to toilet supervised with grab bar       Dates: Start: 12/03/22               Problem: OT Long Term Goals       Dates: Start: 12/03/22         Goal: LTG-By discharge, patient will complete basic self care tasks with supervision using AE/AD       Dates: Start: 12/03/22            Goal: LTG-By discharge, patient will perform bathroom transfers with supervision using AE/AD       Dates: Start: 12/03/22               Problem: Toileting       Dates: Start: 12/03/22         Goal: STG-Within one week, patient will complete toileting tasks with SBA       Dates: Start: 12/03/22

## 2022-12-05 NOTE — PROGRESS NOTES
Assumed care of patient. Bedside report received from Alexis PATE. Patient is in bed. Fall precautions in place. Call light and belongings within reach. Updated on POC, all questions and concerns answered at this time. No other needs at this time.

## 2022-12-05 NOTE — PROGRESS NOTES
"Rehab Progress Note     Date of Service: 12/5/2022  Chief Complaint: follow up debility    Interval Events (Subjective)    Patient seen and examined today in his room.   He is about to work with PT, and has difficulty hearing until his aides are placed.  He continues with Bhatia, which now has clear urine in place, so will be able to do a voiding trial.     Patient has no other new questions, concerns, or complaints today.     ROS: No changes to bowel, bladder, pain, mood, or sleep.       Objective:  VITAL SIGNS: /70   Pulse 90   Temp 36.4 °C (97.6 °F) (Oral)   Resp 18   Ht 1.727 m (5' 8\")   Wt 74 kg (163 lb 2.3 oz)   SpO2 93%   BMI 24.81 kg/m²   Gen: alert, no apparent distress  CV: Regular rate, regular rhythm  Resp: Clear to auscultation bilaterally  Neuro: very hard of hearing      Recent Results (from the past 72 hour(s))   COV-2, FLU A/B, AND RSV BY PCR (2-4 HOURS [employer]): Collect NP swab in [place]    Collection Time: 12/02/22  4:05 PM    Specimen: Respirate   Result Value Ref Range    Influenza virus A RNA Negative Negative    Influenza virus B, PCR Negative Negative    RSV, PCR Negative Negative    SARS-CoV-2 by PCR NotDetected     SARS-CoV-2 Source NP Swab    CBC with Differential    Collection Time: 12/03/22  6:09 AM   Result Value Ref Range    WBC 6.8 4.8 - 10.8 K/uL    RBC 4.08 (L) 4.70 - 6.10 M/uL    Hemoglobin 12.7 (L) 14.0 - 18.0 g/dL    Hematocrit 38.0 (L) 42.0 - 52.0 %    MCV 93.1 81.4 - 97.8 fL    MCH 31.1 27.0 - 33.0 pg    MCHC 33.4 (L) 33.7 - 35.3 g/dL    RDW 45.1 35.9 - 50.0 fL    Platelet Count 202 164 - 446 K/uL    MPV 10.7 9.0 - 12.9 fL    Neutrophils-Polys 74.90 (H) 44.00 - 72.00 %    Lymphocytes 9.80 (L) 22.00 - 41.00 %    Monocytes 9.80 0.00 - 13.40 %    Eosinophils 4.60 0.00 - 6.90 %    Basophils 0.60 0.00 - 1.80 %    Immature Granulocytes 0.30 0.00 - 0.90 %    Nucleated RBC 0.00 /100 WBC    Neutrophils (Absolute) 5.06 1.82 - 7.42 K/uL    Lymphs (Absolute) 0.66 (L) 1.00 - 4.80 " K/uL    Monos (Absolute) 0.66 0.00 - 0.85 K/uL    Eos (Absolute) 0.31 0.00 - 0.51 K/uL    Baso (Absolute) 0.04 0.00 - 0.12 K/uL    Immature Granulocytes (abs) 0.02 0.00 - 0.11 K/uL    NRBC (Absolute) 0.00 K/uL   Comp Metabolic Panel (CMP)    Collection Time: 12/03/22  6:09 AM   Result Value Ref Range    Sodium 140 135 - 145 mmol/L    Potassium 4.1 3.6 - 5.5 mmol/L    Chloride 108 96 - 112 mmol/L    Co2 24 20 - 33 mmol/L    Anion Gap 8.0 7.0 - 16.0    Glucose 102 (H) 65 - 99 mg/dL    Bun 27 (H) 8 - 22 mg/dL    Creatinine 0.97 0.50 - 1.40 mg/dL    Calcium 8.6 8.5 - 10.5 mg/dL    AST(SGOT) 28 12 - 45 U/L    ALT(SGPT) 28 2 - 50 U/L    Alkaline Phosphatase 77 30 - 99 U/L    Total Bilirubin 0.4 0.1 - 1.5 mg/dL    Albumin 3.2 3.2 - 4.9 g/dL    Total Protein 5.9 (L) 6.0 - 8.2 g/dL    Globulin 2.7 1.9 - 3.5 g/dL    A-G Ratio 1.2 g/dL   Magnesium    Collection Time: 12/03/22  6:09 AM   Result Value Ref Range    Magnesium 2.1 1.5 - 2.5 mg/dL   ESTIMATED GFR    Collection Time: 12/03/22  6:09 AM   Result Value Ref Range    GFR (CKD-EPI) 75 >60 mL/min/1.73 m 2       Current Facility-Administered Medications   Medication Frequency    hydrOXYzine HCl (ATARAX) tablet 50 mg Q6HRS PRN    melatonin tablet 3 mg HS PRN    Respiratory Therapy Consult Continuous RT    acetaminophen (Tylenol) tablet 650 mg Q4HRS PRN    lactulose 20 GM/30ML solution 30 mL QDAY PRN    docusate sodium (ENEMEEZ) enema 283 mg QDAY PRN    omeprazole (PRILOSEC) capsule 20 mg QAM AC    mag hydrox-al hydrox-simeth (MAALOX PLUS ES or MYLANTA DS) suspension 20 mL Q2HRS PRN    ondansetron (ZOFRAN ODT) dispertab 4 mg 4X/DAY PRN    Or    ondansetron (ZOFRAN) syringe/vial injection 4 mg 4X/DAY PRN    traZODone (DESYREL) tablet 50 mg QHS PRN    sodium chloride (OCEAN) 0.65 % nasal spray 2 Spray PRN    aspirin EC (ECOTRIN) tablet 81 mg DAILY    atorvastatin (LIPITOR) tablet 40 mg Q EVENING    clopidogrel (PLAVIX) tablet 75 mg DAILY    loratadine (CLARITIN) tablet 10 mg  DAILY    psyllium (METAMUCIL/KONSYL) 1 Packet BID    QUEtiapine (Seroquel) tablet 100 mg Nightly    senna-docusate (PERICOLACE or SENOKOT S) 8.6-50 MG per tablet 2 Tablet BID    And    polyethylene glycol/lytes (MIRALAX) PACKET 1 Packet QDAY PRN    And    magnesium hydroxide (MILK OF MAGNESIA) suspension 30 mL QDAY PRN    And    bisacodyl (DULCOLAX) suppository 10 mg QDAY PRN    tamsulosin (FLOMAX) capsule 0.4 mg QHS       Orders Placed This Encounter   Procedures    Diet Order Diet: Cardiac     Standing Status:   Standing     Number of Occurrences:   1     Order Specific Question:   Diet:     Answer:   Cardiac [6]       Assessment:  This patient is a 88 y.o. male admitted for acute inpatient rehabilitation with debility after acute hospital stay for   Transient ischemic attack (TIA).      Problem List/Medical Decision Making and Plan:    Debility  Continue full rehab program  PT/OT/SLP, 1 hr each discipline, 5 days per week    TIA  Plavix and aspirin    Encephalopathy  Sundowning  Occurred during last hospitalization as well  Multi-factorial - history of stroke, hard of hearing  Continue Seroquel at night for now, will titrate down - decrease to 75 mg 12/5     Carotid stenosis  S/p left stent 11/25 Dr. Norm Hollins for 8 weeks  Aspirin indefinitely     Uvulitis, resolved  Completed Augmentin     Hypotension, resolved  Monitor need to add back medication lisinopril     Hematuria, resolved  From traumatic catheter pulling  Continue Bhatia for now  Voiding trial in am     Constipation  Continue bowel meds and metamucil     Allergies/rhinorrhea   Claritin     Anemia  Improved    Azotemia  Improved  Increase oral fluids    Bowel program  Constipation, resolved  Continue bowel meds and metamucil -  home medication  Last BM 12/5    Bladder program  BPH  Continue Flomax  Remove Bhatia in am    DVT prophylaxis  Contra-indicated due to recent hematuria  Patient walking with therapy  Continue to increase mobility  Monitor for  edema      Total time:  35 minutes.  I spent greater than 50% of the time for patient care, counseling, and coordination on this date, including patient face-to face time, unit/floor time with review of records/pertinent lab data and studies, as well as discussing diagnostic evaluation/work up, planned therapeutic interventions, and future disposition of care, as per the interval events/subjective and the assessment and plan as noted above.      Sugey Barkley M.D.  Physical Medicine and Rehabilitation

## 2022-12-05 NOTE — CARE PLAN
The patient is Stable - Low risk of patient condition declining or worsening    Shift Goals  Clinical Goals: Monitor Jones cath  Patient Goals: Rest  Family Goals: \    Problem: Neurogenic Bladder  Goal: Patient will demonstrate ability to take care of indwelling catheter  Outcome: Progressing  Note: Patient has jones catheter on. Will continue to monitor.     Problem: Skin Integrity  Goal: Patient's skin integrity will be maintained or improve  Outcome: Progressing  Note:   Varghese Score: 15    Patient's skin remains intact and free from new or accidental injury this shift; no s/s of infection. RN wound protocol checked. Encouraged hydration and educated about the importance of nutrition to keep skin integrity. Will continue to monitor.

## 2022-12-06 PROCEDURE — 97535 SELF CARE MNGMENT TRAINING: CPT | Mod: CO

## 2022-12-06 PROCEDURE — 97530 THERAPEUTIC ACTIVITIES: CPT | Mod: CQ

## 2022-12-06 PROCEDURE — 97112 NEUROMUSCULAR REEDUCATION: CPT | Mod: CQ

## 2022-12-06 PROCEDURE — 97116 GAIT TRAINING THERAPY: CPT | Mod: CQ

## 2022-12-06 PROCEDURE — 99231 SBSQ HOSP IP/OBS SF/LOW 25: CPT | Performed by: PHYSICAL MEDICINE & REHABILITATION

## 2022-12-06 PROCEDURE — 770010 HCHG ROOM/CARE - REHAB SEMI PRIVAT*

## 2022-12-06 PROCEDURE — A9270 NON-COVERED ITEM OR SERVICE: HCPCS | Performed by: PHYSICAL MEDICINE & REHABILITATION

## 2022-12-06 PROCEDURE — 97530 THERAPEUTIC ACTIVITIES: CPT | Mod: CO

## 2022-12-06 PROCEDURE — 97129 THER IVNTJ 1ST 15 MIN: CPT

## 2022-12-06 PROCEDURE — 97130 THER IVNTJ EA ADDL 15 MIN: CPT

## 2022-12-06 PROCEDURE — 700102 HCHG RX REV CODE 250 W/ 637 OVERRIDE(OP): Performed by: PHYSICAL MEDICINE & REHABILITATION

## 2022-12-06 RX ORDER — QUETIAPINE FUMARATE 25 MG/1
50 TABLET, FILM COATED ORAL NIGHTLY
Status: DISCONTINUED | OUTPATIENT
Start: 2022-12-06 | End: 2022-12-07

## 2022-12-06 RX ADMIN — QUETIAPINE FUMARATE 50 MG: 25 TABLET ORAL at 20:59

## 2022-12-06 RX ADMIN — CLOPIDOGREL BISULFATE 75 MG: 75 TABLET ORAL at 09:00

## 2022-12-06 RX ADMIN — Medication 1 PACKET: at 20:59

## 2022-12-06 RX ADMIN — TRAZODONE HYDROCHLORIDE 50 MG: 50 TABLET ORAL at 20:59

## 2022-12-06 RX ADMIN — LORATADINE 10 MG: 10 TABLET ORAL at 09:00

## 2022-12-06 RX ADMIN — ASPIRIN 81 MG: 81 TABLET, COATED ORAL at 09:00

## 2022-12-06 RX ADMIN — SENNOSIDES AND DOCUSATE SODIUM 2 TABLET: 50; 8.6 TABLET ORAL at 09:00

## 2022-12-06 RX ADMIN — TAMSULOSIN HYDROCHLORIDE 0.4 MG: 0.4 CAPSULE ORAL at 20:59

## 2022-12-06 RX ADMIN — Medication 1 PACKET: at 09:00

## 2022-12-06 RX ADMIN — OMEPRAZOLE 20 MG: 20 CAPSULE, DELAYED RELEASE ORAL at 09:00

## 2022-12-06 RX ADMIN — ATORVASTATIN CALCIUM 40 MG: 40 TABLET, FILM COATED ORAL at 20:59

## 2022-12-06 RX ADMIN — SENNOSIDES AND DOCUSATE SODIUM 2 TABLET: 50; 8.6 TABLET ORAL at 20:59

## 2022-12-06 ASSESSMENT — GAIT ASSESSMENTS
GAIT LEVEL OF ASSIST: STANDBY ASSIST
DEVIATION: DECREASED BASE OF SUPPORT;SHUFFLED GAIT;DECREASED HEEL STRIKE;DECREASED TOE OFF;BRADYKINETIC
ASSISTIVE DEVICE: FRONT WHEEL WALKER

## 2022-12-06 ASSESSMENT — 10 METER WALK TEST (10METWT)
AVERAGE VELOCITY - METERS PER SECOND: 0.42
TRIAL 1: TIME TO WALK 10 METERS: 13.39
AVERAGE TIME - SECONDS: 14.12
TRIAL 3: TIME TO WALK 10 METERS: 13.95
TRIAL 2: TIME TO WALK 10 METERS: 15.02

## 2022-12-06 ASSESSMENT — ACTIVITIES OF DAILY LIVING (ADL)
BED_CHAIR_WHEELCHAIR_TRANSFER_DESCRIPTION: ADAPTIVE EQUIPMENT;INCREASED TIME;SUPERVISION FOR SAFETY;VERBAL CUEING;SET-UP OF EQUIPMENT

## 2022-12-06 NOTE — THERAPY
"Occupational Therapy  Daily Treatment     Patient Name: Cooper Harvey  Age:  88 y.o., Sex:  male  Medical Record #: [id number]  Today's Date: 12/6/2022     Precautions  Precautions: (P) Fall Risk  Comments: very [place]  hearing aids .         Subjective    \"OH I don't know about that .\"  Shaving with razor and shaving cream     Objective       12/06/22 1231   OT Charge Group   OT Self Care / ADL 3   OT Therapy Activity 1   OT Total Time Spent   OT Individual Total Time Spent (Mins) 60   Precautions   Precautions Fall Risk   Functional Level of Assist   Grooming Minimal Assist  (oral care    setup / supervision seated at sink     Shaving  required  mod assist    1  therapist trimmed with kayode  and patient reluctant to shave with razor and shaving cream usually uses   electric razor.)   Upper Body Dressing Minimal Assist  (assist with  over head  don/ doff of pull over shirt)   Toileting Contact Guard Assist   Bed, Chair, Wheelchair Transfer Contact Guard Assist  (cues for hand placement / safety  and technique)   Toilet Transfers Standby Assist   Bed Mobility    Sit to Supine Standby Assist   Interdisciplinary Plan of Care Collaboration   Patient Position at End of Therapy In Bed;Call Light within Reach;Tray Table within Reach       Assessment    Continues to make slow functional gains.   Though reluctant to shave with razor he did well with the task demonstrated good attention and safety        Strengths: Able to follow instructions, Alert and oriented, Independent prior level of function, Manages pain appropriately, Motivated for self care and independence, Pleasant and cooperative, Supportive family, Willingly participates in therapeutic activities  Barriers: Decreased endurance, Fatigue, Generalized weakness, Home accessibility, Hypertension, Impaired activity tolerance, Impaired balance, Impaired functional cognition, Limited mobility (Hard of hearing)    Plan    ADL  related mobility   strength/endurance " building  standing tolerance and balance activity         Occupational Therapy Goals (Active)       Problem: Dressing       Dates: Start: 12/03/22         Goal: STG-Within one week, patient will dress LB with min A       Dates: Start: 12/03/22               Problem: Functional Transfers       Dates: Start: 12/03/22         Goal: STG-Within one week, patient will transfer to toilet supervised with grab bar       Dates: Start: 12/03/22               Problem: OT Long Term Goals       Dates: Start: 12/03/22         Goal: LTG-By discharge, patient will complete basic self care tasks with supervision using AE/AD       Dates: Start: 12/03/22            Goal: LTG-By discharge, patient will perform bathroom transfers with supervision using AE/AD       Dates: Start: 12/03/22               Problem: Toileting       Dates: Start: 12/03/22         Goal: STG-Within one week, patient will complete toileting tasks with SBA       Dates: Start: 12/03/22

## 2022-12-06 NOTE — THERAPY
Speech Language Pathology  Daily Treatment     Patient Name: Cooper Harvey  Age:  88 y.o., Sex:  male  Medical Record #: [id number]  Today's Date: 12/6/2022     Precautions  Precautions: Fall Risk  Comments: very [place]  hearing aids .    Subjective    Pt pleasant and cooperative, initially with flat affect and no verbalizations, with encouragement pt engaging more so as session continued. EyeH[employer] application used for communication due to pt being severely [place] despite MARTIN present.     Objective       12/06/22 0933   Treatment Charges   SLP Cognitive Skill Development First 15 Minutes 1   SLP Cognitive Skill Development Additional 15 Minutes 3   SLP Total Time Spent   SLP Individual Total Time Spent (Mins) 60   SCCAN (Scales of Cognitive and Communicative Ability for Neurorehabilitation)   Oral Expression - Raw Score 15   Oral Expression - Scale Performance Score 79   Orientation - Raw Score 8   Orientation - Scale Performance Score 67   Memory - Raw Score 8   Memory - Scale Performance Score 42   Speech Comprehension - Raw Score 11   Speech Comprehension - Scale Performance Score 85   Reading Comprehension - Raw Score 6   Reading Comprehension - Scale Performance Score 50   Writing - Raw Score 4   Writing - Scale Performance Score 57   Attention - Raw Score 6   Attention - Scale Performance Score 38   Problem Solving - Raw Score 15   Problem Solving - Scale Performance Score 65   SCCAN Total Raw Score 58   SCCAN Degree of Severity Moderate Impairment       Assessment    SCCAN was completed at this time. Pt achieved a raw score of 58 at this time indicating MOD cognitive impairments overall. Pt with greatest impairments in areas of memory and attention. Per chart review pt achieved a score of 64 on initial assessment and 67 on outcome assessment following hospitalization in April of 2022. Phoned SO to determine baseline function and current impairments. Pts SO reporting pt with slower processing however otherwise  "\"seems like his normal self.\" SO manages med, finances, driving and cooking prior to recent hospitalization. Pts SO expressed agreement for POC to focus on PT and OT for physical strengthening as for SO reported limited ability to provided physical assistance at dc. Rec decrease ST to 30 min to address functional problem solving and attention related to safety.     Strengths: Able to follow instructions, Motivated for self care and independence, Pleasant and cooperative, Willingly participates in therapeutic activities  Barriers: Hearing impairment, Impaired functional cognition    Plan    Address functional problem solving and attention related to safety     Speech Therapy Problems (Active)       Problem: Expression STGs       Dates: Start: 12/03/22         Goal: STG-Within one week, patient will complete word finding tasks with 90% accuracy given min verbal cues.         Dates: Start: 12/03/22               Problem: Memory STGs       Dates: Start: 12/03/22         Goal: STG-Within one week, patient will recall daily events, and safety strategies, given min verbal cues.         Dates: Start: 12/03/22               Problem: Problem Solving STGs       Dates: Start: 12/03/22         Goal: STG-Within one week, patient will score 25 or greater on the O-log over 2 sessions.         Dates: Start: 12/03/22            Goal: STG-Within one week, patient will complete basic attn tasks with 80% accuracy given min verbal cues.         Dates: Start: 12/03/22            Goal: STG-Within one week, patient will complete SCCAN       Dates: Start: 12/03/22               Problem: Speech/Swallowing LTGs       Dates: Start: 12/03/22         Goal: LTG-By discharge, patient will solve basic problems Markie for safe discharge home.         Dates: Start: 12/03/22               "

## 2022-12-06 NOTE — CARE PLAN
The patient is Stable - Low risk of patient condition declining or worsening    Shift Goals  Clinical Goals: Monitor I&Os  Patient Goals: Safety  Family Goals: \    Problem: Skin Integrity  Goal: Patient's skin integrity will be maintained or improve  Outcome: Progressing  Note:   Varghese Score: 15    Patient's skin remains intact and free from new or accidental injury this shift; no s/s of infection. RN wound protocol checked. Encouraged hydration and educated about the importance of nutrition to keep skin integrity. Will continue to monitor.

## 2022-12-06 NOTE — CARE PLAN
Problem: Urinary Elimination  Goal: Establish and maintain regular urinary output  Outcome: Progressing  Note: Bhatia catheter was discontinued per order. Withdrew 20 ml of water from balloon. Catheter removed without difficulty; catheter tip intact. Patient instructed to notify nurse of first void. Will monitor.    The patient is Stable - Low risk of patient condition declining or worsening    Shift Goals  Clinical Goals: Monitor I&Os  Patient Goals: Safety  Family Goals:

## 2022-12-06 NOTE — THERAPY
Speech Language Pathology  Daily Treatment     Patient Name: Cooper Harvey  Age:  88 y.o., Sex:  male  Medical Record #: [id number]  Today's Date: 12/5/2022     Precautions  Precautions: Fall Risk  Comments: very [place]  hearing aids .    Subjective    Pt pleasant and cooperative during tx.  eyeHear RENE utilized due to [place].  Pt preferred to work at  bedside.       Objective       12/05/22 1601   Treatment Charges   SLP Cognitive Skill Development First 15 Minutes 1   SLP Cognitive Skill Development Additional 15 Minutes 1   SLP Total Time Spent   SLP Individual Total Time Spent (Mins) 30   Cognition   Orientation  Minimal (4)       Assessment    Pt scored 23/30 on the O-log.  Pt benefits from min verbal cues to aid in orientation.  Pt utilized room board to answer orientation questions independently.      Strengths: Able to follow instructions, Motivated for self care and independence, Pleasant and cooperative, Willingly participates in therapeutic activities  Barriers: Hearing impairment, Impaired functional cognition    Plan    Complete SCCAN.  Target orientation, recall, attn, and word finding        Speech Therapy Problems (Active)       Problem: Expression STGs       Dates: Start: 12/03/22         Goal: STG-Within one week, patient will complete word finding tasks with 90% accuracy given min verbal cues.         Dates: Start: 12/03/22               Problem: Memory STGs       Dates: Start: 12/03/22         Goal: STG-Within one week, patient will recall daily events, and safety strategies, given min verbal cues.         Dates: Start: 12/03/22               Problem: Problem Solving STGs       Dates: Start: 12/03/22         Goal: STG-Within one week, patient will score 25 or greater on the O-log over 2 sessions.         Dates: Start: 12/03/22            Goal: STG-Within one week, patient will complete basic attn tasks with 80% accuracy given min verbal cues.         Dates: Start: 12/03/22            Goal:  STG-Within one week, patient will complete SCCAN       Dates: Start: 12/03/22               Problem: Speech/Swallowing LTGs       Dates: Start: 12/03/22         Goal: LTG-By discharge, patient will solve basic problems Markie for safe discharge home.         Dates: Start: 12/03/22

## 2022-12-06 NOTE — THERAPY
Physical Therapy   Daily Treatment     Patient Name: Cooper Harvey  Age:  88 y.o., Sex:  male  Medical Record #: [id number]  Today's Date: 12/6/2022     Precautions  Precautions: Fall Risk  Comments: very [place]  hearing aids .    Subjective    Pt agreeable to therapy     Objective       12/06/22 0701   PT Charge Group   PT Gait Training 2   PT Neuromuscular Re-Education / Balance 1   PT Therapeutic Activities 1   Supervising Physical Therapist Vikram Ingram   PT Total Time Spent   PT Individual Total Time Spent (Mins) 60   Vitals   Pulse 98   Patient BP Position Sitting   Blood Pressure  109/48   Room Air Oximetry 98   O2 (LPM) 0   O2 Delivery Device None - Room Air   Pain   Intervention Declines   Cognition    Speech/ Communication Hard of Hearing;Delayed Responses   Level of Consciousness Alert   Ability To Follow Commands 1 Step   Safety Awareness Impaired   New Learning Impaired   Attention Impaired   Sequencing Impaired   Initiation Impaired   Comments poor carry over with safety instructions   Gait Functional Level of Assist    Gait Level Of Assist Standby Assist   Assistive Device Front Wheel Walker   Distance (Feet)   (155', 153', 175', 35' x 2)   Deviation Decreased Base Of Support;Shuffled Gait;Decreased Heel Strike;Decreased Toe Off;Bradykinetic   Wheelchair Functional Level of Assist   Wheelchair Assist   (n/a)   Transfer Functional Level of Assist   Bed, Chair, Wheelchair Transfer Contact Guard Assist  (to Nicholas due to poor eccentric control)   Bed Chair Wheelchair Transfer Description Adaptive equipment;Increased time;Supervision for safety;Verbal cueing;Set-up of equipment   Sitting Lower Body Exercises   Sitting Lower Body Exercises Yes   Ankle Pumps 2 sets of 10   Long Arc Quad 2 sets of 10   Marching 2 sets of 10   Hamstring Curl 1 set of 10   Bed Mobility    Supine to Sit Minimal Assist  (cues for problem solving and sequencing, extra time)   Sit to Stand Minimal Assist   Neuro-Muscular  "Treatments   Neuro-Muscular Treatments Verbal Cuing;Sequencing   Comments STS transfer training with emphasis on hand placement and sequencing (scooting forward, hand on walker and hand to push up, reaching back and sitting slowly) completed functional outcome measure: standing forward reach test = 5.25\"   10 Meter Walk Test   Normal - Trial 1 13.39 seconds   Normal - Trial 2 15.02 seconds   Normal - Trial 3 13.95 seconds   Normal Average Time 14.12 seconds   Normal Average Velocity (m/s) 0.42   Interdisciplinary Plan of Care Collaboration   IDT Collaboration with  Certified O.T. Assistant  ([employer])   Patient Position at End of Therapy Seated;Chair Alarm On;Call Light within Reach;Tray Table within Reach   Collaboration Comments CLOF       Assessment    Pt tolerated session well with increased amb distance this session and progressed to SBA. Gait is characterized by short shuffling steps, poor foot clearance and a forward flexed posture/downward gaze. The patient requires additional time to process instructions and needed multimodal cues for STS transfers this session. Pt amb 0.42 m/s indicative of a HH ambulator and is at increased risk for falls. Functional reach test 5.25\"  Strengths: Able to follow instructions, Effective communication skills, Independent prior level of function, Manages pain appropriately, Motivated for self care and independence, Pleasant and cooperative, Supportive family, Willingly participates in therapeutic activities  Barriers: Decreased endurance, Fatigue, Generalized weakness, Home accessibility, Impaired activity tolerance, Impaired balance, Limited mobility    Plan    Cont gait training FWW, standing balance, LE strengthening, consecutive stairs,     Passport items to be completed Get in/out of bed safely, in/out of a vehicle, safely use mobility device, walk or wheel around home/community, navigate up and down stairs, show how to get up/down from the ground, ensure home is accessible, " demonstrate HEP, complete caregiver training       Physical Therapy Problems (Active)       Problem: Mobility       Dates: Start: 12/03/22         Goal: STG-Within one week, patient will ambulate community distances x 100 feet with FWW and CGA       Dates: Start: 12/03/22            Goal: STG-Within one week, patient will ascend and descend four to six stairs with BHR and CGA       Dates: Start: 12/03/22               Problem: Mobility Transfers       Dates: Start: 12/03/22         Goal: STG-Within one week, patient will transfer bed to chair with FWW and CGA       Dates: Start: 12/03/22               Problem: PT-Long Term Goals       Dates: Start: 12/03/22         Goal: LTG-By discharge, patient will ambulate x 150 feet with FWW and SPV       Dates: Start: 12/03/22            Goal: LTG-By discharge, patient will transfer one surface to another with FWW and SPV       Dates: Start: 12/03/22            Goal: LTG-By discharge, patient will ambulate up/down 4-6 stairs with one HR and SBA       Dates: Start: 12/03/22            Goal: LTG-By discharge, patient will transfer in/out of a car with FWW and SBA       Dates: Start: 12/03/22

## 2022-12-06 NOTE — PROGRESS NOTES
"Rehab Progress Note     Date of Service: 12/6/2022  Chief Complaint: follow up debility    Interval Events (Subjective)    Patient seen and examined today in his room.   He is resting quietly in bed watching TV.  He reports being tired as he has been up since 7 am.  He denies any pain and is sleeping well.  He reports a poor appetite but is eating some.  Per SLP, patient close to his cognitive baseline so will only  for 30 min.    Patient has no other new questions, concerns, or complaints today.       ROS: No changes to bowel, bladder, pain, mood, or sleep.         Objective:  VITAL SIGNS: /48   Pulse 98   Temp 37 °C (98.6 °F) (Oral)   Resp 18   Ht 1.727 m (5' 8\")   Wt 74 kg (163 lb 2.3 oz)   SpO2 93%   BMI 24.81 kg/m²   Gen: alert, no apparent distress  CV: Regular rate, regular rhythm  Resp: Clear to auscultation bilaterally  Neuro: non-focal, very hard of hearing      No results found for this or any previous visit (from the past 72 hour(s)).      Current Facility-Administered Medications   Medication Frequency    QUEtiapine (Seroquel) tablet 75 mg Nightly    hydrOXYzine HCl (ATARAX) tablet 50 mg Q6HRS PRN    melatonin tablet 3 mg HS PRN    Respiratory Therapy Consult Continuous RT    acetaminophen (Tylenol) tablet 650 mg Q4HRS PRN    lactulose 20 GM/30ML solution 30 mL QDAY PRN    docusate sodium (ENEMEEZ) enema 283 mg QDAY PRN    omeprazole (PRILOSEC) capsule 20 mg QAM AC    mag hydrox-al hydrox-simeth (MAALOX PLUS ES or MYLANTA DS) suspension 20 mL Q2HRS PRN    ondansetron (ZOFRAN ODT) dispertab 4 mg 4X/DAY PRN    Or    ondansetron (ZOFRAN) syringe/vial injection 4 mg 4X/DAY PRN    traZODone (DESYREL) tablet 50 mg QHS PRN    sodium chloride (OCEAN) 0.65 % nasal spray 2 Spray PRN    aspirin EC (ECOTRIN) tablet 81 mg DAILY    atorvastatin (LIPITOR) tablet 40 mg Q EVENING    clopidogrel (PLAVIX) tablet 75 mg DAILY    loratadine (CLARITIN) tablet 10 mg DAILY    psyllium (METAMUCIL/KONSYL) 1 " Packet BID    senna-docusate (PERICOLACE or SENOKOT S) 8.6-50 MG per tablet 2 Tablet BID    And    polyethylene glycol/lytes (MIRALAX) PACKET 1 Packet QDAY PRN    And    magnesium hydroxide (MILK OF MAGNESIA) suspension 30 mL QDAY PRN    And    bisacodyl (DULCOLAX) suppository 10 mg QDAY PRN    tamsulosin (FLOMAX) capsule 0.4 mg QHS       Orders Placed This Encounter   Procedures    Diet Order Diet: Cardiac     Standing Status:   Standing     Number of Occurrences:   1     Order Specific Question:   Diet:     Answer:   Cardiac [6]       Assessment:  This patient is a 88 y.o. male admitted for acute inpatient rehabilitation with debility after acute hospital stay for   Transient ischemic attack (TIA).      Problem List/Medical Decision Making and Plan:    Debility, improved  Continue full rehab program  PT/OT/SLP, 1 hr each discipline, 5 days per week    TIA  Plavix and aspirin    Encephalopathy, improved  Sundowning, improved  Occurred during last hospitalization as well  Multi-factorial - history of stroke, hard of hearing  Continue Seroquel at night for now, will titrate down - decrease to 75 mg 12/5 --> 50 mg 12/6     Carotid stenosis  S/p left stent 11/25 Dr. Norm Hollins for 8 weeks  Aspirin indefinitely     Uvulitis, resolved  Completed Augmentin     Hypotension, resolved  Monitor need to add back medication lisinopril     Hematuria, resolved  From traumatic catheter pulling  Remove Bhatia catheter today     Allergies/rhinorrhea   Claritin     Anemia  Improved    Azotemia  Improved  Increase oral fluids    Bowel program  Constipation, resolved  Continue bowel meds and metamucil -  home medication  Last BM 12/5    Bladder program  BPH  Continue Flomax  Remove Bhatia today  Check PVRs  IC for over 400 cc    DVT prophylaxis  Contra-indicated due to recent hematuria  Patient walking with therapy  Continue to increase mobility  Monitor for edema    Total time:  16 minutes.  I spent greater than 50% of the time for  patient care, counseling, and coordination on this date, including patient face-to face time, unit/floor time with review of records/pertinent lab data and studies, as well as discussing diagnostic evaluation/work up, planned therapeutic interventions, and future disposition of care, as per the interval events/subjective and the assessment and plan as noted above.    I have performed a physical exam, reviewed and updated ROS, as well as the assessment and plan today 12/6/2022. In review of note from 12/5/2022 there are no new changes except as documented above.    Sugey Barkley M.D.  Physical Medicine and Rehabilitation

## 2022-12-07 DIAGNOSIS — Z86.73 HISTORY OF CVA (CEREBROVASCULAR ACCIDENT): ICD-10-CM

## 2022-12-07 DIAGNOSIS — G45.9 TRANSIENT ISCHEMIC ATTACK (TIA): ICD-10-CM

## 2022-12-07 DIAGNOSIS — Z98.890 S/P CAROTID ENDARTERECTOMY: ICD-10-CM

## 2022-12-07 PROCEDURE — 97130 THER IVNTJ EA ADDL 15 MIN: CPT

## 2022-12-07 PROCEDURE — 97530 THERAPEUTIC ACTIVITIES: CPT

## 2022-12-07 PROCEDURE — 97129 THER IVNTJ 1ST 15 MIN: CPT

## 2022-12-07 PROCEDURE — 81001 URINALYSIS AUTO W/SCOPE: CPT

## 2022-12-07 PROCEDURE — 97110 THERAPEUTIC EXERCISES: CPT | Mod: CO

## 2022-12-07 PROCEDURE — 97110 THERAPEUTIC EXERCISES: CPT

## 2022-12-07 PROCEDURE — 770010 HCHG ROOM/CARE - REHAB SEMI PRIVAT*

## 2022-12-07 PROCEDURE — 700102 HCHG RX REV CODE 250 W/ 637 OVERRIDE(OP): Performed by: PHYSICAL MEDICINE & REHABILITATION

## 2022-12-07 PROCEDURE — 97116 GAIT TRAINING THERAPY: CPT | Mod: CQ

## 2022-12-07 PROCEDURE — 99233 SBSQ HOSP IP/OBS HIGH 50: CPT | Performed by: PHYSICAL MEDICINE & REHABILITATION

## 2022-12-07 PROCEDURE — A9270 NON-COVERED ITEM OR SERVICE: HCPCS | Performed by: PHYSICAL MEDICINE & REHABILITATION

## 2022-12-07 PROCEDURE — 97535 SELF CARE MNGMENT TRAINING: CPT | Mod: CO

## 2022-12-07 PROCEDURE — 97530 THERAPEUTIC ACTIVITIES: CPT | Mod: CO

## 2022-12-07 RX ORDER — QUETIAPINE FUMARATE 25 MG/1
25 TABLET, FILM COATED ORAL NIGHTLY
Status: DISCONTINUED | OUTPATIENT
Start: 2022-12-07 | End: 2022-12-12

## 2022-12-07 RX ADMIN — OMEPRAZOLE 20 MG: 20 CAPSULE, DELAYED RELEASE ORAL at 07:44

## 2022-12-07 RX ADMIN — SENNOSIDES AND DOCUSATE SODIUM 2 TABLET: 50; 8.6 TABLET ORAL at 20:27

## 2022-12-07 RX ADMIN — SENNOSIDES AND DOCUSATE SODIUM 2 TABLET: 50; 8.6 TABLET ORAL at 07:44

## 2022-12-07 RX ADMIN — TAMSULOSIN HYDROCHLORIDE 0.4 MG: 0.4 CAPSULE ORAL at 20:27

## 2022-12-07 RX ADMIN — TRAZODONE HYDROCHLORIDE 50 MG: 50 TABLET ORAL at 20:27

## 2022-12-07 RX ADMIN — Medication 1 PACKET: at 07:44

## 2022-12-07 RX ADMIN — ASPIRIN 81 MG: 81 TABLET, COATED ORAL at 07:44

## 2022-12-07 RX ADMIN — LORATADINE 10 MG: 10 TABLET ORAL at 07:44

## 2022-12-07 RX ADMIN — QUETIAPINE FUMARATE 25 MG: 25 TABLET ORAL at 20:27

## 2022-12-07 RX ADMIN — CLOPIDOGREL BISULFATE 75 MG: 75 TABLET ORAL at 07:44

## 2022-12-07 RX ADMIN — ATORVASTATIN CALCIUM 40 MG: 40 TABLET, FILM COATED ORAL at 20:27

## 2022-12-07 RX ADMIN — Medication 1 PACKET: at 20:27

## 2022-12-07 ASSESSMENT — GAIT ASSESSMENTS
DISTANCE (FEET): 110
ASSISTIVE DEVICE: FRONT WHEEL WALKER
GAIT LEVEL OF ASSIST: STANDBY ASSIST
DEVIATION: BRADYKINETIC;SHUFFLED GAIT;DECREASED BASE OF SUPPORT

## 2022-12-07 ASSESSMENT — ACTIVITIES OF DAILY LIVING (ADL)
BED_CHAIR_WHEELCHAIR_TRANSFER_DESCRIPTION: SUPERVISION FOR SAFETY;VERBAL CUEING;SET-UP OF EQUIPMENT
TOILET_TRANSFER_DESCRIPTION: ADAPTIVE EQUIPMENT;GRAB BAR;INCREASED TIME;SET-UP OF EQUIPMENT;SUPERVISION FOR SAFETY;VERBAL CUEING
BED_CHAIR_WHEELCHAIR_TRANSFER_DESCRIPTION: ADAPTIVE EQUIPMENT;INCREASED TIME;SET-UP OF EQUIPMENT;SUPERVISION FOR SAFETY;VERBAL CUEING

## 2022-12-07 NOTE — CARE PLAN
Problem: Problem Solving STGs  Goal: STG-Within one week, patient will score 25 or greater on the O-log over 2 sessions.    Outcome: Not Met  Note: Cont to address highest score achieved 23   Goal: STG-Within one week, patient will complete basic attn tasks with 80% accuracy given min verbal cues.    Outcome: Not Met  Note: Mod cues     Problem: Memory STGs  Goal: STG-Within one week, patient will recall daily events, and safety strategies, given min verbal cues.    Outcome: Not Met  Note: Mod-max cues     Problem: Problem Solving STGs  Goal: STG-Within one week, patient will complete SCCAN  Outcome: Met     Problem: Expression STGs  Goal: STG-Within one week, patient will complete word finding tasks with 90% accuracy given min verbal cues.    Outcome: Discharged - Not Met  Note: Not appropriate to POC

## 2022-12-07 NOTE — THERAPY
"Occupational Therapy  Daily Treatment     Patient Name: Cooper Harvey  Age:  88 y.o., Sex:  male  Medical Record #: [id number]  Today's Date: 12/7/2022     Precautions  Precautions: (P) Fall Risk  Comments: (P) [place]  hearing aids         Subjective    \" I just don't know   I am not doing as good today.\"     Objective       12/07/22 1031   OT Charge Group   OT Self Care / ADL 2   OT Therapy Activity 2   OT Total Time Spent   OT Individual Total Time Spent (Mins) 60   Precautions   Precautions Fall Risk   Comments [place]  hearing aids   Functional Level of Assist   Eating Independent   Upper Body Dressing Minimal Assist  (assist to pull down back of shirt)   Lower Body Dressing Maximal Assist  (total assist to don socks and shoes.    pants   min assist  to don over feet  CGA to stand and pull up)   Toileting   (attempted standing urinal use at edge of bed  SBA   no urine produced.)   Bed, Chair, Wheelchair Transfer Contact Guard Assist   Sitting Upper Body Exercises   Chest Press 2 sets of 10;Bilateral   Shoulder Press 2 sets of 10;Bilateral   Internal Shoulder Rotation 2 sets of 10;Right ;Left   External Shoulder Rotation 2 sets of 10;Right ;Left   Bicep Curls 2 sets of 10;Right ;Left   Pronation / Supination 2 sets of 10;Right ;Left   Other Exercise 2 sets of 10;Bilateral; arm circles forward and back   Interdisciplinary Plan of Care Collaboration   IDT Collaboration with  Nursing;Family / Caregiver   Patient Position at End of Therapy Seated;Call Light within Reach;Tray Table within Reach   Collaboration Comments Nrusing reports he is fatigued due to frequent urgent need to use toilet for urination.      Spouse and son  observed about 10 mintues of  tx activity provided update regarding functional status for LB dressing this session       Assessment    Reports moderate fatigue when asked but unable to give details regarding  performance with PT this AM  just that he felt he did not do as well as   previous days. "     Strengths: Able to follow instructions, Alert and oriented, Independent prior level of function, Manages pain appropriately, Motivated for self care and independence, Pleasant and cooperative, Supportive family, Willingly participates in therapeutic activities  Barriers: Decreased endurance, Fatigue, Generalized weakness, Home accessibility, Hypertension, Impaired activity tolerance, Impaired balance, Impaired functional cognition, Limited mobility (Hard of hearing)    Plan  ADL  related mobility   strength/endurance building  standing tolerance and balance activity      Occupational Therapy Goals (Active)       Problem: Dressing       Dates: Start: 12/03/22         Goal: STG-Within one week, patient will dress LB with min A       Dates: Start: 12/03/22         Goal Note filed on 12/07/22 1019 by Darío Rios, C.O.T.A.       Max assist LB dressing      Has been limited by time constraints  decreased strength/endurance                   Problem: Functional Transfers       Dates: Start: 12/03/22         Goal: STG-Within one week, patient will transfer to toilet supervised with grab bar       Dates: Start: 12/03/22         Goal Note filed on 12/07/22 1019 by Darío Rios, C.O.T.A.        CGA CGA   due to decreased strength/endurance    Continue goal                  Problem: OT Long Term Goals       Dates: Start: 12/03/22         Goal: LTG-By discharge, patient will complete basic self care tasks with supervision using AE/AD       Dates: Start: 12/03/22            Goal: LTG-By discharge, patient will perform bathroom transfers with supervision using AE/AD       Dates: Start: 12/03/22               Problem: Toileting       Dates: Start: 12/03/22         Goal: STG-Within one week, patient will complete toileting tasks with SBA       Dates: Start: 12/03/22         Goal Note filed on 12/07/22 1019 by Darío Rios, C.O.T.A.        CGA   due to decreased strength/endurance    Continue goal

## 2022-12-07 NOTE — CARE PLAN
The patient is Stable - Low risk of patient condition declining or worsening    Shift Goals  Clinical Goals: scheduled voiding, rest  Patient Goals: sleep  Family Goals: \    Progress made toward(s) clinical / shift goals:    Problem: Knowledge Deficit - Standard  Goal: Patient and family/care givers will demonstrate understanding of plan of care, disease process/condition, diagnostic tests and medications  Outcome: Progressing     Problem: Bladder / Voiding  Goal: Patient will establish and maintain regular urinary output  Outcome: Progressing     Problem: Skin Integrity  Goal: Patient's skin integrity will be maintained or improve  Outcome: Progressing     Problem: Mobility  Goal: Patient's capacity to carry out activities will improve  Outcome: Progressing     Problem: Fall Risk - Rehab  Goal: Patient will remain free from falls  Outcome: Progressing       Patient is not progressing towards the following goals: n/a

## 2022-12-07 NOTE — CARE PLAN
Problem: Mobility Transfers  Goal: STG-Within one week, patient will transfer bed to chair with FWW and CGA  Outcome: Met     Problem: Mobility  Goal: STG-Within one week, patient will ambulate community distances x 100 feet with FWW and CGA  Outcome: Met  Goal: STG-Within one week, patient will ascend and descend four to six stairs with BHR and CGA  Outcome: Not Met  Note: CGA to Nicholas(fatigue)

## 2022-12-07 NOTE — THERAPY
"Physical Therapy   Daily Treatment     Patient Name: Cooper Harvey  Age:  88 y.o., Sex:  male  Medical Record #: [id number]  Today's Date: 12/7/2022     Precautions  Precautions: Fall Risk  Comments: [place]  hearing aids    Subjective    Pt up in wc finished breakfast ready for therapy     Objective       12/07/22 0831   PT Charge Group   PT Gait Training 1   PT Therapeutic Exercise 1   PT Therapeutic Activities 2   Supervising Physical Therapist Vikram Ingram   PT Total Time Spent   PT Individual Total Time Spent (Mins) 60   Vitals   Pulse 90   Patient BP Position Sitting   Blood Pressure  (!) 92/54   Room Air Oximetry 98   O2 (LPM) 0   O2 Delivery Device None - Room Air   Vitals Comments taken end of session bp taken at 0925   Pain   Intervention Declines   Cognition    Level of Consciousness Alert   Ability To Follow Commands 1 Step   Safety Awareness Impaired;Impulsive   New Learning Impaired   Attention Impaired   Sequencing Impaired   Initiation Impaired   Gait Functional Level of Assist    Gait Level Of Assist Standby Assist   Assistive Device Front Wheel Walker   Distance (Feet) 110   # of Times Distance was Traveled 1   Deviation Bradykinetic;Shuffled Gait;Decreased Base Of Support   Stairs Functional Level of Assist   Level of Assist with Stairs Minimal Assist  (to CGA)   # of Stairs Climbed   (6 (4\") and 4 (6\") stairs with B HR)   Stairs Description Extra time;Hand rails;Limited by fatigue;Supervision for safety;Verbal cueing;Safety concerns  (inconsistent stepping pattern, reciprocal up step to down, tends to switch which leg he ascends with despite cues ([place]))   Transfer Functional Level of Assist   Bed, Chair, Wheelchair Transfer Contact Guard Assist   Bed Chair Wheelchair Transfer Description Supervision for safety;Verbal cueing;Set-up of equipment  (reach pivot with bed rail)   Sitting Lower Body Exercises   Sitting Lower Body Exercises Yes   Other Exercises LE motomed x 11:33\" L2 resistance 1.01 " mi, 55/45 symmetry 29 rpm   Comments 1 rest break   Bed Mobility    Sit to Supine Minimal Assist   Sit to Stand Minimal Assist   Neuro-Muscular Treatments   Neuro-Muscular Treatments Anterior weight shift;Tactile Cuing;Verbal Cuing;Postural Facilitation   Comments cont STS transfer training rom wc <> FWW with focus on hand placement. pt forgets to scoot forward requiring Nicholas to come to stand.   Interdisciplinary Plan of Care Collaboration   IDT Collaboration with  Certified O.T. Assistant  ([employer]);Nursing;Speech Therapist   Patient Position at End of Therapy In Bed;Call Light within Reach;Tray Table within Reach   Collaboration Comments transferred care to ST, discussed fatigue with IDT       Assessment    Pt with increased fatigue this am, required Nicholas for STS and to get his legs in the bed, which is more assistance then he needed previous PT session.   Strengths: Able to follow instructions, Effective communication skills, Independent prior level of function, Manages pain appropriately, Motivated for self care and independence, Pleasant and cooperative, Supportive family, Willingly participates in therapeutic activities  Barriers: Decreased endurance, Fatigue, Generalized weakness, Home accessibility, Impaired activity tolerance, Impaired balance, Limited mobility    Plan    Cont gait training FWW, standing balance, LE strengthening, consecutive stairs,      Passport items to be completed Get in/out of bed safely, in/out of a vehicle, safely use mobility device, walk or wheel around home/community, navigate up and down stairs, show how to get up/down from the ground, ensure home is accessible, demonstrate HEP, complete caregiver training     Physical Therapy Problems (Active)       Problem: Mobility       Dates: Start: 12/03/22         Goal: STG-Within one week, patient will ambulate community distances x 100 feet with FWW and CGA       Dates: Start: 12/03/22            Goal: STG-Within one week, patient will  ascend and descend four to six stairs with BHR and CGA       Dates: Start: 12/03/22               Problem: Mobility Transfers       Dates: Start: 12/03/22         Goal: STG-Within one week, patient will transfer bed to chair with FWW and CGA       Dates: Start: 12/03/22               Problem: PT-Long Term Goals       Dates: Start: 12/03/22         Goal: LTG-By discharge, patient will ambulate x 150 feet with FWW and SPV       Dates: Start: 12/03/22            Goal: LTG-By discharge, patient will transfer one surface to another with FWW and SPV       Dates: Start: 12/03/22            Goal: LTG-By discharge, patient will ambulate up/down 4-6 stairs with one HR and SBA       Dates: Start: 12/03/22            Goal: LTG-By discharge, patient will transfer in/out of a car with FWW and SBA       Dates: Start: 12/03/22

## 2022-12-07 NOTE — CARE PLAN
Problem: Dressing  Goal: STG-Within one week, patient will dress LB with min A  Outcome: Not Met  Note: Max assist LB dressing      Has been limited by time constraints  decreased strength/endurance       Problem: Toileting  Goal: STG-Within one week, patient will complete toileting tasks with SBA  Outcome: Not Met  Note:  CGA   due to decreased strength/endurance    Continue goal        Problem: Functional Transfers  Goal: STG-Within one week, patient will transfer to toilet supervised with grab bar  Outcome: Not Met  Note:  CGA CGA   due to decreased strength/endurance    Continue goal

## 2022-12-07 NOTE — THERAPY
"Occupational Therapy  Daily Treatment     Patient Name: Cooper Harvey  Age:  88 y.o., Sex:  male  Medical Record #: [id number]  Today's Date: 12/7/2022     Precautions  Precautions: (P) Fall Risk  Comments: (P) hearing aids         Subjective    \" Sure.\" When asked if he wanted to lay down at end of tx session      Objective       12/07/22 1301   OT Charge Group   OT Therapy Activity 1   OT Therapeutic Exercise  1   OT Total Time Spent   OT Individual Total Time Spent (Mins) 30   Precautions   Precautions Fall Risk   Comments hearing aids   Functional Level of Assist   Bed, Chair, Wheelchair Transfer Contact Guard Assist   Sitting Upper Body Exercises   Other Exercise Nustep x  5 minutes x 2  workload  2.  CGA and cues for safety/technique for transfer  w/c <-> Nustep   Bed Mobility    Sit to Supine Minimal Assist   Interdisciplinary Plan of Care Collaboration   Patient Position at End of Therapy In Bed;Call Light within Reach;Tray Table within Reach       Assessment     Cues for safety / technique with transfers  this session     Strengths: Able to follow instructions, Alert and oriented, Independent prior level of function, Manages pain appropriately, Motivated for self care and independence, Pleasant and cooperative, Supportive family, Willingly participates in therapeutic activities  Barriers: Decreased endurance, Fatigue, Generalized weakness, Home accessibility, Hypertension, Impaired activity tolerance, Impaired balance, Impaired functional cognition, Limited mobility (Hard of hearing)    Plan  ADL  related mobility   strength/endurance building  standing tolerance and balance activity        Occupational Therapy Goals (Active)       Problem: Dressing       Dates: Start: 12/03/22         Goal: STG-Within one week, patient will dress LB with min A       Dates: Start: 12/03/22         Goal Note filed on 12/07/22 1019 by Darío Rios C.O.TBRAD       Max assist LB dressing      Has been limited by time " constraints  decreased strength/endurance                   Problem: Functional Transfers       Dates: Start: 12/03/22         Goal: STG-Within one week, patient will transfer to toilet supervised with grab bar       Dates: Start: 12/03/22         Goal Note filed on 12/07/22 1019 by TABBY Reyes.O.TCARLOS.        CGA CGA   due to decreased strength/endurance    Continue goal                  Problem: OT Long Term Goals       Dates: Start: 12/03/22         Goal: LTG-By discharge, patient will complete basic self care tasks with supervision using AE/AD       Dates: Start: 12/03/22            Goal: LTG-By discharge, patient will perform bathroom transfers with supervision using AE/AD       Dates: Start: 12/03/22               Problem: Toileting       Dates: Start: 12/03/22         Goal: STG-Within one week, patient will complete toileting tasks with SBA       Dates: Start: 12/03/22         Goal Note filed on 12/07/22 1019 by TABBY Reyes.O.T.[email or web address]        CGA   due to decreased strength/endurance    Continue goal

## 2022-12-07 NOTE — PROGRESS NOTES
"Rehab Progress Note     Date of Service: 12/7/2022  Chief Complaint: follow up debility    Interval Events (Subjective)    Patient seen and examined today in her room.   He has been having urinary frequency, urgency, and difficulty emptying his bladder since yesterday. He denies any dysuria. Had a mild low grade temp this morning. Per therapy staff, is more tired and more impaired compared to Monday, concerning for infection.     Patient has no other new questions, concerns, or complaints today.       ROS: No changes to bowel, pain, mood, or sleep.             Objective:  VITAL SIGNS: /72   Pulse 73   Temp 37.6 °C (99.6 °F) (Oral)   Resp 17   Ht 1.727 m (5' 8\")   Wt 74 kg (163 lb 2.3 oz)   SpO2 92%   BMI 24.81 kg/m²   Gen: alert, no apparent distress  CV: Regular rate, regular rhythm  Resp: Clear to auscultation bilaterally  Neuro: hard of hearing, non-focal      No results found for this or any previous visit (from the past 72 hour(s)).      Current Facility-Administered Medications   Medication Frequency    QUEtiapine (Seroquel) tablet 50 mg Nightly    hydrOXYzine HCl (ATARAX) tablet 50 mg Q6HRS PRN    melatonin tablet 3 mg HS PRN    Respiratory Therapy Consult Continuous RT    acetaminophen (Tylenol) tablet 650 mg Q4HRS PRN    lactulose 20 GM/30ML solution 30 mL QDAY PRN    docusate sodium (ENEMEEZ) enema 283 mg QDAY PRN    omeprazole (PRILOSEC) capsule 20 mg QAM AC    mag hydrox-al hydrox-simeth (MAALOX PLUS ES or MYLANTA DS) suspension 20 mL Q2HRS PRN    ondansetron (ZOFRAN ODT) dispertab 4 mg 4X/DAY PRN    Or    ondansetron (ZOFRAN) syringe/vial injection 4 mg 4X/DAY PRN    traZODone (DESYREL) tablet 50 mg QHS PRN    sodium chloride (OCEAN) 0.65 % nasal spray 2 Spray PRN    aspirin EC (ECOTRIN) tablet 81 mg DAILY    atorvastatin (LIPITOR) tablet 40 mg Q EVENING    clopidogrel (PLAVIX) tablet 75 mg DAILY    loratadine (CLARITIN) tablet 10 mg DAILY    psyllium (METAMUCIL/KONSYL) 1 Packet BID    " senna-docusate (PERICOLACE or SENOKOT S) 8.6-50 MG per tablet 2 Tablet BID    And    polyethylene glycol/lytes (MIRALAX) PACKET 1 Packet QDAY PRN    And    magnesium hydroxide (MILK OF MAGNESIA) suspension 30 mL QDAY PRN    And    bisacodyl (DULCOLAX) suppository 10 mg QDAY PRN    tamsulosin (FLOMAX) capsule 0.4 mg QHS       Orders Placed This Encounter   Procedures    Diet Order Diet: Cardiac     Standing Status:   Standing     Number of Occurrences:   1     Order Specific Question:   Diet:     Answer:   Cardiac [6]       Assessment:  This patient is a 88 y.o. male admitted for acute inpatient rehabilitation with debility after acute hospital stay for   Transient ischemic attack (TIA).    I led and attended the weekly conference today, and agree with the IDT conference documentation and plan of care as noted below.    Date of conference: 12/7/2022    Goals and barriers: See IDT note.    Biggest barriers: hard of hearing, impaired endurance, impulsive, urinary frequency, moderate cognitive impairment, fatigue, stairs at home    CM/social support: wife supportive    Anticipated DC date: 12/17    Home health: PT/OT/SLP/RN    Equip: none needed    Follow up: PCP, stroke bridge clinic, IR        Problem List/Medical Decision Making and Plan:    Debility, improved  Moderate cognitive impairment  Continue full rehab program  PT/OT/SLP, 1 hr each discipline, 5 days per week  12/6: SLP decrease to 30 min, share other 30 min with PT/OT    TIA  Plavix and aspirin  Outpatient follow up with stroke bridge clinic, referral made    Encephalopathy, improved  Sundowning, improved  Occurred during last hospitalization as well  Multi-factorial - history of stroke, hard of hearing  Continue Seroquel at night for now, will titrate down - decrease to 75 mg 12/5 --> 50 mg 12/6 --> 25 mg 12/7     Carotid stenosis  S/p left stent 11/25 Dr. Norm Hollins for 8 weeks  Aspirin indefinitely  Outpatient follow up with IR, referral made      Uvulitis, resolved  Completed Augmentin     Hypotension, intermittent  Monitor need to add back medication lisinopril     Hematuria, resolved  From traumatic catheter pulling  Removed Bhatia catheter 12/6    Urinary frequency/urgency  Check UA     Allergies/rhinorrhea   Claritin     Anemia  Improved    Azotemia  Improved  Increase oral fluids    Bowel program  Constipation, resolved  Continue bowel meds and metamucil -  home medication  Last BM 12/5    Bladder program  BPH  Continue Flomax  Removed Bhatia 12/6  Check PVRs - 69  IC for over 400 cc    DVT prophylaxis  Contra-indicated due to recent hematuria  Patient walking with therapy  Continue to increase mobility  Monitor for edema    Total time:  40 minutes.  I spent greater than 50% of the time for patient care, counseling, and coordination on this date, including patient face-to face time, unit/floor time with review of records/pertinent lab data and studies, as well as discussing diagnostic evaluation/work up, planned therapeutic interventions, and future disposition of care, as per the interval events/subjective and the assessment and plan as noted above.        Sugey Barkley M.D.  Physical Medicine and Rehabilitation

## 2022-12-07 NOTE — THERAPY
"Speech Language Pathology  Daily Treatment     Patient Name: Cooper Harvey  Age:  88 y.o., Sex:  male  Medical Record #: [id number]  Today's Date: 12/7/2022     Precautions  Precautions: Fall Risk  Comments: very [place]  hearing aids .    Subjective    Pt in bed upon arrival, pre PT pt with lower BP during PT session and assisted back to bed.      Objective       12/07/22 0933   Treatment Charges   SLP Cognitive Skill Development First 15 Minutes 1   SLP Cognitive Skill Development Additional 15 Minutes 1   SLP Total Time Spent   SLP Individual Total Time Spent (Mins) 30       Assessment  Pt with flat affect and limited engagement. Vague responses given to questions asked by SLP. Eye[employer] application used throughout session for communication due to severe [place] despite HA present. Pt stating \"everything is screwed up\" max cues to expand and describe what he was meaning, pt reporting \"my legs dont want to work.\" Vitals rechecked - /64 HR 74 O2 95. Pt willing to attempt therapy at bedside however pt required max cues for completion of simple functional math, task discontinued due to difficulty at this time. Suspect fatigue is contributing factor at this time. RN aware and to continue to monitor.     Strengths: Able to follow instructions, Motivated for self care and independence, Pleasant and cooperative, Willingly participates in therapeutic activities  Barriers: Hearing impairment, Impaired functional cognition    Plan    Attention, problem solving and safety    Speech Therapy Problems (Active)       Problem: Expression STGs       Dates: Start: 12/03/22         Goal: STG-Within one week, patient will complete word finding tasks with 90% accuracy given min verbal cues.         Dates: Start: 12/03/22               Problem: Memory STGs       Dates: Start: 12/03/22         Goal: STG-Within one week, patient will recall daily events, and safety strategies, given min verbal cues.         Dates: Start: 12/03/22       "         Problem: Problem Solving STGs       Dates: Start: 12/03/22         Goal: STG-Within one week, patient will score 25 or greater on the O-log over 2 sessions.         Dates: Start: 12/03/22            Goal: STG-Within one week, patient will complete basic attn tasks with 80% accuracy given min verbal cues.         Dates: Start: 12/03/22            Goal: STG-Within one week, patient will complete SCCAN       Dates: Start: 12/03/22               Problem: Speech/Swallowing LTGs       Dates: Start: 12/03/22         Goal: LTG-By discharge, patient will solve basic problems Markie for safe discharge home.         Dates: Start: 12/03/22

## 2022-12-08 LAB
APPEARANCE UR: CLEAR
BACTERIA #/AREA URNS HPF: ABNORMAL /HPF
BILIRUB UR QL STRIP.AUTO: NEGATIVE
COLOR UR: YELLOW
EPI CELLS #/AREA URNS HPF: ABNORMAL /HPF
GLUCOSE UR STRIP.AUTO-MCNC: NEGATIVE MG/DL
HYALINE CASTS #/AREA URNS LPF: ABNORMAL /LPF
KETONES UR STRIP.AUTO-MCNC: NEGATIVE MG/DL
LEUKOCYTE ESTERASE UR QL STRIP.AUTO: ABNORMAL
MICRO URNS: ABNORMAL
NITRITE UR QL STRIP.AUTO: NEGATIVE
PH UR STRIP.AUTO: 6 [PH] (ref 5–8)
PROT UR QL STRIP: NEGATIVE MG/DL
RBC # URNS HPF: ABNORMAL /HPF
RBC UR QL AUTO: ABNORMAL
SP GR UR STRIP.AUTO: 1.02
UROBILINOGEN UR STRIP.AUTO-MCNC: 0.2 MG/DL
WBC #/AREA URNS HPF: ABNORMAL /HPF

## 2022-12-08 PROCEDURE — 97130 THER IVNTJ EA ADDL 15 MIN: CPT

## 2022-12-08 PROCEDURE — 87077 CULTURE AEROBIC IDENTIFY: CPT

## 2022-12-08 PROCEDURE — 99232 SBSQ HOSP IP/OBS MODERATE 35: CPT | Performed by: PHYSICAL MEDICINE & REHABILITATION

## 2022-12-08 PROCEDURE — 87186 SC STD MICRODIL/AGAR DIL: CPT

## 2022-12-08 PROCEDURE — 97116 GAIT TRAINING THERAPY: CPT | Mod: CQ

## 2022-12-08 PROCEDURE — 97110 THERAPEUTIC EXERCISES: CPT

## 2022-12-08 PROCEDURE — 770010 HCHG ROOM/CARE - REHAB SEMI PRIVAT*

## 2022-12-08 PROCEDURE — 700102 HCHG RX REV CODE 250 W/ 637 OVERRIDE(OP): Performed by: PHYSICAL MEDICINE & REHABILITATION

## 2022-12-08 PROCEDURE — 97535 SELF CARE MNGMENT TRAINING: CPT | Mod: CO

## 2022-12-08 PROCEDURE — 97110 THERAPEUTIC EXERCISES: CPT | Mod: CQ

## 2022-12-08 PROCEDURE — 87086 URINE CULTURE/COLONY COUNT: CPT

## 2022-12-08 PROCEDURE — A9270 NON-COVERED ITEM OR SERVICE: HCPCS | Performed by: PHYSICAL MEDICINE & REHABILITATION

## 2022-12-08 PROCEDURE — 97129 THER IVNTJ 1ST 15 MIN: CPT

## 2022-12-08 PROCEDURE — 97530 THERAPEUTIC ACTIVITIES: CPT | Mod: CQ

## 2022-12-08 RX ORDER — SULFAMETHOXAZOLE AND TRIMETHOPRIM 800; 160 MG/1; MG/1
1 TABLET ORAL EVERY 12 HOURS
Status: DISCONTINUED | OUTPATIENT
Start: 2022-12-08 | End: 2022-12-09

## 2022-12-08 RX ADMIN — ATORVASTATIN CALCIUM 40 MG: 40 TABLET, FILM COATED ORAL at 20:32

## 2022-12-08 RX ADMIN — Medication 1 PACKET: at 08:17

## 2022-12-08 RX ADMIN — TAMSULOSIN HYDROCHLORIDE 0.4 MG: 0.4 CAPSULE ORAL at 20:31

## 2022-12-08 RX ADMIN — QUETIAPINE FUMARATE 25 MG: 25 TABLET ORAL at 20:32

## 2022-12-08 RX ADMIN — Medication 1 PACKET: at 20:32

## 2022-12-08 RX ADMIN — SENNOSIDES AND DOCUSATE SODIUM 2 TABLET: 50; 8.6 TABLET ORAL at 20:31

## 2022-12-08 RX ADMIN — OMEPRAZOLE 20 MG: 20 CAPSULE, DELAYED RELEASE ORAL at 08:14

## 2022-12-08 RX ADMIN — CLOPIDOGREL BISULFATE 75 MG: 75 TABLET ORAL at 08:14

## 2022-12-08 RX ADMIN — LORATADINE 10 MG: 10 TABLET ORAL at 08:14

## 2022-12-08 RX ADMIN — SULFAMETHOXAZOLE AND TRIMETHOPRIM 1 TABLET: 800; 160 TABLET ORAL at 20:31

## 2022-12-08 RX ADMIN — TRAZODONE HYDROCHLORIDE 50 MG: 50 TABLET ORAL at 20:32

## 2022-12-08 RX ADMIN — ASPIRIN 81 MG: 81 TABLET, COATED ORAL at 08:14

## 2022-12-08 RX ADMIN — SENNOSIDES AND DOCUSATE SODIUM 2 TABLET: 50; 8.6 TABLET ORAL at 08:14

## 2022-12-08 RX ADMIN — SULFAMETHOXAZOLE AND TRIMETHOPRIM 1 TABLET: 800; 160 TABLET ORAL at 10:06

## 2022-12-08 ASSESSMENT — GAIT ASSESSMENTS
ASSISTIVE DEVICE: FRONT WHEEL WALKER
GAIT LEVEL OF ASSIST: STANDBY ASSIST
DEVIATION: BRADYKINETIC;SHUFFLED GAIT
ASSISTIVE DEVICE: FRONT WHEEL WALKER
GAIT LEVEL OF ASSIST: STANDBY ASSIST

## 2022-12-08 NOTE — THERAPY
Speech Language Pathology  Daily Treatment     Patient Name: Cooper Harvey  Age:  88 y.o., Sex:  male  Medical Record #: [id number]  Today's Date: 12/8/2022     Precautions  Precautions: Fall Risk  Comments: hearing aids    Subjective    Pt pleasant and cooperative. Lethargic but willing to participate at bedside.      Objective       12/08/22 1433   Treatment Charges   SLP Cognitive Skill Development First 15 Minutes 1   SLP Cognitive Skill Development Additional 15 Minutes 1   SLP Total Time Spent   SLP Individual Total Time Spent (Mins) 30       Assessment    Pt pleasant and cooperative. Orientation log completed with decreased score of 13/30 achieved on this date, prior score was 23/30. Pt with current UTI and on medications. Update provided to pt. Pt verbalizing understanding. Pt requesting to get into bed at end of therapy session.     Strengths: Pleasant and cooperative, Supportive family, Able to follow instructions  Barriers: Impaired functional cognition, Impaired carryover of learning, Lack of motivation, Impaired activity tolerance    Plan    Cont olog, simple attention, functional problem solving     Speech Therapy Problems (Active)       Problem: Memory STGs       Dates: Start: 12/03/22         Goal: STG-Within one week, patient will recall daily events, and safety strategies, given min verbal cues.         Dates: Start: 12/03/22         Goal Note filed on 12/07/22 1019 by Jeimy Leos MS,CCC-SLP       Mod-max cues                 Problem: Problem Solving STGs       Dates: Start: 12/03/22         Goal: STG-Within one week, patient will score 25 or greater on the O-log over 2 sessions.         Dates: Start: 12/03/22         Goal Note filed on 12/07/22 1019 by Jeimy Leos MS,CCC-SLP       Cont to address highest score achieved 23               Goal: STG-Within one week, patient will complete basic attn tasks with 80% accuracy given min verbal cues.         Dates: Start: 12/03/22          Goal Note filed on 12/07/22 1019 by Jeimy Leos MS,CCC-SLP       Mod cues                 Problem: Speech/Swallowing LTGs       Dates: Start: 12/03/22         Goal: LTG-By discharge, patient will solve basic problems Markie for safe discharge home.         Dates: Start: 12/03/22

## 2022-12-08 NOTE — CARE PLAN
Problem: Bladder / Voiding  Goal: Patient will establish and maintain regular urinary output  Note: Pt. is incontinent of bladder. Pt. Was straight cath and sample was collected. Awaiting results.      The patient is Stable - Low risk of patient condition declining or worsening    Shift Goals  Clinical Goals: Infection prevention  Patient Goals: sleep  Family Goals: \    Patient is not progressing towards the following goals:

## 2022-12-08 NOTE — THERAPY
"Physical Therapy   Daily Treatment     Patient Name: Cooper Harvey  Age:  88 y.o., Sex:  male  Medical Record #: [id number]  Today's Date: 12/8/2022     Precautions  Precautions: Fall Risk  Comments: hearing aids    Subjective    Pt seated in wc, ready for therapy    Pt reports the year is 2001     Objective      [id number]     12/08/22 0931   PT Charge Group   PT Gait Training 1   PT Therapeutic Activities 1   Supervising Physical Therapist Sophia Soni   PT Total Time Spent   PT Individual Total Time Spent (Mins) 30   Pain   Intervention Declines   Gait Functional Level of Assist    Gait Level Of Assist Standby Assist  (to CGA)   Assistive Device Front Wheel Walker   Distance (Feet)   (30, 75', 125')   # of Times Distance was Traveled 3   Deviation Bradykinetic;Shuffled Gait;Decreased Base Of Support;Other (Comment);Decreased Toe Off  (crouched posture, flexed hips/knees, B UE/LE tremors noted with STS transitions)   Wheelchair Functional Level of Assist   Wheelchair Assist Stand by Assist   Distance Wheelchair (Feet or Distance) 130   Wheelchair Description Extra time;Leg rest management;Impaired coordination;Supervision for safety;Safety concerns;Verbal cueing   Sitting Lower Body Exercises   Other Exercises B LE hs stretch in seated 2 x 20\"   Bed Mobility    Sit to Stand Contact Guard Assist  (max cues)   Neuro-Muscular Treatments   Neuro-Muscular Treatments Verbal Cuing;Sequencing;Anterior weight shift;Postural Facilitation   Comments continued STS transfer training focusing on sequenicng and hand placement. completes 5 trials throughout session requiring max VC and hand over hand assistance to place UE on the wc arm rest with each repitition.  B UE/LE tremors noted with STS transitions   Interdisciplinary Plan of Care Collaboration   IDT Collaboration with  Certified O.T. Assistant  ([employer]);Physician   Patient Position at End of Therapy Seated;Self Releasing Lap Belt Applied;Call Light within Reach "   Collaboration Comments CLOANNE         [id number]   12/08/22 1301   PT Charge Group   PT Gait Training 2   PT Therapeutic Exercise 1   PT Therapeutic Activities 1   Supervising Physical Therapist Sophia Soni   PT Total Time Spent   PT Individual Total Time Spent (Mins) 60   Gait Functional Level of Assist    Gait Level Of Assist Standby Assist  (incidental touching)   Assistive Device Front Wheel Walker   Distance (Feet)   (60', 90', 150')   # of Times Distance was Traveled 3   Deviation Bradykinetic;Shuffled Gait  (crouched posture, flexed hips/knees)   Sitting Lower Body Exercises   Nustep Resistance Level 2;Time (See Comments)  (15' B LE/UE for CV endurance 3 short rest breaks)   Bed Mobility    Sit to Stand Minimal Assist   Interdisciplinary Plan of Care Collaboration   IDT Collaboration with  Family / Caregiver;Speech Therapist   Patient Position at End of Therapy Self Releasing Lap Belt Applied;Call Light within Reach;Tray Table within Reach   Collaboration Comments ST: MASOOD, dc planning, wife present at beg of session     Subjective from pm session    Pt agreeable to therapy, his wife is present in the room, we discussed the dc plan, specifically if she would have assistance from one of their adult children upon dc. She reports she may stay with one of her daughters in [place] or one of them mat come to stay with her and eddy in [place]     Assessment    Pt cont with poor carry over, likely due to having a UTI. He is able to amb with close SBA using a FWW, short shuffled steps, poor foot clearance and poor proximity to the walker. Main barriers are dec activity tolerance, generalized weakness, and poor proximity to the walker  Strengths: Able to follow instructions, Effective communication skills, Independent prior level of function, Manages pain appropriately, Motivated for self care and independence, Pleasant and cooperative, Supportive family, Willingly participates in therapeutic activities  Barriers:  Decreased endurance, Fatigue, Generalized weakness, Home accessibility, Impaired activity tolerance, Impaired balance, Limited mobility    Plan    Cont gait training FWW, standing balance, LE strengthening, consecutive stairs     Passport items to be completed Get in/out of bed safely, in/out of a vehicle, safely use mobility device, walk or wheel around home/community, navigate up and down stairs, show how to get up/down from the ground, ensure home is accessible, demonstrate HEP, complete caregiver training     Physical Therapy Problems (Active)       Problem: Mobility       Dates: Start: 12/03/22         Goal: STG-Within one week, patient will ascend and descend four to six stairs with BHR and CGA       Dates: Start: 12/03/22         Goal Note filed on 12/07/22 1308 by Bhavana Lovell, PTA       CGA to Nicholas(fatigue)                 Problem: PT-Long Term Goals       Dates: Start: 12/03/22         Goal: LTG-By discharge, patient will ambulate x 150 feet with FWW and SPV       Dates: Start: 12/03/22            Goal: LTG-By discharge, patient will transfer one surface to another with FWW and SPV       Dates: Start: 12/03/22            Goal: LTG-By discharge, patient will ambulate up/down 4-6 stairs with one HR and SBA       Dates: Start: 12/03/22            Goal: LTG-By discharge, patient will transfer in/out of a car with FWW and SBA       Dates: Start: 12/03/22

## 2022-12-08 NOTE — DISCHARGE PLANNING
SAMANTHA.  IDt held today; frederick sate set for 1217/  tc to pt's spouse [place]  - no available and no voice mail.     CM will follow up with spouse.

## 2022-12-08 NOTE — CARE PLAN
The patient is Stable - Low risk of patient condition declining or worsening    Shift Goals  Clinical Goals: Infection Control  Patient Goals: sleep  Family Goals: \    Progress made toward(s) clinical / shift goals:    Problem: Infection  Goal: Patient will remain free from infection  Outcome: Not Progressing    Patient is not progressing towards the following goals:    Patient in PO ABT therapy for UTI, no s/s of any adverse reaction noted. Will continue to monitor.

## 2022-12-08 NOTE — PROGRESS NOTES
"Rehab Progress Note     Date of Service: 12/8/2022  Chief Complaint: follow up debility    Interval Events (Subjective)    Patient seen and examined today in the therapy gym.  He is just finished ambulation with physical therapy.  His urinalysis did come back showing white blood cell counts for which she has been started on Bactrim for cystitis.  Therapy reports continued to have impaired function as compared to prior to this infection.  Patient denies any pain.    Patient has no other new questions, concerns, or complaints today.         ROS: No changes to bowel, pain, mood, or sleep.                Objective:  VITAL SIGNS: /56   Pulse 84   Temp 37.4 °C (99.3 °F) (Oral)   Resp 17   Ht 1.727 m (5' 8\")   Wt 74 kg (163 lb 2.3 oz)   SpO2 93%   BMI 24.81 kg/m²   Gen: alert, no apparent distress  CV: Regular rate, regular rhythm  Resp: Clear to auscultation bilaterally  Neuro: Nonfocal, hard of hearing      Recent Results (from the past 72 hour(s))   URINALYSIS    Collection Time: 12/07/22  5:05 PM    Specimen: Urine, Cath   Result Value Ref Range    Color Yellow     Character Clear     Specific Gravity 1.025 <1.035    Ph 6.0 5.0 - 8.0    Glucose Negative Negative mg/dL    Ketones Negative Negative mg/dL    Protein Negative Negative mg/dL    Bilirubin Negative Negative    Urobilinogen, Urine 0.2 Negative    Nitrite Negative Negative    Leukocyte Esterase Small (A) Negative    Occult Blood Large (A) Negative    Micro Urine Req Microscopic    URINE MICROSCOPIC (W/UA)    Collection Time: 12/07/22  5:05 PM   Result Value Ref Range    WBC 20-50 (A) /hpf    RBC 10-20 (A) /hpf    Bacteria Few (A) None /hpf    Epithelial Cells Few /hpf    Hyaline Cast 0-2 /lpf         Current Facility-Administered Medications   Medication Frequency    sulfamethoxazole-trimethoprim (BACTRIM DS) 800-160 MG tablet 1 Tablet Q12HRS    QUEtiapine (Seroquel) tablet 25 mg Nightly    hydrOXYzine HCl (ATARAX) tablet 50 mg Q6HRS PRN    " melatonin tablet 3 mg HS PRN    Respiratory Therapy Consult Continuous RT    acetaminophen (Tylenol) tablet 650 mg Q4HRS PRN    lactulose 20 GM/30ML solution 30 mL QDAY PRN    docusate sodium (ENEMEEZ) enema 283 mg QDAY PRN    omeprazole (PRILOSEC) capsule 20 mg QAM AC    mag hydrox-al hydrox-simeth (MAALOX PLUS ES or MYLANTA DS) suspension 20 mL Q2HRS PRN    ondansetron (ZOFRAN ODT) dispertab 4 mg 4X/DAY PRN    Or    ondansetron (ZOFRAN) syringe/vial injection 4 mg 4X/DAY PRN    traZODone (DESYREL) tablet 50 mg QHS PRN    sodium chloride (OCEAN) 0.65 % nasal spray 2 Spray PRN    aspirin EC (ECOTRIN) tablet 81 mg DAILY    atorvastatin (LIPITOR) tablet 40 mg Q EVENING    clopidogrel (PLAVIX) tablet 75 mg DAILY    loratadine (CLARITIN) tablet 10 mg DAILY    psyllium (METAMUCIL/KONSYL) 1 Packet BID    senna-docusate (PERICOLACE or SENOKOT S) 8.6-50 MG per tablet 2 Tablet BID    And    polyethylene glycol/lytes (MIRALAX) PACKET 1 Packet QDAY PRN    And    magnesium hydroxide (MILK OF MAGNESIA) suspension 30 mL QDAY PRN    And    bisacodyl (DULCOLAX) suppository 10 mg QDAY PRN    tamsulosin (FLOMAX) capsule 0.4 mg QHS       Orders Placed This Encounter   Procedures    Diet Order Diet: Cardiac     Standing Status:   Standing     Number of Occurrences:   1     Order Specific Question:   Diet:     Answer:   Cardiac [6]       Assessment:  This patient is a 88 y.o. male admitted for acute inpatient rehabilitation with debility after acute hospital stay for   Transient ischemic attack (TIA).    I led and attended the weekly conference, and agree with the IDT conference documentation and plan of care as noted below.    Date of conference: 12/7/2022    Goals and barriers: See IDT note.    Biggest barriers: hard of hearing, impaired endurance, impulsive, urinary frequency, moderate cognitive impairment, fatigue, stairs at home    CM/social support: wife supportive    Anticipated DC date: 12/17    Home health:  PT/OT/SLP/RN    Equip: none needed    Follow up: PCP, stroke bridge clinic, IR        Problem List/Medical Decision Making and Plan:    Debility, improved  Moderate cognitive impairment  Continue full rehab program  PT/OT/SLP, 1 hr each discipline, 5 days per week  12/6: SLP decrease to 30 min, share other 30 min with PT/OT    TIA  Plavix and aspirin  Outpatient follow up with stroke bridge clinic, referral made    Encephalopathy, improved  Sundowning, improved  Occurred during last hospitalization as well  Multi-factorial - history of stroke, hard of hearing  Continue Seroquel at night for now, will titrate down - decrease to 75 mg 12/5 --> 50 mg 12/6 --> 25 mg 12/7     Carotid stenosis  S/p left stent 11/25 Dr. Norm Hollins for 8 weeks  Aspirin indefinitely  Outpatient follow up with IR, referral made     Uvulitis, resolved  Completed Augmentin     Hypotension, intermittent  Monitor need to add back home medication lisinopril     Hematuria, resolved  From traumatic catheter pulling  Removed Bhatia catheter 12/6, voiding    Urinary frequency/urgency  + UA, culture ordered, started on Bactrim monitor with intermittent labs     Allergies/rhinorrhea   Claritin     Anemia  Improved    Azotemia  Improved  Increase oral fluids  Monitor with intermittent labs    Bowel program  Constipation, resolved  Continue bowel meds and metamucil -  home medication  Last BM 12/6    Bladder program  BPH  Continue Flomax  Removed Bhatia 12/6  Check PVRs - 69, 97  IC for over 400 cc    DVT prophylaxis  Contra-indicated due to recent hematuria  Patient walking with therapy  Continue to increase mobility  Monitor for edema    Total time:  28 minutes.  I spent greater than 50% of the time for patient care, counseling, and coordination on this date, including patient face-to face time, unit/floor time with review of records/pertinent lab data and studies, as well as discussing diagnostic evaluation/work up, planned therapeutic interventions, and  future disposition of care, as per the interval events/subjective and the assessment and plan as noted above.    I have performed a physical exam, reviewed and updated ROS, as well as the assessment and plan today 12/8/2022. In review of note from 12/7/2022 there are no new changes except as documented above.            Sugey Barkley M.D.  Physical Medicine and Rehabilitation

## 2022-12-08 NOTE — CARE PLAN
The patient is Stable - Low risk of patient condition declining or worsening    Shift Goals  Clinical Goals: safety  Patient Goals: sleep  Family Goals: \    Progress made toward(s) clinical / shift goals:  progressing      Patient is not progressing towards the following goals:

## 2022-12-08 NOTE — THERAPY
Physical Therapy   Daily Treatment     Patient Name: Cooper Harvey  Age:  88 y.o., Sex:  male  Medical Record #: [id number]  Today's Date: 12/7/2022     Precautions  Precautions: Fall Risk  Comments: hearing aids    Subjective    Pt received on toilet with RN assisting, agreeable to participate. Reported feeling less fatigued than he did this am.     Objective       12/07/22 1401   PT Charge Group   PT Therapeutic Exercise 1   PT Therapeutic Activities 1   PT Total Time Spent   PT Individual Total Time Spent (Mins) 30   Cognition    New Learning Impaired   Sequencing Impaired   Initiation Impaired   Comments poor carryover in novel environments   Transfer Functional Level of Assist   Bed, Chair, Wheelchair Transfer Contact Guard Assist   Bed Chair Wheelchair Transfer Description Adaptive equipment;Increased time;Set-up of equipment;Supervision for safety;Verbal cueing  (stand pivot wc>bed with bed rail)   Toilet Transfers Standby Assist   Toilet Transfer Description Adaptive equipment;Grab bar;Increased time;Set-up of equipment;Supervision for safety;Verbal cueing  (wc<>toilet)   Sitting Lower Body Exercises   Sit to Stand 1 set of 10  (limited by urinary urgency)   Bed Mobility    Sit to Supine Standby Assist   Sit to Stand Contact Guard Assist   Scooting Standby Assist   Interdisciplinary Plan of Care Collaboration   IDT Collaboration with  Physical Therapist Assistant (PTA);Nursing;Physician   Patient Position at End of Therapy In Bed;Call Light within Reach;Tray Table within Reach;Phone within Reach  (with physician in room)   Collaboration Comments CLOF/POC with primary PTA, notified by RN of urinary frequency, physician on rounds and aware of urinary frequency     No urine output during urinary frequency episode within session so unable to collect urine sample.  Performed sit<>stand x2 times at EOB with poor carryover for hand placement from blocked practice earlier in session.    Assessment    Time for  therapeutic activities was limited this session d/t urinary frequency, pt received on toilet and requested to use toilet again during half hour session. Pt with poor carryover for sit<>stand technique within session when in novel environments, ie wc vs EOB.    Strengths: Able to follow instructions, Effective communication skills, Independent prior level of function, Manages pain appropriately, Motivated for self care and independence, Pleasant and cooperative, Supportive family, Willingly participates in therapeutic activities  Barriers: Decreased endurance, Fatigue, Generalized weakness, Home accessibility, Impaired activity tolerance, Impaired balance, Limited mobility    Plan    Cont gait training FWW, standing balance, LE strengthening, consecutive stairs     Passport items to be completed Get in/out of bed safely, in/out of a vehicle, safely use mobility device, walk or wheel around home/community, navigate up and down stairs, show how to get up/down from the ground, ensure home is accessible, demonstrate HEP, complete caregiver training     Physical Therapy Problems (Active)       Problem: Mobility       Dates: Start: 12/03/22         Goal: STG-Within one week, patient will ascend and descend four to six stairs with BHR and CGA       Dates: Start: 12/03/22         Goal Note filed on 12/07/22 1308 by Bhavana Lovell, PTA       CGA to Nicholas(fatigue)                 Problem: PT-Long Term Goals       Dates: Start: 12/03/22         Goal: LTG-By discharge, patient will ambulate x 150 feet with FWW and SPV       Dates: Start: 12/03/22            Goal: LTG-By discharge, patient will transfer one surface to another with FWW and SPV       Dates: Start: 12/03/22            Goal: LTG-By discharge, patient will ambulate up/down 4-6 stairs with one HR and SBA       Dates: Start: 12/03/22            Goal: LTG-By discharge, patient will transfer in/out of a car with FWW and SBA       Dates: Start: 12/03/22

## 2022-12-08 NOTE — THERAPY
"Occupational Therapy  Daily Treatment     Patient Name: Cooper Harvey  Age:  88 y.o., Sex:  male  Medical Record #: [id number]  Today's Date: 12/8/2022     Precautions  Precautions: (P) Fall Risk  Comments: (P) Hearing aids.         Subjective    Agreeable to tx activity presented this session   \" I'm tired.\" Stated at end of session      Objective       12/08/22 0702   OT Charge Group   OT Self Care / ADL 4   OT Total Time Spent   OT Individual Total Time Spent (Mins) 60   Precautions   Precautions Fall Risk   Comments Hearing aids.   Functional Level of Assist   Grooming Minimal Assist  (wash face oral care   direct supervision  cues to perform tasks  Shaving with electric razor required  mod assist)   Upper Body Dressing Moderate Assist  (extra time to problem solve doffing of pull over shirt . donning required mod assist   patient had  right elbow flexed and   stuck in sleeve of shirt when left unattended to don the shirt.)   Lower Body Dressing Maximal Assist  (socks total assist  shoes  max assist  pants    min / mod assist)   Toileting Minimal Assist  (assist to pull pants up   due to being \"shakey\")   Bed, Chair, Wheelchair Transfer Contact Guard Assist  (UE and LE tremors noted with supine to sit and sit to stand. once up on feet  they faded.)   Toilet Transfers Contact Guard Assist   Interdisciplinary Plan of Care Collaboration   IDT Collaboration with  Nursing   Patient Position at End of Therapy Seated;Call Light within Reach;Tray Table within Reach;Chair Alarm On   Collaboration Comments patient  with tremors this AM  during various task performance   supine to sit   toileting and dressing tasks   required increased assist overall today .       Assessment    Required increased assist   overall with ADLs  mobility and transfer.   Presenting with tremors/ shakiness  at various times throughout the session   Nursing  notified.     Strengths: Able to follow instructions, Alert and oriented, " Independent prior level of function, Manages pain appropriately, Motivated for self care and independence, Pleasant and cooperative, Supportive family, Willingly participates in therapeutic activities  Barriers: Decreased endurance, Fatigue, Generalized weakness, Home accessibility, Hypertension, Impaired activity tolerance, Impaired balance, Impaired functional cognition, Limited mobility (Hard of hearing)    Plan    ADL  related mobility   strength/endurance building  standing tolerance and balance activity            Occupational Therapy Goals (Active)       Problem: Dressing       Dates: Start: 12/03/22         Goal: STG-Within one week, patient will dress LB with min A       Dates: Start: 12/03/22         Goal Note filed on 12/07/22 1019 by Darío Rios C.O.T.A.       Max assist LB dressing      Has been limited by time constraints  decreased strength/endurance                   Problem: Functional Transfers       Dates: Start: 12/03/22         Goal: STG-Within one week, patient will transfer to toilet supervised with grab bar       Dates: Start: 12/03/22         Goal Note filed on 12/07/22 1019 by Darío Rios C.O.T.A.        CGA CGA   due to decreased strength/endurance    Continue goal                  Problem: OT Long Term Goals       Dates: Start: 12/03/22         Goal: LTG-By discharge, patient will complete basic self care tasks with supervision using AE/AD       Dates: Start: 12/03/22            Goal: LTG-By discharge, patient will perform bathroom transfers with supervision using AE/AD       Dates: Start: 12/03/22               Problem: Toileting       Dates: Start: 12/03/22         Goal: STG-Within one week, patient will complete toileting tasks with SBA       Dates: Start: 12/03/22         Goal Note filed on 12/07/22 1019 by Darío Rios C.O.T.A.        CGA   due to decreased strength/endurance    Continue goal

## 2022-12-08 NOTE — DISCHARGE PLANNING
Spoke with family in lobby today. Daughters Angela [phone], Juju [phone] and wife. Discussed discharge planning etc. Family requested CM call china Matute with updates.

## 2022-12-08 NOTE — THERAPY
"Occupational Therapy  Daily Treatment     Patient Name: Cooper Harvey  Age:  88 y.o., Sex:  male  Medical Record #: [id number]  Today's Date: 12/8/2022     Precautions  Precautions: (P) Fall Risk  Comments: (P) hearing aids         Subjective    \"Where is Darío?\"     Objective       12/08/22 1101   OT Charge Group   OT Therapeutic Exercise  2   OT Total Time Spent   OT Individual Total Time Spent (Mins) 30   Precautions   Precautions Fall Risk   Comments hearing aids   Sitting Upper Body Exercises   Upper Extremity Bike Level 3 Resistance  (10 min, 0 RB, 1.17 miles)   Sitting Lower Body Exercises   Sit to Stand 1 set of 10  (// bars)   Interdisciplinary Plan of Care Collaboration   IDT Collaboration with  Family / Caregiver   Patient Position at End of Therapy Seated;Tray Table within Reach;Call Light within Reach;Phone within Reach   Collaboration Comments family present at beginning of session   2 sets of 10 alternating toe taps on ground level marker in // bars.     Assessment    Pt tolerated session well focused on endurance and standing tolerance. Pt demonstrated slight shakiness while standing in // bars.   Strengths: Able to follow instructions, Alert and oriented, Independent prior level of function, Manages pain appropriately, Motivated for self care and independence, Pleasant and cooperative, Supportive family, Willingly participates in therapeutic activities  Barriers: Decreased endurance, Fatigue, Generalized weakness, Home accessibility, Hypertension, Impaired activity tolerance, Impaired balance, Impaired functional cognition, Limited mobility (Hard of hearing)    Plan    ADL  related mobility   strength/endurance building  standing tolerance and balance activity      Occupational Therapy Goals (Active)       Problem: Dressing       Dates: Start: 12/03/22         Goal: STG-Within one week, patient will dress LB with min A       Dates: Start: 12/03/22         Goal Note filed on 12/07/22 1019 by " ISIDRO ReyesOBELLA       Max assist LB dressing      Has been limited by time constraints  decreased strength/endurance                   Problem: Functional Transfers       Dates: Start: 12/03/22         Goal: STG-Within one week, patient will transfer to toilet supervised with grab bar       Dates: Start: 12/03/22         Goal Note filed on 12/07/22 1019 by ELIZA ReyesTBRAD        CGA CGA   due to decreased strength/endurance    Continue goal                  Problem: OT Long Term Goals       Dates: Start: 12/03/22         Goal: LTG-By discharge, patient will complete basic self care tasks with supervision using AE/AD       Dates: Start: 12/03/22            Goal: LTG-By discharge, patient will perform bathroom transfers with supervision using AE/AD       Dates: Start: 12/03/22               Problem: Toileting       Dates: Start: 12/03/22         Goal: STG-Within one week, patient will complete toileting tasks with SBA       Dates: Start: 12/03/22         Goal Note filed on 12/07/22 1019 by TABBY [email or web address]        CGA   due to decreased strength/endurance    Continue goal

## 2022-12-09 ENCOUNTER — APPOINTMENT (OUTPATIENT)
Dept: RADIOLOGY | Facility: REHABILITATION | Age: 87
DRG: 949 | End: 2022-12-09
Attending: PHYSICAL MEDICINE & REHABILITATION
Payer: MEDICARE

## 2022-12-09 LAB
ANION GAP SERPL CALC-SCNC: 9 MMOL/L (ref 7–16)
BASOPHILS # BLD AUTO: 0.6 % (ref 0–1.8)
BASOPHILS # BLD: 0.08 K/UL (ref 0–0.12)
BUN SERPL-MCNC: 27 MG/DL (ref 8–22)
CALCIUM SERPL-MCNC: 8.7 MG/DL (ref 8.5–10.5)
CHLORIDE SERPL-SCNC: 104 MMOL/L (ref 96–112)
CO2 SERPL-SCNC: 22 MMOL/L (ref 20–33)
CREAT SERPL-MCNC: 1.16 MG/DL (ref 0.5–1.4)
EOSINOPHIL # BLD AUTO: 0.2 K/UL (ref 0–0.51)
EOSINOPHIL NFR BLD: 1.5 % (ref 0–6.9)
ERYTHROCYTE [DISTWIDTH] IN BLOOD BY AUTOMATED COUNT: 46 FL (ref 35.9–50)
FLUAV RNA SPEC QL NAA+PROBE: NEGATIVE
FLUBV RNA SPEC QL NAA+PROBE: NEGATIVE
GFR SERPLBLD CREATININE-BSD FMLA CKD-EPI: 60 ML/MIN/1.73 M 2
GLUCOSE SERPL-MCNC: 93 MG/DL (ref 65–99)
HCT VFR BLD AUTO: 35.8 % (ref 42–52)
HGB BLD-MCNC: 12 G/DL (ref 14–18)
IMM GRANULOCYTES # BLD AUTO: 0.06 K/UL (ref 0–0.11)
IMM GRANULOCYTES NFR BLD AUTO: 0.5 % (ref 0–0.9)
LYMPHOCYTES # BLD AUTO: 0.55 K/UL (ref 1–4.8)
LYMPHOCYTES NFR BLD: 4.2 % (ref 22–41)
MCH RBC QN AUTO: 31.3 PG (ref 27–33)
MCHC RBC AUTO-ENTMCNC: 33.5 G/DL (ref 33.7–35.3)
MCV RBC AUTO: 93.5 FL (ref 81.4–97.8)
MONOCYTES # BLD AUTO: 1.11 K/UL (ref 0–0.85)
MONOCYTES NFR BLD AUTO: 8.4 % (ref 0–13.4)
NEUTROPHILS # BLD AUTO: 11.15 K/UL (ref 1.82–7.42)
NEUTROPHILS NFR BLD: 84.8 % (ref 44–72)
NRBC # BLD AUTO: 0 K/UL
NRBC BLD-RTO: 0 /100 WBC
PLATELET # BLD AUTO: 255 K/UL (ref 164–446)
PMV BLD AUTO: 10.5 FL (ref 9–12.9)
POTASSIUM SERPL-SCNC: 4.3 MMOL/L (ref 3.6–5.5)
RBC # BLD AUTO: 3.83 M/UL (ref 4.7–6.1)
RSV RNA SPEC QL NAA+PROBE: NEGATIVE
SARS-COV-2 RNA RESP QL NAA+PROBE: NOTDETECTED
SODIUM SERPL-SCNC: 135 MMOL/L (ref 135–145)
SPECIMEN SOURCE: NORMAL
WBC # BLD AUTO: 13.2 K/UL (ref 4.8–10.8)

## 2022-12-09 PROCEDURE — 700102 HCHG RX REV CODE 250 W/ 637 OVERRIDE(OP): Performed by: PHYSICAL MEDICINE & REHABILITATION

## 2022-12-09 PROCEDURE — 99233 SBSQ HOSP IP/OBS HIGH 50: CPT | Performed by: PHYSICAL MEDICINE & REHABILITATION

## 2022-12-09 PROCEDURE — 770010 HCHG ROOM/CARE - REHAB SEMI PRIVAT*

## 2022-12-09 PROCEDURE — 97535 SELF CARE MNGMENT TRAINING: CPT | Mod: CO

## 2022-12-09 PROCEDURE — 97530 THERAPEUTIC ACTIVITIES: CPT | Mod: CO

## 2022-12-09 PROCEDURE — A9270 NON-COVERED ITEM OR SERVICE: HCPCS | Performed by: PHYSICAL MEDICINE & REHABILITATION

## 2022-12-09 PROCEDURE — 71045 X-RAY EXAM CHEST 1 VIEW: CPT

## 2022-12-09 PROCEDURE — 85025 COMPLETE CBC W/AUTO DIFF WBC: CPT

## 2022-12-09 PROCEDURE — 80048 BASIC METABOLIC PNL TOTAL CA: CPT

## 2022-12-09 PROCEDURE — 97116 GAIT TRAINING THERAPY: CPT | Mod: CQ

## 2022-12-09 PROCEDURE — 36415 COLL VENOUS BLD VENIPUNCTURE: CPT

## 2022-12-09 PROCEDURE — 0241U HCHG SARS-COV-2 COVID-19 NFCT DS RESP RNA 4 TRGT MIC: CPT

## 2022-12-09 PROCEDURE — 97129 THER IVNTJ 1ST 15 MIN: CPT

## 2022-12-09 PROCEDURE — 97530 THERAPEUTIC ACTIVITIES: CPT | Mod: CQ

## 2022-12-09 PROCEDURE — 97130 THER IVNTJ EA ADDL 15 MIN: CPT

## 2022-12-09 RX ORDER — CEFUROXIME AXETIL 500 MG/1
500 TABLET ORAL EVERY 12 HOURS
Status: COMPLETED | OUTPATIENT
Start: 2022-12-09 | End: 2022-12-13

## 2022-12-09 RX ADMIN — Medication 1 PACKET: at 09:39

## 2022-12-09 RX ADMIN — QUETIAPINE FUMARATE 25 MG: 25 TABLET ORAL at 21:31

## 2022-12-09 RX ADMIN — SULFAMETHOXAZOLE AND TRIMETHOPRIM 1 TABLET: 800; 160 TABLET ORAL at 09:40

## 2022-12-09 RX ADMIN — SENNOSIDES AND DOCUSATE SODIUM 2 TABLET: 50; 8.6 TABLET ORAL at 21:31

## 2022-12-09 RX ADMIN — OMEPRAZOLE 20 MG: 20 CAPSULE, DELAYED RELEASE ORAL at 09:40

## 2022-12-09 RX ADMIN — SENNOSIDES AND DOCUSATE SODIUM 2 TABLET: 50; 8.6 TABLET ORAL at 09:39

## 2022-12-09 RX ADMIN — CLOPIDOGREL BISULFATE 75 MG: 75 TABLET ORAL at 09:40

## 2022-12-09 RX ADMIN — CEFUROXIME AXETIL 500 MG: 500 TABLET ORAL at 11:32

## 2022-12-09 RX ADMIN — LORATADINE 10 MG: 10 TABLET ORAL at 09:40

## 2022-12-09 RX ADMIN — ATORVASTATIN CALCIUM 40 MG: 40 TABLET, FILM COATED ORAL at 21:31

## 2022-12-09 RX ADMIN — ASPIRIN 81 MG: 81 TABLET, COATED ORAL at 09:40

## 2022-12-09 RX ADMIN — TAMSULOSIN HYDROCHLORIDE 0.4 MG: 0.4 CAPSULE ORAL at 21:31

## 2022-12-09 RX ADMIN — CEFUROXIME AXETIL 500 MG: 500 TABLET ORAL at 21:33

## 2022-12-09 RX ADMIN — Medication 1 PACKET: at 21:32

## 2022-12-09 ASSESSMENT — GAIT ASSESSMENTS
DEVIATION: BRADYKINETIC;SHUFFLED GAIT;OTHER (COMMENT)
GAIT LEVEL OF ASSIST: MINIMAL ASSIST
DISTANCE (FEET): 30
ASSISTIVE DEVICE: FRONT WHEEL WALKER

## 2022-12-09 ASSESSMENT — ACTIVITIES OF DAILY LIVING (ADL)
BED_CHAIR_WHEELCHAIR_TRANSFER_DESCRIPTION: ADAPTIVE EQUIPMENT;INCREASED TIME;SET-UP OF EQUIPMENT;SUPERVISION FOR SAFETY;VERBAL CUEING
BED_CHAIR_WHEELCHAIR_TRANSFER_DESCRIPTION: ADAPTIVE EQUIPMENT;INCREASED TIME;SET-UP OF EQUIPMENT;ASSIST WITH TWO LIMBS

## 2022-12-09 ASSESSMENT — PAIN DESCRIPTION - PAIN TYPE: TYPE: ACUTE PAIN

## 2022-12-09 NOTE — PROGRESS NOTES
"Rehab Progress Note     Date of Service: 12/9/2022  Chief Complaint: follow up debility    Interval Events (Subjective)    Patient seen and examined today in his room.  He is working with occupational therapy.  He is very shaky today and continues to decline functionally and cognitively.  Does not appear to be responding to the Bactrim.  We will switch his antibiotics.  Urine culture did return with E. coli.  Speech therapy reports cough so we will also check him for pneumonia COVID and flu.    Patient had a fall yesterday when trying to get up by himself.  No injuries other than a skin tear on his left arm.    Updated wife and daughter who were present.    Patient has no other new questions, concerns, or complaints today.       ROS: No changes to bowel, bladder, pain, mood, or sleep.     Objective:  VITAL SIGNS: /50   Pulse 80   Temp 36.6 °C (97.8 °F) (Oral)   Resp 17   Ht 1.727 m (5' 8\")   Wt 74 kg (163 lb 2.3 oz)   SpO2 91%   BMI 24.81 kg/m²   Gen: alert, no apparent distress  CV: Regular rate, regular rhythm  Resp: Clear to auscultation bilaterally  Neuro: Hard of hearing, increased confusion, tremor    Recent Results (from the past 72 hour(s))   URINALYSIS    Collection Time: 12/07/22  5:05 PM    Specimen: Urine, Cath   Result Value Ref Range    Color Yellow     Character Clear     Specific Gravity 1.025 <1.035    Ph 6.0 5.0 - 8.0    Glucose Negative Negative mg/dL    Ketones Negative Negative mg/dL    Protein Negative Negative mg/dL    Bilirubin Negative Negative    Urobilinogen, Urine 0.2 Negative    Nitrite Negative Negative    Leukocyte Esterase Small (A) Negative    Occult Blood Large (A) Negative    Micro Urine Req Microscopic    URINE MICROSCOPIC (W/UA)    Collection Time: 12/07/22  5:05 PM   Result Value Ref Range    WBC 20-50 (A) /hpf    RBC 10-20 (A) /hpf    Bacteria Few (A) None /hpf    Epithelial Cells Few /hpf    Hyaline Cast 0-2 /lpf   CBC WITH DIFFERENTIAL    Collection Time: " 12/09/22  6:31 AM   Result Value Ref Range    WBC 13.2 (H) 4.8 - 10.8 K/uL    RBC 3.83 (L) 4.70 - 6.10 M/uL    Hemoglobin 12.0 (L) 14.0 - 18.0 g/dL    Hematocrit 35.8 (L) 42.0 - 52.0 %    MCV 93.5 81.4 - 97.8 fL    MCH 31.3 27.0 - 33.0 pg    MCHC 33.5 (L) 33.7 - 35.3 g/dL    RDW 46.0 35.9 - 50.0 fL    Platelet Count 255 164 - 446 K/uL    MPV 10.5 9.0 - 12.9 fL    Neutrophils-Polys 84.80 (H) 44.00 - 72.00 %    Lymphocytes 4.20 (L) 22.00 - 41.00 %    Monocytes 8.40 0.00 - 13.40 %    Eosinophils 1.50 0.00 - 6.90 %    Basophils 0.60 0.00 - 1.80 %    Immature Granulocytes 0.50 0.00 - 0.90 %    Nucleated RBC 0.00 /100 WBC    Neutrophils (Absolute) 11.15 (H) 1.82 - 7.42 K/uL    Lymphs (Absolute) 0.55 (L) 1.00 - 4.80 K/uL    Monos (Absolute) 1.11 (H) 0.00 - 0.85 K/uL    Eos (Absolute) 0.20 0.00 - 0.51 K/uL    Baso (Absolute) 0.08 0.00 - 0.12 K/uL    Immature Granulocytes (abs) 0.06 0.00 - 0.11 K/uL    NRBC (Absolute) 0.00 K/uL   Basic Metabolic Panel    Collection Time: 12/09/22  6:31 AM   Result Value Ref Range    Sodium 135 135 - 145 mmol/L    Potassium 4.3 3.6 - 5.5 mmol/L    Chloride 104 96 - 112 mmol/L    Co2 22 20 - 33 mmol/L    Glucose 93 65 - 99 mg/dL    Bun 27 (H) 8 - 22 mg/dL    Creatinine 1.16 0.50 - 1.40 mg/dL    Calcium 8.7 8.5 - 10.5 mg/dL    Anion Gap 9.0 7.0 - 16.0   ESTIMATED GFR    Collection Time: 12/09/22  6:31 AM   Result Value Ref Range    GFR (CKD-EPI) 60 >60 mL/min/1.73 m 2         Current Facility-Administered Medications   Medication Frequency    sulfamethoxazole-trimethoprim (BACTRIM DS) 800-160 MG tablet 1 Tablet Q12HRS    QUEtiapine (Seroquel) tablet 25 mg Nightly    hydrOXYzine HCl (ATARAX) tablet 50 mg Q6HRS PRN    melatonin tablet 3 mg HS PRN    Respiratory Therapy Consult Continuous RT    acetaminophen (Tylenol) tablet 650 mg Q4HRS PRN    lactulose 20 GM/30ML solution 30 mL QDAY PRN    docusate sodium (ENEMEEZ) enema 283 mg QDAY PRN    omeprazole (PRILOSEC) capsule 20 mg QAM AC    mag  hydrox-al hydrox-simeth (MAALOX PLUS ES or MYLANTA DS) suspension 20 mL Q2HRS PRN    ondansetron (ZOFRAN ODT) dispertab 4 mg 4X/DAY PRN    Or    ondansetron (ZOFRAN) syringe/vial injection 4 mg 4X/DAY PRN    traZODone (DESYREL) tablet 50 mg QHS PRN    sodium chloride (OCEAN) 0.65 % nasal spray 2 Spray PRN    aspirin EC (ECOTRIN) tablet 81 mg DAILY    atorvastatin (LIPITOR) tablet 40 mg Q EVENING    clopidogrel (PLAVIX) tablet 75 mg DAILY    loratadine (CLARITIN) tablet 10 mg DAILY    psyllium (METAMUCIL/KONSYL) 1 Packet BID    senna-docusate (PERICOLACE or SENOKOT S) 8.6-50 MG per tablet 2 Tablet BID    And    polyethylene glycol/lytes (MIRALAX) PACKET 1 Packet QDAY PRN    And    magnesium hydroxide (MILK OF MAGNESIA) suspension 30 mL QDAY PRN    And    bisacodyl (DULCOLAX) suppository 10 mg QDAY PRN    tamsulosin (FLOMAX) capsule 0.4 mg QHS       Orders Placed This Encounter   Procedures    Diet Order Diet: Cardiac     Standing Status:   Standing     Number of Occurrences:   1     Order Specific Question:   Diet:     Answer:   Cardiac [6]       Assessment:  This patient is a 88 y.o. male admitted for acute inpatient rehabilitation with debility after acute hospital stay for   Transient ischemic attack (TIA).    I led and attended the weekly conference, and agree with the IDT conference documentation and plan of care as noted below.    Date of conference: 12/7/2022    Goals and barriers: See IDT note.    Biggest barriers: hard of hearing, impaired endurance, impulsive, urinary frequency, moderate cognitive impairment, fatigue, stairs at home    CM/social support: wife supportive    Anticipated DC date: 12/17    Home health: PT/OT/SLP/RN    Equip: none needed    Follow up: PCP, stroke bridge clinic, IR        Problem List/Medical Decision Making and Plan:    Debility, improved  Moderate cognitive impairment  Continue full rehab program  PT/OT/SLP, 1 hr each discipline, 5 days per week  12/6: SLP decrease to 30 min,  share other 30 min with PT/OT    TIA  Plavix and aspirin  Outpatient follow up with stroke bridge clinic, referral made    Encephalopathy, improved  Sundowning, improved  Occurred during last hospitalization as well  Multi-factorial - history of stroke, hard of hearing  Continue Seroquel at night for now, will titrate down - decrease to 75 mg 12/5 --> 50 mg 12/6 --> 25 mg 12/7     Carotid stenosis  S/p left stent 11/25 Dr. Norm Hollins for 8 weeks  Aspirin indefinitely  Outpatient follow up with IR, referral made     Uvulitis, resolved  Completed Augmentin     History of hypertension  Hypotension, intermittent  Monitor need to add back home medication lisinopril discussed with family today,      Hematuria, resolved  From traumatic catheter pulling  Removed Bhatia catheter 12/6, voiding    Urinary frequency/urgency  Low-grade fever  Leukocytosis  Altered mental status  + UA, culture with EEO E. coli, sensitivities pending, started on Bactrim without much response, switched to cefuroxime  Also check chest x-ray, flu, COVID     Allergies/rhinorrhea   Claritin     Anemia  Improved    Azotemia, stable  Improved  Increase oral fluids  Monitor with intermittent labs    Bowel program  Constipation, resolved  Continue bowel meds and metamucil -  home medication  Last BM 12/9    Bladder program  BPH  Continue Flomax  Removed Bhatia 12/6  Check PVRs - 69, 97  IC for over 400 cc    DVT prophylaxis  Contra-indicated due to recent hematuria  Patient walking with therapy  Continue to increase mobility  Monitor for edema    Total time:  36 minutes.  I spent greater than 50% of the time for patient care, counseling, and coordination on this date, including patient face-to face time, unit/floor time with review of records/pertinent lab data and studies, as well as discussing diagnostic evaluation/work up, planned therapeutic interventions, and future disposition of care, as per the interval events/subjective and the assessment and plan  as noted above.      I have performed a physical exam, reviewed and updated ROS, as well as the assessment and plan today 12/9/2022. In review of note from 12/8/2022 there are no new changes except as documented above.    Sugey Barkley M.D.  Physical Medicine and Rehabilitation

## 2022-12-09 NOTE — THERAPY
"Occupational Therapy  Daily Treatment     Patient Name: Cooper Harvey  Age:  88 y.o., Sex:  male  Medical Record #: [id number]  Today's Date: 12/9/2022     Precautions  Precautions: Fall Risk  Comments: [place], has hearing aids         Subjective    \"I'm very tired today . I don't know what is wrong.\" Reminded patient he had  UTI that is affecting his abilities.      Objective/Assessment       12/09/22 1245   Therapy Missed   Missed Therapy (Minutes) 30   Reason For Missed Therapy Medical - Patient on Hold from Therapy   OT Charge Group   OT Self Care / ADL 1   OT Therapy Activity 1   OT Total Time Spent   OT Individual Total Time Spent (Mins) 30   Precautions   Precautions Fall Risk   Comments [place], has hearing aids   Functional Level of Assist   Toileting Contact Guard Assist  (standing at toilet attempted to urinate    did not occur)   Bed, Chair, Wheelchair Transfer Contact Guard Assist  (cues for technique)   Toilet Transfers Contact Guard Assist  (w/c to standing at toilet)   Interdisciplinary Plan of Care Collaboration   IDT Collaboration with  Nursing;Other (See Comments);Physician  (therapy supervisor)   Patient Position at End of Therapy In Bed;Call Light within Reach;Tray Table within Reach   Collaboration Comments Dr Barkley  approved hold order  patient unable to tolerate  particiaption in activity beyond  toileting and transfer back to bed.     Attempted continued tx after toileting but he was unable to focus on  task  presented with UE and LE termor with intentional movement    collaborated with Dr Barkley regarding hold for remainder of tx session           Strengths: Able to follow instructions, Alert and oriented, Independent prior level of function, Manages pain appropriately, Motivated for self care and independence, Pleasant and cooperative, Supportive family, Willingly participates in therapeutic activities  Barriers: Decreased endurance, Fatigue, Generalized weakness, Home accessibility, " Hypertension, Impaired activity tolerance, Impaired balance, Impaired functional cognition, Limited mobility (Hard of hearing)    Plan    ADL  related mobility   strength/endurance building  standing tolerance and balance activity      Occupational Therapy Goals (Active)       Problem: Dressing       Dates: Start: 12/03/22         Goal: STG-Within one week, patient will dress LB with min A       Dates: Start: 12/03/22         Goal Note filed on 12/07/22 1019 by TABBY Reyes.O.TBRAD       Max assist LB dressing      Has been limited by time constraints  decreased strength/endurance                   Problem: Functional Transfers       Dates: Start: 12/03/22         Goal: STG-Within one week, patient will transfer to toilet supervised with grab bar       Dates: Start: 12/03/22         Goal Note filed on 12/07/22 1019 by TABBY Reyes.O.T.A.        CGA CGA   due to decreased strength/endurance    Continue goal                  Problem: OT Long Term Goals       Dates: Start: 12/03/22         Goal: LTG-By discharge, patient will complete basic self care tasks with supervision using AE/AD       Dates: Start: 12/03/22            Goal: LTG-By discharge, patient will perform bathroom transfers with supervision using AE/AD       Dates: Start: 12/03/22               Problem: Toileting       Dates: Start: 12/03/22         Goal: STG-Within one week, patient will complete toileting tasks with SBA       Dates: Start: 12/03/22         Goal Note filed on 12/07/22 1019 by Darío Rios C.O.T.ATatyana        CGA   due to decreased strength/endurance    Continue goal

## 2022-12-09 NOTE — THERAPY
Physical Therapy   Daily Treatment     Patient Name: Cooper Harvey  Age:  88 y.o., Sex:  male  Medical Record #: [id number]  Today's Date: 12/9/2022     Precautions  Precautions: Fall Risk  Comments: [place], has hearing aids    Subjective    Pt sleeping upon arrival, he required extra time to wake up however he was agreeable to participate in PT     Objective       12/09/22 0701   Therapy Missed   Missed Therapy (Minutes) 30   Reason For Missed Therapy Medical - Patient on Hold from Therapy   PT Charge Group   PT Gait Training 1   PT Therapeutic Activities 2   Supervising Physical Therapist Sophia Soni   PT Total Time Spent   PT Individual Total Time Spent (Mins) 60   Vitals   Pulse 80   Patient BP Position Supine   Blood Pressure  101/50   Room Air Oximetry 92   O2 (LPM) 0   O2 Delivery Device None - Room Air   Pain   Intervention Nurse Notified   Pain 0 - 10 Group   Location Neck   Location Orientation Upper;Posterior   Description Sore   Comfort Goal Comfort with Movement;Perform Activity   Therapist Pain Assessment Post Activity   Cognition    Speech/ Communication Hard of Hearing;Delayed Responses   Orientation Level Not Oriented to Year   Level of Consciousness Alert   Ability To Follow Commands 1 Step   Safety Awareness Impaired;Impulsive   New Learning Impaired   Attention Impaired   Sequencing Impaired   Initiation Impaired   Gait Functional Level of Assist    Gait Level Of Assist Minimal Assist   Assistive Device Front Wheel Walker   Distance (Feet) 30   # of Times Distance was Traveled 2   Deviation Bradykinetic;Shuffled Gait;Other (Comment)  (crouched posture, flexed hips/knees)   Transfer Functional Level of Assist   Bed, Chair, Wheelchair Transfer Minimal Assist   Bed Chair Wheelchair Transfer Description Adaptive equipment;Increased time;Set-up of equipment;Assist with two limbs   Toilet Transfers Minimal Assist  (Nicholas to sit on the toilet modA to stand from the toilet)   Bed Mobility    Supine  to Sit Moderate Assist  (impaired sequencing)   Sit to Supine Minimal Assist   Sit to Stand Minimal Assist   Scooting Minimal Assist   Rolling Minimum Assist to Lt.   Interdisciplinary Plan of Care Collaboration   IDT Collaboration with  Certified Nursing Assistant;Certified O.T. Assistant  (ESCOBEDO);Nursing;Physician;Family / Caregiver;Occupational Therapist   Patient Position at End of Therapy In Bed;Call Light within Reach;Tray Table within Reach   Collaboration Comments CNA assisted with rolling in bed for brief change and 2 person A to scoot pt up in bed, ESCOBEDO: CLOF, RN: pain/CLOF, discussed pt CLOF and change in satus with mobility     Pt seen [id number] and returned again at 1100 -1115    Spoke to pt daughter Constantin and RN and MD re: pt CLOF and decline in functional status. Provided update on level of assistance likely needed upon dc, SBA to SPV with FWW    Assessment    Pt with poor/fair tolerance to PT session, requiring an increased level of assistance with all mobility due to a UTI. MD aware, medical hold sought for remaining therapy for which pt is not appropriate. Daughter and grand daughter present during 2nd PT session therapist provided update on pt CLOF, anticipate need for increased level of SPV upon dc recommendation for FWW vs 4WW    Strengths: Able to follow instructions, Effective communication skills, Independent prior level of function, Manages pain appropriately, Motivated for self care and independence, Pleasant and cooperative, Supportive family, Willingly participates in therapeutic activities  Barriers: Decreased endurance, Fatigue, Generalized weakness, Home accessibility, Impaired activity tolerance, Impaired balance, Limited mobility    Plan    Cont gait training FWW, standing balance, LE strengthening, consecutive stairs     Passport items to be completed Get in/out of bed safely, in/out of a vehicle, safely use mobility device, walk or wheel around home/community, navigate up and down  stairs, show how to get up/down from the ground, ensure home is accessible, demonstrate HEP, complete caregiver training        Physical Therapy Problems (Active)       Problem: Mobility       Dates: Start: 12/03/22         Goal: STG-Within one week, patient will ascend and descend four to six stairs with BHR and CGA       Dates: Start: 12/03/22         Goal Note filed on 12/07/22 1308 by Bhavana Lovell, PTA       CGA to Nicholas(fatigue)                 Problem: PT-Long Term Goals       Dates: Start: 12/03/22         Goal: LTG-By discharge, patient will ambulate x 150 feet with FWW and SPV       Dates: Start: 12/03/22            Goal: LTG-By discharge, patient will transfer one surface to another with FWW and SPV       Dates: Start: 12/03/22            Goal: LTG-By discharge, patient will ambulate up/down 4-6 stairs with one HR and SBA       Dates: Start: 12/03/22            Goal: LTG-By discharge, patient will transfer in/out of a car with FWW and SBA       Dates: Start: 12/03/22

## 2022-12-09 NOTE — PROGRESS NOTES
Rehab Fall Note    Date of fall: 12/8/2022     Time of incident/discovery? 16:20     Witnessed or Unwitnessed: unwitnessed     Assisted or unassisted?: unassisted     Staff member present? Therapist      Head strike?: no    What is the patient's orientation status? disoriented to time    Location of fall: patient's room     Was this a fall with therapy? No      Patient had a fall from: chair/wheelchair     Injury from fall: minor - scrape/skin tear/bruise     Persons contacted regarding the fall: family/caregiver and physician     Post fall huddle called: yes     Persons present at post fall huddle: nurse, nursing supervisor, therapist, and nursing aide     Interventions to prevent another fall: alarms on, call light within reach, room close to nursing station, and signs on patient's door, Seatbelt alarm added.     Was the call light used? No     Did the bed or chair alarm sound? Yes     If no alarm sounded, do the alarms work? N/A     If alarm malfunctioned, was a maintenance request submitted? N/A     Was the pt. wearing a seatbelt prior to the fall? Yes     If wearing, did the pt. take off the seatbelt? Yes     What is the patient's transfer status? CGA to Min assist.     Other fall details: PT tech was passing by patient's room and hear chair alarm going off. He found patient sitting on the floor next to the wheel chair, reported to the Nurse and patient was put back to wheel chair. Skin check done, patient with a small skin tear on left fore arm that was clean and covered with Mepitel one. Patient denied hitting the head and denied any pain at this time. Doctor Rubia and patient's daughter Constantin notified.

## 2022-12-09 NOTE — CARE PLAN
The patient is Stable - Low risk of patient condition declining or worsening    Shift Goals  Clinical Goals: Infection Control  Patient Goals: sleep  Family Goals: \    Progress made toward(s) clinical / shift goals:  progressing    Patient is not progressing towards the following goals:

## 2022-12-09 NOTE — THERAPY
Speech Language Pathology  Daily Treatment     Patient Name: Cooper Harvey  Age:  88 y.o., Sex:  male  Medical Record #: [id number]  Today's Date: 12/9/2022     Precautions  Precautions: Fall Risk  Comments: [place], has hearing aids    Subjective    Pt with increasing flat affect, limited verbalizations, and tearful throughout session.     Objective       12/09/22 1033   Treatment Charges   SLP Cognitive Skill Development First 15 Minutes 1   SLP Cognitive Skill Development Additional 15 Minutes 1   SLP Total Time Spent   SLP Individual Total Time Spent (Mins) 30       Assessment    Olog continued at this time, max cues for answers to be provided by pt. Score of 11/30 achieved on this date. Education provided to pt and family re: current UTI and impact on cognition and physical strength/abilities. Pt assisted back to room to visit with family at the end of cognitive session.     Strengths: Pleasant and cooperative, Supportive family, Able to follow instructions  Barriers: Impaired functional cognition, Impaired carryover of learning, Lack of motivation, Impaired activity tolerance    Plan    Cont olog, simple attention and functional problem solving     Speech Therapy Problems (Active)       Problem: Memory STGs       Dates: Start: 12/03/22         Goal: STG-Within one week, patient will recall daily events, and safety strategies, given min verbal cues.         Dates: Start: 12/03/22         Goal Note filed on 12/07/22 1019 by Jeimy Leos MS,CCC-SLP       Mod-max cues                 Problem: Problem Solving STGs       Dates: Start: 12/03/22         Goal: STG-Within one week, patient will score 25 or greater on the O-log over 2 sessions.         Dates: Start: 12/03/22         Goal Note filed on 12/07/22 1019 by Jeimy Leos MS,CCC-SLP       Cont to address highest score achieved 23               Goal: STG-Within one week, patient will complete basic attn tasks with 80% accuracy given min verbal  cues.         Dates: Start: 12/03/22         Goal Note filed on 12/07/22 1019 by Jeimy Leos MS,CCC-SLP       Mod cues                 Problem: Speech/Swallowing LTGs       Dates: Start: 12/03/22         Goal: LTG-By discharge, patient will solve basic problems Markie for safe discharge home.         Dates: Start: 12/03/22

## 2022-12-09 NOTE — THERAPY
Occupational Therapy  Daily Treatment     Patient Name: Cooper Harvey  Age:  88 y.o., Sex:  male  Medical Record #: [id number]  Today's Date: 12/9/2022     Precautions  Precautions: (P) Fall Risk  Comments: (P) [place], has hearing aids         Subjective    Pt resting in bed, agreeable to OT session with increased time and encouragement.      Objective       12/09/22 1001   OT Charge Group   OT Self Care / ADL 2   OT Total Time Spent   OT Individual Total Time Spent (Mins) 30   Precautions   Precautions Fall Risk   Comments [place], has hearing aids   Cognition    Speech/ Communication Hard of Hearing   Level of Consciousness Alert   Ability To Follow Commands 1 Step   Safety Awareness Impaired;Impulsive   New Learning Impaired   Attention Impaired   Sequencing Impaired   Initiation Impaired   Functional Level of Assist   Grooming Minimal Assist  (assist with set up for oral care, assist to comb hair with cues for initiation)   Upper Body Dressing Moderate Assist  (seated EOB, assist to pull shirt overhead and down in back; posterior lean)   Lower Body Dressing Maximal Assist   Lower Body Dressing Description Assist with threading into pant leg;Increased time;Set-up of equipment;Supervision for safety;Verbal cueing  (max A to don pants, socks and shoes)   Bed, Chair, Wheelchair Transfer Maximal Assist   Bed Chair Wheelchair Transfer Description Adaptive equipment;Increased time;Set-up of equipment;Supervision for safety;Verbal cueing  (SPT using FWW)   Bed Mobility    Supine to Sit Maximal Assist   Scooting Maximal Assist   Rolling Minimal Assist to Rt.   Skilled Intervention Sequencing;Tactile Cuing;Verbal Cuing;Postural Facilitation   Interdisciplinary Plan of Care Collaboration   IDT Collaboration with  Nursing;Physician;Family / Caregiver   Patient Position at End of Therapy Seated;Chair Alarm On;Self Releasing Lap Belt Applied;Family / Friend in Room  (seatbelt alarm on)   Collaboration Comments CLOF, POC,  increased assist needed for ADLs and txfrs       Assessment    Pt required increased assistance with ADLs and functional transfers this session. He presented with tremors/shakiness and heavy posterior lean in both sitting and standing.   Strengths: Able to follow instructions, Alert and oriented, Independent prior level of function, Manages pain appropriately, Motivated for self care and independence, Pleasant and cooperative, Supportive family, Willingly participates in therapeutic activities  Barriers: Decreased endurance, Fatigue, Generalized weakness, Home accessibility, Hypertension, Impaired activity tolerance, Impaired balance, Impaired functional cognition, Limited mobility (Hard of hearing)    Plan    ADL  related mobility   strength/endurance building  standing tolerance and balance activity      Occupational Therapy Goals (Active)       Problem: Dressing       Dates: Start: 12/03/22         Goal: STG-Within one week, patient will dress LB with min A       Dates: Start: 12/03/22         Goal Note filed on 12/07/22 1019 by Darío Rios, C.O.T.A.       Max assist LB dressing      Has been limited by time constraints  decreased strength/endurance                   Problem: Functional Transfers       Dates: Start: 12/03/22         Goal: STG-Within one week, patient will transfer to toilet supervised with grab bar       Dates: Start: 12/03/22         Goal Note filed on 12/07/22 1019 by Darío Rios, C.O.T.A.        CGA CGA   due to decreased strength/endurance    Continue goal                  Problem: OT Long Term Goals       Dates: Start: 12/03/22         Goal: LTG-By discharge, patient will complete basic self care tasks with supervision using AE/AD       Dates: Start: 12/03/22            Goal: LTG-By discharge, patient will perform bathroom transfers with supervision using AE/AD       Dates: Start: 12/03/22               Problem: Toileting       Dates: Start: 12/03/22         Goal: STG-Within one week,  patient will complete toileting tasks with SBA       Dates: Start: 12/03/22         Goal Note filed on 12/07/22 1019 by FLKAITO Reyes        CGA   due to decreased strength/endurance    Continue goal

## 2022-12-10 LAB
BACTERIA UR CULT: ABNORMAL
BACTERIA UR CULT: ABNORMAL
SIGNIFICANT IND 70042: ABNORMAL
SITE SITE: ABNORMAL
SOURCE SOURCE: ABNORMAL

## 2022-12-10 PROCEDURE — 97130 THER IVNTJ EA ADDL 15 MIN: CPT

## 2022-12-10 PROCEDURE — A9270 NON-COVERED ITEM OR SERVICE: HCPCS | Performed by: PHYSICAL MEDICINE & REHABILITATION

## 2022-12-10 PROCEDURE — 700102 HCHG RX REV CODE 250 W/ 637 OVERRIDE(OP): Performed by: PHYSICAL MEDICINE & REHABILITATION

## 2022-12-10 PROCEDURE — 770010 HCHG ROOM/CARE - REHAB SEMI PRIVAT*

## 2022-12-10 PROCEDURE — 99232 SBSQ HOSP IP/OBS MODERATE 35: CPT | Performed by: PHYSICAL MEDICINE & REHABILITATION

## 2022-12-10 PROCEDURE — 97129 THER IVNTJ 1ST 15 MIN: CPT

## 2022-12-10 RX ADMIN — SENNOSIDES AND DOCUSATE SODIUM 2 TABLET: 50; 8.6 TABLET ORAL at 07:55

## 2022-12-10 RX ADMIN — CLOPIDOGREL BISULFATE 75 MG: 75 TABLET ORAL at 07:55

## 2022-12-10 RX ADMIN — CEFUROXIME AXETIL 500 MG: 500 TABLET ORAL at 07:55

## 2022-12-10 RX ADMIN — LORATADINE 10 MG: 10 TABLET ORAL at 07:55

## 2022-12-10 RX ADMIN — QUETIAPINE FUMARATE 25 MG: 25 TABLET ORAL at 20:07

## 2022-12-10 RX ADMIN — CEFUROXIME AXETIL 500 MG: 500 TABLET ORAL at 20:06

## 2022-12-10 RX ADMIN — Medication 1 PACKET: at 07:56

## 2022-12-10 RX ADMIN — Medication 1 PACKET: at 20:10

## 2022-12-10 RX ADMIN — TAMSULOSIN HYDROCHLORIDE 0.4 MG: 0.4 CAPSULE ORAL at 20:06

## 2022-12-10 RX ADMIN — SENNOSIDES AND DOCUSATE SODIUM 2 TABLET: 50; 8.6 TABLET ORAL at 20:07

## 2022-12-10 RX ADMIN — ATORVASTATIN CALCIUM 40 MG: 40 TABLET, FILM COATED ORAL at 20:07

## 2022-12-10 RX ADMIN — ASPIRIN 81 MG: 81 TABLET, COATED ORAL at 07:55

## 2022-12-10 RX ADMIN — OMEPRAZOLE 20 MG: 20 CAPSULE, DELAYED RELEASE ORAL at 07:55

## 2022-12-10 ASSESSMENT — PAIN DESCRIPTION - PAIN TYPE: TYPE: ACUTE PAIN

## 2022-12-10 NOTE — CARE PLAN
The patient is Stable - Low risk of patient condition declining or worsening    Shift Goals  Clinical Goals: Infection Control  Patient Goals: sleep  Family Goals: \    Progress made toward(s) clinical / shift goals:    Problem: Infection  Goal: Patient will remain free from infection  Outcome: Progressing   Patient in ABT therapy for UTI, no s/s of any adverse reaction noted. Will continue to monitor.

## 2022-12-10 NOTE — THERAPY
Speech Language Pathology  Daily Treatment     Patient Name: Cooper Harvey  Age:  88 y.o., Sex:  male  Medical Record #: [id number]  Today's Date: 12/10/2022     Precautions  Precautions: Fall Risk  Comments: [place], has hearing aids    Subjective    Patient pleasant and cooperative.     Objective       12/10/22 0901   Treatment Charges   SLP Cognitive Skill Development First 15 Minutes 1   SLP Cognitive Skill Development Additional 15 Minutes 1   SLP Total Time Spent   SLP Individual Total Time Spent (Mins) 30   Cognition   Moderate Attention Moderate (3)   Orientation  Moderate (3)       Assessment    O-log 16/30.   Patient worked on attention in the context of following simple 1 step directions with pictured targets with 100% with min cues needed to keep his place in task.  Patient followed 1 step writtent directives with 2 critical elements  with 20% acc with difficulty attending to the action, mod to max cues needed.    Strengths: Pleasant and cooperative, Supportive family, Able to follow instructions  Barriers: Impaired functional cognition, Impaired carryover of learning, Lack of motivation, Impaired activity tolerance    Plan    Target orientation, attention, recall.    Passport items to be completed:  Express basic needs, understand food/liquid recommendations, consistently follow swallow precautions, manage finances, manage medications, arrive to therapy appointments on time, complete daily memory log entries, solve problems related to safety situations, review education related to hospitalization, complete caregiver training     Speech Therapy Problems (Active)       Problem: Memory STGs       Dates: Start: 12/03/22         Goal: STG-Within one week, patient will recall daily events, and safety strategies, given min verbal cues.         Dates: Start: 12/03/22         Goal Note filed on 12/07/22 1019 by Jeimy Leos MS,CCC-SLP       Mod-max cues                 Problem: Problem Solving STGs        Dates: Start: 12/03/22         Goal: STG-Within one week, patient will score 25 or greater on the O-log over 2 sessions.         Dates: Start: 12/03/22         Goal Note filed on 12/07/22 1019 by Jeimy Leos MS,CCC-SLP       Cont to address highest score achieved 23               Goal: STG-Within one week, patient will complete basic attn tasks with 80% accuracy given min verbal cues.         Dates: Start: 12/03/22         Goal Note filed on 12/07/22 1019 by Jeimy Leos MS,CCC-SLP       Mod cues                 Problem: Speech/Swallowing LTGs       Dates: Start: 12/03/22         Goal: LTG-By discharge, patient will solve basic problems Markie for safe discharge home.         Dates: Start: 12/03/22

## 2022-12-10 NOTE — PROGRESS NOTES
"Rehab Progress Note     Date of Service: 12/10/2022  Chief Complaint: follow up debility    Interval Events (Subjective)  Patient is doing well today, some nausea, no vomiting.Urine growing E. coli, pansensitive except for ampicillin.  Patient is now on cefuroxime.  Flu and COVID-negative    ROS: No changes to bowel, bladder, pain, mood, or sleep.     Objective:  VITAL SIGNS: /58   Pulse 82   Temp 36.6 °C (97.8 °F) (Oral)   Resp 18   Ht 1.727 m (5' 8\")   Wt 74 kg (163 lb 2.3 oz)   SpO2 95%   BMI 24.81 kg/m²   Gen: alert, no apparent distress  CV: Regular rate, regular rhythm  Resp: Clear to auscultation bilaterally  Neuro: Hard of hearing, increased confusion, tremor    Recent Results (from the past 72 hour(s))   URINALYSIS    Collection Time: 12/07/22  5:05 PM    Specimen: Urine, Cath   Result Value Ref Range    Color Yellow     Character Clear     Specific Gravity 1.025 <1.035    Ph 6.0 5.0 - 8.0    Glucose Negative Negative mg/dL    Ketones Negative Negative mg/dL    Protein Negative Negative mg/dL    Bilirubin Negative Negative    Urobilinogen, Urine 0.2 Negative    Nitrite Negative Negative    Leukocyte Esterase Small (A) Negative    Occult Blood Large (A) Negative    Micro Urine Req Microscopic    URINE MICROSCOPIC (W/UA)    Collection Time: 12/07/22  5:05 PM   Result Value Ref Range    WBC 20-50 (A) /hpf    RBC 10-20 (A) /hpf    Bacteria Few (A) None /hpf    Epithelial Cells Few /hpf    Hyaline Cast 0-2 /lpf   URINE CULTURE-EXISTING-LESS THAN 48 HOURS    Collection Time: 12/08/22  9:50 AM    Specimen: Urine, Clean Catch   Result Value Ref Range    Significant Indicator POS (POS)     Source UR     Site URINE, CLEAN CATCH     Culture Result - (A)     Culture Result Escherichia coli  >100,000 cfu/mL   (A)        Susceptibility    Escherichia coli - SANDY     Ampicillin >16 Resistant mcg/mL     Ceftriaxone <=1 Sensitive mcg/mL     Cefazolin* <=2 Sensitive mcg/mL      * Breakpoints when Cefazolin is " used for therapy of infections  other than uncomplicated UTIs due to Enterobacterales are as  follows:  SANDY and Interpretation:  <=2 S  4 I  >=8 R       Ciprofloxacin <=0.25 Sensitive mcg/mL     Cefepime <=2 Sensitive mcg/mL     Cefuroxime <=4 Sensitive mcg/mL     Ampicillin/sulbactam 16/8 Intermediate mcg/mL     Tobramycin <=2 Sensitive mcg/mL     Nitrofurantoin <=32 Sensitive mcg/mL     Gentamicin <=2 Sensitive mcg/mL     Levofloxacin <=0.5 Sensitive mcg/mL     Minocycline <=4 Sensitive mcg/mL     Pip/Tazobactam <=8 Sensitive mcg/mL     Trimeth/Sulfa <=0.5/9.5 Sensitive mcg/mL     Tigecycline <=2 Sensitive mcg/mL   CBC WITH DIFFERENTIAL    Collection Time: 12/09/22  6:31 AM   Result Value Ref Range    WBC 13.2 (H) 4.8 - 10.8 K/uL    RBC 3.83 (L) 4.70 - 6.10 M/uL    Hemoglobin 12.0 (L) 14.0 - 18.0 g/dL    Hematocrit 35.8 (L) 42.0 - 52.0 %    MCV 93.5 81.4 - 97.8 fL    MCH 31.3 27.0 - 33.0 pg    MCHC 33.5 (L) 33.7 - 35.3 g/dL    RDW 46.0 35.9 - 50.0 fL    Platelet Count 255 164 - 446 K/uL    MPV 10.5 9.0 - 12.9 fL    Neutrophils-Polys 84.80 (H) 44.00 - 72.00 %    Lymphocytes 4.20 (L) 22.00 - 41.00 %    Monocytes 8.40 0.00 - 13.40 %    Eosinophils 1.50 0.00 - 6.90 %    Basophils 0.60 0.00 - 1.80 %    Immature Granulocytes 0.50 0.00 - 0.90 %    Nucleated RBC 0.00 /100 WBC    Neutrophils (Absolute) 11.15 (H) 1.82 - 7.42 K/uL    Lymphs (Absolute) 0.55 (L) 1.00 - 4.80 K/uL    Monos (Absolute) 1.11 (H) 0.00 - 0.85 K/uL    Eos (Absolute) 0.20 0.00 - 0.51 K/uL    Baso (Absolute) 0.08 0.00 - 0.12 K/uL    Immature Granulocytes (abs) 0.06 0.00 - 0.11 K/uL    NRBC (Absolute) 0.00 K/uL   Basic Metabolic Panel    Collection Time: 12/09/22  6:31 AM   Result Value Ref Range    Sodium 135 135 - 145 mmol/L    Potassium 4.3 3.6 - 5.5 mmol/L    Chloride 104 96 - 112 mmol/L    Co2 22 20 - 33 mmol/L    Glucose 93 65 - 99 mg/dL    Bun 27 (H) 8 - 22 mg/dL    Creatinine 1.16 0.50 - 1.40 mg/dL    Calcium 8.7 8.5 - 10.5 mg/dL    Anion Gap 9.0  7.0 - 16.0   ESTIMATED GFR    Collection Time: 12/09/22  6:31 AM   Result Value Ref Range    GFR (CKD-EPI) 60 >60 mL/min/1.73 m 2   COV-2, FLU A/B, AND RSV BY PCR (2-4 HOURS CEPHEID): Collect NP swab in VTM    Collection Time: 12/09/22 11:45 AM    Specimen: Respirate   Result Value Ref Range    Influenza virus A RNA Negative Negative    Influenza virus B, PCR Negative Negative    RSV, PCR Negative Negative    SARS-CoV-2 by PCR NotDetected     SARS-CoV-2 Source NP Swab          Current Facility-Administered Medications   Medication Frequency    cefUROXime (CEFTIN) tablet 500 mg Q12HRS    QUEtiapine (Seroquel) tablet 25 mg Nightly    hydrOXYzine HCl (ATARAX) tablet 50 mg Q6HRS PRN    melatonin tablet 3 mg HS PRN    Respiratory Therapy Consult Continuous RT    acetaminophen (Tylenol) tablet 650 mg Q4HRS PRN    lactulose 20 GM/30ML solution 30 mL QDAY PRN    docusate sodium (ENEMEEZ) enema 283 mg QDAY PRN    omeprazole (PRILOSEC) capsule 20 mg QAM AC    mag hydrox-al hydrox-simeth (MAALOX PLUS ES or MYLANTA DS) suspension 20 mL Q2HRS PRN    ondansetron (ZOFRAN ODT) dispertab 4 mg 4X/DAY PRN    Or    ondansetron (ZOFRAN) syringe/vial injection 4 mg 4X/DAY PRN    traZODone (DESYREL) tablet 50 mg QHS PRN    sodium chloride (OCEAN) 0.65 % nasal spray 2 Spray PRN    aspirin EC (ECOTRIN) tablet 81 mg DAILY    atorvastatin (LIPITOR) tablet 40 mg Q EVENING    clopidogrel (PLAVIX) tablet 75 mg DAILY    loratadine (CLARITIN) tablet 10 mg DAILY    psyllium (METAMUCIL/KONSYL) 1 Packet BID    senna-docusate (PERICOLACE or SENOKOT S) 8.6-50 MG per tablet 2 Tablet BID    And    polyethylene glycol/lytes (MIRALAX) PACKET 1 Packet QDAY PRN    And    magnesium hydroxide (MILK OF MAGNESIA) suspension 30 mL QDAY PRN    And    bisacodyl (DULCOLAX) suppository 10 mg QDAY PRN    tamsulosin (FLOMAX) capsule 0.4 mg QHS       Orders Placed This Encounter   Procedures    Diet Order Diet: Cardiac     Standing Status:   Standing     Number of  Occurrences:   1     Order Specific Question:   Diet:     Answer:   Cardiac [6]       Assessment:  This patient is a 88 y.o. male admitted for acute inpatient rehabilitation with debility after acute hospital stay for   Transient ischemic attack (TIA).    I led and attended the weekly conference, and agree with the IDT conference documentation and plan of care as noted below.    Date of conference: 12/7/2022    Goals and barriers: See IDT note.    Biggest barriers: hard of hearing, impaired endurance, impulsive, urinary frequency, moderate cognitive impairment, fatigue, stairs at home    CM/social support: wife supportive    Anticipated DC date: 12/17    Home health: PT/OT/SLP/RN    Equip: none needed    Follow up: PCP, stroke bridge clinic, IR        Problem List/Medical Decision Making and Plan:    - no changes today. Continue treatment per primary team, as below     Debility, improved  Moderate cognitive impairment  Continue full rehab program  PT/OT/SLP, 1 hr each discipline, 5 days per week  12/6: SLP decrease to 30 min, share other 30 min with PT/OT    TIA  Plavix and aspirin  Outpatient follow up with stroke bridge clinic, referral made    Encephalopathy, improved  Sundowning, improved  Occurred during last hospitalization as well  Multi-factorial - history of stroke, hard of hearing  Continue Seroquel at night for now, will titrate down - decrease to 75 mg 12/5 --> 50 mg 12/6 --> 25 mg 12/7     Carotid stenosis  S/p left stent 11/25 Dr. Norm Hollins for 8 weeks  Aspirin indefinitely  Outpatient follow up with IR, referral made     Uvulitis, resolved  Completed Augmentin     History of hypertension  Hypotension, intermittent  Monitor need to add back home medication lisinopril discussed with family today,      Hematuria, resolved  From traumatic catheter pulling  Removed Bhatia catheter 12/6, voiding    Urinary frequency/urgency  Low-grade fever  Leukocytosis  Altered mental status  + UA, culture with STEVEN BLACKMON  coli, sensitivities pending, started on Bactrim without much response, switched to cefuroxime  Also check chest x-ray, flu, COVID     Allergies/rhinorrhea   Claritin     Anemia  Improved    Azotemia, stable  Improved  Increase oral fluids  Monitor with intermittent labs    Bowel program  Constipation, resolved  Continue bowel meds and metamucil -  home medication  Last BM 12/9    Bladder program  BPH  Continue Flomax  Removed Bhatia 12/6  Check PVRs - 69, 97  IC for over 400 cc    DVT prophylaxis  Contra-indicated due to recent hematuria  Patient walking with therapy  Continue to increase mobility  Monitor for edema    Total time:  26 minutes.  I spent greater than 50% of the time for patient care, counseling, and coordination on this date, including patient face-to face time, unit/floor time with review of records/pertinent lab data and studies, as well as discussing diagnostic evaluation/work up, planned therapeutic interventions, and future disposition of care, as per the interval events/subjective and the assessment and plan as noted above.      I have performed a physical exam, reviewed and updated ROS, as well as the assessment and plan today 12/10/2022. In review of note from 12/8/2022 there are no new changes except as documented above.    Bear Trinh D.O.  Physical Medicine and Rehabilitation

## 2022-12-10 NOTE — CARE PLAN
Problem: Knowledge Deficit - Standard  Goal: Patient and family/care givers will demonstrate understanding of plan of care, disease process/condition, diagnostic tests and medications  Outcome: Progressing   Pt education given regarding plan of care, pt shows little understanding,  will continue to reinforce education and continue to monitor.       Problem: Fall Risk - Rehab  Goal: Patient will remain free from falls  Outcome: Progressing   Pt education given regarding fall precautions AND safety measures, pt shows good understanding, has not attempted to self transfer this shift, will continue to reinforce education and continue to monitor.

## 2022-12-11 PROCEDURE — 97129 THER IVNTJ 1ST 15 MIN: CPT

## 2022-12-11 PROCEDURE — 770010 HCHG ROOM/CARE - REHAB SEMI PRIVAT*

## 2022-12-11 PROCEDURE — 97110 THERAPEUTIC EXERCISES: CPT

## 2022-12-11 PROCEDURE — 97130 THER IVNTJ EA ADDL 15 MIN: CPT

## 2022-12-11 PROCEDURE — A9270 NON-COVERED ITEM OR SERVICE: HCPCS | Performed by: PHYSICAL MEDICINE & REHABILITATION

## 2022-12-11 PROCEDURE — 97116 GAIT TRAINING THERAPY: CPT

## 2022-12-11 PROCEDURE — 700102 HCHG RX REV CODE 250 W/ 637 OVERRIDE(OP): Performed by: PHYSICAL MEDICINE & REHABILITATION

## 2022-12-11 RX ADMIN — SENNOSIDES AND DOCUSATE SODIUM 2 TABLET: 50; 8.6 TABLET ORAL at 10:45

## 2022-12-11 RX ADMIN — Medication 1 PACKET: at 21:30

## 2022-12-11 RX ADMIN — OMEPRAZOLE 20 MG: 20 CAPSULE, DELAYED RELEASE ORAL at 07:59

## 2022-12-11 RX ADMIN — QUETIAPINE FUMARATE 25 MG: 25 TABLET ORAL at 21:27

## 2022-12-11 RX ADMIN — ATORVASTATIN CALCIUM 40 MG: 40 TABLET, FILM COATED ORAL at 21:27

## 2022-12-11 RX ADMIN — TAMSULOSIN HYDROCHLORIDE 0.4 MG: 0.4 CAPSULE ORAL at 21:27

## 2022-12-11 RX ADMIN — CEFUROXIME AXETIL 500 MG: 500 TABLET ORAL at 10:45

## 2022-12-11 RX ADMIN — SENNOSIDES AND DOCUSATE SODIUM 2 TABLET: 50; 8.6 TABLET ORAL at 21:27

## 2022-12-11 RX ADMIN — CLOPIDOGREL BISULFATE 75 MG: 75 TABLET ORAL at 10:45

## 2022-12-11 RX ADMIN — CEFUROXIME AXETIL 500 MG: 500 TABLET ORAL at 21:27

## 2022-12-11 RX ADMIN — Medication 1 PACKET: at 10:46

## 2022-12-11 RX ADMIN — LORATADINE 10 MG: 10 TABLET ORAL at 10:45

## 2022-12-11 RX ADMIN — ASPIRIN 81 MG: 81 TABLET, COATED ORAL at 10:45

## 2022-12-11 ASSESSMENT — FIBROSIS 4 INDEX: FIB4 SCORE: 1.83

## 2022-12-11 ASSESSMENT — GAIT ASSESSMENTS
ASSISTIVE DEVICE: FRONT WHEEL WALKER
DISTANCE (FEET): 300
GAIT LEVEL OF ASSIST: CONTACT GUARD ASSIST
DEVIATION: SHUFFLED GAIT;BRADYKINETIC

## 2022-12-11 ASSESSMENT — PAIN DESCRIPTION - PAIN TYPE: TYPE: ACUTE PAIN

## 2022-12-11 ASSESSMENT — ACTIVITIES OF DAILY LIVING (ADL): BED_CHAIR_WHEELCHAIR_TRANSFER_DESCRIPTION: SET-UP OF EQUIPMENT;INCREASED TIME

## 2022-12-11 NOTE — THERAPY
Physical Therapy   Daily Treatment     Patient Name: Cooper Harvey  Age:  88 y.o., Sex:  male  Medical Record #: [id number]  Today's Date: 12/11/2022     Precautions  Precautions: Fall Risk  Comments: [place], has hearing aids    Subjective    Patient agreeable to work on ambulation and endurance work     Objective       12/11/22 1101   PT Charge Group   PT Gait Training 1   PT Therapeutic Exercise 1   PT Total Time Spent   PT Individual Total Time Spent (Mins) 30   Precautions   Precautions Fall Risk   Comments [place], has hearing aids   Gait Functional Level of Assist    Gait Level Of Assist Contact Guard Assist   Assistive Device Front Wheel Walker   Distance (Feet) 300   # of Times Distance was Traveled 1   Deviation Shuffled Gait;Bradykinetic   Transfer Functional Level of Assist   Bed, Chair, Wheelchair Transfer Contact Guard Assist   Bed Chair Wheelchair Transfer Description Set-up of equipment;Increased time   Sitting Lower Body Exercises   Nustep Resistance Level 2  (8min for endurance)       Assessment    Patient requires occasional verbal cues for STS safety, able to demonstrate safe ambulation with FWW 300ft; self-limits ambulation distance based on general fatigue, no LOB    Strengths: Able to follow instructions, Effective communication skills, Independent prior level of function, Manages pain appropriately, Motivated for self care and independence, Pleasant and cooperative, Supportive family, Willingly participates in therapeutic activities  Barriers: Decreased endurance, Fatigue, Generalized weakness, Home accessibility, Impaired activity tolerance, Impaired balance, Limited mobility    Plan    Cont gait training FWW, standing balance, LE strengthening, consecutive stairs     Passport items to be completed Get in/out of bed safely, in/out of a vehicle, safely use mobility device, walk or wheel around home/community, navigate up and down stairs, show how to get up/down from the ground, ensure home is  accessible, demonstrate HEP, complete caregiver training     Physical Therapy Problems (Active)       Problem: Mobility       Dates: Start: 12/03/22         Goal: STG-Within one week, patient will ascend and descend four to six stairs with BHR and CGA       Dates: Start: 12/03/22         Goal Note filed on 12/07/22 1308 by Bhavana Lovell, PTA       CGA to Nicholas(fatigue)                 Problem: PT-Long Term Goals       Dates: Start: 12/03/22         Goal: LTG-By discharge, patient will ambulate x 150 feet with FWW and SPV       Dates: Start: 12/03/22            Goal: LTG-By discharge, patient will transfer one surface to another with FWW and SPV       Dates: Start: 12/03/22            Goal: LTG-By discharge, patient will ambulate up/down 4-6 stairs with one HR and SBA       Dates: Start: 12/03/22            Goal: LTG-By discharge, patient will transfer in/out of a car with FWW and SBA       Dates: Start: 12/03/22

## 2022-12-11 NOTE — CARE PLAN
The patient is Stable - Low risk of patient condition declining or worsening    Shift Goals  Clinical Goals: Infection control  Patient Goals: rest  Family Goals: \    Progress made toward(s) clinical / shift goals:    Problem: Knowledge Deficit - Standard  Goal: Patient and family/care givers will demonstrate understanding of plan of care, disease process/condition, diagnostic tests and medications  Outcome: Progressing     Problem: Skin Integrity  Goal: Skin integrity is maintained or improved  Outcome: Progressing       Patient is not progressing towards the following goals:

## 2022-12-11 NOTE — THERAPY
Speech Language Pathology  Daily Treatment     Patient Name: Cooper Harvey  Age:  88 y.o., Sex:  male  Medical Record #: [id number]  Today's Date: 12/11/2022     Precautions  Precautions: Fall Risk  Comments: [place], has hearing aids    Subjective    Pt willing to participate in this ST session at bedside, pt is very [place] and benefited from having information written (using [employer] application on ipad).      Objective       12/11/22 0831   Treatment Charges   SLP Cognitive Skill Development First 15 Minutes 1   SLP Cognitive Skill Development Additional 15 Minutes 1   SLP Total Time Spent   SLP Individual Total Time Spent (Mins) 30       Assessment    O-log was completed, pt scored a 20/30 (increased from 16/30 on 12/10).  Pt required cues to recall his current location ([place]), the date, year, SHANEL, reading the clock correctly and current deficits.      Strengths: Pleasant and cooperative, Supportive family, Able to follow instructions  Barriers: Impaired functional cognition, Impaired carryover of learning, Lack of motivation, Impaired activity tolerance    Plan    Continue O-log, target simple attention         Speech Therapy Problems (Active)       Problem: Memory STGs       Dates: Start: 12/03/22         Goal: STG-Within one week, patient will recall daily events, and safety strategies, given min verbal cues.         Dates: Start: 12/03/22         Goal Note filed on 12/07/22 1019 by Jeimy Leos MS,CCC-SLP       Mod-max cues                 Problem: Problem Solving STGs       Dates: Start: 12/03/22         Goal: STG-Within one week, patient will score 25 or greater on the O-log over 2 sessions.         Dates: Start: 12/03/22         Goal Note filed on 12/07/22 1019 by Jeimy Leos MS,CCC-SLP       Cont to address highest score achieved 23               Goal: STG-Within one week, patient will complete basic attn tasks with 80% accuracy given min verbal cues.         Dates: Start: 12/03/22          Goal Note filed on 12/07/22 1019 by Jeimy Leos MS,CCC-SLP       Mod cues                 Problem: Speech/Swallowing LTGs       Dates: Start: 12/03/22         Goal: LTG-By discharge, patient will solve basic problems Markie for safe discharge home.         Dates: Start: 12/03/22

## 2022-12-11 NOTE — CARE PLAN
Problem: Knowledge Deficit - Standard  Goal: Patient and family/care givers will demonstrate understanding of plan of care, disease process/condition, diagnostic tests and medications  Outcome: Progressing     Problem: Skin Integrity  Goal: Patient's skin integrity will be maintained or improve  Outcome: Progressing   The patient is Stable - Low risk of patient condition declining or worsening    Shift Goals  Clinical Goals: Infection Control  Patient Goals: sleep  Family Goals: \    Progress made toward(s) clinical / shift goals:  Patient is sleeping in bed    Patient is not progressing towards the following goals:

## 2022-12-12 ENCOUNTER — APPOINTMENT (OUTPATIENT)
Dept: RADIOLOGY | Facility: REHABILITATION | Age: 87
DRG: 949 | End: 2022-12-12
Attending: PHYSICAL MEDICINE & REHABILITATION
Payer: MEDICARE

## 2022-12-12 PROCEDURE — 99232 SBSQ HOSP IP/OBS MODERATE 35: CPT | Performed by: PHYSICAL MEDICINE & REHABILITATION

## 2022-12-12 PROCEDURE — 770010 HCHG ROOM/CARE - REHAB SEMI PRIVAT*

## 2022-12-12 PROCEDURE — 97130 THER IVNTJ EA ADDL 15 MIN: CPT

## 2022-12-12 PROCEDURE — 97116 GAIT TRAINING THERAPY: CPT | Mod: CQ

## 2022-12-12 PROCEDURE — 700102 HCHG RX REV CODE 250 W/ 637 OVERRIDE(OP): Performed by: PHYSICAL MEDICINE & REHABILITATION

## 2022-12-12 PROCEDURE — 97110 THERAPEUTIC EXERCISES: CPT | Mod: CQ

## 2022-12-12 PROCEDURE — A9270 NON-COVERED ITEM OR SERVICE: HCPCS | Performed by: PHYSICAL MEDICINE & REHABILITATION

## 2022-12-12 PROCEDURE — 97129 THER IVNTJ 1ST 15 MIN: CPT

## 2022-12-12 PROCEDURE — 97535 SELF CARE MNGMENT TRAINING: CPT | Mod: CO

## 2022-12-12 PROCEDURE — 97112 NEUROMUSCULAR REEDUCATION: CPT | Mod: CQ

## 2022-12-12 PROCEDURE — 74018 RADEX ABDOMEN 1 VIEW: CPT

## 2022-12-12 RX ORDER — QUETIAPINE FUMARATE 25 MG/1
25 TABLET, FILM COATED ORAL
Status: DISCONTINUED | OUTPATIENT
Start: 2022-12-12 | End: 2022-12-16 | Stop reason: HOSPADM

## 2022-12-12 RX ADMIN — ASPIRIN 81 MG: 81 TABLET, COATED ORAL at 08:39

## 2022-12-12 RX ADMIN — LORATADINE 10 MG: 10 TABLET ORAL at 08:39

## 2022-12-12 RX ADMIN — TAMSULOSIN HYDROCHLORIDE 0.4 MG: 0.4 CAPSULE ORAL at 21:52

## 2022-12-12 RX ADMIN — OMEPRAZOLE 20 MG: 20 CAPSULE, DELAYED RELEASE ORAL at 08:39

## 2022-12-12 RX ADMIN — Medication 1 PACKET: at 10:53

## 2022-12-12 RX ADMIN — MAGNESIUM HYDROXIDE 30 ML: 1200 LIQUID ORAL at 06:29

## 2022-12-12 RX ADMIN — ATORVASTATIN CALCIUM 40 MG: 40 TABLET, FILM COATED ORAL at 21:52

## 2022-12-12 RX ADMIN — Medication 1 PACKET: at 21:52

## 2022-12-12 RX ADMIN — SENNOSIDES AND DOCUSATE SODIUM 2 TABLET: 50; 8.6 TABLET ORAL at 08:39

## 2022-12-12 RX ADMIN — SENNOSIDES AND DOCUSATE SODIUM 2 TABLET: 50; 8.6 TABLET ORAL at 21:52

## 2022-12-12 RX ADMIN — CEFUROXIME AXETIL 500 MG: 500 TABLET ORAL at 21:52

## 2022-12-12 RX ADMIN — CLOPIDOGREL BISULFATE 75 MG: 75 TABLET ORAL at 08:39

## 2022-12-12 RX ADMIN — CEFUROXIME AXETIL 500 MG: 500 TABLET ORAL at 08:39

## 2022-12-12 ASSESSMENT — GAIT ASSESSMENTS
DEVIATION: BRADYKINETIC;SHUFFLED GAIT
GAIT LEVEL OF ASSIST: CONTACT GUARD ASSIST
DISTANCE (FEET): 175
ASSISTIVE DEVICE: FRONT WHEEL WALKER
ASSISTIVE DEVICE: FRONT WHEEL WALKER
GAIT LEVEL OF ASSIST: STANDBY ASSIST
DEVIATION: BRADYKINETIC;SHUFFLED GAIT

## 2022-12-12 ASSESSMENT — ACTIVITIES OF DAILY LIVING (ADL): BED_CHAIR_WHEELCHAIR_TRANSFER_DESCRIPTION: SET-UP OF EQUIPMENT;SUPERVISION FOR SAFETY;VERBAL CUEING

## 2022-12-12 NOTE — THERAPY
"Occupational Therapy  Daily Treatment     Patient Name: Cooper Harvey  Age:  88 y.o., Sex:  male  Medical Record #: [id number]  Today's Date: 12/12/2022     Precautions  Precautions: (P) Fall Risk  Comments: (P) hearing aids   [place]         Subjective    \"I've never shaved myself.\"   Reminded patient  he has shaved 2 times prior with this therapist  once  razor and shaving cream and another using electric razor       Objective       12/12/22 1001   OT Charge Group   OT Self Care / ADL 2   OT Total Time Spent   OT Individual Total Time Spent (Mins) 30   Precautions   Precautions Fall Risk   Comments hearing aids   [place]   Functional Level of Assist   Grooming Minimal Assist  (oral care  setup  shaving with electric razor required    min assist for quality  and due to beard length)   Interdisciplinary Plan of Care Collaboration   Patient Position at End of Therapy Seated;Chair Alarm On;Self Releasing Lap Belt Applied;Call Light within Reach  (alarming seat belt)         Assessment     Cues for task initiation   waiting for therapist to instruct him as to what to do next     Strengths: Able to follow instructions, Alert and oriented, Independent prior level of function, Manages pain appropriately, Motivated for self care and independence, Pleasant and cooperative, Supportive family, Willingly participates in therapeutic activities  Barriers: Decreased endurance, Fatigue, Generalized weakness, Home accessibility, Hypertension, Impaired activity tolerance, Impaired balance, Impaired functional cognition, Limited mobility (Hard of hearing)    Plan    ADL  related mobility   strength/endurance building  standing tolerance and balance activity    Occupational Therapy Goals (Active)       Problem: Dressing       Dates: Start: 12/03/22         Goal: STG-Within one week, patient will dress LB with min A       Dates: Start: 12/03/22         Goal Note filed on 12/07/22 1019 by FLAKITO Reyes assist LB " dressing      Has been limited by time constraints  decreased strength/endurance                   Problem: Functional Transfers       Dates: Start: 12/03/22         Goal: STG-Within one week, patient will transfer to toilet supervised with grab bar       Dates: Start: 12/03/22         Goal Note filed on 12/07/22 1019 by TABBY Reyes.O.TBRAD        CGA CGA   due to decreased strength/endurance    Continue goal                  Problem: OT Long Term Goals       Dates: Start: 12/03/22         Goal: LTG-By discharge, patient will complete basic self care tasks with supervision using AE/AD       Dates: Start: 12/03/22            Goal: LTG-By discharge, patient will perform bathroom transfers with supervision using AE/AD       Dates: Start: 12/03/22               Problem: Toileting       Dates: Start: 12/03/22         Goal: STG-Within one week, patient will complete toileting tasks with SBA       Dates: Start: 12/03/22         Goal Note filed on 12/07/22 1019 by Darío Rios C.O.T.ATatyana        CGA   due to decreased strength/endurance    Continue goal

## 2022-12-12 NOTE — THERAPY
Speech Language Pathology  Daily Treatment     Patient Name: Cooper Harvey  Age:  88 y.o., Sex:  male  Medical Record #: [id number]  Today's Date: 12/12/2022     Precautions  Precautions: Fall Risk  Comments: hearing aids   [place]    Subjective    Pt pleasant and cooperative.      Objective       12/12/22 1303   Treatment Charges   SLP Cognitive Skill Development First 15 Minutes 1   SLP Cognitive Skill Development Additional 15 Minutes 1   SLP Total Time Spent   SLP Individual Total Time Spent (Mins) 30       Assessment    Orientation log continued with pt achieving a score of 19/30 on this date. Reviewed SCCAN with pts SO and daughter, reinforced assistance needed with meds and finances at dc (SO completing prior to recent hospitalization) as well as supervision for safety. Daughter expressed concern at the end of the session re: SO ability to care for pt, SLP rec additional supervision and support from other family members at least initially following dc from Rehab.     Strengths: Pleasant and cooperative, Supportive family, Able to follow instructions  Barriers: Impaired functional cognition, Impaired carryover of learning, Lack of motivation, Impaired activity tolerance    Plan    Dc planning, olog, problem solving/safety     Speech Therapy Problems (Active)       Problem: Memory STGs       Dates: Start: 12/03/22         Goal: STG-Within one week, patient will recall daily events, and safety strategies, given min verbal cues.         Dates: Start: 12/03/22         Goal Note filed on 12/07/22 1019 by Jeimy Leos MS,CCC-SLP       Mod-max cues                 Problem: Problem Solving STGs       Dates: Start: 12/03/22         Goal: STG-Within one week, patient will score 25 or greater on the O-log over 2 sessions.         Dates: Start: 12/03/22         Goal Note filed on 12/07/22 1019 by Jeimy Leos MS,CCC-SLP       Cont to address highest score achieved 23               Goal: STG-Within one  week, patient will complete basic attn tasks with 80% accuracy given min verbal cues.         Dates: Start: 12/03/22         Goal Note filed on 12/07/22 1019 by Jeimy Leos MS,CCC-SLP       Mod cues                 Problem: Speech/Swallowing LTGs       Dates: Start: 12/03/22         Goal: LTG-By discharge, patient will solve basic problems Markie for safe discharge home.         Dates: Start: 12/03/22

## 2022-12-12 NOTE — CARE PLAN
Problem: Knowledge Deficit - Standard  Goal: Patient and family/care givers will demonstrate understanding of plan of care, disease process/condition, diagnostic tests and medications  Outcome: Progressing     Problem: Bladder / Voiding  Goal: Patient will establish and maintain regular urinary output  Outcome: Progressing

## 2022-12-12 NOTE — PROGRESS NOTES
"Rehab Progress Note     Date of Service: 12/12/2022  Chief Complaint: follow up debility    Interval Events (Subjective)    Patient seen and examined today in his room.  His confusion improved over the weekend after changing his antibiotics.  His urine culture returned as sensitive to Ceftin.  COVID and flu testing was negative.    He is constipated without a bowel movement since the eighth. He denies any abdominal pain.  Therapist report he is back to his baseline level of function prior to his UTI.  Family concerned about wife's ability to assist at home.  Patient is on track for discharge on the 17th per therapy.  Recommendation for patient and wife to get some assistance from family initially when he returns home.    Patient has no other new questions, concerns, or complaints today.     ROS: No changes to bowel, bladder, pain, mood, or sleep.       Objective:  VITAL SIGNS: BP (!) 144/77   Pulse 66   Temp 36.6 °C (97.8 °F) (Oral)   Resp 20   Ht 1.727 m (5' 8\")   Wt 70 kg (154 lb 5.2 oz)   SpO2 94%   BMI 23.46 kg/m²   Gen: alert, no apparent distress  CV: Regular rate, regular rhythm  Resp: Clear to auscultation bilaterally  Neuro: Hard of hearing, nonfocal    Recent Results (from the past 72 hour(s))   COV-2, FLU A/B, AND RSV BY PCR (2-4 HOURS CEPHEID): Collect [facility] swab in [facility]    Collection Time: 12/09/22 11:45 AM    Specimen: Respirate   Result Value Ref Range    Influenza virus A RNA Negative Negative    Influenza virus B, PCR Negative Negative    RSV, PCR Negative Negative    SARS-CoV-2 by PCR NotDetected     SARS-CoV-2 Source NP Swab          Current Facility-Administered Medications   Medication Frequency    cefUROXime (CEFTIN) tablet 500 mg Q12HRS    QUEtiapine (Seroquel) tablet 25 mg Nightly    hydrOXYzine HCl (ATARAX) tablet 50 mg Q6HRS PRN    melatonin tablet 3 mg HS PRN    Respiratory Therapy Consult Continuous RT    acetaminophen (Tylenol) tablet 650 mg Q4HRS PRN    lactulose 20 GM/30ML solution " 30 mL QDAY PRN    docusate sodium (ENEMEEZ) enema 283 mg QDAY PRN    omeprazole (PRILOSEC) capsule 20 mg QAM AC    mag hydrox-al hydrox-simeth (MAALOX PLUS ES or MYLANTA DS) suspension 20 mL Q2HRS PRN    ondansetron (ZOFRAN ODT) dispertab 4 mg 4X/DAY PRN    Or    ondansetron (ZOFRAN) syringe/vial injection 4 mg 4X/DAY PRN    traZODone (DESYREL) tablet 50 mg QHS PRN    sodium chloride (OCEAN) 0.65 % nasal spray 2 Spray PRN    aspirin EC (ECOTRIN) tablet 81 mg DAILY    atorvastatin (LIPITOR) tablet 40 mg Q EVENING    clopidogrel (PLAVIX) tablet 75 mg DAILY    loratadine (CLARITIN) tablet 10 mg DAILY    psyllium (METAMUCIL/KONSYL) 1 Packet BID    senna-docusate (PERICOLACE or SENOKOT S) 8.6-50 MG per tablet 2 Tablet BID    And    polyethylene glycol/lytes (MIRALAX) PACKET 1 Packet QDAY PRN    And    magnesium hydroxide (MILK OF MAGNESIA) suspension 30 mL QDAY PRN    And    bisacodyl (DULCOLAX) suppository 10 mg QDAY PRN    tamsulosin (FLOMAX) capsule 0.4 mg QHS       Orders Placed This Encounter   Procedures    Diet Order Diet: Cardiac     Standing Status:   Standing     Number of Occurrences:   1     Order Specific Question:   Diet:     Answer:   Cardiac [6]       Assessment:  This patient is a 88 y.o. male admitted for acute inpatient rehabilitation with debility after acute hospital stay for   Transient ischemic attack (TIA).    I led and attended the weekly conference, and agree with the IDT conference documentation and plan of care as noted below.    Date of conference: 12/7/2022    Goals and barriers: See IDT note.    Biggest barriers: hard of hearing, impaired endurance, impulsive, urinary frequency, moderate cognitive impairment, fatigue, stairs at home    CM/social support: wife supportive, would benefit from supervision from their family initially on discharge    Anticipated DC date: 12/17    Home health: PT/OT/SLP/RN    Equip: none needed    Follow up: PCP, stroke bridge clinic, IR        Problem  List/Medical Decision Making and Plan:    Debility, improved  Moderate cognitive impairment  Continue full rehab program  PT/OT/SLP, 1 hr each discipline, 5 days per week  12/6: SLP decrease to 30 min, share other 30 min with PT/OT    TIA  Plavix and aspirin  Outpatient follow up with stroke bridge clinic, referral made    Encephalopathy, resolved  Sundowning, resolved  Occurred during last hospitalization as well  Multi-factorial - history of stroke, hard of hearing  Continue Seroquel at night for now, will titrate down - decrease to 75 mg 12/5 --> 50 mg 12/6 --> 25 mg 12/7 --> changed to 25 mg as needed 12/12     Carotid stenosis  S/p left stent 11/25 Dr. Norm Hollins for 8 weeks  Aspirin indefinitely  Outpatient follow up with IR, referral made     Uvulitis, resolved  Completed Augmentin     History of hypertension  Hypotension, intermittent  Monitor need to add back home medication lisinopril, discussed with family  Currently with good control with intermittent high     Hematuria, resolved  From traumatic catheter pulling  Removed Bhatia catheter 12/6, voiding    Urinary frequency/urgency  Low-grade fever  Leukocytosis  Altered mental status  + UA, culture with E. coli, started on Bactrim without much response, switched to cefuroxime, sensitive  Negative chest x-ray, flu, COVID  Recheck CBC in the morning     Allergies/rhinorrhea   Claritin     Anemia  Improved  Recheck labs in the morning    Azotemia, stable  Improved  Increase oral fluids  Recheck labs in the morning    Bowel program  Constipation, intermittent  Continue bowel meds and metamucil -  home medication  Last BM 12/8  Check KUB    Bladder program  BPH  Continue Flomax  Removed Bhatia 12/6  Check PVRs - 69, 97  Not retaining  IC for over 400 cc    DVT prophylaxis  Contra-indicated due to recent hematuria  Patient walking with therapy  Continue to increase mobility  Monitor for edema    Total time:  25 minutes.  I spent greater than 50% of the time for  patient care, counseling, and coordination on this date, including patient face-to face time, unit/floor time with review of records/pertinent lab data and studies, as well as discussing diagnostic evaluation/work up, planned therapeutic interventions, and future disposition of care, as per the interval events/subjective and the assessment and plan as noted above.    I have performed a physical exam, reviewed and updated ROS, as well as the assessment and plan today 12/12/2022. In review of note from 12/9/2022 there are no new changes except as documented above.        Sugey Barkley M.D.  Physical Medicine and Rehabilitation

## 2022-12-12 NOTE — THERAPY
"Physical Therapy   Daily Treatment     Patient Name: Cooper Harvey  Age:  88 y.o., Sex:  male  Medical Record #: [id number]  Today's Date: 12/12/2022     Precautions  Precautions: Fall Risk  Comments: hearing aids   [place]    Subjective    Pt smiling and having a conversation with family upon arrival. Agreeable to therapy     Objective       12/12/22 1401   PT Charge Group   PT Gait Training 2   PT Therapeutic Exercise 1   PT Neuromuscular Re-Education / Balance 1   Supervising Physical Therapist Vikram Ingram   PT Total Time Spent   PT Individual Total Time Spent (Mins) 60   Vitals   Pulse 76   Patient BP Position Sitting   Blood Pressure  126/77   Room Air Oximetry 99   O2 (LPM) 0   O2 Delivery Device None - Room Air   Gait Functional Level of Assist    Gait Level Of Assist Standby Assist   Assistive Device Front Wheel Walker   Distance (Feet)   (200', 300', 140')   # of Times Distance was Traveled 3   Deviation Bradykinetic;Shuffled Gait   Transfer Functional Level of Assist   Bed, Chair, Wheelchair Transfer Contact Guard Assist  (to SBA with bed rail)   Bed Chair Wheelchair Transfer Description Set-up of equipment;Supervision for safety;Verbal cueing   Sitting Lower Body Exercises   Sit to Stand 2 sets of 10  (from EOM > no UE support and frm EOM > FWW)   Standing Lower Body Exercises   Standing Lower Body Exercises Yes   Hamstring Curl 1 set of 10   Hip Flexion 1 set of 10   Hip Abduction 1 set of 10   Marching 1 set of 10   Heel Rise 1 set of 10   Toe Rise 1 set of 10   Mini Squat 1 set of 10   Comments with B UE Support on // and #2.5lb B LE   Bed Mobility    Supine to Sit Standby Assist   Sit to Supine Standby Assist   Sit to Stand Contact Guard Assist   Neuro-Muscular Treatments   Neuro-Muscular Treatments Sequencing;Verbal Cuing   Comments blocked practice STS transfer training with focus on hand placement, use of 1 hand to push/reach back and one hand to stabilize the walker. 6\" curb + 2\" platform with " FWW, CGA cues for walker management   Interdisciplinary Plan of Care Collaboration   IDT Collaboration with  Family / Caregiver;Certified O.T. Assistant  (ESCOBEDO);   Patient Position at End of Therapy In Bed;Call Light within Reach;Tray Table within Reach   Collaboration Comments daughter/SO present in the room at beginning of session, unable to stay for caregiver training/ ESCOBEDO: SAMANTHA CORREIA req for FWW       Assessment    The patient tolerated the session well with focus on LE strength and progression of functional mobility, pt able to amb with FWW on indoor level surfaces and CGA/SBA. Requires cues for safe walker management specifically with turning around and remaining within walker with transitions   Strengths: Able to follow instructions, Effective communication skills, Independent prior level of function, Manages pain appropriately, Motivated for self care and independence, Pleasant and cooperative, Supportive family, Willingly participates in therapeutic activities  Barriers: Decreased endurance, Fatigue, Generalized weakness, Home accessibility, Impaired activity tolerance, Impaired balance, Limited mobility    Plan    Cont gait training FWW focus on safe walker management, standing balance, LE strengthening, consecutive stairs, curb with FWW     Passport items to be completed Get in/out of bed safely, in/out of a vehicle, safely use mobility device, walk or wheel around home/community, navigate up and down stairs, show how to get up/down from the ground, ensure home is accessible, demonstrate HEP, complete caregiver training    Physical Therapy Problems (Active)       Problem: Mobility       Dates: Start: 12/03/22         Goal: STG-Within one week, patient will ascend and descend four to six stairs with BHR and CGA       Dates: Start: 12/03/22         Goal Note filed on 12/07/22 1308 by Bhavana Lovell, PTA       CGA to Nicholas(fatigue)                 Problem: PT-Long Term Goals       Dates: Start:  12/03/22         Goal: LTG-By discharge, patient will ambulate x 150 feet with FWW and SPV       Dates: Start: 12/03/22            Goal: LTG-By discharge, patient will transfer one surface to another with FWW and SPV       Dates: Start: 12/03/22            Goal: LTG-By discharge, patient will ambulate up/down 4-6 stairs with one HR and SBA       Dates: Start: 12/03/22            Goal: LTG-By discharge, patient will transfer in/out of a car with FWW and SBA       Dates: Start: 12/03/22

## 2022-12-12 NOTE — CARE PLAN
"The patient is Stable - Low risk of patient condition declining or worsening    Shift Goals  Clinical Goals: Infection control  Patient Goals: rest  Family Goals: \    Patient is not progressing towards the following goals:    Problem: Fall Risk - Rehab  Goal: Patient will remain free from falls  Outcome: Not Met  Note: Sandra Turcios Fall risk Assessment Score: 20    High fall risk Interventions   - Alarming seatbelt  - Bed and strip alarm   - Yellow sign by the door   - Yellow wrist band \"Fall risk\"  - Room near to the nurse station  - Do not leave patient unattended in the bathroom  - Fall risk education provided     Problem: Infection  Goal: Patient will remain free from infection  Outcome: Not Progressing  Note: 12/9 - WBCs elevated at 13.2.   12/8 - UA results show FEW bacteria and SMALL leukocyte estrace      "

## 2022-12-12 NOTE — THERAPY
Occupational Therapy  Daily Treatment     Patient Name: Cooper Harvey  Age:  88 y.o., Sex:  male  Medical Record #: [id number]  Today's Date: 12/12/2022     Precautions  Precautions: (P) Fall Risk  Comments: (P) hearing aids   [place]         Subjective    Agreeable to ADL routine this session      Objective       12/12/22 0702   OT Charge Group   OT Self Care / ADL 4   OT Total Time Spent   OT Individual Total Time Spent (Mins) 60   Precautions   Precautions Fall Risk   Comments hearing aids   [place]   Functional Level of Assist   Bathing Contact Guard Assist  (when standing)   Upper Body Dressing Supervision  (setup  therapist retrieved from closet)   Lower Body Dressing Minimal Assist  (setup close SBA   pull up brief pants and socks.  slip on  shoes   Mod assist)   Toileting Contact Guard Assist   Bed, Chair, Wheelchair Transfer Contact Guard Assist  (cues for hand placement sit to stand to FWW)   Toilet Transfers Contact Guard Assist   Tub / Shower Transfers Contact Guard Assist   Interdisciplinary Plan of Care Collaboration   Patient Position at End of Therapy Seated;Chair Alarm On;Call Light within Reach;Tray Table within Reach;Self Releasing Lap Belt Applied  (alarming seat belt)      FWW  bed to bathroom  with CGA      Assessment    Improved ability to participate as compared to  end of last week.    One slight LOB when standing to pull  pants up  self corrected using grab bar in bathroom     Strengths: Able to follow instructions, Alert and oriented, Independent prior level of function, Manages pain appropriately, Motivated for self care and independence, Pleasant and cooperative, Supportive family, Willingly participates in therapeutic activities  Barriers: Decreased endurance, Fatigue, Generalized weakness, Home accessibility, Hypertension, Impaired activity tolerance, Impaired balance, Impaired functional cognition, Limited mobility (Hard of hearing)    Plan  ADL  related mobility   strength/endurance  building  standing tolerance and balance activity           Occupational Therapy Goals (Active)       Problem: Dressing       Dates: Start: 12/03/22         Goal: STG-Within one week, patient will dress LB with min A       Dates: Start: 12/03/22         Goal Note filed on 12/07/22 1019 by TABBY Reyes.O.TBRAD       Max assist LB dressing      Has been limited by time constraints  decreased strength/endurance                   Problem: Functional Transfers       Dates: Start: 12/03/22         Goal: STG-Within one week, patient will transfer to toilet supervised with grab bar       Dates: Start: 12/03/22         Goal Note filed on 12/07/22 1019 by TABBY Reyes.O.TBRAD        CGA CGA   due to decreased strength/endurance    Continue goal                  Problem: OT Long Term Goals       Dates: Start: 12/03/22         Goal: LTG-By discharge, patient will complete basic self care tasks with supervision using AE/AD       Dates: Start: 12/03/22            Goal: LTG-By discharge, patient will perform bathroom transfers with supervision using AE/AD       Dates: Start: 12/03/22               Problem: Toileting       Dates: Start: 12/03/22         Goal: STG-Within one week, patient will complete toileting tasks with SBA       Dates: Start: 12/03/22         Goal Note filed on 12/07/22 1019 by TABBY Reyes.O.T.LASHAE        CGA   due to decreased strength/endurance    Continue goal

## 2022-12-12 NOTE — THERAPY
Physical Therapy   Daily Treatment     Patient Name: Cooper Harvey  Age:  88 y.o., Sex:  male  Medical Record #: [id number]  Today's Date: 12/12/2022     Precautions  Precautions: Fall Risk  Comments: hearing aids   [place]    Subjective    Pt up in wc, finishing breakfast and willing to participate.     Objective       12/12/22 0831   PT Charge Group   PT Gait Training 1   PT Therapeutic Exercise 1   PT Total Time Spent   PT Individual Total Time Spent (Mins) 30   Gait Functional Level of Assist    Gait Level Of Assist Contact Guard Assist  (light CGA at gait belt)   Assistive Device Front Wheel Walker   Distance (Feet) 175   # of Times Distance was Traveled 2   Deviation Bradykinetic;Shuffled Gait   Stairs Functional Level of Assist   Level of Assist with Stairs Contact Guard Assist  (light CGA)   # of Stairs Climbed 4   Stairs Description Extra time;Hand rails  (step to patterning)   Sitting Lower Body Exercises   Ankle Pumps 3 sets of 10   Hip Abduction 3 sets of 10   Hip Adduction 3 sets of 10   Long Arc Quad 3 sets of 10   Marching 3 sets of 10   Hamstring Curl 3 sets of 10   Comments 1.5# wts/ green TB       Assessment    Pt tolerated gait endurance and strength training without difficulty, close SBA/ light CGA for safety with mobility using FWW.    Strengths: Able to follow instructions, Effective communication skills, Independent prior level of function, Manages pain appropriately, Motivated for self care and independence, Pleasant and cooperative, Supportive family, Willingly participates in therapeutic activities  Barriers: Decreased endurance, Fatigue, Generalized weakness, Home accessibility, Impaired activity tolerance, Impaired balance, Limited mobility    Plan    Cont gait training FWW, standing balance, LE strengthening, consecutive stairs     Passport items to be completed Get in/out of bed safely, in/out of a vehicle, safely use mobility device, walk or wheel around home/community, navigate up  and down stairs, show how to get up/down from the ground, ensure home is accessible, demonstrate HEP, complete caregiver training     Physical Therapy Problems (Active)       Problem: Mobility       Dates: Start: 12/03/22         Goal: STG-Within one week, patient will ascend and descend four to six stairs with BHR and CGA       Dates: Start: 12/03/22         Goal Note filed on 12/07/22 2678 by Bhavana Lovell, PTA       CGA to Nicholas(fatigue)                 Problem: PT-Long Term Goals       Dates: Start: 12/03/22         Goal: LTG-By discharge, patient will ambulate x 150 feet with FWW and SPV       Dates: Start: 12/03/22            Goal: LTG-By discharge, patient will transfer one surface to another with FWW and SPV       Dates: Start: 12/03/22            Goal: LTG-By discharge, patient will ambulate up/down 4-6 stairs with one HR and SBA       Dates: Start: 12/03/22            Goal: LTG-By discharge, patient will transfer in/out of a car with FWW and SBA       Dates: Start: 12/03/22

## 2022-12-13 LAB
ANION GAP SERPL CALC-SCNC: 8 MMOL/L (ref 7–16)
BASOPHILS # BLD AUTO: 1 % (ref 0–1.8)
BASOPHILS # BLD: 0.06 K/UL (ref 0–0.12)
BUN SERPL-MCNC: 30 MG/DL (ref 8–22)
CALCIUM SERPL-MCNC: 8.7 MG/DL (ref 8.5–10.5)
CHLORIDE SERPL-SCNC: 105 MMOL/L (ref 96–112)
CO2 SERPL-SCNC: 24 MMOL/L (ref 20–33)
CREAT SERPL-MCNC: 1.09 MG/DL (ref 0.5–1.4)
EOSINOPHIL # BLD AUTO: 0.42 K/UL (ref 0–0.51)
EOSINOPHIL NFR BLD: 7.3 % (ref 0–6.9)
ERYTHROCYTE [DISTWIDTH] IN BLOOD BY AUTOMATED COUNT: 44.2 FL (ref 35.9–50)
GFR SERPLBLD CREATININE-BSD FMLA CKD-EPI: 65 ML/MIN/1.73 M 2
GLUCOSE SERPL-MCNC: 92 MG/DL (ref 65–99)
HCT VFR BLD AUTO: 38 % (ref 42–52)
HGB BLD-MCNC: 12.5 G/DL (ref 14–18)
IMM GRANULOCYTES # BLD AUTO: 0.04 K/UL (ref 0–0.11)
IMM GRANULOCYTES NFR BLD AUTO: 0.7 % (ref 0–0.9)
LYMPHOCYTES # BLD AUTO: 0.63 K/UL (ref 1–4.8)
LYMPHOCYTES NFR BLD: 10.9 % (ref 22–41)
MCH RBC QN AUTO: 30.8 PG (ref 27–33)
MCHC RBC AUTO-ENTMCNC: 32.9 G/DL (ref 33.7–35.3)
MCV RBC AUTO: 93.6 FL (ref 81.4–97.8)
MONOCYTES # BLD AUTO: 0.51 K/UL (ref 0–0.85)
MONOCYTES NFR BLD AUTO: 8.8 % (ref 0–13.4)
NEUTROPHILS # BLD AUTO: 4.13 K/UL (ref 1.82–7.42)
NEUTROPHILS NFR BLD: 71.3 % (ref 44–72)
NRBC # BLD AUTO: 0 K/UL
NRBC BLD-RTO: 0 /100 WBC
PLATELET # BLD AUTO: 322 K/UL (ref 164–446)
PMV BLD AUTO: 10.1 FL (ref 9–12.9)
POTASSIUM SERPL-SCNC: 4.5 MMOL/L (ref 3.6–5.5)
RBC # BLD AUTO: 4.06 M/UL (ref 4.7–6.1)
SODIUM SERPL-SCNC: 137 MMOL/L (ref 135–145)
WBC # BLD AUTO: 5.8 K/UL (ref 4.8–10.8)

## 2022-12-13 PROCEDURE — 97110 THERAPEUTIC EXERCISES: CPT | Mod: CO

## 2022-12-13 PROCEDURE — 770010 HCHG ROOM/CARE - REHAB SEMI PRIVAT*

## 2022-12-13 PROCEDURE — 97112 NEUROMUSCULAR REEDUCATION: CPT | Mod: CQ

## 2022-12-13 PROCEDURE — 97116 GAIT TRAINING THERAPY: CPT | Mod: CQ

## 2022-12-13 PROCEDURE — 700102 HCHG RX REV CODE 250 W/ 637 OVERRIDE(OP): Performed by: PHYSICAL MEDICINE & REHABILITATION

## 2022-12-13 PROCEDURE — 97530 THERAPEUTIC ACTIVITIES: CPT | Mod: CQ

## 2022-12-13 PROCEDURE — 97129 THER IVNTJ 1ST 15 MIN: CPT

## 2022-12-13 PROCEDURE — 97130 THER IVNTJ EA ADDL 15 MIN: CPT

## 2022-12-13 PROCEDURE — A9270 NON-COVERED ITEM OR SERVICE: HCPCS | Performed by: PHYSICAL MEDICINE & REHABILITATION

## 2022-12-13 PROCEDURE — 36415 COLL VENOUS BLD VENIPUNCTURE: CPT

## 2022-12-13 PROCEDURE — 85025 COMPLETE CBC W/AUTO DIFF WBC: CPT

## 2022-12-13 PROCEDURE — 99232 SBSQ HOSP IP/OBS MODERATE 35: CPT | Performed by: PHYSICAL MEDICINE & REHABILITATION

## 2022-12-13 PROCEDURE — 80048 BASIC METABOLIC PNL TOTAL CA: CPT

## 2022-12-13 PROCEDURE — 97530 THERAPEUTIC ACTIVITIES: CPT | Mod: CO

## 2022-12-13 RX ORDER — AMOXICILLIN 250 MG
2 CAPSULE ORAL 2 TIMES DAILY
Status: DISCONTINUED | OUTPATIENT
Start: 2022-12-13 | End: 2022-12-16 | Stop reason: HOSPADM

## 2022-12-13 RX ORDER — BISACODYL 10 MG
10 SUPPOSITORY, RECTAL RECTAL DAILY
Status: DISCONTINUED | OUTPATIENT
Start: 2022-12-13 | End: 2022-12-16 | Stop reason: HOSPADM

## 2022-12-13 RX ORDER — POLYETHYLENE GLYCOL 3350 17 G/17G
1 POWDER, FOR SOLUTION ORAL
Status: DISCONTINUED | OUTPATIENT
Start: 2022-12-13 | End: 2022-12-16 | Stop reason: HOSPADM

## 2022-12-13 RX ADMIN — OMEPRAZOLE 20 MG: 20 CAPSULE, DELAYED RELEASE ORAL at 08:24

## 2022-12-13 RX ADMIN — ATORVASTATIN CALCIUM 40 MG: 40 TABLET, FILM COATED ORAL at 20:11

## 2022-12-13 RX ADMIN — TAMSULOSIN HYDROCHLORIDE 0.4 MG: 0.4 CAPSULE ORAL at 20:11

## 2022-12-13 RX ADMIN — SENNOSIDES AND DOCUSATE SODIUM 2 TABLET: 50; 8.6 TABLET ORAL at 20:11

## 2022-12-13 RX ADMIN — CEFUROXIME AXETIL 500 MG: 500 TABLET ORAL at 20:11

## 2022-12-13 RX ADMIN — LACTULOSE 30 ML: 20 SOLUTION ORAL at 06:13

## 2022-12-13 RX ADMIN — BISACODYL 10 MG: 10 SUPPOSITORY RECTAL at 20:12

## 2022-12-13 RX ADMIN — ASPIRIN 81 MG: 81 TABLET, COATED ORAL at 08:24

## 2022-12-13 RX ADMIN — SENNOSIDES AND DOCUSATE SODIUM 2 TABLET: 50; 8.6 TABLET ORAL at 08:24

## 2022-12-13 RX ADMIN — CEFUROXIME AXETIL 500 MG: 500 TABLET ORAL at 08:24

## 2022-12-13 RX ADMIN — LORATADINE 10 MG: 10 TABLET ORAL at 08:24

## 2022-12-13 RX ADMIN — Medication 1 PACKET: at 20:12

## 2022-12-13 RX ADMIN — Medication 1 PACKET: at 08:24

## 2022-12-13 RX ADMIN — CLOPIDOGREL BISULFATE 75 MG: 75 TABLET ORAL at 08:24

## 2022-12-13 ASSESSMENT — GAIT ASSESSMENTS
DISTANCE (FEET): 150
DEVIATION: BRADYKINETIC;SHUFFLED GAIT
ASSISTIVE DEVICE: FRONT WHEEL WALKER
GAIT LEVEL OF ASSIST: STANDBY ASSIST
DEVIATION: DECREASED BASE OF SUPPORT;BRADYKINETIC;SHUFFLED GAIT;DECREASED HEEL STRIKE;DECREASED TOE OFF
GAIT LEVEL OF ASSIST: STANDBY ASSIST
ASSISTIVE DEVICE: FRONT WHEEL WALKER

## 2022-12-13 ASSESSMENT — PAIN DESCRIPTION - PAIN TYPE: TYPE: ACUTE PAIN

## 2022-12-13 NOTE — PROGRESS NOTES
"Rehab Progress Note     Date of Service: 12/13/2022  Chief Complaint: follow up debility    Interval Events (Subjective)    Patient seen and examined today in his room.  He is very sad and tearful and really wants to go home.  He denies any pain.  No new focal neurological findings.  Family was in today for family training with therapist in the afternoon.  Patient continues to be constipated.    Patient has no other new questions, concerns, or complaints today.       ROS: No changes to bowel, bladder, pain, mood, or sleep.         Objective:  VITAL SIGNS: /68   Pulse 68   Temp 36.7 °C (98 °F) (Oral)   Resp 20   Ht 1.727 m (5' 8\")   Wt 70 kg (154 lb 5.2 oz)   SpO2 94%   BMI 23.46 kg/m²   Gen: alert, in mild distress  CV: Regular rate, regular rhythm  Resp: Clear to auscultation bilaterally  Neuro: Hard of hearing, nonfocal exam  Psych: Tearful      Recent Results (from the past 72 hour(s))   CBC WITH DIFFERENTIAL    Collection Time: 12/13/22  6:12 AM   Result Value Ref Range    WBC 5.8 4.8 - 10.8 K/uL    RBC 4.06 (L) 4.70 - 6.10 M/uL    Hemoglobin 12.5 (L) 14.0 - 18.0 g/dL    Hematocrit 38.0 (L) 42.0 - 52.0 %    MCV 93.6 81.4 - 97.8 fL    MCH 30.8 27.0 - 33.0 pg    MCHC 32.9 (L) 33.7 - 35.3 g/dL    RDW 44.2 35.9 - 50.0 fL    Platelet Count 322 164 - 446 K/uL    MPV 10.1 9.0 - 12.9 fL    Neutrophils-Polys 71.30 44.00 - 72.00 %    Lymphocytes 10.90 (L) 22.00 - 41.00 %    Monocytes 8.80 0.00 - 13.40 %    Eosinophils 7.30 (H) 0.00 - 6.90 %    Basophils 1.00 0.00 - 1.80 %    Immature Granulocytes 0.70 0.00 - 0.90 %    Nucleated RBC 0.00 /100 WBC    Neutrophils (Absolute) 4.13 1.82 - 7.42 K/uL    Lymphs (Absolute) 0.63 (L) 1.00 - 4.80 K/uL    Monos (Absolute) 0.51 0.00 - 0.85 K/uL    Eos (Absolute) 0.42 0.00 - 0.51 K/uL    Baso (Absolute) 0.06 0.00 - 0.12 K/uL    Immature Granulocytes (abs) 0.04 0.00 - 0.11 K/uL    NRBC (Absolute) 0.00 K/uL   Basic Metabolic Panel    Collection Time: 12/13/22  6:12 AM   Result " Value Ref Range    Sodium 137 135 - 145 mmol/L    Potassium 4.5 3.6 - 5.5 mmol/L    Chloride 105 96 - 112 mmol/L    Co2 24 20 - 33 mmol/L    Glucose 92 65 - 99 mg/dL    Bun 30 (H) 8 - 22 mg/dL    Creatinine 1.09 0.50 - 1.40 mg/dL    Calcium 8.7 8.5 - 10.5 mg/dL    Anion Gap 8.0 7.0 - 16.0   ESTIMATED GFR    Collection Time: 12/13/22  6:12 AM   Result Value Ref Range    GFR (CKD-EPI) 65 >60 mL/min/1.73 m 2         Current Facility-Administered Medications   Medication Frequency    senna-docusate (PERICOLACE or SENOKOT S) 8.6-50 MG per tablet 2 Tablet BID    And    polyethylene glycol/lytes (MIRALAX) PACKET 1 Packet QDAY PRN    And    magnesium hydroxide (MILK OF MAGNESIA) suspension 30 mL QDAY PRN    And    bisacodyl (DULCOLAX) suppository 10 mg DAILY    QUEtiapine (Seroquel) tablet 25 mg QHS PRN    cefUROXime (CEFTIN) tablet 500 mg Q12HRS    hydrOXYzine HCl (ATARAX) tablet 50 mg Q6HRS PRN    melatonin tablet 3 mg HS PRN    Respiratory Therapy Consult Continuous RT    acetaminophen (Tylenol) tablet 650 mg Q4HRS PRN    lactulose 20 GM/30ML solution 30 mL QDAY PRN    docusate sodium (ENEMEEZ) enema 283 mg QDAY PRN    omeprazole (PRILOSEC) capsule 20 mg QAM AC    mag hydrox-al hydrox-simeth (MAALOX PLUS ES or MYLANTA DS) suspension 20 mL Q2HRS PRN    ondansetron (ZOFRAN ODT) dispertab 4 mg 4X/DAY PRN    Or    ondansetron (ZOFRAN) syringe/vial injection 4 mg 4X/DAY PRN    traZODone (DESYREL) tablet 50 mg QHS PRN    sodium chloride (OCEAN) 0.65 % nasal spray 2 Spray PRN    aspirin EC (ECOTRIN) tablet 81 mg DAILY    atorvastatin (LIPITOR) tablet 40 mg Q EVENING    clopidogrel (PLAVIX) tablet 75 mg DAILY    loratadine (CLARITIN) tablet 10 mg DAILY    psyllium (METAMUCIL/KONSYL) 1 Packet BID    tamsulosin (FLOMAX) capsule 0.4 mg QHS       Orders Placed This Encounter   Procedures    Diet Order Diet: Cardiac     Standing Status:   Standing     Number of Occurrences:   1     Order Specific Question:   Diet:     Answer:    Cardiac [6]       Radiology    [id number] VIEW   Final Result      1.  T11 compression deformity of indeterminate age.   2.  Benign bowel gas pattern. Considering the history of constipation. There is no excessive fecal burden.      DX-CHEST-LIMITED (1 VIEW)   Final Result      No acute cardiopulmonary disease.          Assessment:  This patient is a 88 y.o. male admitted for acute inpatient rehabilitation with debility after acute hospital stay for   Transient ischemic attack (TIA).    I led and attended the weekly conference, and agree with the IDT conference documentation and plan of care as noted below.    Date of conference: 12/7/2022    Goals and barriers: See IDT note.    Biggest barriers: hard of hearing, impaired endurance, impulsive, urinary frequency, moderate cognitive impairment, fatigue, stairs at home    CM/social support: wife supportive, would benefit from supervision from their family initially on discharge    Anticipated DC date: 12/17    Home health: PT/OT/SLP/RN    Equip: none needed    Follow up: PCP, stroke bridge clinic, IR        Problem List/Medical Decision Making and Plan:    Debility, improved  Moderate cognitive impairment  Continue full rehab program  PT/OT/SLP, 1 hr each discipline, 5 days per week  12/6: SLP decrease to 30 min, share other 30 min with PT/OT    TIA (aphasia)  Plavix and aspirin  Outpatient follow up with stroke bridge clinic, referral made    Encephalopathy, resolved  Sundowning, resolved  Occurred during last hospitalization as well  Multi-factorial - history of stroke, hard of hearing  Continue Seroquel at night for now, will titrate down - decrease to 75 mg 12/5 --> 50 mg 12/6 --> 25 mg 12/7 --> changed to 25 mg as needed 12/12     Carotid stenosis  S/p left stent 11/25 Dr. Norm Hollins for 8 weeks  Aspirin indefinitely  Outpatient follow up with IR, referral made     Uvulitis, resolved  Completed Augmentin     History of hypertension  Hypotension,  intermittent  Monitor need to add back home medication lisinopril, discussed with family  Currently with good control with intermittent high     Hematuria, resolved  From traumatic catheter pulling  Removed Bhatia catheter 12/6, voiding    Urinary frequency/urgency, improved  Low-grade fever, resolved  Leukocytosis, resolved  Altered mental status, improved  UTI, culture with E. coli, started on Bactrim without much response, switched to cefuroxime, sensitive  Negative chest x-ray, flu, COVID     Allergies/rhinorrhea   Claritin     Anemia  Improved    Azotemia, continues  Increase oral fluids    Bowel program  Constipation, continues  Continue bowel meds and metamucil -  home medication  Increase bowel medications to include milk of mag, lactulose, suppository  Last BM 12/8  Checked KUB 12/12 -without significant stool burden    Bladder program  BPH  Continue Flomax  Removed Bhatia 12/6  Check PVRs - 69, 97  Not retaining  IC for over 400 cc    DVT prophylaxis  Contra-indicated due to recent hematuria  Patient walking with therapy  Continue to increase mobility  Monitor for edema    Total time:  26 minutes.  I spent greater than 50% of the time for patient care, counseling, and coordination on this date, including patient face-to face time, unit/floor time with review of records/pertinent lab data and studies, as well as discussing diagnostic evaluation/work up, planned therapeutic interventions, and future disposition of care, as per the interval events/subjective and the assessment and plan as noted above.    I have performed a physical exam, reviewed and updated ROS, as well as the assessment and plan today 12/13/2022. In review of note from 12/12/2022 there are no new changes except as documented above.      Sugey Barkley M.D.  Physical Medicine and Rehabilitation

## 2022-12-13 NOTE — CARE PLAN
Problem: Mobility  Goal: STG-Within one week, patient will ascend and descend four to six stairs with BHR and CGA  Outcome: Met   Progressing toward LTG

## 2022-12-13 NOTE — THERAPY
Occupational Therapy  Daily Treatment     Patient Name: Cooper Harvey  Age:  88 y.o., Sex:  male  Medical Record #: [id number]  Today's Date: 12/13/2022     Precautions  Precautions: Fall Risk  Comments: hearing aids   [place]         Subjective     Agreeable to tx activities performed 2 separate AM sessions    Objective  Tx 1  [id number]  Tx 2 1030 to 1130     12/13/22 0831 12/13/22 1031   OT Charge Group   OT Therapy Activity 1  --    OT Therapeutic Exercise  1  --    OT Total Time Spent   OT Individual Total Time Spent (Mins) 30  --    Functional Level of Assist   Bed, Chair, Wheelchair Transfer  --  Standby Assist  (FWW)   Toilet Transfers Standby Assist  (cues for safety and technique using FWW and grab bar)  --    Tub / Shower Transfers  --  Standby Assist  (Dry step in shower transfer  using FWW and grab bars  cues for safety and technique  performed  2 times)   Sitting Upper Body Exercises   Lat Pull 2 sets of 10;Bilateral  (weighted pulley  10lbs)  --    Bilateral Row 2 sets of 10;Bilateral  (weighed pulley 15lbs)  --    Upper Extremity Bike Level 2 Resistance  (x 5 minutes motomed)  --    Interdisciplinary Plan of Care Collaboration   IDT Collaboration with   --  Family / Caregiver   Patient Position at End of Therapy Seated;Call Light within Reach;Tray Table within Reach Seated;Call Light within Reach;Tray Table within Reach   Collaboration Comments  --  impromptu family training with  spouse and daughter .  verbal review  of current functional status for  ADLs   performed  shower transfer and bed transfer  SBA    household furniture transfer  SBA and cues  at FWW level    spouse Cynthia performed household furniture transfer and  mobility at FWW level  in [facility]  .   reviewed that assist with higher level ADL tasks  like brining in wood in  needs to be hired out for safety of  both Ion and his spouse.   She verbalizes ability to provided supervision / SBA   and demosntrated her ability as  well.     2 nd tx  60 minutes   4 therapeutic activity   Assessment      Tearful at start of second tx session  anxious to go home  reminded  d/c is  Saturday 12-17  4 more days.    Mood improved when family was present and participated in training in prep to return home      Strengths: Able to follow instructions, Alert and oriented, Independent prior level of function, Manages pain appropriately, Motivated for self care and independence, Pleasant and cooperative, Supportive family, Willingly participates in therapeutic activities  Barriers: Decreased endurance, Fatigue, Generalized weakness, Home accessibility, Hypertension, Impaired activity tolerance, Impaired balance, Impaired functional cognition, Limited mobility (Hard of hearing)    Plan    FT for PT car transfer Thursday at 1pm    ADL  related mobility   strength/endurance building  standing tolerance and balance activity        Occupational Therapy Goals (Active)       Problem: Dressing       Dates: Start: 12/03/22         Goal: STG-Within one week, patient will dress LB with min A       Dates: Start: 12/03/22         Goal Note filed on 12/07/22 1019 by Darío Rios, C.O.T.A.       Max assist LB dressing      Has been limited by time constraints  decreased strength/endurance                   Problem: Functional Transfers       Dates: Start: 12/03/22         Goal: STG-Within one week, patient will transfer to toilet supervised with grab bar       Dates: Start: 12/03/22         Goal Note filed on 12/07/22 1019 by Darío Rios, C.O.T.A.        CGA CGA   due to decreased strength/endurance    Continue goal                  Problem: OT Long Term Goals       Dates: Start: 12/03/22         Goal: LTG-By discharge, patient will complete basic self care tasks with supervision using AE/AD       Dates: Start: 12/03/22            Goal: LTG-By discharge, patient will perform bathroom transfers with supervision using AE/AD       Dates: Start: 12/03/22                Problem: Toileting       Dates: Start: 12/03/22         Goal: STG-Within one week, patient will complete toileting tasks with SBA       Dates: Start: 12/03/22         Goal Note filed on 12/07/22 1019 by FLAKITO Reyes        CGA   due to decreased strength/endurance    Continue goal

## 2022-12-13 NOTE — THERAPY
Physical Therapy   Daily Treatment     Patient Name: Cooper Harvey  Age:  88 y.o., Sex:  male  Medical Record #: [id number]  Today's Date: 12/13/2022     Precautions  Precautions: Fall Risk  Comments: hearing aids   [place]    Subjective    Pt seated in the , family reports pt was a little emotional because he wants to go home     Objective    [id number]     12/13/22 0701   PT Charge Group   PT Gait Training 2   PT Neuromuscular Re-Education / Balance 1   PT Therapeutic Activities 1   Supervising Physical Therapist Vikram Ingram   PT Total Time Spent   PT Individual Total Time Spent (Mins) 60   Pain   Intervention Declines   Cognition    Safety Awareness Impaired;Impulsive   New Learning Impaired   Attention Impaired   Sequencing Impaired   Initiation Impaired   Comments impaired safety awareness   Gait Functional Level of Assist    Gait Level Of Assist Standby Assist   Assistive Device Front Wheel Walker   Distance (Feet)   (320', 160 x 2)   # of Times Distance was Traveled 3   Deviation Bradykinetic;Shuffled Gait  (poor foot clearance)   Bed Mobility    Supine to Sit Standby Assist   Sit to Supine Supervised   Sit to Stand Standby Assist   Scooting Standby Assist   Rolling Supervised   Neuro-Muscular Treatments   Neuro-Muscular Treatments Compensatory Strategies;Verbal Cuing;Anterior weight shift;Postural Changes   Comments dynamic standing balance in // 2 x 10 chest press, OH press and single UE shoulder abd, completes 1 set on non compliant and 1 set on compliant surface(air ex pad) no UE support and CGA on air ex   Interdisciplinary Plan of Care Collaboration   IDT Collaboration with  Nursing   Patient Position at End of Therapy Seated;Self Releasing Lap Belt Applied;Chair Alarm On;Call Light within Reach;Tray Table within Reach;Phone within Reach   Collaboration Comments am rounding with patient         [id number]     12/13/22 1301   PT Charge Group   PT Gait Training 1   PT Therapeutic Activities 1  "  Supervising Physical Therapist Vikram Ingram   PT Total Time Spent   PT Individual Total Time Spent (Mins) 30   Gait Functional Level of Assist    Gait Level Of Assist Standby Assist  (SPV to SBA)   Assistive Device Front Wheel Walker   Distance (Feet) 1503   Deviation Decreased Base Of Support;Bradykinetic;Shuffled Gait;Decreased Heel Strike;Decreased Toe Off   Stairs Functional Level of Assist   Level of Assist with Stairs Standby Assist   # of Stairs Climbed 4   Stairs Description Extra time;Supervision for safety;Safety concerns;Limited by fatigue;Verbal cueing  (ascends/descends sidestep method with B UE on single HR)   Transfer Functional Level of Assist   Bed, Chair, Wheelchair Transfer Standby Assist   Bed Chair Wheelchair Transfer Description   (stand step transfer with bed rail)   Interdisciplinary Plan of Care Collaboration   IDT Collaboration with  Family / Caregiver;Certified O.T. Assistant  ([employer])   Patient Position at End of Therapy In Bed;Call Light within Reach;Tray Table within Reach   Collaboration Comments caregiver training, CLOF     Completed caregiver training on the following topics  -gait with FWW on indoor level surfaces  -curb step with FWW for entry to home   - standard height 6\" stairs with single HR(garage entry)  -edu on L side mild incoordination  -STS transitions and FWW safety/turning  -fall packet  -floor transfer    Reinforced recommendation for use of FWW at all times for balance and that spouse be present with patient when he has to go up/down stairs or for entry/exit to the home    Assessment    Completed caregiver training, caregiver(daughter Constantin and wife) return verbal comprehension of all education , continued training on Thursday for car transfer . Pt demos improving carryover with safety techniques, STS transfers/hand placements, safety with FWW/management. Cont to present with impaired activity tolerance and shuffled gait indicating pt is still at risk for " falls  Strengths: Able to follow instructions, Effective communication skills, Independent prior level of function, Manages pain appropriately, Motivated for self care and independence, Pleasant and cooperative, Supportive family, Willingly participates in therapeutic activities  Barriers: Decreased endurance, Fatigue, Generalized weakness, Home accessibility, Impaired activity tolerance, Impaired balance, Limited mobility    Plan    Safety with FWW, outdoor amb with FWW, car transfer Thursday, fall recovery, standing/seated HEP, dynamic bal, endurance activities     Passport items to be completed Get in/out of bed safely, in/out of a vehicle, safely use mobility device, walk or wheel around home/community, navigate up and down stairs, show how to get up/down from the ground, ensure home is accessible, demonstrate HEP, complete caregiver training    Physical Therapy Problems (Active)       Problem: Mobility       Dates: Start: 12/03/22         Goal: STG-Within one week, patient will ascend and descend four to six stairs with BHR and CGA       Dates: Start: 12/03/22         Goal Note filed on 12/07/22 1308 by Bhavana Lovell, PTA       CGA to Nicholas(fatigue)                 Problem: PT-Long Term Goals       Dates: Start: 12/03/22         Goal: LTG-By discharge, patient will ambulate x 150 feet with FWW and SPV       Dates: Start: 12/03/22            Goal: LTG-By discharge, patient will transfer one surface to another with FWW and SPV       Dates: Start: 12/03/22            Goal: LTG-By discharge, patient will ambulate up/down 4-6 stairs with one HR and SBA       Dates: Start: 12/03/22            Goal: LTG-By discharge, patient will transfer in/out of a car with FWW and SBA       Dates: Start: 12/03/22

## 2022-12-13 NOTE — THERAPY
Speech Language Pathology  Daily Treatment     Patient Name: Cooper Harvey  Age:  88 y.o., Sex:  male  Medical Record #: [id number]  Today's Date: 12/13/2022     Precautions  Precautions: Fall Risk  Comments: hearing aids   [place]    Subjective    Pt with flat affect, limited responses on this date.      Objective       12/13/22 0933   Treatment Charges   SLP Cognitive Skill Development First 15 Minutes 1   SLP Cognitive Skill Development Additional 15 Minutes 1   SLP Total Time Spent   SLP Individual Total Time Spent (Mins) 30       Assessment    Olog cont on this date, pt with increased no responses throughout required CO2 score decreased to 14/30 at this time. Pt presented with written two step directions, pt correctly followed one of the two directions however always circled the target word rather than paying attention to the specific instruction for [place], cross out, put an X on etc.     Strengths: Pleasant and cooperative, Supportive family, Able to follow instructions  Barriers: Impaired functional cognition, Impaired carryover of learning, Lack of motivation, Impaired activity tolerance    Plan    Cont to address attention, orientation and dc planning with family     Speech Therapy Problems (Active)       Problem: Memory STGs       Dates: Start: 12/03/22         Goal: STG-Within one week, patient will recall daily events, and safety strategies, given min verbal cues.         Dates: Start: 12/03/22         Goal Note filed on 12/07/22 1019 by Jeimy Leos MS,CCC-SLP       Mod-max cues                 Problem: Problem Solving STGs       Dates: Start: 12/03/22         Goal: STG-Within one week, patient will score 25 or greater on the O-log over 2 sessions.         Dates: Start: 12/03/22         Goal Note filed on 12/07/22 1019 by Jeimy Leos MS,CCC-SLP       Cont to address highest score achieved 23               Goal: STG-Within one week, patient will complete basic attn tasks with 80%  accuracy given min verbal cues.         Dates: Start: 12/03/22         Goal Note filed on 12/07/22 1019 by Jeimy Leos MS,CCC-SLP       Mod cues                 Problem: Speech/Swallowing LTGs       Dates: Start: 12/03/22         Goal: LTG-By discharge, patient will solve basic problems Markie for safe discharge home.         Dates: Start: 12/03/22

## 2022-12-13 NOTE — CARE PLAN
"The patient is Stable - Low risk of patient condition declining or worsening    Shift Goals  Clinical Goals: Infection control  Patient Goals: rest  Family Goals: \    Progress made toward(s) clinical / shift goals:      Problem: Neurogenic Bladder  Goal: Patient will demonstrate ability to take care of indwelling catheter  Outcome: Met  Note: Patient does not have a catheter in place.     Patient is not progressing towards the following goals:    Problem: Bladder / Voiding  Goal: Patient will establish and maintain regular urinary output  Outcome: Not Progressing  Note: Patient has been incontinent of bladder this shift.     Problem: Fall Risk - Rehab  Goal: Patient will remain free from falls  Outcome: Not Met  Note: Sandra Turcios Fall risk Assessment Score: 20    High fall risk Interventions   - Alarming seatbelt  - Bed and strip alarm   - Yellow sign by the door   - Yellow wrist band \"Fall risk\"  - Room near to the nurse station  - Do not leave patient unattended in the bathroom  - Fall risk education provided     Problem: Infection  Goal: Patient will remain free from infection  Outcome: Not Progressing  Note: Patient had Urine culture and sensitivity results from 12/8 that was positive for E. Coli. Patient has not been treated for UTI.     "

## 2022-12-14 PROCEDURE — 700102 HCHG RX REV CODE 250 W/ 637 OVERRIDE(OP): Performed by: PHYSICAL MEDICINE & REHABILITATION

## 2022-12-14 PROCEDURE — A9270 NON-COVERED ITEM OR SERVICE: HCPCS | Performed by: PHYSICAL MEDICINE & REHABILITATION

## 2022-12-14 PROCEDURE — 97110 THERAPEUTIC EXERCISES: CPT

## 2022-12-14 PROCEDURE — 97530 THERAPEUTIC ACTIVITIES: CPT | Mod: CO

## 2022-12-14 PROCEDURE — 97535 SELF CARE MNGMENT TRAINING: CPT | Mod: CO

## 2022-12-14 PROCEDURE — 97530 THERAPEUTIC ACTIVITIES: CPT

## 2022-12-14 PROCEDURE — 97110 THERAPEUTIC EXERCISES: CPT | Mod: CO

## 2022-12-14 PROCEDURE — 770010 HCHG ROOM/CARE - REHAB SEMI PRIVAT*

## 2022-12-14 PROCEDURE — 97112 NEUROMUSCULAR REEDUCATION: CPT | Mod: CQ

## 2022-12-14 PROCEDURE — 99233 SBSQ HOSP IP/OBS HIGH 50: CPT | Performed by: PHYSICAL MEDICINE & REHABILITATION

## 2022-12-14 PROCEDURE — 97130 THER IVNTJ EA ADDL 15 MIN: CPT

## 2022-12-14 PROCEDURE — 97129 THER IVNTJ 1ST 15 MIN: CPT

## 2022-12-14 RX ADMIN — SENNOSIDES AND DOCUSATE SODIUM 2 TABLET: 50; 8.6 TABLET ORAL at 09:49

## 2022-12-14 RX ADMIN — TAMSULOSIN HYDROCHLORIDE 0.4 MG: 0.4 CAPSULE ORAL at 20:06

## 2022-12-14 RX ADMIN — CLOPIDOGREL BISULFATE 75 MG: 75 TABLET ORAL at 09:50

## 2022-12-14 RX ADMIN — TRAZODONE HYDROCHLORIDE 50 MG: 50 TABLET ORAL at 20:06

## 2022-12-14 RX ADMIN — Medication 1 PACKET: at 09:50

## 2022-12-14 RX ADMIN — OMEPRAZOLE 20 MG: 20 CAPSULE, DELAYED RELEASE ORAL at 09:50

## 2022-12-14 RX ADMIN — Medication 3 MG: at 00:15

## 2022-12-14 RX ADMIN — TRAZODONE HYDROCHLORIDE 50 MG: 50 TABLET ORAL at 00:15

## 2022-12-14 RX ADMIN — ATORVASTATIN CALCIUM 40 MG: 40 TABLET, FILM COATED ORAL at 20:06

## 2022-12-14 RX ADMIN — ASPIRIN 81 MG: 81 TABLET, COATED ORAL at 09:50

## 2022-12-14 RX ADMIN — Medication 1 PACKET: at 20:06

## 2022-12-14 RX ADMIN — Medication 3 MG: at 20:07

## 2022-12-14 RX ADMIN — LORATADINE 10 MG: 10 TABLET ORAL at 09:50

## 2022-12-14 RX ADMIN — SENNOSIDES AND DOCUSATE SODIUM 2 TABLET: 50; 8.6 TABLET ORAL at 20:06

## 2022-12-14 ASSESSMENT — ACTIVITIES OF DAILY LIVING (ADL)
TOILET_TRANSFER_DESCRIPTION: ADAPTIVE EQUIPMENT;GRAB BAR;INCREASED TIME;SET-UP OF EQUIPMENT
BED_CHAIR_WHEELCHAIR_TRANSFER_DESCRIPTION: INCREASED TIME;SET-UP OF EQUIPMENT;SUPERVISION FOR SAFETY;VERBAL CUEING
BED_CHAIR_WHEELCHAIR_TRANSFER_DESCRIPTION: ADAPTIVE EQUIPMENT;INCREASED TIME;SET-UP OF EQUIPMENT

## 2022-12-14 NOTE — DISCHARGE PLANNING
Last seen 1/13/20   Spoke with patient and family in room. Discussed discharge process and what to expect once patient gets home. Sent home health orders to [facility]. Waiting response. Will follow up with daughter after team conference.

## 2022-12-14 NOTE — CARE PLAN
Problem: Problem Solving STGs  Goal: STG-Within one week, patient will score 25 or greater on the O-log over 2 sessions.    12/14/2022 1110 by Jeimy Leos MS,CCC-SLP  Outcome: Not Met  Note: Today pt achieved a score of 9/30, previous high was 23/30  12/14/2022 1109 by Jeimy Leos MS,CCC-SLP  Outcome: Not Met  Note: Today pt achieved score of 9/30, previous high was 23/30  Goal: STG-Within one week, patient will complete basic attn tasks with 80% accuracy given min verbal cues.    12/14/2022 1110 by Jeimy Leos MS,CCC-SLP  Outcome: Not Met  Note: Mod a required  12/14/2022 1109 by Jeimy Leos MS,CCC-SLP  Note: Mod cues required      Problem: Problem Solving STGs  Goal: STG-Within one week, patient will complete basic attn tasks with 80% accuracy given min verbal cues.    12/14/2022 1110 by Jeimy Leos MS,CCC-SLP  Outcome: Not Met  Note: Mod a required  12/14/2022 1109 by Jeimy Leos MS,CCC-SLP  Note: Mod cues required

## 2022-12-14 NOTE — CARE PLAN
"The patient is Stable - Low risk of patient condition declining or worsening    Shift Goals  Clinical Goals: consistent bowels  Patient Goals: Rest    Patient is not progressing towards the following goals:    Problem: Fall Risk - Rehab  Goal: Patient will remain free from falls  Outcome: Not Met  Note: Sandra Turcios Fall risk Assessment Score: 23    High fall risk Interventions   - Alarming seatbelt  - Bed and strip alarm   - Yellow sign by the door   - Yellow wrist band \"Fall risk\"  - Room near to the nurse station  - Do not leave patient unattended in the bathroom  - Fall risk education provided     Problem: Infection  Goal: Patient will remain free from infection  Outcome: Not Progressing  Note: Taking Ceftin for UTI     Problem: Bowel Elimination  Goal: Patient will participate in bowel management program  Outcome: Not Progressing  Note: Patient started on nightly suppositories. Patient had small hard BM at first, the an XXXL soft/formed BM half accident and half on the toilet.     "

## 2022-12-14 NOTE — CARE PLAN
Problem: Dressing  Goal: STG-Within one week, patient will dress LB with min A  Outcome: Met     Problem: Toileting  Goal: STG-Within one week, patient will complete toileting tasks with SBA  Outcome: Met     Problem: Functional Transfers  Goal: STG-Within one week, patient will transfer to toilet supervised with grab bar  Outcome: Met  Note: SBA

## 2022-12-14 NOTE — THERAPY
"Speech Language Pathology  Daily Treatment     Patient Name: Cooper Harvey  Age:  88 y.o., Sex:  male  Medical Record #: [id number]  Today's Date: 12/14/2022     Precautions  Precautions: Fall Risk  Comments: hearing aids   [place]    Subjective    Pt sleeping upon arrival, willing to participate at bedside.     Objective       12/14/22 2630   Treatment Charges   SLP Cognitive Skill Development First 15 Minutes 1   SLP Cognitive Skill Development Additional 15 Minutes 1   SLP Total Time Spent   SLP Individual Total Time Spent (Mins) 30       Assessment    Orientation log completed with decreased score of 9/30 on this date. Pt required freq CO2 in order for response to be provided. Pt likely not appropriate for cognitive intervention at 7:30, relayed to . Pt indep looked to clock to respond to \"what time of day is it\"    Strengths: Pleasant and cooperative, Supportive family, Able to follow instructions  Barriers: Impaired functional cognition, Impaired carryover of learning, Lack of motivation, Impaired activity tolerance    Plan    Dc planning with pt and family     Speech Therapy Problems (Active)       Problem: Memory STGs       Dates: Start: 12/03/22         Goal: STG-Within one week, patient will recall daily events, and safety strategies, given min verbal cues.         Dates: Start: 12/03/22         Goal Note filed on 12/07/22 1019 by Jeimy Leos MS,CCC-SLP       Mod-max cues                 Problem: Problem Solving STGs       Dates: Start: 12/03/22         Goal: STG-Within one week, patient will score 25 or greater on the O-log over 2 sessions.         Dates: Start: 12/03/22         Goal Note filed on 12/07/22 1019 by Jeimy Leos MS,CCC-SLP       Cont to address highest score achieved 23               Goal: STG-Within one week, patient will complete basic attn tasks with 80% accuracy given min verbal cues.         Dates: Start: 12/03/22         Goal Note filed on 12/07/22 1019 by " Jeimy Leos MS,CCC-SLP       Mod cues                 Problem: Speech/Swallowing LTGs       Dates: Start: 12/03/22         Goal: LTG-By discharge, patient will solve basic problems Markie for safe discharge home.         Dates: Start: 12/03/22                Minoxidil Counseling: Minoxidil is a topical medication which can increase blood flow where it is applied. It is uncertain how this medication increases hair growth. Side effects are uncommon and include stinging and allergic reactions.

## 2022-12-14 NOTE — THERAPY
Physical Therapy   Daily Treatment     Patient Name: Cooper Harvey  Age:  88 y.o., Sex:  male  Medical Record #: [id number]  Today's Date: 12/14/2022     Precautions  Precautions: Fall Risk  Comments: hearing aids   [place]    Subjective    Pt seated EOB, finished eating his breakfast. Agreeable to participate     Objective       12/14/22 0831   PT Charge Group   PT Therapeutic Exercise 2   PT Neuromuscular Re-Education / Balance 1   PT Therapeutic Activities 1   Supervising Physical Therapist Vikram Ingram   PT Total Time Spent   PT Individual Total Time Spent (Mins) 60   Pain   Intervention Declines   Transfer Functional Level of Assist   Bed, Chair, Wheelchair Transfer Standby Assist   Bed Chair Wheelchair Transfer Description Increased time;Set-up of equipment;Supervision for safety;Verbal cueing   Sitting Lower Body Exercises   Sitting Lower Body Exercises Yes   Ankle Pumps 2 sets of 15   Hip Abduction 2 sets of 15   Hip Adduction 2 sets of 15   Long Arc Quad 2 sets of 15   Marching 2 sets of 15   Hamstring Curl 2 sets of 15   Comments #2 LB and pink band   Bed Mobility    Supine to Sit Standby Assist   Sit to Supine Supervised   Sit to Stand Standby Assist  (vc for hand placement)   Scooting Supervised   Rolling Supervised   Neuro-Muscular Treatments   Neuro-Muscular Treatments Postural Facilitation;Verbal Cuing;Sequencing   Comments floor transfer training with Nicholas to transfer to a mat on the floor and Nicholas to swing R LE through to stand up to the mat table. pt did not get all the way in to supine or sidelying(despite vc and demo) instead only wne as far as quadruped.   Interdisciplinary Plan of Care Collaboration   IDT Collaboration with  Certified O.T. Assistant  ([employer])   Patient Position at End of Therapy In Bed;Call Light within Reach;Tray Table within Reach;Phone within Reach   Collaboration Comments discussed CLOF, fatigue this session     Donned hearing aides totalA    Completed floor transfer  "training, stairs, and seated exercises as indicated above. Pt completed 4 6\" stairs with B HR SBA and vc for sequencing followed by 4 6\" stairs with single HR side stepping method ascending and descending.      Assessment    Pt with increased fatigue this session, impaired motor planning requires vc/tc for completion of seated LE exercises. Required rest breaks to complete exercises  Strengths: Able to follow instructions, Effective communication skills, Independent prior level of function, Manages pain appropriately, Motivated for self care and independence, Pleasant and cooperative, Supportive family, Willingly participates in therapeutic activities  Barriers: Decreased endurance, Fatigue, Generalized weakness, Home accessibility, Impaired activity tolerance, Impaired balance, Limited mobility    Plan    Caregiver training Thursday for car transfer and additional hands on training for stairs. collection of DC IRF-SAMANTHA in preparation for dc 12/16, complete passport     Passport items to be completed Get in/out of bed safely, in/out of a vehicle, safely use mobility device, walk or wheel around home/community, navigate up and down stairs, show how to get up/down from the ground, ensure home is accessible, demonstrate HEP, complete caregiver training    Physical Therapy Problems (Active)       Problem: PT-Long Term Goals       Dates: Start: 12/03/22         Goal: LTG-By discharge, patient will ambulate x 150 feet with FWW and SPV       Dates: Start: 12/03/22            Goal: LTG-By discharge, patient will transfer one surface to another with FWW and SPV       Dates: Start: 12/03/22            Goal: LTG-By discharge, patient will ambulate up/down 4-6 stairs with one HR and SBA       Dates: Start: 12/03/22            Goal: LTG-By discharge, patient will transfer in/out of a car with FWW and SBA       Dates: Start: 12/03/22              "

## 2022-12-14 NOTE — THERAPY
Occupational Therapy  Daily Treatment     Patient Name: Cooper Harvey  Age:  88 y.o., Sex:  male  Medical Record #: [id number]  Today's Date: 12/14/2022     Precautions  Precautions: (P) Fall Risk  Comments: (P) Hearing aids  [place]         Subjective     Agreeable to tx activity presented this session    Increased difficulty with hearing with hearing aids in   spouse had cleaned them and changed filter.    It was discovered that they needed to be charged at end of session          Objective       12/14/22 1031   OT Charge Group   OT Self Care / ADL 1   OT Therapy Activity 2   OT Therapeutic Exercise  1   OT Total Time Spent   OT Individual Total Time Spent (Mins) 60   Precautions   Precautions Fall Risk   Comments Hearing aids  [place]   Functional Level of Assist   Toileting Supervision   Toilet Transfers Standby Assist  (supervision)   Sitting Upper Body Exercises   Bilateral Row 3 sets of 10;Bilateral  (weighted pulley  20lbs)   Upper Extremity Bike Level 3 Resistance  (x 5 mintues  motomed)   Balance   Standing Balance (Dynamic) Fair +   Comments reciprocal step and toss  x 10 reps x 4  performed in // bars no hand on bars for support    once rings tossed on to board  walk length of bars  retrieve  hand to therapist and walk backwards  to w/c for seated rest break. Ion was able  to kneel down on to one knee and get back with one hand on bar for support to retrieve a  ring that landed on the floor.   Interdisciplinary Plan of Care Collaboration   IDT Collaboration with  Physician;Family / Caregiver   Patient Position at End of Therapy Seated;Chair Alarm On;Self Releasing Lap Belt Applied;Family / Friend in Room   Collaboration Comments Dr Rubia wheeler with patient    changed d/c date to  12-16-22    Spouse and daughter presented  observed supervision toileting and supervision SBA for transfer     Cynthia verbalizes ability to assist       Assessment     Happy with changed d/c date    Participates to the best of  his ability  demonstrated good balance with activity performed   Demonstrates need for supervision / SBA    cues for hand placement with sit <-> stands  using FWW     Strengths: Able to follow instructions, Alert and oriented, Independent prior level of function, Manages pain appropriately, Motivated for self care and independence, Pleasant and cooperative, Supportive family, Willingly participates in therapeutic activities  Barriers: Decreased endurance, Fatigue, Generalized weakness, Home accessibility, Hypertension, Impaired activity tolerance, Impaired balance, Impaired functional cognition, Limited mobility (Hard of hearing)    Plan     ADL routine  d/c IRF SAMANTHA  12-15-22 including BIMs and CAM  please   Family training complete     Occupational Therapy Goals (Active)       Problem: OT Long Term Goals       Dates: Start: 12/03/22         Goal: LTG-By discharge, patient will complete basic self care tasks with supervision using AE/AD       Dates: Start: 12/03/22            Goal: LTG-By discharge, patient will perform bathroom transfers with supervision using AE/AD       Dates: Start: 12/03/22

## 2022-12-14 NOTE — PROGRESS NOTES
"Rehab Progress Note     Date of Service: 12/14/2022  Chief Complaint: follow up debility    Interval Events (Subjective)    Patient seen and examined today in his room.  He is very sad and tearful and really wants to go home.  He denies any pain.  No new focal neurological findings.  Family was in today for family training with therapist in the afternoon.  Patient continues to be constipated.    Patient has no other new questions, concerns, or complaints today.       ROS: No changes to bowel, bladder, pain, mood, or sleep.         Objective:  VITAL SIGNS: /52   Pulse 75   Temp 36.8 °C (98.2 °F) (Oral)   Resp 18   Ht 1.727 m (5' 8\")   Wt 70 kg (154 lb 5.2 oz)   SpO2 95%   BMI 23.46 kg/m²   Gen: alert, in mild distress  CV: Regular rate, regular rhythm  Resp: Clear to auscultation bilaterally  Neuro: Hard of hearing, nonfocal exam  Psych: Tearful      Recent Results (from the past 72 hour(s))   CBC WITH DIFFERENTIAL    Collection Time: 12/13/22  6:12 AM   Result Value Ref Range    WBC 5.8 4.8 - 10.8 K/uL    RBC 4.06 (L) 4.70 - 6.10 M/uL    Hemoglobin 12.5 (L) 14.0 - 18.0 g/dL    Hematocrit 38.0 (L) 42.0 - 52.0 %    MCV 93.6 81.4 - 97.8 fL    MCH 30.8 27.0 - 33.0 pg    MCHC 32.9 (L) 33.7 - 35.3 g/dL    RDW 44.2 35.9 - 50.0 fL    Platelet Count 322 164 - 446 K/uL    MPV 10.1 9.0 - 12.9 fL    Neutrophils-Polys 71.30 44.00 - 72.00 %    Lymphocytes 10.90 (L) 22.00 - 41.00 %    Monocytes 8.80 0.00 - 13.40 %    Eosinophils 7.30 (H) 0.00 - 6.90 %    Basophils 1.00 0.00 - 1.80 %    Immature Granulocytes 0.70 0.00 - 0.90 %    Nucleated RBC 0.00 /100 WBC    Neutrophils (Absolute) 4.13 1.82 - 7.42 K/uL    Lymphs (Absolute) 0.63 (L) 1.00 - 4.80 K/uL    Monos (Absolute) 0.51 0.00 - 0.85 K/uL    Eos (Absolute) 0.42 0.00 - 0.51 K/uL    Baso (Absolute) 0.06 0.00 - 0.12 K/uL    Immature Granulocytes (abs) 0.04 0.00 - 0.11 K/uL    NRBC (Absolute) 0.00 K/uL   Basic Metabolic Panel    Collection Time: 12/13/22  6:12 AM "   Result Value Ref Range    Sodium 137 135 - 145 mmol/L    Potassium 4.5 3.6 - 5.5 mmol/L    Chloride 105 96 - 112 mmol/L    Co2 24 20 - 33 mmol/L    Glucose 92 65 - 99 mg/dL    Bun 30 (H) 8 - 22 mg/dL    Creatinine 1.09 0.50 - 1.40 mg/dL    Calcium 8.7 8.5 - 10.5 mg/dL    Anion Gap 8.0 7.0 - 16.0   ESTIMATED GFR    Collection Time: 12/13/22  6:12 AM   Result Value Ref Range    GFR (CKD-EPI) 65 >60 mL/min/1.73 m 2         Current Facility-Administered Medications   Medication Frequency    senna-docusate (PERICOLACE or SENOKOT S) 8.6-50 MG per tablet 2 Tablet BID    And    polyethylene glycol/lytes (MIRALAX) PACKET 1 Packet QDAY PRN    And    magnesium hydroxide (MILK OF MAGNESIA) suspension 30 mL QDAY PRN    And    bisacodyl (DULCOLAX) suppository 10 mg DAILY    QUEtiapine (Seroquel) tablet 25 mg QHS PRN    hydrOXYzine HCl (ATARAX) tablet 50 mg Q6HRS PRN    melatonin tablet 3 mg HS PRN    Respiratory Therapy Consult Continuous RT    acetaminophen (Tylenol) tablet 650 mg Q4HRS PRN    lactulose 20 GM/30ML solution 30 mL QDAY PRN    docusate sodium (ENEMEEZ) enema 283 mg QDAY PRN    omeprazole (PRILOSEC) capsule 20 mg QAM AC    mag hydrox-al hydrox-simeth (MAALOX PLUS ES or MYLANTA DS) suspension 20 mL Q2HRS PRN    ondansetron (ZOFRAN ODT) dispertab 4 mg 4X/DAY PRN    Or    ondansetron (ZOFRAN) syringe/vial injection 4 mg 4X/DAY PRN    traZODone (DESYREL) tablet 50 mg QHS PRN    sodium chloride (OCEAN) 0.65 % nasal spray 2 Spray PRN    aspirin EC (ECOTRIN) tablet 81 mg DAILY    atorvastatin (LIPITOR) tablet 40 mg Q EVENING    clopidogrel (PLAVIX) tablet 75 mg DAILY    loratadine (CLARITIN) tablet 10 mg DAILY    psyllium (METAMUCIL/KONSYL) 1 Packet BID    tamsulosin (FLOMAX) capsule 0.4 mg QHS       Orders Placed This Encounter   Procedures    Diet Order Diet: Cardiac     Standing Status:   Standing     Number of Occurrences:   1     Order Specific Question:   Diet:     Answer:   Cardiac [6]        Radiology    [id number] VIEW   Final Result      1.  T11 compression deformity of indeterminate age.   2.  Benign bowel gas pattern. Considering the history of constipation. There is no excessive fecal burden.      DX-CHEST-LIMITED (1 VIEW)   Final Result      No acute cardiopulmonary disease.          Assessment:  This patient is a 88 y.o. male admitted for acute inpatient rehabilitation with debility after acute hospital stay for   Transient ischemic attack (TIA).    I led and attended the weekly conference, and agree with the IDT conference documentation and plan of care as noted below.    Date of conference: 12/7/2022    Goals and barriers: See IDT note.    Biggest barriers: hard of hearing, impaired endurance, impulsive, urinary frequency, moderate cognitive impairment, fatigue, stairs at home    CM/social support: wife supportive, would benefit from supervision from their family initially on discharge    Anticipated DC date: 12/16, moved up as patient is doing well    Home health: PT/OT/SLP/RN    Equip: none needed    Follow up: PCP, stroke bridge clinic, IR      Problem List/Medical Decision Making and Plan:    Debility, improved  Moderate cognitive impairment  Continue full rehab program  PT/OT/SLP, 1 hr each discipline, 5 days per week  12/6: SLP decrease to 30 min, share other 30 min with PT/OT    TIA (aphasia)  Plavix and aspirin  Outpatient follow up with stroke bridge clinic, referral made    Encephalopathy, resolved  Sundowning, resolved  Occurred during last hospitalization as well  Multi-factorial - history of stroke, hard of hearing  Continue Seroquel at night for now, will titrate down - decrease to 75 mg 12/5 --> 50 mg 12/6 --> 25 mg 12/7 --> changed to 25 mg as needed 12/12     Carotid stenosis  S/p left stent 11/25 Dr. Norm Hollins for 8 weeks  Aspirin indefinitely  Outpatient follow up with IR, referral made     Uvulitis, resolved  Completed Augmentin     History of  hypertension  Hypotension, intermittent  Monitor need to add back home medication lisinopril, discussed with family  Currently with good control with intermittent high     Hematuria, resolved  From traumatic catheter pulling  Removed Bhatia catheter 12/6, voiding    Urinary frequency/urgency, improved  Low-grade fever, resolved  Leukocytosis, resolved  Altered mental status, improved  UTI, culture with E. coli, started on Bactrim without much response, switched to cefuroxime, sensitive  Negative chest x-ray, flu, COVID     Allergies/rhinorrhea   Claritin     Anemia  Improved    Azotemia, continues  Increase oral fluids    Bowel program  Constipation, resolved  Increased bowel meds  Checked KUB 12/12 -without significant stool burden  Last BM 12/14    Bladder program  BPH  Continue Flomax  Removed Bhatia 12/6  Check PVRs - 69, 97  Not retaining    DVT prophylaxis  Contra-indicated due to recent hematuria  Patient walking with therapy  Continue to increase mobility  Monitor for edema    Total time:  40 minutes.  I spent greater than 50% of the time for patient care, counseling, and coordination on this date, including patient face-to face time, unit/floor time with review of records/pertinent lab data and studies, as well as discussing diagnostic evaluation/work up, planned therapeutic interventions, and future disposition of care, as per the interval events/subjective and the assessment and plan as noted above.        Sugey Barkley M.D.  Physical Medicine and Rehabilitation

## 2022-12-15 PROBLEM — I95.9 HYPOTENSION: Status: RESOLVED | Noted: 2022-12-02 | Resolved: 2022-12-15

## 2022-12-15 PROBLEM — R31.0 GROSS HEMATURIA: Status: RESOLVED | Noted: 2022-11-29 | Resolved: 2022-12-15

## 2022-12-15 PROBLEM — K12.2 UVULITIS: Status: RESOLVED | Noted: 2022-11-26 | Resolved: 2022-12-15

## 2022-12-15 PROCEDURE — A9270 NON-COVERED ITEM OR SERVICE: HCPCS | Performed by: PHYSICAL MEDICINE & REHABILITATION

## 2022-12-15 PROCEDURE — 97129 THER IVNTJ 1ST 15 MIN: CPT

## 2022-12-15 PROCEDURE — 700102 HCHG RX REV CODE 250 W/ 637 OVERRIDE(OP): Performed by: PHYSICAL MEDICINE & REHABILITATION

## 2022-12-15 PROCEDURE — 97530 THERAPEUTIC ACTIVITIES: CPT | Mod: CQ

## 2022-12-15 PROCEDURE — 770010 HCHG ROOM/CARE - REHAB SEMI PRIVAT*

## 2022-12-15 PROCEDURE — 99232 SBSQ HOSP IP/OBS MODERATE 35: CPT | Performed by: PHYSICAL MEDICINE & REHABILITATION

## 2022-12-15 PROCEDURE — 97130 THER IVNTJ EA ADDL 15 MIN: CPT

## 2022-12-15 PROCEDURE — 97116 GAIT TRAINING THERAPY: CPT | Mod: CQ

## 2022-12-15 PROCEDURE — 97110 THERAPEUTIC EXERCISES: CPT

## 2022-12-15 PROCEDURE — RXMED WILLOW AMBULATORY MEDICATION CHARGE: Performed by: PHYSICAL MEDICINE & REHABILITATION

## 2022-12-15 PROCEDURE — 97535 SELF CARE MNGMENT TRAINING: CPT

## 2022-12-15 PROCEDURE — 97530 THERAPEUTIC ACTIVITIES: CPT

## 2022-12-15 RX ORDER — TAMSULOSIN HYDROCHLORIDE 0.4 MG/1
0.4 CAPSULE ORAL
Qty: 30 CAPSULE | Refills: 0 | Status: SHIPPED | OUTPATIENT
Start: 2022-12-15

## 2022-12-15 RX ORDER — ATORVASTATIN CALCIUM 40 MG/1
40 TABLET, FILM COATED ORAL EVERY EVENING
Qty: 30 TABLET | Refills: 0 | Status: SHIPPED | OUTPATIENT
Start: 2022-12-15

## 2022-12-15 RX ORDER — CLOPIDOGREL BISULFATE 75 MG/1
75 TABLET ORAL DAILY
Qty: 37 TABLET | Refills: 0 | Status: SHIPPED | OUTPATIENT
Start: 2022-12-15 | End: 2023-01-22

## 2022-12-15 RX ORDER — ASPIRIN 81 MG/1
81 TABLET ORAL DAILY
Qty: 30 TABLET | Refills: 0 | COMMUNITY
Start: 2022-12-15

## 2022-12-15 RX ORDER — AMOXICILLIN 250 MG
2 CAPSULE ORAL 2 TIMES DAILY
Qty: 120 TABLET | Refills: 0 | COMMUNITY
Start: 2022-12-15

## 2022-12-15 RX ORDER — LORATADINE 10 MG/1
10 TABLET ORAL DAILY
Qty: 30 TABLET | Refills: 0 | Status: SHIPPED | OUTPATIENT
Start: 2022-12-15

## 2022-12-15 RX ORDER — TRAZODONE HYDROCHLORIDE 50 MG/1
50 TABLET ORAL
Qty: 30 TABLET | Refills: 0 | Status: SHIPPED | OUTPATIENT
Start: 2022-12-15

## 2022-12-15 RX ORDER — LANOLIN ALCOHOL/MO/W.PET/CERES
3 CREAM (GRAM) TOPICAL NIGHTLY PRN
Qty: 30 TABLET | Refills: 0 | COMMUNITY
Start: 2022-12-15

## 2022-12-15 RX ADMIN — SENNOSIDES AND DOCUSATE SODIUM 2 TABLET: 50; 8.6 TABLET ORAL at 08:26

## 2022-12-15 RX ADMIN — Medication 1 PACKET: at 08:26

## 2022-12-15 RX ADMIN — TRAZODONE HYDROCHLORIDE 50 MG: 50 TABLET ORAL at 20:38

## 2022-12-15 RX ADMIN — CLOPIDOGREL BISULFATE 75 MG: 75 TABLET ORAL at 08:26

## 2022-12-15 RX ADMIN — TAMSULOSIN HYDROCHLORIDE 0.4 MG: 0.4 CAPSULE ORAL at 20:38

## 2022-12-15 RX ADMIN — OMEPRAZOLE 20 MG: 20 CAPSULE, DELAYED RELEASE ORAL at 08:26

## 2022-12-15 RX ADMIN — SENNOSIDES AND DOCUSATE SODIUM 2 TABLET: 50; 8.6 TABLET ORAL at 20:38

## 2022-12-15 RX ADMIN — ATORVASTATIN CALCIUM 40 MG: 40 TABLET, FILM COATED ORAL at 20:49

## 2022-12-15 RX ADMIN — LORATADINE 10 MG: 10 TABLET ORAL at 08:26

## 2022-12-15 RX ADMIN — Medication 1 PACKET: at 20:41

## 2022-12-15 RX ADMIN — ASPIRIN 81 MG: 81 TABLET, COATED ORAL at 08:26

## 2022-12-15 ASSESSMENT — 10 METER WALK TEST (10METWT)
TRIAL 3: TIME TO WALK 10 METERS: 9.11
TRIAL 2: TIME TO WALK 10 METERS: 8.98
TRIAL 1: TIME TO WALK 10 METERS: 8.22
AVERAGE VELOCITY - METERS PER SECOND: 0.68
AVERAGE TIME - SECONDS: 8.77

## 2022-12-15 ASSESSMENT — ACTIVITIES OF DAILY LIVING (ADL)
TOILETING_LEVEL_OF_ASSIST_DESCRIPTION: GRAB BAR;INCREASED TIME;SUPERVISION FOR SAFETY
TOILET_TRANSFER_LEVEL_OF_ASSIST: REQUIRES SUPERVISION WITH TOILET TRANSFER
BED_CHAIR_WHEELCHAIR_TRANSFER_DESCRIPTION: ADAPTIVE EQUIPMENT;INCREASED TIME;SET-UP OF EQUIPMENT;SUPERVISION FOR SAFETY
TOILET_TRANSFER_DESCRIPTION: ADAPTIVE EQUIPMENT;GRAB BAR;INCREASED TIME
BED_CHAIR_WHEELCHAIR_TRANSFER_DESCRIPTION: SET-UP OF EQUIPMENT;SUPERVISION FOR SAFETY;INCREASED TIME
SHOWER_TRANSFER_LEVEL_OF_ASSIST: REQUIRES SUPERVISION WITH SHOWER TRANSFER
TOILETING_LEVEL_OF_ASSIST: REQUIRES SUPERVISION WITH TOILETING

## 2022-12-15 ASSESSMENT — BRIEF INTERVIEW FOR MENTAL STATUS (BIMS)
WHAT DAY OF THE WEEK IS IT: INCORRECT
WHAT DAY OF THE WEEK IS IT: INCORRECT
COGNITIVE PATTERN ASSESSMENT USED: BIMS
WHAT MONTH IS IT: MISSED BY MORE THAN 1 MONTH
ASKED TO RECALL SOCK: NO, COULD NOT RECALL
ASKED TO RECALL SOCK: NO, COULD NOT RECALL
BIMS SUMMARY SCORE: 3
INITIAL REPETITION OF BED BLUE SOCK - FIRST ATTEMPT: 3
WHAT MONTH IS IT: ACCURATE WITHIN 5 DAYS
ASKED TO RECALL BED: NO, COULD NOT RECALL
ASKED TO RECALL BED: YES, AFTER CUEING (A PIECE OF FURNITURE")"
ASKED TO RECALL BLUE: NO, COULD NOT RECALL
ASKED TO RECALL BLUE: NO, COULD NOT RECALL
WHAT YEAR IS IT: MISSED BY 2 TO 5 YEARS
INITIAL REPETITION OF BED BLUE SOCK - FIRST ATTEMPT: 3
WHAT YEAR IS IT: NONSENSICAL
BIMS SUMMARY SCORE: 7

## 2022-12-15 ASSESSMENT — GAIT ASSESSMENTS
GAIT LEVEL OF ASSIST: SUPERVISED
ASSISTIVE DEVICE: FRONT WHEEL WALKER
DISTANCE (FEET): 150
DEVIATION: BRADYKINETIC;SHUFFLED GAIT

## 2022-12-15 ASSESSMENT — PAIN DESCRIPTION - PAIN TYPE: TYPE: ACUTE PAIN

## 2022-12-15 NOTE — CARE PLAN
Problem: Knowledge Deficit - Standard  Goal: Patient and family/care givers will demonstrate understanding of plan of care, disease process/condition, diagnostic tests and medications  Outcome: Progressing     Problem: Skin Integrity  Goal: Patient's skin integrity will be maintained or improve  Outcome: Progressing   The patient is Stable - Low risk of patient condition declining or worsening    Shift Goals  Clinical Goals: safety  Patient Goals: safety  Family Goals: SHANIQUE    Progress made toward(s) clinical / shift goals:  Patient is resting in bed no distress noted    Patient is not progressing towards the following goals:

## 2022-12-15 NOTE — DISCHARGE INSTRUCTIONS
[facility] NURSING DISCHARGE INSTRUCTIONS    Blood Pressure : 126/64  Weight: 70 kg (154 lb 5.2 oz)  Nursing recommendations for Ion Harvey at time of discharge are as follows:  Client and Family Member verbalized understanding of all discharge instructions and prescriptions.     Review all your home medications and newly ordered medications with your doctor and/or pharmacist. Follow medication instructions as directed by your doctor and/or pharmacist.    Pain Management:   Discharge Pain Medication Instructions:  Comfort Goal: Comfort with Movement, Perform Activity, Sleep Comfortably  Notify your primary care provider if pain is unrelieved with these measures, if the pain is new, or increased in intensity.    Discharge Skin Characteristics:    Discharge Skin Exam:    Wound 11/28/22 Thigh Anterior;Medial Right IR puncture site, rt fem (Active)       Wound 11/28/22  Penis (Active)     Skin / Wound Care Instructions: Please contact your primary care physician for any change in skin integrity.     If You Have Surgical Incisions / Wounds:  Monitor surgical site(s) for signs of increased swelling, redness or symptoms of drainage from the site or fever as this could indicate signs and symptoms of infection. If these symptoms are noted, notifiy your primary care provider.      Discharge Safety Instructions:       Discharge Safety Concerns: Unsteady Gait, Weakness, Balance Problems (Dizziness, Light Headedness)  The interdisciplinary team has made recommendation that you should not be left alone  in the house due to weakness and unsteady gait  Anti-embolic stockings are not required.    Discharge Diet: Cardiac Diet     Discharge Liquids: Thin Liquids  Discharge Bowel Function:    Please contact your primary care physician for any changes in bowel habits.  Discharge Bowel Program:    Discharge Bladder Function:    Discharge Urinary Devices:        Nursing Discharge Plan:   Influenza Vaccine  Indication: Not indicated: Previously immunized this influenza season and > 8 years of age    Case Management Discharge Instructions:   Discharge Location: Home with Home Health  Agency Name/Address/Phone:    Home Health: Registered Nurse, Home Health Aide, Occupational Therapist  Outpatient Services:    DME Provider/Phone:    Medical Equipment Ordered:    Prescription Faxed to:        Discharge Medication Instructions:  Below are the medications your physician expects you to take upon discharge:          Occupational Therapy Discharge Instructions for Cooper Harvey    12/15/2022    Level of Assist Required for Eating: Able to Complete Eating without Assist  Level of Assist Required for Grooming: Requires Supervision with Grooming  Level of Assist Required for Dressing: Requires Supervision with Dressing  Level of Assist Required for Toileting: Requires Supervision with Toileting  Level of Assist Required for Toilet Transfer: Requires Supervision with Toilet Transfer  Equipment for Toilet Transfer: Grab Bars by Toilet  Level of Assist Required for Bathing: Requires Supervision with Bathing  Equipment for Bathing: Shower Chair, Grab Bars in Tub / Shower, Hand Held Shower Head  Level of Assist Required for Shower Transfer: Requires Supervision with Shower Transfer  Equipment for Shower Transfer: Grab Bars in Tub / Shower, Shower Chair  Level of Assist Required for Home Mgmt: Requires Supervision with Home Management  Level of Assist Required for Meal Prep: Requires Supervision with Meal Preparation  Driving: Please Contact Physician Prior to Driving    It has been great working with you, Ion! We will miss you and wish you all the best!  -Brandon OT    Physical Therapy Discharge Instructions for Ion Harvey    12/15/2022    Level of Assist Required for Ambulation: Supervision on Flat Surfaces, Supervision on Curbs, Supervision on Stairs  Distance Patient May Ambulate: 300 feet, monitor  fatigue  Device Recommended for Ambulation: Front-Wheeled Walker  Level of Assist Required to Propel Wheelchair:  (n/a)  Level of Assist Required for Transfers: Supervision  Device Recommended for Transfers: Front-Wheeled Walker  Home Exercise Program: Refer to Home Exercise Program Handout for Details  Prosthesis / Orthosis Recommendation / Location: No Prosthesis  or Orthosis Recommended  Thank you for working so hard in PT, it was a pleasure to work with you Ion! Elda Delgado      Discharge Supervision Recommendations -    CLOSE 24/7 SUPERVISION-- this level of supervision is recommended to help identify unsafe situations and maintain safety in the home.  The person is NOT SAFE to be left alone under any circumstances.   Supervision Recommendations:  It is recommended that a caregiver is in the same room or has good line of sight of the person at all times due to safety concerns.    When the person is walking or moving around, it is recommended that a caregiver be next to the person at all times for safety, even if physical assistance is not needed.      Assistive devices such as a walker, cane or wheelchair should be used consistently for mobility, as recommended by the therapist.    If the person goes outside to get the mail, takes a pet outside, performs yard work, or goes for a walk, a caregiver needs to be with the person at all times.    The person should have a caregiver with them for all self-care activities including but not limited to:  showering or bathing, grooming, toileting, dressing, and eating.     If the primary caregiver has to leave the house, an alternate caregiver must be arranged.            Caregiver assistance and guidance is needed for the following activities:  Managing medications    Performing House work/yard work  Following a daily schedule  Paying bills/managing money and finances  Cooking or using the microwave    Caregivers should assume the responsibility  for:  Driving  Operating heavy machinery  Using power tools or appliances    Types of activities that the person can participate in safely with supervision:  Folding laundry while seated  Preparing a simple cold meal with supervision (no sharp knives or scissors)  Simple household tasks without use of chemicals  Leisure activities that the person enjoys--be sure to allow extra time    To move up to the next level of supervision (Distant 24/7 Supervision) the person should consistently demonstrate the following for 2 weeks:  Consistently follow directions without cues or physical assistance.    Demonstrate good safety awareness while in a room for 1 hour or more periods of time.  Ask for assistance with difficult tasks.  Comply with supervision recommendations without assistance or reminders.

## 2022-12-15 NOTE — THERAPY
Occupational Therapy  Daily Treatment     Patient Name: Cooper Harvey  Age:  88 y.o., Sex:  male  Medical Record #: [id number]  Today's Date: 12/15/2022     Precautions  Precautions: Fall Risk  Comments: Hearing aids  [place]    Safety   ADL Safety : Requires Supervision for Safety    Subjective    Pt talking about his family history.      Objective       12/15/22 1331   OT Charge Group   OT Therapy Activity 1   OT Therapeutic Exercise  1   OT Total Time Spent   OT Individual Total Time Spent (Mins) 30   Safety    ADL Safety  Requires Supervision for Safety   Functional Level of Assist   Grooming Supervision   Toileting Supervision   Bed, Chair, Wheelchair Transfer Standby Assist   Toilet Transfers Standby Assist   Sitting Lower Body Exercises   Nustep Resistance Level 2  (10 min )   Interdisciplinary Plan of Care Collaboration   IDT Collaboration with  Family / Caregiver   Patient Position at End of Therapy In Bed;Call Light within Reach;Tray Table within Reach;Bed Alarm On   Collaboration Comments Daughter and wife reported feeling prepared for d/c       Assessment    Pt appeared fairly fatigued by toileting tasks today after nustep activity. Seemed to tolerate nustep fine prior.    Strengths: Able to follow instructions, Alert and oriented, Independent prior level of function, Manages pain appropriately, Motivated for self care and independence, Pleasant and cooperative, Supportive family, Willingly participates in therapeutic activities  Barriers: Decreased endurance, Fatigue, Generalized weakness, Home accessibility, Hypertension, Impaired activity tolerance, Impaired balance, Impaired functional cognition, Limited mobility (Hard of hearing)    Plan    D/c      Occupational Therapy Goals (Active)       There are no active problems.

## 2022-12-15 NOTE — THERAPY
Occupational Therapy  Daily Treatment     Patient Name: Cooper Harvey  Age:  88 y.o., Sex:  male  Medical Record #: [id number]  Today's Date: 12/15/2022     Precautions  Precautions: (P) Fall Risk  Comments: (P) Hearing aids  [place]         Subjective    Pt resting in bed, agreeable to OT session.      Objective       12/15/22 0701   OT Charge Group   OT Self Care / ADL 4   OT Total Time Spent   OT Individual Total Time Spent (Mins) 60   Precautions   Precautions Fall Risk   Comments Hearing aids  [place]   Cognition    Speech/ Communication Hard of Hearing   Level of Consciousness Alert   Functional Level of Assist   Eating Independent   Grooming Supervision;Seated   Grooming Description Increased time  (oral care, shaving and combing hair)   Bathing Standby Assist   Bathing Description Grab bar;Tub bench;Hand held shower;Increased time;Set-up of equipment;Supervision for safety;Verbal cueing  (close SBA and GB when standing)   Upper Body Dressing Supervision   Upper Body Dressing Description Increased time;Set-up of equipment  (don/doff pullover shirt)   Lower Body Dressing Standby Assist   Lower Body Dressing Description Grab bar;Increased time;Set-up of equipment;Supervision for safety;Verbal cueing  (Don/doff brief, pants, socks, shoes)   Toileting Supervision   Toileting Description Grab bar;Increased time;Supervision for safety   Bed, Chair, Wheelchair Transfer Standby Assist   Bed Chair Wheelchair Transfer Description Adaptive equipment;Increased time;Set-up of equipment;Supervision for safety  (FWW)   Toilet Transfers Standby Assist   Toilet Transfer Description Adaptive equipment;Grab bar;Increased time  (FWW)   Tub / Shower Transfers Standby Assist   Tub Shower Transfer Description Adaptive equipment;Grab bar;Shower bench;Increased time;Supervision for safety;Set-up of equipment;Verbal cueing  (FWW)   Bed Mobility    Supine to Sit Standby Assist   Scooting Supervised   Rolling Supervised    Interdisciplinary Plan of Care Collaboration   IDT Collaboration with  Certified Nursing Assistant;Nursing   Patient Position at End of Therapy Seated;Chair Alarm On;Self Releasing Lap Belt Applied;Call Light within Reach;Tray Table within Reach   Collaboration Comments RN/CNA re: pt incontinent of urine       Assessment    Pt tolerated OT session well and completed ADLs with overall supervision to close SBA for standing components. Hearing aids charged and appear to be working today. Pt will require 24/7 supv for safety at d/c.   Strengths: Able to follow instructions, Alert and oriented, Independent prior level of function, Manages pain appropriately, Motivated for self care and independence, Pleasant and cooperative, Supportive family, Willingly participates in therapeutic activities  Barriers: Decreased endurance, Fatigue, Generalized weakness, Home accessibility, Hypertension, Impaired activity tolerance, Impaired balance, Impaired functional cognition, Limited mobility (Hard of hearing)    Plan    D/c tomorrow.     Passport items to be completed:  Completed.     Occupational Therapy Goals (Active)       Problem: OT Long Term Goals       Dates: Start: 12/03/22         Goal: LTG-By discharge, patient will complete basic self care tasks with supervision using AE/AD       Dates: Start: 12/03/22            Goal: LTG-By discharge, patient will perform bathroom transfers with supervision using AE/AD       Dates: Start: 12/03/22

## 2022-12-15 NOTE — CARE PLAN
Problem: OT Long Term Goals  Goal: LTG-By discharge, patient will complete basic self care tasks with supervision using AE/AD  Outcome: Met  Goal: LTG-By discharge, patient will perform bathroom transfers with supervision using AE/AD  Outcome: Met

## 2022-12-15 NOTE — THERAPY
Speech Language Pathology  Daily Treatment     Patient Name: Cooper Harvey  Age:  88 y.o., Sex:  male  Medical Record #: [id number]  Today's Date: 12/15/2022     Precautions  Precautions: Fall Risk  Comments: Hearing aids  [place]    Subjective    Pt pleasant and cooperative, excited for dc tomorrow home with family support.     Objective       12/15/22 1033   Treatment Charges   SLP Cognitive Skill Development First 15 Minutes 1   SLP Cognitive Skill Development Additional 15 Minutes 1   SLP Total Time Spent   SLP Individual Total Time Spent (Mins) 30       Assessment    Pt presented with attention task with categories. Pt required step by step assistance for completion and initiation, when left to complete on his own pt did not complete any further portions of task. Pt indep recalled 3/3 words immediately, 0/3 with delay and was not oriented to any question on BIMS. Functional problem solving discussed related to dc, pt requires MOD A for safe decision making at this time. Discussed with family need for supervision and reinforcement as for pt wants to cont to complete all tasks he was previously doing without awareness of current barriers/risks involved. Pts daughter and SO demonstrating understanding at this time.     Strengths: Pleasant and cooperative, Supportive family, Able to follow instructions  Barriers: Impaired functional cognition, Impaired carryover of learning, Lack of motivation, Impaired activity tolerance    Plan    Pt to dc home tomorrow with family assistance.     Speech Therapy Problems (Active)       Problem: Memory STGs       Dates: Start: 12/03/22         Goal: STG-Within one week, patient will recall daily events, and safety strategies, given min verbal cues.         Dates: Start: 12/03/22         Goal Note filed on 12/07/22 1019 by Jeimy Leos MS,CCC-SLP       Mod-max cues                 Problem: Problem Solving STGs       Dates: Start: 12/03/22         Goal: STG-Within one  week, patient will score 25 or greater on the O-log over 2 sessions.         Dates: Start: 12/03/22         Goal Note filed on 12/14/22 1110 by Jeimy Leos MS,CCC-SLP       Today pt achieved a score of 9/30, previous high was 23/30              Goal: STG-Within one week, patient will complete basic attn tasks with 80% accuracy given min verbal cues.         Dates: Start: 12/03/22         Goal Note filed on 12/14/22 1110 by Jeimy Leos MS,CCC-SLP       Mod a required                 Problem: Speech/Swallowing LTGs       Dates: Start: 12/03/22         Goal: LTG-By discharge, patient will solve basic problems Markie for safe discharge home.         Dates: Start: 12/03/22

## 2022-12-15 NOTE — PROGRESS NOTES
"Rehab Progress Note     Date of Service: 12/15/2022  Chief Complaint: follow up debility    Interval Events (Subjective)    Patient seen and examined today in his room.  He is resting comfortably in bed.  He understands a plan to discharge home tomorrow and he is very excited about this.  He denies any pain.    Patient has no other new questions, concerns, or complaints today.       ROS: No changes to bowel, bladder, pain, mood, or sleep.       Objective:  VITAL SIGNS: /64   Pulse 70   Temp 36.5 °C (97.7 °F) (Oral)   Resp 20   Ht 1.727 m (5' 8\")   Wt 70 kg (154 lb 5.2 oz)   SpO2 95%   BMI 23.46 kg/m²   Gen: alert, no apparent distress  CV: Regular rate, regular rhythm  Resp: Clear to auscultation bilaterally  Neuro: Hard of hearing      Recent Results (from the past 72 hour(s))   CBC WITH DIFFERENTIAL    Collection Time: 12/13/22  6:12 AM   Result Value Ref Range    WBC 5.8 4.8 - 10.8 K/uL    RBC 4.06 (L) 4.70 - 6.10 M/uL    Hemoglobin 12.5 (L) 14.0 - 18.0 g/dL    Hematocrit 38.0 (L) 42.0 - 52.0 %    MCV 93.6 81.4 - 97.8 fL    MCH 30.8 27.0 - 33.0 pg    MCHC 32.9 (L) 33.7 - 35.3 g/dL    RDW 44.2 35.9 - 50.0 fL    Platelet Count 322 164 - 446 K/uL    MPV 10.1 9.0 - 12.9 fL    Neutrophils-Polys 71.30 44.00 - 72.00 %    Lymphocytes 10.90 (L) 22.00 - 41.00 %    Monocytes 8.80 0.00 - 13.40 %    Eosinophils 7.30 (H) 0.00 - 6.90 %    Basophils 1.00 0.00 - 1.80 %    Immature Granulocytes 0.70 0.00 - 0.90 %    Nucleated RBC 0.00 /100 WBC    Neutrophils (Absolute) 4.13 1.82 - 7.42 K/uL    Lymphs (Absolute) 0.63 (L) 1.00 - 4.80 K/uL    Monos (Absolute) 0.51 0.00 - 0.85 K/uL    Eos (Absolute) 0.42 0.00 - 0.51 K/uL    Baso (Absolute) 0.06 0.00 - 0.12 K/uL    Immature Granulocytes (abs) 0.04 0.00 - 0.11 K/uL    NRBC (Absolute) 0.00 K/uL   Basic Metabolic Panel    Collection Time: 12/13/22  6:12 AM   Result Value Ref Range    Sodium 137 135 - 145 mmol/L    Potassium 4.5 3.6 - 5.5 mmol/L    Chloride 105 96 - 112 mmol/L "    Co2 24 20 - 33 mmol/L    Glucose 92 65 - 99 mg/dL    Bun 30 (H) 8 - 22 mg/dL    Creatinine 1.09 0.50 - 1.40 mg/dL    Calcium 8.7 8.5 - 10.5 mg/dL    Anion Gap 8.0 7.0 - 16.0   ESTIMATED GFR    Collection Time: 12/13/22  6:12 AM   Result Value Ref Range    GFR (CKD-EPI) 65 >60 mL/min/1.73 m 2         Current Facility-Administered Medications   Medication Frequency    senna-docusate (PERICOLACE or SENOKOT S) 8.6-50 MG per tablet 2 Tablet BID    And    polyethylene glycol/lytes (MIRALAX) PACKET 1 Packet QDAY PRN    And    magnesium hydroxide (MILK OF MAGNESIA) suspension 30 mL QDAY PRN    And    bisacodyl (DULCOLAX) suppository 10 mg DAILY    QUEtiapine (Seroquel) tablet 25 mg QHS PRN    hydrOXYzine HCl (ATARAX) tablet 50 mg Q6HRS PRN    melatonin tablet 3 mg HS PRN    Respiratory Therapy Consult Continuous RT    acetaminophen (Tylenol) tablet 650 mg Q4HRS PRN    lactulose 20 GM/30ML solution 30 mL QDAY PRN    docusate sodium (ENEMEEZ) enema 283 mg QDAY PRN    omeprazole (PRILOSEC) capsule 20 mg QAM AC    mag hydrox-al hydrox-simeth (MAALOX PLUS ES or MYLANTA DS) suspension 20 mL Q2HRS PRN    ondansetron (ZOFRAN ODT) dispertab 4 mg 4X/DAY PRN    Or    ondansetron (ZOFRAN) syringe/vial injection 4 mg 4X/DAY PRN    traZODone (DESYREL) tablet 50 mg QHS PRN    sodium chloride (OCEAN) 0.65 % nasal spray 2 Spray PRN    aspirin EC (ECOTRIN) tablet 81 mg DAILY    atorvastatin (LIPITOR) tablet 40 mg Q EVENING    clopidogrel (PLAVIX) tablet 75 mg DAILY    loratadine (CLARITIN) tablet 10 mg DAILY    psyllium (METAMUCIL/KONSYL) 1 Packet BID    tamsulosin (FLOMAX) capsule 0.4 mg QHS       Orders Placed This Encounter   Procedures    Diet Order Diet: Cardiac     Standing Status:   Standing     Number of Occurrences:   1     Order Specific Question:   Diet:     Answer:   Cardiac [6]       Radiology    [id number] VIEW   Final Result      1.  T11 compression deformity of indeterminate age.   2.  Benign bowel gas pattern.  Considering the history of constipation. There is no excessive fecal burden.      DX-CHEST-LIMITED (1 VIEW)   Final Result      No acute cardiopulmonary disease.          Assessment:  This patient is a 88 y.o. male admitted for acute inpatient rehabilitation with debility after acute hospital stay for   Transient ischemic attack (TIA).    I led and attended the weekly conference, and agree with the IDT conference documentation and plan of care as noted below.    Date of conference: 12/7/2022    Goals and barriers: See IDT note.    Biggest barriers: hard of hearing, impaired endurance, impulsive, urinary frequency, moderate cognitive impairment, fatigue, stairs at home    CM/social support: wife supportive, would benefit from supervision from their family initially on discharge    Anticipated DC date: 12/16, moved up as patient is doing well    Home health: PT/OT/SLP/RN    Equip: none needed    Follow up: PCP, stroke bridge clinic, IR      Problem List/Medical Decision Making and Plan:    Debility, improved  Moderate cognitive impairment  Continue full rehab program  PT/OT/SLP, 1 hr each discipline, 5 days per week  12/6: SLP decrease to 30 min, share other 30 min with PT/OT    TIA (aphasia)  Plavix and aspirin  Outpatient follow up with stroke bridge clinic, referral made    Encephalopathy, resolved  Sundowning, resolved  Occurred during last hospitalization as well  Multi-factorial - history of stroke, hard of hearing  Continue Seroquel at night for now, will titrate down - decrease to 75 mg 12/5 --> 50 mg 12/6 --> 25 mg 12/7 --> changed to 25 mg as needed 12/12, no longer requiring  Using as needed trazodone at night     Carotid stenosis  S/p left stent 11/25 Dr. Norm Hollins for 8 weeks, stop date January 20  Aspirin indefinitely  Outpatient follow up with IR, referral made     Uvulitis, resolved  Completed Augmentin     History of hypertension  Hypotension, intermittent  Monitor need to add back home medication  lisinopril, discussed with family  Currently with good control with intermittent high     Hematuria, resolved  From traumatic catheter pulling  Removed Bhatia catheter 12/6, voiding    Urinary frequency/urgency, resolved  Low-grade fever, resolved  Leukocytosis, resolved  Altered mental status, improved  UTI, culture with E. coli, started on Bactrim without much response, switched to cefuroxime, sensitive  Negative chest x-ray, flu, COVID     Allergies/rhinorrhea   Claritin     Anemia  Improved    Azotemia, continues  Increase oral fluids    Bowel program  Constipation, resolved  Increased bowel meds  Checked KUB 12/12 - without significant stool burden  Last BM 12/14    Bladder program  BPH  Continue Flomax  Removed Bhatia 12/6  Check PVRs - 69, 97  Not retaining    DVT prophylaxis  Contra-indicated due to recent hematuria  Patient walking with therapy  Continue to increase mobility  Monitor for edema    Total time:  28 minutes.  I spent greater than 50% of the time for patient care, counseling, and coordination on this date, including patient face-to face time, unit/floor time with review of records/pertinent lab data and studies, as well as discussing diagnostic evaluation/work up, planned therapeutic interventions, and future disposition of care, as per the interval events/subjective and the assessment and plan as noted above.    Time today spent preparing for tomorrow's discharge, discussing discharge medications, verifying pharmacy choice, need for prior authorization for medications, discussing follow up with the outpatient providers, as well as answering any discharge related questions.    I have performed a physical exam, reviewed and updated ROS, as well as the assessment and plan today 12/15/2022. In review of note from 12/14/2022 there are no new changes except as documented above.      Sugey Barkley M.D.  Physical Medicine and Rehabilitation

## 2022-12-15 NOTE — THERAPY
Physical Therapy   Daily Treatment     Patient Name: Cooper Harvey  Age:  88 y.o., Sex:  male  Medical Record #: [id number]  Today's Date: 12/15/2022     Precautions  Precautions: Fall Risk  Comments: Hearing aids  [place]    Subjective    Pt agreeable to PT session     Objective    [id number]     12/15/22 0931   PT Charge Group   PT Gait Training 1   PT Therapeutic Activities 1   Supervising Physical Therapist Vikram Ingram   PT Total Time Spent   PT Individual Total Time Spent (Mins) 30   Gait Functional Level of Assist    Gait Level Of Assist Supervised   Assistive Device Front Wheel Walker   Distance (Feet) 150   # of Times Distance was Traveled 1   Deviation Bradykinetic;Shuffled Gait   Stairs Functional Level of Assist   Level of Assist with Stairs Standby Assist   # of Stairs Climbed 12   Stairs Description Hand rails;Extra time;Supervision for safety;Verbal cueing;Safety concerns  (ascended/descended side step wuth single HR)   Transfer Functional Level of Assist   Bed, Chair, Wheelchair Transfer Supervised   Bed Chair Wheelchair Transfer Description Set-up of equipment;Supervision for safety;Increased time  (with bed rail, vc for locking breaks)   Bed Mobility    Supine to Sit Supervised   Sit to Supine Supervised   Sit to Stand Standby Assist   Scooting Supervised   Rolling Supervised   10 Meter Walk Test   Normal - Trial 1 8.22 seconds   Normal - Trial 2 8.98 seconds   Normal - Trial 3 9.11 seconds   Normal Average Time 8.77 seconds   Normal Average Velocity (m/s) 0.68   Interdisciplinary Plan of Care Collaboration   Patient Position at End of Therapy Seated;Self Releasing Lap Belt Applied;Call Light within Reach;Tray Table within Reach         [id number]     12/15/22 1301   PT Charge Group   PT Therapeutic Activities 2   Supervising Physical Therapist Vikram Ingram   PT Total Time Spent   PT Individual Total Time Spent (Mins) 30   Interdisciplinary Plan of Care Collaboration   IDT Collaboration with   Family / Caregiver   Patient Position at End of Therapy Seated;Self Releasing Lap Belt Applied;Chair Alarm On;Family / Friend in Room   Collaboration Comments caregiver training     Completed caregiver training:  Car transfer from with FWW and CGA using gait belt  Stairs and sequencing with CGA and gait belt   Daughter and SO present for training, daughter completes hands on assist for car transfer and pts wife completes stairs with CGA  Provided edu on use of gait belt with stairs for entry to the home via garage, reinforced FWW for mobility and and that SO should provided supervision when pt is up amb with FWW.  Reviewed HEP and all passport items have been completed    Assessment    Caregiver training completed, pt's daughter Constantin verbalizes all education provided and she reports her brother will be arriving to stay with her parents for a few days and she will edu him on all info we covered. Pt's FWW was delivered during PT and set for appropriate height. Pt prepared for DC     Strengths: Able to follow instructions, Effective communication skills, Independent prior level of function, Manages pain appropriately, Motivated for self care and independence, Pleasant and cooperative, Supportive family, Willingly participates in therapeutic activities  Barriers: Decreased endurance, Fatigue, Generalized weakness, Home accessibility, Impaired activity tolerance, Impaired balance, Limited mobility    Plan    Complete DC, HH PT     Passport items to be completed:  completed    Physical Therapy Problems (Active)       Problem: PT-Long Term Goals       Dates: Start: 12/03/22         Goal: LTG-By discharge, patient will ambulate x 150 feet with FWW and SPV       Dates: Start: 12/03/22            Goal: LTG-By discharge, patient will transfer one surface to another with FWW and SPV       Dates: Start: 12/03/22            Goal: LTG-By discharge, patient will ambulate up/down 4-6 stairs with one HR and SBA       Dates: Start:  12/03/22            Goal: LTG-By discharge, patient will transfer in/out of a car with FWW and SBA       Dates: Start: 12/03/22

## 2022-12-16 ENCOUNTER — PHARMACY VISIT (OUTPATIENT)
Dept: PHARMACY | Facility: MEDICAL CENTER | Age: 87
End: 2022-12-16
Payer: COMMERCIAL

## 2022-12-16 VITALS
DIASTOLIC BLOOD PRESSURE: 64 MMHG | TEMPERATURE: 98.1 F | BODY MASS INDEX: 23.39 KG/M2 | RESPIRATION RATE: 16 BRPM | OXYGEN SATURATION: 96 % | HEIGHT: 68 IN | WEIGHT: 154.32 LBS | HEART RATE: 60 BPM | SYSTOLIC BLOOD PRESSURE: 126 MMHG

## 2022-12-16 PROCEDURE — 99239 HOSP IP/OBS DSCHRG MGMT >30: CPT | Performed by: PHYSICAL MEDICINE & REHABILITATION

## 2022-12-16 PROCEDURE — 700102 HCHG RX REV CODE 250 W/ 637 OVERRIDE(OP): Performed by: PHYSICAL MEDICINE & REHABILITATION

## 2022-12-16 PROCEDURE — A9270 NON-COVERED ITEM OR SERVICE: HCPCS | Performed by: PHYSICAL MEDICINE & REHABILITATION

## 2022-12-16 RX ADMIN — Medication 1 PACKET: at 09:01

## 2022-12-16 RX ADMIN — OMEPRAZOLE 20 MG: 20 CAPSULE, DELAYED RELEASE ORAL at 09:01

## 2022-12-16 RX ADMIN — CLOPIDOGREL BISULFATE 75 MG: 75 TABLET ORAL at 09:02

## 2022-12-16 RX ADMIN — SENNOSIDES AND DOCUSATE SODIUM 2 TABLET: 50; 8.6 TABLET ORAL at 09:02

## 2022-12-16 RX ADMIN — ASPIRIN 81 MG: 81 TABLET, COATED ORAL at 09:02

## 2022-12-16 RX ADMIN — LORATADINE 10 MG: 10 TABLET ORAL at 09:02

## 2022-12-16 ASSESSMENT — PAIN DESCRIPTION - PAIN TYPE: TYPE: ACUTE PAIN

## 2022-12-16 NOTE — CARE PLAN
Problem: Speech/Swallowing LTGs  Goal: LTG-By discharge, patient will solve basic problems Markie for safe discharge home.    Outcome: Discharged - Not Met     Problem: Problem Solving STGs  Goal: STG-Within one week, patient will score 25 or greater on the O-log over 2 sessions.    Outcome: Discharged - Not Met  Goal: STG-Within one week, patient will complete basic attn tasks with 80% accuracy given min verbal cues.    Outcome: Discharged - Not Met     Problem: Memory STGs  Goal: STG-Within one week, patient will recall daily events, and safety strategies, given min verbal cues.    Outcome: Discharged - Not Met

## 2022-12-16 NOTE — DISCHARGE PLANNING
Case management  Reviewed signed copy of IMM and answered all questions.    Dc date /disposition: Home with [facility].     CM went to talk with patient and family at discharge. They are waiting for medication from meds to beds. FWW is in room. [facility] will be contacting daughter today.

## 2022-12-16 NOTE — CARE PLAN
"The patient is Stable - Low risk of patient condition declining or worsening    Shift Goals  Clinical Goals: Safety, assess skin  Patient Goals: Rest  Family Goals: SHANIQUE    Progress made toward(s) clinical / shift goals:  Discharge tomorrow 12/16/22    Problem: Knowledge Deficit - Standard  Goal: Patient and family/care givers will demonstrate understanding of plan of care, disease process/condition, diagnostic tests and medications  Outcome: Progressing  Note: Sandra Turcios Fall risk Assessment Score: 23    High fall risk Interventions   - Alarming seatbelt  - Wander guard  - Bed and strip alarm   - Yellow sign by the door   - Yellow wrist band \"Fall risk\"  - Room near to the nurse station  - Do not leave patient unattended in the bathroom  - Fall risk education provided  Patient and family utilize call light and wait for staff assistance with transfers.     Patient is to discharge tomorrow, family states readiness for his return.             "

## 2022-12-16 NOTE — DISCHARGE SUMMARY
"Admission Date: 12/2/2022    Discharge Date: 12/16/2022    Attending Provider: Sugey Barkley MD    Admission Diagnosis:   Active Hospital Problems    Diagnosis     *Transient ischemic attack (TIA)     Debility     Impaired mobility and ADLs     History of CVA (cerebrovascular accident)     Aneurysm of middle cerebral artery     Hard of hearing     S/P carotid endarterectomy     BPH (benign prostatic hyperplasia)     Mixed hyperlipidemia        Discharge Diagnosis:  Active Hospital Problems    Diagnosis     *Transient ischemic attack (TIA)     Debility     Impaired mobility and ADLs     History of CVA (cerebrovascular accident)     Aneurysm of middle cerebral artery     Hard of hearing     S/P carotid endarterectomy     BPH (benign prostatic hyperplasia)     Mixed hyperlipidemia        HPI per H&P:    Per Dr. Trinh's consult, \"The patient is a 88 y.o. right hand dominant male with a past medical history of stroke, right carotid endarterectomy, hypertension, hyperlipidemia, hard of hearing, cognitive impairment, BPH;  who presented on 11/23/2022  2:20 PM with acute onset right-sided weakness and speech difficulties.  Patient was found to have left common carotid and left ICA stenosis, and MR brain did not find acute infarct.  Patient underwent left carotid stent placement on 11/25 with Dr. Romero.\"     In addition to the above, patient's Head CT showed chronic left basal ganglia and thalamic lacunar infarctions, moderate white matter ischemic changes. 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       He was seen and evaluated by neurology and thought to have had a TIA. He will need to be on Plavix for 8 weeks with aspirin, then aspirin only indefinitely. Acute stay was complicated by hematuria (patient tried to remove his catheter) for which he was treated with continuous bladder irrigation. He had a " renal US without any findings of stones or hydronephrosis. His hematuria has improved and his hemoglobin stabilized.He had some hypotension that was treated with IVF and his lisinopril was held. He had a chest xray with mild vascular congestion. Patient was started on oral antibiotics for uvulitis. He was treated with 100 mg Seroquel at night for delirium.      Patient has a history of inpatient rehab stay in 4/2021 after suffering strokes in the right cerebellum, right occipital, and right frontal lobes. During that stay he had sundowing at night that was initially treated with Seroquel. He discharged with home health therapy, saw Dr. Sales in clinic, and switched to outpatient therapy. At the time of his clinic visit in 2021, he was ambulating without an assistive device.      Patient currently reports generalized weakness.  He reports he has floaters in the left eye but otherwise denies any changes in his vision.  He does report some pain with his urethra.     Patient was evaluated by Rehab Medicine physician and Physical Therapy and Occupational Therapy and determined to be appropriate for acute inpatient rehab and was transferred to [facility] on 12/2/2022  2:26 PM.       Rehab Hospital Course by Problem List:    Debility, improved  Moderate cognitive impairment, at baseline  TIA (aphasia)  Plavix and aspirin  Outpatient follow up with stroke bridge clinic, referral made     Encephalopathy, resolved  Sundowning, resolved  Occurred during last hospitalization as well  Multi-factorial - history of stroke, hard of hearing  Titrated off schedule Seroquel at night  Using as needed trazodone at night, provided at discharge     Carotid stenosis  S/p left stent 11/25 Dr. Norm Hollins for 8 weeks, stop date January 20  Aspirin indefinitely  Outpatient follow up with IR, referral made     Uvulitis, resolved  Completed Augmentin     History of hypertension  Hypotension, intermittent  Currently with good  control with intermittent high, no need to add back home medication of lisinopril at this point     Hematuria, resolved  From traumatic catheter pulling  Removed Bhatia catheter 12/6, voiding     Urinary frequency/urgency, resolved  Low-grade fever, resolved  Leukocytosis, resolved  Altered mental status, resolved  UTI, treated, culture with E. coli, started on Bactrim without much response, switched to cefuroxime, sensitive     Allergies/rhinorrhea   Claritin     Anemia  Improved     Azotemia, continues  Increase oral fluids     Bowel program  Constipation, intermittent  Increased bowel meds  Checked KUB 12/12 - without significant stool burden  Last BM 12/14     Bladder program  BPH  Continue Flomax  Removed Bhatia 12/6  Check PVRs - 69, 97  Not retaining     DVT prophylaxis  Contra-indicated due to recent hematuria  Patient walking with therapy  Continue to increase mobility  Monitor for edema     Functional Status at Discharge  Eating:  Independent  Eating Description:  Set-up of equipment or meal/tube feeding  Grooming:  Supervision  Grooming Description:  Increased time (oral care, shaving and combing hair)  Bathing:  Standby Assist  Bathing Description:  Grab bar, Tub bench, Hand held shower, Increased time, Set-up of equipment, Supervision for safety, Verbal cueing (close SBA and GB when standing)  Upper Body Dressing:  Supervision  Upper Body Dressing Description:  Increased time, Set-up of equipment (don/doff pullover shirt)  Lower Body Dressing:  Standby Assist  Lower Body Dressing Description:  Grab bar, Increased time, Set-up of equipment, Supervision for safety, Verbal cueing (Don/doff brief, pants, socks, shoes)  Discharge Location : Home  Patient Discharging with Assist of: Family ;Spouse / Significant Other  Level of Supervision Required: 24 Hour Supervision  Recommended Equipment for Discharge: Front-Wheeled Walker;Shower Chair;Grab Bars by Toilet;Grab Bars in Tub / Shower  Recommended Services Upon  Discharge: Home Health Occupational Therapy  Long Term Goals Met: 2  Long Term Goals Not Met: 0  Reason(s) for Goals Not Met: n/a  Criteria for Termination of Services: Maximum Function Achieved for Inpatient Rehabilitation  Walk:  Supervised  Distance Walked:  150  Number of Times Distance Was Traveled:  1  Assistive Device:  Front Wheel Walker  Gait Deviation:  Bradykinetic, Shuffled Gait  Wheelchair:  Stand by Assist  Distance Propelled:  130   Wheelchair Description:  Extra time, Leg rest management, Impaired coordination, Supervision for safety, Safety concerns, Verbal cueing  Stairs Standby Assist  Stairs Description Hand rails, Extra time, Supervision for safety, Verbal cueing, Safety concerns (ascended/descended side step wuth single HR)  Discharge Location: Home  Patient Discharging with Assist of: Family;Spouse / Significant Other;Friend  Level of Supervision Required Upon Discharge: Twenty Four Hour Supervision  Recommended Equipment for Discharge: Front-Wheeled Walker  Recommeded Services Upon Discharge: Home Health Physical Therapy  Long Term Goals Met: 4  Long Term Goals Not Met: 0  Reason(s) for Goals Not Met: n/a  Criteria for Termination of Services: Maximum Function Achieved for Inpatient Rehabilitation  Comments: required incidental touching only for car transfer with FWW  Comprehension:  Moderate Assist  Comprehension Description:  Hearing aids/amplifiers, Verbal cues, Visual devices  Expression:  Supervision  Expression Description:  Verbal cueing  Social Interaction:  Supervision  Social Interaction Description:  Verbal cues  Problem Solving:  Moderate Assist  Problem Solving Description:  Verbal cueing, Therapy schedule, Seat belt, Increased time, Bed/chair alarm  Memory:  Moderate Assist  Memory Description:  Seat belt, Therapy schedule, Verbal cueing, Increased time, Bed/chair alarm       ISugey M.D., personally performed a complete drug regimen review and no potential  clinically significant medication issues were identified.     Discharge Medication:     Medication List        START taking these medications        Instructions   traZODone 50 MG Tabs  Commonly known as: DESYREL   Take 1 Tablet by mouth at bedtime as needed for Sleep.  Dose: 50 mg            CHANGE how you take these medications        Instructions   melatonin 3 MG Tabs  What changed:   medication strength  how much to take  when to take this  reasons to take this   Take 1 Tablet by mouth at bedtime as needed (insomnia).  Dose: 3 mg            CONTINUE taking these medications        Instructions   aspirin 81 MG EC tablet   Take 1 Tablet by mouth every day.  Dose: 81 mg     atorvastatin 40 MG Tabs  Commonly known as: LIPITOR   Take 1 Tablet by mouth every evening.  Dose: 40 mg     clopidogrel 75 MG Tabs  Commonly known as: PLAVIX   Take 1 Tablet by mouth every day for 37 days.  Dose: 75 mg     loratadine 10 MG Tabs  Commonly known as: CLARITIN   Take 1 Tablet by mouth every day.  Dose: 10 mg     Psyllium 28 % packet  Commonly known as: METAMUCIL   Take 1 Packet by mouth 2 times a day.  Dose: 1 Packet     senna-docusate 8.6-50 MG Tabs  Commonly known as: PERICOLACE or SENOKOT S   Take 2 Tablets by mouth 2 times a day.  Dose: 2 Tablet     tamsulosin 0.4 MG capsule  Commonly known as: FLOMAX   Take 1 Capsule by mouth at bedtime.  Dose: 0.4 mg            STOP taking these medications      acetaminophen 325 MG Tabs  Commonly known as: Tylenol     amoxicillin-clavulanate 875-125 MG Tabs  Commonly known as: AUGMENTIN     bisacodyl 10 MG Supp  Commonly known as: DULCOLAX     magnesium hydroxide 400 MG/5ML Susp  Commonly known as: MILK OF MAGNESIA     ondansetron 4 MG Tbdp  Commonly known as: ZOFRAN ODT     polyethylene glycol/lytes 17 g Pack  Commonly known as: MIRALAX     QUEtiapine 100 MG Tabs  Commonly known as: Seroquel              Discharge Diet:  Regular    Discharge Activity:  As tolerated    Disposition:  Patient  to discharge home with family support and community resources.     Equipment:  Follow-up & Discharge Instructions:  Home health: PT/OT/SLP/RN     Equip: none needed     Follow up: PCP, stroke bridge clinic, IR     Condition on Discharge:  Good    More than 35 minutes was spent on discharging this patient, including face-to-face time, prescription management, and the dictation of this note.    Sugey Barkley M.D.    Date of Service: 12/16/2022

## 2022-12-16 NOTE — CARE PLAN
The patient is Watcher - Medium risk of patient condition declining or worsening    Shift Goals  Clinical Goals: Safety, assess skin  Patient Goals: Rest  Family Goals: SHANIQUE    Progress made toward(s) clinical / shift goals:    Problem: Infection  Goal: Patient will remain free from infection  Note: Patient remains free from s/s infection; afebrile.  Will continue to monitor.      Problem: Skin Integrity  Goal: Skin integrity is maintained or improved  Note: Patient's skin remains intact and free from new or accidental injury this shift.  Will continue to monitor.

## 2022-12-16 NOTE — PROGRESS NOTES
Patient discharged to home per order.  Discharge instructions reviewed with patient, wife, and daughter; they verbalize understanding and signed copies placed in chart.  Patient has all belongings; signed copy of form in chart.  Patient left facility at 1100 via wheelchair and personal vehicle accompanied by rehab staff and wife and daughter. Have enjoyed working with this pleasant patient.

## 2022-12-16 NOTE — CARE PLAN
Problem: Knowledge Deficit - Standard  Goal: Patient and family/care givers will demonstrate understanding of plan of care, disease process/condition, diagnostic tests and medications  Outcome: Met     Problem: Discharge Barriers/Planning  Goal: Patient's continuum of care needs are met  Outcome: Met     Problem: Psychosocial  Goal: Patient's ability to verbalize feelings about condition will improve  Outcome: Met     Problem: Bladder / Voiding  Goal: Patient will establish and maintain regular urinary output  Outcome: Met     Problem: Neurogenic Bowel  Goal: Patient will perform adequate hygiene with incontinent episodes  Outcome: Met  Goal: Patient will verbalize signs and symptoms of constipation and how to prevent/alleviate  Outcome: Met     Problem: Skin Integrity  Goal: Patient's skin integrity will be maintained or improve  Outcome: Met     Problem: Nutrition  Goal: Patient's nutritional and fluid intake will be adequate or improve  Outcome: Met     Problem: Mobility  Goal: Patient's capacity to carry out activities will improve  Outcome: Met     Problem: VTE Prevention  Goal: Patient will remain free from venous thromboembolism (VTE)  Outcome: Met     Problem: Skin Integrity  Goal: Skin integrity is maintained or improved  Outcome: Met     Problem: Urinary Elimination  Goal: Establish and maintain regular urinary output  Outcome: Met     Problem: Fall Risk - Rehab  Goal: Patient will remain free from falls  Outcome: Met     Problem: Infection  Goal: Patient will remain free from infection  Outcome: Met     Problem: Bowel Elimination  Goal: Patient will participate in bowel management program  Outcome: Met   The patient is Stable - Low risk of patient condition declining or worsening    Shift Goals  Clinical Goals: Safety  Patient Goals: Rest  Family Goals: SHANIQUE    Progress made toward(s) clinical / shift goals:      Patient is not progressing towards the following goals:

## 2022-12-19 NOTE — DOCUMENTATION QUERY
DOCUMENTATION QUERY    PROVIDERS: Please select “Cosign w/ note”to reply to query.    To better represent the severity of illness of your patient, please review the following information and exercise your independent professional judgment in responding to this query.     UTI/cystitis is documented in a patient with a previous jones (removed 12/6). Based upon the clinical findings, risk factors, and treatment, can the relationship between these two conditions be further specified?    UTI/cystitis secondary to indwelling urinary catheter  UTI/cystitis unrelated to indwelling urinary catheter  Other explanation of clinical findings (please document)  Unable to determine (no explanation for clinical findings)      The medical record reflects the following:   Clinical Findings Rehab Progress Notes 12/7/22 Dr. NESTOR Barkley  Interval Events (Subjective)  Patient seen and examined today in her room.   He has been having urinary frequency, urgency, and difficulty emptying his bladder since yesterday. He denies any dysuria. Had a mild low grade temp this morning. Per therapy staff, is more tired and more impaired compared to Monday, concerning for infection.      Rehab Progress Notes 12/13/22 Dr. NESTOR Barkley   Problem List/Medical Decision Making and Plan:  Hematuria, resolved  From traumatic catheter pulling  Removed Jones catheter 12/6, voiding     Urinary frequency/urgency, improved  Low-grade fever, resolved  Leukocytosis, resolved  Altered mental status, improved  UTI, culture with E. coli, started on Bactrim without much response, switched to cefuroxime, sensitive  Negative chest x-ray, flu, COVID       H&P 12/2/22 Dr. NESTOR Barkley  HPI:Acute stay was complicated by hematuria (patient tried to remove his catheter) for which he was treated with continuous bladder irrigation. He had a renal US without any findings of stones or hydronephrosis. His hematuria has improved and his hemoglobin stabilized    Plan:  Hematuria  From  traumatic catheter pulling  Continue Bhatia for now  Will remove once clears  Monitor Hgb   Treatment  Bactrim, cefuroxime   Risk Factors  Traumatic catheter pulling, bph, encephalopathy, sundowning   Location within medical record  Progress Notes and Discharge Summary     Thank you,   Kiarra Agee, [facility]    - Inpatient  [email or web address]

## 2022-12-22 ENCOUNTER — TELEPHONE (OUTPATIENT)
Dept: HEALTH INFORMATION MANAGEMENT | Facility: OTHER | Age: 87
End: 2022-12-22

## 2024-07-16 NOTE — PROGRESS NOTES
12 hour Chart Check completed.     Monitor Summary-  SR 61-79  Occasional Trigeminy Occasional PVC  Rare to Occasional PAC  0.16/0.08/0.40   Recommendations (Free Text): Discussed with pt the need to do a punch biopsy.  Pt would like to hold off to see if area heals.\\nPt states that he feels like he’s got an infected gum and causing issues to the right cheek\\n\\Paula Torres consulted.  Per , ok to hold off biopsy for 3 more weeks.  If no improvement needs to do punch biopsy.\\nDiscussed Oral prednisone good for one reason but impedes healing. \\nDiscussed needs to put Silvadene cream on telfa and leave covered. Render Risk Assessment In Note?: no Detail Level: Zone Recommendations (Free Text): Small area treated with LN2.  If heals will freeze sections at a time since fluouracil caused blistering
